# Patient Record
Sex: MALE | Race: WHITE | Employment: OTHER | URBAN - METROPOLITAN AREA
[De-identification: names, ages, dates, MRNs, and addresses within clinical notes are randomized per-mention and may not be internally consistent; named-entity substitution may affect disease eponyms.]

---

## 2017-01-01 ENCOUNTER — APPOINTMENT (OUTPATIENT)
Dept: GENERAL RADIOLOGY | Age: 57
DRG: 641 | End: 2017-01-01
Attending: HOSPITALIST
Payer: MEDICARE

## 2017-01-01 ENCOUNTER — APPOINTMENT (OUTPATIENT)
Dept: GENERAL RADIOLOGY | Age: 57
DRG: 641 | End: 2017-01-01
Attending: NURSE PRACTITIONER
Payer: MEDICARE

## 2017-01-01 ENCOUNTER — APPOINTMENT (OUTPATIENT)
Dept: GENERAL RADIOLOGY | Age: 57
DRG: 433 | End: 2017-01-01
Attending: HOSPITALIST
Payer: MEDICARE

## 2017-01-01 ENCOUNTER — HOSPITAL ENCOUNTER (INPATIENT)
Age: 57
LOS: 4 days | Discharge: HOME | DRG: 433 | End: 2017-11-24
Attending: EMERGENCY MEDICINE | Admitting: HOSPITALIST
Payer: MEDICARE

## 2017-01-01 ENCOUNTER — HOSPITAL ENCOUNTER (INPATIENT)
Age: 57
LOS: 4 days | Discharge: HOME | DRG: 641 | End: 2017-12-10
Attending: EMERGENCY MEDICINE | Admitting: INTERNAL MEDICINE
Payer: MEDICARE

## 2017-01-01 ENCOUNTER — APPOINTMENT (OUTPATIENT)
Dept: ULTRASOUND IMAGING | Age: 57
DRG: 433 | End: 2017-01-01
Attending: HOSPITALIST
Payer: MEDICARE

## 2017-01-01 ENCOUNTER — APPOINTMENT (OUTPATIENT)
Dept: GENERAL RADIOLOGY | Age: 57
DRG: 433 | End: 2017-01-01
Attending: EMERGENCY MEDICINE
Payer: MEDICARE

## 2017-01-01 ENCOUNTER — APPOINTMENT (OUTPATIENT)
Dept: ULTRASOUND IMAGING | Age: 57
DRG: 433 | End: 2017-01-01
Attending: INTERNAL MEDICINE
Payer: MEDICARE

## 2017-01-01 VITALS
BODY MASS INDEX: 28.41 KG/M2 | HEIGHT: 70 IN | TEMPERATURE: 98 F | DIASTOLIC BLOOD PRESSURE: 53 MMHG | OXYGEN SATURATION: 97 % | SYSTOLIC BLOOD PRESSURE: 94 MMHG | HEART RATE: 60 BPM | RESPIRATION RATE: 16 BRPM | WEIGHT: 198.41 LBS

## 2017-01-01 VITALS
RESPIRATION RATE: 15 BRPM | BODY MASS INDEX: 29.35 KG/M2 | WEIGHT: 205 LBS | DIASTOLIC BLOOD PRESSURE: 55 MMHG | HEIGHT: 70 IN | TEMPERATURE: 97.9 F | OXYGEN SATURATION: 98 % | HEART RATE: 60 BPM | SYSTOLIC BLOOD PRESSURE: 109 MMHG

## 2017-01-01 DIAGNOSIS — E87.1 HYPONATREMIA: Primary | ICD-10-CM

## 2017-01-01 DIAGNOSIS — D64.9 ANEMIA, UNSPECIFIED TYPE: ICD-10-CM

## 2017-01-01 DIAGNOSIS — R55 SYNCOPE AND COLLAPSE: ICD-10-CM

## 2017-01-01 DIAGNOSIS — F10.10 ALCOHOL ABUSE: ICD-10-CM

## 2017-01-01 DIAGNOSIS — N17.9 AKI (ACUTE KIDNEY INJURY) (HCC): ICD-10-CM

## 2017-01-01 DIAGNOSIS — D69.6 THROMBOCYTOPENIA (HCC): ICD-10-CM

## 2017-01-01 DIAGNOSIS — K74.60 CIRRHOSIS OF LIVER WITHOUT ASCITES, UNSPECIFIED HEPATIC CIRRHOSIS TYPE (HCC): ICD-10-CM

## 2017-01-01 DIAGNOSIS — K70.31 ALCOHOLIC CIRRHOSIS OF LIVER WITH ASCITES (HCC): ICD-10-CM
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
## 2017-01-01 DIAGNOSIS — R18.8 OTHER ASCITES: ICD-10-CM

## 2017-01-01 LAB
ABO + RH BLD: NORMAL
ALBUMIN SERPL-MCNC: 2.5 G/DL (ref 3.5–5)
ALBUMIN SERPL-MCNC: 2.7 G/DL (ref 3.5–5)
ALBUMIN SERPL-MCNC: 3.2 G/DL (ref 3.5–5)
ALBUMIN SERPL-MCNC: 4 G/DL (ref 3.5–5)
ALBUMIN/GLOB SERPL: 0.6 {RATIO} (ref 1.1–2.2)
ALBUMIN/GLOB SERPL: 0.6 {RATIO} (ref 1.1–2.2)
ALBUMIN/GLOB SERPL: 0.7 {RATIO} (ref 1.1–2.2)
ALBUMIN/GLOB SERPL: 1.2 {RATIO} (ref 1.1–2.2)
ALP SERPL-CCNC: 158 U/L (ref 45–117)
ALP SERPL-CCNC: 159 U/L (ref 45–117)
ALP SERPL-CCNC: 170 U/L (ref 45–117)
ALP SERPL-CCNC: 242 U/L (ref 45–117)
ALT SERPL-CCNC: 45 U/L (ref 12–78)
ALT SERPL-CCNC: 48 U/L (ref 12–78)
ALT SERPL-CCNC: 51 U/L (ref 12–78)
ALT SERPL-CCNC: 60 U/L (ref 12–78)
ANION GAP SERPL CALC-SCNC: 10 MMOL/L (ref 5–15)
ANION GAP SERPL CALC-SCNC: 11 MMOL/L (ref 5–15)
ANION GAP SERPL CALC-SCNC: 7 MMOL/L (ref 5–15)
ANION GAP SERPL CALC-SCNC: 8 MMOL/L (ref 5–15)
ANION GAP SERPL CALC-SCNC: 9 MMOL/L (ref 5–15)
APPEARANCE FLD: CLEAR
APPEARANCE UR: CLEAR
APTT PPP: 33.4 SEC (ref 22.1–32.5)
AST SERPL-CCNC: 107 U/L (ref 15–37)
AST SERPL-CCNC: 110 U/L (ref 15–37)
AST SERPL-CCNC: 129 U/L (ref 15–37)
AST SERPL-CCNC: 79 U/L (ref 15–37)
ATRIAL RATE: 60 BPM
BACTERIA URNS QL MICRO: NEGATIVE /HPF
BASOPHILS # BLD: 0 K/UL (ref 0–0.1)
BASOPHILS # BLD: 0.1 K/UL (ref 0–0.1)
BASOPHILS NFR BLD: 0 % (ref 0–1)
BASOPHILS NFR BLD: 1 % (ref 0–1)
BASOPHILS NFR BLD: 2 % (ref 0–1)
BILIRUB SERPL-MCNC: 4.6 MG/DL (ref 0.2–1)
BILIRUB SERPL-MCNC: 5.4 MG/DL (ref 0.2–1)
BILIRUB SERPL-MCNC: 6.1 MG/DL (ref 0.2–1)
BILIRUB SERPL-MCNC: 6.3 MG/DL (ref 0.2–1)
BILIRUB UR QL: NEGATIVE
BLD PROD TYP BPU: NORMAL
BLD PROD TYP BPU: NORMAL
BLOOD GROUP ANTIBODIES SERPL: NORMAL
BPU ID: NORMAL
BPU ID: NORMAL
BUN SERPL-MCNC: 11 MG/DL (ref 6–20)
BUN SERPL-MCNC: 12 MG/DL (ref 6–20)
BUN SERPL-MCNC: 12 MG/DL (ref 6–20)
BUN SERPL-MCNC: 13 MG/DL (ref 6–20)
BUN SERPL-MCNC: 14 MG/DL (ref 6–20)
BUN SERPL-MCNC: 16 MG/DL (ref 6–20)
BUN SERPL-MCNC: 17 MG/DL (ref 6–20)
BUN SERPL-MCNC: 17 MG/DL (ref 6–20)
BUN SERPL-MCNC: 24 MG/DL (ref 6–20)
BUN/CREAT SERPL: 12 (ref 12–20)
BUN/CREAT SERPL: 13 (ref 12–20)
BUN/CREAT SERPL: 14 (ref 12–20)
BUN/CREAT SERPL: 17 (ref 12–20)
BUN/CREAT SERPL: 18 (ref 12–20)
CALCIUM SERPL-MCNC: 10.1 MG/DL (ref 8.5–10.1)
CALCIUM SERPL-MCNC: 8.3 MG/DL (ref 8.5–10.1)
CALCIUM SERPL-MCNC: 8.4 MG/DL (ref 8.5–10.1)
CALCIUM SERPL-MCNC: 8.8 MG/DL (ref 8.5–10.1)
CALCIUM SERPL-MCNC: 8.9 MG/DL (ref 8.5–10.1)
CALCIUM SERPL-MCNC: 8.9 MG/DL (ref 8.5–10.1)
CALCIUM SERPL-MCNC: 9 MG/DL (ref 8.5–10.1)
CALCIUM SERPL-MCNC: 9.2 MG/DL (ref 8.5–10.1)
CALCIUM SERPL-MCNC: 9.7 MG/DL (ref 8.5–10.1)
CALCULATED P AXIS, ECG09: -3 DEGREES
CALCULATED R AXIS, ECG10: -114 DEGREES
CALCULATED T AXIS, ECG11: 31 DEGREES
CHLORIDE SERPL-SCNC: 86 MMOL/L (ref 97–108)
CHLORIDE SERPL-SCNC: 87 MMOL/L (ref 97–108)
CHLORIDE SERPL-SCNC: 91 MMOL/L (ref 97–108)
CHLORIDE SERPL-SCNC: 93 MMOL/L (ref 97–108)
CHLORIDE SERPL-SCNC: 93 MMOL/L (ref 97–108)
CHLORIDE SERPL-SCNC: 94 MMOL/L (ref 97–108)
CHLORIDE SERPL-SCNC: 96 MMOL/L (ref 97–108)
CHLORIDE SERPL-SCNC: 96 MMOL/L (ref 97–108)
CHLORIDE SERPL-SCNC: 97 MMOL/L (ref 97–108)
CO2 SERPL-SCNC: 21 MMOL/L (ref 21–32)
CO2 SERPL-SCNC: 21 MMOL/L (ref 21–32)
CO2 SERPL-SCNC: 22 MMOL/L (ref 21–32)
CO2 SERPL-SCNC: 22 MMOL/L (ref 21–32)
CO2 SERPL-SCNC: 24 MMOL/L (ref 21–32)
CO2 SERPL-SCNC: 25 MMOL/L (ref 21–32)
CO2 SERPL-SCNC: 26 MMOL/L (ref 21–32)
CO2 SERPL-SCNC: 26 MMOL/L (ref 21–32)
COLOR FLD: YELLOW
COLOR UR: ABNORMAL
CREAT SERPL-MCNC: 0.86 MG/DL (ref 0.7–1.3)
CREAT SERPL-MCNC: 0.87 MG/DL (ref 0.7–1.3)
CREAT SERPL-MCNC: 0.9 MG/DL (ref 0.7–1.3)
CREAT SERPL-MCNC: 0.9 MG/DL (ref 0.7–1.3)
CREAT SERPL-MCNC: 0.97 MG/DL (ref 0.7–1.3)
CREAT SERPL-MCNC: 0.98 MG/DL (ref 0.7–1.3)
CREAT SERPL-MCNC: 0.98 MG/DL (ref 0.7–1.3)
CREAT SERPL-MCNC: 1.01 MG/DL (ref 0.7–1.3)
CREAT SERPL-MCNC: 1.03 MG/DL (ref 0.7–1.3)
CREAT SERPL-MCNC: 1.41 MG/DL (ref 0.7–1.3)
CREAT SERPL-MCNC: 1.77 MG/DL (ref 0.7–1.3)
CROSSMATCH RESULT,%XM: NORMAL
CROSSMATCH RESULT,%XM: NORMAL
DIAGNOSIS, 93000: NORMAL
DIFFERENTIAL METHOD BLD: ABNORMAL
DIGOXIN SERPL-MCNC: 0.6 NG/ML (ref 0.9–2)
DIGOXIN SERPL-MCNC: 0.8 NG/ML (ref 0.9–2)
EOSINOPHIL # BLD: 0 K/UL (ref 0–0.4)
EOSINOPHIL # BLD: 0.1 K/UL (ref 0–0.4)
EOSINOPHIL NFR BLD: 0 % (ref 0–7)
EOSINOPHIL NFR BLD: 1 % (ref 0–7)
EOSINOPHIL NFR BLD: 1 % (ref 0–7)
EOSINOPHIL NFR BLD: 2 % (ref 0–7)
EOSINOPHIL NFR BLD: 3 % (ref 0–7)
EOSINOPHIL NFR BLD: 4 % (ref 0–7)
EPITH CASTS URNS QL MICRO: ABNORMAL /LPF
ERYTHROCYTE [DISTWIDTH] IN BLOOD BY AUTOMATED COUNT: 12.4 % (ref 11.5–14.5)
ERYTHROCYTE [DISTWIDTH] IN BLOOD BY AUTOMATED COUNT: 12.8 % (ref 11.5–14.5)
ERYTHROCYTE [DISTWIDTH] IN BLOOD BY AUTOMATED COUNT: 13 % (ref 11.5–14.5)
ERYTHROCYTE [DISTWIDTH] IN BLOOD BY AUTOMATED COUNT: 13 % (ref 11.5–14.5)
ERYTHROCYTE [DISTWIDTH] IN BLOOD BY AUTOMATED COUNT: 13.1 % (ref 11.5–14.5)
ERYTHROCYTE [DISTWIDTH] IN BLOOD BY AUTOMATED COUNT: 13.1 % (ref 11.5–14.5)
ERYTHROCYTE [DISTWIDTH] IN BLOOD BY AUTOMATED COUNT: 13.4 % (ref 11.5–14.5)
EST. AVERAGE GLUCOSE BLD GHB EST-MCNC: 114 MG/DL
EST. AVERAGE GLUCOSE BLD GHB EST-MCNC: NORMAL MG/DL
ETHANOL SERPL-MCNC: 136 MG/DL
FOLATE SERPL-MCNC: 19.9 NG/ML (ref 5–21)
GLOBULIN SER CALC-MCNC: 3.3 G/DL (ref 2–4)
GLOBULIN SER CALC-MCNC: 4.1 G/DL (ref 2–4)
GLOBULIN SER CALC-MCNC: 4.3 G/DL (ref 2–4)
GLOBULIN SER CALC-MCNC: 4.9 G/DL (ref 2–4)
GLUCOSE BLD STRIP.AUTO-MCNC: 111 MG/DL (ref 65–100)
GLUCOSE BLD STRIP.AUTO-MCNC: 121 MG/DL (ref 65–100)
GLUCOSE BLD STRIP.AUTO-MCNC: 125 MG/DL (ref 65–100)
GLUCOSE BLD STRIP.AUTO-MCNC: 126 MG/DL (ref 65–100)
GLUCOSE BLD STRIP.AUTO-MCNC: 128 MG/DL (ref 65–100)
GLUCOSE BLD STRIP.AUTO-MCNC: 129 MG/DL (ref 65–100)
GLUCOSE BLD STRIP.AUTO-MCNC: 130 MG/DL (ref 65–100)
GLUCOSE BLD STRIP.AUTO-MCNC: 134 MG/DL (ref 65–100)
GLUCOSE BLD STRIP.AUTO-MCNC: 135 MG/DL (ref 65–100)
GLUCOSE BLD STRIP.AUTO-MCNC: 137 MG/DL (ref 65–100)
GLUCOSE BLD STRIP.AUTO-MCNC: 140 MG/DL (ref 65–100)
GLUCOSE BLD STRIP.AUTO-MCNC: 140 MG/DL (ref 65–100)
GLUCOSE BLD STRIP.AUTO-MCNC: 141 MG/DL (ref 65–100)
GLUCOSE BLD STRIP.AUTO-MCNC: 142 MG/DL (ref 65–100)
GLUCOSE BLD STRIP.AUTO-MCNC: 144 MG/DL (ref 65–100)
GLUCOSE BLD STRIP.AUTO-MCNC: 145 MG/DL (ref 65–100)
GLUCOSE BLD STRIP.AUTO-MCNC: 149 MG/DL (ref 65–100)
GLUCOSE BLD STRIP.AUTO-MCNC: 153 MG/DL (ref 65–100)
GLUCOSE BLD STRIP.AUTO-MCNC: 157 MG/DL (ref 65–100)
GLUCOSE BLD STRIP.AUTO-MCNC: 172 MG/DL (ref 65–100)
GLUCOSE BLD STRIP.AUTO-MCNC: 175 MG/DL (ref 65–100)
GLUCOSE BLD STRIP.AUTO-MCNC: 181 MG/DL (ref 65–100)
GLUCOSE BLD STRIP.AUTO-MCNC: 185 MG/DL (ref 65–100)
GLUCOSE BLD STRIP.AUTO-MCNC: 186 MG/DL (ref 65–100)
GLUCOSE BLD STRIP.AUTO-MCNC: 186 MG/DL (ref 65–100)
GLUCOSE BLD STRIP.AUTO-MCNC: 188 MG/DL (ref 65–100)
GLUCOSE BLD STRIP.AUTO-MCNC: 194 MG/DL (ref 65–100)
GLUCOSE BLD STRIP.AUTO-MCNC: 197 MG/DL (ref 65–100)
GLUCOSE BLD STRIP.AUTO-MCNC: 197 MG/DL (ref 65–100)
GLUCOSE BLD STRIP.AUTO-MCNC: 206 MG/DL (ref 65–100)
GLUCOSE BLD STRIP.AUTO-MCNC: 243 MG/DL (ref 65–100)
GLUCOSE SERPL-MCNC: 103 MG/DL (ref 65–100)
GLUCOSE SERPL-MCNC: 115 MG/DL (ref 65–100)
GLUCOSE SERPL-MCNC: 121 MG/DL (ref 65–100)
GLUCOSE SERPL-MCNC: 123 MG/DL (ref 65–100)
GLUCOSE SERPL-MCNC: 137 MG/DL (ref 65–100)
GLUCOSE SERPL-MCNC: 137 MG/DL (ref 65–100)
GLUCOSE SERPL-MCNC: 139 MG/DL (ref 65–100)
GLUCOSE SERPL-MCNC: 141 MG/DL (ref 65–100)
GLUCOSE SERPL-MCNC: 144 MG/DL (ref 65–100)
GLUCOSE SERPL-MCNC: 166 MG/DL (ref 65–100)
GLUCOSE SERPL-MCNC: 170 MG/DL (ref 65–100)
GLUCOSE UR STRIP.AUTO-MCNC: 250 MG/DL
HBA1C MFR BLD: 4.9 % (ref 4.2–6.3)
HBA1C MFR BLD: 5.6 % (ref 4.2–6.3)
HCT VFR BLD AUTO: 18.9 % (ref 36.6–50.3)
HCT VFR BLD AUTO: 22.4 % (ref 36.6–50.3)
HCT VFR BLD AUTO: 23.3 % (ref 36.6–50.3)
HCT VFR BLD AUTO: 23.7 % (ref 36.6–50.3)
HCT VFR BLD AUTO: 24.5 % (ref 36.6–50.3)
HCT VFR BLD AUTO: 24.9 % (ref 36.6–50.3)
HCT VFR BLD AUTO: 25.5 % (ref 36.6–50.3)
HCT VFR BLD AUTO: 27.4 % (ref 36.6–50.3)
HCT VFR BLD AUTO: 28.5 % (ref 36.6–50.3)
HCT VFR BLD AUTO: 29.6 % (ref 36.6–50.3)
HGB BLD-MCNC: 10 G/DL (ref 12.1–17)
HGB BLD-MCNC: 11 G/DL (ref 12.1–17)
HGB BLD-MCNC: 6.9 G/DL (ref 12.1–17)
HGB BLD-MCNC: 8.3 G/DL (ref 12.1–17)
HGB BLD-MCNC: 8.5 G/DL (ref 12.1–17)
HGB BLD-MCNC: 8.7 G/DL (ref 12.1–17)
HGB BLD-MCNC: 8.8 G/DL (ref 12.1–17)
HGB BLD-MCNC: 9 G/DL (ref 12.1–17)
HGB BLD-MCNC: 9.5 G/DL (ref 12.1–17)
HGB BLD-MCNC: 9.9 G/DL (ref 12.1–17)
HGB UR QL STRIP: NEGATIVE
HYALINE CASTS URNS QL MICRO: ABNORMAL /LPF (ref 0–5)
INR PPP: 1.3 (ref 0.9–1.1)
KETONES UR QL STRIP.AUTO: NEGATIVE MG/DL
LEUKOCYTE ESTERASE UR QL STRIP.AUTO: NEGATIVE
LYMPHOCYTES # BLD: 0.2 K/UL (ref 0.8–3.5)
LYMPHOCYTES # BLD: 0.3 K/UL (ref 0.8–3.5)
LYMPHOCYTES # BLD: 0.3 K/UL (ref 0.8–3.5)
LYMPHOCYTES # BLD: 0.4 K/UL (ref 0.8–3.5)
LYMPHOCYTES NFR BLD: 10 % (ref 12–49)
LYMPHOCYTES NFR BLD: 11 % (ref 12–49)
LYMPHOCYTES NFR BLD: 13 % (ref 12–49)
LYMPHOCYTES NFR BLD: 19 % (ref 12–49)
LYMPHOCYTES NFR BLD: 22 % (ref 12–49)
LYMPHOCYTES NFR BLD: 4 % (ref 12–49)
LYMPHOCYTES NFR BLD: 7 % (ref 12–49)
LYMPHOCYTES NFR BLD: 9 % (ref 12–49)
LYMPHOCYTES NFR FLD: 2 %
MAGNESIUM SERPL-MCNC: 1.5 MG/DL (ref 1.6–2.4)
MAGNESIUM SERPL-MCNC: 1.8 MG/DL (ref 1.6–2.4)
MCH RBC QN AUTO: 37.4 PG (ref 26–34)
MCH RBC QN AUTO: 37.9 PG (ref 26–34)
MCH RBC QN AUTO: 38.1 PG (ref 26–34)
MCH RBC QN AUTO: 38.5 PG (ref 26–34)
MCH RBC QN AUTO: 38.6 PG (ref 26–34)
MCH RBC QN AUTO: 38.6 PG (ref 26–34)
MCH RBC QN AUTO: 39.1 PG (ref 26–34)
MCH RBC QN AUTO: 39.4 PG (ref 26–34)
MCH RBC QN AUTO: 39.6 PG (ref 26–34)
MCHC RBC AUTO-ENTMCNC: 34.7 G/DL (ref 30–36.5)
MCHC RBC AUTO-ENTMCNC: 35.9 G/DL (ref 30–36.5)
MCHC RBC AUTO-ENTMCNC: 36.1 G/DL (ref 30–36.5)
MCHC RBC AUTO-ENTMCNC: 36.5 G/DL (ref 30–36.5)
MCHC RBC AUTO-ENTMCNC: 37.1 G/DL (ref 30–36.5)
MCHC RBC AUTO-ENTMCNC: 37.2 G/DL (ref 30–36.5)
MCHC RBC AUTO-ENTMCNC: 37.3 G/DL (ref 30–36.5)
MCV RBC AUTO: 102.3 FL (ref 80–99)
MCV RBC AUTO: 104.3 FL (ref 80–99)
MCV RBC AUTO: 104.4 FL (ref 80–99)
MCV RBC AUTO: 105.3 FL (ref 80–99)
MCV RBC AUTO: 105.4 FL (ref 80–99)
MCV RBC AUTO: 105.9 FL (ref 80–99)
MCV RBC AUTO: 106.3 FL (ref 80–99)
MCV RBC AUTO: 106.9 FL (ref 80–99)
MCV RBC AUTO: 113 FL (ref 80–99)
MESOTHL CELL NFR FLD: 15 %
MONOCYTES # BLD: 0.2 K/UL (ref 0–1)
MONOCYTES # BLD: 0.2 K/UL (ref 0–1)
MONOCYTES # BLD: 0.3 K/UL (ref 0–1)
MONOCYTES # BLD: 0.4 K/UL (ref 0–1)
MONOCYTES # BLD: 0.5 K/UL (ref 0–1)
MONOCYTES # BLD: 0.5 K/UL (ref 0–1)
MONOCYTES # BLD: 0.6 K/UL (ref 0–1)
MONOCYTES # BLD: 0.7 K/UL (ref 0–1)
MONOCYTES NFR BLD: 11 % (ref 5–13)
MONOCYTES NFR BLD: 12 % (ref 5–13)
MONOCYTES NFR BLD: 16 % (ref 5–13)
MONOCYTES NFR BLD: 18 % (ref 5–13)
MONOCYTES NFR BLD: 22 % (ref 5–13)
MONOCYTES NFR BLD: 23 % (ref 5–13)
MONOCYTES NFR BLD: 9 % (ref 5–13)
MONOCYTES NFR BLD: 9 % (ref 5–13)
MONOS+MACROS NFR FLD: 82 %
NEUTROPHILS NFR FLD: 1 %
NEUTS BAND NFR BLD MANUAL: 5 % (ref 0–6)
NEUTS SEG # BLD: 1.1 K/UL (ref 1.8–8)
NEUTS SEG # BLD: 1.3 K/UL (ref 1.8–8)
NEUTS SEG # BLD: 1.5 K/UL (ref 1.8–8)
NEUTS SEG # BLD: 1.6 K/UL (ref 1.8–8)
NEUTS SEG # BLD: 1.7 K/UL (ref 1.8–8)
NEUTS SEG # BLD: 1.9 K/UL (ref 1.8–8)
NEUTS SEG # BLD: 3.2 K/UL (ref 1.8–8)
NEUTS SEG # BLD: 3.7 K/UL (ref 1.8–8)
NEUTS SEG NFR BLD: 62 % (ref 32–75)
NEUTS SEG NFR BLD: 62 % (ref 32–75)
NEUTS SEG NFR BLD: 64 % (ref 32–75)
NEUTS SEG NFR BLD: 66 % (ref 32–75)
NEUTS SEG NFR BLD: 68 % (ref 32–75)
NEUTS SEG NFR BLD: 77 % (ref 32–75)
NEUTS SEG NFR BLD: 78 % (ref 32–75)
NEUTS SEG NFR BLD: 83 % (ref 32–75)
NITRITE UR QL STRIP.AUTO: NEGATIVE
NRBC # BLD: 0.03 K/UL (ref 0–0.01)
NRBC BLD-RTO: 1.2 PER 100 WBC
NUC CELL # FLD: 247 /CU MM (ref 0–5)
OSMOLALITY SERPL: 273 MOSM/KG H2O
OSMOLALITY UR: 195 MOSM/KG H2O
P-R INTERVAL, ECG05: 226 MS
PH UR STRIP: 6 [PH] (ref 5–8)
PLATELET # BLD AUTO: 48 K/UL (ref 150–400)
PLATELET # BLD AUTO: 50 K/UL (ref 150–400)
PLATELET # BLD AUTO: 55 K/UL (ref 150–400)
PLATELET # BLD AUTO: 57 K/UL (ref 150–400)
PLATELET # BLD AUTO: 60 K/UL (ref 150–400)
PLATELET # BLD AUTO: 68 K/UL (ref 150–400)
PLATELET # BLD AUTO: 71 K/UL (ref 150–400)
PLATELET # BLD AUTO: 85 K/UL (ref 150–400)
PLATELET # BLD AUTO: 89 K/UL (ref 150–400)
POTASSIUM SERPL-SCNC: 3.9 MMOL/L (ref 3.5–5.1)
POTASSIUM SERPL-SCNC: 4.2 MMOL/L (ref 3.5–5.1)
POTASSIUM SERPL-SCNC: 4.2 MMOL/L (ref 3.5–5.1)
POTASSIUM SERPL-SCNC: 4.3 MMOL/L (ref 3.5–5.1)
POTASSIUM SERPL-SCNC: 4.4 MMOL/L (ref 3.5–5.1)
POTASSIUM SERPL-SCNC: 4.4 MMOL/L (ref 3.5–5.1)
POTASSIUM SERPL-SCNC: 4.5 MMOL/L (ref 3.5–5.1)
POTASSIUM SERPL-SCNC: 4.7 MMOL/L (ref 3.5–5.1)
POTASSIUM SERPL-SCNC: 5.1 MMOL/L (ref 3.5–5.1)
PROT SERPL-MCNC: 6.6 G/DL (ref 6.4–8.2)
PROT SERPL-MCNC: 7.3 G/DL (ref 6.4–8.2)
PROT SERPL-MCNC: 7.5 G/DL (ref 6.4–8.2)
PROT SERPL-MCNC: 7.6 G/DL (ref 6.4–8.2)
PROT UR STRIP-MCNC: NEGATIVE MG/DL
PROTHROMBIN TIME: 13.3 SEC (ref 9–11.1)
Q-T INTERVAL, ECG07: 500 MS
QRS DURATION, ECG06: 158 MS
QTC CALCULATION (BEZET), ECG08: 500 MS
RBC # BLD AUTO: 1.81 M/UL (ref 4.1–5.7)
RBC # BLD AUTO: 2.19 M/UL (ref 4.1–5.7)
RBC # BLD AUTO: 2.2 M/UL (ref 4.1–5.7)
RBC # BLD AUTO: 2.33 M/UL (ref 4.1–5.7)
RBC # BLD AUTO: 2.35 M/UL (ref 4.1–5.7)
RBC # BLD AUTO: 2.4 M/UL (ref 4.1–5.7)
RBC # BLD AUTO: 2.51 M/UL (ref 4.1–5.7)
RBC # BLD AUTO: 2.6 M/UL (ref 4.1–5.7)
RBC # BLD AUTO: 2.81 M/UL (ref 4.1–5.7)
RBC # FLD: >100 /CU MM
RBC #/AREA URNS HPF: ABNORMAL /HPF (ref 0–5)
RBC MORPH BLD: ABNORMAL
SERVICE CMNT-IMP: ABNORMAL
SODIUM SERPL-SCNC: 117 MMOL/L (ref 136–145)
SODIUM SERPL-SCNC: 118 MMOL/L (ref 136–145)
SODIUM SERPL-SCNC: 123 MMOL/L (ref 136–145)
SODIUM SERPL-SCNC: 124 MMOL/L (ref 136–145)
SODIUM SERPL-SCNC: 125 MMOL/L (ref 136–145)
SODIUM SERPL-SCNC: 127 MMOL/L (ref 136–145)
SODIUM SERPL-SCNC: 127 MMOL/L (ref 136–145)
SODIUM SERPL-SCNC: 129 MMOL/L (ref 136–145)
SODIUM SERPL-SCNC: 130 MMOL/L (ref 136–145)
SODIUM SERPL-SCNC: 130 MMOL/L (ref 136–145)
SODIUM SERPL-SCNC: 131 MMOL/L (ref 136–145)
SP GR UR REFRACTOMETRY: 1.01 (ref 1–1.03)
SPECIMEN EXP DATE BLD: NORMAL
SPECIMEN SOURCE FLD: ABNORMAL
STATUS OF UNIT,%ST: NORMAL
STATUS OF UNIT,%ST: NORMAL
THERAPEUTIC RANGE,PTTT: ABNORMAL SECS (ref 58–77)
UNIT DIVISION, %UDIV: 0
UNIT DIVISION, %UDIV: 0
UR CULT HOLD, URHOLD: NORMAL
UROBILINOGEN UR QL STRIP.AUTO: 1 EU/DL (ref 0.2–1)
VENTRICULAR RATE, ECG03: 60 BPM
VIT B12 SERPL-MCNC: 1280 PG/ML (ref 211–911)
VIT B12 SERPL-MCNC: 886 PG/ML (ref 211–911)
WBC # BLD AUTO: 1.9 K/UL (ref 4.1–11.1)
WBC # BLD AUTO: 1.9 K/UL (ref 4.1–11.1)
WBC # BLD AUTO: 2.1 K/UL (ref 4.1–11.1)
WBC # BLD AUTO: 2.3 K/UL (ref 4.1–11.1)
WBC # BLD AUTO: 2.5 K/UL (ref 4.1–11.1)
WBC # BLD AUTO: 2.6 K/UL (ref 4.1–11.1)
WBC # BLD AUTO: 2.9 K/UL (ref 4.1–11.1)
WBC # BLD AUTO: 4.1 K/UL (ref 4.1–11.1)
WBC # BLD AUTO: 4.4 K/UL (ref 4.1–11.1)
WBC NRBC COR # BLD: ABNORMAL 10*3/UL
WBC URNS QL MICRO: ABNORMAL /HPF (ref 0–4)

## 2017-01-01 PROCEDURE — 74011636637 HC RX REV CODE- 636/637: Performed by: INTERNAL MEDICINE

## 2017-01-01 PROCEDURE — 80053 COMPREHEN METABOLIC PANEL: CPT | Performed by: EMERGENCY MEDICINE

## 2017-01-01 PROCEDURE — 77030011256 HC DRSG MEPILEX <16IN NO BORD MOLN -A

## 2017-01-01 PROCEDURE — 74011250637 HC RX REV CODE- 250/637: Performed by: HOSPITALIST

## 2017-01-01 PROCEDURE — 83735 ASSAY OF MAGNESIUM: CPT | Performed by: HOSPITALIST

## 2017-01-01 PROCEDURE — 97161 PT EVAL LOW COMPLEX 20 MIN: CPT

## 2017-01-01 PROCEDURE — 65660000000 HC RM CCU STEPDOWN

## 2017-01-01 PROCEDURE — 77030010545

## 2017-01-01 PROCEDURE — 77030012940 HC DRSG HYDRCOIL BMS -B

## 2017-01-01 PROCEDURE — 93971 EXTREMITY STUDY: CPT

## 2017-01-01 PROCEDURE — 82962 GLUCOSE BLOOD TEST: CPT

## 2017-01-01 PROCEDURE — 74011250637 HC RX REV CODE- 250/637: Performed by: INTERNAL MEDICINE

## 2017-01-01 PROCEDURE — 77030028894 HC DRSG MEDIH CA ALG INLC -B

## 2017-01-01 PROCEDURE — 83036 HEMOGLOBIN GLYCOSYLATED A1C: CPT | Performed by: HOSPITALIST

## 2017-01-01 PROCEDURE — 74011250636 HC RX REV CODE- 250/636: Performed by: INTERNAL MEDICINE

## 2017-01-01 PROCEDURE — 94761 N-INVAS EAR/PLS OXIMETRY MLT: CPT

## 2017-01-01 PROCEDURE — 93005 ELECTROCARDIOGRAM TRACING: CPT

## 2017-01-01 PROCEDURE — 85025 COMPLETE CBC W/AUTO DIFF WBC: CPT | Performed by: EMERGENCY MEDICINE

## 2017-01-01 PROCEDURE — 82607 VITAMIN B-12: CPT | Performed by: INTERNAL MEDICINE

## 2017-01-01 PROCEDURE — 85025 COMPLETE CBC W/AUTO DIFF WBC: CPT | Performed by: HOSPITALIST

## 2017-01-01 PROCEDURE — 74011000250 HC RX REV CODE- 250: Performed by: RADIOLOGY

## 2017-01-01 PROCEDURE — 36415 COLL VENOUS BLD VENIPUNCTURE: CPT | Performed by: HOSPITALIST

## 2017-01-01 PROCEDURE — 86923 COMPATIBILITY TEST ELECTRIC: CPT | Performed by: FAMILY MEDICINE

## 2017-01-01 PROCEDURE — 74011250636 HC RX REV CODE- 250/636: Performed by: EMERGENCY MEDICINE

## 2017-01-01 PROCEDURE — 74000 XR ABD PORT  1 V: CPT

## 2017-01-01 PROCEDURE — 74011636637 HC RX REV CODE- 636/637: Performed by: HOSPITALIST

## 2017-01-01 PROCEDURE — 77030002996 HC SUT SLK J&J -A

## 2017-01-01 PROCEDURE — 71020 XR CHEST PA LAT: CPT

## 2017-01-01 PROCEDURE — 96374 THER/PROPH/DIAG INJ IV PUSH: CPT

## 2017-01-01 PROCEDURE — P9047 ALBUMIN (HUMAN), 25%, 50ML: HCPCS | Performed by: INTERNAL MEDICINE

## 2017-01-01 PROCEDURE — 85730 THROMBOPLASTIN TIME PARTIAL: CPT | Performed by: EMERGENCY MEDICINE

## 2017-01-01 PROCEDURE — 80048 BASIC METABOLIC PNL TOTAL CA: CPT | Performed by: HOSPITALIST

## 2017-01-01 PROCEDURE — 85027 COMPLETE CBC AUTOMATED: CPT | Performed by: HOSPITALIST

## 2017-01-01 PROCEDURE — 80053 COMPREHEN METABOLIC PANEL: CPT | Performed by: HOSPITALIST

## 2017-01-01 PROCEDURE — 82607 VITAMIN B-12: CPT | Performed by: HOSPITALIST

## 2017-01-01 PROCEDURE — 83735 ASSAY OF MAGNESIUM: CPT | Performed by: NURSE PRACTITIONER

## 2017-01-01 PROCEDURE — 49083 ABD PARACENTESIS W/IMAGING: CPT

## 2017-01-01 PROCEDURE — 85018 HEMOGLOBIN: CPT | Performed by: HOSPITALIST

## 2017-01-01 PROCEDURE — 77030032490 HC SLV COMPR SCD KNE COVD -B

## 2017-01-01 PROCEDURE — 30233N1 TRANSFUSION OF NONAUTOLOGOUS RED BLOOD CELLS INTO PERIPHERAL VEIN, PERCUTANEOUS APPROACH: ICD-10-PCS | Performed by: FAMILY MEDICINE

## 2017-01-01 PROCEDURE — 80307 DRUG TEST PRSMV CHEM ANLYZR: CPT | Performed by: EMERGENCY MEDICINE

## 2017-01-01 PROCEDURE — 86900 BLOOD TYPING SEROLOGIC ABO: CPT | Performed by: FAMILY MEDICINE

## 2017-01-01 PROCEDURE — 74011000250 HC RX REV CODE- 250: Performed by: NURSE PRACTITIONER

## 2017-01-01 PROCEDURE — 83930 ASSAY OF BLOOD OSMOLALITY: CPT | Performed by: HOSPITALIST

## 2017-01-01 PROCEDURE — 83036 HEMOGLOBIN GLYCOSYLATED A1C: CPT | Performed by: INTERNAL MEDICINE

## 2017-01-01 PROCEDURE — 36415 COLL VENOUS BLD VENIPUNCTURE: CPT | Performed by: EMERGENCY MEDICINE

## 2017-01-01 PROCEDURE — 96365 THER/PROPH/DIAG IV INF INIT: CPT

## 2017-01-01 PROCEDURE — 80162 ASSAY OF DIGOXIN TOTAL: CPT | Performed by: INTERNAL MEDICINE

## 2017-01-01 PROCEDURE — 83935 ASSAY OF URINE OSMOLALITY: CPT | Performed by: INTERNAL MEDICINE

## 2017-01-01 PROCEDURE — 76705 ECHO EXAM OF ABDOMEN: CPT

## 2017-01-01 PROCEDURE — 99285 EMERGENCY DEPT VISIT HI MDM: CPT

## 2017-01-01 PROCEDURE — 89050 BODY FLUID CELL COUNT: CPT | Performed by: INTERNAL MEDICINE

## 2017-01-01 PROCEDURE — 96366 THER/PROPH/DIAG IV INF ADDON: CPT

## 2017-01-01 PROCEDURE — 81001 URINALYSIS AUTO W/SCOPE: CPT | Performed by: NURSE PRACTITIONER

## 2017-01-01 PROCEDURE — 80048 BASIC METABOLIC PNL TOTAL CA: CPT | Performed by: INTERNAL MEDICINE

## 2017-01-01 PROCEDURE — 74011250636 HC RX REV CODE- 250/636: Performed by: HOSPITALIST

## 2017-01-01 PROCEDURE — 74011250636 HC RX REV CODE- 250/636: Performed by: NURSE PRACTITIONER

## 2017-01-01 PROCEDURE — 0W9G3ZZ DRAINAGE OF PERITONEAL CAVITY, PERCUTANEOUS APPROACH: ICD-10-PCS | Performed by: RADIOLOGY

## 2017-01-01 PROCEDURE — 77030005208 HC CATH HLDR GLWC -A

## 2017-01-01 PROCEDURE — C1729 CATH, DRAINAGE: HCPCS

## 2017-01-01 PROCEDURE — 72100 X-RAY EXAM L-S SPINE 2/3 VWS: CPT

## 2017-01-01 PROCEDURE — 82746 ASSAY OF FOLIC ACID SERUM: CPT | Performed by: INTERNAL MEDICINE

## 2017-01-01 PROCEDURE — 36415 COLL VENOUS BLD VENIPUNCTURE: CPT | Performed by: INTERNAL MEDICINE

## 2017-01-01 PROCEDURE — 80162 ASSAY OF DIGOXIN TOTAL: CPT | Performed by: NURSE PRACTITIONER

## 2017-01-01 PROCEDURE — 97116 GAIT TRAINING THERAPY: CPT

## 2017-01-01 PROCEDURE — P9047 ALBUMIN (HUMAN), 25%, 50ML: HCPCS | Performed by: NURSE PRACTITIONER

## 2017-01-01 PROCEDURE — 85610 PROTHROMBIN TIME: CPT | Performed by: EMERGENCY MEDICINE

## 2017-01-01 PROCEDURE — 99284 EMERGENCY DEPT VISIT MOD MDM: CPT

## 2017-01-01 PROCEDURE — 96368 THER/DIAG CONCURRENT INF: CPT

## 2017-01-01 RX ORDER — ONDANSETRON 2 MG/ML
4 INJECTION INTRAMUSCULAR; INTRAVENOUS
Status: DISCONTINUED | OUTPATIENT
Start: 2017-01-01 | End: 2017-01-01 | Stop reason: HOSPADM

## 2017-01-01 RX ORDER — METOPROLOL SUCCINATE 50 MG/1
100 TABLET, EXTENDED RELEASE ORAL
Status: DISCONTINUED | OUTPATIENT
Start: 2017-01-01 | End: 2017-01-01

## 2017-01-01 RX ORDER — LANOLIN ALCOHOL/MO/W.PET/CERES
100 CREAM (GRAM) TOPICAL DAILY
Status: DISCONTINUED | OUTPATIENT
Start: 2017-01-01 | End: 2017-01-01 | Stop reason: HOSPADM

## 2017-01-01 RX ORDER — FUROSEMIDE 40 MG/1
40 TABLET ORAL DAILY
Status: DISCONTINUED | OUTPATIENT
Start: 2017-01-01 | End: 2017-01-01

## 2017-01-01 RX ORDER — SODIUM CHLORIDE 0.9 % (FLUSH) 0.9 %
5-10 SYRINGE (ML) INJECTION AS NEEDED
Status: DISCONTINUED | OUTPATIENT
Start: 2017-01-01 | End: 2017-01-01 | Stop reason: HOSPADM

## 2017-01-01 RX ORDER — SPIRONOLACTONE 100 MG/1
100 TABLET, FILM COATED ORAL DAILY
Status: DISCONTINUED | OUTPATIENT
Start: 2017-01-01 | End: 2017-01-01 | Stop reason: HOSPADM

## 2017-01-01 RX ORDER — INSULIN LISPRO 100 [IU]/ML
INJECTION, SOLUTION INTRAVENOUS; SUBCUTANEOUS
Status: DISCONTINUED | OUTPATIENT
Start: 2017-01-01 | End: 2017-01-01 | Stop reason: HOSPADM

## 2017-01-01 RX ORDER — SIMETHICONE 80 MG
80 TABLET,CHEWABLE ORAL
Status: DISCONTINUED | OUTPATIENT
Start: 2017-01-01 | End: 2017-01-01 | Stop reason: HOSPADM

## 2017-01-01 RX ORDER — DEXTROSE 50 % IN WATER (D50W) INTRAVENOUS SYRINGE
12.5-25 AS NEEDED
Status: DISCONTINUED | OUTPATIENT
Start: 2017-01-01 | End: 2017-01-01 | Stop reason: HOSPADM

## 2017-01-01 RX ORDER — HEPARIN SODIUM 5000 [USP'U]/ML
5000 INJECTION, SOLUTION INTRAVENOUS; SUBCUTANEOUS EVERY 8 HOURS
Status: CANCELLED | OUTPATIENT
Start: 2017-01-01

## 2017-01-01 RX ORDER — SODIUM CHLORIDE 9 MG/ML
250 INJECTION, SOLUTION INTRAVENOUS AS NEEDED
Status: DISCONTINUED | OUTPATIENT
Start: 2017-01-01 | End: 2017-01-01 | Stop reason: HOSPADM

## 2017-01-01 RX ORDER — LORAZEPAM 2 MG/ML
4 INJECTION INTRAMUSCULAR
Status: DISCONTINUED | OUTPATIENT
Start: 2017-01-01 | End: 2017-01-01 | Stop reason: HOSPADM

## 2017-01-01 RX ORDER — DIGOXIN 125 MCG
0.12 TABLET ORAL
Status: DISCONTINUED | OUTPATIENT
Start: 2017-01-01 | End: 2017-01-01 | Stop reason: HOSPADM

## 2017-01-01 RX ORDER — METOPROLOL SUCCINATE 25 MG/1
12.5 TABLET, EXTENDED RELEASE ORAL DAILY
Status: DISCONTINUED | OUTPATIENT
Start: 2017-01-01 | End: 2017-01-01 | Stop reason: HOSPADM

## 2017-01-01 RX ORDER — AMIODARONE HYDROCHLORIDE 200 MG/1
200 TABLET ORAL
COMMUNITY
End: 2018-01-01 | Stop reason: SDUPTHER

## 2017-01-01 RX ORDER — METOPROLOL SUCCINATE 25 MG/1
12.5 TABLET, EXTENDED RELEASE ORAL DAILY
Qty: 15 TAB | Refills: 0 | Status: SHIPPED | OUTPATIENT
Start: 2017-01-01

## 2017-01-01 RX ORDER — AMIODARONE HYDROCHLORIDE 200 MG/1
100 TABLET ORAL
Status: DISCONTINUED | OUTPATIENT
Start: 2017-01-01 | End: 2017-01-01

## 2017-01-01 RX ORDER — AMIODARONE HYDROCHLORIDE 200 MG/1
200 TABLET ORAL
Status: DISCONTINUED | OUTPATIENT
Start: 2017-01-01 | End: 2017-01-01 | Stop reason: HOSPADM

## 2017-01-01 RX ORDER — LORAZEPAM 1 MG/1
2 TABLET ORAL
Status: DISCONTINUED | OUTPATIENT
Start: 2017-01-01 | End: 2017-01-01 | Stop reason: HOSPADM

## 2017-01-01 RX ORDER — GABAPENTIN 100 MG/1
100 CAPSULE ORAL 3 TIMES DAILY
Status: DISCONTINUED | OUTPATIENT
Start: 2017-01-01 | End: 2017-01-01 | Stop reason: HOSPADM

## 2017-01-01 RX ORDER — AMIODARONE HYDROCHLORIDE 200 MG/1
200 TABLET ORAL
Status: DISCONTINUED | OUTPATIENT
Start: 2017-01-01 | End: 2017-01-01

## 2017-01-01 RX ORDER — SPIRONOLACTONE 50 MG/1
50 TABLET, FILM COATED ORAL
COMMUNITY
End: 2017-01-01

## 2017-01-01 RX ORDER — LANOLIN ALCOHOL/MO/W.PET/CERES
100 CREAM (GRAM) TOPICAL DAILY
Qty: 30 TAB | Refills: 0 | Status: SHIPPED | OUTPATIENT
Start: 2017-01-01

## 2017-01-01 RX ORDER — BACITRACIN 500 UNIT/G
1 PACKET (EA) TOPICAL
Status: COMPLETED | OUTPATIENT
Start: 2017-01-01 | End: 2017-01-01

## 2017-01-01 RX ORDER — MAGNESIUM SULFATE 100 %
4 CRYSTALS MISCELLANEOUS AS NEEDED
Status: DISCONTINUED | OUTPATIENT
Start: 2017-01-01 | End: 2017-01-01 | Stop reason: HOSPADM

## 2017-01-01 RX ORDER — FOLIC ACID 1 MG/1
1 TABLET ORAL DAILY
Status: DISCONTINUED | OUTPATIENT
Start: 2017-01-01 | End: 2017-01-01 | Stop reason: HOSPADM

## 2017-01-01 RX ORDER — ALBUMIN HUMAN 250 G/1000ML
25 SOLUTION INTRAVENOUS EVERY 6 HOURS
Status: DISCONTINUED | OUTPATIENT
Start: 2017-01-01 | End: 2017-01-01 | Stop reason: HOSPADM

## 2017-01-01 RX ORDER — METOPROLOL SUCCINATE 25 MG/1
25 TABLET, EXTENDED RELEASE ORAL
Status: DISCONTINUED | OUTPATIENT
Start: 2017-01-01 | End: 2017-01-01

## 2017-01-01 RX ORDER — ALBUMIN HUMAN 250 G/1000ML
25 SOLUTION INTRAVENOUS EVERY 6 HOURS
Status: COMPLETED | OUTPATIENT
Start: 2017-01-01 | End: 2017-01-01

## 2017-01-01 RX ORDER — IBUPROFEN 400 MG/1
400 TABLET ORAL
Status: DISCONTINUED | OUTPATIENT
Start: 2017-01-01 | End: 2017-01-01 | Stop reason: HOSPADM

## 2017-01-01 RX ORDER — FUROSEMIDE 40 MG/1
40 TABLET ORAL DAILY
Qty: 30 TAB | Refills: 0 | Status: SHIPPED | OUTPATIENT
Start: 2017-01-01 | End: 2017-01-01

## 2017-01-01 RX ORDER — FOLIC ACID 1 MG/1
1 TABLET ORAL DAILY
Qty: 30 TAB | Refills: 0 | Status: SHIPPED | OUTPATIENT
Start: 2017-01-01 | End: 2018-01-01

## 2017-01-01 RX ORDER — DIAZEPAM 5 MG/1
15 TABLET ORAL EVERY 8 HOURS
Status: DISCONTINUED | OUTPATIENT
Start: 2017-01-01 | End: 2017-01-01

## 2017-01-01 RX ORDER — FUROSEMIDE 10 MG/ML
60 INJECTION INTRAMUSCULAR; INTRAVENOUS DAILY
Status: DISCONTINUED | OUTPATIENT
Start: 2017-01-01 | End: 2017-01-01

## 2017-01-01 RX ORDER — SPIRONOLACTONE 100 MG/1
100 TABLET, FILM COATED ORAL DAILY
Qty: 30 TAB | Refills: 0 | Status: SHIPPED | OUTPATIENT
Start: 2017-01-01 | End: 2017-01-01

## 2017-01-01 RX ORDER — SODIUM CHLORIDE 0.9 % (FLUSH) 0.9 %
5-10 SYRINGE (ML) INJECTION EVERY 8 HOURS
Status: DISCONTINUED | OUTPATIENT
Start: 2017-01-01 | End: 2017-01-01 | Stop reason: HOSPADM

## 2017-01-01 RX ORDER — FUROSEMIDE 10 MG/ML
40 INJECTION INTRAMUSCULAR; INTRAVENOUS DAILY
Status: DISCONTINUED | OUTPATIENT
Start: 2017-01-01 | End: 2017-01-01

## 2017-01-01 RX ORDER — MIDODRINE HYDROCHLORIDE 5 MG/1
5 TABLET ORAL 2 TIMES DAILY WITH MEALS
Status: DISCONTINUED | OUTPATIENT
Start: 2017-01-01 | End: 2017-01-01 | Stop reason: HOSPADM

## 2017-01-01 RX ORDER — FUROSEMIDE 40 MG/1
40 TABLET ORAL DAILY
Status: DISCONTINUED | OUTPATIENT
Start: 2017-01-01 | End: 2017-01-01 | Stop reason: HOSPADM

## 2017-01-01 RX ORDER — SPIRONOLACTONE 25 MG/1
50 TABLET ORAL
Status: DISCONTINUED | OUTPATIENT
Start: 2017-01-01 | End: 2017-01-01

## 2017-01-01 RX ORDER — MAGNESIUM SULFATE HEPTAHYDRATE 40 MG/ML
2 INJECTION, SOLUTION INTRAVENOUS ONCE
Status: COMPLETED | OUTPATIENT
Start: 2017-01-01 | End: 2017-01-01

## 2017-01-01 RX ORDER — SPIRONOLACTONE 100 MG/1
100 TABLET, FILM COATED ORAL DAILY
Status: DISCONTINUED | OUTPATIENT
Start: 2017-01-01 | End: 2017-01-01

## 2017-01-01 RX ORDER — LIDOCAINE HYDROCHLORIDE 10 MG/ML
10 INJECTION, SOLUTION EPIDURAL; INFILTRATION; INTRACAUDAL; PERINEURAL
Status: COMPLETED | OUTPATIENT
Start: 2017-01-01 | End: 2017-01-01

## 2017-01-01 RX ORDER — LORAZEPAM 2 MG/ML
2 INJECTION INTRAMUSCULAR
Status: DISCONTINUED | OUTPATIENT
Start: 2017-01-01 | End: 2017-01-01

## 2017-01-01 RX ORDER — FUROSEMIDE 10 MG/ML
20 INJECTION INTRAMUSCULAR; INTRAVENOUS
Status: COMPLETED | OUTPATIENT
Start: 2017-01-01 | End: 2017-01-01

## 2017-01-01 RX ORDER — LORAZEPAM 2 MG/ML
2 INJECTION INTRAMUSCULAR
Status: DISCONTINUED | OUTPATIENT
Start: 2017-01-01 | End: 2017-01-01 | Stop reason: HOSPADM

## 2017-01-01 RX ORDER — ALBUMIN HUMAN 250 G/1000ML
12.5 SOLUTION INTRAVENOUS ONCE
Status: COMPLETED | OUTPATIENT
Start: 2017-01-01 | End: 2017-01-01

## 2017-01-01 RX ORDER — ALBUMIN HUMAN 250 G/1000ML
25 SOLUTION INTRAVENOUS EVERY 6 HOURS
Status: DISCONTINUED | OUTPATIENT
Start: 2017-01-01 | End: 2017-01-01 | Stop reason: SDUPTHER

## 2017-01-01 RX ORDER — THERA TABS 400 MCG
1 TAB ORAL DAILY
Status: DISCONTINUED | OUTPATIENT
Start: 2017-01-01 | End: 2017-01-01 | Stop reason: HOSPADM

## 2017-01-01 RX ADMIN — FUROSEMIDE 40 MG: 40 TABLET ORAL at 13:04

## 2017-01-01 RX ADMIN — GABAPENTIN 100 MG: 100 CAPSULE ORAL at 09:00

## 2017-01-01 RX ADMIN — FUROSEMIDE 40 MG: 40 TABLET ORAL at 09:12

## 2017-01-01 RX ADMIN — Medication 10 ML: at 20:59

## 2017-01-01 RX ADMIN — ALBUMIN (HUMAN) 25 G: 0.25 INJECTION, SOLUTION INTRAVENOUS at 15:23

## 2017-01-01 RX ADMIN — ALBUMIN (HUMAN) 25 G: 0.25 INJECTION, SOLUTION INTRAVENOUS at 06:35

## 2017-01-01 RX ADMIN — SPIRONOLACTONE 100 MG: 100 TABLET, FILM COATED ORAL at 09:56

## 2017-01-01 RX ADMIN — SODIUM CHLORIDE 500 ML: 900 INJECTION, SOLUTION INTRAVENOUS at 17:31

## 2017-01-01 RX ADMIN — IBUPROFEN 400 MG: 400 TABLET ORAL at 12:09

## 2017-01-01 RX ADMIN — AMIODARONE HYDROCHLORIDE 200 MG: 200 TABLET ORAL at 21:07

## 2017-01-01 RX ADMIN — INSULIN LISPRO 2 UNITS: 100 INJECTION, SOLUTION INTRAVENOUS; SUBCUTANEOUS at 07:15

## 2017-01-01 RX ADMIN — Medication 10 ML: at 16:45

## 2017-01-01 RX ADMIN — ALBUMIN (HUMAN) 25 G: 0.25 INJECTION, SOLUTION INTRAVENOUS at 07:01

## 2017-01-01 RX ADMIN — SPIRONOLACTONE 50 MG: 25 TABLET ORAL at 21:07

## 2017-01-01 RX ADMIN — SPIRONOLACTONE 100 MG: 100 TABLET, FILM COATED ORAL at 10:09

## 2017-01-01 RX ADMIN — AMIODARONE HYDROCHLORIDE 200 MG: 200 TABLET ORAL at 21:01

## 2017-01-01 RX ADMIN — AMIODARONE HYDROCHLORIDE 200 MG: 200 TABLET ORAL at 22:10

## 2017-01-01 RX ADMIN — GABAPENTIN 100 MG: 100 CAPSULE ORAL at 21:25

## 2017-01-01 RX ADMIN — FOLIC ACID 1 MG: 1 TABLET ORAL at 09:00

## 2017-01-01 RX ADMIN — AMIODARONE HYDROCHLORIDE 200 MG: 200 TABLET ORAL at 21:10

## 2017-01-01 RX ADMIN — ALBUMIN (HUMAN) 25 G: 0.25 INJECTION, SOLUTION INTRAVENOUS at 18:27

## 2017-01-01 RX ADMIN — THERA TABS 1 TABLET: TAB at 09:26

## 2017-01-01 RX ADMIN — AMIODARONE HYDROCHLORIDE 200 MG: 200 TABLET ORAL at 21:25

## 2017-01-01 RX ADMIN — DIGOXIN 0.12 MG: 125 TABLET ORAL at 21:25

## 2017-01-01 RX ADMIN — Medication 10 ML: at 13:15

## 2017-01-01 RX ADMIN — GABAPENTIN 100 MG: 100 CAPSULE ORAL at 16:40

## 2017-01-01 RX ADMIN — Medication 100 MG: at 08:47

## 2017-01-01 RX ADMIN — GABAPENTIN 100 MG: 100 CAPSULE ORAL at 01:42

## 2017-01-01 RX ADMIN — IBUPROFEN 400 MG: 400 TABLET ORAL at 18:08

## 2017-01-01 RX ADMIN — Medication 10 ML: at 21:01

## 2017-01-01 RX ADMIN — DIGOXIN 0.12 MG: 125 TABLET ORAL at 21:00

## 2017-01-01 RX ADMIN — Medication 10 ML: at 06:36

## 2017-01-01 RX ADMIN — GABAPENTIN 100 MG: 100 CAPSULE ORAL at 09:13

## 2017-01-01 RX ADMIN — SPIRONOLACTONE 100 MG: 100 TABLET, FILM COATED ORAL at 09:00

## 2017-01-01 RX ADMIN — Medication 10 ML: at 06:00

## 2017-01-01 RX ADMIN — INSULIN LISPRO 2 UNITS: 100 INJECTION, SOLUTION INTRAVENOUS; SUBCUTANEOUS at 11:44

## 2017-01-01 RX ADMIN — SACUBITRIL AND VALSARTAN 1 TABLET: 49; 51 TABLET, FILM COATED ORAL at 21:07

## 2017-01-01 RX ADMIN — SACUBITRIL AND VALSARTAN 1 TABLET: 24; 26 TABLET, FILM COATED ORAL at 09:17

## 2017-01-01 RX ADMIN — INSULIN LISPRO 2 UNITS: 100 INJECTION, SOLUTION INTRAVENOUS; SUBCUTANEOUS at 12:29

## 2017-01-01 RX ADMIN — FUROSEMIDE 40 MG: 40 TABLET ORAL at 09:00

## 2017-01-01 RX ADMIN — SPIRONOLACTONE 100 MG: 100 TABLET, FILM COATED ORAL at 13:04

## 2017-01-01 RX ADMIN — FUROSEMIDE 60 MG: 10 INJECTION, SOLUTION INTRAMUSCULAR; INTRAVENOUS at 08:31

## 2017-01-01 RX ADMIN — Medication 10 ML: at 07:34

## 2017-01-01 RX ADMIN — GABAPENTIN 100 MG: 100 CAPSULE ORAL at 09:27

## 2017-01-01 RX ADMIN — Medication 10 ML: at 19:36

## 2017-01-01 RX ADMIN — INSULIN LISPRO 2 UNITS: 100 INJECTION, SOLUTION INTRAVENOUS; SUBCUTANEOUS at 12:09

## 2017-01-01 RX ADMIN — INSULIN LISPRO 2 UNITS: 100 INJECTION, SOLUTION INTRAVENOUS; SUBCUTANEOUS at 08:02

## 2017-01-01 RX ADMIN — Medication 10 ML: at 22:11

## 2017-01-01 RX ADMIN — Medication 10 ML: at 15:01

## 2017-01-01 RX ADMIN — METOPROLOL SUCCINATE 100 MG: 50 TABLET, EXTENDED RELEASE ORAL at 22:56

## 2017-01-01 RX ADMIN — ALBUMIN (HUMAN) 25 G: 0.25 INJECTION, SOLUTION INTRAVENOUS at 15:06

## 2017-01-01 RX ADMIN — FUROSEMIDE 40 MG: 40 TABLET ORAL at 10:09

## 2017-01-01 RX ADMIN — METOPROLOL SUCCINATE 12.5 MG: 25 TABLET, EXTENDED RELEASE ORAL at 09:13

## 2017-01-01 RX ADMIN — IBUPROFEN 400 MG: 400 TABLET ORAL at 20:01

## 2017-01-01 RX ADMIN — ALBUMIN (HUMAN) 25 G: 0.25 INJECTION, SOLUTION INTRAVENOUS at 01:45

## 2017-01-01 RX ADMIN — Medication 100 MG: at 09:00

## 2017-01-01 RX ADMIN — INSULIN LISPRO 2 UNITS: 100 INJECTION, SOLUTION INTRAVENOUS; SUBCUTANEOUS at 07:11

## 2017-01-01 RX ADMIN — Medication 100 MG: at 08:36

## 2017-01-01 RX ADMIN — DIGOXIN 0.12 MG: 125 TABLET ORAL at 21:33

## 2017-01-01 RX ADMIN — ALBUMIN (HUMAN) 25 G: 0.25 INJECTION, SOLUTION INTRAVENOUS at 08:35

## 2017-01-01 RX ADMIN — GABAPENTIN 100 MG: 100 CAPSULE ORAL at 08:48

## 2017-01-01 RX ADMIN — Medication 100 MG: at 08:28

## 2017-01-01 RX ADMIN — Medication 10 ML: at 18:39

## 2017-01-01 RX ADMIN — Medication 10 ML: at 21:28

## 2017-01-01 RX ADMIN — ALBUMIN (HUMAN) 25 G: 0.25 INJECTION, SOLUTION INTRAVENOUS at 11:45

## 2017-01-01 RX ADMIN — SACUBITRIL AND VALSARTAN 1 TABLET: 24; 26 TABLET, FILM COATED ORAL at 13:14

## 2017-01-01 RX ADMIN — Medication 100 MG: at 09:12

## 2017-01-01 RX ADMIN — ALBUMIN (HUMAN) 25 G: 0.25 INJECTION, SOLUTION INTRAVENOUS at 21:33

## 2017-01-01 RX ADMIN — DIAZEPAM 15 MG: 5 TABLET ORAL at 07:08

## 2017-01-01 RX ADMIN — METOPROLOL SUCCINATE 100 MG: 50 TABLET, EXTENDED RELEASE ORAL at 21:07

## 2017-01-01 RX ADMIN — INSULIN LISPRO 3 UNITS: 100 INJECTION, SOLUTION INTRAVENOUS; SUBCUTANEOUS at 11:46

## 2017-01-01 RX ADMIN — THERA TABS 1 TABLET: TAB at 09:13

## 2017-01-01 RX ADMIN — MAGNESIUM SULFATE HEPTAHYDRATE 2 G: 40 INJECTION, SOLUTION INTRAVENOUS at 17:27

## 2017-01-01 RX ADMIN — METOPROLOL SUCCINATE 12.5 MG: 25 TABLET, EXTENDED RELEASE ORAL at 09:57

## 2017-01-01 RX ADMIN — LIDOCAINE HYDROCHLORIDE 10 ML: 10 INJECTION, SOLUTION EPIDURAL; INFILTRATION; INTRACAUDAL; PERINEURAL at 10:30

## 2017-01-01 RX ADMIN — Medication 100 MG: at 10:10

## 2017-01-01 RX ADMIN — DIGOXIN 0.12 MG: 125 TABLET ORAL at 22:22

## 2017-01-01 RX ADMIN — GABAPENTIN 100 MG: 100 CAPSULE ORAL at 16:14

## 2017-01-01 RX ADMIN — Medication 10 ML: at 07:01

## 2017-01-01 RX ADMIN — Medication 10 ML: at 22:57

## 2017-01-01 RX ADMIN — FOLIC ACID 1 MG: 1 TABLET ORAL at 08:31

## 2017-01-01 RX ADMIN — SACUBITRIL AND VALSARTAN 1 TABLET: 49; 51 TABLET, FILM COATED ORAL at 18:01

## 2017-01-01 RX ADMIN — Medication 10 ML: at 21:24

## 2017-01-01 RX ADMIN — INSULIN LISPRO 2 UNITS: 100 INJECTION, SOLUTION INTRAVENOUS; SUBCUTANEOUS at 16:40

## 2017-01-01 RX ADMIN — SACUBITRIL AND VALSARTAN 1 TABLET: 49; 51 TABLET, FILM COATED ORAL at 08:40

## 2017-01-01 RX ADMIN — SACUBITRIL AND VALSARTAN 1 TABLET: 24; 26 TABLET, FILM COATED ORAL at 16:40

## 2017-01-01 RX ADMIN — INSULIN LISPRO 2 UNITS: 100 INJECTION, SOLUTION INTRAVENOUS; SUBCUTANEOUS at 12:38

## 2017-01-01 RX ADMIN — SACUBITRIL AND VALSARTAN 1 TABLET: 49; 51 TABLET, FILM COATED ORAL at 18:39

## 2017-01-01 RX ADMIN — SACUBITRIL AND VALSARTAN 1 TABLET: 24; 26 TABLET, FILM COATED ORAL at 22:59

## 2017-01-01 RX ADMIN — Medication 10 ML: at 07:17

## 2017-01-01 RX ADMIN — METOPROLOL SUCCINATE 25 MG: 25 TABLET, EXTENDED RELEASE ORAL at 21:10

## 2017-01-01 RX ADMIN — INSULIN LISPRO 2 UNITS: 100 INJECTION, SOLUTION INTRAVENOUS; SUBCUTANEOUS at 07:20

## 2017-01-01 RX ADMIN — Medication 10 ML: at 07:11

## 2017-01-01 RX ADMIN — AMIODARONE HYDROCHLORIDE 200 MG: 200 TABLET ORAL at 22:56

## 2017-01-01 RX ADMIN — FUROSEMIDE 40 MG: 40 TABLET ORAL at 09:58

## 2017-01-01 RX ADMIN — SPIRONOLACTONE 100 MG: 100 TABLET, FILM COATED ORAL at 09:13

## 2017-01-01 RX ADMIN — Medication 10 ML: at 15:23

## 2017-01-01 RX ADMIN — SODIUM CHLORIDE 500 ML: 900 INJECTION, SOLUTION INTRAVENOUS at 04:16

## 2017-01-01 RX ADMIN — SPIRONOLACTONE 50 MG: 25 TABLET ORAL at 22:56

## 2017-01-01 RX ADMIN — Medication 10 ML: at 16:41

## 2017-01-01 RX ADMIN — ALBUMIN (HUMAN) 25 G: 0.25 INJECTION, SOLUTION INTRAVENOUS at 20:59

## 2017-01-01 RX ADMIN — INSULIN LISPRO 2 UNITS: 100 INJECTION, SOLUTION INTRAVENOUS; SUBCUTANEOUS at 16:45

## 2017-01-01 RX ADMIN — FOLIC ACID 1 MG: 1 TABLET ORAL at 09:13

## 2017-01-01 RX ADMIN — ALBUMIN (HUMAN) 25 G: 0.25 INJECTION, SOLUTION INTRAVENOUS at 02:53

## 2017-01-01 RX ADMIN — FOLIC ACID 1 MG: 1 TABLET ORAL at 08:37

## 2017-01-01 RX ADMIN — AMIODARONE HYDROCHLORIDE 200 MG: 200 TABLET ORAL at 21:33

## 2017-01-01 RX ADMIN — ALBUMIN (HUMAN) 12.5 G: 0.25 INJECTION, SOLUTION INTRAVENOUS at 17:31

## 2017-01-01 RX ADMIN — Medication 10 ML: at 21:34

## 2017-01-01 RX ADMIN — SACUBITRIL AND VALSARTAN 1 TABLET: 49; 51 TABLET, FILM COATED ORAL at 19:34

## 2017-01-01 RX ADMIN — INSULIN LISPRO 2 UNITS: 100 INJECTION, SOLUTION INTRAVENOUS; SUBCUTANEOUS at 12:28

## 2017-01-01 RX ADMIN — THERA TABS 1 TABLET: TAB at 09:00

## 2017-01-01 RX ADMIN — BACITRACIN 1 PACKET: 500 OINTMENT TOPICAL at 18:18

## 2017-01-01 RX ADMIN — INSULIN LISPRO 2 UNITS: 100 INJECTION, SOLUTION INTRAVENOUS; SUBCUTANEOUS at 12:35

## 2017-01-01 RX ADMIN — Medication 10 ML: at 13:05

## 2017-01-01 RX ADMIN — SACUBITRIL AND VALSARTAN 1 TABLET: 24; 26 TABLET, FILM COATED ORAL at 20:59

## 2017-01-01 RX ADMIN — DIGOXIN 0.12 MG: 125 TABLET ORAL at 22:10

## 2017-01-01 RX ADMIN — MIDODRINE HYDROCHLORIDE 5 MG: 5 TABLET ORAL at 12:36

## 2017-01-01 RX ADMIN — SACUBITRIL AND VALSARTAN 1 TABLET: 24; 26 TABLET, FILM COATED ORAL at 09:58

## 2017-01-01 RX ADMIN — SACUBITRIL AND VALSARTAN 1 TABLET: 49; 51 TABLET, FILM COATED ORAL at 08:45

## 2017-01-01 RX ADMIN — FOLIC ACID 1 MG: 1 TABLET ORAL at 08:28

## 2017-01-01 RX ADMIN — FOLIC ACID 1 MG: 1 TABLET ORAL at 09:27

## 2017-01-01 RX ADMIN — ALBUMIN (HUMAN) 25 G: 0.25 INJECTION, SOLUTION INTRAVENOUS at 23:40

## 2017-01-01 RX ADMIN — Medication 10 ML: at 22:24

## 2017-01-01 RX ADMIN — METOPROLOL SUCCINATE 12.5 MG: 25 TABLET, EXTENDED RELEASE ORAL at 09:00

## 2017-01-01 RX ADMIN — Medication 10 ML: at 07:08

## 2017-01-01 RX ADMIN — SACUBITRIL AND VALSARTAN 1 TABLET: 24; 26 TABLET, FILM COATED ORAL at 22:13

## 2017-01-01 RX ADMIN — INSULIN LISPRO 3 UNITS: 100 INJECTION, SOLUTION INTRAVENOUS; SUBCUTANEOUS at 06:44

## 2017-01-01 RX ADMIN — INSULIN LISPRO 2 UNITS: 100 INJECTION, SOLUTION INTRAVENOUS; SUBCUTANEOUS at 13:48

## 2017-01-01 RX ADMIN — SACUBITRIL AND VALSARTAN 1 TABLET: 49; 51 TABLET, FILM COATED ORAL at 08:31

## 2017-01-01 RX ADMIN — DIAZEPAM 15 MG: 5 TABLET ORAL at 22:22

## 2017-01-01 RX ADMIN — MIDODRINE HYDROCHLORIDE 5 MG: 5 TABLET ORAL at 17:28

## 2017-01-01 RX ADMIN — SACUBITRIL AND VALSARTAN 1 TABLET: 49; 51 TABLET, FILM COATED ORAL at 10:10

## 2017-01-01 RX ADMIN — GABAPENTIN 100 MG: 100 CAPSULE ORAL at 22:10

## 2017-01-01 RX ADMIN — Medication 100 MG: at 08:31

## 2017-01-01 RX ADMIN — FOLIC ACID 1 MG: 1 TABLET ORAL at 10:09

## 2017-01-01 RX ADMIN — GABAPENTIN 100 MG: 100 CAPSULE ORAL at 21:01

## 2017-01-01 RX ADMIN — Medication 100 MG: at 09:27

## 2017-01-01 RX ADMIN — MIDODRINE HYDROCHLORIDE 5 MG: 5 TABLET ORAL at 10:09

## 2017-01-01 RX ADMIN — FUROSEMIDE 20 MG: 10 INJECTION, SOLUTION INTRAMUSCULAR; INTRAVENOUS at 15:47

## 2017-01-01 RX ADMIN — FOLIC ACID 1 MG: 1 TABLET ORAL at 08:47

## 2017-01-01 RX ADMIN — METOPROLOL SUCCINATE 12.5 MG: 25 TABLET, EXTENDED RELEASE ORAL at 13:14

## 2017-01-01 RX ADMIN — ALBUMIN (HUMAN) 25 G: 0.25 INJECTION, SOLUTION INTRAVENOUS at 07:26

## 2017-01-01 RX ADMIN — Medication 10 ML: at 07:54

## 2017-01-01 RX ADMIN — AMIODARONE HYDROCHLORIDE 200 MG: 200 TABLET ORAL at 22:21

## 2017-01-01 RX ADMIN — THERA TABS 1 TABLET: TAB at 08:54

## 2017-03-30 ENCOUNTER — OFFICE VISIT (OUTPATIENT)
Dept: HEMATOLOGY | Age: 57
End: 2017-03-30

## 2017-03-30 VITALS
HEART RATE: 68 BPM | OXYGEN SATURATION: 95 % | HEIGHT: 70 IN | TEMPERATURE: 98.6 F | BODY MASS INDEX: 30.8 KG/M2 | SYSTOLIC BLOOD PRESSURE: 132 MMHG | WEIGHT: 215.13 LBS | DIASTOLIC BLOOD PRESSURE: 60 MMHG

## 2017-03-30 DIAGNOSIS — K70.30 ALCOHOLIC CIRRHOSIS OF LIVER WITHOUT ASCITES (HCC): Primary | ICD-10-CM

## 2017-03-30 PROBLEM — Z95.810 CARDIAC DEFIBRILLATOR IN PLACE: Status: ACTIVE | Noted: 2017-03-30

## 2017-03-30 PROBLEM — Z89.519 S/P BKA (BELOW KNEE AMPUTATION) (HCC): Status: ACTIVE | Noted: 2017-03-30

## 2017-03-30 PROBLEM — G62.9 NEUROPATHY: Status: ACTIVE | Noted: 2017-03-30

## 2017-03-30 PROBLEM — I10 HYPERTENSION: Status: ACTIVE | Noted: 2017-03-30

## 2017-03-30 NOTE — PROGRESS NOTES
MD Laurie, [address]     April Karina Castellanos, KULDEEP Cunningham MD, [address], MD Shoshana Mauricio, MARTÍN Guadalupe NP        [address], [address] [address] Long  22.     [phone]     FAX: [address], [address] [facility], [address]     [phone]     FAX: [phone]       Patient Care Team:  Cherelle Trores MD as PCP - General (Family Practice)  Cherelle Torres MD (Family Practice)      Problem List  Date Reviewed: 3/30/2017          Codes Class Noted    Neuropathy ICD-10-CM: G62.9  ICD-9-CM: 355.9  3/30/2017        S/P BKA (below knee amputation) [facility]) ICD-10-CM: Z89.519  ICD-9-CM: V49.75  3/30/2017        Hypertension ICD-10-CM: I10  ICD-9-CM: 401.9  3/30/2017        Cardiac defibrillator in place ICD-10-CM: Z95.810  ICD-9-CM: V45.02  3/30/2017        Type II diabetes mellitus [facility]) ICD-10-CM: E11.9  ICD-9-CM: 250.00  2/1/2015                The physicians listed above have asked me to see Pebbles  in consultation regarding elevated liver enzymes and its management. All medical records sent by the referring physicians were reviewed including imaging studies     The patient is a 59 y.o.  male who was first noted to have abnormalities in liver enzymes in 2012. Serologic evaluation for markers of chronic liver disease were either not performed or available to me. Ultrasound of the liver was performed in 2012. The results of the imaging suggested cirrhosis. A liver biopsy was performed in 2012. This did not demonstrate cirhrosis. The exact findings are not know.     The most recent laboratory studies indicate that the liver transaminases are normal, ALP is elevated, tests of hepatic synthetic and metabolic function are   normal, and the platelet count is normal.    The patient has no symptoms which could be attributed to the liver disorder. The patient completes all daily activities without any functional limitations. The patient has not experienced fatigue, pain in the right side over the liver, problems concentrating, swelling of the abdomen, swelling of the lower extremities, hematemesis, hematochezia. ALLERGIES  No Known Allergies    MEDICATIONS  Current Outpatient Prescriptions   Medication Sig    SACUBITRIL/VALSARTAN (ENTRESTO PO) Take 50 mg by mouth two (2) times a day.  canagliflozin (INVOKANA) 100 mg tablet Take 100 mg by mouth every evening.  linagliptin (TRADJENTA) 5 mg tablet Take 5 mg by mouth every morning.  amiodarone (CORDARONE) 200 mg tablet Take 1 Tab by mouth Every Mon, Wed & Sun. (Patient taking differently: Take 200 mg by mouth nightly.)    spironolactone (ALDACTONE) 25 mg tablet Take 50 mg by mouth nightly.  nitroglycerin (NITROSTAT) 0.4 mg SL tablet Take 1 Tab by mouth every five (5) minutes as needed for Chest Pain. qhs prn for sob    multivitamins-minerals-lutein (CENTRUM SILVER) Tab Take 1 Tab by mouth daily.  metoprolol-XL (TOPROL XL) 100 mg XL tablet Take 100 mg by mouth nightly.  digoxin (LANOXIN) 0.125 mg tablet Take 0.125 mg by mouth nightly. No current facility-administered medications for this visit. SYSTEM REVIEW NOT RELATED TO LIVER DISEASE OR REVIEWED ABOVE:  Constitution systems: Negative for fever, chills, weight gain, weight loss. Eyes: Negative for visual changes. ENT: Negative for sore throat, painful swallowing. Respiratory: Negative for cough, hemoptysis, SOB. Cardiology: Negative for chest pain, palpitations. GI:  Negative for constipation or diarrhea. : Negative for urinary frequency, dysuria, hematuria, nocturia. Skin: Negative for rash. Hematology: Negative for easy bruising, blood clots.     Musculo-skelatal: Negative for back pain, muscle pain, weakness. Neurologic: Negative for headaches, dizziness, vertigo, memory problems not related to HE. Psychology: Negative for anxiety, depression. FAMILY HISTORY:  The father  of unknown cause. The mother  of heart disease. There is no family history of liver disease. SOCIAL HISTORY:  The patient is . The patient has 3 children,   The patient has never used tobacco products. The patient consumes 6+ alcoholic beverages per day. The patient used to work . The patient retired in . PHYSICAL EXAMINATION:  /60  Pulse 68  Temp 98.6 °F (37 °C) (Tympanic)   Ht 5' 10\" (1.778 m)  Wt 215 lb 2 oz (97.6 kg)  SpO2 95%  BMI 30.87 kg/m2  General: No acute distress. Eyes: Sclera anicteric. ENT: No oral lesions. Thyroid normal.  Nodes: No adenopathy. Skin: No spider angiomata. No jaundice. No palmar erythema. Respiratory: Lungs clear to auscultation. Cardiovascular: Regular heart rate. No murmurs. No JVD. Abdomen: Soft non-tender. Liver size normal to percussion/palpation. Spleen not palpable. No obvious ascites. Extremities: No edema. No muscle wasting. No gross arthritic changes. Neurologic: Alert and oriented. Cranial nerves grossly intact. No asterixis.     LABORATORY STUDIES:  [facility] Ref Rng & Units 3/30/2017   WBC 3.4 - 10.8 x10E3/uL 5.0   ANC 1.4 - 7.0 x10E3/uL 4.1   HGB 12.6 - 17.7 g/dL 13.9    - 379 x10E3/uL 76 (LL)   INR 0.8 - 1.2 1.2   AST 0 - 40 IU/L 80 (H)   ALT 0 - 44 IU/L 48 (H)   Alk Phos 39 - 117 IU/L 172 (H)   Bili, Total 0.0 - 1.2 mg/dL 3.4 (H)   Bili, Direct 0.00 - 0.40 mg/dL 1.78 (H)   Albumin 3.5 - 5.5 g/dL 4.1   BUN 6 - 24 mg/dL 14   Creat 0.76 - 1.27 mg/dL 0.84   Na 134 - 144 mmol/L 136   K 3.5 - 5.2 mmol/L 4.5   Cl 96 - 106 mmol/L 97   CO2 18 - 29 mmol/L 24   Glucose 65 - 99 mg/dL 138 (H)   Magnesium 1.6 - 2.4 mg/dL      SEROLOGIES:  Serologies Latest Ref Rng & Units 3/30/2017   Hep A Ab, Total Negative Positive (A)   Hep B Surface Ag Negative Negative   Hep B Core Ab, Total Negative Negative   Hep B Surface AB QL  Non Reactive   Hep C Ab 0.0 - 0.9 s/co ratio 0.1   Ferritin 30 - 400 ng/mL 399   Iron % Saturation 15 - 55 % 76 (HH)   Alpha-1 antitrypsin level 90 - 200 mg/dL 181     LIVER HISTOLOGY:  Not available or performed    ENDOSCOPIC PROCEDURES:  Not available or performed    RADIOLOGY:  10/2012. Ultrasound of liver. Echogenic nodular consistent with cirrhosis. No liver mass lesions. No dilated bile ducts. No ascites. OTHER TESTING:  Not available or performed    ASSESSMENT AND PLAN:  Persistent elevation in alkaline phosphate of unclear etiology at this time. I doubt this is related to his previous bone injuries since these were treated with amputation. Liver function is normal.  The platelet count is normal.      Although the ultrasound suggests cirrhosis I am not yet convinced this is correct. There is a liver biopsy performed somewhere that he says does not show cirrhosis. We do not know at which hospital the liver biopsy was performed. Serologic testing for causes of chronic liver disease were all negative. Will perform additional serologic tests to screen for cholestatic chronic liver disease. Will perform MRI to exlclude bile duct disease and help determine if cirrhosis is present. Will perform laboratory testing to monitor liver function and degree of liver injury. This included BMP, hepatic panel, CBC with platelet count, INR. Will perform imaging of the liver with ultrasound. The need to perform an assessment of liver fibrosis was discussed with the patient. The Fibroscan can assess liver fibrosis and determine if a patient has advanced fibrosis or cirrhosis without the need for liver biopsy. The Fibroscan is currently available at [facility] [place]. This will be performed a tthe next office visit.     The patient was directed to continue all current medications at the current dosages. There are no contraindications for the patient to take any medications that are necessary for treatment of other medical issues. The patient was counseled regarding alcohol consumption. Vaccination for viral hepatitis B is recommended since the patient has no serologic evidence of previous exposure or vaccination with immunity. Vaccination for viral hepatitis A is not needed. The patient has serologic evidence of prior exposure or vaccination with immunity. All of the above issues were discussed with the patient. All questions were answered. The patient expressed a clear understanding of the above. [address] [place] [address] in 4 weeks to review all data and determine the treatment plan.     Madison Garcia MD  [facility] [place] [address].  [phone]

## 2017-03-30 NOTE — MR AVS SNAPSHOT
Visit Information Date & Time Provider Department Dept. Phone Encounter #  
 3/30/2017  3:30 PM [facility] [address] [place] [address] [id number] Follow-up Instructions Return for FS with NP. Upcoming Health Maintenance Date Due Hepatitis C Screening 1960 DTaP/Tdap/Td series (1 - Tdap) 1/17/1979 FOBT Q 1 YEAR AGE 50-75 1/17/2008 INFLUENZA AGE 9 TO ADULT 8/1/2016 Allergies as of 3/30/2017  Review Complete On: 3/30/2017 By: Rose Mary Pizarro LPN No Known Allergies Current Immunizations  Reviewed on 11/5/2013 Name Date Influenza Vaccine 11/12/2014 Influenza Vaccine PF 11/5/2013  6:09 PM  
 Pneumococcal Vaccine (Unspecified Type) 2/1/2012 Not reviewed this visit You Were Diagnosed With   
  
 Codes Comments Alcoholic cirrhosis of liver without ascites ([facility] [address]    -  Primary ICD-10-CM: K70.30 ICD-9-CM: 571.2 Vitals BP Pulse Temp Height(growth percentile) Weight(growth percentile) SpO2  
 132/60 68 98.6 °F (37 °C) (Tympanic) 5' 10\" (1.778 m) 215 lb 2 oz (97.6 kg) 95% BMI Smoking Status 30.87 kg/m2 Former Smoker Vitals History BMI and BSA Data Body Mass Index Body Surface Area  
 30.87 kg/m 2 2.2 m 2 Preferred Pharmacy Pharmacy Name Phone [facility] [address] [phone] Your Updated Medication List  
  
   
This list is accurate as of: 3/30/17  3:54 PM.  Always use your most recent med list. ALDACTONE 25 mg tablet Generic drug:  spironolactone Take 50 mg by mouth nightly. amiodarone 200 mg tablet Commonly known as:  CORDARONE Take 1 Tab by mouth Every Mon, Wed & Sun.  
  
 CENTRUM SILVER Tab tablet Generic drug:  multivitamins-minerals-lutein Take 1 Tab by mouth daily. digoxin 0.125 mg tablet Commonly known as:  LANOXIN Take 0.125 mg by mouth nightly. ENTRESTO PO Take 50 mg by mouth two (2) times a day. INVOKANA 100 mg tablet Generic drug:  canagliflozin Take 100 mg by mouth every evening. nitroglycerin 0.4 mg SL tablet Commonly known as:  NITROSTAT Take 1 Tab by mouth every five (5) minutes as needed for Chest Pain. qhs prn for sob TOPROL  mg tablet Generic drug:  metoprolol succinate Take 100 mg by mouth nightly. TRADJENTA 5 mg tablet Generic drug:  linagliptin Take 5 mg by mouth every morning. We Performed the Following AFP WITH AFP-L3% [[id number] Custom] ALPHA-1-ANTITRYPSIN, TOTAL [73430 CPT(R)] CBC WITH AUTOMATED DIFF [39603 CPT(R)] ETHYL ALCOHOL [id number] CPT(R)] FERRITIN [92788 CPT(R)] HCV AB W/REFLEX VERIFICATION [[id number] Custom] HEMOGLOBIN A1C WITH EAG [43299 CPT(R)] HEP A AB, TOTAL [id number] CPT(R)] HEP B SURFACE AB [id number] CPT(R)] HEP B SURFACE AG X011646 CPT(R)] HEPATIC FUNCTION PANEL [28152 CPT(R)] HEPATITIS B CORE AB, TOTAL [id number] CPT(R)] IRON PROFILE L9272586 CPT(R)] METABOLIC PANEL, COMPREHENSIVE [00425 CPT(R)] PROTHROMBIN TIME + INR [69334 CPT(R)] Follow-up Instructions Return for FS with NP. To-Do List   
 05/02/2017 9:30 AM  
  Appointment with ARTURO Prince at [facility] [address] ([phone]) Introducing [facility] & HEALTH SERVICES! Anisa Ames introduces Gini patient portal. Now you can access parts of your medical record, email your doctor's office, and request medication refills online. 1. In your internet browser, go to [email or web address] 2. Click on the First Time User? Click Here link in the Sign In box. You will see the New Member Sign Up page. 3. Enter your Gini Access Code exactly as it appears below. You will not need to use this code after youve completed the sign-up process.  If you do not sign up before the expiration date, you must request a new code. 
 
· iPosition Access Code: [email or web address] Expires: 6/28/2017  3:54 PM 
 
4. Enter the last four digits of your Social Security Number (xxxx) and Date of Birth (mm/dd/yyyy) as indicated and click Submit. You will be taken to the next sign-up page. 5. Create a iPosition ID. This will be your iPosition login ID and cannot be changed, so think of one that is secure and easy to remember. 6. Create a iPosition password. You can change your password at any time. 7. Enter your Password Reset Question and Answer. This can be used at a later time if you forget your password. 8. Enter your e-mail address. You will receive e-mail notification when new information is available in [address] 9. Click Sign Up. You can now view and download portions of your medical record. 10. Click the Download Summary menu link to download a portable copy of your medical information. If you have questions, please visit the Frequently Asked Questions section of the iPosition website. Remember, iPosition is NOT to be used for urgent needs. For medical emergencies, dial 911. Now available from your iPhone and Android! Please provide this summary of care documentation to your next provider. Your primary care clinician is listed as [place]. If you have any questions after today's visit, please call [phone].

## 2017-03-31 LAB
A1AT SERPL-MCNC: 181 MG/DL (ref 90–200)
AFP L3 MFR SERPL: 8.2 % (ref 0–9.9)
AFP SERPL-MCNC: 11.1 NG/ML (ref 0–8)
ALBUMIN SERPL-MCNC: 4.1 G/DL (ref 3.5–5.5)
ALBUMIN/GLOB SERPL: 1.1 {RATIO} (ref 1.2–2.2)
ALP SERPL-CCNC: 172 IU/L (ref 39–117)
ALT SERPL-CCNC: 48 IU/L (ref 0–44)
AST SERPL-CCNC: 80 IU/L (ref 0–40)
BASOPHILS # BLD AUTO: 0 X10E3/UL (ref 0–0.2)
BASOPHILS NFR BLD AUTO: 0 %
BILIRUB DIRECT SERPL-MCNC: 1.78 MG/DL (ref 0–0.4)
BILIRUB SERPL-MCNC: 3.4 MG/DL (ref 0–1.2)
BUN SERPL-MCNC: 14 MG/DL (ref 6–24)
BUN/CREAT SERPL: 17 (ref 9–20)
CALCIUM SERPL-MCNC: 9.4 MG/DL (ref 8.7–10.2)
CHLORIDE SERPL-SCNC: 97 MMOL/L (ref 96–106)
CO2 SERPL-SCNC: 24 MMOL/L (ref 18–29)
COMMENT, 144067: NORMAL
CREAT SERPL-MCNC: 0.84 MG/DL (ref 0.76–1.27)
EOSINOPHIL # BLD AUTO: 0 X10E3/UL (ref 0–0.4)
EOSINOPHIL NFR BLD AUTO: 0 %
ERYTHROCYTE [DISTWIDTH] IN BLOOD BY AUTOMATED COUNT: 13.9 % (ref 12.3–15.4)
EST. AVERAGE GLUCOSE BLD GHB EST-MCNC: 137 MG/DL
ETHANOL BLD GC-MCNC: NEGATIVE %
FERRITIN SERPL-MCNC: 399 NG/ML (ref 30–400)
GLOBULIN SER CALC-MCNC: 3.8 G/DL (ref 1.5–4.5)
GLUCOSE SERPL-MCNC: 138 MG/DL (ref 65–99)
HAV AB SER QL IA: POSITIVE
HBA1C MFR BLD: 6.4 % (ref 4.8–5.6)
HBV CORE AB SERPL QL IA: NEGATIVE
HBV SURFACE AB SER QL: NON REACTIVE
HBV SURFACE AG SERPL QL IA: NEGATIVE
HCT VFR BLD AUTO: 40.9 % (ref 37.5–51)
HCV AB S/CO SERPL IA: 0.1 S/CO RATIO (ref 0–0.9)
HGB BLD-MCNC: 13.9 G/DL (ref 12.6–17.7)
IMM GRANULOCYTES # BLD: 0 X10E3/UL (ref 0–0.1)
IMM GRANULOCYTES NFR BLD: 0 %
INR PPP: 1.2 (ref 0.8–1.2)
IRON SATN MFR SERPL: 76 % (ref 15–55)
IRON SERPL-MCNC: 198 UG/DL (ref 38–169)
LYMPHOCYTES # BLD AUTO: 0.4 X10E3/UL (ref 0.7–3.1)
LYMPHOCYTES NFR BLD AUTO: 7 %
MCH RBC QN AUTO: 37.4 PG (ref 26.6–33)
MCHC RBC AUTO-ENTMCNC: 34 G/DL (ref 31.5–35.7)
MCV RBC AUTO: 110 FL (ref 79–97)
MONOCYTES # BLD AUTO: 0.6 X10E3/UL (ref 0.1–0.9)
MONOCYTES NFR BLD AUTO: 11 %
MORPHOLOGY BLD-IMP: ABNORMAL
NEUTROPHILS # BLD AUTO: 4.1 X10E3/UL (ref 1.4–7)
NEUTROPHILS NFR BLD AUTO: 82 %
PLATELET # BLD AUTO: 76 X10E3/UL (ref 150–379)
POTASSIUM SERPL-SCNC: 4.5 MMOL/L (ref 3.5–5.2)
PROT SERPL-MCNC: 7.9 G/DL (ref 6–8.5)
PROTHROMBIN TIME: 11.9 SEC (ref 9.1–12)
RBC # BLD AUTO: 3.72 X10E6/UL (ref 4.14–5.8)
SODIUM SERPL-SCNC: 136 MMOL/L (ref 134–144)
TIBC SERPL-MCNC: 259 UG/DL (ref 250–450)
UIBC SERPL-MCNC: 61 UG/DL (ref 111–343)
WBC # BLD AUTO: 5 X10E3/UL (ref 3.4–10.8)

## 2017-05-02 ENCOUNTER — OFFICE VISIT (OUTPATIENT)
Dept: HEMATOLOGY | Age: 57
End: 2017-05-02

## 2017-05-02 VITALS
OXYGEN SATURATION: 96 % | BODY MASS INDEX: 31.51 KG/M2 | HEART RATE: 61 BPM | WEIGHT: 219.6 LBS | TEMPERATURE: 98.7 F | SYSTOLIC BLOOD PRESSURE: 131 MMHG | DIASTOLIC BLOOD PRESSURE: 64 MMHG

## 2017-05-02 DIAGNOSIS — K74.60 CIRRHOSIS OF LIVER WITHOUT ASCITES, UNSPECIFIED HEPATIC CIRRHOSIS TYPE (HCC): Primary | ICD-10-CM

## 2017-05-02 NOTE — MR AVS SNAPSHOT
Visit Information Date & Time Provider Department Dept. Phone Encounter #  
 5/2/2017  9:30 AM ARTURO Perez [facility]EMI [phone] [id number] Follow-up Instructions Return in about 6 weeks (around 6/13/2017) for Melo Donald. Upcoming Health Maintenance Date Due  
 LIPID PANEL Q1 1960 MICROALBUMIN Q1 1/17/1968 EYE EXAM RETINAL OR DILATED Q1 1/17/1968 DTaP/Tdap/Td series (1 - Tdap) 1/17/1979 FOBT Q 1 YEAR AGE 50-75 1/17/2008 FOOT EXAM Q1 1/31/2016 INFLUENZA AGE 9 TO ADULT 8/1/2017 HEMOGLOBIN A1C Q6M 9/30/2017 Allergies as of 5/2/2017  Review Complete On: 4/16/2017 By: Fouzia Valdez MD  
 No Known Allergies Current Immunizations  Reviewed on 11/5/2013 Name Date Influenza Vaccine 11/12/2014 Influenza Vaccine PF 11/5/2013  6:09 PM  
 Pneumococcal Vaccine (Unspecified Type) 2/1/2012 Not reviewed this visit You Were Diagnosed With   
  
 Codes Comments Cirrhosis of liver without ascites, unspecified hepatic cirrhosis type ([facility]    -  Primary ICD-10-CM: K74.60 ICD-9-CM: 571.5 Vitals BP Pulse Temp Weight(growth percentile) SpO2 BMI  
 131/64 61 98.7 °F (37.1 °C) (Tympanic) 219 lb 9.6 oz (99.6 kg) 96% 31.51 kg/m2 Smoking Status Former Smoker BMI and BSA Data Body Mass Index Body Surface Area  
 31.51 kg/m 2 2.22 m 2 Preferred Pharmacy Pharmacy Name Phone James [facility]orden [address], [address] [place] [address] [phone] Your Updated Medication List  
  
   
This list is accurate as of: 5/2/17  9:59 AM.  Always use your most recent med list. ALDACTONE 25 mg tablet Generic drug:  spironolactone Take 50 mg by mouth nightly. amiodarone 200 mg tablet Commonly known as:  CORDARONE Take 1 Tab by mouth Every Mon, Wed & Sun.  
  
 CENTRUM SILVER Tab tablet Generic drug:  multivitamins-minerals-lutein Take 1 Tab by mouth daily. digoxin 0.125 mg tablet Commonly known as:  LANOXIN Take 0.125 mg by mouth nightly. ENTRESTO PO Take 50 mg by mouth two (2) times a day. INVOKANA 100 mg tablet Generic drug:  canagliflozin Take 100 mg by mouth every evening. nitroglycerin 0.4 mg SL tablet Commonly known as:  NITROSTAT Take 1 Tab by mouth every five (5) minutes as needed for Chest Pain. qhs prn for sob TOPROL  mg tablet Generic drug:  metoprolol succinate Take 100 mg by mouth nightly. TRADJENTA 5 mg tablet Generic drug:  linagliptin Take 5 mg by mouth every morning. We Performed the Following ANTI-NEUTROPHIL CYTOPLASMIC AB [id number] CPT(R)] CBC W/O DIFF [08417 CPT(R)] HEMOCHROMATOSIS MUTATION [[id number] Custom] HEPATIC FUNCTION PANEL (6) [[id number] Custom] METABOLIC PANEL, BASIC [57398 CPT(R)] MITOCHONDRIAL M2 AB [id number] CPT(R)] PROTHROMBIN TIME + INR [96326 CPT(R)] UPPER GI ENDOSCOPY,DIAGNOSIS [89468 CPT(R)] Comments:  
 Schedule with Dr. Maddie Bundy Follow-up Instructions Return in about 6 weeks (around 6/13/2017) for Kaylene Brock. To-Do List   
 05/08/2017 Imaging:  [facility] ABD COMP \[facility]\"" & HEALTH SERVICES! Bernabe Jarvis introduces [facility] patient portal. Now you can access parts of your medical record, email your doctor's office, and request medication refills online. 1. In your internet browser, go to [email or web address] 2. Click on the First Time User? Click Here link in the Sign In box. You will see the New Member Sign Up page. 3. Enter your [facility] Access Code exactly as it appears below. You will not need to use this code after youve completed the sign-up process. If you do not sign up before the expiration date, you must request a new code. · [facility] Access Code: [email or web address] Expires: 6/28/2017  3:54 PM 
 
 4. Enter the last four digits of your Social Security Number (xxxx) and Date of Birth (mm/dd/yyyy) as indicated and click Submit. You will be taken to the next sign-up page. 5. Create a Quandora ID. This will be your Quandora login ID and cannot be changed, so think of one that is secure and easy to remember. 6. Create a Quandora password. You can change your password at any time. 7. Enter your Password Reset Question and Answer. This can be used at a later time if you forget your password. 8. Enter your e-mail address. You will receive e-mail notification when new information is available in [address] 9. Click Sign Up. You can now view and download portions of your medical record. 10. Click the Download Summary menu link to download a portable copy of your medical information. If you have questions, please visit the Frequently Asked Questions section of the Qu[employer]ora website. Remember, [employer]ora is NOT to be used for urgent needs. For medical emergencies, dial 911. Now available from your iPhone and Android! Please provide this summary of care documentation to your next provider. Your primary care clinician is listed as Myrna Carr. If you have any questions after today's visit, please call [phone].

## 2017-05-02 NOTE — PROGRESS NOTES
93 Agnes Mercedes MD, [address]     April MARTÍN Santoro PA-C Iantha Hooker, MD, [address], MD Shoshana Sanchez NP Soundra Barbara, NP        [address]     [address] [place] [address], [address] [facility], [place]  [address].     [phone]     FAX: [address] [place] [address], [address] [facility], [address]     [phone]     FAX: [phone]       Patient Care Team:  Emelia Yan MD as PCP - General (Family Practice)  Emelia Yan MD (Family Practice)      Problem List  Date Reviewed: 4/16/2017          Codes Class Noted    Neuropathy ICD-10-CM: G62.9  ICD-9-CM: 355.9  3/30/2017        S/P BKA (below knee amputation) [facility]) ICD-10-CM: Z89.519  ICD-9-CM: V49.75  3/30/2017        Hypertension ICD-10-CM: I10  ICD-9-CM: 401.9  3/30/2017        Cardiac defibrillator in place ICD-10-CM: Z95.810  ICD-9-CM: V45.02  3/30/2017        Type II diabetes mellitus ([facility] [address] ICD-10-CM: E11.9  ICD-9-CM: 250.00  2/1/2015            [facility] for management of cirrhosis secondary to unclear causes at this time. The active problem list, all pertinent past medical history, medications, and laboratory findings related to the liver disorder were reviewed with the patient. The patient is a 59 y.o.  male who was first noted to have abnormalities in liver enzymes in 2012. Serologic evaluation for markers of chronic liver disease were performed at his last office visit and rule out viral hepatitis and A1AT disease. There is suggestion of iron overload. Ultrasound of the liver was performed in 2012. The results of the imaging suggested cirrhosis. A liver biopsy was performed in 2012. This according to the patient did not demonstrate cirrhosis. The exact findings are not known or available for review.     The most recent laboratory studies indicate that the liver transaminases are elevated, alkaline phosphatase is elevated, tests of hepatic synthetic and metabolic function are depressed, total bilirubin is elevated, INR is normal, albumin is  normal, and the platelet count is depressed. The patient has no symptoms which could be attributed to the liver disorder. The patient completes all daily activities without any functional limitations. The patient has not experienced fatigue, pain in the right side over the liver, problems concentrating, swelling of the abdomen, swelling of the lower extremities, hematemesis, hematochezia. ALLERGIES  No Known Allergies    MEDICATIONS  Current Outpatient Prescriptions   Medication Sig    SACUBITRIL/VALSARTAN (ENTRESTO PO) Take 50 mg by mouth two (2) times a day.  canagliflozin (INVOKANA) 100 mg tablet Take 100 mg by mouth every evening.  linagliptin (TRADJENTA) 5 mg tablet Take 5 mg by mouth every morning.  amiodarone (CORDARONE) 200 mg tablet Take 1 Tab by mouth Every Mon, Wed & Sun. (Patient taking differently: Take 200 mg by mouth nightly.)    spironolactone (ALDACTONE) 25 mg tablet Take 50 mg by mouth nightly.  nitroglycerin (NITROSTAT) 0.4 mg SL tablet Take 1 Tab by mouth every five (5) minutes as needed for Chest Pain. qhs prn for sob    multivitamins-minerals-lutein (CENTRUM SILVER) Tab Take 1 Tab by mouth daily.  metoprolol-XL (TOPROL XL) 100 mg XL tablet Take 100 mg by mouth nightly.  digoxin (LANOXIN) 0.125 mg tablet Take 0.125 mg by mouth nightly. No current facility-administered medications for this visit. SYSTEM REVIEW NOT RELATED TO LIVER DISEASE OR REVIEWED ABOVE:  Constitution systems: Negative for fever, chills, weight gain, weight loss. Eyes: Negative for visual changes. ENT: Negative for sore throat, painful swallowing. Respiratory: Negative for cough, hemoptysis, SOB.    Cardiology: Negative for chest pain, palpitations. GI:  Negative for constipation or diarrhea. : Negative for urinary frequency, dysuria, hematuria, nocturia. Skin: Negative for rash. Hematology: Negative for easy bruising, blood clots. Musculo-skeletal: Negative for back pain, muscle pain, weakness. Neurologic: Negative for headaches, dizziness, vertigo, memory problems not related to HE. Psychology: Negative for anxiety, depression. FAMILY HISTORY:  The father  of unknown cause. The mother  of heart disease. There is no family history of liver disease. SOCIAL HISTORY:  The patient is . The patient has 3 children,   The patient has never used tobacco products. The patient consumes 6+ alcoholic beverages per day. This has been his long-standing pattern. The patient used to work . The patient retired in . PHYSICAL EXAMINATION:  /64  Pulse 61  Temp 98.7 °F (37.1 °C) (Tympanic)   Wt 219 lb 9.6 oz (99.6 kg)  SpO2 96%  BMI 31.51 kg/m2  General: No acute distress. Eyes: Sclera anicteric. ENT: No oral lesions. Thyroid normal.  Nodes: No adenopathy. Skin: Numerous spider angiomata. No jaundice. No palmar erythema. Respiratory: Lungs clear to auscultation. Cardiovascular: Regular heart rate. No murmurs. No JVD. Abdomen: Soft non-tender. Liver size normal to percussion/palpation. Spleen not palpable. No obvious ascites. Extremities: No edema. No muscle wasting. No gross arthritic changes. Neurologic: Alert and oriented. Cranial nerves grossly intact. No asterixis.     LABORATORY STUDIES:  [facility] of [address] [place] [address] & Units 3/30/2017   WBC 3.4 - 10.8 x10E3/uL 5.0   ANC 1.4 - 7.0 x10E3/uL 4.1   HGB 12.6 - 17.7 g/dL 13.9    - 379 x10E3/uL 76 (LL)   INR 0.8 - 1.2 1.2   AST 0 - 40 IU/L 80 (H)   ALT 0 - 44 IU/L 48 (H)   Alk Phos 39 - 117 IU/L 172 (H)   Bili, Total 0.0 - 1.2 mg/dL 3.4 (H)   Bili, Direct 0.00 - 0.40 mg/dL 1.78 (H)   Albumin 3.5 - 5.5 g/dL 4.1   BUN 6 - 24 mg/dL 14   Creat 0.76 - 1.27 mg/dL 0.84   Na 134 - 144 mmol/L 136   K 3.5 - 5.2 mmol/L 4.5   Cl 96 - 106 mmol/L 97   CO2 18 - 29 mmol/L 24   Glucose 65 - 99 mg/dL 138 (H)   Magnesium 1.6 - 2.4 mg/dL    Additional lab values drawn at today's office visit are pending at the time of documentation. SEROLOGIES:  Serologies Latest Ref Rng & Units 3/30/2017   Hep A Ab, Total Negative Positive (A)   Hep B Surface Ag Negative Negative   Hep B Core Ab, Total Negative Negative   Hep B Surface AB QL  Non Reactive   Hep C Ab 0.0 - 0.9 s/co ratio 0.1   Ferritin 30 - 400 ng/mL 399   Iron % Saturation 15 - 55 % 76 (HH)   Alpha-1 antitrypsin level 90 - 200 mg/dL 181     LIVER HISTOLOGY:  5/2017. FibroScan performed at [address]. EkPa was 75. Suggested fibrosis level is F4. ENDOSCOPIC PROCEDURES:  Not available or performed    RADIOLOGY:  10/2012. Ultrasound of liver. Echogenic nodular consistent with cirrhosis. No liver mass lesions. No dilated bile ducts. No ascites. OTHER TESTING:  Not available or performed    ASSESSMENT AND PLAN:  Cirrhosis of unclear etiology at this time, likely alcohol. Liver function is depressed, platelet count is depressed. Fibroscan in the office supports clinical/lab evidence of cirrhosis. Virologic testing for causes of chronic liver disease were all negative. Will perform additional serologic tests to screen for cholestatic chronic liver disease and investigate further possible iron overload with hemochromatosis testing. I have explained this in detail to the patient. Will perform laboratory testing to monitor liver function and degree of liver injury. This included BMP, hepatic panel, CBC with platelet count, INR. I have explained to the patient the significance of cirrhosis, its natural history, and our recommendations for initiating screening for complications.   I have advised complete abstinence from alcohol as this is likely underlying or contributing etiology of liver disease. Patient's current calculated MELD score is 14. He is not presently a candidate for transplant due to relatively low MELD score and ongoing alcohol use. Will perform imaging of the liver with ultrasound. Will schedule for EGD to evaluate for esophageal varices and need to administer treatment to reduce risk of first variceal bleed. The patient was counseled regarding alcohol consumption. I have advised in no uncertain terms that he must discontinue all alcohol as this will cause additional strain on his liver functions. He verbalizes understanding. Vaccination for viral hepatitis B is recommended since the patient has no serologic evidence of previous exposure or vaccination with immunity. Vaccination for viral hepatitis A is not needed. The patient has serologic evidence of prior exposure or vaccination with immunity. All of the above issues were discussed with the patient. All questions were answered. The patient expressed a clear understanding of the above. [facility] [place] [address] in 6 weeks to review all additional lab data, EGD and US findings and to monitor the impact of alcohol cessation.     Fatou Nieves PA-C  Liver [place] [address], [address] [address]  22.  [phone]

## 2017-05-03 LAB
ALBUMIN SERPL-MCNC: 4.2 G/DL (ref 3.5–5.5)
ALP SERPL-CCNC: 154 IU/L (ref 39–117)
ALT SERPL-CCNC: 35 IU/L (ref 0–44)
AST SERPL-CCNC: 72 IU/L (ref 0–40)
BILIRUB DIRECT SERPL-MCNC: 1.35 MG/DL (ref 0–0.4)
BILIRUB SERPL-MCNC: 2.4 MG/DL (ref 0–1.2)
BUN SERPL-MCNC: 10 MG/DL (ref 6–24)
BUN/CREAT SERPL: 13 (ref 9–20)
C-ANCA TITR SER IF: NORMAL TITER
CALCIUM SERPL-MCNC: 9.4 MG/DL (ref 8.7–10.2)
CHLORIDE SERPL-SCNC: 93 MMOL/L (ref 96–106)
CO2 SERPL-SCNC: 19 MMOL/L (ref 18–29)
CREAT SERPL-MCNC: 0.79 MG/DL (ref 0.76–1.27)
ERYTHROCYTE [DISTWIDTH] IN BLOOD BY AUTOMATED COUNT: 14.7 % (ref 12.3–15.4)
GLUCOSE SERPL-MCNC: 132 MG/DL (ref 65–99)
HCT VFR BLD AUTO: 40.4 % (ref 37.5–51)
HGB BLD-MCNC: 13.9 G/DL (ref 12.6–17.7)
INR PPP: 1.2 (ref 0.8–1.2)
MCH RBC QN AUTO: 38.2 PG (ref 26.6–33)
MCHC RBC AUTO-ENTMCNC: 34.4 G/DL (ref 31.5–35.7)
MCV RBC AUTO: 111 FL (ref 79–97)
MITOCHONDRIA M2 IGG SER-ACNC: 4.2 UNITS (ref 0–20)
MORPHOLOGY BLD-IMP: ABNORMAL
P-ANCA ATYPICAL TITR SER IF: NORMAL TITER
P-ANCA TITR SER IF: NORMAL TITER
PLATELET # BLD AUTO: 65 X10E3/UL (ref 150–379)
POTASSIUM SERPL-SCNC: 5.3 MMOL/L (ref 3.5–5.2)
PROTHROMBIN TIME: 11.9 SEC (ref 9.1–12)
RBC # BLD AUTO: 3.64 X10E6/UL (ref 4.14–5.8)
SODIUM SERPL-SCNC: 131 MMOL/L (ref 134–144)
WBC # BLD AUTO: 3.4 X10E3/UL (ref 3.4–10.8)

## 2017-05-05 NOTE — PROGRESS NOTES
Pt notified of depressed but stable liver function. Must discontinue all alcohol as discussed at office visit. Plan follow-up and additional procedures as scheduled. HC testing pending, will follow-up with results when available.

## 2017-05-08 LAB — HFE GENE MUT ANL BLD/T: NORMAL

## 2017-05-15 ENCOUNTER — HOSPITAL ENCOUNTER (OUTPATIENT)
Dept: ULTRASOUND IMAGING | Age: 57
Discharge: HOME | End: 2017-05-15
Attending: PHYSICIAN ASSISTANT
Payer: COMMERCIAL

## 2017-05-15 DIAGNOSIS — K74.60 CIRRHOSIS OF LIVER WITHOUT ASCITES, UNSPECIFIED HEPATIC CIRRHOSIS TYPE (HCC): ICD-10-CM

## 2017-05-15 PROCEDURE — 76700 US EXAM ABDOM COMPLETE: CPT

## 2017-05-16 DIAGNOSIS — K74.60 CIRRHOSIS OF LIVER WITHOUT ASCITES, UNSPECIFIED HEPATIC CIRRHOSIS TYPE (HCC): Primary | ICD-10-CM

## 2017-05-16 NOTE — PROGRESS NOTES
Pt notified of new lesion of left lobe of liver. Recommend imaging with CT, ordered. Will follow-up on results as available.

## 2017-05-25 ENCOUNTER — HOSPITAL ENCOUNTER (OUTPATIENT)
Dept: CT IMAGING | Age: 57
Discharge: HOME | End: 2017-05-25
Attending: PHYSICIAN ASSISTANT
Payer: COMMERCIAL

## 2017-05-25 DIAGNOSIS — K74.60 CIRRHOSIS OF LIVER WITHOUT ASCITES, UNSPECIFIED HEPATIC CIRRHOSIS TYPE (HCC): ICD-10-CM

## 2017-05-25 PROCEDURE — 74011000258 HC RX REV CODE- 258: Performed by: PHYSICIAN ASSISTANT

## 2017-05-25 PROCEDURE — 74170 CT ABD WO CNTRST FLWD CNTRST: CPT

## 2017-05-25 PROCEDURE — 74011636320 HC RX REV CODE- 636/320: Performed by: PHYSICIAN ASSISTANT

## 2017-05-25 RX ORDER — SODIUM CHLORIDE 0.9 % (FLUSH) 0.9 %
10 SYRINGE (ML) INJECTION
Status: COMPLETED | OUTPATIENT
Start: 2017-05-25 | End: 2017-05-25

## 2017-05-25 RX ADMIN — Medication 10 ML: at 07:29

## 2017-05-25 RX ADMIN — SODIUM CHLORIDE 100 ML: 900 INJECTION, SOLUTION INTRAVENOUS at 07:29

## 2017-05-25 RX ADMIN — IOPAMIDOL 100 ML: 755 INJECTION, SOLUTION INTRAVENOUS at 07:29

## 2017-06-01 ENCOUNTER — ANESTHESIA (OUTPATIENT)
Dept: ENDOSCOPY | Age: 57
End: 2017-06-01
Payer: COMMERCIAL

## 2017-06-01 ENCOUNTER — ANESTHESIA EVENT (OUTPATIENT)
Dept: ENDOSCOPY | Age: 57
End: 2017-06-01
Payer: COMMERCIAL

## 2017-06-01 ENCOUNTER — HOSPITAL ENCOUNTER (OUTPATIENT)
Age: 57
Setting detail: OUTPATIENT SURGERY
Discharge: HOME | End: 2017-06-01
Attending: INTERNAL MEDICINE | Admitting: INTERNAL MEDICINE
Payer: COMMERCIAL

## 2017-06-01 VITALS
BODY MASS INDEX: 31.1 KG/M2 | SYSTOLIC BLOOD PRESSURE: 170 MMHG | HEART RATE: 81 BPM | OXYGEN SATURATION: 96 % | TEMPERATURE: 97.6 F | RESPIRATION RATE: 11 BRPM | DIASTOLIC BLOOD PRESSURE: 85 MMHG | HEIGHT: 70 IN | WEIGHT: 217.25 LBS

## 2017-06-01 LAB
GLUCOSE BLD STRIP.AUTO-MCNC: 126 MG/DL (ref 65–100)
SERVICE CMNT-IMP: ABNORMAL

## 2017-06-01 PROCEDURE — 76040000019: Performed by: INTERNAL MEDICINE

## 2017-06-01 PROCEDURE — 74011250636 HC RX REV CODE- 250/636

## 2017-06-01 PROCEDURE — 74011250636 HC RX REV CODE- 250/636: Performed by: INTERNAL MEDICINE

## 2017-06-01 PROCEDURE — 76060000031 HC ANESTHESIA FIRST 0.5 HR: Performed by: INTERNAL MEDICINE

## 2017-06-01 PROCEDURE — 77030014243 HC BND LIG VRCES BSC -D: Performed by: INTERNAL MEDICINE

## 2017-06-01 PROCEDURE — 82962 GLUCOSE BLOOD TEST: CPT

## 2017-06-01 PROCEDURE — 74011000250 HC RX REV CODE- 250

## 2017-06-01 RX ORDER — NALOXONE HYDROCHLORIDE 0.4 MG/ML
0.4 INJECTION, SOLUTION INTRAMUSCULAR; INTRAVENOUS; SUBCUTANEOUS
Status: ACTIVE | OUTPATIENT
Start: 2017-06-01 | End: 2017-06-01

## 2017-06-01 RX ORDER — MIDAZOLAM HYDROCHLORIDE 1 MG/ML
5-10 INJECTION, SOLUTION INTRAMUSCULAR; INTRAVENOUS
Status: ACTIVE | OUTPATIENT
Start: 2017-06-01 | End: 2017-06-01

## 2017-06-01 RX ORDER — SODIUM CHLORIDE 9 MG/ML
INJECTION, SOLUTION INTRAVENOUS
Status: DISCONTINUED | OUTPATIENT
Start: 2017-06-01 | End: 2017-06-01 | Stop reason: HOSPADM

## 2017-06-01 RX ORDER — PROPOFOL 10 MG/ML
INJECTION, EMULSION INTRAVENOUS AS NEEDED
Status: DISCONTINUED | OUTPATIENT
Start: 2017-06-01 | End: 2017-06-01 | Stop reason: HOSPADM

## 2017-06-01 RX ORDER — SODIUM CHLORIDE 0.9 % (FLUSH) 0.9 %
5-10 SYRINGE (ML) INJECTION EVERY 8 HOURS
Status: DISCONTINUED | OUTPATIENT
Start: 2017-06-01 | End: 2017-06-02 | Stop reason: HOSPADM

## 2017-06-01 RX ORDER — SODIUM CHLORIDE 9 MG/ML
50 INJECTION, SOLUTION INTRAVENOUS CONTINUOUS
Status: DISPENSED | OUTPATIENT
Start: 2017-06-01 | End: 2017-06-01

## 2017-06-01 RX ORDER — EPINEPHRINE 0.1 MG/ML
1 INJECTION INTRACARDIAC; INTRAVENOUS
Status: ACTIVE | OUTPATIENT
Start: 2017-06-01 | End: 2017-06-01

## 2017-06-01 RX ORDER — LIDOCAINE HYDROCHLORIDE 20 MG/ML
INJECTION, SOLUTION EPIDURAL; INFILTRATION; INTRACAUDAL; PERINEURAL AS NEEDED
Status: DISCONTINUED | OUTPATIENT
Start: 2017-06-01 | End: 2017-06-01 | Stop reason: HOSPADM

## 2017-06-01 RX ORDER — FLUMAZENIL 0.1 MG/ML
0.2 INJECTION INTRAVENOUS
Status: ACTIVE | OUTPATIENT
Start: 2017-06-01 | End: 2017-06-01

## 2017-06-01 RX ORDER — SODIUM CHLORIDE 0.9 % (FLUSH) 0.9 %
5-10 SYRINGE (ML) INJECTION AS NEEDED
Status: ACTIVE | OUTPATIENT
Start: 2017-06-01 | End: 2017-06-01

## 2017-06-01 RX ORDER — ATROPINE SULFATE 0.1 MG/ML
0.5 INJECTION INTRAVENOUS
Status: ACTIVE | OUTPATIENT
Start: 2017-06-01 | End: 2017-06-01

## 2017-06-01 RX ORDER — DEXTROMETHORPHAN/PSEUDOEPHED 2.5-7.5/.8
1.2 DROPS ORAL
Status: DISCONTINUED | OUTPATIENT
Start: 2017-06-01 | End: 2017-06-02 | Stop reason: HOSPADM

## 2017-06-01 RX ORDER — FENTANYL CITRATE 50 UG/ML
50-200 INJECTION, SOLUTION INTRAMUSCULAR; INTRAVENOUS
Status: ACTIVE | OUTPATIENT
Start: 2017-06-01 | End: 2017-06-01

## 2017-06-01 RX ADMIN — LIDOCAINE HYDROCHLORIDE 100 MG: 20 INJECTION, SOLUTION EPIDURAL; INFILTRATION; INTRACAUDAL; PERINEURAL at 12:25

## 2017-06-01 RX ADMIN — SODIUM CHLORIDE 500 ML: 900 INJECTION, SOLUTION INTRAVENOUS at 12:12

## 2017-06-01 RX ADMIN — PROPOFOL 100 MG: 10 INJECTION, EMULSION INTRAVENOUS at 12:25

## 2017-06-01 RX ADMIN — PROPOFOL 100 MG: 10 INJECTION, EMULSION INTRAVENOUS at 12:32

## 2017-06-01 RX ADMIN — SODIUM CHLORIDE: 9 INJECTION, SOLUTION INTRAVENOUS at 12:00

## 2017-06-01 RX ADMIN — PROPOFOL 100 MG: 10 INJECTION, EMULSION INTRAVENOUS at 12:27

## 2017-06-01 RX ADMIN — PROPOFOL 100 MG: 10 INJECTION, EMULSION INTRAVENOUS at 12:30

## 2017-06-01 NOTE — PROCEDURES
93 Agnes Mercedes MD, [address]     April Kacie So, NP     KULDEEP Bermudez MD, FACP, [address], NP        [address] [facility], [address] [facility], [address]     [phone]     FAX: [address] [place] [address], [address] [facility], [address]26 [facility] [address]     [phone]     FAX: [phone]         UPPER ENDOSCOPY PROCEDURE NOTE    David Shane  1960    INDICATION: Cirrhosis. Screening for esophageal varices with variceal ligation if indicated. : Lexi Hurtado MD    ANESTHESIA/SEDATION: Propofol per anesthesia    PROCEDURE DESCRIPTION:  [employer] consent was obtained from the patient for the procedure. All risks and benefits of the procedure explained. The patient was taken to the endoscopy suite and placed in the left lateral decubitus position. Following sequential administration of sedation to doses as indicated above the endoscope was inserted into the mouth and advanced under direct vision to the second portion of the duodenum. Careful inspection of upper gastrointestinal tract was made as the endoscope was inserted and withdrawn. Retroflexion of the endoscope to view of the cardia of the stomach was performed. After withdrawing the endoscope the banding devise was placed on the tip of the endoscope. The scope was then reinserted under direct inspection and advanced to the esophagus. Banding of esophageal varices was performed as described below. The scope was then removed. FINDINGS:  Esophagus:  A few medium-large esophageal varices were identified. Banding of esophageal varices was performed. Excellent hemostasis was achieved after banding. Short segment Barretts    Stomach: Moderate portal hypertensive gastropathy of the body of the stomach. No gastric varicies identified.     Duodenum:   Normal bulb and second portion    INTERVENTION:   4 bands placed were placed on esophageal varices. COMPLICATIONS: None. The patient tolerated the procedure well. EBL: Negligible. RECOMMENDATIONS:  Observe until discharge parameters are achieved. Liquid diet today. Soft food tomorrow. Resume general diet thereafter. Repeat endoscopy to reassess varices and need for additional banding in 3 months. Follow-up Liver [place] of [address] [place] [address] office as scheduled.       Shelly Hope MD  6/1/2017  12:43 PM

## 2017-06-01 NOTE — ANESTHESIA PREPROCEDURE EVALUATION
Anesthetic History   No history of anesthetic complications            Review of Systems / Medical History  Patient summary reviewed, nursing notes reviewed and pertinent labs reviewed    Pulmonary  Within defined limits                 Neuro/Psych   Within defined limits           Cardiovascular    Hypertension        Dysrhythmias : atrial fibrillation  Pacemaker    Exercise tolerance: >4 METS  Comments: AICD  SUMMARY:  Left ventricle: The ventricle was moderately dilated. Systolic function  was severely reduced. Ejection fraction was estimated to be 20 %. There  was moderate diffuse hypokinesis.    GI/Hepatic/Renal           Liver disease    Comments: Alcoholic cirrhosis Endo/Other    Diabetes    Arthritis     Other Findings   Comments: Left leg prothesis         Physical Exam    Airway  Mallampati: II  TM Distance: > 6 cm  Neck ROM: normal range of motion   Mouth opening: Normal     Cardiovascular    Rhythm: regular  Rate: normal         Dental  No notable dental hx       Pulmonary  Breath sounds clear to auscultation               Abdominal  Abdominal exam normal       Other Findings            Anesthetic Plan    ASA: 3  Anesthesia type: MAC          Induction: Intravenous  Anesthetic plan and risks discussed with: Patient

## 2017-06-01 NOTE — ANESTHESIA POSTPROCEDURE EVALUATION
Post-Anesthesia Evaluation and Assessment    Patient: Ranjana Calle MRN: [id number]  SSN: [id number]    YOB: 1960  Age: 59 y.o. Sex: male       Cardiovascular Function/Vital Signs  Visit Vitals    /85    Pulse 81    Temp 36.4 °C (97.6 °F)    Resp 11    Ht 5' 10\" (1.778 m)    Wt 98.5 kg (217 lb 4 oz)    SpO2 96%    BMI 31.17 kg/m2       Patient is status post MAC anesthesia for Procedure(s):  ESOPHAGOGASTRODUODENOSCOPY (EGD)  ENDOSCOPIC BANDING OR LIGATION. Nausea/Vomiting: None    Postoperative hydration reviewed and adequate. Pain:  Pain Scale 1: Numeric (0 - 10) (06/01/17 1104)  Pain Intensity 1: 0 (06/01/17 1104)   Managed    Neurological Status: At baseline    Mental Status and Level of Consciousness: Arousable    Pulmonary Status:   O2 Device: Nasal cannula (06/01/17 1253)   Adequate oxygenation and airway patent    Complications related to anesthesia: None    Post-anesthesia assessment completed.  No concerns    Signed By: Darcy Pereyra MD     June 1, 2017

## 2017-06-01 NOTE — H&P
93 Agnes Mercedes MD, Reese Ferrer, [facility]     April MARTÍN Hernandez PA-C Bernhard Reasons, MD, FACP, [address], NP        [facility], [address] [address]     [phone]     FAX: [address], [address] [place] [address], [address]     [phone]     FAX: [phone]         PRE-PROCEDURE NOTE - EGD    H and P from last office visit reviewed. Allergies reviewed. Out-patient medicaton list reviewed. Patient Active Problem List   Diagnosis Code    Type II diabetes mellitus ([facility] [address] E11.9    Neuropathy G62.9    S/P BKA (below knee amputation) ([facility] Z89.519    Hypertension I10    Cardiac defibrillator in place Z95.810    Cirrhosis of liver without ascites ([facility] K74.60       No Known Allergies    No current facility-administered medications on file prior to encounter. Current Outpatient Prescriptions on File Prior to Encounter   Medication Sig Dispense Refill    SACUBITRIL/VALSARTAN (ENTRESTO PO) Take 50 mg by mouth two (2) times a day.  canagliflozin (INVOKANA) 100 mg tablet Take 100 mg by mouth every evening.  linagliptin (TRADJENTA) 5 mg tablet Take 5 mg by mouth every morning.  amiodarone (CORDARONE) 200 mg tablet Take 1 Tab by mouth Every Mon, Wed & Sun. (Patient taking differently: Take 200 mg by mouth nightly.) 15 Tab 0    spironolactone (ALDACTONE) 25 mg tablet Take 50 mg by mouth nightly.  multivitamins-minerals-lutein (CENTRUM SILVER) Tab Take 1 Tab by mouth daily.  metoprolol-XL (TOPROL XL) 100 mg XL tablet Take 100 mg by mouth nightly.  digoxin (LANOXIN) 0.125 mg tablet Take 0.125 mg by mouth nightly.  nitroglycerin (NITROSTAT) 0.4 mg SL tablet Take 1 Tab by mouth every five (5) minutes as needed for Chest Pain.  qhs prn for sob 1 Bottle 0       For EGD to assess for esophageal and gastric varices. Plan to perform banding if indicated based upon variceal size and appearance. The risks of the procedure were discussed with the patient. These included reaction to anesthesia, pain, perforation and bleeding. All questions were answered. The patient wishes to proceed with the procedure. PHYSICAL EXAMINATION:  VS: per nursing note  General: No acute distress. Eyes: Sclera anicteric. ENT: No oral lesions. Thyroid normal.  Nodes: No adenopathy. Skin: No spider angiomata. No jaundice. No palmar erythema. Respiratory: Lungs clear to auscultation. Cardiovascular: Regular heart rate. No murmurs. No JVD. Abdomen: Soft non-tender, liver size normal to percussion/palpation. Spleen not palpable. No obvious ascites. Extremities: No edema. No muscle wasting. No gross arthritic changes. Neurologic: Alert and oriented. Cranial nerves grossly intact. No asterixis. MOST RECENT LABORATORY STUDIES:  Lab Results   Component Value Date/Time    WBC 3.4 05/02/2017 12:00 AM    HGB 13.9 05/02/2017 12:00 AM    HCT 40.4 05/02/2017 12:00 AM    PLATELET 65 02/50/0026 12:00 AM     05/02/2017 12:00 AM     Lab Results   Component Value Date/Time    INR 1.2 05/02/2017 12:00 AM    INR 1.2 03/30/2017 12:00 PM    INR 1.1 02/18/2015 02:08 AM    Prothrombin time 11.9 05/02/2017 12:00 AM    Prothrombin time 11.9 03/30/2017 12:00 PM    Prothrombin time 10.9 02/18/2015 02:08 AM       ASSESSMENT AND PLAN:  EGD to assess for esophageal and/or gastric varices. Conscious sedation with fentanyl and versed.     Trung Flynn MD  Liver [place] of [address] [place] [address] [address] [place] [address], [facility] 22.  [phone]

## 2017-06-01 NOTE — ROUTINE PROCESS
Ranjana Calle  1960  [id number]    Situation:  Verbal report received from: Jessica  Procedure: Procedure(s):  ESOPHAGOGASTRODUODENOSCOPY (EGD)  ENDOSCOPIC BANDING OR LIGATION    Background:    Preoperative diagnosis: Alcoholic cirrhosis of liver without ascites ([facility] [K70.30]  Postoperative diagnosis: portal gastropathy, short segment barretts with banding    :  Dr. Danielle Martinez  Assistant(s): Endoscopy Technician-1: Agnes Lozano  Endoscopy RN-1: Mahamed Meraz RN    Specimens: * No specimens in log *  H. Pylori  no    Assessment:  Intra-procedure medications     Anesthesia gave intra-procedure sedation and medications, see anesthesia flow sheet yes    Intravenous fluids: NS@ KVO     Vital signs stable     Abdominal assessment: round and soft     Recommendation:  Discharge patient per MD order.     Family   Permission to share finding with family or friend yes

## 2017-06-01 NOTE — IP AVS SNAPSHOT
[address] 
[phone] Patient: Guerrero Fernandes MRN: [id number] GMP:8/91/0641 You are allergic to the following No active allergies Recent Documentation Height Weight BMI Smoking Status 1.778 m 98.5 kg 31.17 kg/m2 Former Smoker Emergency Contacts Name Discharge Info Relation Home Work Mobile CarlosCass DISCHARGE CAREGIVER [3] Spouse [3]   [phone] ReCass holman  Spouse [3] [phone] About your hospitalization You were admitted on:  June 1, 2017 You last received care in the:  [facility] ENDOSCOPY You were discharged on:  June 1, 2017 Unit phone number:  [phone] Why you were hospitalized Your primary diagnosis was:  Not on File Providers Seen During Your Hospitalizations Provider Role Specialty Primary office phone Sarah Perez MD Attending Provider Hepatology [phone] Your Primary Care Physician (PCP) Primary Care Physician Office Phone Office Fax Linda Lazo [phone] Follow-up Information Follow up With Details Comments Contact Info Joselito Rosario MD   [address] [place] [address] 
[phone] Your Appointments Tuesday June 06, 2017  1:30 PM EDT Follow Up with [facility] [address] ([address] [phone] [address] [place] [address]  
[phone] Current Discharge Medication List  
  
CONTINUE these medications which have CHANGED Dose & Instructions Dispensing Information Comments Morning Noon Evening Bedtime  
 amiodarone 200 mg tablet Commonly known as:  CORDARONE What changed:  when to take this Your last dose was: Your next dose is:    
   
   
 Dose:  200 mg Take 1 Tab by mouth Every Mon, Wed & Sun.  
 Quantity:  15 Tab Refills:  0 CONTINUE these medications which have NOT CHANGED Dose & Instructions Dispensing Information Comments Morning Noon Evening Bedtime ALDACTONE 25 mg tablet Generic drug:  spironolactone Your last dose was: Your next dose is:    
   
   
 Dose:  50 mg Take 50 mg by mouth nightly. Refills:  0 CENTRUM SILVER Tab tablet Generic drug:  multivitamins-minerals-lutein Your last dose was: Your next dose is:    
   
   
 Dose:  1 Tab Take 1 Tab by mouth daily. Refills:  0  
     
   
   
   
  
 digoxin 0.125 mg tablet Commonly known as:  LANOXIN Your last dose was: Your next dose is:    
   
   
 Dose:  0.125 mg Take 0.125 mg by mouth nightly. Refills:  0 ENTRESTO PO Your last dose was: Your next dose is:    
   
   
 Dose:  50 mg Take 50 mg by mouth two (2) times a day. Refills:  0 INVOKANA 100 mg tablet Generic drug:  canagliflozin Your last dose was: Your next dose is:    
   
   
 Dose:  100 mg Take 100 mg by mouth every evening. Refills:  0  
     
   
   
   
  
 nitroglycerin 0.4 mg SL tablet Commonly known as:  NITROSTAT Your last dose was: Your next dose is:    
   
   
 Dose:  0.4 mg Take 1 Tab by mouth every five (5) minutes as needed for Chest Pain. qhs prn for sob Quantity:  1 Bottle Refills:  0  
     
   
   
   
  
 TOPROL  mg tablet Generic drug:  metoprolol succinate Your last dose was: Your next dose is:    
   
   
 Dose:  100 mg Take 100 mg by mouth nightly. Refills:  0  
     
   
   
   
  
 TRADJENTA 5 mg tablet Generic drug:  linagliptin Your last dose was: Your next dose is:    
   
   
 Dose:  5 mg Take 5 mg by mouth every morning. Refills:  0 Discharge Instructions [address] [facility] [address] [facility] Carlee Bush MD, MARTÍN Jo PA-C Pauline Handing, MD, Abdifatah Mcdonnell NP [address] [facility], [address] [facility], [address]. 
   [phone] FAX: 411 [address], [address] [facility] [place], [address] 
   [phone] FAX: [phone] ENDOSCOPY WITH BANDING DISCHARGE INSTRUCTIONS Erica Simmons 1960 Date: 6/1/2017 DISCOMFORT: 
Use lozenges or warm salt water gargle for sore thoat Apply warm compress to IV site if red. If redness or soreness persists call the office. You may experience gas and bloating. Walking and belching will help relieve this. You may experience chest pain or discomfort or feel as though food is \"sticking\" in your food pipe for a few days after the procedure. This is a normal feeling after banding of esophageal varices. DIET: 
Regular food may dislodge the bands placed on the varices. For this reason you should only have liquid for the rest of today. Eat only soft food that does not need to be chewed all day tomorrow. You may advance to your regular diet in 2 days. ACTIVITY: 
Spend the remainder of the day resting. Avoid any strenuous activity. You may not operate a vehicle for 12 hours. You may not engage in an occupation involving machinery or appliances for rest of today. Avoid making any critical decisions for at least 24 hour. Call the [address] [facility] if you have any of the following: 
Increasing chest or abdominal pain, nausea, vomiting, vomiting blood, abdominal distension or swelling, fever or chills, bloody discharge from nose or mouth or shortness of breath. Follow-up Instructions: 
Call Dr. Ru Webb for any questions or problems at the phone number listed above. If a biopsy was performed, you will be contacted by the office staff or Dr Janny Huang within 1 week. If you have not heard from us by then you may call the office at the phone number listed above to inquire about the results. ENDOSCOPY FINDINGS: 
Multiple varices were found in the esophagus (food tube). 4 bands were placed to seal the varices and reduce the risk of bleeding. DISCHARGE SUMMARY from the Nurse: The following personal items collected during your admission are returned to you:  
Dental Appliance: Dental Appliances: None Vision: Visual Aid: None Hearing Aid:   
Jewelry:   
Clothing:   
Other Valuables:   
Valuables sent to safe:   
 
 
Discharge Orders None Introducing [facility]! Olga Astorga introduces [employer] patient portal. Now you can access parts of your medical record, email your doctor's office, and request medication refills online. 1. In your internet browser, go to [email or web address] 2. Click on the First Time User? Click Here link in the Sign In box. You will see the New Member Sign Up page. 3. Enter your [employer] Access Code exactly as it appears below. You will not need to use this code after youve completed the sign-up process. If you do not sign up before the expiration date, you must request a new code. · [employer] Access Code: [email or web address] Expires: 6/28/2017  3:54 PM 
 
4. Enter the last four digits of your Social Security Number (xxxx) and Date of Birth (mm/dd/yyyy) as indicated and click Submit. You will be taken to the next sign-up page. 5. Create a [email or web address] ID. This will be your [employer] login ID and cannot be changed, so think of one that is secure and easy to remember. 6. Create a [employer] password. You can change your password at any time. 7. Enter your Password Reset Question and Answer. This can be used at a later time if you forget your password. 8. Enter your e-mail address.  You will receive e-mail notification when new information is available in [employer]. 9. Click Sign Up. You can now view and download portions of your medical record. 10. Click the Download Summary menu link to download a portable copy of your medical information. If you have questions, please visit the Frequently Asked Questions section of the [employer] website. Remember, [employer] is NOT to be used for urgent needs. For medical emergencies, dial 911. Now available from your iPhone and Android! General Information Please provide this summary of care documentation to your next provider. Patient Signature:  ____________________________________________________________ Date:  ____________________________________________________________  
  
Carlos Has Provider Signature:  ____________________________________________________________ Date:  ____________________________________________________________

## 2017-06-01 NOTE — DISCHARGE INSTRUCTIONS
[address], MD, [facility]     April MARTÍN Jones PA-C Rip Cleaver, MD, [place], [address] [facility], NP        [address] [facility], [place], [address] [address]  [address].     [phone]     FAX: [address], [address] [facility], [address]     [phone]     FAX: [phone]         ENDOSCOPY WITH BANDING DISCHARGE INSTRUCTIONS    Rogelioril Hearing  1960  Date: 6/1/2017    DISCOMFORT:  Use lozenges or warm salt water gargle for sore thoat  Apply warm compress to IV site if red. If redness or soreness persists call the office. You may experience gas and bloating. Walking and belching will help relieve this. You may experience chest pain or discomfort or feel as though food is \"sticking\" in your food pipe for a few days after the procedure. This is a normal feeling after banding of esophageal varices. DIET:  Regular food may dislodge the bands placed on the varices. For this reason you should only have liquid for the rest of today. Eat only soft food that does not need to be chewed all day tomorrow. You may advance to your regular diet in 2 days. ACTIVITY:  Spend the remainder of the day resting. Avoid any strenuous activity. You may not operate a vehicle for 12 hours. You may not engage in an occupation involving machinery or appliances for rest of today. Avoid making any critical decisions for at least 24 hour. Call the [address] [employer] if you have any of the following:  Increasing chest or abdominal pain, nausea, vomiting, vomiting blood, abdominal distension or swelling, fever or chills, bloody discharge from nose or mouth or shortness of breath. Follow-up Instructions:  Call Dr. Liz Vernon for any questions or problems at the phone number listed above.   If a biopsy was performed, you will be contacted by the office staff or Dr Ru Webb within 1 week. If you have not heard from us by then you may call the office at the phone number listed above to inquire about the results. ENDOSCOPY FINDINGS:  Multiple varices were found in the esophagus (food tube). 4 bands were placed to seal the varices and reduce the risk of bleeding. DISCHARGE SUMMARY from the Nurse:   The following personal items collected during your admission are returned to you:   Dental Appliance: Dental Appliances: None  Vision: Visual Aid: None  Hearing Aid:    Jewelry:    Clothing:    Other Valuables:    Valuables sent to safe:

## 2017-06-06 ENCOUNTER — OFFICE VISIT (OUTPATIENT)
Dept: HEMATOLOGY | Age: 57
End: 2017-06-06

## 2017-06-06 VITALS
WEIGHT: 220.2 LBS | OXYGEN SATURATION: 96 % | DIASTOLIC BLOOD PRESSURE: 59 MMHG | HEART RATE: 60 BPM | BODY MASS INDEX: 31.6 KG/M2 | TEMPERATURE: 97.5 F | SYSTOLIC BLOOD PRESSURE: 114 MMHG

## 2017-06-06 DIAGNOSIS — K74.60 CIRRHOSIS OF LIVER WITHOUT ASCITES, UNSPECIFIED HEPATIC CIRRHOSIS TYPE (HCC): Primary | ICD-10-CM

## 2017-06-06 NOTE — PROGRESS NOTES
93 Agnes Mercedes MD, [address]     April MARTÍN Burden, KULDEEP Guo MD, MD Shoshana Osuna NP Arden Rattan, NP [facility]     [address] [facility], [address] [address]  [address].     [phone]     FAX: [address] [place] [address], [address] [facility], [address]     [phone]     FAX: [phone]       Patient Care Team:  Pita Gill MD as PCP - General (Family Practice)  Pita Gill MD (Family Practice)  Maame Johnson MD (Gastroenterology)      Problem List  Date Reviewed: 5/2/2017          Codes Class Noted    Cirrhosis of liver without ascites [facility]) ICD-10-CM: K74.60  ICD-9-CM: 571.5  5/2/2017        Neuropathy ICD-10-CM: G62.9  ICD-9-CM: 355.9  3/30/2017        S/P BKA (below knee amputation) [facility]) ICD-10-CM: G45.941  ICD-9-CM: V49.75  3/30/2017        Hypertension ICD-10-CM: I10  ICD-9-CM: 401.9  3/30/2017        Cardiac defibrillator in place ICD-10-CM: Z95.810  ICD-9-CM: V45.02  3/30/2017        Type II diabetes mellitus ([facility] ICD-10-CM: E11.9  ICD-9-CM: 250.00  2/1/2015            Monet Mosquera returns to the [address] for management of cirrhosis kvng likely secondary to alcohol. The active problem list, all pertinent past medical history, medications, and laboratory findings related to the liver disorder were reviewed with the patient. The patient is a 59 y.o.  male who was first noted to have abnormalities in liver enzymes in 2012. Serologic evaluation for markers of chronic liver disease were performed at his last office visit and rule out viral hepatitis and A1AT disease, autoimmune markers, and HC. His has a single mutation for H63D. Ultrasound of the liver was recently performed in 5/2017.   The results of the imaging suggested cirrhosis and question a new 1.7 cm lesion. Follow-up imaging with CT scan did NOT demonstrate liver mass/lesion, the finding on US was believed to be due to overlying mesenteric fat. A liver biopsy was performed in 2012. This according to the patient did not demonstrate cirrhosis. The exact findings are not known or available for review. The patient has had past fibroscan analysis demonstrating a score consistent with cirrhosis, EkPa of 75. The patient has had no history of UGI bleeding, and has had recent screening EGD. This demonstrated portal hypertensive gastropathy and the presence of varices. Four bands were placed last week and the patient has tolerated recovery from this procedure well. The most recent laboratory studies indicate that the liver transaminases are elevated, alkaline phosphatase is elevated, tests of hepatic synthetic and metabolic function are depressed, total bilirubin is elevated, INR is normal, albumin is  normal, and the platelet count is depressed. The patient has no symptoms which could be attributed to the liver disorder. The patient completes all daily activities without any functional limitations. The patient has not experienced fatigue, pain in the right side over the liver, problems concentrating, swelling of the abdomen, swelling of the lower extremities, hematemesis, hematochezia. ALLERGIES  No Known Allergies    MEDICATIONS  Current Outpatient Prescriptions   Medication Sig    SACUBITRIL/VALSARTAN (ENTRESTO PO) Take 50 mg by mouth two (2) times a day.  canagliflozin (INVOKANA) 100 mg tablet Take 100 mg by mouth every evening.  linagliptin (TRADJENTA) 5 mg tablet Take 5 mg by mouth every morning.  amiodarone (CORDARONE) 200 mg tablet Take 1 Tab by mouth Every Mon, Wed & Sun. (Patient taking differently: Take 200 mg by mouth nightly.)    spironolactone (ALDACTONE) 25 mg tablet Take 50 mg by mouth nightly.     nitroglycerin (NITROSTAT) 0.4 mg SL tablet Take 1 Tab by mouth every five (5) minutes as needed for Chest Pain. qhs prn for sob    multivitamins-minerals-lutein (CENTRUM SILVER) Tab Take 1 Tab by mouth daily.  metoprolol-XL (TOPROL XL) 100 mg XL tablet Take 100 mg by mouth nightly.  digoxin (LANOXIN) 0.125 mg tablet Take 0.125 mg by mouth nightly. No current facility-administered medications for this visit. SYSTEM REVIEW NOT RELATED TO LIVER DISEASE OR REVIEWED ABOVE:  Constitution systems: Negative for fever, chills, weight gain, weight loss. Eyes: Negative for visual changes. ENT: Negative for sore throat, painful swallowing. Respiratory: Negative for cough, hemoptysis, SOB. Cardiology: Negative for chest pain, palpitations. GI:  Negative for constipation or diarrhea. : Negative for urinary frequency, dysuria, hematuria, nocturia. Skin: Negative for rash. Hematology: Negative for easy bruising, blood clots. Musculo-skeletal: Negative for back pain, muscle pain, weakness. Neurologic: Negative for headaches, dizziness, vertigo, memory problems not related to HE. Psychology: Negative for anxiety, depression. FAMILY HISTORY:  The father  of unknown cause. The mother  of heart disease. There is no family history of liver disease. SOCIAL HISTORY:  The patient is . The patient has 3 children. The patient has never used tobacco products. The patient consumes 6+ alcoholic beverages per day. This has been his long-standing pattern. He states that he has tried to make a recent effort to cut back, is vague on actual intake. The patient used to work . The patient retired in . PHYSICAL EXAMINATION:  /59  Pulse 60  Temp 97.5 °F (36.4 °C) (Oral)   Wt 220 lb 3.2 oz (99.9 kg)  SpO2 96%  BMI 31.6 kg/m2  General: No acute distress. Eyes: Sclera anicteric. ENT: No oral lesions. Thyroid normal.  Nodes: No adenopathy. Skin: Numerous spider angiomata. No jaundice.   No palmar erythema. Respiratory: Lungs clear to auscultation. Cardiovascular: Regular heart rate. No murmurs. No JVD. Abdomen: Soft non-tender. Liver firm and easily palpable 2-3 cm BCM. Spleen not palpable. No obvious ascites. Extremities: No edema. No muscle wasting. No gross arthritic changes. Prosthetic LLE. Neurologic: Alert and oriented. Cranial nerves grossly intact. No asterixis. LABORATORY STUDIES:  [facility] [address] [place] [address] & Units 5/2/2017 3/30/2017 2/20/2015   WBC 3.4 - 10.8 x10E3/uL 3.4 5.0 4.1   ANC 1.4 - 7.0 x10E3/uL  4.1 2.9   HGB 12.6 - 17.7 g/dL 13.9 13.9 7.7 (L)    - 379 x10E3/uL 65 (LL) 76 (LL) 187   INR 0.8 - 1.2 1.2 1.2    AST 0 - 40 IU/L 72 (H) 80 (H)    ALT 0 - 44 IU/L 35 48 (H)    Alk Phos 39 - 117 IU/L 154 (H) 172 (H)    Bili, Total 0.0 - 1.2 mg/dL 2.4 (H) 3.4 (H)    Bili, Direct 0.00 - 0.40 mg/dL 1.35 (H) 1.78 (H)    Albumin 3.5 - 5.5 g/dL 4.2 4.1    BUN 6 - 24 mg/dL 10 14    Creat 0.76 - 1.27 mg/dL 0.79 0.84    Na 134 - 144 mmol/L 131 (L) 136    K 3.5 - 5.2 mmol/L 5.3 (H) 4.5    Cl 96 - 106 mmol/L 93 (L) 97    CO2 18 - 29 mmol/L 19 24    Glucose 65 - 99 mg/dL 132 (H) 138 (H)    Magnesium 1.6 - 2.4 mg/dL      Cancer Screening Latest Ref Rng & Units  3/30/2017    AFP, Serum 0.0 - 8.0 ng/mL  11.1 (H)    AFP-L3% 0.0 - 9.9 %  8.2    Additional lab values drawn at today's office visit are pending at the time of documentation. SEROLOGIES:  Serologies Latest Ref Rng & Units 3/30/2017   Hep A Ab, Total Negative Positive (A)   Hep B Surface Ag Negative Negative   Hep B Core Ab, Total Negative Negative   Hep B Surface AB QL  Non Reactive   Hep C Ab 0.0 - 0.9 s/co ratio 0.1   Ferritin 30 - 400 ng/mL 399   Iron % Saturation 15 - 55 % 76 (HH)   Alpha-1 antitrypsin level 90 - 200 mg/dL 181     LIVER HISTOLOGY:  5/2017. FibroScan performed at [address]. EkPa was 75. Suggested fibrosis level is F4. ENDOSCOPIC PROCEDURES:  6/2017.   EGD performed by Dr Maryam Tay. Few medium-large esophageal varices. Banding performed x 4. No gastric varices. Moderate portal gastropathy. Repeat in 3 months. RADIOLOGY:  10/2012. Ultrasound of liver. Echogenic nodular consistent with cirrhosis. No liver mass lesions. No dilated bile ducts. No ascites. 5/2017. Ultrasound of liver. Echogenic consistent with cirrhosis. No dilated bile ducts. No ascites. There is question of a 1.7 x 1.1 cm echogenic lesion in  the left lobe and MR is recommended for further assessment  5/2017. CT scan of the abdomen with and without contrast.  Cirrhotic liver with marked left lobe atrophy and lobulation with an area of intervening mesenteric fat corresponding to hyperechoic area seen on ultrasound. No CT identification of suspicious hepatic lesion. 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 Liver function is depressed, platelet count is depressed. Fibroscan in the office supports clinical/lab evidence of cirrhosis. Virologic and serologic testing for causes of chronic liver disease were all negative. I have explained these results in detail with the patient and that his underlying diseae is most likely due to alcohol. No need or indication for liver biopsy at this time as this would not likely change our treatment/screening approach with this patient. Will perform laboratory testing to monitor liver function and degree of liver injury. This included BMP, hepatic panel, CBC with platelet count, INR. I have explained to the patient the significance of cirrhosis, its natural history, and our recommendations for initiating screening for complications. I have advised complete abstinence from alcohol as this is likely underlying or contributing etiology of liver disease. Patient's most recent calculated MELD score is 18. He is not presently a candidate for transplant due to ongoing alcohol use.       Will perform imaging of the liver with ultrasound on a routine basis, the next screen will be due in 11/2017. Recent EGD with banding of esophageal varices. I have explained to the patient that this will be repeated until varices obliterated. Plan to repeat this study in 9/2017. The patient was counseled regarding alcohol consumption. I have advised in no uncertain terms that he must discontinue all alcohol as this will cause additional strain on his liver functions. He verbalizes understanding. Vaccination for viral hepatitis B is recommended since the patient has no serologic evidence of previous exposure or vaccination with immunity. Vaccination for viral hepatitis A is not needed. The patient has serologic evidence of prior exposure or vaccination with immunity. All of the above issues were discussed with the patient. All questions were answered. The patient expressed a clear understanding of the above. [address] [address] in 2 months.     Filiberto Salmeron PA-C  Liver [place] [address], [address] [place]  [address]elia[address]frank  22.  [phone]

## 2017-06-06 NOTE — MR AVS SNAPSHOT
Visit Information Date & Time Provider Department Dept. Phone Encounter #  
 6/6/2017  1:30 PM [facility] [phone] [id number] Follow-up Instructions Return in about 2 months (around 8/6/2017). Your Appointments 8/3/2017 11:00 AM  
Follow Up with [facility] Appt Note: Follow up [address] [facility] [place] [phone] [address] [facility] [address]  
[address] [place] [address] Upcoming Health Maintenance Date Due  
 LIPID PANEL Q1 1960 MICROALBUMIN Q1 1/17/1968 EYE EXAM RETINAL OR DILATED Q1 1/17/1968 DTaP/Tdap/Td series (1 - Tdap) 1/17/1979 FOBT Q 1 YEAR AGE 50-75 1/17/2008 FOOT EXAM Q1 1/31/2016 INFLUENZA AGE 9 TO ADULT 8/1/2017 HEMOGLOBIN A1C Q6M 9/30/2017 Allergies as of 6/6/2017  Review Complete On: 6/6/2017 By: ARTURO Valdez No Known Allergies Current Immunizations  Reviewed on 11/5/2013 Name Date Influenza Vaccine 11/12/2014 Influenza Vaccine PF 11/5/2013  6:09 PM  
 Pneumococcal Vaccine (Unspecified Type) 2/1/2012 Not reviewed this visit You Were Diagnosed With   
  
 Codes Comments Cirrhosis of liver without ascites, unspecified hepatic cirrhosis type ([facility]    -  Primary ICD-10-CM: K74.60 ICD-9-CM: 571.5 Vitals BP Pulse Temp Weight(growth percentile) SpO2 BMI  
 114/59 60 97.5 °F (36.4 °C) (Oral) 220 lb 3.2 oz (99.9 kg) 96% 31.6 kg/m2 Smoking Status Former Smoker Vitals History BMI and BSA Data Body Mass Index Body Surface Area  
 31.6 kg/m 2 2.22 m 2 Preferred Pharmacy Pharmacy Name Phone [facility] [address], [address] [facility] [phone] Your Updated Medication List  
  
   
This list is accurate as of: 6/6/17  1:57 PM.  Always use your most recent med list.  
  
  
  
  
 ALDACTONE 25 mg tablet Generic drug:  spironolactone Take 50 mg by mouth nightly. amiodarone 200 mg tablet Commonly known as:  CORDARONE Take 1 Tab by mouth Every Mon, Wed & Sun.  
  
 CENTRUM SILVER Tab tablet Generic drug:  multivitamins-minerals-lutein Take 1 Tab by mouth daily. digoxin 0.125 mg tablet Commonly known as:  LANOXIN Take 0.125 mg by mouth nightly. ENTRESTO PO Take 50 mg by mouth two (2) times a day. INVOKANA 100 mg tablet Generic drug:  canagliflozin Take 100 mg by mouth every evening. nitroglycerin 0.4 mg SL tablet Commonly known as:  NITROSTAT Take 1 Tab by mouth every five (5) minutes as needed for Chest Pain. qhs prn for sob TOPROL  mg tablet Generic drug:  metoprolol succinate Take 100 mg by mouth nightly. TRADJENTA 5 mg tablet Generic drug:  linagliptin Take 5 mg by mouth every morning. We Performed the Following CBC W/O DIFF [47387 CPT(R)] HEPATIC FUNCTION PANEL (6) [[id number] Custom] METABOLIC PANEL, BASIC [48604 CPT(R)] PROTHROMBIN TIME + INR [65682 CPT(R)] Follow-up Instructions Return in about 2 months (around 8/6/2017). To-Do List   
 09/06/2017 Procedures:  UPPER GI ENDOSCOPY,DIAGNOSIS Introducing [facility] & HEALTH SERVICES! [facility] introduces [employer] patient portal. Now you can access parts of your medical record, email your doctor's office, and request medication refills online. 1. In your internet browser, go to [email or web address] 2. Click on the First Time User? Click Here link in the Sign In box. You will see the New Member Sign Up page. 3. Enter your [employer] Access Code exactly as it appears below. You will not need to use this code after youve completed the sign-up process. If you do not sign up before the expiration date, you must request a new code. · [employer] Access Code: [email or web address] Expires: 6/28/2017  3:54 PM 
 
4. Enter the last four digits of your Social Security Number (xxxx) and Date of Birth (mm/dd/yyyy) as indicated and click Submit. You will be taken to the next sign-up page. 5. Create a qLearning ID. This will be your qLearning login ID and cannot be changed, so think of one that is secure and easy to remember. 6. Create a qLearning password. You can change your password at any time. 7. Enter your Password Reset Question and Answer. This can be used at a later time if you forget your password. 8. Enter your e-mail address. You will receive e-mail notification when new information is available in [address] 9. Click Sign Up. You can now view and download portions of your medical record. 10. Click the Download Summary menu link to download a portable copy of your medical information. If you have questions, please visit the Frequently Asked Questions section of the qLearning website. Remember, qLearning is NOT to be used for urgent needs. For medical emergencies, dial 911. Now available from your iPhone and Android! Please provide this summary of care documentation to your next provider. Your primary care clinician is listed as Heather Chavez. If you have any questions after today's visit, please call [phone].

## 2017-06-07 LAB
ALBUMIN SERPL-MCNC: 3.8 G/DL (ref 3.5–5.5)
ALP SERPL-CCNC: 171 IU/L (ref 39–117)
ALT SERPL-CCNC: 37 IU/L (ref 0–44)
AST SERPL-CCNC: 85 IU/L (ref 0–40)
BILIRUB DIRECT SERPL-MCNC: 1.35 MG/DL (ref 0–0.4)
BILIRUB SERPL-MCNC: 2.4 MG/DL (ref 0–1.2)
BUN SERPL-MCNC: 9 MG/DL (ref 6–24)
BUN/CREAT SERPL: 12 (ref 9–20)
CALCIUM SERPL-MCNC: 9 MG/DL (ref 8.7–10.2)
CHLORIDE SERPL-SCNC: 93 MMOL/L (ref 96–106)
CO2 SERPL-SCNC: 21 MMOL/L (ref 18–29)
CREAT SERPL-MCNC: 0.76 MG/DL (ref 0.76–1.27)
ERYTHROCYTE [DISTWIDTH] IN BLOOD BY AUTOMATED COUNT: 14.2 % (ref 12.3–15.4)
GLUCOSE SERPL-MCNC: 104 MG/DL (ref 65–99)
HCT VFR BLD AUTO: 38.1 % (ref 37.5–51)
HGB BLD-MCNC: 13.2 G/DL (ref 12.6–17.7)
INR PPP: 1.2 (ref 0.8–1.2)
MCH RBC QN AUTO: 38.7 PG (ref 26.6–33)
MCHC RBC AUTO-ENTMCNC: 34.6 G/DL (ref 31.5–35.7)
MCV RBC AUTO: 112 FL (ref 79–97)
MORPHOLOGY BLD-IMP: ABNORMAL
PLATELET # BLD AUTO: 64 X10E3/UL (ref 150–379)
POTASSIUM SERPL-SCNC: 4.4 MMOL/L (ref 3.5–5.2)
PROTHROMBIN TIME: 11.9 SEC (ref 9.1–12)
RBC # BLD AUTO: 3.41 X10E6/UL (ref 4.14–5.8)
SODIUM SERPL-SCNC: 131 MMOL/L (ref 134–144)
WBC # BLD AUTO: 3.3 X10E3/UL (ref 3.4–10.8)

## 2017-11-20 PROBLEM — R60.1 ANASARCA: Status: ACTIVE | Noted: 2017-01-01

## 2017-11-20 PROBLEM — E87.1 HYPONATREMIA: Status: ACTIVE | Noted: 2017-01-01

## 2017-11-20 NOTE — ED PROVIDER NOTES
HPI Comments: 59 y.o. male with past medical history significant for CHF, Hypertension, Diabetes, Atrial Fibrillation, Cirrhosis, and Arthritis who presents from Home with chief complaint of Evaluation for Fluid Retention. Patient states onset \"over the past month\" of increased swelling of his right leg and abdomen. Patient reports gradually worsening symptoms since onset. Patient was seen by his Primary Care Physician today regarding symptoms and was referred to ED for further evaluation. Pt states accompanying nonproductive cough, dyspnea and atypical weight gain described as \"25lbs since last Tuesday\". 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 Reginald Myles Pt denies fever, chills, congestion, chest pain, abdominal pain, nausea, vomiting, diarrhea, difficulty urinating, or dysuria. Pt denies any other acute medical complaints. There are no other acute medical concerns at this time. PCP: Guru Lincoln MD    Hepatologist: Dr. Perry Minor    Alcohol Use: Yes    Note written by Radhika Romo, as dictated by Cheryl Rhodes MD 2:37 PM    The history is provided by the patient.         Past Medical History:   Diagnosis Date    Arthritis     Atrial fibrillation ([address] 2014    CHF (congestive heart failure) ([address]     Diabetes ([address]     Diabetic ulcer of left foot ([address] 2/2015 2/2015 Lt BKA    History of blood transfusion 2015    During Lt BKA; pt denies any adverse reaction    Hypertension     Liver disease     CIRRHOSIS    Sepsis ([address] 02/2015    From diabetic Left foot wound;  had a BKA ;  as of 11/21/16 pt states back to his baseline        Past Surgical History:   Procedure Laterality Date    HX AMPUTATION Left 2/10/2015    BKA    HX COLONOSCOPY      HX IMPLANTABLE CARDIOVERTER DEFIBRILLATOR  08/04/2015    [employer] cardio defibrillator; Dr Renetta Sanchez; as of 11/21/16 pt denies defibrillator going off         Family History: Problem Relation Age of Onset    Heart Disease Brother     Heart Failure Brother     Heart Failure Brother        Social History     Social History    Marital status:      Spouse name: N/A    Number of children: N/A    Years of education: N/A     Occupational History    Not on file. Social History Main Topics    Smoking status: Former Smoker     Packs/day: 1.00     Years: 5.00     Quit date: 1/1/2013    Smokeless tobacco: Never Used    Alcohol use 14.4 oz/week     24 Cans of beer per week      Comment: as of 11/21/16 pt reports a case of beer weekly    Drug use: No    Sexual activity: Not on file     Other Topics Concern    Not on file     Social History Narrative         ALLERGIES: Review of patient's allergies indicates no known allergies. Review of Systems   Constitutional: Positive for unexpected weight change. Negative for chills and fever. HENT: Negative for congestion. Respiratory: Positive for cough and shortness of breath. Cardiovascular: Positive for leg swelling. Negative for chest pain. Gastrointestinal: Positive for abdominal distention. Negative for abdominal pain, diarrhea, nausea and vomiting. Genitourinary: Negative for difficulty urinating and dysuria. All other systems reviewed and are negative. Vitals:    11/20/17 1334 11/20/17 1534 11/20/17 1600 11/20/17 1615   BP: 136/75 139/65 115/85 128/82   Pulse: 60 60 60 60   Resp: 24 20 14 19   Temp: 98.1 °F (36.7 °C)      SpO2: 98% 97% 98% 95%   Weight: 109.4 kg (241 lb 2 oz)      Height: 5' 10\" (1.778 m)               Physical Exam   Vital signs reviewed. Nursing notes reviewed.     Const:  No acute distress, well developed, well nourished  Head:  Atraumatic, normocephalic  Eyes:  PERRL, conjunctiva normal, no scleral icterus  Neck:  Supple, trachea midline  Cardiovascular:  RRR, no murmurs, no gallops, no rubs  Resp:  No resp distress, no increased work of breathing, no wheezes, no rhonchi, no rales,  Abd:  Soft, non-tender, non-distended, no rebound, no guarding, no CVA tenderness  :  Deferred  MSK:  Edema of right left, BKA of left leg  Neuro:  Alert and oriented x3, no cranial nerve defect  Skin:  Jaundice  Psych: normal mood and affect, behavior is normal, judgement and thought content is normal  Note written by Radhika Salas, as dictated by Chrissy Louise MD 2:38 PM      MDM  Number of Diagnoses or Management Options  Alcoholic cirrhosis of liver with ascites ([facility]): Hyponatremia:      Amount and/or Complexity of Data Reviewed  Clinical lab tests: ordered and reviewed  Tests in the radiology section of CPT®: ordered and reviewed  Review and summarize past medical records: yes    Patient Progress  Patient progress: stable    ED Course     Pt. Presents to the ER with fluid retention. Pt. Has had weight gain. Pt. Found to be hyponatremic. I have given him a dose of lasix. Pt. To be evaluated for admission by the hospitalist.      Procedures    PROGRESS NOTE:3:30 PM  Provider has discussed results and plan of treatment with patient and patient's family. Patient expressed understanding and agreement of treatment plan. NA is 117    Will admit patient     CONSULT NOTE:  4:04 PM Chrissy Louise MD spoke with Dr. Kassidy Samayoa, Consult for Hospitalist.  Discussed available diagnostic tests and clinical findings. He is in agreement with care plans as outlined. CONSULT NOTE:  5:07 PM Chrissy Louise MD spoke with Dr. Iwona Miller for Hospitalist.  Discussed available diagnostic tests and clinical findings. He is in agreement with care plans as outlined.   Will see and admit

## 2017-11-20 NOTE — IP AVS SNAPSHOT
2700 [facility] [address] [place] [address] 
[phone] Patient: Victorina Beasley MRN: [id number] GZV:1/44/[id number] About your hospitalization You were admitted on:  November 20, 2017 You last received care in the:  [facility] [place] 4 IM 2 You were discharged on:  November 24, 2017 Why you were hospitalized Your primary diagnosis was: Anasarca Your diagnoses also included:  Hyponatremia Things You Need To Do (next 8 weeks) Follow up with ARTURO Willard  
follow up in 2 weeks Phone:  [phone] Where:  [address] [facility] [place], [facility] [address] [address] [place], [address] [facility], [address] [place] [address] Follow up with Manju Tracey MD  
Follow up next week Phone:  [phone] Where:  [address], [address] [facility], [address] [place] [address] Friday Dec 01, 2017 Follow up with Petra Shane MD  
Friday December 1, 2017 3:45 Phone:  [phone] Where:  [address] [address], [address] [place] [address] Discharge Orders None A check suzanne indicates which time of day the medication should be taken. My Medications TAKE these medications as instructed Instructions Each Dose to Equal  
 Morning Noon Evening Bedtime  
 amiodarone 200 mg tablet Commonly known as:  CORDARONE Your last dose was: Your next dose is: Take 200 mg by mouth nightly. 200 mg CENTRUM SILVER Tab tablet Generic drug:  multivitamins-minerals-lutein Your last dose was: Your next dose is: Take 1 Tab by mouth daily. 1 Tab  
    
   
   
   
  
 digoxin 0.125 mg tablet Commonly known as:  LANOXIN Your last dose was: Your next dose is: Take 0.125 mg by mouth nightly. 0.125 mg ENTRESTO 49-51 mg Tab tablet Generic drug:  sacubitril-valsartan Your last dose was: Your next dose is: Take 1 Tab by mouth two (2) times a day. 1 Tab  
    
   
   
   
  
 folic acid 1 mg tablet Commonly known as:  Google Start taking on:  11/25/2017 Your last dose was: Your next dose is: Take 1 Tab by mouth daily. 1 mg  
    
   
   
   
  
 furosemide 40 mg tablet Commonly known as:  LASIX Your last dose was: Your next dose is: Take 1 Tab by mouth daily. 40 mg  
    
   
   
   
  
 metoprolol succinate 25 mg XL tablet Commonly known as:  TOPROL XL Your last dose was: Your next dose is: Take 0.5 Tabs by mouth daily. 12.5 mg  
    
   
   
   
  
 spironolactone 100 mg tablet Commonly known as:  ALDACTONE Start taking on:  11/25/2017 Your last dose was: Your next dose is: Take 1 Tab by mouth daily. 100 mg  
    
   
   
   
  
 thiamine 100 mg tablet Commonly known as:  B-1 Start taking on:  11/25/2017 Your last dose was: Your next dose is: Take 1 Tab by mouth daily. 100 mg  
    
   
   
   
  
 TRADJENTA 5 mg tablet Generic drug:  linagliptin Your last dose was: Your next dose is: Take 5 mg by mouth every morning. 5 mg Where to Get Your Medications Information on where to get these meds will be given to you by the nurse or doctor. ! Ask your nurse or doctor about these medications  
  folic acid 1 mg tablet  
 furosemide 40 mg tablet  
 metoprolol succinate 25 mg XL tablet  
 spironolactone 100 mg tablet  
 thiamine 100 mg tablet Discharge Instructions Discharge Instructions PATIENT ID: Terrance Paredes MRN: [id number] YOB: 1960 DATE OF ADMISSION: 11/20/2017  1:58 PM   
DATE OF DISCHARGE: 11/24/2017 PRIMARY CARE PROVIDER: Thu Lazcano MD  
 
ATTENDING PHYSICIAN: Karen Melchor MD 
DISCHARGING PROVIDER: Lindsay Jo NP   
 To contact this individual call [phone] and ask the  to page. If unavailable ask to be transferred the Adult Hospitalist Department. DISCHARGE DIAGNOSES: Ascites, decompensated liver cirrhosis, chronic systolic heart failure, alcohol abuse CONSULTATIONS: IP CONSULT TO HOSPITALIST 
IP CONSULT TO HEPATOLOGY 
IP CONSULT TO INTERVENTIONAL RADIOLOGY 
IP CONSULT TO CARDIOLOGY PROCEDURES/SURGERIES: * No surgery found * PENDING TEST RESULTS:  
At the time of discharge the following test results are still pending: none FOLLOW UP APPOINTMENTS:  
Follow-up Information Follow up With Details Comments Contact Info [place]audhary [place]   [address] [address] 
[phone] ARTURO Chaves  follow up in 2 weeks [address] [facility] [address] 
[phone] ADDITIONAL CARE RECOMMENDATIONS: 
Follow up with your cardiologist next week Follow up with Dr. Talia Keyes office in 2 weeks Decrease metoprolol to 12.5 mg per cardiology NEW: lasix 40 mg daily, spirolactone 100 mg daily DIET: Cardiac ACTIVITY: Activity as tolerated WOUND CARE: none EQUIPMENT needed: none DISCHARGE MEDICATIONS: 
 See Medication Reconciliation Form · It is important that you take the medication exactly as they are prescribed. · Keep your medication in the bottles provided by the pharmacist and keep a list of the medication names, dosages, and times to be taken in your wallet. · Do not take other medications without consulting your doctor. NOTIFY YOUR PHYSICIAN FOR ANY OF THE FOLLOWING:  
Fever over 101 degrees for 24 hours. Chest pain, shortness of breath, fever, chills, nausea, vomiting, diarrhea, change in mentation, falling, weakness, bleeding. Severe pain or pain not relieved by medications. Or, any other signs or symptoms that you may have questions about.  
 
 
DISPOSITION: 
xx Home With: 
 OT  PT  [facility]  RN  
  
 SNF/Inpatient Rehab/LTAC Independent/assisted living Hospice Other: CDMP Checked:  
Yes xx PROBLEM LIST Updated: 
Yes xx Signed:  
Sofia Whaley NP 
11/24/2017 
1:58 PM 
 
  
  
  
Atreaon Announcement We are excited to announce that we are making your provider's discharge notes available to you in Atreaon. You will see these notes when they are completed and signed by the physician that discharged you from your recent hospital stay. If you have any questions or concerns about any information you see in Atreaon, please call the Health Information Department where you were seen or reach out to your Primary Care Provider for more information about your plan of care. Introducing [facility] SERVICES! Ruchi Reyes introduces Atreaon patient portal. Now you can access parts of your medical record, email your doctor's office, and request medication refills online. 1. In your internet browser, go to [email or web address] 2. Click on the First Time User? Click Here link in the Sign In box. You will see the New Member Sign Up page. 3. Enter your Atreaon Access Code exactly as it appears below. You will not need to use this code after youve completed the sign-up process. If you do not sign up before the expiration date, you must request a new code. · Atreaon Access Code: [email or web address] Expires: 2/20/2018  2:42 PM 
 
4. Enter the last four digits of your Social Security Number (xxxx) and Date of Birth (mm/dd/yyyy) as indicated and click Submit. You will be taken to the next sign-up page. 5. Create a Atreaon ID. This will be your Atreaon login ID and cannot be changed, so think of one that is secure and easy to remember. 6. Create a Atreaon password. You can change your password at any time. 7. Enter your Password Reset Question and Answer. This can be used at a later time if you forget your password. 8. Enter your e-mail address. You will receive e-mail notification when new information is available in [address] 9. Click Sign Up. You can now view and download portions of your medical record. 10. Click the Download Summary menu link to download a portable copy of your medical information. If you have questions, please visit the Frequently Asked Questions section of the MyChart website. Remember, [facility] is NOT to be used for urgent needs. For medical emergencies, dial 911. Now available from your iPhone and Android! Providers Seen During Your Hospitalization Provider Specialty Primary office phone Jennifer Zapata MD Emergency Medicine [phone] Jodene Siemens, MD Internal Medicine [phone] Darnell Vega MD Internal Medicine [phone] Salas Crocker MD Internal Medicine [phone] Your Primary Care Physician (PCP) Primary Care Physician Office Phone Office Fax Cally Chen [phone] You are allergic to the following No active allergies Recent Documentation Height Weight BMI Smoking Status 1.778 m 93 kg 29.41 kg/m2 Former Smoker Emergency Contacts Name Discharge Info Relation Home Work Mobile Cass Gonzalez DISCHARGE CAREGIVER [3] Spouse [3] [phone] Patient Belongings The following personal items are in your possession at time of discharge: 
  Dental Appliances: None  Visual Aid: Glasses, At bedside      Home Medications: None   Jewelry: None  Clothing: At bedside, Footwear    Other Valuables: Cell Phone, Prosthesis Discharge Instructions Attachments/References HEART FAILURE: AVOIDING TRIGGERS (ENGLISH) Patient Handouts Avoiding Triggers With Heart Failure: Care Instructions Your Care Instructions Triggers are anything that make your heart failure flare up.  A flare-up is also called \"sudden heart failure\" or \"acute heart failure. \" When you have a flare-up, fluid builds up in your lungs, and you have problems breathing. You might need to go to the hospital. By watching for changes in your condition and avoiding triggers, you can prevent heart failure flare-ups. Follow-up care is a key part of your treatment and safety. Be sure to make and go to all appointments, and call your doctor if you are having problems. It's also a good idea to know your test results and keep a list of the medicines you take. How can you care for yourself at home? Watch for changes in your weight and condition · Weigh yourself without clothing at the same time each day. Record your weight. Call your doctor if you have sudden weight gain, such as more than 2 to 3 pounds in a day or 5 pounds in a week. (Your doctor may suggest a different range of weight gain.) A sudden weight gain may mean that your heart failure is getting worse. · Keep a daily record of your symptoms. Write down any changes in how you feel, such as new shortness of breath, cough, or problems eating. Also record if your ankles are more swollen than usual and if you feel more tired than usual. Note anything that you ate or did that could have triggered these changes. Limit sodium Sodium causes your body to hold on to extra water. This may cause your heart failure symptoms to get worse. People get most of their sodium from processed foods. Fast food and restaurant meals also tend to be very high in sodium. · Your doctor may suggest that you limit sodium to 2,000 milligrams (mg) a day or less. That is less than 1 teaspoon of salt a day, including all the salt you eat in cooking or in packaged foods. · Read food labels on cans and food packages. They tell you how much sodium you get in one serving. Check the serving size. If you eat more than one serving, you are getting more sodium. · Be aware that sodium can come in forms other than salt, including monosodium glutamate (MSG), sodium citrate, and sodium bicarbonate (baking soda). MSG is often added to Asian food. You can sometimes ask for food without MSG or salt. · Slowly reducing salt will help you adjust to the taste. Take the salt shaker off the table. · Flavor your food with garlic, lemon juice, onion, vinegar, herbs, and spices instead of salt. Do not use soy sauce, steak sauce, onion salt, garlic salt, mustard, or ketchup on your food, unless it is labeled \"low-sodium\" or \"low-salt. \" 
· Make your own salad dressings, sauces, and ketchup without adding salt. · Use fresh or frozen ingredients, instead of canned ones, whenever you can. Choose low-sodium canned goods. · Eat less processed food and food from restaurants, including fast food. Exercise as directed Moderate, regular exercise is very good for your heart. It improves your blood flow and helps control your weight. But too much exercise can stress your heart and cause a heart failure flare-up. · Check with your doctor before you start an exercise program. 
· Walking is an easy way to get exercise. Start out slowly. Gradually increase the length and pace of your walk. Swimming, riding a bike, and using a treadmill are also good forms of exercise. · When you exercise, watch for signs that your heart is working too hard. You are pushing yourself too hard if you cannot talk while you are exercising. If you become short of breath or dizzy or have chest pain, stop, sit down, and rest. 
· Do not exercise when you do not feel well. Take medicines correctly · Take your medicines exactly as prescribed. Call your doctor if you think you are having a problem with your medicine. · Make a list of all the medicines you take.  Include those prescribed to you by other doctors and any over-the-counter medicines, vitamins, or supplements you take. Take this list with you when you go to any doctor. · Take your medicines at the same time every day. It may help you to post a list of all the medicines you take every day and what time of day you take them. · Make taking your medicine as simple as you can. Plan times to take your medicines when you are doing other things, such as eating a meal or getting ready for bed. This will make it easier to remember to take your medicines. · Get organized. Use helpful tools, such as daily or weekly pill containers. When should you call for help? Call 911 if you have symptoms of sudden heart failure such as: 
? · You have severe trouble breathing. ? · You cough up pink, foamy mucus. ? · You have a new irregular or rapid heartbeat. ?Call your doctor now or seek immediate medical care if: 
? · You have new or increased shortness of breath. ? · You are dizzy or lightheaded, or you feel like you may faint. ? · You have sudden weight gain, such as more than 2 to 3 pounds in a day or 5 pounds in a week. (Your doctor may suggest a different range of weight gain.) ? · You have increased swelling in your legs, ankles, or feet. ? · You are suddenly so tired or weak that you cannot do your usual activities. ? Watch closely for changes in your health, and be sure to contact your doctor if you develop new symptoms. Where can you learn more? Go to http://precious-vita.info/. Enter S619 in the search box to learn more about \"Avoiding Triggers With Heart Failure: Care Instructions. \" Current as of: September 21, 2016 Content Version: 11.4 © 5836-7901 [employer]. Care instructions adapted under license by [employer] (which disclaims liability or warranty for this information).  If you have questions about a medical condition or this instruction, always ask your healthcare professional. [employer] disclaims any warranty or liability for your use of this information. Please provide this summary of care documentation to your next provider. Signatures-by signing, you are acknowledging that this After Visit Summary has been reviewed with you and you have received a copy. Patient Signature:  ____________________________________________________________ Date:  ____________________________________________________________  
  
Ramon Chelsie Provider Signature:  ____________________________________________________________ Date:  ____________________________________________________________

## 2017-11-20 NOTE — ED TRIAGE NOTES
Pt c/o right leg swelling and abd swelling x 3 weeks, denies abd pain, +jaundice , some difficulty breathing , +edema noted to right leg(pt with prosthesis to left leg)

## 2017-11-20 NOTE — IP AVS SNAPSHOT
[address] 
[phone] Patient: Tenzin Gonzalez MRN: [id number] KZO:[birth year]34 My Medications TAKE these medications as instructed Instructions Each Dose to Equal  
 Morning Noon Evening Bedtime  
 amiodarone 200 mg tablet Commonly known as:  CORDARONE Your last dose was: Your next dose is: Take 200 mg by mouth nightly. 200 mg CENTRUM SILVER Tab tablet Generic drug:  multivitamins-minerals-lutein Your last dose was: Your next dose is: Take 1 Tab by mouth daily. 1 Tab  
    
   
   
   
  
 digoxin 0.125 mg tablet Commonly known as:  LANOXIN Your last dose was: Your next dose is: Take 0.125 mg by mouth nightly. 0.125 mg ENTRESTO 49-51 mg Tab tablet Generic drug:  sacubitril-valsartan Your last dose was: Your next dose is: Take 1 Tab by mouth two (2) times a day. 1 Tab  
    
   
   
   
  
 folic acid 1 mg tablet Commonly known as:  Google Start taking on:  11/25/2017 Your last dose was: Your next dose is: Take 1 Tab by mouth daily. 1 mg  
    
   
   
   
  
 furosemide 40 mg tablet Commonly known as:  LASIX Your last dose was: Your next dose is: Take 1 Tab by mouth daily. 40 mg  
    
   
   
   
  
 metoprolol succinate 25 mg XL tablet Commonly known as:  TOPROL XL Your last dose was: Your next dose is: Take 0.5 Tabs by mouth daily. 12.5 mg  
    
   
   
   
  
 spironolactone 100 mg tablet Commonly known as:  ALDACTONE Start taking on:  11/25/2017 Your last dose was: Your next dose is: Take 1 Tab by mouth daily. 100 mg  
    
   
   
   
  
 thiamine 100 mg tablet Commonly known as:  B-1  
 Start taking on:  11/25/2017 Your last dose was: Your next dose is: Take 1 Tab by mouth daily. 100 mg  
    
   
   
   
  
 TRADJENTA 5 mg tablet Generic drug:  linagliptin Your last dose was: Your next dose is: Take 5 mg by mouth every morning. 5 mg Where to Get Your Medications Information on where to get these meds will be given to you by the nurse or doctor. ! Ask your nurse or doctor about these medications  
  folic acid 1 mg tablet  
 furosemide 40 mg tablet  
 metoprolol succinate 25 mg XL tablet  
 spironolactone 100 mg tablet  
 thiamine 100 mg tablet

## 2017-11-20 NOTE — PROGRESS NOTES
Admission Medication Reconciliation:    Information obtained from: Patient, spouse, home medication list    Significant PMH/Disease States:   Past Medical History:   Diagnosis Date    Arthritis     [facility] ([facility] 2014    CHF (congestive heart failure) ([facility]     Diabetes ([facility]     Diabetic ulcer of left foot ([facility] 2/2015 2/2015 Lt BKA    History of blood transfusion 2015    During Lt BKA; pt denies any adverse reaction    Hypertension     Liver disease     CIRRHOSIS    Sepsis ([facility] 02/2015    From diabetic Left foot wound;  had a BKA ;  as of 11/21/16 pt states back to his baseline        Chief Complaint for this Admission:    Chief Complaint   Patient presents with    Leg Swelling         Allergies:  Review of patient's allergies indicates no known allergies. Prior to Admission Medications:   Prior to Admission Medications   Prescriptions Last Dose Informant Patient Reported? Taking?   amiodarone (CORDARONE) 200 mg tablet 11/19/2017  Yes Yes   Sig: Take 200 mg by mouth nightly. digoxin (LANOXIN) 0.125 mg tablet 11/19/2017  Yes Yes   Sig: Take 0.125 mg by mouth nightly. linagliptin (TRADJENTA) 5 mg tablet 11/20/2017  Yes Yes   Sig: Take 5 mg by mouth every morning. metoprolol-XL (TOPROL XL) 100 mg XL tablet 11/19/2017  Yes Yes   Sig: Take 100 mg by mouth nightly. multivitamins-minerals-lutein (CENTRUM SILVER) Tab 11/20/2017  Yes Yes   Sig: Take 1 Tab by mouth daily. sacubitril-valsartan (ENTRESTO) 49 mg/51 mg tablet 11/20/2017  Yes Yes   Sig: Take 1 Tab by mouth two (2) times a day. spironolactone (ALDACTONE) 50 mg tablet 11/19/2017  Yes Yes   Sig: Take 50 mg by mouth nightly. Facility-Administered Medications: None         Comments/Recommendations: Spoke with patient and wife. Changes made/pertinent information regarding PTA medications as follows:      -Removed Invokana. Patient stopped taking back in July 2017.  Physician ok'd stop as patient's A1C was not improving  -Changed frequency of amiodarone from 3x/week to daily at bedtime  -Added correct dose to [facility]  -Patient has been prescribed Amaryl, but has not yet started. He was instructed to finish supply of Tradjenta before starting.       Jacinto Becerra, PharmD

## 2017-11-20 NOTE — PROCEDURES
[address]  *** FINAL REPORT ***    Name: Montez Mendez  MRN: [id number]  : 1960  HIS Order #: [id number]  [address] [facility] Visit #: [id number]  Date: 2017    TYPE OF TEST: Peripheral Venous Testing    REASON FOR TEST  Concern for DVT    Right Leg:-  Deep venous thrombosis:           No  Superficial venous thrombosis:    No  Deep venous insufficiency:        Not examined  Superficial venous insufficiency: Not examined      INTERPRETATION/FINDINGS  PROCEDURE:  Color duplex ultrasound imaging of lower extremity veins. FINDINGS:       Right: The common femoral, deep femoral, femoral, popliteal,  posterior tibial, peroneal, and great saphenous are patent and without   evidence of thrombus; each is is fully compressible and there is no  narrowing of the flow channel on color Doppler imaging. Phasic flow  is observed in the common femoral vein. Left:   The common femoral vein is patent and without evidence of   thrombus. Phasic flow is observed. This extremity was not otherwise   evaluated. IMPRESSION:  No evidence of right lower extremity vein thrombosis. ADDITIONAL COMMENTS    I have personally reviewed the data relevant to the interpretation of  this  study.     TECHNOLOGIST: Evon Singh RVT  Signed: 2017 03:13 PM    PHYSICIAN: Jolene Elizondo MD  Signed: 2017 06:43 AM

## 2017-11-20 NOTE — ROUTINE PROCESS
TRANSFER - OUT REPORT:    Verbal report given to [address] [place] [address] on Dot Can  being transferred to  for routine progression of care       Report consisted of patients Situation, Background, Assessment and   Recommendations(SBAR). Information from the following report(s) SBAR, Kardex and ED Summary was reviewed with the receiving nurse. Lines:   Peripheral IV 06/01/17 Right Hand (Active)       Peripheral IV 11/20/17 Right Antecubital (Active)   Site Assessment Clean, dry, & intact 11/20/2017  1:44 PM   Phlebitis Assessment 0 11/20/2017  1:44 PM   Infiltration Assessment 0 11/20/2017  1:44 PM   Dressing Status Clean, dry, & intact; Occlusive 11/20/2017  1:44 PM   Dressing Type 4 X 4;Tape;Transparent 11/20/2017  1:44 PM   Hub Color/Line Status Pink;Flushed;Patent 11/20/2017  1:44 PM   Action Taken Blood drawn 11/20/2017  1:44 PM        Opportunity for questions and clarification was provided.       Patient transported with:   Monitor  Tech

## 2017-11-20 NOTE — PROGRESS NOTES
Primary Nurse Demetrio Meneses RN and Bo Hagan RN performed a dual skin assessment on this patient No impairment noted  Spencer score is {SPENCER SCALE:60574}

## 2017-11-21 PROBLEM — I50.22 CHRONIC SYSTOLIC HEART FAILURE (HCC): Chronic | Status: ACTIVE | Noted: 2017-01-01

## 2017-11-21 NOTE — PROGRESS NOTES
Problem: Falls - Risk of  Goal: *Absence of Falls  Document Stefania Fall Risk and appropriate interventions in the flowsheet.    Outcome: Progressing Towards Goal  Fall Risk Interventions:  Mobility Interventions: Communicate number of staff needed for ambulation/transfer, Patient to call before getting OOB         Medication Interventions: Evaluate medications/consider consulting pharmacy, Patient to call before getting OOB, Teach patient to arise slowly

## 2017-11-21 NOTE — PROGRESS NOTES
NUTRITION       Nutrition screening referral was triggered based on results obtained during nursing admission assessment (leg swelling/wound). MST negative on admission. The patient's chart was reviewed and nutrition assessment is not indicated at this time. Pt admitted with hyponatremia. PMHx: cirrhosis, EtOH abuse, DM2, HTN, Left BKA, others noted. Pt is on a Cardiac Regular diet. Noted 1500 mL fluid restriction under orders-- added to diet order. No po recorded yet. Will plan to see patient for rescreen as indicated. Thank you.      [address]

## 2017-11-21 NOTE — WOUND CARE
WOCN Note:   New consult placed by RN for left stump wound; Chart review revealed:   Admitted for anasarca; history of DM, CHF, AFIB, cirrhosis of the liver  Admitted from home    Assessment:   Patient is alert, verbal;  Bed: total care; Patient continent;  Diet: cardiac  Patient denied pain;   right heel, buttocks, and sacral skin intact and without erythema. Unable to palpate pulse in right foot, foot warm, pink; lower leg with hemosiderosis staining;     1. POA left stump wound consistent with friction/stage 2 pressure injury from device (prosthesis) measured 1.5cm x 1.5cm x 0.1, 100% pink, small serous drainage, no odor, wound edges open, periwound skin without erythema; cleansed with normal saline, applied duoderm, secured with transparent dressing; Patient repositioned without assist from supine to right on total care bed with pillows to offload bony prominences;     Skin/wound treatment Recommendations:    1. Weekly and as needed for strike through drainage or if edges roll up, cleanse left stump wound with normal saline, apply duoderm, secure with transparent dressing;   2. Discussed with patient that he needs to follow up to have prosthesis adjust to fit better;   3.  Recommend that patient follow up with out patient wound center for wound healing;     Skin Care & Pressure Injury Prevention Recommendations:  1. Minimize layers of linen/pads under patient to optimize support surface. 2.  Encourage patient to reposition approximately every 2 hours;  3. Float heel with pillow under calf;   4. Promote continence;   5. Continue on total care bed for pressure redistribution. 6.  Assess/protect skin in contact with medical devices. Keep skin moisturized;   7. Ensure that patient is repositioning in chair shifting weight approximately every 15 minutes and standing up every hour.        Discussed above assessment and recommendations with Yamile Sanchez (JEAN) and Dr. Ronald Bhandari;    Transition of Care: Plan to follow weekly and as needed while admitted to hospital.    MUSTAPHA MartínezN, RN, [facility]  Certified Wound, Ostomy, Continence Nurse  office [phone]  pager [phone]   [facility]

## 2017-11-21 NOTE — PROGRESS NOTES
Problem: Fluid Volume - Risk of, Imbalanced  Goal: *Balanced intake and output  Outcome: Progressing Towards Goal  Discussed with patient about fluid intake. I&Os being monitored closely. Patient urinating in the urinal for accurate fluid output measurement. No signs of fluid overload. Discussed the importance of daily weights. 0745- Bedside and Verbal shift change report given to JEAN Bajwa (oncoming nurse) by Brigitte Gallegos (offgoing nurse). Report included the following information SBAR, Kardex, Intake/Output, MAR and Cardiac Rhythm Paced.

## 2017-11-21 NOTE — PROGRESS NOTES
Problem: Falls - Risk of  Goal: *Absence of Falls  Document Stefania Fall Risk and appropriate interventions in the flowsheet.   Outcome: Progressing Towards Goal  Fall Risk Interventions:  Mobility Interventions: Utilize walker, cane, or other assitive device

## 2017-11-21 NOTE — PROGRESS NOTES
Hospitalist Progress Note  Angelita Ruff MD  Answering service: [phone] OR [phone] from in house phone      Date of Service:  2017  NAME:  Terrance Paredes  :  1960  MRN:  [id number]      Admission Summary:   Terrance Paredes is a 59 y.o.  male with past medical history cirrhosis, non ischemic cardiomyopathy EF 25% s/p AICD,  DM type 2 , hypertension who presented to the ED with the complaint of abdominal swelling and right lower extremity swelling. Interval history / Subjective:     Pt seen, Family at bedside, he reports that his swelling is coming down. He reports he did take some alcohol and his las drink was a few days back Denies Withdrawals. Assessment & Plan:     Anasarca: This is secondary to decompensated liver cirrhosis, in the setting of chronic systolic heart failure. Diurese with Lasix 40 mg I.V BID, aldactone  Daily weights  Advised cessation  Consulted her Hepatologist     Non ischemic cardiomyopathy:  Last Echo 2015: Ef 20%. S/p AICD  CXR done on admission showed no acute findings. Diurese with Lasix 40 mg  I.V BID, aldactone. Continue metoprolol and entresto  Check 2 d echo to evaluate EF  Fluid water restriction 1.5 liters/ day. Daily weights     Alcoholic Liver Cirrhosis  No evidence of hepatic encephalopathy.   Advised Etoh Cessation      Severe Hyponatremia: Secondary to hypervolemic state and beer potomania  Serum sodium of 117 - improving   Avoid More than 12 meq correction  Liberalize fluid Intake to slow down correction   Repeat BMP in AM  No Neurologic symptoms    Ulcer on the left Foot Stump - POA  Wound care and Duoderm Dressings  [facility]     Hyperbilirubinemia: Likely due to Congestive heart failure in the setting of cirrhosis  Check abd US    Thrombocytopenia: Secondary to liver cirrhosis  No evidence of bleeding.      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 Will not benefit from corticosteriods  Obtain Abd US  Serial Monitoring     Paroxsymal Atrial fibrillation:   Patient is rate controlled and in SR. Continue amiodarone  Continue tele monitoring. Not on [facility] due to bleeding risk     Hyperglycemia - likely From Acute Illness/Etoh  On  ISS as per protocol  hbA1c 4.9     Alcohol Abuse with dependence; Patient is not in withdrawal.  Monitor for DT's. CIWA protocol with as needed ativan  Seizure precautions. On thiamine 100 mg I.V daily     DVT PPX: SCD    Code status: Full  DVT prophylaxis: SCD    Care Plan discussed with: Patient/Family and Nurse  Disposition: TBD     Hospital Problems  Date Reviewed: 6/6/2017          Codes Class Noted POA    * (Principal)Anasarca ICD-10-CM: R60.1  ICD-9-CM: 782.3  11/20/2017 Yes        Hyponatremia ICD-10-CM: E87.1  ICD-9-CM: 276.1  11/20/2017 Unknown                Review of Systems:   A comprehensive review of systems was negative except for that written in the HPI. Vital Signs:    Last 24hrs VS reviewed since prior progress note. Most recent are:  Visit Vitals    /62 (BP 1 Location: Left arm, BP Patient Position: At rest)    Pulse 60    Temp 98.4 °F (36.9 °C)    Resp 18    Ht 5' 10\" (1.778 m)    Wt 108.9 kg (240 lb 1.3 oz)    SpO2 98%    BMI 34.45 kg/m2         Intake/Output Summary (Last 24 hours) at 11/21/17 2345  Last data filed at 11/21/17 6930   Gross per 24 hour   Intake              100 ml   Output             1575 ml   Net            -1475 ml        Physical Examination:             Constitutional:  No acute distress, cooperative, pleasant    ENT:  Oral mucous moist, oropharynx benign. Neck supple,    Resp:  CTA bilaterally. No wheezing/rhonchi/rales.  No accessory muscle use   CV:  Regular rhythm, normal rate, no murmurs, gallops, rubs    GI:  Soft, non distended, non tender. normoactive bowel sounds, no hepatosplenomegaly     Musculoskeletal:  Edema Noted in his right leg. 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 AAOx3, CN II-XII reviewed     Psych:  Good insight, Not anxious nor agitated. Data Review:    Review and/or order of clinical lab test  Review and/or order of tests in the radiology section of CPT  Review and/or order of tests in the medicine section of CPT      Labs:     Recent Labs      11/20/17   1342   WBC  4.4   HGB  11.0*   HCT  29.6*   PLT  85*     Recent Labs      11/21/17   0456  11/20/17   1342   NA  124*  117*   K  4.7  5.1   CL  91*  87*   CO2  24  21   BUN  12  13   CREA  0.90  1.03   GLU  115*  141*   CA  9.0  8.8   MG  1.8   --      Recent Labs      11/21/17   0456  11/20/17   1342   SGOT  107*  129*   ALT  45  51   AP  170*  242*   TBILI  6.1*  6.3*   TP  6.6  7.6   ALB  2.5*  2.7*   GLOB  4.1*  4.9*     Recent Labs      11/20/17   1342   INR  1.3*   PTP  13.3*   APTT  33.4*      No results for input(s): FE, TIBC, PSAT, FERR in the last 72 hours. No results found for: FOL, RBCF   No results for input(s): PH, PCO2, PO2 in the last 72 hours. No results for input(s): CPK, CKNDX, TROIQ in the last 72 hours.     No lab exists for component: CPKMB  No results found for: CHOL, CHOLX, CHLST, CHOLV, HDL, LDL, LDLC, DLDLP, TGLX, TRIGL, TRIGP, CHHD, CHHDX  Lab Results   Component Value Date/Time    Glucose (POC) 130 11/21/2017 06:41 AM    Glucose (POC) 128 11/20/2017 09:14 PM    Glucose (POC) 142 11/20/2017 07:29 PM    Glucose (POC) 126 06/01/2017 11:19 AM    Glucose (POC) 146 08/05/2015 07:47 AM     Lab Results   Component Value Date/Time    Color DARK YELLOW 02/20/2015 06:30 PM    Appearance CLEAR 02/20/2015 06:30 PM    Specific gravity 1.018 02/20/2015 06:30 PM    pH (UA) 6.5 02/20/2015 06:30 PM    Protein NEGATIVE  02/20/2015 06:30 PM    Glucose 250 02/20/2015 06:30 PM    Ketone NEGATIVE  02/20/2015 06:30 PM    Bilirubin NEGATIVE  02/20/2015 06:30 PM    Urobilinogen 0.2 02/20/2015 06:30 PM    Nitrites NEGATIVE  02/20/2015 06:30 PM    Leukocyte Esterase NEGATIVE  02/20/2015 06:30 PM    Epithelial cells FEW 02/20/2015 06:30 PM    Bacteria NEGATIVE  02/20/2015 06:30 PM    WBC 0-4 02/20/2015 06:30 PM    RBC 0-5 02/20/2015 06:30 PM         Medications Reviewed:     Current Facility-Administered Medications   Medication Dose Route Frequency    amiodarone (CORDARONE) tablet 200 mg  200 mg Oral QHS    metoprolol succinate (TOPROL-XL) XL tablet 100 mg  100 mg Oral QHS    sacubitril-valsartan (ENTRESTO) 49-51 mg tablet 1 Tab  1 Tab Oral BID    sodium chloride (NS) flush 5-10 mL  5-10 mL IntraVENous Q8H    sodium chloride (NS) flush 5-10 mL  5-10 mL IntraVENous PRN    glucose chewable tablet 16 g  4 Tab Oral PRN    dextrose (D50W) injection syrg 12.5-25 g  12.5-25 g IntraVENous PRN    glucagon (GLUCAGEN) injection 1 mg  1 mg IntraMUSCular PRN    insulin lispro (HUMALOG) injection   SubCUTAneous TIDAC    spironolactone (ALDACTONE) tablet 50 mg  50 mg Oral QHS    furosemide (LASIX) injection 60 mg  60 mg IntraVENous DAILY    LORazepam (ATIVAN) injection 2 mg  2 mg IntraVENous Q1H PRN    LORazepam (ATIVAN) injection 4 mg  4 mg IntraVENous Q1H PRN    thiamine (B-1) tablet 100 mg  100 mg Oral DAILY    folic acid (FOLVITE) tablet 1 mg  1 mg Oral DAILY     ______________________________________________________________________  EXPECTED LENGTH OF STAY: - - -  ACTUAL LENGTH OF STAY:          1                 Lina Villagomez MD

## 2017-11-21 NOTE — PROGRESS NOTES
Bedside and Verbal shift change report given to Leanne Valdivia RN (oncoming nurse) by Missy Baldwin RN (offgoing nurse). Report included the following information SBAR, Kardex, Intake/Output, MAR and Recent Results.

## 2017-11-21 NOTE — PROGRESS NOTES
11/20/18 2000 Primary Nurse Claudeen Elders, JEAN and Veronica Klinefelter, RN performed a dual skin assessment on this patient Impairment noted- see wound doc flow sheet  Spencer score is 23.

## 2017-11-22 NOTE — PROGRESS NOTES
Hospitalist Progress Note  Lizeth Kidd MD  Answering service: [phone] OR [phone] from in house phone      Date of Service:  2017  NAME:  Suma Almazan  :  1960  MRN:  [id number]      Admission Summary:   Suma Almazan is a 59 y.o.  male with past medical history cirrhosis, non ischemic cardiomyopathy EF 25% s/p AICD,  DM type 2 , hypertension who presented to the ED with the complaint of abdominal swelling and right lower extremity swelling. Interval history / Subjective:     Patient seen and examined, he is back from paracentesis, he has a intraperitoneal catheter whicj is draining clear marielena fluid.  - on Diuresis, Started on IV albumin   - paracentesis done     Assessment & Plan:     Anasarca: This is secondary to decompensated liver cirrhosis, in the setting of chronic systolic heart failure. Diurese with Lasix 40 mg I.V BID, aldactone  Daily weights  Advised cessation  S/P paracentesis on   Hepatology following     Non ischemic cardiomyopathy:  Last Echo 2015: Ef 20%. S/p AICD  CXR done on admission showed no acute findings. Diurese with Lasix  I.V BID, aldactone. Decrease Metoprolol due to hypotension   Continue entresto  Check 2 d echo this admissions showing EF  35% to 40%. Fluid water restriction 1.5 liters/ day. Daily weights     Alcoholic Liver Cirrhosis  No evidence of hepatic encephalopathy.   Advised Etoh Cessation      Severe Hyponatremia: Secondary to hypervolemic state and beer potomania  Serum sodium of 117 on admission  - improving   Avoid More than 12 meq correction in 24 hrs  Liberalize fluid Intake to slow down correction   Repeat BMP in AM  No Neurologic symptoms    Ulcer on the left Foot Stump - POA  Wound care and Duoderm Dressings  [facility] Followup     Hyperbilirubinemia: Likely due to Congestive heart failure in the setting of cirrhosis  Check abd US - Thickened wall is expected in the setting of cirrhosis and ascites. No shadowing gallstone. Negative sonographic Vásquez sign.    no intra or extrahepatic biliary ductal dilatation. Thrombocytopenia: Secondary to liver cirrhosis  No evidence of bleeding.        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 Continue amiodarone  Continue tele monitoring. Not on [address] due to bleeding risk     Hyperglycemia - likely From Acute Illness/Etoh  On  ISS as per protocol  hbA1c 4.9     Alcohol Abuse with dependence; Patient is not in withdrawal.  Monitor for DT's. CIWA protocol with as needed ativan  Seizure precautions. On thiamine 100 mg I.V daily     DVT PPX: SCD    Code status: Full  DVT prophylaxis: SCD    Care Plan discussed with: Patient/Family and Nurse  Disposition: TBD     Hospital Problems  Date Reviewed: 6/6/2017          Codes Class Noted POA    * (Principal)Anasarca ICD-10-CM: R60.1  ICD-9-CM: 782.3  11/20/2017 Yes        Hyponatremia ICD-10-CM: E87.1  ICD-9-CM: 276.1  11/20/2017 Unknown                Review of Systems:   A comprehensive review of systems was negative except for that written in the HPI. Vital Signs:    Last 24hrs VS reviewed since prior progress note.  Most recent are:  Visit Vitals    /59 (BP 1 Location: Left arm, BP Patient Position: At rest)    Pulse 60    Temp 98.1 °F (36.7 °C)    Resp 18    Ht 5' 10\" (1.778 m)    Wt 108.8 kg (239 lb 13.8 oz)    SpO2 98%    BMI 34.42 kg/m2         Intake/Output Summary (Last 24 hours) at 11/22/17 0143  Last data filed at 11/21/17 1847   Gross per 24 hour   Intake              340 ml   Output             2390 ml   Net            -2050 ml        Physical Examination:             Constitutional:  No acute distress, cooperative, pleasant    ENT:  Oral mucous moist, oropharynx benign. Neck supple,    Resp:  CTA bilaterally. No wheezing/rhonchi/rales. No accessory muscle use   CV:  Regular rhythm, normal rate, no murmurs, gallops, rubs    GI:  Soft, non distended, non tender. normoactive bowel sounds, no hepatosplenomegaly , Paracentesis catheter in place and draining clear marielena fluid    Musculoskeletal:  Edema Noted in his right leg. 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 AAOx3, CN II-XII reviewed     Psych:  Good insight, Not anxious nor agitated. Data Review:    Review and/or order of clinical lab test  Review and/or order of tests in the radiology section of CPT  Review and/or order of tests in the medicine section of CPT      Labs:     Recent Labs      11/22/17 0340 11/21/17 0456   WBC  2.6*  2.9*   HGB  9.5*  10.0*   HCT  25.5*  27.4*   PLT  55*  71*     Recent Labs      11/22/17   0340  11/21/17   0456  11/20/17   1342   NA  127*  124*  117*   K  4.5  4.7  5.1   CL  94*  91*  87*   CO2  26  24  21   BUN  17  12  13   CREA  1.01  0.90  1.03   GLU  103*  115*  141*   CA  8.9  9.0  8.8   MG   --   1.8   --      Recent Labs      11/21/17   0456  11/20/17   1342   SGOT  107*  129*   ALT  45  51   AP  170*  242*   TBILI  6.1*  6.3*   TP  6.6  7.6   ALB  2.5*  2.7*   GLOB  4.1*  4.9*     Recent Labs      11/20/17   1342   INR  1.3*   PTP  13.3*   APTT  33.4*      No results for input(s): FE, TIBC, PSAT, FERR in the last 72 hours. No results found for: FOL, RBCF   No results for input(s): PH, PCO2, PO2 in the last 72 hours. No results for input(s): CPK, CKNDX, TROIQ in the last 72 hours.     No lab exists for component: CPKMB  No results found for: CHOL, CHOLX, CHLST, CHOLV, HDL, LDL, LDLC, DLDLP, TGLX, TRIGL, TRIGP, CHHD, CHHDX  Lab Results   Component Value Date/Time    Glucose (POC) 129 11/22/2017 07:03 AM    Glucose (POC) 135 11/21/2017 04:24 PM    Glucose (POC) 157 11/21/2017 11:48 AM    Glucose (POC) 130 11/21/2017 06:41 AM    Glucose (POC) 128 11/20/2017 09:14 PM     Lab Results   Component Value Date/Time    Color DARK YELLOW 02/20/2015 06:30 PM    Appearance CLEAR 02/20/2015 06:30 PM    Specific gravity 1.018 02/20/2015 06:30 PM    pH (UA) 6.5 02/20/2015 06:30 PM    Protein NEGATIVE  02/20/2015 06:30 PM    Glucose 250 02/20/2015 06:30 PM    Ketone NEGATIVE  02/20/2015 06:30 PM    Bilirubin NEGATIVE  02/20/2015 06:30 PM    Urobilinogen 0.2 02/20/2015 06:30 PM    Nitrites NEGATIVE  02/20/2015 06:30 PM    Leukocyte Esterase NEGATIVE  02/20/2015 06:30 PM    Epithelial cells FEW 02/20/2015 06:30 PM    Bacteria NEGATIVE  02/20/2015 06:30 PM    WBC 0-4 02/20/2015 06:30 PM    RBC 0-5 02/20/2015 06:30 PM         Medications Reviewed:     Current Facility-Administered Medications   Medication Dose Route Frequency    albumin human 25% (BUMINATE) solution 25 g  25 g IntraVENous Q6H    metoprolol succinate (TOPROL-XL) XL tablet 25 mg  25 mg Oral QHS    ibuprofen (MOTRIN) tablet 400 mg  400 mg Oral Q8H PRN    amiodarone (CORDARONE) tablet 200 mg  200 mg Oral QHS    sacubitril-valsartan (ENTRESTO) 49-51 mg tablet 1 Tab  1 Tab Oral BID    sodium chloride (NS) flush 5-10 mL  5-10 mL IntraVENous Q8H    sodium chloride (NS) flush 5-10 mL  5-10 mL IntraVENous PRN    glucose chewable tablet 16 g  4 Tab Oral PRN    dextrose (D50W) injection syrg 12.5-25 g  12.5-25 g IntraVENous PRN    glucagon (GLUCAGEN) injection 1 mg  1 mg IntraMUSCular PRN    insulin lispro (HUMALOG) injection   SubCUTAneous TIDAC    LORazepam (ATIVAN) injection 2 mg  2 mg IntraVENous Q1H PRN    LORazepam (ATIVAN) injection 4 mg  4 mg IntraVENous Q1H PRN    thiamine (B-1) tablet 100 mg  100 mg Oral DAILY    folic acid (FOLVITE) tablet 1 mg  1 mg Oral DAILY     ______________________________________________________________________  EXPECTED LENGTH OF STAY: 2d 16h  ACTUAL LENGTH OF STAY:          2                 Jaida Crouch MD

## 2017-11-22 NOTE — NURSE NAVIGATOR
Chart reviewed by Heart Failure Nurse Navigator. Heart Failure database completed. EF 35/40%. ACEi/ARB: Entrest 49/51mg q12hr. BB: Toprol XL 100mg qd. CRT ICD implanted 2015. NYHA Functional Class not assigned. Documentation requested for NYHA Functional Class via Provider message on [employer]  Heart Failure Teach Back in Patient Education. Heart Failure Avoiding Triggers on Discharge Instructions. Cardiology not yet consulted.

## 2017-11-22 NOTE — PROGRESS NOTES
8911 Patient's BP 79/44. Paged Dr. Venkata Shah to receive orders. 0410 Orders received from Dr. Venkata Shah  For 500 mL bolus of NS. Advised to page if BP does not improve after bolus. 0416 Bolus started. Will recheck patient's BP.     0437 Patient's BP improved to 93/51. Will continue to monitor. 0529 Patient's BP improved to 101/59 after bolus. 4016 Bedside and Verbal shift change report given to Lm العراقي RN (oncoming nurse) by Kunal Ribeiro RN (offgoing nurse). Report included the following information SBAR, Kardex, Intake/Output, MAR, Recent Results and Cardiac Rhythm AV Paced.

## 2017-11-22 NOTE — PROGRESS NOTES
Per Bernadette patients blood sugar 144 (I saw 144 on glucometer). Not registering in results review. Will give 2 units per order      Approximately 1400-Pt put on prosthesis and opened wound on left stump. Blood is visible. Left message with wound care to have them look at wound. Wound care advised to apply mepilex        1616-patient c/o pain in abdomen and back. Had paracentesis this am .  I paged MD.  Waiting for return call  MD ordered abdominal ultrasound bedside. Drain was clamped. Ultrasound was done and results were no abnormal.   findings.   MD advised leave clamp drained

## 2017-11-22 NOTE — PROGRESS NOTES
Problem: Fluid Volume - Risk of, Imbalanced  Goal: *Balanced intake and output  Outcome: Progressing Towards Goal  Monitor intake and output  Limit fluid to 1500ml as ordered    Problem: Falls - Risk of  Goal: *Absence of Falls  Document Stefania Fall Risk and appropriate interventions in the flowsheet. Outcome: Progressing Towards Goal  Fall Risk Interventions:  Mobility Interventions: Communicate number of staff needed for ambulation/transfer, Patient to call before getting OOB         Medication Interventions: Evaluate medications/consider consulting pharmacy, Patient to call before getting OOB, Teach patient to arise slowly    Elimination Interventions: Call light in reach, Patient to call for help with toileting needs             Bedside and Verbal shift change report given to Jasbir Chappell RN (oncoming nurse) by Steve Felix RN (offgoing nurse). Report included the following information SBAR, Kardex, Intake/Output, MAR and Recent Results.

## 2017-11-22 NOTE — CONSULTS
[address], [address], [facility], [place]       MARTÍN Senior PA-C Lianne Mano, [facility]   MARTÍN Amin NP        at [address], [address] [address]     [phone]     FAX: [phone]    at [address], [address] [facility], [address]     [phone]     FAX: [phone]       HEPATOLOGY CONSULT NOTE  The patient is well known to me and regularly cared for at [address] [address]. He is a 59year old  male with cirrhosis secondary to alcohol. He has never been abstinent from alcohol for a prolonged period of time. The current hospitalization was promted by several days of prolonged excessive alcohol use and increasing swelling of the abdomen. I have reviewed the Emergency room note, Hospital admission note, Notes by all other physicians who have seen the patient during this hospitalization to date. I have reviewed the problem list and the reason for this hospitalization. I have reviewed the allergies and the medications the patient was taking at home prior to this hospitalization. A paracentesis was not performed. He was swtched from oral to IV lasix. Aldactone was continued. He feels like his abdomen is softer. There ahs been no confusion, hematemasis or hematochezia. ASSESSMENT AND PLAN:  Cirrhosis  This is secondary to alcohol. The CTP score 10, Child class C, MELD score 26. He is not a candidate for liver transplant because of ongoing alcohol consumption. Ascites  Paracentesis was not performed. He was switched for oral to IV lasix and continued on aldactone. I will have him undergo paracentesis in AM.  I have started IV albumin at usual dose. Hyponatremia  This is secondary to cirrhosis and diuretics.   Cannot continue to push diuretics in the face of hyponatremia in patients with cirrhosis. Cannot give IV NS fluid bolus as this will just cause more ascites. The management of hyponatremia in cirrhosis is to stop diuretics and use IV albumin which will correct hyponatremia. As sodium comes up can then restart diuretics. Anemia  Etiology is multifactorial.  Probably bone marrow supression from alcohol and chronic GI blood loss from portal hypertension. Ferritin and FE saturation last checked in 3/2017 and was 399 and 76%. Thrombocytopenia  This is secondary to cirrhosis. No treatment needed. Alcohol abuse  This is an ongoing problem  He is aware that he needs to stop returning to alcohol consumption when he gets \"stressed out\"    SYSTEM REVIEW:  Constitution systems: Negative for fever, chills, weight gain, weight loss. Eyes: Negative for visual changes. ENT: Negative for sore throat, painful swallowing. Respiratory: Negative for cough, hemoptysis, SOB. Cardiology: Negative for chest pain, palpitations. GI:  Abdominal swelling. : Negative for urinary frequency, dysuria, hematuria, nocturia. Skin: Negative for rash. Hematology: Negative for easy bruising, blood clots. Musculo-skelatal: Negative for back pain, muscle pain, weakness. Neurologic: Negative for headaches, dizziness, vertigo, memory problems not related to HE. Psychology: Negative for anxiety, depression. FAMILY HISTORY:  The father  of unknown cause. The mother  of heart disease. There is no family history of liver disease.       SOCIAL HISTORY:  The patient is . The patient has 3 children. The patient has never used tobacco products. The patient consumes 6+ alcoholic beverages per day. This has been his long-standing pattern. He states that he has tried to make a recent effort to cut back, is vague on actual intake. The patient used to work .   The patient retired in .       PHYSICAL EXAMINATION:  VS: per nursing note  General:  No acute distress. Eyes:  Sclera anicteric. ENT:  No oral lesions. Thyroid normal.  Nodes:  No adenopathy. Skin:  Spider angiomata. No jaundice. Respiratory:  Lungs clear to auscultation. Cardiovascular:  Irregularly irregular heart rate. Abdomen:  Soft non-tender, Distended with obvious ascites. Extremities:  No lower extremity edema. Neurologic:  Alert and oriented. Cranial nerves grossly intact. No asterixis. LABORATORY:  Results for Sp Becker (MRN [id number]) as of 11/21/2017 22:06   Ref. Range 11/20/2017 13:42 11/21/2017 04:56   WBC Latest Ref Range: 4.1 - 11.1 K/uL 4.4 2.9 (L)   HGB Latest Ref Range: 12.1 - 17.0 g/dL 11.0 (L) 10.0 (L)   PLATELET Latest Ref Range: 150 - 400 K/uL 85 (L) 71 (L)   INR Latest Ref Range: 0.9 - 1.1   1.3 (H)    Sodium Latest Ref Range: 136 - 145 mmol/L 117 (LL) 124 (L)   Potassium Latest Ref Range: 3.5 - 5.1 mmol/L 5.1 4.7   Chloride Latest Ref Range: 97 - 108 mmol/L 87 (L) 91 (L)   CO2 Latest Ref Range: 21 - 32 mmol/L 21 24   Glucose Latest Ref Range: 65 - 100 mg/dL 141 (H) 115 (H)   BUN Latest Ref Range: 6 - 20 MG/DL 13 12   Creatinine Latest Ref Range: 0.70 - 1.30 MG/DL 1.03 0.90   Bilirubin, total Latest Ref Range: 0.2 - 1.0 MG/DL 6.3 (H) 6.1 (H)   Albumin Latest Ref Range: 3.5 - 5.0 g/dL 2.7 (L) 2.5 (L)   ALT (SGPT) Latest Ref Range: 12 - 78 U/L 51 45   AST Latest Ref Range: 15 - 37 U/L 129 (H) 107 (H)   Alk. phosphatase Latest Ref Range: 45 - 117 U/L 242 (H) 170 (H)       RADIOLOGY  Ultrasound of liver. Echogenic consistent with cirrhosis. No liver mass lesions. No dilated bile ducts. Moderate ascites.         Benjamin Irwin MD  [facility] [place] of [address] [place] [address] [facility] [address].  [phone]

## 2017-11-22 NOTE — PROGRESS NOTES
Report called to JEAN Palumbo. Patient tolerated well paracentesis. Drain to gravity drainage to clark bag. Draining clear yellow drainage. Patient to receive albumin when returns to floor per [facility]. patient asymptomatic with blood pressures.

## 2017-11-22 NOTE — PROCEDURES
[address], [address] [place] [address]   ECHOCARDIOGRAPHY REPORT       Name:  Erasmo Frias   MR#:  [id number]   :  1960   Account #:  [de-identified]    Date of Procedure:  2017   Date of Adm:  2017       DATE OF PROCEDURE: 2017    Left ventricle is moderately dilated, and there is moderately reduced   ejection fraction with EF estimated at 35% to 40%. The left atrium is dilated. Right atrial and left ventricular sizes are   normal with preserved right ventricular ejection fraction. An ICD or a   pacer lead is noted crossing the tricuspid valve. Aortic valve appears normal, and there is no stenosis or regurgitation. Mitral valve appears normal, and there is mild regurgitation. Tricuspid   valve appears normal, and there is mild regurgitation without   pulmonary hypertension. CONCLUSION   1. Dilated left ventricle with moderate to severely reduced ejection   fraction. 2. Left atrial enlargement. 3. Mild mitral regurgitation. 4. Mild tricuspid regurgitation without pulmonary hypertension.         MD MARIA ANTONIA Lombardi / MATT   D:  2017   11:42   T:  2017   07:38   Job #:  [id number]

## 2017-11-22 NOTE — PROGRESS NOTES
Problem: Falls - Risk of  Goal: *Absence of Falls  Document Stefania Fall Risk and appropriate interventions in the flowsheet. Outcome: Progressing Towards Goal  Fall Risk Interventions:  Mobility Interventions: Communicate number of staff needed for ambulation/transfer, Patient to call before getting OOB         Medication Interventions: Evaluate medications/consider consulting pharmacy, Patient to call before getting OOB, Teach patient to arise slowly    Elimination Interventions: Call light in reach, Patient to call for help with toileting needs     Bed in low position, locked bed wheels. Call bell and personal items within reach. Hourly rounding performed. Instructed patient to call when needing assistance. Patient demonstrates understanding.

## 2017-11-23 NOTE — PROGRESS NOTES
1130 Patient received verbal order for IR to consult for removal of Centesis drain. Writer notified IR of new order. Per radiology staff stated she notify MD and return call back to writer. [address] Yelitza Collins at patient's bedside for removal of Centesis drain to right abdomen. Abdominal Pad and 4x4 pads with special tape in place. Will continue to monitor.

## 2017-11-23 NOTE — PROGRESS NOTES
Problem: Falls - Risk of  Goal: *Absence of Falls  Document Stefania Fall Risk and appropriate interventions in the flowsheet.    Outcome: Progressing Towards Goal  Fall Risk Interventions:  Mobility Interventions: Communicate number of staff needed for ambulation/transfer, Patient to call before getting OOB, PT Consult for mobility concerns, Strengthening exercises (ROM-active/passive)         Medication Interventions: Bed/chair exit alarm, Evaluate medications/consider consulting pharmacy, Patient to call before getting OOB, Teach patient to arise slowly    Elimination Interventions: Call light in reach, Patient to call for help with toileting needs, Toilet paper/wipes in reach, Toileting schedule/hourly rounds, Urinal in reach

## 2017-11-23 NOTE — PROGRESS NOTES
[address], [address], [place], [place]        April MARTÍN Morillo PA-C Louetta Peace, [facility]   MARTÍN Saha NP         at [address], [address] [address]     [phone]     FAX: [phone]    at [address], [address] [place] [address], [address]     [phone]     FAX: [phone]         HEPATOLOGY PROGRESS NOTE  The patient is a 59year old  male with cirrhosis secondary to alcohol. He has never been abstinent from alcohol for a prolonged period of time. The current hospitalization was promted by several days of prolonged excessive alcohol use and increasing swelling of the abdomen. Large volume paracentesis has drained ascites. Catheter removed today. Sodium is up to 127 with IV albumin. Will continue IV albu,in. Can restart step 1 diuretics. ASSESSMENT AND PLAN:  Cirrhosis  This is secondary to alcohol. The CTP score 10, Child class C, MELD score 26. He is not a candidate for liver transplant because of ongoing alcohol consumption.       Ascites  Most of ascites removed with paracentesis. Catheter pulled out. Remains on IV lasix and aldactone. Continue IV albumin.       Hyponatremia  NA up to 127. Can restart diuretics. Continue IV albumin.       Anemia  Etiology is multifactorial.  Probably bone marrow supression from alcohol and chronic GI blood loss from portal hypertension. Ferritin and FE saturation last checked in 3/2017 and was 399 and 76%.     Thrombocytopenia  This is secondary to cirrhosis. No treatment needed.     Alcohol abuse  This is an ongoing problem  He is aware that he needs to stop returning to alcohol consumption when he gets \"stressed out\"     PHYSICAL EXAMINATION:  VS: per nursing note  General:  No acute distress. Eyes:  Sclera anicteric.    ENT:  No oral lesions. Thyroid normal.  Nodes:  No adenopathy. Skin:  Spider angiomata. No jaundice. Respiratory:  Lungs clear to auscultation. Cardiovascular:  Irregularly irregular heart rate. Abdomen:  Soft non-tender, Some ascites may still be present. Extremities:  No lower extremity edema. Neurologic:  Alert and oriented. Cranial nerves grossly intact. No asterixis.     LABORATORY:  Results for Milad Kidd (MRN [id number]) as of 11/23/2017 12:26   Ref. Range 11/20/2017 13:42 11/21/2017 04:56 11/22/2017 03:40   WBC Latest Ref Range: 4.1 - 11.1 K/uL 4.4 2.9 (L) 2.6 (L)   HGB Latest Ref Range: 12.1 - 17.0 g/dL 11.0 (L) 10.0 (L) 9.5 (L)   PLATELET Latest Ref Range: 150 - 400 K/uL 85 (L) 71 (L) 55 (L)   INR Latest Ref Range: 0.9 - 1.1   1.3 (H)     Sodium Latest Ref Range: 136 - 145 mmol/L 117 (LL) 124 (L) 127 (L)   Potassium Latest Ref Range: 3.5 - 5.1 mmol/L 5.1 4.7 4.5   Chloride Latest Ref Range: 97 - 108 mmol/L 87 (L) 91 (L) 94 (L)   CO2 Latest Ref Range: 21 - 32 mmol/L 21 24 26   Glucose Latest Ref Range: 65 - 100 mg/dL 141 (H) 115 (H) 103 (H)   BUN Latest Ref Range: 6 - 20 MG/DL 13 12 17   Creatinine Latest Ref Range: 0.70 - 1.30 MG/DL 1.03 0.90 1.01   Bilirubin, total Latest Ref Range: 0.2 - 1.0 MG/DL 6.3 (H) 6.1 (H)    Albumin Latest Ref Range: 3.5 - 5.0 g/dL 2.7 (L) 2.5 (L)    ALT (SGPT) Latest Ref Range: 12 - 78 U/L 51 45    AST Latest Ref Range: 15 - 37 U/L 129 (H) 107 (H)    Alk.  phosphatase Latest Ref Range: 45 - 117 U/L 242 (H) 170 (H)

## 2017-11-23 NOTE — PROGRESS NOTES
Problem: Fluid Volume - Risk of, Imbalanced  Goal: *Balanced intake and output  Outcome: Progressing Towards Goal  Document intake and output    Bedside and Verbal shift change report given to Juan Alfred RN (oncoming nurse) by Fern Adams (offgoing nurse). Report included the following information SBAR, Kardex, Intake/Output, MAR and Recent Results.

## 2017-11-23 NOTE — PROGRESS NOTES
Bedside shift change report given to Suyapa Lyons RN (oncoming nurse) by Dylon Castellanos RN (offgoing nurse). Report included the following information SBAR, Kardex, Procedure Summary, Intake/Output, Recent Results and Cardiac Rhythm Paced.

## 2017-11-23 NOTE — CONSULTS
CARDIOLOGY CONSULT                  Subjective:    Date of  Admission: 11/20/2017  1:58 PM     Admission type:Emergency    Shola Olvera is a 59 y.o. male admitted for Hyponatremia. MR. Miguel Scott has a known cardiomyopathy with history of volume overload due to cirrhosis as well as CHF. He had another episode of heavy EtOH intake and presented as above. He had a LVP and received IV albumin with improvement in his symptoms. He has been restarted on his diuretics. Has been on Entresto for some time and has noticed improvement in his dyspnea and his LVEF has improved also.     Patient Active Problem List    Diagnosis Date Noted    Chronic systolic heart failure ([address] 11/21/2017    Anasarca 11/20/2017    Hyponatremia 11/20/2017    Cirrhosis of liver without ascites ([address] 05/02/2017    Neuropathy 03/30/2017    S/P BKA (below knee amputation) ([address] 03/30/2017    Hypertension 03/30/2017    Cardiac defibrillator in place 03/30/2017    Type II diabetes mellitus ([address] 02/01/2015      Janett Freeman MD  Past Medical History:   Diagnosis Date    Arthritis     Atrial fibrillation [facility]) 2014    CHF (congestive heart failure) ([address]     Diabetes ([address]     Diabetic ulcer of left foot ([address] 2/2015 2/2015 Lt BKA    History of blood transfusion 2015    During Lt BKA; pt denies any adverse reaction    Hypertension     Liver disease     CIRRHOSIS    Sepsis ([address] 02/2015    From diabetic Left foot wound;  had a BKA ;  as of 11/21/16 pt states back to his baseline       Past Surgical History:   Procedure Laterality Date    HX AMPUTATION Left 2/10/2015    BKA    HX COLONOSCOPY      HX IMPLANTABLE CARDIOVERTER DEFIBRILLATOR  08/04/2015    [employer] cardio defibrillator; Dr Justin Vega; as of 11/21/16 pt denies defibrillator going off     No Known Allergies   Family History   Problem Relation Age of Onset    Heart Disease Brother     Heart Failure Brother     Heart Failure Brother       Current Facility-Administered Medications   Medication Dose Route Frequency    digoxin (LANOXIN) tablet 0.125 mg  0.125 mg Oral QHS    midodrine (PROAMITINE) tablet 5 mg  5 mg Oral BID WITH MEALS    furosemide (LASIX) tablet 40 mg  40 mg Oral DAILY    spironolactone (ALDACTONE) tablet 100 mg  100 mg Oral DAILY    albumin human 25% (BUMINATE) solution 25 g  25 g IntraVENous Q6H    simethicone (MYLICON) tablet 80 mg  80 mg Oral QID PRN    ibuprofen (MOTRIN) tablet 400 mg  400 mg Oral Q8H PRN    amiodarone (CORDARONE) tablet 200 mg  200 mg Oral QHS    sacubitril-valsartan (ENTRESTO) 49-51 mg tablet 1 Tab  1 Tab Oral BID    sodium chloride (NS) flush 5-10 mL  5-10 mL IntraVENous Q8H    sodium chloride (NS) flush 5-10 mL  5-10 mL IntraVENous PRN    glucose chewable tablet 16 g  4 Tab Oral PRN    dextrose (D50W) injection syrg 12.5-25 g  12.5-25 g IntraVENous PRN    glucagon (GLUCAGEN) injection 1 mg  1 mg IntraMUSCular PRN    insulin lispro (HUMALOG) injection   SubCUTAneous TIDAC    LORazepam (ATIVAN) injection 2 mg  2 mg IntraVENous Q1H PRN    LORazepam (ATIVAN) injection 4 mg  4 mg IntraVENous Q1H PRN    thiamine (B-1) tablet 100 mg  100 mg Oral DAILY    folic acid (FOLVITE) tablet 1 mg  1 mg Oral DAILY         Review of Symptoms:  Gen - no F/C/S  Eyes - no vision changes  ENT - no sore throat, rhinorrhea, otalgia  CV - no CP, no palpitations, no orthopnea, no PND, no JOHANA  Resp no cough, no SOB/LEE  GI - no AP, no n/v/d/c   - no dysuria, no hematuria  MSK - no abnormal joint pains  Skin - no rashes  Neuro - no HA, no numbness, no weakness, no slurred speech  Psych - no change in mood         Physical Exam    Visit Vitals    /55 (BP 1 Location: Left arm, BP Patient Position: At rest)    Pulse 60    Temp 98.1 °F (36.7 °C)    Resp 11    Ht 5' 10\" (1.778 m)    Wt 93.4 kg (206 lb)  Comment: weighed twice-- one pillow, blanket, and sheet    SpO2 100%    BMI 29.56 kg/m2     NAD  Skin warm and dry  Icteric sclera  Oropharynx without exudate. Neck supple  Lungs clear  Normal S1/ S2 with occasional ectopy  No Murmurs, click or Rubs  Abdomen soft and non tender  Pulses 2+ radials  Neuro:  Grossly intact  Appropriate      Cardiographics    Echocardiogram: Noted    Labs:   Recent Results (from the past 24 hour(s))   GLUCOSE, POC    Collection Time: 11/22/17  4:42 PM   Result Value Ref Range    Glucose (POC) 111 (H) 65 - 100 mg/dL    Performed by Berny Villanueva, POC    Collection Time: 11/23/17  6:44 AM   Result Value Ref Range    Glucose (POC) 153 (H) 65 - 100 mg/dL    Performed by Miguel Fuller, POC    Collection Time: 11/23/17 12:10 PM   Result Value Ref Range    Glucose (POC) 141 (H) 65 - 100 mg/dL    Performed by Freddie Loza. METABOLIC PANEL, BASIC    Collection Time: 11/23/17  1:08 PM   Result Value Ref Range    Sodium 130 (L) 136 - 145 mmol/L    Potassium 4.2 3.5 - 5.1 mmol/L    Chloride 96 (L) 97 - 108 mmol/L    CO2 26 21 - 32 mmol/L    Anion gap 8 5 - 15 mmol/L    Glucose 139 (H) 65 - 100 mg/dL    BUN 11 6 - 20 MG/DL    Creatinine 0.87 0.70 - 1.30 MG/DL    BUN/Creatinine ratio 13 12 - 20      GFR est AA >60 >60 ml/min/1.73m2    GFR est non-AA >60 >60 ml/min/1.73m2    Calcium 8.9 8.5 - 10.1 MG/DL        Assessment and Plan: This is a 59 yom with chronic systolic CHF presenting with acute cirrhosis exacerbation. From a cardiac perspective, holding his metoprolol currently and has been continued on other medications.     Continue his current cardiac meds, including his Alexander Ugarte, can provide some samples if needed on a short-term basis and will relay his case to our local Entresto rep for help in obtaining financial assistance  Hopeful to restart BB, will decrease the dose but would like to maintain some beta-blockade in order to balance cardiac and hepatic concerns  Checking digoxin level in AM  Has been restarted on his diuretics    Thank you for this consult, please call with questions       Assessment:

## 2017-11-23 NOTE — PROGRESS NOTES
Hospitalist Progress Note  Kailey Hill MD  Answering service: [phone] OR [phone] from in house phone      Date of Service:  2017  NAME:  Uma Hope  :  1960  MRN:  [id number]      Admission Summary:   Uma Hope is a 59 y.o.  male with past medical history cirrhosis, non ischemic cardiomyopathy EF 25% s/p AICD,  DM type 2 , hypertension who presented to the ED with the complaint of abdominal swelling and right lower extremity swelling. Interval history / Subjective:     Patient seen and examined, he Is feeling improved. He denies dizziness   - on Diuresis, Started on IV albumin   - paracentesis done, he started complaining of abdominal gas pain- kub done and was negative. He got gasex and improved   - peritoneal drain removed. Started back on diuretics     Assessment & Plan:     Anasarca: This is secondary to decompensated liver cirrhosis, in the setting of chronic systolic heart failure. Daily weights  Advised cessation  S/P paracentesis on   Hepatology following  Restarted back on lasix as hyponatremia has improved     Non ischemic cardiomyopathy:  Last Echo 2015: Ef 20%. S/p AICD  CXR done on admission showed no acute findings. D/C BB due to hypotension  Will Consult Cardiology as patient had questions about continuing the entresto. Check 2 d echo this admission showing EF  35% to 40%. Fluid water restriction 1.5 liters/ day. Daily weights  Hypotension - likely from large volume paracentesis - on albumin, added midodrine.      Alcoholic Liver Cirrhosis  No evidence of hepatic encephalopathy.   Advised Etoh Cessation and fluid restriction at home     Severe Hyponatremia: Secondary to hypervolemic state and beer potomania  Serum sodium of 117 on admission  - improving   Repeat BMP in AM  No Neurologic symptoms    Ulcer on the left Foot Stump - POA  Wound care and Duoderm Dressings  [facility]     Hyperbilirubinemia: Likely due to Congestive heart failure in the setting of cirrhosis  Check abd US - Thickened wall is expected in the setting of cirrhosis and ascites. No shadowing gallstone. Negative sonographic Vásquez sign. no intra or extrahepatic biliary ductal dilatation. Thrombocytopenia: Secondary to liver cirrhosis  No evidence of bleeding.      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 Continue amiodarone and digoxin  Continue tele monitoring. Not on [address] due to bleeding risk     Hyperglycemia - likely From Acute Illness/Etoh  On  ISS as per protocol  hbA1c 4.9     Alcohol Abuse with dependence; Patient is not in withdrawal.  Monitor for DT's. CIWA protocol with as needed ativan  Seizure precautions. On thiamine  daily     DVT PPX: SCD    Code status: Full  DVT prophylaxis: SCD    Care Plan discussed with: Patient/Family and Nurse  Disposition: TBD     Hospital Problems  Date Reviewed: 6/6/2017          Codes Class Noted POA    * (Principal)Anasarca ICD-10-CM: R60.1  ICD-9-CM: 782.3  11/20/2017 Yes        Hyponatremia ICD-10-CM: E87.1  ICD-9-CM: 276.1  11/20/2017 Unknown                Review of Systems:   A comprehensive review of systems was negative except for that written in the HPI. Vital Signs:    Last 24hrs VS reviewed since prior progress note.  Most recent are:  Visit Vitals    BP 96/42 (BP 1 Location: Right arm, BP Patient Position: At rest)    Pulse 60    Temp 98.5 °F (36.9 °C)    Resp 16    Ht 5' 10\" (1.778 m)    Wt 93.4 kg (206 lb)    SpO2 100%    BMI 29.56 kg/m2         Intake/Output Summary (Last 24 hours) at 11/23/17 1050  Last data filed at 11/23/17 0305   Gross per 24 hour   Intake                0 ml   Output             8700 ml   Net            -8700 ml        Physical Examination:      Constitutional:  No acute distress, cooperative, pleasant    ENT:  Oral mucous moist, oropharynx benign. Neck supple,    Resp:  CTA bilaterally. No wheezing/rhonchi/rales. No accessory muscle use   CV:  Regular rhythm, normal rate, no murmurs, gallops, rubs    GI:  Soft, non distended, non tender. normoactive bowel sounds, no hepatosplenomegaly , Paracentesis catheter in place and draining clear marielena fluid    Musculoskeletal:  Edema resolved in the leg. Left Leg has BKA stump has a superficial ulcer dressed   2+ pulses throughout in radials    Neurologic:  Moves all extremities. AAOx3, CN II-XII reviewed     Psych:  Good insight, Not anxious nor agitated. Data Review:    Review and/or order of clinical lab test  Review and/or order of tests in the radiology section of CPT  Review and/or order of tests in the medicine section of CPT      Labs:     Recent Labs      11/22/17 0340 11/21/17   0456   WBC  2.6*  2.9*   HGB  9.5*  10.0*   HCT  25.5*  27.4*   PLT  55*  71*     Recent Labs      11/22/17   0340  11/21/17   0456  11/20/17   1342   NA  127*  124*  117*   K  4.5  4.7  5.1   CL  94*  91*  87*   CO2  26  24  21   BUN  17  12  13   CREA  1.01  0.90  1.03   GLU  103*  115*  141*   CA  8.9  9.0  8.8   MG   --   1.8   --      Recent Labs      11/21/17   0456  11/20/17   1342   SGOT  107*  129*   ALT  45  51   AP  170*  242*   TBILI  6.1*  6.3*   TP  6.6  7.6   ALB  2.5*  2.7*   GLOB  4.1*  4.9*     Recent Labs      11/20/17   1342   INR  1.3*   PTP  13.3*   APTT  33.4*      No results for input(s): FE, TIBC, PSAT, FERR in the last 72 hours. No results found for: FOL, RBCF   No results for input(s): PH, PCO2, PO2 in the last 72 hours. No results for input(s): CPK, CKNDX, TROIQ in the last 72 hours.     No lab exists for component: CPKMB  No results found for: CHOL, CHOLX, CHLST, CHOLV, HDL, LDL, LDLC, DLDLP, TGLX, Thelma Muhammad [facility]  Lab Results   Component Value Date/Time    Glucose (POC) 153 11/23/2017 06:44 AM    Glucose (POC) 111 11/22/2017 04:42 PM    Glucose (POC) 144 11/22/2017 01:08 PM    Glucose (POC) 129 11/22/2017 07:03 AM    Glucose (POC) 135 11/21/2017 04:24 PM     Lab Results   Component Value Date/Time    Color DARK YELLOW 02/20/2015 06:30 PM    Appearance CLEAR 02/20/2015 06:30 PM    Specific gravity 1.018 02/20/2015 06:30 PM    pH (UA) 6.5 02/20/2015 06:30 PM    Protein NEGATIVE  02/20/2015 06:30 PM    Glucose 250 02/20/2015 06:30 PM    Ketone NEGATIVE  02/20/2015 06:30 PM    Bilirubin NEGATIVE  02/20/2015 06:30 PM    Urobilinogen 0.2 02/20/2015 06:30 PM    Nitrites NEGATIVE  02/20/2015 06:30 PM    Leukocyte Esterase NEGATIVE  02/20/2015 06:30 PM    Epithelial cells FEW 02/20/2015 06:30 PM    Bacteria NEGATIVE  02/20/2015 06:30 PM    WBC 0-4 02/20/2015 06:30 PM    RBC 0-5 02/20/2015 06:30 PM         Medications Reviewed:     Current Facility-Administered Medications   Medication Dose Route Frequency    digoxin (LANOXIN) tablet 0.125 mg  0.125 mg Oral QHS    midodrine (PROAMITINE) tablet 5 mg  5 mg Oral BID WITH MEALS    metoprolol succinate (TOPROL-XL) XL tablet 25 mg  25 mg Oral QHS    albumin human 25% (BUMINATE) solution 25 g  25 g IntraVENous Q6H    simethicone (MYLICON) tablet 80 mg  80 mg Oral QID PRN    ibuprofen (MOTRIN) tablet 400 mg  400 mg Oral Q8H PRN    amiodarone (CORDARONE) tablet 200 mg  200 mg Oral QHS    sacubitril-valsartan (ENTRESTO) 49-51 mg tablet 1 Tab  1 Tab Oral BID    sodium chloride (NS) flush 5-10 mL  5-10 mL IntraVENous Q8H    sodium chloride (NS) flush 5-10 mL  5-10 mL IntraVENous PRN    glucose chewable tablet 16 g  4 Tab Oral PRN    dextrose (D50W) injection syrg 12.5-25 g  12.5-25 g IntraVENous PRN    glucagon (GLUCAGEN) injection 1 mg  1 mg IntraMUSCular PRN    insulin lispro (HUMALOG) injection   SubCUTAneous TIDAC    LORazepam (ATIVAN) injection 2 mg 2 mg IntraVENous Q1H PRN    LORazepam (ATIVAN) injection 4 mg  4 mg IntraVENous Q1H PRN    thiamine (B-1) tablet 100 mg  100 mg Oral DAILY    folic acid (FOLVITE) tablet 1 mg  1 mg Oral DAILY     ______________________________________________________________________  EXPECTED LENGTH OF STAY: 2d 16h  ACTUAL LENGTH OF STAY:          3                 Laura Montano MD

## 2017-11-23 NOTE — PROGRESS NOTES
Problem: General Medical Care Plan  Goal: *Vital signs within specified parameters  Outcome: Progressing Towards Goal  Patient Vitals for the past 12 hrs:   Temp Pulse Resp BP SpO2   11/23/17 0305 98.2 °F (36.8 °C) 60 16 (!) 94/36 97 %   11/22/17 2329 97.8 °F (36.6 °C) 60 20 95/50 99 %   11/22/17 1934 98 °F (36.7 °C) 60 20 118/59 98 %       Patient is A&O x 4 with no signs of distress. Vital signs are within defined limits. Goal: *Optimal pain control at patient's stated goal  Outcome: Progressing Towards Goal  Patient has no complaints of pain. 0745 Bedside and Verbal shift change report given to JEAN OWEN (oncoming nurse) by Lucinda Boone RN (offgoing nurse). Report included the following information SBAR, Kardex, Intake/Output, MAR, Recent Results and Cardiac Rhythm AV paced.

## 2017-11-24 PROBLEM — R60.1 ANASARCA: Status: RESOLVED | Noted: 2017-01-01 | Resolved: 2017-01-01

## 2017-11-24 NOTE — PROGRESS NOTES
Problem: General Medical Care Plan  Goal: *Skin integrity maintained  Outcome: Progressing Towards Goal  Patient has wound on left BKA that was present on admission. Wound covered with Mepilex foam dressing with as needed and weekly dressing changes scheduled. Patient also has incision from paracentesis drain removal. Site covered with 4X4--clean, dry, and intact. Goal: *Anxiety reduced or absent  Outcome: Progressing Towards Goal  Patient shows no signs of anxiety. He is calm and cooperative with staff. Will continue to monitor. 0745 Bedside and Verbal shift change report given to Bert Gustafson RN and Ector Moran RN (oncoming nurse) by Davina Burkitt, RN (offgoing nurse). Report included the following information SBAR, Kardex, Intake/Output, MAR, Recent Results and Cardiac Rhythm AV paced.

## 2017-11-24 NOTE — PROGRESS NOTES
[address] [facility] Parisa Jamil MD, FACP, [place], [place]       Mukul Peñaloza, KULDEEP Villarreal, [facility]-BC   Parth Hernandez, MARTÍN Romero NP   [address] [address], [address] [facility]  [place], [address] [facility]     [phone]     FAX: [phone] [facility]  [address] [facility], [address] [facility], [address] [facility]     [phone]     FAX: [phone]   Ashok Bowers  HEPATOLOGY PROGRESS NOTE  The patient is a 59year old  male with cirrhosis secondary to alcohol. Imani Black has never been abstinent from alcohol for a prolonged period of time.  The current hospitalization was promted by several days of prolonged excessive alcohol use and increasing swelling of the abdomen.       Large volume paracentesis has drained ascites. Catheter removed today. Sodium is up to 129-130. Now back on step 1 diuretics. Will continue IV albumin until discharged. Beta-blockers have been shown to increase the risk of LORENZA in patients with cirrhosis. I would like to stop beta-blockers if possible and if not reduce to lowest dose possible. From my point of view he is ready for DC. ARTURO Figueredo in my office will see him back in the office in 2 weeks. ASSESSMENT AND PLAN:  Cirrhosis  This is secondary to alcohol.  The CTP score 10, Child class C, MELD score 26.  He is not a candidate for liver transplant because of ongoing alcohol consumption.        Ascites  Most of ascites removed with paracentesis. Catheter pulled out. Now on step 1 diuretics PO. Continue IV albumin until DC.        Hyponatremia  This has nearly resolved. NA up to 129-130.   Continue IV albumin until DC     Anemia  Etiology is multifactorial.  Probably bone marrow supression from alcohol and chronic GI blood loss from portal hypertension.  Ferritin and FE saturation last checked in 3/2017 and was 399 and 76%.      Thrombocytopenia  This is secondary to cirrhosis.  No treatment needed.      Alcohol abuse  This is an ongoing problem  He is aware that he needs to stop returning to alcohol consumption when he gets \"stressed out\"      PHYSICAL EXAMINATION:  VS: per nursing note  General:  No acute distress. Eyes:  Sclera anicteric. ENT:  No oral lesions.  Thyroid normal.  Nodes:  No adenopathy. Skin:  Spider angiomata.  No jaundice. Respiratory:  Lungs clear to auscultation. Cardiovascular:  Irregularly irregular heart rate. Abdomen:  Soft non-tender, Some ascites may still be present. Extremities:  No lower extremity edema.    Neurologic:  Alert and oriented.  Cranial nerves grossly intact.  No asterixis.      LABORATORY:  Results for Leonid Yan (MRN [id number]) as of 11/24/2017 06:47   Ref. Range 11/21/2017 04:56 11/22/2017 03:40 11/23/2017 13:08 11/24/2017 03:00   WBC Latest Ref Range: 4.1 - 11.1 K/uL 2.9 (L) 2.6 (L)  1.9 (L)   HGB Latest Ref Range: 12.1 - 17.0 g/dL 10.0 (L) 9.5 (L)  9.0 (L)   PLATELET Latest Ref Range: 150 - 400 K/uL 71 (L) 55 (L)  60 (L)   Sodium Latest Ref Range: 136 - 145 mmol/L 124 (L) 127 (L) 130 (L) 129 (L)   Potassium Latest Ref Range: 3.5 - 5.1 mmol/L 4.7 4.5 4.2 3.9   Chloride Latest Ref Range: 97 - 108 mmol/L 91 (L) 94 (L) 96 (L) 97   CO2 Latest Ref Range: 21 - 32 mmol/L 24 26 26 24   Glucose Latest Ref Range: 65 - 100 mg/dL 115 (H) 103 (H) 139 (H) 123 (H)   BUN Latest Ref Range: 6 - 20 MG/DL 12 17 11 14   Creatinine Latest Ref Range: 0.70 - 1.30 MG/DL 0.90 1.01 0.87 0.98   Bilirubin, total Latest Ref Range: 0.2 - 1.0 MG/DL 6.1 (H)      A-G Ratio Latest Ref Range: 1.1 - 2.2   0.6 (L)      ALT (SGPT) Latest Ref Range: 12 - 78 U/L 45      AST Latest Ref Range: 15 - 37 U/L 107 (H)      Alk.  phosphatase Latest Ref Range: 45 - 117 U/L 170 (H)        Tomas Mccray MD  [place] [address] [address] [facility], [address] [place] [address], [address] [place] [id number]  [phone]

## 2017-11-24 NOTE — PROGRESS NOTES
Patient admitted for anasarca, ascites, and hyponatremia. Cardiology notes they have samples for short-term Entresto and will refer to their rep for assistance with Entresto. Not sure what insurance will pay. He has [facility]. Will follow for discharge needs.

## 2017-11-24 NOTE — PROGRESS NOTES
I have reviewed discharge instructions with the patient. The patient verbalized understanding. PIV removed and opportunities for questions provided.

## 2017-11-24 NOTE — DISCHARGE SUMMARY
Discharge Summary       PATIENT ID: Marixa Graf  MRN: [id number]   YOB: 1960    DATE OF ADMISSION: 11/20/2017  1:58 PM    DATE OF DISCHARGE: 11/24/2017   PRIMARY CARE PROVIDER: Lianne Villarreal MD     ATTENDING PHYSICIAN: Dr. Helena Gatica  DISCHARGING PROVIDER: Andrzej Agustin NP    To contact this individual call [phone] and ask the  to page. If unavailable ask to be transferred the Adult Hospitalist Department. CONSULTATIONS: IP CONSULT TO HOSPITALIST  IP CONSULT TO HEPATOLOGY  IP CONSULT TO INTERVENTIONAL RADIOLOGY  IP CONSULT TO CARDIOLOGY    PROCEDURES/SURGERIES: * No surgery found *    ADMITTING DIAGNOSES & HOSPITAL COURSE:   Jose Maria Cloud is a 59 y.o.  male with PMHx of cirrhosis, non ischemic cardiomyopathy EF 25% s/p AICD, DM2 , hTn who presented to the ED on 11/20 w/ cc of abdominal swelling & RLE edema. Pt was admitted with ascites and underwent a large volume (2L) paracentesis, drain was left in and removed on 1/23. He Was evaluated by Dr. Luis Santiago who is well known to him. Pt was restarted on step 1 diuretics. He was additionally evaluated by cardiology and metoprolol was decreased to 12.5 mg. Hyponatremia is stable on discharge at 129. VSS    DISCHARGE DIAGNOSES / PLAN:      Anasarca: This is secondary to decompensated liver cirrhosis, in the setting of chronic systolic heart failure. Daily weights  Advised etoh cessation  S/P paracentesis on 11/22  Hepatology following  Restarted back on lasix as hyponatremia has improved      Non ischemic cardiomyopathy:  Last Echo 2/2015: Ef 20%. S/p AICD  CXR done on admission showed no acute findings. D/C BB due to hypotension  Will Consult Cardiology as patient had questions about continuing the entresto. Check 2 d echo this admission showing EF  35% to 40%. Fluid water restriction 1.5 liters/ day. Daily weights  Hypotension - likely from large volume paracentesis - on albumin, added midodrine.     Alcoholic Liver Cirrhosis  No evidence of hepatic encephalopathy. Advised Etoh Cessation and fluid restriction at home      Severe Hyponatremia: Secondary to hypervolemic state and beer potomania  Serum sodium of 117 on admission  - improved to 129  No Neurologic symptoms     Ulcer on the left Foot Stump - POA  Wound care and Duoderm Dressings  [facility] Follow[facility]      Hyperbilirubinemia: Likely due to Congestive heart failure in the setting of cirrhosis  Check abd US - Thickened wall is expected in the setting of cirrhosis and ascites. No shadowing gallstone. Negative sonographic Vásquez sign. no intra or extrahepatic biliary ductal dilatation.     Thrombocytopenia: Secondary to liver cirrhosis  No evidence of bleeding.       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 Continue amiodarone and digoxin  Continue tele monitoring. Not on [address] due to bleeding risk      Hyperglycemia - likely From Acute Illness/Etoh  On  ISS as per protocol  hbA1c 4.9      Alcohol Abuse with dependence; Patient is not in withdrawal.  Monitor for DT's.   CIWA protocol with as needed ativan  Seizure precautions  On thiamine  daily       PENDING TEST RESULTS:   At the time of discharge the following test results are still pending: none    FOLLOW UP APPOINTMENTS:    Follow-up Information     Follow up With Details Comments Imer Mcclellan MD   [address] [facility] [address] [facility]      ARTURO Friend  follow up in 2 weeks [address] [facility] [address]      Alonso Crawford MD   [address]  [id number]  [address] [address] [phone]             ADDITIONAL CARE RECOMMENDATIONS:  Follow up with your cardiologist next week  Follow up with Dr. Goldstein Nearing office in 2 weeks  Decrease metoprolol to 12.5 mg per cardiology  NEW: lasix 40 mg daily, spirolactone 100 mg daily     DIET: Cardiac     ACTIVITY: Activity as tolerated     WOUND CARE: none     EQUIPMENT needed: none      DISCHARGE MEDICATIONS:  Current Discharge Medication List      START taking these medications    Details   furosemide (LASIX) 40 mg tablet Take 1 Tab by mouth daily. Qty: 30 Tab, Refills: 0      thiamine (B-1) 100 mg tablet Take 1 Tab by mouth daily. Qty: 30 Tab, Refills: 0      folic acid (FOLVITE) 1 mg tablet Take 1 Tab by mouth daily. Qty: 30 Tab, Refills: 0         CONTINUE these medications which have CHANGED    Details   spironolactone (ALDACTONE) 100 mg tablet Take 1 Tab by mouth daily. Qty: 30 Tab, Refills: 0         CONTINUE these medications which have NOT CHANGED    Details   amiodarone (CORDARONE) 200 mg tablet Take 200 mg by mouth nightly. sacubitril-valsartan (ENTRESTO) 49 mg/51 mg tablet Take 1 Tab by mouth two (2) times a day. linagliptin (TRADJENTA) 5 mg tablet Take 5 mg by mouth every morning.      multivitamins-minerals-lutein (CENTRUM SILVER) Tab Take 1 Tab by mouth daily. digoxin (LANOXIN) 0.125 mg tablet Take 0.125 mg by mouth nightly. NOTIFY YOUR PHYSICIAN FOR ANY OF THE FOLLOWING:   Fever over 101 degrees for 24 hours. Chest pain, shortness of breath, fever, chills, nausea, vomiting, diarrhea, change in mentation, falling, weakness, bleeding. Severe pain or pain not relieved by medications. Or, any other signs or symptoms that you may have questions about.     DISPOSITION:   xx Home With:   OT  PT  KELTON  RN       Long term SNF/Inpatient Rehab    Independent/assisted living    Hospice    Other:       PATIENT CONDITION AT DISCHARGE:     Functional status    Poor     Deconditioned    xx Independent      Cognition   xx  Lucid     Forgetful     Dementia      Catheters/lines (plus indication)    Vizcarra     PICC     PEG    xx None      Code status    xx Full code     DNR      PHYSICAL EXAMINATION AT DISCHARGE:  Constitutional:  No acute distress, cooperative, pleasant    ENT:  Oral mucous moist, oropharynx benign. Neck supple   Resp:  CTA bilaterally. No wheezing/rhonchi/rales. No accessory muscle use   CV:  Regular rhythm, normal rate, no murmurs, gallops, rubs. AV paced on tele    GI:  Soft, non distended, non tender. normoactive bowel sounds, no hepatosplenomegaly , Paracentesis catheter in place and draining clear marielena fluid    Musculoskeletal:  Edema resolved in the leg. Left Leg has BKA stump has a superficial ulcer dressed   2+ pulses throughout in radials    Neurologic:  Moves all extremities. AAOx3, CN II-XII reviewed.  No asterixis. Psych:  Good insight, Not anxious nor agitated. Skin: Spider angiomata.         CHRONIC MEDICAL DIAGNOSES:  Problem List as of 11/24/2017  Date Reviewed: 11/24/2017          Codes Class Noted - Resolved    Chronic systolic heart failure (HCC) (Chronic) ICD-10-CM: I50.22  ICD-9-CM: 428.22  11/21/2017 - Present        Hyponatremia ICD-10-CM: E87.1  ICD-9-CM: 276.1  11/20/2017 - Present        Cirrhosis of liver without ascites ([facility] ICD-10-CM: K74.60  ICD-9-CM: 571.5  5/2/2017 - Present        Neuropathy ICD-10-CM: G62.9  ICD-9-CM: 355.9  3/30/2017 - Present        S/P BKA (below knee amputation) ([facility] ICD-10-CM: Z89.519  ICD-9-CM: V49.75  3/30/2017 - Present        Hypertension ICD-10-CM: I10  ICD-9-CM: 401.9  3/30/2017 - Present        Cardiac defibrillator in place ICD-10-CM: Z95.810  ICD-9-CM: V45.02  3/30/2017 - Present        Type II diabetes mellitus ([facility] ICD-10-CM: E11.9  ICD-9-CM: 250.00  2/1/2015 - Present        * (Principal)RESOLVED: Anasarca ICD-10-CM: R60.1  ICD-9-CM: 782.3  11/20/2017 - 11/24/2017        RESOLVED: Extremity pain ICD-10-CM: M79.609  ICD-9-CM: 729.5  2/18/2015 - 3/30/2017 RESOLVED: Cellulitis and abscess of foot ICD-10-CM: L03.119, L02.619  ICD-9-CM: 682.7  2/1/2015 - 2/13/2015        RESOLVED: Hyperglycemia ICD-10-CM: R73.9  ICD-9-CM: 790.29  1/31/2015 - 2/13/2015              Greater than 30 minutes were spent with the patient on counseling and coordination of care    Signed:   Heidy Tafoya NP  11/24/2017  2:16 PM

## 2017-11-24 NOTE — DISCHARGE INSTRUCTIONS
Discharge Instructions       PATIENT ID: Bernard Alfonso  MRN: [id number]   YOB: 1960    DATE OF ADMISSION: 11/20/2017  1:58 PM    DATE OF DISCHARGE: 11/24/2017    PRIMARY CARE PROVIDER: Sharon Ghosh MD     ATTENDING PHYSICIAN: Loy Green MD  DISCHARGING PROVIDER: Chet Rodriguez NP    To contact this individual call [phone] and ask the  to page. If unavailable ask to be transferred the Adult Hospitalist Department. DISCHARGE DIAGNOSES: Ascites, decompensated liver cirrhosis, chronic systolic heart failure, alcohol abuse    CONSULTATIONS: IP CONSULT TO HOSPITALIST  IP CONSULT TO HEPATOLOGY  IP CONSULT TO INTERVENTIONAL RADIOLOGY  IP CONSULT TO CARDIOLOGY    PROCEDURES/SURGERIES: * No surgery found *    PENDING TEST RESULTS:   At the time of discharge the following test results are still pending: none    FOLLOW UP APPOINTMENTS:   Follow-up Information     Follow up With Details Comments Imer Mcclellan MD   [address] [place] [address]  follow up in 2 weeks [address]  [phone]             ADDITIONAL CARE RECOMMENDATIONS:  Follow up with your cardiologist next week  Follow up with Dr. Lou Chahal office in 2 weeks  Decrease metoprolol to 12.5 mg per cardiology  NEW: lasix 40 mg daily, spirolactone 100 mg daily    DIET: Cardiac    ACTIVITY: Activity as tolerated    WOUND CARE: none    EQUIPMENT needed: none      DISCHARGE MEDICATIONS:   See Medication Reconciliation Form    · It is important that you take the medication exactly as they are prescribed. · Keep your medication in the bottles provided by the pharmacist and keep a list of the medication names, dosages, and times to be taken in your wallet. · Do not take other medications without consulting your doctor.        NOTIFY YOUR PHYSICIAN FOR ANY OF THE FOLLOWING:   Fever over 101 degrees for 24 hours. Chest pain, shortness of breath, fever, chills, nausea, vomiting, diarrhea, change in mentation, falling, weakness, bleeding. Severe pain or pain not relieved by medications. Or, any other signs or symptoms that you may have questions about.       DISPOSITION:  xx Home With:   OT  PT  HH  RN       SNF/Inpatient Rehab/LTAC    Independent/assisted living    Hospice    Other:     CDMP Checked:   Yes xx     PROBLEM LIST Updated:  Yes xx       Signed:   Payam KERR NP  11/24/2017  1:58 PM

## 2017-11-24 NOTE — PROGRESS NOTES
Bedside shift change report given to Moises Arauz  (oncoming nurse) by Leda Jesus RN  (offgoing nurse). Report included the following information SBAR and MAR.

## 2017-11-24 NOTE — PROGRESS NOTES
Cardiology Progress Note  2017     Admit Date: 2017  Admit Diagnosis: Hyponatremia  CC: none currently    Assessment:   Active Problems:    Hyponatremia (2017)      Plan:     Continue Entresto, amio, digoxin, lasix, asnket  Will restart lowest dose of metoprolol to balance cardiac and hepatic concerns    Subjective:      Sebastian Saleh has no c/o today. Rudolpho Box for DC      Objective:    Physical Exam:  Overall VSSAF;    Visit Vitals    /55 (BP 1 Location: Right arm, BP Patient Position: Sitting)    Pulse 60    Temp 97.9 °F (36.6 °C)    Resp 15    Ht 5' 10\" (1.778 m)    Wt 93 kg (205 lb)    SpO2 98%    BMI 29.41 kg/m2     Temp (24hrs), Av.2 °F (36.8 °C), Min:97.9 °F (36.6 °C), Max:98.7 °F (37.1 °C)    Patient Vitals for the past 8 hrs:   Pulse   17 1150 60   17 0800 67    Patient Vitals for the past 8 hrs:   Resp   17 1150 15   17 0800 13    Patient Vitals for the past 8 hrs:   BP   17 1150 109/55   17 0800 91/46       1901 -  0700  In: -   Out: 0221 [Urine:2850; Drains:900]      General Appearance: Well developed, well nourished, no acute distress. Ears/Nose/Mouth/Throat:   Normal MM; icteric. JVP: WNL   Resp:   Lungs clear to auscultation bilaterally. Nl resp effort. Cardiovascular:  RRR, S1, S2 normal, no new murmur. No gallop or rub. Abdomen:   Soft, non-tender, bowel sounds are present. Extremities: No edema bilaterally. Skin:  Neuro: Warm and dry.   A/O x3, grossly nonfocal                         Data Review:       Labs:   Recent Results (from the past 24 hour(s))   GLUCOSE, POC    Collection Time: 17  5:34 PM   Result Value Ref Range    Glucose (POC) 134 (H) 65 - 100 mg/dL    Performed by Unkown     GLUCOSE, POC    Collection Time: 17  9:01 PM   Result Value Ref Range    Glucose (POC) 126 (H) 65 - 100 mg/dL    Performed by Deion Quiñones, BASIC    Collection Time: 11/24/17  3:00 AM   Result Value Ref Range    Sodium 129 (L) 136 - 145 mmol/L    Potassium 3.9 3.5 - 5.1 mmol/L    Chloride 97 97 - 108 mmol/L    CO2 24 21 - 32 mmol/L    Anion gap 8 5 - 15 mmol/L    Glucose 123 (H) 65 - 100 mg/dL    BUN 14 6 - 20 MG/DL    Creatinine 0.98 0.70 - 1.30 MG/DL    BUN/Creatinine ratio 14 12 - 20      GFR est AA >60 >60 ml/min/1.73m2    GFR est non-AA >60 >60 ml/min/1.73m2    Calcium 9.0 8.5 - 10.1 MG/DL   CBC WITH AUTOMATED DIFF    Collection Time: 11/24/17  3:00 AM   Result Value Ref Range    WBC 1.9 (L) 4.1 - 11.1 K/uL    RBC 2.33 (L) 4.10 - 5.70 M/uL    HGB 9.0 (L) 12.1 - 17.0 g/dL    HCT 24.9 (L) 36.6 - 50.3 %    .9 (H) 80.0 - 99.0 FL    MCH 38.6 (H) 26.0 - 34.0 PG    MCHC 36.1 30.0 - 36.5 g/dL    PLATELET 60 (L) 673 - 400 K/uL    NEUTROPHILS 66 32 - 75 %    LYMPHOCYTES 22 12 - 49 %    MONOCYTES 9 5 - 13 %    EOSINOPHILS 2 0 - 7 %    BASOPHILS 1 0 - 1 %    ABS. NEUTROPHILS 1.3 (L) 1.8 - 8.0 K/UL    ABS. LYMPHOCYTES 0.4 (L) 0.8 - 3.5 K/UL    ABS. MONOCYTES 0.2 0.0 - 1.0 K/UL    ABS. EOSINOPHILS 0.0 0.0 - 0.4 K/UL    ABS.  BASOPHILS 0.0 0.0 - 0.1 K/UL    DF SMEAR SCANNED      RBC COMMENTS MACROCYTOSIS  1+        RBC COMMENTS ANISOCYTOSIS  1+       DIGOXIN    Collection Time: 11/24/17  3:00 AM   Result Value Ref Range    Digoxin level 0.6 (L) 0.9 - 2.0 NG/ML   GLUCOSE, POC    Collection Time: 11/24/17  6:27 AM   Result Value Ref Range    Glucose (POC) 125 (H) 65 - 100 mg/dL    Performed by [id number], POC    Collection Time: 11/24/17 11:48 AM   Result Value Ref Range    Glucose (POC) 140 (H) 65 - 100 mg/dL    Performed by Suma Dutta       Current medications reviewed       Adia Gill MD

## 2017-12-06 PROBLEM — R45.851 SUICIDAL IDEATIONS: Status: ACTIVE | Noted: 2017-01-01

## 2017-12-06 PROBLEM — E83.42 HYPOMAGNESEMIA: Status: ACTIVE | Noted: 2017-01-01

## 2017-12-06 NOTE — ED TRIAGE NOTES
Pt stated he is having spells of dizziness, pt stated he was at the store and passed out, denies cp or sob, denies n/v , pt stated he is \"unstable\", +left elbow and left wrist pain, some left leg pain

## 2017-12-06 NOTE — IP AVS SNAPSHOT
[facility] [facility] [address] [place] [address] 
[phone] Patient: Mone Hernández MRN: [id number] JZ:[id number] About your hospitalization You were admitted on:  December 6, 2017 You last received care in the:  [facility] [id number] You were discharged on:  December 10, 2017 Why you were hospitalized Your primary diagnosis was:  Hyponatremia Your diagnoses also included:  Hypomagnesemia, Suicidal Ideations Things You Need To Do (next 8 weeks) Follow up with Ok Izaguirre MD in 1 week(s) Call office for appointment Phone:  [phone] Where:  [address] [facility], [address] [address] [address] Follow up with Niharika Ash MD  
  
Phone:  [phone] Where:  [address], [address] [address] Discharge Orders None A check suzanne indicates which time of day the medication should be taken. My Medications STOP taking these medications   
 furosemide 40 mg tablet Commonly known as:  LASIX  
   
  
 spironolactone 100 mg tablet Commonly known as:  ALDACTONE  
   
  
  
TAKE these medications as instructed Instructions Each Dose to Equal  
 Morning Noon Evening Bedtime  
 amiodarone 200 mg tablet Commonly known as:  CORDARONE Your last dose was: Your next dose is: Take 200 mg by mouth nightly. 200 mg CENTRUM SILVER Tab tablet Generic drug:  multivitamins-minerals-lutein Your last dose was: Your next dose is: Take 1 Tab by mouth daily. 1 Tab  
    
   
   
   
  
 digoxin 0.125 mg tablet Commonly known as:  LANOXIN Your last dose was: Your next dose is: Take 0.125 mg by mouth nightly. 0.125 mg ENTRESTO 24-26 mg tablet Generic drug:  sacubitril-valsartan Your last dose was: Your next dose is: Take 1 Tab by mouth two (2) times a day. 1 Tab  
    
   
   
   
  
 folic acid 1 mg tablet Commonly known as:  [employer] Your last dose was: Your next dose is: Take 1 Tab by mouth daily. 1 mg  
    
   
   
   
  
 metoprolol succinate 25 mg XL tablet Commonly known as:  TOPROL XL Your last dose was: Your next dose is: Take 0.5 Tabs by mouth daily. 12.5 mg  
    
   
   
   
  
 thiamine 100 mg tablet Commonly known as:  B-1 Your last dose was: Your next dose is: Take 1 Tab by mouth daily. 100 mg  
    
   
   
   
  
 TRADJENTA 5 mg tablet Generic drug:  linagliptin Your last dose was: Your next dose is: Take 5 mg by mouth every morning. 5 mg Discharge Instructions Discharge Instructions PATIENT ID: [facility] MRN: [id number] YOB: 1960 DATE OF ADMISSION: 12/6/2017  4:09 PM   
DATE OF DISCHARGE: 12/10/2017 PRIMARY CARE PROVIDER: Radha Porras MD  
 
ATTENDING PHYSICIAN: Teodora Damon MD 
DISCHARGING PROVIDER: Teodora Damon MD   
To contact this individual call [phone] and ask the  to page. If unavailable ask to be transferred the Adult Hospitalist Department. DISCHARGE DIAGNOSES Syncope,rohtostatic(due to dehydration and low blood pressure) CONSULTATIONS: IP CONSULT TO HOSPITALIST 
IP CONSULT TO CARDIOLOGY PROCEDURES/SURGERIES: * No surgery found * PENDING TEST RESULTS:  
At the time of discharge the following test results are still pending: none FOLLOW UP APPOINTMENTS:  
Follow-up Information Follow up With Details Comments Contact Nithin Patiño MD In 1 week Call office for appointment [address] [place] [address] [address] [place] [address] 
[phone] ADDITIONAL CARE RECOMMENDATIONS:  
 
 We have stopped the diuretics Lasix and aldactone due to worsening kidney function. Dr Priscilla Martinez is aware and his office will monitor the kidney numbers and decide when safe to resume taking the diuretics. DIET: Cardiac Diet and Diabetic Diet ACTIVITY: Activity as tolerated WOUND CARE: local wound care. Wound care team has recommended the following: 
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 Mepilex Border dressings to left elbow and wrist skin tears; change every 3 days. EQUIPMENT needed:none DISCHARGE MEDICATIONS: 
 See Medication Reconciliation Form · It is important that you take the medication exactly as they are prescribed. · Keep your medication in the bottles provided by the pharmacist and keep a list of the medication names, dosages, and times to be taken in your wallet. · Do not take other medications without consulting your doctor. NOTIFY YOUR PHYSICIAN FOR ANY OF THE FOLLOWING:  
Fever over 101 degrees for 24 hours. Chest pain, shortness of breath, fever, chills, nausea, vomiting, diarrhea, change in mentation, falling, weakness, bleeding. Severe pain or pain not relieved by medications. Or, any other signs or symptoms that you may have questions about. DISPOSITION: 
 x Home With: 
 OT  PT  [facility]  RN  
  
 SNF/Inpatient Rehab/LTAC Independent/assisted living Hospice Other:  
 
 
 
 
Signed: Margot Julien MD 
12/10/2017 
12:21 PM 
 
  
  
  
[employer] Announcement We are excited to announce that we are making your provider's discharge notes available to you in [employer]. You will see these notes when they are completed and signed by the physician that discharged you from your recent hospital stay.   If you have any questions or concerns about any information you see in [employer], please call the Health Information Department where you were seen or reach out to your Primary Care Provider for more information about your plan of care. Introducing [facility]! [employer] Weinstein introduces [facility] patient portal. Now you can access parts of your medical record, email your doctor's office, and request medication refills online. 1. In your internet browser, go to [email or web address] 2. Click on the First Time User? Click Here link in the Sign In box. You will see the New Member Sign Up page. 3. Enter your [facility] Access Code exactly as it appears below. You will not need to use this code after youve completed the sign-up process. If you do not sign up before the expiration date, you must request a new code. · [facility] Access Code: [email or web address] Expires: 2/20/2018  2:42 PM 
 
4. Enter the last four digits of your Social Security Number (xxxx) and Date of Birth (mm/dd/yyyy) as indicated and click Submit. You will be taken to the next sign-up page. 5. Create a [facility] ID. This will be your [facility] login ID and cannot be changed, so think of one that is secure and easy to remember. 6. Create a [facility] [employer] password. You can change your password at any time. 7. Enter your Password Reset Question and Answer. This can be used at a later time if you forget your password. 8. Enter your e-mail address. You will receive e-mail notification when new information is available in [address] 9. Click Sign Up. You can now view and download portions of your medical record. 10. Click the Download Summary menu link to download a portable copy of your medical information. If you have questions, please visit the Frequently Asked Questions section of the [employer] website. Remember, [facility] [employer] is NOT to be used for urgent needs. For medical emergencies, dial 911. Now available from your iPhone and Android! Providers Seen During Your Hospitalization Provider Specialty Primary office phone Kvng Gregg MD Emergency Medicine [phone] Anshu Parker MD Internal Medicine [phone] Johanne Gomez MD Internal Medicine [phone] Your Primary Care Physician (PCP) Primary Care Physician Office Phone Office Fax Madelon Sever [phone] You are allergic to the following No active allergies Recent Documentation Height Weight BMI Smoking Status 1.778 m 90 kg 28.47 kg/m2 Former Smoker Emergency Contacts Name Discharge Info Relation Home Work Mobile Cass Gonzalez DISCHARGE CAREGIVER [3] Spouse [3] [phone] Patient Belongings The following personal items are in your possession at time of discharge: 
  Dental Appliances: None  Visual Aid: Glasses, With patient      Home Medications: Sent home   Jewelry: Watch  Clothing: Pants, Undergarments, With patient    Other Valuables: Cell Phone, Prosthesis Please provide this summary of care documentation to your next provider. Signatures-by signing, you are acknowledging that this After Visit Summary has been reviewed with you and you have received a copy. Patient Signature:  ____________________________________________________________ Date:  ____________________________________________________________  
  
[facility] Provider Signature:  ____________________________________________________________ Date:  ____________________________________________________________

## 2017-12-06 NOTE — ED PROVIDER NOTES
HPI Comments: The patient is a 68-year-old male who presents to the emergency room for evaluation of recurrent dizziness, lightheadedness, and collapsed prior to arrival. History of the same. Patient states that he has not felt well since he left the hospital after Thanksgiving. He was seen by his primary care provider and his cardiologist this week who made a medication adjustments and pierre routine laboratory data. He feels most dizzy and lightheaded upon position changes. History of EtOH use and does smell strongly of alcohol today. He reports that he may have had a couple of drinks this morning. He does report ongoing use of alcohol every day. Upon collapse at the grocery store today he did scrape his left elbow and left wrist. There are superficial abrasions present. Tetanus shot is up-to-date. Patient denies closed head injury and altered sensorium. He feels as though he briefly lost consciousness. The collapse was witnessed by his grandson. He denies ICD discharge. Pt denies fevers, chills, night sweats, chest pain, pressure, SOB, LEE, PND, orthopnea, abdominal pain, n/v/d, melena, hematuria, dysuria, constipation, and HA.      Past Medical History:  No date: Arthritis  2014: Atrial fibrillation ([facility] [address]  No date: CHF (congestive heart failure) ([address]  No date: Diabetes ([address]  2/2015: Diabetic ulcer of left foot ([facility] [address]      Comment: 2/2015 Lt BKA  2015: History of blood transfusion      Comment: During Lt BKA; pt denies any adverse reaction  No date: Hypertension  No date: Liver disease      Comment: CIRRHOSIS  02/2015: Sepsis ([address]      Comment: From diabetic Left foot wound;  had a BKA ;                 as of 11/21/16 pt states back to his baseline     Past Surgical History:  2/10/2015: HX AMPUTATION Left      Comment: BKA  No date: HX COLONOSCOPY  08/04/2015: HX IMPLANTABLE CARDIOVERTER DEFIBRILLATOR      Comment: [employer] cardio defibrillator; Dr Renetta Sanchez;                as of 11/21/16 pt denies defibrillator going                off    PCP:  Sharon Ghosh MD        Patient is a 59 y.o. male presenting with syncope. Syncope    This is a recurrent problem. Associated symptoms include dizziness, light-headedness and weakness. Pertinent negatives include no chest pain, no palpitations, no confusion, no diaphoresis, no fever, no abdominal pain, no nausea, no vomiting, no congestion, no headaches and no back pain. His past medical history is significant for syncope. Past Medical History:   Diagnosis Date    Arthritis     Atrial fibrillation ([facility] 2014    CHF (congestive heart failure) ([facility] [facility]     Diabetes ([facility] [facility]     Diabetic ulcer of left foot ([facility] [address] 2/2015 2/2015 Lt BKA    History of blood transfusion 2015    During Lt BKA; pt denies any adverse reaction    Hypertension     Liver disease     CIRRHOSIS    Sepsis ([facility] [address] 02/2015    From diabetic Left foot wound;  had a BKA ;  as of 11/21/16 pt states back to his baseline        Past Surgical History:   Procedure Laterality Date    HX AMPUTATION Left 2/10/2015    BKA    HX COLONOSCOPY      HX IMPLANTABLE CARDIOVERTER DEFIBRILLATOR  08/04/2015    [employer] cardio defibrillator; Dr Canales Rather; as of 11/21/16 pt denies defibrillator going off         Family History:   Problem Relation Age of Onset    Heart Disease Brother     Heart Failure Brother     Heart Failure Brother        Social History     Social History    Marital status:      Spouse name: N/A    Number of children: N/A    Years of education: N/A     Occupational History    Not on file.      Social History Main Topics    Smoking status: Former Smoker     Packs/day: 1.00     Years: 5.00     Quit date: 1/1/2013    Smokeless tobacco: Never Used    Alcohol use 14.4 oz/week     24 Cans of beer per week      Comment: as of 11/21/16 pt reports a case of beer weekly    Drug use: No    Sexual activity: Not on file     Other Topics Concern    Not on file     Social History Narrative ALLERGIES: Review of patient's allergies indicates no known allergies. Review of Systems   Constitutional: Negative for activity change, appetite change, chills, diaphoresis, fatigue, fever and unexpected weight change. HENT: Negative for congestion, ear pain, rhinorrhea, sinus pressure, sore throat, tinnitus, trouble swallowing and voice change. Eyes: Negative for pain, discharge, redness and visual disturbance. Respiratory: Negative for apnea, cough, choking, chest tightness, shortness of breath, wheezing and stridor. Cardiovascular: Positive for syncope. Negative for chest pain, palpitations and leg swelling. Gastrointestinal: Negative for abdominal pain, constipation, nausea and vomiting. Endocrine: Negative for cold intolerance and heat intolerance. Genitourinary: Negative for difficulty urinating, dysuria, flank pain, hematuria, testicular pain and urgency. Musculoskeletal: Negative for arthralgias, back pain, gait problem, myalgias, neck pain and neck stiffness. Skin: Negative for color change, pallor, rash and wound. Allergic/Immunologic: Negative for immunocompromised state. Neurological: Positive for dizziness, syncope, weakness and light-headedness. Negative for tremors, numbness and headaches. Hematological: Does not bruise/bleed easily. Psychiatric/Behavioral: Negative for agitation, confusion and suicidal ideas. Vitals:    12/06/17 1611   BP: (!) 147/91   Pulse: 60   Resp: 16   Temp: 97.9 °F (36.6 °C)   SpO2: 99%   Height: 5' 10\" (1.778 m)            Physical Exam   Constitutional: He is oriented to person, place, and time. He appears well-developed and well-nourished. No distress. HENT:   Head: Atraumatic. Nose: Nose normal.   Mouth/Throat: No oropharyngeal exudate. Eyes: Conjunctivae and EOM are normal. Right eye exhibits no discharge. Left eye exhibits no discharge. No scleral icterus. Neck: Normal range of motion. Neck supple. No JVD present.  No tracheal deviation present. No thyromegaly present. Cardiovascular: Normal rate and regular rhythm. Exam reveals no gallop and no friction rub. No murmur heard. + 2 pitting edema of RLE  LLE surgically absent    Pulmonary/Chest: Breath sounds normal. No stridor. No respiratory distress. He has no wheezes. He has no rales. He exhibits no tenderness. Abdominal: Soft. Bowel sounds are normal. He exhibits no distension and no mass. There is no tenderness. There is no rebound and no guarding. Musculoskeletal: Normal range of motion. He exhibits no edema or tenderness. Lymphadenopathy:     He has no cervical adenopathy. Neurological: He is alert and oriented to person, place, and time. He has normal strength. No cranial nerve deficit or sensory deficit. GCS eye subscore is 4. GCS verbal subscore is 5. GCS motor subscore is 6. Skin: Skin is warm and dry. He is not diaphoretic. Psychiatric: He has a normal mood and affect. His behavior is normal.   Nursing note and vitals reviewed. MDM  Number of Diagnoses or Management Options  Diagnosis management comments:    * routine laboratory data and UA   * CXR   * EKG        Amount and/or Complexity of Data Reviewed  Clinical lab tests: ordered and reviewed  Tests in the radiology section of CPT®: ordered and reviewed  Discussion of test results with the performing providers: yes  Review and summarize past medical records: yes  Discuss the patient with other providers: yes    Risk of Complications, Morbidity, and/or Mortality  General comments:    - hemodynamically stable pt in NAD    Patient Progress  Patient progress: stable    ED Course       Procedures         CONSULT NOTE:   [address] PM  Jacobo Smith NP spoke with Dr. Chastity Simmons MD,   Specialty: Hospitalist  Discussed pt's hx, disposition, and available diagnostic and imaging results. Reviewed care plans. Consultant agrees with plans as outlined. Admit to inpatient.  Jacobo Smith NP      7:22 PM  Patient is being admitted to the hospital.  The results of their tests and reasons for their admission have been discussed with them and/or available family. They convey agreement and understanding for the need to be admitted and for their admission diagnosis. Consultation has been made with the inpatient physician specialist for hospitalization. LABORATORY TESTS:  Recent Results (from the past 12 hour(s))   EKG, 12 LEAD, INITIAL    Collection Time: 12/06/17  4:18 PM   Result Value Ref Range    Ventricular Rate 60 BPM    Atrial Rate 60 BPM    P-R Interval 226 ms    QRS Duration 158 ms    Q-T Interval 500 ms    QTC Calculation (Bezet) 500 ms    Calculated P Axis -3 degrees    Calculated R Axis -114 degrees    Calculated T Axis 31 degrees    Diagnosis       AV dual-paced rhythm with prolonged AV conduction  Biventricular pacemaker detected  When compared with ECG of 31-JAN-2015 12:01,  Electronic ventricular pacemaker has replaced Sinus rhythm     CBC WITH AUTOMATED DIFF    Collection Time: 12/06/17  4:25 PM   Result Value Ref Range    WBC 4.1 4.1 - 11.1 K/uL    RBC 2.51 (L) 4.10 - 5.70 M/uL    HGB 9.9 (L) 12.1 - 17.0 g/dL    HCT 28.5 (L) 36.6 - 50.3 %    .0 (H) 80.0 - 99.0 FL    MCH 39.4 (H) 26.0 - 34.0 PG    MCHC 34.7 30.0 - 36.5 g/dL    RDW 12.4 11.5 - 14.5 %    PLATELET 89 (L) 103 - 400 K/uL    NEUTROPHILS 77 (H) 32 - 75 %    LYMPHOCYTES 9 (L) 12 - 49 %    MONOCYTES 12 5 - 13 %    EOSINOPHILS 1 0 - 7 %    BASOPHILS 1 0 - 1 %    ABS. NEUTROPHILS 3.2 1.8 - 8.0 K/UL    ABS. LYMPHOCYTES 0.4 (L) 0.8 - 3.5 K/UL    ABS. MONOCYTES 0.5 0.0 - 1.0 K/UL    ABS. EOSINOPHILS 0.0 0.0 - 0.4 K/UL    ABS.  BASOPHILS 0.0 0.0 - 0.1 K/UL    DF SMEAR SCANNED      RBC COMMENTS ANISOCYTOSIS  1+        RBC COMMENTS MACROCYTOSIS  PRESENT       METABOLIC PANEL, COMPREHENSIVE    Collection Time: 12/06/17  4:25 PM   Result Value Ref Range    Sodium 118 (LL) 136 - 145 mmol/L    Potassium 4.4 3.5 - 5.1 mmol/L    Chloride 86 (L) 97 - 108 mmol/L    CO2 21 21 - 32 mmol/L    Anion gap 11 5 - 15 mmol/L    Glucose 144 (H) 65 - 100 mg/dL    BUN 13 6 - 20 MG/DL    Creatinine 0.97 0.70 - 1.30 MG/DL    BUN/Creatinine ratio 13 12 - 20      GFR est AA >60 >60 ml/min/1.73m2    GFR est non-AA >60 >60 ml/min/1.73m2    Calcium 8.4 (L) 8.5 - 10.1 MG/DL    Bilirubin, total 5.4 (H) 0.2 - 1.0 MG/DL    ALT (SGPT) 60 12 - 78 U/L    AST (SGOT) 110 (H) 15 - 37 U/L    Alk. phosphatase 158 (H) 45 - 117 U/L    Protein, total 7.5 6.4 - 8.2 g/dL    Albumin 3.2 (L) 3.5 - 5.0 g/dL    Globulin 4.3 (H) 2.0 - 4.0 g/dL    A-G Ratio 0.7 (L) 1.1 - 2.2     ETHYL ALCOHOL    Collection Time: 12/06/17  4:25 PM   Result Value Ref Range    ALCOHOL(ETHYL),SERUM 136 (H) <10 MG/DL   DIGOXIN    Collection Time: 12/06/17  4:25 PM   Result Value Ref Range    Digoxin level 0.8 (L) 0.9 - 2.0 NG/ML   MAGNESIUM    Collection Time: 12/06/17  4:25 PM   Result Value Ref Range    Magnesium 1.5 (L) 1.6 - 2.4 mg/dL   URINALYSIS W/MICROSCOPIC    Collection Time: 12/06/17  5:01 PM   Result Value Ref Range    Color DARK YELLOW      Appearance CLEAR CLEAR      Specific gravity 1.013 1.003 - 1.030      pH (UA) 6.0 5.0 - 8.0      Protein NEGATIVE  NEG mg/dL    Glucose 250 (A) NEG mg/dL    Ketone NEGATIVE  NEG mg/dL    Bilirubin NEGATIVE  NEG      Blood NEGATIVE  NEG      Urobilinogen 1.0 0.2 - 1.0 EU/dL    Nitrites NEGATIVE  NEG      Leukocyte Esterase NEGATIVE  NEG      WBC 0-4 0 - 4 /hpf    RBC 0-5 0 - 5 /hpf    Epithelial cells FEW FEW /lpf    Bacteria NEGATIVE  NEG /hpf    Hyaline cast 0-2 0 - 5 /lpf   URINE CULTURE HOLD SAMPLE    Collection Time: 12/06/17  5:01 PM   Result Value Ref Range    Urine culture hold URINE ON HOLD IN MICROBIOLOGY DEPT FOR 3 DAYS         IMAGING RESULTS:  XR CHEST PA LAT   Final Result        Xr Chest Pa Lat    Result Date: 12/6/2017  EXAM:  XR CHEST PA LAT INDICATION:   weakness and dizziness COMPARISON: November 20, 2017.  FINDINGS: PA and lateral radiographs of the chest demonstrate a left actually pacemaker/AICD with 3 leads again shown. There are diminished lung volumes. There is no consolidation or pulmonary edema. There is no pneumothorax or pleural effusion. Cardiac, mediastinal and hilar contours remain within normal limits. No osseous abnormality is shown. IMPRESSION: No acute findings. MEDICATIONS GIVEN:  Medications   magnesium sulfate 2 g/50 ml IVPB (premix or compounded) (0 g IntraVENous IV Completed 12/6/17 1917)   albumin human 25% (BUMINATE) solution 12.5 g (0 g IntraVENous IV Completed 12/6/17 1917)   sodium chloride 0.9 % bolus infusion 500 mL (0 mL IntraVENous IV Completed 12/6/17 1917)   bacitracin 500 unit/gram packet 1 Packet (1 Packet Topical Given 12/6/17 1818)       IMPRESSION:  1. Hyponatremia    2. Syncope and collapse    3. Blood alcohol level of 120-199 mg/100 ml        PLAN:  1.  Admit to [address] [place] [address]  7:22 PM

## 2017-12-06 NOTE — Clinical Note
Status[de-identified] Inpatient [101] Type of Bed: Medical [8] Inpatient Hospitalization Certified Necessary for the Following Reasons: 9. Other (further clarification in H&P documentation) Admitting Diagnosis: Hypomagnesemia [898976] Admitting Diagnosis: Suicidal ideations [3448525] Admitting Diagnosis: Hyponatremia [557237] Admitting Physician: Farooq Rosa [[id number]] Attending Physician: Farooq Rosa [[id number]] Estimated Length of Stay: 2 Midnights Discharge Plan[de-identified] Other (Specify) Comments: Psychiatric unit

## 2017-12-06 NOTE — ED NOTES
I personally saw and examined the patient. I have reviewed and agree with the MLP's findings, including all diagnostic interpretations, and plans as written. I was present during the key portions of separately billed procedures.     Tiara Fairchild MD

## 2017-12-07 NOTE — ROUTINE PROCESS
Bedside shift change report given to [facility] (oncoming nurse) by Schuyler Orr (offgoing nurse). Report included the following information SBAR, Kardex, Intake/Output and MAR.

## 2017-12-07 NOTE — H&P
[address] [facility] [place] [address]   HISTORY AND PHYSICAL       Name:  Peggy Riggs   MR#:  [id number]   :  1960   Account #:  [de-identified]        Date of Adm:  2017       HISTORY OF PRESENT ILLNESS: A 47-year-old white male presents   to the emergency department after having a syncopal episode that   occurred in a local grocery store today. The patient was discharged   from this facility on 2017 in which he was diagnosed with   anasarca, nonischemic cardiomyopathy, alcoholic liver cirrhosis, and   severe hyponatremia which was believed to be secondary to a   hypervolemic state and beer potomania. Per patient and which spouse   also confirms, the patient has had dizziness since discharge that   typically occurs 1-2 minutes after going from sitting to standing   position. Today is the only syncopal episode. There is no report of him   having any head trauma. The patient is unaware of the length of the   loss of consciousness. He does have an AICD placed; however, he is   not aware of any shocks. He denies any headaches, nausea, vomiting,   chest pain, palpitations, dyspnea on exertion, orthopnea, claudication,   change in frequency, urgency of urination, however, he is on diuretics   given his CHF and alcohol liver cirrhosis. PAST MEDICAL HISTORY:   1. Atrial fibrillation. 2. Congestive heart failure (systolic), LVEF 69-02% 7569.   3. Diabetes mellitus type 2.   4. Hypertension. 5. Alcoholic liver cirrhosis. 6. Nonischemic cardiomyopathy. PAST SURGICAL HISTORY:   1. Left BKA. 2. AICD placement. MEDICATIONS:   1. Amiodarone 200 mg p.o. at bedtime. 2. Digoxin 0.125 mg p.o. at bedtime. 3. Folic acid 1 mg p.o. daily. 4. Furosemide 40 mg p.o. daily. 5. Tradjenta 5 mg p.o. daily. 6. Toprol-XL 12 mg p.o. daily. 7. Multivitamin 1 tablet p.o. daily. 8. Entresto 49/51 mg p.o. b.i.d.   9. Spironolactone 100 mg p.o. daily.    10. Thiamine 100 mg p.o. daily. ALLERGIES: NO KNOWN DRUG ALLERGIES. FAMILY HISTORY: Noncontributory to presentation. SOCIAL HISTORY: The patient is , lives with spouse. He   continues to consume alcohol, last being 4 hours prior to encounter   today. He reports drinking 3-5 beers per day. He denies any tobacco or   illicit drug usage. REVIEW OF SYSTEMS   GENERAL: He reports a decreased appetite, however, his weight has   remained the same since discharge and he his p.o. liquid intake is   usually in the afternoon and evenings. He denies fever or chills. HEAD: Per HPI. EYES: No change in vision, blurred vision, photophobia or pain. EARS: No change in hearing, discharge, tinnitus, or pain. NOSE: No epistaxis, stuffiness, sneezing or discharge. MOUTH AND THROAT: No bleeding gums, ulcer, sore throat, swollen   neck. CARDIAC: Per HPI. RESPIRATORY: No shortness of breath, wheezing, cough,   hemoptysis. GASTROINTESTINAL: No dysphagia, diarrhea, constipation,   abdominal pain, hematemesis, melena. GENITOURINARY: No change in frequency or urgency of urination,   hematuria, dysuria, polyuria, or incontinence. MUSCULOSKELETAL: No joint pain or muscle weakness. He does   have a left BKA. NEUROLOGIC: No loss of sensation, numbness, tingling, tremors,   weakness, paralysis, seizure disorder. All others per HPI. HEMATOLOGIC: No anemia or easy bruising. ENDOCRINE: No heat or cold intolerance. No polyuria, polydipsia. SKIN: No rashes or photosensitivity. PSYCHIATRIC: No anxiety, depression, suicidal/homicidal ideations. PHYSICAL EXAMINATION   GENERAL: White male lying in bed in no acute cardiopulmonary   distress. VITAL SIGNS: Temperature 97.9 degrees Fahrenheit, pulse 62,   respirations 13, blood pressure 104/46, O2 saturations 99% on room   air. HEAD: Normocephalic, atraumatic. No masses, nodes or tenderness. EYES: Pupils equally round and reactive to light. Icteric sclerae. Conjunctivae pink and moist.   NOSE: No deviated or perforated septum, nasal discharge. MOUTH/THROAT: Oral mucosa pink, moist. No erythematous stroke. No ulcers, no lesions. CVS: Regular rate and rhythm, S1, S2 audible. No murmurs, gallops,   or rubs. No edema. No carotid abdominal bruits. Radial pulses 2+   bilaterally. Right dorsalis pedis pulse 2+. LUNGS: Clear to auscultation bilaterally. No wheeze, rales or rhonchi. ABDOMEN: Obese, bowel sounds present, soft, nontender,   nondistended. No guarding or rebound, no hepatosplenomegaly. MUSCULOSKELETAL: Gross range of motion of the upper extremities   bilaterally. Left BKA is noted. No gross muscle atrophy is noted. NEUROLOGIC: Alert and oriented to person, place, time. Cranial   nerves 2-12 grossly intact. Biceps deep tendon reflexes 2+ bilaterally. Muscle strength 5/5 in the upper extremities. SKIN: Jaundice. No rashes or pallor. ENDOCRINE: No thyromegaly. LYMPHATICS: No cervical, axillary, or inguinal lymphadenopathy. LABORATORY DATA:   CBC with differential: WBC 4.1, hemoglobin   9.9, hematocrit 28.5, platelets 89, neutrophils 77%, lymphocytes 9%,   monocytes 12%, eosinophils 1%, basophils 1%. . CMP: Sodium 118, potassium 4.4, chloride 86, bicarbonate 21, anion gap 11,   BUN 13, creatinine 0.97, glucose 144, calcium 8.4, corrected to 9.04,   total bilirubin 5.4, total protein 7.5, albumin 3.2, ALT 60, ,   alkaline phosphatase 158. Urinalysis: Color dark yellow, appearance   clear, specific gravity 1.013, pH 6, protein negative, glucose 250,   ketone negative, blood negative, bilirubin negative, urobilinogen   1, nitrites negative, leukocyte esterase negative, epithelial cells few,   WBC per high-powered field 0-4, RBC per high-power field 0-5,   bacteria negative, hyaline casts 0-2. Magnesium 1.5. Chest x-ray per Radiology shows no acute findings. EKG shows a   paced rhythm.     ASSESSMENT AND PLAN: A 24-year-old white male presents with   syncope, likely due to volume depletion and orthostasis. PROBLEM LIST:   1. Syncope. 2. Hyponatremia. 3. Hypomagnesemia. 4. Alcohol dependence/abuse. 5. Thrombocytopenia. 6. Hyperbilirubinemia. 7. Macrocytic anemia. 8. Congestive heart failure (systolic). 9. Atrial fibrillation (not in acute RVR). 10. Hypertension. 11. Alcoholic liver cirrhosis. 12. Nonischemic cardiomyopathy. PLAN: The patient will be admitted to the telemetry floor. His syncope   could be due to either volume depletion or medications as he is on   quite a few medications that would cause him to be orthostatic of which   include Toprol-XL, spironolactone, furosemide, Entresto, and he is on   2 antiarrhythmics, amiodarone, and digoxin. Given his medication   profile, will consult Cardiology to review. He did have a 2-D   echocardiogram during the previous admission 2 weeks ago, which   showed an LVEF of 35% to 40%, dilated left ventricle with moderate   to severely reduced ejection fraction, left atrial enlargement, mild mitral   regurgitation, and mild tricuspid regurgitation without pulmonary   hypertension. We will hold off obtaining a 2D echo at this time. Cardiomyopathy could be due to EtOH. Given   his cirrhosis and his propensity to be volume overloaded, will be very   difficult to give the patient fluid. We will hold off at this time. During the   last admission, he was diagnosed with beer potomania. Unsure if this   is the etiology of his hyponatremia at this time, however, given his beer   consumption, it is most likely contributing to this. He will be put on fluid   restriction as he is euvolemic clinically. We will obtain a serum and   urine osmolality. We will hold off placing IV fluids at this time. We will   check sodium every day. The patient does have a macrocytic anemia   most likely due to folate and vitamin B12 deficiency given his chronic   alcoholism.  We will obtain a vitamin B12 and folate level. His   hemoglobin is 9.9. Will transfuse if falls below 7. Will continue to   monitor. Given his alcohol dependence/abuse, will place scheduled   diltiazem q.8 hours with CIWA protocol and p.r.n. Ativan. His alcoholism and his liver cirrhosis are likely the etiology of his   thrombocytopenia, as well as his macrocytic anemia. Given his   albumin of 3.2, will place a nutrition consult. Will hold his p.o.   antihyperglycemic and place sliding scale insulin. Will await Cardiology   recommendation regarding his amiodarone,   digoxin, furosemide, Toprol-XL, spironolactone and Entresto. Will   continue multivitamin, folate, and thiamine for his chronic alcohol   consumption. Will place sequential compression hose for deep vein   thrombosis prophylaxis given his thrombocytopenia. CODE STATUS: FULL CODE. TOTAL TIME FOR ADMISSION: 70 minutes.          Radha Aguirre IV, MD      OB / Reymundo Franklin   D:  12/06/2017   21:07   T:  12/06/2017   23:11   Job #:  [id number]

## 2017-12-07 NOTE — PROGRESS NOTES
Hospitalist Progress Note                               Aziza Varner MD                                     Answering service: [phone]                               OR [phone] from in house phone                               Cell: [phone]              Date of Service:  2017  NAME:  Sam Espino  :  1960  MRN:  [id number]      Admission Summary:   From H&P  \"A 68-year-old white male presents to the emergency department after having a syncopal episode that occurred in a local grocery store today. The patient was discharged   from this facility on 2017 in which he was diagnosed with anasarca, nonischemic cardiomyopathy, alcoholic liver cirrhosis, and severe hyponatremia which was believed to be secondary to a hypervolemic state and beer potomania. Per patient and which spouse also confirms, the patient has had dizziness since discharge that typically occurs 1-2 minutes after going from sitting to standing position. Today is the only syncopal episode. There is no report of him having any head trauma. The patient is unaware of the length of the loss of consciousness. He does have an AICD placed; however, he is not aware of any shocks. He denies any headaches, nausea, vomiting,   chest pain, palpitations, dyspnea on exertion, orthopnea, claudication, change in frequency, urgency of urination, however, he is on diuretics given his CHF and alcohol liver cirrhosis. \"    Interval history / Subjective:    F/u for syncope    -Mr Brian Herrera has no complaints today,denied sob,dizziness,chestpressure,nausea,vomiting ,diarrhea.   He admitted to drinking 6 beers yesterday between 10am-2p  He denied ever expressing suicidal ideas     Assessment & Plan:   Orthostatic Syncope,electrolyte disorders contributing   -Due to multiple antihypertensive,diuretics in the contest of ongoing excessive alcohol use  -Minimizing diuretics and antihypertensives  -Colloids for volume expansion    Hyponatremia from hypovolemia,beer potomania  -Improving. Monitor. Hypomagnesemia. Replace    Chronic systolic CHF,NYHA class I,NICMP  -EF 35-45%  -On GDM. Cardiology consulted. Lowered lopressor dose  -S/P AICD       Alcohol dependence/abuse.  -Had 6 drinks yesterday. Watch for withdrawal.CIWA instituted. Alcoholic cirrhosis ,thrombocytopenia,abnormal LFTS,stable liver function. Followed by hepatology.  -He still continued to use excessive alcohol      Thrombocytopenia due to cirrhosis  Hyperbilirubinemia. Macrocytic anemia due likely to ETOH. ON thiamine and folic acid,mvi   Atrial fibrillation (not in acute RVR). -ON amiodarone and digoxin  -Not on Anticoagulation,likey due to thrombocytopenia,fall risk    Hypertension,controlled    Left BKA with stamp ulcer  -Local wound care. Wound care team consulted      Diet:Cardiac  Code status: full  DVT prophylaxis: scd  Care Plan discussed with: patient,nurse    Discharge planning/disposition:home when stable    Hospital Problems  Date Reviewed: 11/24/2017          Codes Class Noted POA    Hypomagnesemia ICD-10-CM: E83.42  ICD-9-CM: 275.2  12/6/2017 Unknown        Suicidal ideations ICD-10-CM: R45.851  ICD-9-CM: V62.84  12/6/2017 Unknown        * (Principal)Hyponatremia ICD-10-CM: E87.1  ICD-9-CM: 276.1  11/20/2017 Unknown                Review of Systems:   A comprehensive review of systems was negative except for that written in the HPI. Physical Examination:      Last 24hrs VS reviewed since prior progress note. Most recent are:  Visit Vitals    /56    Pulse 60    Temp 97.5 °F (36.4 °C)    Resp 18    Ht 5' 10\" (1.778 m)    Wt 89.9 kg (198 lb 3.2 oz)    SpO2 98%    BMI 28.44 kg/m2           Constitutional:  No acute distress, cooperative, pleasant    Eyes: Non icteric,non pallor,no erythema,discharge,PERRLA   ENT:  Oral mucous moist, oropharynx benign. Neck supple,    Resp:  CTA bilaterally.  No wheezing/rhonchi/rales. No accessory muscle use   CV:  Regular rhythm, normal rate, no murmurs, gallops, rubs    GI:  Soft, non distended, non tender. normoactive bowel sounds, no hepatosplenomegaly    :  No CVA or suprapubic tenderness   Skin  :  No erythema,rash,bullae,dipigmentation     Musculoskeletal:  left BKA with small stamp ulcer. Neurologic:  AAOx3, CN II-XII reviewed. Moves all extremities. Intake/Output Summary (Last 24 hours) at 12/07/17 1603  Last data filed at 12/07/17 1306   Gross per 24 hour   Intake              680 ml   Output             2880 ml   Net            -2200 ml          Data Review:    Review and/or order of clinical lab test  Review and/or order of tests in the radiology section of CPT  Review and/or order of tests in the medicine section of CPT      Labs:     Recent Labs      12/06/17   1625   WBC  4.1   HGB  9.9*   HCT  28.5*   PLT  89*     Recent Labs      12/07/17   1143  12/07/17   0425  12/06/17   1625   NA  125*  123*  118*   K  4.5  4.2  4.4   CL  93*  93*  86*   CO2  24  22  21   BUN  13  12  13   CREA  0.98  0.86  0.97   GLU  170*  166*  144*   CA  9.0  8.3*  8.4*   MG   --    --   1.5*     Recent Labs      12/06/17   1625   SGOT  110*   ALT  60   AP  158*   TBILI  5.4*   TP  7.5   ALB  3.2*   GLOB  4.3*     No results for input(s): INR, PTP, APTT in the last 72 hours. No lab exists for component: INREXT   No results for input(s): FE, TIBC, PSAT, FERR in the last 72 hours. Lab Results   Component Value Date/Time    Folate 19.9 12/07/2017 04:25 AM      No results for input(s): PH, PCO2, PO2 in the last 72 hours. No results for input(s): CPK, CKNDX, TROIQ in the last 72 hours.     No lab exists for component: CPKMB  No results found for: CHOL, CHOLX, CHLST, CHOLV, HDL, LDL, LDLC, DLDLP, TGLX, TRIGL, TRIGP, CHHD, CHHDX  Lab Results   Component Value Date/Time    Glucose (POC) 197 12/07/2017 12:02 PM    Glucose (POC) 137 12/07/2017 07:32 AM    Glucose (POC) 140 12/07/2017 07:13 AM    Glucose (POC) 121 12/06/2017 09:31 PM    Glucose (POC) 140 11/24/2017 11:48 AM     Lab Results   Component Value Date/Time    Color DARK YELLOW 12/06/2017 05:01 PM    Appearance CLEAR 12/06/2017 05:01 PM    Specific gravity 1.013 12/06/2017 05:01 PM    pH (UA) 6.0 12/06/2017 05:01 PM    Protein NEGATIVE  12/06/2017 05:01 PM    Glucose 250 12/06/2017 05:01 PM    Ketone NEGATIVE  12/06/2017 05:01 PM    Bilirubin NEGATIVE  12/06/2017 05:01 PM    Urobilinogen 1.0 12/06/2017 05:01 PM    Nitrites NEGATIVE  12/06/2017 05:01 PM    Leukocyte Esterase NEGATIVE  12/06/2017 05:01 PM    Epithelial cells FEW 12/06/2017 05:01 PM    Bacteria NEGATIVE  12/06/2017 05:01 PM    WBC 0-4 12/06/2017 05:01 PM    RBC 0-5 12/06/2017 05:01 PM         Medications Reviewed:     Current Facility-Administered Medications   Medication Dose Route Frequency    gabapentin (NEURONTIN) capsule 100 mg  100 mg Oral TID    sacubitril-valsartan (ENTRESTO) 24-26 mg tablet 1 Tab  1 Tab Oral Q12H    amiodarone (CORDARONE) tablet 200 mg  200 mg Oral QHS    sodium chloride (NS) flush 5-10 mL  5-10 mL IntraVENous Q8H    sodium chloride (NS) flush 5-10 mL  5-10 mL IntraVENous PRN    ondansetron (ZOFRAN) injection 4 mg  4 mg IntraVENous Q4H PRN    digoxin (LANOXIN) tablet 0.125 mg  0.125 mg Oral QHS    folic acid (FOLVITE) tablet 1 mg  1 mg Oral DAILY    furosemide (LASIX) tablet 40 mg  40 mg Oral DAILY    metoprolol succinate (TOPROL-XL) XL tablet 12.5 mg  12.5 mg Oral DAILY    therapeutic multivitamin (THERAGRAN) tablet 1 Tab  1 Tab Oral DAILY    spironolactone (ALDACTONE) tablet 100 mg  100 mg Oral DAILY    thiamine (B-1) tablet 100 mg  100 mg Oral DAILY    LORazepam (ATIVAN) injection 2 mg  2 mg IntraVENous Q1H PRN    glucose chewable tablet 16 g  4 Tab Oral PRN    dextrose (D50W) injection syrg 12.5-25 g  12.5-25 g IntraVENous PRN    glucagon (GLUCAGEN) injection 1 mg  1 mg IntraMUSCular PRN    insulin lispro (HUMALOG) injection   SubCUTAneous AC&HS    sodium chloride (NS) flush 5-10 mL  5-10 mL IntraVENous Q8H    sodium chloride (NS) flush 5-10 mL  5-10 mL IntraVENous PRN     ______________________________________________________________________  EXPECTED LENGTH OF STAY: 2d 14h  ACTUAL LENGTH OF STAY:          1                 Patrick Jimenez MD

## 2017-12-07 NOTE — CONSULTS
CARDIOLOGY CONSULT                  Subjective:    Date of  Admission: 12/6/2017  4:09 PM     Admission type:Emergency    Terrance Paredes is a 59 y.o. male admitted for Hypomagnesemia  Suicidal ideations  Hyponatremia  Hyponatremia  Hypomagnesemia. Mr. Stevo Martell is a very complicated pt with multiple significant medical problems. He was recently admitted for an exacerbation of his liver cirrhosis and his diuretics were increased for compensation. He had follow up with his primary cardiologist who decreased him to the lower dose of Entresto which he was tolerating well. He had presented to the ED with a syncopal episode after having episodes of dizziness which would occur with positional changes previously. He had been feeling well before the presenting episode in general.    Workup thus far has showed mild hyponatremia which has been fairly consistent with his previous values thus far. There have been no episodes on telemetry.     Patient Active Problem List    Diagnosis Date Noted    Hypomagnesemia 12/06/2017    Suicidal ideations 12/06/2017    Chronic systolic heart failure ([address] 11/21/2017    Hyponatremia 11/20/2017    Cirrhosis of liver without ascites ([address] 05/02/2017    Neuropathy 03/30/2017    S/P BKA (below knee amputation) ([address] 03/30/2017    Hypertension 03/30/2017    Cardiac defibrillator in place 03/30/2017    Type II diabetes mellitus ([address] 02/01/2015      Thu Lazcano MD  Past Medical History:   Diagnosis Date    Arthritis     Atrial fibrillation [facility]) 2014    CHF (congestive heart failure) ([address]     Diabetes ([address]     Diabetic ulcer of left foot ([address] 2/2015 2/2015 Lt BKA    History of blood transfusion 2015    During Lt BKA; pt denies any adverse reaction    Hypertension     Liver disease     CIRRHOSIS    Sepsis ([address] 02/2015    From diabetic Left foot wound;  had a BKA ;  as of 11/21/16 pt states back to his baseline       Past Surgical History:   Procedure Laterality Date  HX AMPUTATION Left 2/10/2015    BKA    HX COLONOSCOPY      HX IMPLANTABLE CARDIOVERTER DEFIBRILLATOR  08/04/2015    [employer] cardio defibrillator; Dr Antelmo Phan; as of 11/21/16 pt denies defibrillator going off     No Known Allergies   Family History   Problem Relation Age of Onset    Heart Disease Brother     Heart Failure Brother     Heart Failure Brother       Current Facility-Administered Medications   Medication Dose Route Frequency    gabapentin (NEURONTIN) capsule 100 mg  100 mg Oral TID    sacubitril-valsartan (ENTRESTO) 24-26 mg tablet 1 Tab  1 Tab Oral Q12H    amiodarone (CORDARONE) tablet 200 mg  200 mg Oral QHS    sodium chloride (NS) flush 5-10 mL  5-10 mL IntraVENous Q8H    sodium chloride (NS) flush 5-10 mL  5-10 mL IntraVENous PRN    ondansetron (ZOFRAN) injection 4 mg  4 mg IntraVENous Q4H PRN    digoxin (LANOXIN) tablet 0.125 mg  0.125 mg Oral QHS    folic acid (FOLVITE) tablet 1 mg  1 mg Oral DAILY    furosemide (LASIX) tablet 40 mg  40 mg Oral DAILY    metoprolol succinate (TOPROL-XL) XL tablet 12.5 mg  12.5 mg Oral DAILY    therapeutic multivitamin (THERAGRAN) tablet 1 Tab  1 Tab Oral DAILY    spironolactone (ALDACTONE) tablet 100 mg  100 mg Oral DAILY    thiamine (B-1) tablet 100 mg  100 mg Oral DAILY    LORazepam (ATIVAN) injection 2 mg  2 mg IntraVENous Q1H PRN    glucose chewable tablet 16 g  4 Tab Oral PRN    dextrose (D50W) injection syrg 12.5-25 g  12.5-25 g IntraVENous PRN    glucagon (GLUCAGEN) injection 1 mg  1 mg IntraMUSCular PRN    insulin lispro (HUMALOG) injection   SubCUTAneous AC&HS    sodium chloride (NS) flush 5-10 mL  5-10 mL IntraVENous Q8H    sodium chloride (NS) flush 5-10 mL  5-10 mL IntraVENous PRN         Review of Symptoms:  Gen - no F/C/S  Eyes - no vision changes  ENT - no sore throat, rhinorrhea, otalgia  CV - no CP, no palpitations, no orthopnea, no PND, no JOHANA  Resp no cough, no SOB/LEE  GI - no AP, no n/v/d/c   - no dysuria, no hematuria  MSK - no abnormal joint pains  Skin - no rashes  Neuro - no HA, no numbness, no weakness, no slurred speech  Psych - no change in mood       Physical Exam    Visit Vitals    /56    Pulse 60    Temp 97.5 °F (36.4 °C)    Resp 18    Ht 5' 10\" (1.778 m)    Wt 89.9 kg (198 lb 3.2 oz)    SpO2 98%    BMI 28.44 kg/m2     NAD  Skin warm and dry  Nl conjunctiva  Oropharynx without exudate. Neck supple  Lungs clear  Normal S1/ S2 with occasional ectopy  No Murmurs, click or Rubs  Abdomen soft and non tender  Pulses 2+ radials  Neuro:  Grossly intact  Appropriate      Cardiographics    Telemetry: paced  ECG: paced      Labs:   Recent Results (from the past 24 hour(s))   EKG, 12 LEAD, INITIAL    Collection Time: 12/06/17  4:18 PM   Result Value Ref Range    Ventricular Rate 60 BPM    Atrial Rate 60 BPM    P-R Interval 226 ms    QRS Duration 158 ms    Q-T Interval 500 ms    QTC Calculation (Bezet) 500 ms    Calculated P Axis -3 degrees    Calculated R Axis -114 degrees    Calculated T Axis 31 degrees    Diagnosis       AV dual-paced rhythm with prolonged AV conduction  Biventricular pacemaker detected  When compared with ECG of 31-JAN-2015 12:01,  Electronic ventricular pacemaker has replaced Sinus rhythm     CBC WITH AUTOMATED DIFF    Collection Time: 12/06/17  4:25 PM   Result Value Ref Range    WBC 4.1 4.1 - 11.1 K/uL    RBC 2.51 (L) 4.10 - 5.70 M/uL    HGB 9.9 (L) 12.1 - 17.0 g/dL    HCT 28.5 (L) 36.6 - 50.3 %    .0 (H) 80.0 - 99.0 FL    MCH 39.4 (H) 26.0 - 34.0 PG    MCHC 34.7 30.0 - 36.5 g/dL    RDW 12.4 11.5 - 14.5 %    PLATELET 89 (L) 743 - 400 K/uL    NEUTROPHILS 77 (H) 32 - 75 %    LYMPHOCYTES 9 (L) 12 - 49 %    MONOCYTES 12 5 - 13 %    EOSINOPHILS 1 0 - 7 %    BASOPHILS 1 0 - 1 %    ABS. NEUTROPHILS 3.2 1.8 - 8.0 K/UL    ABS. LYMPHOCYTES 0.4 (L) 0.8 - 3.5 K/UL    ABS. MONOCYTES 0.5 0.0 - 1.0 K/UL    ABS. EOSINOPHILS 0.0 0.0 - 0.4 K/UL    ABS.  BASOPHILS 0.0 0.0 - 0.1 K/UL    DF SMEAR SCANNED      RBC COMMENTS ANISOCYTOSIS  1+        RBC COMMENTS MACROCYTOSIS  PRESENT       METABOLIC PANEL, COMPREHENSIVE    Collection Time: 12/06/17  4:25 PM   Result Value Ref Range    Sodium 118 (LL) 136 - 145 mmol/L    Potassium 4.4 3.5 - 5.1 mmol/L    Chloride 86 (L) 97 - 108 mmol/L    CO2 21 21 - 32 mmol/L    Anion gap 11 5 - 15 mmol/L    Glucose 144 (H) 65 - 100 mg/dL    BUN 13 6 - 20 MG/DL    Creatinine 0.97 0.70 - 1.30 MG/DL    BUN/Creatinine ratio 13 12 - 20      GFR est AA >60 >60 ml/min/1.73m2    GFR est non-AA >60 >60 ml/min/1.73m2    Calcium 8.4 (L) 8.5 - 10.1 MG/DL    Bilirubin, total 5.4 (H) 0.2 - 1.0 MG/DL    ALT (SGPT) 60 12 - 78 U/L    AST (SGOT) 110 (H) 15 - 37 U/L    Alk.  phosphatase 158 (H) 45 - 117 U/L    Protein, total 7.5 6.4 - 8.2 g/dL    Albumin 3.2 (L) 3.5 - 5.0 g/dL    Globulin 4.3 (H) 2.0 - 4.0 g/dL    A-G Ratio 0.7 (L) 1.1 - 2.2     ETHYL ALCOHOL    Collection Time: 12/06/17  4:25 PM   Result Value Ref Range    ALCOHOL(ETHYL),SERUM 136 (H) <10 MG/DL   DIGOXIN    Collection Time: 12/06/17  4:25 PM   Result Value Ref Range    Digoxin level 0.8 (L) 0.9 - 2.0 NG/ML   MAGNESIUM    Collection Time: 12/06/17  4:25 PM   Result Value Ref Range    Magnesium 1.5 (L) 1.6 - 2.4 mg/dL   URINALYSIS W/MICROSCOPIC    Collection Time: 12/06/17  5:01 PM   Result Value Ref Range    Color DARK YELLOW      Appearance CLEAR CLEAR      Specific gravity 1.013 1.003 - 1.030      pH (UA) 6.0 5.0 - 8.0      Protein NEGATIVE  NEG mg/dL    Glucose 250 (A) NEG mg/dL    Ketone NEGATIVE  NEG mg/dL    Bilirubin NEGATIVE  NEG      Blood NEGATIVE  NEG      Urobilinogen 1.0 0.2 - 1.0 EU/dL    Nitrites NEGATIVE  NEG      Leukocyte Esterase NEGATIVE  NEG      WBC 0-4 0 - 4 /hpf    RBC 0-5 0 - 5 /hpf    Epithelial cells FEW FEW /lpf    Bacteria NEGATIVE  NEG /hpf    Hyaline cast 0-2 0 - 5 /lpf   URINE CULTURE HOLD SAMPLE    Collection Time: 12/06/17  5:01 PM   Result Value Ref Range    Urine culture hold URINE ON HOLD IN MICROBIOLOGY DEPT FOR 3 DAYS     GLUCOSE, POC    Collection Time: 12/06/17  9:31 PM   Result Value Ref Range    Glucose (POC) 121 (H) 65 - 100 mg/dL    Performed by ILDEFONSO Lyons    Collection Time: 12/07/17 12:00 AM   Result Value Ref Range    Osmolality,urine 195 MOSM/kg H2O   HEMOGLOBIN A1C WITH EAG    Collection Time: 12/07/17  4:25 AM   Result Value Ref Range    Hemoglobin A1c 5.6 4.2 - 6.3 %    Est. average glucose 114 mg/dL   VITAMIN B12    Collection Time: 12/07/17  4:25 AM   Result Value Ref Range    Vitamin B12 886 211 - 911 pg/mL   FOLATE    Collection Time: 12/07/17  4:25 AM   Result Value Ref Range    Folate 19.9 5.0 - 36.9 ng/mL   METABOLIC PANEL, BASIC    Collection Time: 12/07/17  4:25 AM   Result Value Ref Range    Sodium 123 (L) 136 - 145 mmol/L    Potassium 4.2 3.5 - 5.1 mmol/L    Chloride 93 (L) 97 - 108 mmol/L    CO2 22 21 - 32 mmol/L    Anion gap 8 5 - 15 mmol/L    Glucose 166 (H) 65 - 100 mg/dL    BUN 12 6 - 20 MG/DL    Creatinine 0.86 0.70 - 1.30 MG/DL    BUN/Creatinine ratio 14 12 - 20      GFR est AA >60 >60 ml/min/1.73m2    GFR est non-AA >60 >60 ml/min/1.73m2    Calcium 8.3 (L) 8.5 - 10.1 MG/DL   GLUCOSE, POC    Collection Time: 12/07/17  7:13 AM   Result Value Ref Range    Glucose (POC) 140 (H) 65 - 100 mg/dL    Performed by ADRI BECERRA    GLUCOSE, POC    Collection Time: 12/07/17  7:32 AM   Result Value Ref Range    Glucose (POC) 137 (H) 65 - 100 mg/dL    Performed by ADRI BECERRA    METABOLIC PANEL, BASIC    Collection Time: 12/07/17 11:43 AM   Result Value Ref Range    Sodium 125 (L) 136 - 145 mmol/L    Potassium 4.5 3.5 - 5.1 mmol/L    Chloride 93 (L) 97 - 108 mmol/L    CO2 24 21 - 32 mmol/L    Anion gap 8 5 - 15 mmol/L    Glucose 170 (H) 65 - 100 mg/dL    BUN 13 6 - 20 MG/DL    Creatinine 0.98 0.70 - 1.30 MG/DL    BUN/Creatinine ratio 13 12 - 20      GFR est AA >60 >60 ml/min/1.73m2    GFR est non-AA >60 >60 ml/min/1.73m2    Calcium 9.0 8.5 - 10.1 MG/DL GLUCOSE, POC    Collection Time: 12/07/17 12:02 PM   Result Value Ref Range    Glucose (POC) 197 (H) 65 - 100 mg/dL    Performed by [email or web address] Rod[employer]guez and Plan: This is a 59 yom with advanced liver and heart failure presenting with an episode of syncope. Mr. Neena Denver has a pacemaker system implanted and so would not be concerned with an episode of bradycardia and so would leave amiodarone and digoxin as is. Ideally, would like to continue him on his current cardiac meds as he has had a nice improvement in his LVEF since being on the current regimen despite his continue use of alcohol. Would have hepatology comment on whether his diuretics could be decreased. If that is not possible, can hold his metoprolol as it appears that he has gotten more benefit out of his Entresto than he has his metoprolol although there is sure to be some benefit in the metoprolol.     Thank you for the consult, please call with questions       Assessment:

## 2017-12-07 NOTE — PROGRESS NOTES
Primary Nurse Carlos Theodore, RN and Clair RN, RN performed a dual skin assessment on this patient Impairment noted- see wound doc flow sheet  Spencer score is 21

## 2017-12-07 NOTE — PROGRESS NOTES
Reviewed medical chart; met with the patient at the bedside. Patient is being seen for hyponatremia and hypomagnesemia. He has a history of type II diabetes, neuropathy, arthritis, CHF, liver disease, and s/p BKA among other conditions. Patient lives with his wife and 8year old son. He owns a walker, cane, crutches, shower chair, and prosthetic leg. He typically just uses the prosthetic leg. He has a PCP - Dr. Todd Lira and gets his prescriptions at [facility] on [email or web address]. Patient has been to [facility] in the past for rehabilitation. Patient enjoys spending time with his wife and son and woodworking; he makes canes and walking sticks. Patient is a  by [employer], but [facility] He has been struggling to afford his Entresto. Provided a [employer] (the drug company for [employer]) Patient [facility] application. Care Management will continue to follow his disposition. JOHN Constantino     Current Discharge Plan:  Home with family. Possible home health wound care if needed. Entresto/[employer] Patient Assistance Application. Discharge Caregiver: Patient's wife, CATHLEEN Henry#[phone]. Care Management Interventions  PCP Verified by CM:  Yes  Palliative Care Criteria Met (RRAT>21 & CHF Dx)?: No  Mode of Transport at Discharge:  (Patient will travel via car at discharge.  )  MyChart Signup: No  Discharge Durable Medical Equipment: No  Physical Therapy Consult: Yes  Occupational Therapy Consult: No  Speech Therapy Consult: No  Current Support Network: Lives with Spouse  Confirm Follow Up Transport:  (Patient will travel via car at discharge.  )  Plan discussed with Pt/Family/Caregiver: Yes  Freedom of Choice Offered: Yes  Discharge Location  Discharge Placement: Home with family assistance

## 2017-12-07 NOTE — DIABETES MGMT
DTC Consult Note    Recommendations/ Comments: BG's in range 121 - 140 mg/dL  A1c 5.6% which is WNL but raises concern that pt may have hypoglycemia. He is at higher risk for hypoglycemia due to ETOH abuse. No recommendations at this time  DTC will continue to follow. ____________________________    Consult received for:   []           Hospital Medication Recommendations                 [x]           Hospital Blood Glucose Management    Chart reviewed and initial evaluation complete on [place] Chavez. Patient is a 59 y.o. male with known DM on Tradjenta 5 mg each AM at home. A1c:   Lab Results   Component Value Date/Time    Hemoglobin A1c 5.6 12/07/2017 04:25 AM       Recent Glucose Results:   Lab Results   Component Value Date/Time     (H) 12/07/2017 04:25 AM     (H) 12/06/2017 04:25 PM    GLUCPOC 137 (H) 12/07/2017 07:32 AM    GLUCPOC 140 (H) 12/07/2017 07:13 AM    GLUCPOC 121 (H) 12/06/2017 09:31 PM        Lab Results   Component Value Date/Time    Creatinine 0.86 12/07/2017 04:25 AM       Active Orders   Diet    DIET CARDIAC Regular; 2 GM NA (House Low NA); Consistent Carb 1800kcal; FR 1000ML        PO intake: No data found. Current hospital DM medication: lispro correction scale normal snesitivity    Thank you.   Honorio Kovacs RN, CDE

## 2017-12-07 NOTE — WOUND CARE
Wound Consult:  New Patient Visit. Chart reviewed. Consulted for left BKA, left arm and wrist.  Spoke with patients nurse,  Coretta Ganies to hand off on care provided to areas. Patient is resting on a total care bed. His wife is at bedside. He was seen here in November and noted to have injury to left BKA; he has not yet seen the prosthetic maker to check fitting of prothesis. I have encouraged him to do so in order to correct issue as it is most likely culprit of injury to L BKA. He has syncopal episode with fall that caused skin tears to left arm and wrist.  Assessment:  1. Left bka - 1.2 x 1 x 0.1 cm full thickness appearing injury to distal residual limb; intact macerated skin surrounding. No odor or purulence, scant serosanguinous drainage. 2. Left elbow area - 7 x 1 x 0.1 cm, skin tear with no skin flap remaining, moist pale pink base, small amount serosanguinous drainage. 3. Left wrist - 1.5 x 1 x 0.1 cm skin tear with partial flap coverage. Scant serosanguinous drainage. Treatment:  Medi honey alginate to left BKA, folded 4x4 and tape. Mepilex Border dressings to left elbow and wrist skin tears. Wound Recommendations:  Medi honey alginate to left BKA, folded 4x4 and tape; cleanse with saline, change every other day. Mepilex Border dressings to left elbow and wrist skin tears; change every 3 days. Skin Care Recommendations:  Continue to monitor nutrition, pain, and skin risk scale, and skin assessment. Plan:  Spoke with Dr. Jarred Mata regarding findings and obtained orders for treatment. We will continue to reassess routinely and as needed.   Gavi Stiven, [address] [place] [address] Office [phone]  Pager [phone]

## 2017-12-07 NOTE — PROGRESS NOTES
NUTRITION    RECOMMENDATIONS:   1. Consider some liberalization of fluid restriction as appropriate  2. Continue to monitor electrolytes with repletion PRN  3. Daily weights on standing scale     Interventions/Plan:   Food/Nutrient Delivery:           2200kcal consistent CHO  Nutrition Education:    high protein, regular meals, low sodium  Assessment:   Reason for Assessment: [x] Provider Consult    Diet: Cardiac, Consistent carb (1800kcal) 1000ml FR  Nutritionally Significant Medications: [x] Reviewed & Includes: amiodarone valium, digoxin, folic acid, lasix, SSI, entresto, spironolactone, MVI, thiamine; PRN: ativan, zofran    Pt admitted for hyponatremia. PMHx: cirrhosis, ETOH abuse, CHF, DM, left BKA, cardiomyopathy. Recent dx of cirrhosis noted. 10% wt loss x 6 months however pt reports 40# wt loss since admit last month with diuresis. Hyponatremia and hypochloridemia noted today with low Mg yesterday. Question if related to diuresis? LFT's elevated with hx of cirrhosis. Pt continues to consume ETOH at home per chart review. Happy to see MVI, folic acid, and thiamine rx. Pt visited today. Appetite has been good at home. Pt notes making healthier food choices with reduction in \"junk food. \" Discussed importance of protein with meals, and encourage at least 3 meals per day (generally eats just 2 meals). High protein foods reviewed. Pt has discussed sodium with hepatologist in the office with good understanding and compliance. Avoids canned tomato sauce, buys [employer] pasta for DM control, non-fat plain yogurt, etc.    Wt Readings:   12/07/17 89.9 kg (198 lb 3.2 oz)   11/24/17 93 kg (205 lb)   06/06/17 99.9 kg (220 lb 3.2 oz)   06/01/17 98.5 kg (217 lb 4 oz)   05/02/17 99.6 kg (219 lb 9.6 oz)   03/30/17 97.6 kg (215 lb 2 oz)     Pt eating well and with good nutrition knowledge and reported compliance. At low nutrition risk, will rescreen per protocol.    Estimated Nutrition Needs:   Kcals/day: 2193 Kcals/day ([id number])  Protein: 90 g (90-105g (1.2-1.4g/kg))  Fluid: 2100 ml (1ml/kcal or per MD ordered FR )  Based On:  [facility] [facility] (x 1.3-1.4)  Weight Used: IBW (75.4kg)    Pt expected to meet estimated nutrient needs:  [x]   Yes     []  No [] Unable to predict at this time    Katlyn Leonard RD

## 2017-12-07 NOTE — PROGRESS NOTES
entresto continues to be held as pt states that he does not take the dose that is ordered. States that his cardiac MD decreased dose last week but this is not reflected on the PTA record.  Cardiology consult called this am

## 2017-12-07 NOTE — PROGRESS NOTES
Physical Therapy Screening:    [facility] was triggered for physical therapy based on results obtained during the nursing admission assessment. The patients chart was reviewed and the patient is appropriate for a skilled therapy evaluation if there is a decline in functional mobility from baseline. Please order a consult for physical therapy if you are in agreement and would like an evaluation to be completed. Thank you.     Catrina Murillo, PT

## 2017-12-07 NOTE — CDMP QUERY
There is noted documentation of CHF (systolic) on this record. Could this be further clarified as    =>Chronic Systolic CHF in the setting of known history requiring monitoring/echo   =>Other Explanation of clinical findings  =>Unable to Determine (no explanation of clinical findings)    The medical record reflects the following clinical findings, treatment, and risk factors:    Risk Factors: HX of CHF  Clinical Indicators: H/P-Congestive heart failure (systolic). Treatment: echo      Please clarify and document your clinical opinion in the progress notes and discharge summary including the definitive and/or presumptive diagnosis, (suspected or probable), related to the above clinical findings.  Please include clinical findings supporting your diagnosis    Thank you,         Colt Flanagan [facility] [place]

## 2017-12-08 NOTE — PROGRESS NOTES
Pt has a \"No blood products\" on chart. Dr. Rossi Loud put in orders for Albumin to begin at 0000. Pharmacy called RN and asked RN to clarify with patient if it is okay for him to receive albumin since his chart reads he does not want to recieve blood products. When RN spoke with pt, pt clarified with RN that it was okay to receive albumin if he needs it. RN answered pts questions regarding the medication and informed pharmacy that it was okayed by the patient.

## 2017-12-08 NOTE — PROGRESS NOTES
Bedside and Verbal shift change report given to Ray Bashir RN  (oncoming nurse) by Alba Singh (offgoing nurse). Report included the following information SBAR and Kardex.  =

## 2017-12-08 NOTE — PROGRESS NOTES
Bedside shift change report given to [employer] (oncoming nurse) by Felice Lozoya (offgoing nurse). Report included the following information SBAR, Kardex, Intake/Output and MAR.

## 2017-12-08 NOTE — PROGRESS NOTES
physical Therapy EVALUATION/DISCHARGE  Patient: Kyree Venegas (46 y.o. male)  Date: 12/8/2017  Primary Diagnosis: Hypomagnesemia  Suicidal ideations  Hyponatremia  Hyponatremia  Hypomagnesemia    Precautions:   Fall (L BKA with prosthetic use; hx EtOH)  ASSESSMENT :  Based on the objective data described below, the patient presents with baseline mobility despite recent syncopal episode. He was able to suly his own prosthetic and did not have WB restrictions in the chart despite his wound on WB surface of his residual limb. Patient ambulated 175 ft and climbed 8 stairs with modified independence. Recommend no further therapy. Will complete orders. Skilled physical therapy is not indicated at this time. PLAN :  Discharge Recommendations: None  Further Equipment Recommendations for Discharge: none     SUBJECTIVE:   Patient stated \"I went to [facility] for a while.     OBJECTIVE DATA SUMMARY:   HISTORY:    Past Medical History:   Diagnosis Date    Arthritis     Atrial fibrillation ([address] 2014    CHF (congestive heart failure) ([address]     [facility]     Diabetic ulcer of left foot ([facility] [facility] 2/2015 2/2015 Lt BKA    History of blood transfusion 2015    During Lt BKA; pt denies any adverse reaction    Hypertension     Liver disease     CIRRHOSIS    Sepsis ([address] 02/2015    From diabetic Left foot wound;  had a BKA ;  as of 11/21/16 pt states back to his baseline      Past Surgical History:   Procedure Laterality Date    HX AMPUTATION Left 2/10/2015    BKA    HX COLONOSCOPY      HX IMPLANTABLE CARDIOVERTER DEFIBRILLATOR  08/04/2015    [employer] cardio defibrillator; Dr Brea Acevedo; as of 11/21/16 pt denies defibrillator going off     Prior Level of Function/Home Situation: Independent with prosthetic.  Hx of EtOH abuse  Personal factors and/or comorbidities impacting plan of care: PMH, alcohol abuse    Home Situation  Home Environment: Private residence  # Steps to Enter: 3  Rails to Enter: Yes  [employer] : Bilateral  Wheelchair Ramp: No  One/Two Story Residence: Two story  # of Interior Steps: 14  Height of Each Step (in): 6 inches  Interior Rails: Both  Lift Chair Available: No  Living Alone: No  Support Systems: Family member(s), Spouse/Significant Other/Partner  Patient Expects to be Discharged to[de-identified] Private residence  Current DME Used/Available at Home: Walker, rolling, Shower chair    EXAMINATION/PRESENTATION/DECISION MAKING:   Critical Behavior:  Neurologic State: Alert, Appropriate for age, Eyes open spontaneously  Hearing: Auditory  Auditory Impairment: None  Range Of Motion:  AROM: Within functional limits  PROM: Within functional limits  Strength:    Strength:  Within functional limits  Tone & Sensation:   Tone: Normal  Sensation: Impaired (hx neuropathy)    Coordination:  Coordination: Within functional limits  Vision:      Functional Mobility:  Bed Mobility:  Independent  Transfers:  Modified independent  Balance:   Intact without support  Ambulation/Gait Training:  Distance (ft): 175 Feet (ft)  Assistive Device: Prosthetic device  Ambulation - Level of Assistance: Stand-by asssistance;Modified independent  Gait Abnormalities: Antalgic;Decreased step clearance;Trunk sway increased  Base of Support: Widened  Speed/Charlee: Pace decreased (<100 feet/min)  Step Length: Right shortened;Left shortened  Swing Pattern: Left asymmetrical   Stairs:  Number of Stairs Trained: 8  Stairs - Level of Assistance: Contact guard assistance   Rail Use: Right     Functional Measure:  Tinetti test:    Sitting Balance: 1  Arises: 1  Attempts to Rise: 2  Immediate Standing Balance: 2  Standing Balance: 1  Nudged: 2  Eyes Closed: 1  Turn 360 Degrees - Continuous/Discontinuous: 1  Turn 360 Degrees - Steady/Unsteady: 1  Sitting Down: 1  Balance Score: 13  Indication of Gait: 1  R Step Length/Height: 1  L Step Length/Height: 1  R Foot Clearance: 1  L Foot Clearance: 1  Step Symmetry: 0  Step Continuity: 1  Path: 1  Trunk: 2  Walking Time: 0  Gait Score: 9  Total Score: 22       Tinetti Test and G-code impairment scale:  Percentage of Impairment CH    0%   CI    1-19% CJ    20-39% CK    40-59% CL    60-79% CM    80-99% CN     100%   Tinetti  Score 0-28 28 23-27 17-22 12-16 6-11 1-5 0       Tinetti Tool Score Risk of Falls  <19 = High Fall Risk  19-24 = Moderate Fall Risk  25-28 = Low Fall Risk  Tinetti ME. Performance-Oriented Assessment of Mobility Problems in Elderly Patients. [facility] 66; [id number]. (Scoring Description: PT Bulletin Feb. 10, 1993)    Older adults: Lea Meade et al, 2009; n = 1000 [facility] elderly evaluated with ABC, ARIEL, ADL, and IADL)  · Mean ARIEL score for males aged 69-68 years = 26.21(3.40)  · Mean ARIEL score for females age 69-68 years = 25.16(4.30)  · Mean ARIEL score for males over 80 years = 23.29(6.02)  · Mean ARIEL score for females over 80 years = 17.20(8.32)         G codes: In compliance with CMSs Claims Based Outcome Reporting, the following G-code set was chosen for this patient based on their primary functional limitation being treated: The outcome measure chosen to determine the severity of the functional limitation was the Tinetti with a score of 22/28 which was correlated with the impairment scale.     ? Mobility - Walking and Moving Around:     - CURRENT STATUS: CJ - 20%-39% impaired, limited or restricted    - D/C STATUS:  CJ - 20%-39% impaired, limited or restricted          Physical Therapy Evaluation Charge Determination   History Examination Presentation Decision-Making   HIGH Complexity :3+ comorbidities / personal factors will impact the outcome/ POC  MEDIUM Complexity : 3 Standardized tests and measures addressing body structure, function, activity limitation and / or participation in recreation  LOW Complexity : Stable, uncomplicated  Other outcome measures Tinetti 22/28  LOW       Based on the above components, the patient evaluation is determined to be of the following complexity level: LOW     Pain:Pain Scale 1: Numeric (0 - 10)  Pain Intensity 1: 0     Activity Tolerance:   Good  Please refer to the flowsheet for vital signs taken during this treatment. After treatment:   [x]   Patient left in no apparent distress sitting up in chair  []   Patient left in no apparent distress in bed  [x]   Call bell left within reach  [x]   Nursing notified  []   Caregiver present  []   Bed alarm activated    COMMUNICATION/EDUCATION:   Communication/Collaboration:  [x]   Fall prevention education was provided and the patient/caregiver indicated understanding. [x]   Patient/family have participated as able and agree with findings and recommendations. []   Patient is unable to participate in plan of care at this time.   Findings and recommendations were discussed with: Registered Nurse    Thank you for this referral.  Jodi Sewell, PT, DPT   Time Calculation: 22 mins

## 2017-12-08 NOTE — PROGRESS NOTES
Bedside shift change report given to Yazmin Keys RN by Betzy Romero RN. Report included the following information SBAR, Kardex, ED Summary, Intake/Output, MAR, Recent Results and Cardiac Rhythm Paced.

## 2017-12-08 NOTE — DIABETES MGMT
DTC Consult Note   Follow Up    Recommendations/ Comments: BG's in range 121 - 140 mg/dL initially but have now risen to 175 - 206 mg/dL  A1c 5.6% which is WNL but raises concern that pt may have hypoglycemia. He is at higher risk for hypoglycemia due to ETOH abuse. If appropriate please consider  Addition of Januvia 100 mg daily (on Tradjenta at home)  Add carb consistent to current diet order  Document po intake    ____________________________    Consult received for:   []           Hospital Medication Recommendations                 [x]           Hospital Blood Glucose Management    Chart reviewed and initial evaluation complete on Henry Jonessherman. Patient is a 59 y.o. male with known DM on Tradjenta 5 mg each AM at home. A1c:   Lab Results   Component Value Date/Time    Hemoglobin A1c 5.6 12/07/2017 04:25 AM       Recent Glucose Results:   Lab Results   Component Value Date/Time     (H) 12/08/2017 03:54 AM    GLUCPOC 188 (H) 12/08/2017 11:01 AM    GLUCPOC 206 (H) 12/08/2017 06:39 AM    GLUCPOC 175 (H) 12/07/2017 09:14 PM        Lab Results   Component Value Date/Time    Creatinine 0.90 12/08/2017 03:54 AM       Active Orders   Diet    DIET CARDIAC Regular; Consistent Carb 2200kcal; FR 1000ML        PO intake:   Patient Vitals for the past 72 hrs:   % Diet Eaten   12/07/17 1616 100 %       Current hospital DM medication: lispro correction scale normal sensitivity    Thank you.   Katiuska Benitez RN, CDE

## 2017-12-08 NOTE — PROGRESS NOTES
Hospitalist Progress Note                               Jarrett Lopez MD                                     Answering service: [phone]                               OR [phone] from in house phone                               Cell: [phone]              Date of Service:  2017  NAME:  Suma Almazan  :  1960  MRN:  [id number]      Admission Summary:   From H&P  \"A 68-year-old white male presents to the emergency department after having a syncopal episode that occurred in a local grocery store today. The patient was discharged   from this facility on 2017 in which he was diagnosed with anasarca, nonischemic cardiomyopathy, alcoholic liver cirrhosis, and severe hyponatremia which was believed to be secondary to a hypervolemic state and beer potomania. Per patient and which spouse also confirms, the patient has had dizziness since discharge that typically occurs 1-2 minutes after going from sitting to standing position. Today is the only syncopal episode. There is no report of him having any head trauma. The patient is unaware of the length of the loss of consciousness. He does have an AICD placed; however, he is not aware of any shocks. He denies any headaches, nausea, vomiting,   chest pain, palpitations, dyspnea on exertion, orthopnea, claudication, change in frequency, urgency of urination, however, he is on diuretics given his CHF and alcohol liver cirrhosis. \"    Interval history / Subjective:    F/u for syncope  -Feeling better. Assessment & Plan:   Orthostatic Syncope,electrolyte disorders contributing   -Due to multiple antihypertensive,diuretics in the contest of ongoing excessive alcohol use  -Minimizing diuretics and antihypertensives  -Colloids for volume expansion    Hyponatremia from hypovolemia,beer potomania  -Improving,127 today. Monitor. Hypomagnesemia.  Replace    Chronic systolic CHF,NYHA class I,NICMP  -EF 35-45%  -On GDM. Cardiology consulted. Lowered lopressor dose  -S/P AICD       Alcohol dependence/abuse.  -Had 6 drinks day of amdission. Watch for withdrawal.CIWA instituted.  -No sign of withdrawal.    Alcoholic cirrhosis ,thrombocytopenia,abnormal LFTS,stable liver function. Followed by hepatology.  -He still continued to use excessive alcohol      Thrombocytopenia due to cirrhosis  Hyperbilirubinemia. Macrocytic anemia due likely to ETOH. ON thiamine and folic acid,mvi   Atrial fibrillation (not in acute RVR). -ON amiodarone and digoxin  -Not on Anticoagulation,likey due to thrombocytopenia,fall risk    Hypertension,controlled    Left BKA with stamp ulcer  -Local wound care. Wound care team consulted      Diet:Cardiac  Code status: full  DVT prophylaxis: scd  Care Plan discussed with: patient,nurse    Discharge planning/disposition:home if the NA >=130    Hospital Problems  Date Reviewed: 11/24/2017          Codes Class Noted POA    Hypomagnesemia ICD-10-CM: E83.42  ICD-9-CM: 275.2  12/6/2017 Unknown        Suicidal ideations ICD-10-CM: R45.851  ICD-9-CM: V62.84  12/6/2017 Unknown        * (Principal)Hyponatremia ICD-10-CM: E87.1  ICD-9-CM: 276.1  11/20/2017 Unknown                Review of Systems:   A comprehensive review of systems was negative except for that written in the HPI. Physical Examination:      Last 24hrs VS reviewed since prior progress note. Most recent are:  Visit Vitals    BP 94/60 (BP 1 Location: Left arm, BP Patient Position: Standing)    Pulse 73    Temp 98.2 °F (36.8 °C)    Resp 16    Ht 5' 10\" (1.778 m)    Wt 87.5 kg (193 lb)    SpO2 99%    BMI 27.69 kg/m2           Constitutional:  No acute distress, cooperative, pleasant    Eyes: Non icteric,non pallor,no erythema,discharge,PERRLA   ENT:  Oral mucous moist, oropharynx benign. Neck supple,    Resp:  CTA bilaterally. No wheezing/rhonchi/rales.  No accessory muscle use   CV:  Regular rhythm, normal rate, no murmurs, gallops, rubs GI:  Soft, non distended, non tender. normoactive bowel sounds, no hepatosplenomegaly    :  No CVA or suprapubic tenderness   Skin  :  No erythema,rash,bullae,dipigmentation     Musculoskeletal:  left BKA with small stamp ulcer. Neurologic:  AAOx3, CN II-XII reviewed. Moves all extremities. Intake/Output Summary (Last 24 hours) at 12/08/17 1424  Last data filed at 12/07/17 2345   Gross per 24 hour   Intake              480 ml   Output             1900 ml   Net            -1420 ml          Data Review:    Review and/or order of clinical lab test  Review and/or order of tests in the radiology section of CPT  Review and/or order of tests in the medicine section of CPT      Labs:     Recent Labs      12/08/17   0354  12/06/17   1625   WBC  2.1*  4.1   HGB  8.3*  9.9*   HCT  22.4*  28.5*   PLT  50*  89*     Recent Labs      12/08/17   0354  12/07/17   1143  12/07/17   0425  12/06/17   1625   NA  127*  125*  123*  118*   K  4.4  4.5  4.2  4.4   CL  96*  93*  93*  86*   CO2  22  24  22  21   BUN  16  13  12  13   CREA  0.90  0.98  0.86  0.97   GLU  121*  170*  166*  144*   CA  9.2  9.0  8.3*  8.4*   MG   --    --    --   1.5*     Recent Labs      12/06/17   1625   SGOT  110*   ALT  60   AP  158*   TBILI  5.4*   TP  7.5   ALB  3.2*   GLOB  4.3*     No results for input(s): INR, PTP, APTT in the last 72 hours. No lab exists for component: INREXT, INREXT   No results for input(s): FE, TIBC, PSAT, FERR in the last 72 hours. Lab Results   Component Value Date/Time    Folate 19.9 12/07/2017 04:25 AM      No results for input(s): PH, PCO2, PO2 in the last 72 hours. No results for input(s): CPK, CKNDX, TROIQ in the last 72 hours.     No lab exists for component: CPKMB  No results found for: CHOL, CHOLX, CHLST, CHOLV, HDL, LDL, LDLC, DLDLP, TGLX, TRIGL, TRIGP, CHHD, CHHDX  Lab Results   Component Value Date/Time    Glucose (POC) 188 12/08/2017 11:01 AM    Glucose (POC) 206 12/08/2017 06:39 AM    Glucose (POC) 175 12/07/2017 09:14 PM    Glucose (POC) 197 12/07/2017 06:13 PM    Glucose (POC) 197 12/07/2017 12:02 PM     Lab Results   Component Value Date/Time    Color DARK YELLOW 12/06/2017 05:01 PM    Appearance CLEAR 12/06/2017 05:01 PM    Specific gravity 1.013 12/06/2017 05:01 PM    pH (UA) 6.0 12/06/2017 05:01 PM    Protein NEGATIVE  12/06/2017 05:01 PM    Glucose 250 12/06/2017 05:01 PM    Ketone NEGATIVE  12/06/2017 05:01 PM    Bilirubin NEGATIVE  12/06/2017 05:01 PM    Urobilinogen 1.0 12/06/2017 05:01 PM    Nitrites NEGATIVE  12/06/2017 05:01 PM    Leukocyte Esterase NEGATIVE  12/06/2017 05:01 PM    Epithelial cells FEW 12/06/2017 05:01 PM    Bacteria NEGATIVE  12/06/2017 05:01 PM    WBC 0-4 12/06/2017 05:01 PM    RBC 0-5 12/06/2017 05:01 PM         Medications Reviewed:     Current Facility-Administered Medications   Medication Dose Route Frequency    gabapentin (NEURONTIN) capsule 100 mg  100 mg Oral TID    sacubitril-valsartan (ENTRESTO) 24-26 mg tablet 1 Tab  1 Tab Oral Q12H    amiodarone (CORDARONE) tablet 200 mg  200 mg Oral QHS    albumin human 25% (BUMINATE) solution 25 g  25 g IntraVENous Q6H    sodium chloride (NS) flush 5-10 mL  5-10 mL IntraVENous Q8H    sodium chloride (NS) flush 5-10 mL  5-10 mL IntraVENous PRN    ondansetron (ZOFRAN) injection 4 mg  4 mg IntraVENous Q4H PRN    digoxin (LANOXIN) tablet 0.125 mg  0.125 mg Oral QHS    folic acid (FOLVITE) tablet 1 mg  1 mg Oral DAILY    furosemide (LASIX) tablet 40 mg  40 mg Oral DAILY    metoprolol succinate (TOPROL-XL) XL tablet 12.5 mg  12.5 mg Oral DAILY    therapeutic multivitamin (THERAGRAN) tablet 1 Tab  1 Tab Oral DAILY    spironolactone (ALDACTONE) tablet 100 mg  100 mg Oral DAILY    thiamine (B-1) tablet 100 mg  100 mg Oral DAILY    LORazepam (ATIVAN) injection 2 mg  2 mg IntraVENous Q1H PRN    glucose chewable tablet 16 g  4 Tab Oral PRN    dextrose (D50W) injection syrg 12.5-25 g  12.5-25 g IntraVENous PRN    glucagon (GLUCAGEN) injection 1 mg  1 mg IntraMUSCular PRN    insulin lispro (HUMALOG) injection   SubCUTAneous AC&HS    sodium chloride (NS) flush 5-10 mL  5-10 mL IntraVENous Q8H    sodium chloride (NS) flush 5-10 mL  5-10 mL IntraVENous PRN     ______________________________________________________________________  EXPECTED LENGTH OF STAY: 2d 14h  ACTUAL LENGTH OF STAY:          2                 Shaylee Mak MD

## 2017-12-08 NOTE — CONSULTS
[address], [address], [place], [place]       Grady Kirkland, KULDEEP Menard, [facility]   MARTÍN Dumont NP        at [address]     [address] [place] [address], [address] [address]     [phone]     FAX: [phone]    at [facility]     [facility], [address] [place] [address], [address]     [phone]     FAX: [phone]       HEPATOLOGY CONSULT NOTE  The patient is well known to me and regularly cared for at [address] [address]. He has cirrhosis that is probably secondary to alcohol use. The liver function is depressed and the platelet count is low. He has never developed complciations of cirrhosis such as ascites, HE or variceal bleeding. He has a history of CAD and a pacemaker in place. I have reviewed the Emergency room note, Hospital admission note, Notes by all other physicians who have seen the patient during this hospitalization to date. I have reviewed the problem list and the reason for this hospitalization. I have reviewed the allergies and the medications the patient was taking at home prior to this hospitalization. ASSESSMENT AND PLAN:  Cirrhosis  The etiology has not been defined. It may be alcohol but he is not consuming alcohol any more. With history of DM this may be due to NUNEZ. The CTP score is 8, Child class B, MELD score 17. Alcohol abuse  He knows he has cirrhosis and despite this continues to consume alcohol. Blood alcohol when in the ED was positive. Hyponatremia  This is multifocal and due to cirrhosis, heart disease, and diuretics. Will start IV albumin. Thrombocytopenia  This is secondary to cirrhosis. No treatment needed. Syncope  This sounds like it was precipitated by dizziness which is unlikely to be from liver/cirrhosis.      SYSTEM REVIEW:  Constitution systems: Negative for fever, chills, weight gain, weight loss. Eyes: Negative for visual changes. ENT: Negative for sore throat, painful swallowing. Respiratory: Negative for cough, hemoptysis, SOB. Cardiology: Negative for chest pain, palpitations. GI:  Negative for constipation or diarrhea. : Negative for urinary frequency, dysuria, hematuria, nocturia. Skin: Negative for rash. Hematology: Negative for easy bruising, blood clots. Musculo-skelatal: Negative for back pain, muscle pain, weakness. Neurologic: Negative for headaches, dizziness, vertigo, memory problems not related to HE. Psychology: Negative for anxiety, depression. FAMILY HISTORY:  The father  of unknown cause. The mother  of heart disease. There is no family history of liver disease.       SOCIAL HISTORY:  The patient is . The patient has 3 children,   The patient has never used tobacco products. The patient consumes 6+ alcoholic beverages per day. This has been his long-standing pattern. The patient used to work . The patient retired in . PHYSICAL EXAMINATION:  VS: per nursing note  General:  No acute distress. Eyes:  Sclera anicteric. ENT:  No oral lesions. Thyroid normal.  Nodes:  No adenopathy. Skin:  No spider angiomata. No jaundice. Respiratory:  Lungs clear to auscultation. Cardiovascular:  Regular heart rate. Abdomen:  Soft non-tender, Some ascites may be present. Extremities:  No lower extremity edema. No muscle wasting. Neurologic:  Alert and oriented. Cranial nerves grossly intact. No asterixis. LABORATORY:  Results for Peggy Riggs (MRN [id number]) as of 2017 20:28   Ref.  Range 2017 03:00 2017 16:25 2017 04:25 2017 11:43   WBC Latest Ref Range: 4.1 - 11.1 K/uL 1.9 (L) 4.1     HGB Latest Ref Range: 12.1 - 17.0 g/dL 9.0 (L) 9.9 (L)     PLATELET Latest Ref Range: 150 - 400 K/uL 60 (L) 89 (L)     Sodium Latest Ref Range: 136 - 145 mmol/L 129 (L) 118 (LL) 123 (L) 125 (L)   Potassium Latest Ref Range: 3.5 - 5.1 mmol/L 3.9 4.4 4.2 4.5   Chloride Latest Ref Range: 97 - 108 mmol/L 97 86 (L) 93 (L) 93 (L)   CO2 Latest Ref Range: 21 - 32 mmol/L 24 21 22 24   Glucose Latest Ref Range: 65 - 100 mg/dL 123 (H) 144 (H) 166 (H) 170 (H)   BUN Latest Ref Range: 6 - 20 MG/DL 14 13 12 13   Creatinine Latest Ref Range: 0.70 - 1.30 MG/DL 0.98 0.97 0.86 0.98   Bilirubin, total Latest Ref Range: 0.2 - 1.0 MG/DL  5.4 (H)     Albumin Latest Ref Range: 3.5 - 5.0 g/dL  3.2 (L)     ALT (SGPT) Latest Ref Range: 12 - 78 U/L  60     AST Latest Ref Range: 15 - 37 U/L  110 (H)     Alk. phosphatase Latest Ref Range: 45 - 117 U/L  158 (H)         RADIOLOGY:  11/2017. Ultrasound of liver. Echogenic consistent with cirrhosis. No liver mass lesions. No dilated bile ducts. Moderate ascites.         Benjamin Irwin MD  [facility] [address] [facility]  [place]  [address].  [phone]

## 2017-12-09 NOTE — PROGRESS NOTES
8798: Per Dr. Venu Gonzalez ok to administer metoprolol, spirinolactone, entresto, and Lasix with pt's /56.

## 2017-12-09 NOTE — PROGRESS NOTES
1200 - Pt refused medihoney and gauze on LLE.  Placed nonadherent and tape over LLE wound at pt request.

## 2017-12-09 NOTE — PROGRESS NOTES
Hospitalist Progress Note                               Margot Julien MD                                     Answering service: [phone]                               OR [phone] from in house phone                               Cell: [phone]              Date of Service:  2017  NAME:  Coni Chu  :  1960  MRN:  [id number]      Admission Summary:   From H&P  \"A 61-year-old white male presents to the emergency department after having a syncopal episode that occurred in a local grocery store today. The patient was discharged   from this facility on 2017 in which he was diagnosed with anasarca, nonischemic cardiomyopathy, alcoholic liver cirrhosis, and severe hyponatremia which was believed to be secondary to a hypervolemic state and beer potomania. Per patient and which spouse also confirms, the patient has had dizziness since discharge that typically occurs 1-2 minutes after going from sitting to standing position. Today is the only syncopal episode. There is no report of him having any head trauma. The patient is unaware of the length of the loss of consciousness. He does have an AICD placed; however, he is not aware of any shocks. He denies any headaches, nausea, vomiting,   chest pain, palpitations, dyspnea on exertion, orthopnea, claudication, change in frequency, urgency of urination, however, he is on diuretics given his CHF and alcohol liver cirrhosis. \"    Interval history / Subjective:    F/u for syncope  -No complaints. Wife and son the room. Hb was reported low and transfusion was ordered by night hospitalist.His HB stable for days. He has no evidence of blood loss to account for this sudden drop in HB. Repeat HB  8. 7. D/c transfusion. Repeat H&H in the afternoon     Assessment & Plan:   Orthostatic Syncope,electrolyte disorders contributing   -Due to multiple antihypertensive,diuretics in the contest of ongoing excessive alcohol use  -Minimizing diuretics and antihypertensives  -Colloids for volume expansion    Hyponatremia from hypovolemia,beer potomania  -Improving,130 today. Monitor. Hypomagnesemia. Replace    Chronic systolic CHF,NYHA class I,NICMP  -EF 35-45%  -On GDM. Cardiology consulted. Lowered lopressor dose  -S/P AICD       Alcohol dependence/abuse.  -Had 6 drinks day of amdission. Watch for withdrawal.CIWA instituted.  -No sign of withdrawal.    Alcoholic cirrhosis ,thrombocytopenia,abnormal LFTS,stable liver function. Followed by hepatology.  -He still continued to use excessive alcohol      Thrombocytopenia due to cirrhosis  Hyperbilirubinemia. Macrocytic anemia due likely to ETOH. ON thiamine and folic acid,mvi   Atrial fibrillation (not in acute RVR). -ON amiodarone and digoxin  -Not on Anticoagulation,likey due to thrombocytopenia,fall risk    Hypertension,controlled    Left BKA with stamp ulcer  -Local wound care. Wound care team consulted      Diet:Cardiac  Code status: full  DVT prophylaxis: scd  Care Plan discussed with: patient,nurse    Discharge planning/disposition:home likely tomorrow. Hospital Problems  Date Reviewed: 11/24/2017          Codes Class Noted POA    Hypomagnesemia ICD-10-CM: E83.42  ICD-9-CM: 275.2  12/6/2017 Unknown        Suicidal ideations ICD-10-CM: R45.851  ICD-9-CM: V62.84  12/6/2017 Unknown        * (Principal)Hyponatremia ICD-10-CM: E87.1  ICD-9-CM: 276.1  11/20/2017 Unknown                Review of Systems:   A comprehensive review of systems was negative except for that written in the HPI. Physical Examination:      Last 24hrs VS reviewed since prior progress note.  Most recent are:  Visit Vitals    /56 (BP 1 Location: Right arm, BP Patient Position: At rest;Head of bed elevated (Comment degrees))    Pulse 68    Temp 98.6 °F (37 °C)    Resp 16    Ht 5' 10\" (1.778 m)    Wt 89.9 kg (198 lb 3.1 oz)    SpO2 98%    BMI 28.44 kg/m2           Constitutional:  No acute distress, cooperative, pleasant    Eyes: Non icteric,non pallor,no erythema,discharge,PERRLA   ENT:  Oral mucous moist, oropharynx benign. Neck supple,    Resp:  CTA bilaterally. No wheezing/rhonchi/rales. No accessory muscle use   CV:  Regular rhythm, normal rate, no murmurs, gallops, rubs    GI:  Soft, non distended, non tender. normoactive bowel sounds, no hepatosplenomegaly    :  No CVA or suprapubic tenderness   Skin  :  No erythema,rash,bullae,dipigmentation     Musculoskeletal:  left BKA with small stamp ulcer. Neurologic:  AAOx3, CN II-XII reviewed. Moves all extremities. Intake/Output Summary (Last 24 hours) at 12/09/17 1200  Last data filed at 12/09/17 2811   Gross per 24 hour   Intake              440 ml   Output             2000 ml   Net            -1560 ml          Data Review:    Review and/or order of clinical lab test  Review and/or order of tests in the radiology section of CPT  Review and/or order of tests in the medicine section of CPT      Labs:     Recent Labs      12/09/17   0937  12/09/17   0325  12/08/17   0354   WBC   --   1.9*  2.1*   HGB  8.7*  6.9*  8.3*   HCT  23.7*  18.9*  22.4*   PLT   --   48*  50*     Recent Labs      12/09/17   0325  12/08/17   0354  12/07/17   1143   12/06/17   1625   NA  130*  127*  125*   < >  118*   K  4.3  4.4  4.5   < >  4.4   CL  97  96*  93*   < >  86*   CO2  25  22  24   < >  21   BUN  17  16  13   < >  13   CREA  1.41*  0.90  0.98   < >  0.97   GLU  137*  121*  170*   < >  144*   CA  9.7  9.2  9.0   < >  8.4*   MG   --    --    --    --   1.5*    < > = values in this interval not displayed. Recent Labs      12/09/17   0325  12/06/17   1625   SGOT  79*  110*   ALT  48  60   AP  159*  158*   TBILI  4.6*  5.4*   TP  7.3  7.5   ALB  4.0  3.2*   GLOB  3.3  4.3*     No results for input(s): INR, PTP, APTT in the last 72 hours. No lab exists for component: INREXT, INREXT   No results for input(s): FE, TIBC, PSAT, FERR in the last 72 hours. Lab Results   Component Value Date/Time    Folate 19.9 12/07/2017 04:25 AM      No results for input(s): PH, PCO2, PO2 in the last 72 hours. No results for input(s): CPK, CKNDX, TROIQ in the last 72 hours.     No lab exists for component: CPKMB  No results found for: CHOL, CHOLX, CHLST, CHOLV, HDL, LDL, LDLC, DLDLP, TGLX, TRIGL, TRIGP, CHHD, CHHDX  Lab Results   Component Value Date/Time    Glucose (POC) 186 12/09/2017 06:29 AM    Glucose (POC) 181 12/08/2017 10:26 PM    Glucose (POC) 172 12/08/2017 05:26 PM    Glucose (POC) 149 12/08/2017 04:35 PM    Glucose (POC) 188 12/08/2017 11:01 AM     Lab Results   Component Value Date/Time    Color DARK YELLOW 12/06/2017 05:01 PM    Appearance CLEAR 12/06/2017 05:01 PM    Specific gravity 1.013 12/06/2017 05:01 PM    pH (UA) 6.0 12/06/2017 05:01 PM    Protein NEGATIVE  12/06/2017 05:01 PM    Glucose 250 12/06/2017 05:01 PM    Ketone NEGATIVE  12/06/2017 05:01 PM    Bilirubin NEGATIVE  12/06/2017 05:01 PM    Urobilinogen 1.0 12/06/2017 05:01 PM    Nitrites NEGATIVE  12/06/2017 05:01 PM    Leukocyte Esterase NEGATIVE  12/06/2017 05:01 PM    Epithelial cells FEW 12/06/2017 05:01 PM    Bacteria NEGATIVE  12/06/2017 05:01 PM    WBC 0-4 12/06/2017 05:01 PM    RBC 0-5 12/06/2017 05:01 PM         Medications Reviewed:     Current Facility-Administered Medications   Medication Dose Route Frequency    0.9% sodium chloride infusion 250 mL  250 mL IntraVENous PRN    LORazepam (ATIVAN) tablet 2 mg  2 mg Oral Q1H PRN    gabapentin (NEURONTIN) capsule 100 mg  100 mg Oral TID    sacubitril-valsartan (ENTRESTO) 24-26 mg tablet 1 Tab  1 Tab Oral Q12H    amiodarone (CORDARONE) tablet 200 mg  200 mg Oral QHS    sodium chloride (NS) flush 5-10 mL  5-10 mL IntraVENous Q8H    sodium chloride (NS) flush 5-10 mL  5-10 mL IntraVENous PRN    ondansetron (ZOFRAN) injection 4 mg  4 mg IntraVENous Q4H PRN    digoxin (LANOXIN) tablet 0.125 mg  0.125 mg Oral QHS    folic acid (FOLVITE) tablet 1 mg  1 mg Oral DAILY    furosemide (LASIX) tablet 40 mg  40 mg Oral DAILY    metoprolol succinate (TOPROL-XL) XL tablet 12.5 mg  12.5 mg Oral DAILY    therapeutic multivitamin (THERAGRAN) tablet 1 Tab  1 Tab Oral DAILY    spironolactone (ALDACTONE) tablet 100 mg  100 mg Oral DAILY    thiamine (B-1) tablet 100 mg  100 mg Oral DAILY    glucose chewable tablet 16 g  4 Tab Oral PRN    dextrose (D50W) injection syrg 12.5-25 g  12.5-25 g IntraVENous PRN    glucagon (GLUCAGEN) injection 1 mg  1 mg IntraMUSCular PRN    insulin lispro (HUMALOG) injection   SubCUTAneous AC&HS    sodium chloride (NS) flush 5-10 mL  5-10 mL IntraVENous Q8H    sodium chloride (NS) flush 5-10 mL  5-10 mL IntraVENous PRN     ______________________________________________________________________  EXPECTED LENGTH OF STAY: 2d 14h  ACTUAL LENGTH OF STAY:          3                 Latonia Perea MD

## 2017-12-09 NOTE — PROGRESS NOTES
Bedside shift change [facility] by Rohith Alberts RN (offgoing nurse). Report included the following information SBAR, Kardex, ED Summary, Intake/Output, MAR, Accordion and Recent Results.

## 2017-12-09 NOTE — PROGRESS NOTES
Spiritual Care Partner Volunteer visited patient in [address] [place] [address] on 12.8. 17.     Documented by:  Ti Zamora, Staff   Please call [phone] ([phone]) to page  if needed

## 2017-12-09 NOTE — PROGRESS NOTES
I returned page from nurse who reports hemoglobin = 6.9. I spoke with patient and informed him of risks, benefits, and potential complications of blood transfusion. Patient provided written informed consent, freely and without coercion. Orders were given to transfuse 2 units of PRBCs. Repeat CBC post transfusion.

## 2017-12-10 PROBLEM — R45.851 SUICIDAL IDEATIONS: Status: RESOLVED | Noted: 2017-01-01 | Resolved: 2017-01-01

## 2017-12-10 PROBLEM — E87.1 HYPONATREMIA: Status: RESOLVED | Noted: 2017-01-01 | Resolved: 2017-01-01

## 2017-12-10 PROBLEM — E83.42 HYPOMAGNESEMIA: Status: RESOLVED | Noted: 2017-01-01 | Resolved: 2017-01-01

## 2017-12-10 NOTE — DISCHARGE SUMMARY
Discharge Summary       PATIENT ID: Veronica Penn  MRN: [id number]   YOB: 1960    DATE OF ADMISSION: 12/6/2017  4:09 PM    DATE OF DISCHARGE: 12/10/2017  PRIMARY CARE PROVIDER: Triston Cheung MD     ATTENDING PHYSICIAN: Fly Tripp MD  DISCHARGING PROVIDER: Fly Tripp MD    To contact this individual call [phone] and ask the  to page. If unavailable ask to be transferred the Adult Hospitalist Department. CONSULTATIONS: IP CONSULT TO HOSPITALIST  IP CONSULT TO CARDIOLOGY    PROCEDURES/SURGERIES: * No surgery found *    ADMITTING [address] [place] [address] COURSE:   Admission Summary:   From H&P  \"A 49-year-old white male presents to the emergency department after having a syncopal episode that occurred in a local grocery store today. The patient was discharged   from this facility on 11/24/2017 in which he was diagnosed with anasarca, nonischemic cardiomyopathy, alcoholic liver cirrhosis, and severe hyponatremia which was believed to be secondary to a hypervolemic state and beer potomania. Per patient and which spouse also confirms, the patient has had dizziness since discharge that typically occurs 1-2 minutes after going from sitting to standing position. Today is the only syncopal episode. There is no report of him having any head trauma. The patient is unaware of the length of the loss of consciousness. He does have an AICD placed; however, he is not aware of any shocks. He denies any headaches, nausea, vomiting,   chest pain, palpitations, dyspnea on exertion, orthopnea, claudication, change in frequency, urgency of urination, however, he is on diuretics given his CHF and alcohol liver cirrhosis. \"           Assessment & Plan:   Orthostatic Syncope,electrolyte disorders contributing   -Due to multiple antihypertensive,diuretics in the contest of ongoing excessive alcohol use  -Received albumin. Remain with out symptoms and orthostatic drop in BP here  -Lasix and aldactone stopped on discharge due to rising creatinine,     Hyponatremia from hypovolemia,beer potomania  -Improving,131. Out patient follow up. Hypomagnesemia. Replace     Chronic systolic CHF,NYHA class I,NICMP  -EF 35-45%  -On GDM. Cardiology consulted. Lowered lopressor dose,other cardiac medication continued. -S/P AICD         Alcohol dependence/abuse.  -Had 6 drinks day of amdission. Watch for withdrawal.CIWA instituted.  -No sign of withdrawal.     Alcoholic cirrhosis ,thrombocytopenia,abnormal LFTS,stable liver function. Followed by hepatology.  -He still continued to use excessive alcohol        Thrombocytopenia due to cirrhosis  Hyperbilirubinemia. Macrocytic anemia due likely to ETOH. ON thiamine and folic acid,mvi  -Drop in Hb yesterday was spurious,repeated reading afterwards remained at base line HB of 8-9     Atrial fibrillation (not in acute RVR). -ON amiodarone and digoxin  -Not on Anticoagulation,likey due to thrombocytopenia,fall risk     Hypertension,controlled     Left BKA with stamp ulcer  -Local wound care. Wound care team consulted   Per wound care team:Assessment:  1. Left bka - 1.2 x 1 x 0.1 cm full thickness appearing injury to distal residual limb; intact macerated skin surrounding. No odor or purulence, scant serosanguinous drainage. 2. Left elbow area - 7 x 1 x 0.1 cm, skin tear with no skin flap remaining, moist pale pink base, small amount serosanguinous drainage. 3. Left wrist - 1.5 x 1 x 0.1 cm skin tear with partial flap coverage. Scant serosanguinous drainage. Treatment:  Medi honey alginate to left BKA, folded 4x4 and tape. Mepilex Border dressings to left elbow and wrist skin tears. Wound Recommendations:  Medi honey alginate to left BKA, folded 4x4 and tape; cleanse with saline, change every other day. Mepilex Border dressings to left elbow and wrist skin tears; change every 3 days. DISCHARGE DIAGNOSES / PLAN:      LORENZA: hold diuretics. Discussed with  Jazmyn,his office will follow patient with Labs and determine when to restart the diuretics. Jonatan Sameera PENDING TEST RESULTS:   At the time of discharge the following test results are still pending: none    FOLLOW UP APPOINTMENTS:    Follow-up Information     Follow up With Details Comments [address], [place] In 1 week Call office for appointment [address] [facility]  [address]  [phone]             ADDITIONAL CARE RECOMMENDATIONS:   We have stopped the diuretics Lasix and aldactone due to worsening kidney function. Dr Graeme Sánchez is aware and his office will monitor the kidney numbers and decide when safe to resume taking the diuretics. DIET: Cardiac Diet and Diabetic Diet    ACTIVITY: Activity as tolerated    WOUND CARE: local wound care    EQUIPMENT needed: own      DISCHARGE MEDICATIONS:  Current Discharge Medication List      CONTINUE these medications which have NOT CHANGED    Details   sacubitril-valsartan (ENTRESTO) 24 mg/26 mg tablet Take 1 Tab by mouth two (2) times a day. thiamine (B-1) 100 mg tablet Take 1 Tab by mouth daily. Qty: 30 Tab, Refills: 0      folic acid (FOLVITE) 1 mg tablet Take 1 Tab by mouth daily. Qty: 30 Tab, Refills: 0      metoprolol succinate (TOPROL XL) 25 mg XL tablet Take 0.5 Tabs by mouth daily. Qty: 15 Tab, Refills: 0      amiodarone (CORDARONE) 200 mg tablet Take 200 mg by mouth nightly. linagliptin (TRADJENTA) 5 mg tablet Take 5 mg by mouth every morning.      multivitamins-minerals-lutein (CENTRUM SILVER) Tab Take 1 Tab by mouth daily. digoxin (LANOXIN) 0.125 mg tablet Take 0.125 mg by mouth nightly. STOP taking these medications       furosemide (LASIX) 40 mg tablet Comments:   Reason for Stopping:         spironolactone (ALDACTONE) 100 mg tablet Comments:   Reason for Stopping:                 NOTIFY YOUR PHYSICIAN FOR ANY OF THE FOLLOWING:   Fever over 101 degrees for 24 hours.    Chest pain, shortness of breath, fever, chills, nausea, vomiting, diarrhea, change in mentation, falling, weakness, bleeding. Severe pain or pain not relieved by medications. Or, any other signs or symptoms that you may have questions about. DISPOSITION:   x Home With:   OT  PT  HH  RN       Long term SNF/Inpatient Rehab    Independent/assisted living    Hospice    Other:       PATIENT CONDITION AT DISCHARGE:     Functional status    Poor     Deconditioned    x Independent      Cognition   x  Lucid     Forgetful     Dementia      Catheters/lines (plus indication)    Vizcarra     PICC     PEG    x None      Code status    x Full code     DNR      PHYSICAL EXAMINATION AT DISCHARGE:     Visit Vitals    BP 94/53 (BP 1 Location: Left arm, BP Patient Position: At rest;Supine)    Pulse 60    Temp 98 °F (36.7 °C)    Resp 16    Ht 5' 10\" (1.778 m)    Wt 90 kg (198 lb 6.6 oz)    SpO2 97%    BMI 28.47 kg/m2      O2 Device: Room air    Temp (24hrs), Av.1 °F (36.7 °C), Min:97.3 °F (36.3 °C), Max:98.6 °F (37 °C)    12/10 0701 - 12/10 1900  In: -   Out: 350 [Urine:350]   1901 - 12/10 07  In: 120 [P.O.:120]  Out: 2550 [Urine:2550]    GENERAL:  Alert, oriented, cooperative, no apparent distress  HEENT:  Normocephalic, atraumatic, non icteric sclerae, non pallor conjuctivae, EOMs intact, PERRLA. NECK: Supple, trachea midline, no adenopathy, no thyromegally or tenderness, no carotid bruit and no JVD. LUNGS:   Vesicular breath sounds bilaterally, no added sounds. HEART:   S1 and S2 well heard,RRR,  no murmur, click, rub or gallop. ABDOMEB:   Soft, non-tender. Normoactive bowel sounds. No masses,  No organomegaly. EXTREMETIES: Left BKA  PULSES: 2+ and symmetric all extremities. SKIN:  No rashes or lesions  NEUROLOGY: Alert and oriented to PPT, CNII-XII intact. Motor and sensory exam grossly intact.       CHRONIC MEDICAL DIAGNOSES:  Problem List as of 12/10/2017  Date Reviewed: 2017          Codes Class Noted - Resolved    Chronic systolic heart failure (HCC) (Chronic) ICD-10-CM: I50.22  ICD-9-CM: 428.22  11/21/2017 - Present        Cirrhosis of liver without ascites (HCC) ICD-10-CM: K74.60  ICD-9-CM: 571.5  5/2/2017 - Present        Neuropathy ICD-10-CM: G62.9  ICD-9-CM: 355.9  3/30/2017 - Present        S/P BKA (below knee amputation) ([facility] ICD-10-CM: Z89.519  ICD-9-CM: V49.75  3/30/2017 - Present        Hypertension ICD-10-CM: I10  ICD-9-CM: 401.9  3/30/2017 - Present        Cardiac defibrillator in place ICD-10-CM: Z95.810  ICD-9-CM: V45.02  3/30/2017 - Present        Type II diabetes mellitus ([facility] ICD-10-CM: E11.9  ICD-9-CM: 250.00  2/1/2015 - Present        RESOLVED: Hypomagnesemia ICD-10-CM: E83.42  ICD-9-CM: 275.2  12/6/2017 - 12/10/2017        RESOLVED: Suicidal ideations ICD-10-CM: L68.449  ICD-9-CM: V62.84  12/6/2017 - 12/10/2017        RESOLVED: Anasarca ICD-10-CM: R60.1  ICD-9-CM: 782.3  11/20/2017 - 11/24/2017        * (Principal)RESOLVED: Hyponatremia ICD-10-CM: E87.1  ICD-9-CM: 276.1  11/20/2017 - 12/10/2017        RESOLVED: Extremity pain ICD-10-CM: M79.609  ICD-9-CM: 729.5  2/18/2015 - 3/30/2017        RESOLVED: Cellulitis and abscess of foot ICD-10-CM: L03.119, L02.619  ICD-9-CM: 682.7  2/1/2015 - 2/13/2015        RESOLVED: Hyperglycemia ICD-10-CM: R73.9  ICD-9-CM: 790.29  1/31/2015 - 2/13/2015              Greater than 35 minutes were spent with the patient on counseling and coordination of care    Signed:    [address] [place]  12/10/2017  12:25 PM

## 2017-12-10 NOTE — PROGRESS NOTES
[address] [facility] [address] Juan Talbot MD, FACP, Cite Frantz Pined[place], [place]       Carlos Manuel Mcdaniel, MARTÍN Pierson, KULDEEP SARKAR, ACNP-BC   Parth Hinson, MARTÍN Mazariegos, MARTÍN   [address] [address], [address] [facility]  [place], [address] [place] [address]     [phone]     FAX: [phone] [facility]  1000 [facility], [address] [facility], [address] [facility]     [phone]     FAX: [phone]   [facility]  HEPATOLOGY PROGRESS NOTE  The patient has cirrhosis that is probably secondary to alcohol use.  The liver function is depressed and the platelet count is low.  He has never developed complciations of cirrhosis such as ascites, HE or variceal bleeding. Yusuf Nascimento has a history of CAD and a pacemaker in place. He was consuming alcohol the day of admission when he had a syncopal episode. Unclear if this was related to alcohol.     He has done well since admission. Screat up and he appears a bit dry. HB is stable. I agree he can go home. We will see him in office in 2 weeks.      ASSESSMENT AND PLAN:  Cirrhosis  The etiology has not been defined. Desiree Mcneil may be alcohol but he is not consuming alcohol any more.  With history of DM this may be due to NUNEZ.  The CTP score is 8, Child class B, MELD score 17.      Alcohol abuse  He knows he has cirrhosis and despite this continues to consume alcohol.  Blood alcohol when in the ED was positive.      Hyponatremia  This is multifocal and due to cirrhosis, heart disease, and diuretics.  Will start IV albumin.     LORENZA  Will DC on no diuretics.     Anemia  HB down 2 gms from admission. NOw stable. No sign of acute blood loss.      Thrombocytopenia  This is secondary to cirrhosis.  No treatment needed.      Syncope  This sounds like it was precipitated by dizziness which is unlikely to be from liver/cirrhosis.        PHYSICAL EXAMINATION:  VS: per nursing note  General:  No acute distress. Eyes:  Sclera anicteric. ENT:  No oral lesions.  Thyroid normal.  Nodes:  No adenopathy. Skin:  No spider angiomata.  No jaundice. Respiratory:  Lungs clear to auscultation. Cardiovascular:  Regular heart rate. Abdomen:  Soft non-tender, Some ascites may be present. Extremities:  No lower extremity edema.  No muscle wasting. Neurologic:  Alert and oriented.  Cranial nerves grossly intact.  No asterixis.      LABORATORY:  Results for Sol Lo (MRN [id number]) as of 12/10/2017 14:16   Ref. Range 12/8/2017 03:54 12/9/2017 03:25 12/9/2017 09:37 12/9/2017 16:04 12/10/2017 03:12   WBC Latest Ref Range: 4.1 - 11.1 K/uL 2.1 (L) 1.9 (L)  2.5 (L) 2.3 (L)   HGB Latest Ref Range: 12.1 - 17.0 g/dL 8.3 (L) 6.9 (L) 8.7 (L) 8.8 (L) 8.5 (L)   PLATELET Latest Ref Range: 150 - 400 K/uL 50 (L) 48 (LL)  68 (L) 57 (L)   Sodium Latest Ref Range: 136 - 145 mmol/L 127 (L) 130 (L)   131 (L)   Potassium Latest Ref Range: 3.5 - 5.1 mmol/L 4.4 4.3   4.5   Chloride Latest Ref Range: 97 - 108 mmol/L 96 (L) 97   97   CO2 Latest Ref Range: 21 - 32 mmol/L 22 25   24   Glucose Latest Ref Range: 65 - 100 mg/dL 121 (H) 137 (H)   137 (H)   BUN Latest Ref Range: 6 - 20 MG/DL 16 17   24 (H)   Creatinine Latest Ref Range: 0.70 - 1.30 MG/DL 0.90 1.41 (H)   1.77 (H)   Bilirubin, total Latest Ref Range: 0.2 - 1.0 MG/DL  4.6 (H)      Albumin Latest Ref Range: 3.5 - 5.0 g/dL  4.0      ALT (SGPT) Latest Ref Range: 12 - 78 U/L  48      AST Latest Ref Range: 15 - 37 U/L  79 (H)      Alk.  phosphatase Latest Ref Range: 45 - 117 U/L  159 (H)          Mt Polina, MD  Liver [place] of [address] [place] [address] Thierry Mcdaniels [address] [place] [address], [facility] 22.  [phone]

## 2017-12-10 NOTE — DISCHARGE INSTRUCTIONS
Discharge Instructions       PATIENT ID: Cristina Ramirez  MRN: [id number]   YOB: 1960    DATE OF ADMISSION: 12/6/2017  4:09 PM    DATE OF DISCHARGE: 12/10/2017    PRIMARY CARE PROVIDER: Tolbert Galeazzi, MD     ATTENDING PHYSICIAN: Rebekah Ojeda MD  DISCHARGING PROVIDER: Rebekah Ojeda MD    To contact this individual call [phone] and ask the  to page. If unavailable ask to be transferred the Adult Hospitalist Department. DISCHARGE DIAGNOSES   Syncope,rohtostatic(due to dehydration and low blood pressure)    CONSULTATIONS: IP CONSULT TO HOSPITALIST  IP CONSULT TO CARDIOLOGY    PROCEDURES/SURGERIES: * No surgery found *    PENDING TEST RESULTS:   At the time of discharge the following test results are still pending: none    FOLLOW UP APPOINTMENTS:   Follow-up Information     Follow up With Details Comments [address], [place] In 1 week Call office for appointment [address]  [phone]             ADDITIONAL CARE RECOMMENDATIONS:     We have stopped the diuretics Lasix and aldactone due to worsening kidney function. Dr Paloma Sexton is aware and his office will monitor the kidney numbers and decide when safe to resume taking the diuretics. DIET: Cardiac Diet and Diabetic Diet    ACTIVITY: Activity as tolerated    WOUND CARE: local wound care. Wound care team has recommended the following:  Medi honey alginate to left BKA, folded 4x4 and tape; cleanse with saline, change every other day. Mepilex Border dressings to left elbow and wrist skin tears; change every 3 days. EQUIPMENT needed:none      DISCHARGE MEDICATIONS:   See Medication Reconciliation Form    · It is important that you take the medication exactly as they are prescribed. · Keep your medication in the bottles provided by the pharmacist and keep a list of the medication names, dosages, and times to be taken in your wallet.    · Do not take other medications without consulting your doctor. NOTIFY YOUR PHYSICIAN FOR ANY OF THE FOLLOWING:   Fever over 101 degrees for 24 hours. Chest pain, shortness of breath, fever, chills, nausea, vomiting, diarrhea, change in mentation, falling, weakness, bleeding. Severe pain or pain not relieved by medications. Or, any other signs or symptoms that you may have questions about. DISPOSITION:   x Home With:   OT  PT  HH  RN       SNF/Inpatient Rehab/LTAC    Independent/assisted living    Hospice    Other:           Signed:    Chua Ash MD  12/10/2017  12:21 PM

## 2017-12-10 NOTE — PROGRESS NOTES
Bedside shift change report given to Erica (oncoming nurse) by Digna Vargas (offgoing nurse). Report included the following information SBAR, Kardex, Intake/Output, MAR, Recent Results and Cardiac Rhythm Paced, BBB.

## 2017-12-10 NOTE — PROGRESS NOTES
Discharge instructions and medication information discussed with pt. Opportunity for questions provided. Peripheral IV removed without complication. Pt belongings packed and sent with pt. Pt transported via wheelchair by  to discharge lot. Pt stable and in no acute distress at time of discharge.

## 2017-12-10 NOTE — PROGRESS NOTES
[address], [address], Cite [facility], [place]        April Saratha Epley, NP Aletta Mura, PA-C Sheral Haas, [facility]-BC   MARTÍN Marroquin NP         at [facility]     217 [facility], [address] [address]     [phone]     FAX: [phone]    at [facility], [address] [place] [address], [address]     [phone]     FAX: [phone]         HEPATOLOGY PROGRESS NOTE  The patient has cirrhosis that is probably secondary to alcohol use. The liver function is depressed and the platelet count is low. He has never developed complciations of cirrhosis such as ascites, HE or variceal bleeding. He has a history of CAD and a pacemaker in place. He was consuming alcohol the day of admission when he had a syncopal episode. Unclear if this was related to alcohol. He has done well since admission. Screat up and he appears a bit dry. Will hold diuretics.       ASSESSMENT AND PLAN:  Cirrhosis  The etiology has not been defined. It may be alcohol but he is not consuming alcohol any more. With history of DM this may be due to NUNEZ. The CTP score is 8, Child class B, MELD score 17.     Alcohol abuse  He knows he has cirrhosis and despite this continues to consume alcohol. Blood alcohol when in the ED was positive.     Hyponatremia  This is multifocal and due to cirrhosis, heart disease, and diuretics. Will start IV albumin. LORENZA  Treating with IV albumin. Anemia  HB down 2 gms today. Will repeat later. No sign of acute blood loss.     Thrombocytopenia  This is secondary to cirrhosis. No treatment needed.     Syncope  This sounds like it was precipitated by dizziness which is unlikely to be from liver/cirrhosis.        PHYSICAL EXAMINATION:  VS: per nursing note  General:  No acute distress. Eyes:  Sclera anicteric. ENT:  No oral lesions.   Thyroid normal.  Nodes:  No adenopathy. Skin:  No spider angiomata. No jaundice. Respiratory:  Lungs clear to auscultation. Cardiovascular:  Regular heart rate. Abdomen:  Soft non-tender, Some ascites may be present. Extremities:  No lower extremity edema. No muscle wasting. Neurologic:  Alert and oriented. Cranial nerves grossly intact. No asterixis.     LABORATORY:  Results for Sp Becker (MRN [id number]) as of 12/10/2017 14:16   Ref. Range 12/8/2017 03:54 12/9/2017 03:25   WBC Latest Ref Range: 4.1 - 11.1 K/uL 2.1 (L) 1.9 (L)   HGB Latest Ref Range: 12.1 - 17.0 g/dL 8.3 (L) 6.9 (L)   PLATELET Latest Ref Range: 150 - 400 K/uL 50 (L) 48 (LL)   Sodium Latest Ref Range: 136 - 145 mmol/L 127 (L) 130 (L)   Potassium Latest Ref Range: 3.5 - 5.1 mmol/L 4.4 4.3   Chloride Latest Ref Range: 97 - 108 mmol/L 96 (L) 97   CO2 Latest Ref Range: 21 - 32 mmol/L 22 25   Glucose Latest Ref Range: 65 - 100 mg/dL 121 (H) 137 (H)   BUN Latest Ref Range: 6 - 20 MG/DL 16 17   Creatinine Latest Ref Range: 0.70 - 1.30 MG/DL 0.90 1.41 (H)   Bilirubin, total Latest Ref Range: 0.2 - 1.0 MG/DL  4.6 (H)   Albumin Latest Ref Range: 3.5 - 5.0 g/dL  4.0   ALT (SGPT) Latest Ref Range: 12 - 78 U/L  48   AST Latest Ref Range: 15 - 37 U/L  79 (H)   Alk.  phosphatase Latest Ref Range: 45 - 117 U/L  159 (H)       Benjamin Irwin MD  Liver [place] of [address] [place] [address] Drive [address] Thierry Mcdaniels [address] [place], [place] [address]  22.  [phone]

## 2018-01-01 ENCOUNTER — HOSPICE CERTIFICATION ENCOUNTER (OUTPATIENT)
Dept: PALLATIVE CARE | Age: 58
End: 2018-01-01

## 2018-01-01 ENCOUNTER — APPOINTMENT (OUTPATIENT)
Dept: CT IMAGING | Age: 58
DRG: 432 | End: 2018-01-01
Attending: FAMILY MEDICINE
Payer: MEDICARE

## 2018-01-01 ENCOUNTER — HOME CARE VISIT (OUTPATIENT)
Dept: SCHEDULING | Facility: HOME HEALTH | Age: 58
End: 2018-01-01
Payer: MEDICARE

## 2018-01-01 ENCOUNTER — APPOINTMENT (OUTPATIENT)
Dept: ULTRASOUND IMAGING | Age: 58
DRG: 432 | End: 2018-01-01
Attending: NURSE PRACTITIONER
Payer: MEDICARE

## 2018-01-01 ENCOUNTER — HOSPICE ADMISSION (OUTPATIENT)
Dept: HOSPICE | Facility: HOSPICE | Age: 58
End: 2018-01-01
Payer: MEDICARE

## 2018-01-01 ENCOUNTER — HOSPITAL ENCOUNTER (INPATIENT)
Age: 58
LOS: 4 days | Discharge: HOME | DRG: 372 | End: 2018-07-20
Attending: EMERGENCY MEDICINE | Admitting: FAMILY MEDICINE
Payer: MEDICARE

## 2018-01-01 ENCOUNTER — HOSPITAL ENCOUNTER (INPATIENT)
Age: 58
LOS: 4 days | Discharge: HOME | DRG: 372 | End: 2018-08-10
Attending: EMERGENCY MEDICINE | Admitting: FAMILY MEDICINE
Payer: MEDICARE

## 2018-01-01 ENCOUNTER — TELEPHONE (OUTPATIENT)
Dept: PALLATIVE CARE | Age: 58
End: 2018-01-01

## 2018-01-01 ENCOUNTER — DOCUMENTATION ONLY (OUTPATIENT)
Dept: HEMATOLOGY | Age: 58
End: 2018-01-01

## 2018-01-01 ENCOUNTER — APPOINTMENT (OUTPATIENT)
Dept: CT IMAGING | Age: 58
DRG: 432 | End: 2018-01-01
Attending: EMERGENCY MEDICINE
Payer: MEDICARE

## 2018-01-01 ENCOUNTER — HOME CARE VISIT (OUTPATIENT)
Dept: HOSPICE | Facility: HOSPICE | Age: 58
End: 2018-01-01
Payer: MEDICARE

## 2018-01-01 ENCOUNTER — HOME VISIT (OUTPATIENT)
Dept: PALLATIVE CARE | Age: 58
End: 2018-01-01

## 2018-01-01 ENCOUNTER — HOSPITAL ENCOUNTER (OUTPATIENT)
Dept: ULTRASOUND IMAGING | Age: 58
Discharge: HOME | End: 2018-07-09
Attending: INTERNAL MEDICINE
Payer: MEDICARE

## 2018-01-01 ENCOUNTER — HOSPITAL ENCOUNTER (OUTPATIENT)
Dept: ULTRASOUND IMAGING | Age: 58
Discharge: HOME | DRG: 442 | End: 2018-06-20
Attending: NURSE PRACTITIONER
Payer: MEDICARE

## 2018-01-01 ENCOUNTER — NURSE NAVIGATOR (OUTPATIENT)
Dept: PALLATIVE CARE | Age: 58
End: 2018-01-01

## 2018-01-01 ENCOUNTER — HOSPITAL ENCOUNTER (OUTPATIENT)
Dept: ULTRASOUND IMAGING | Age: 58
Discharge: HOME | DRG: 432 | End: 2018-04-13
Attending: NURSE PRACTITIONER
Payer: MEDICARE

## 2018-01-01 ENCOUNTER — TELEPHONE (OUTPATIENT)
Dept: HEMATOLOGY | Age: 58
End: 2018-01-01

## 2018-01-01 ENCOUNTER — APPOINTMENT (OUTPATIENT)
Dept: CT IMAGING | Age: 58
DRG: 433 | End: 2018-01-01
Attending: NURSE PRACTITIONER
Payer: MEDICARE

## 2018-01-01 ENCOUNTER — OFFICE VISIT (OUTPATIENT)
Dept: HEMATOLOGY | Age: 58
End: 2018-01-01

## 2018-01-01 ENCOUNTER — HOSPITAL ENCOUNTER (OUTPATIENT)
Dept: ULTRASOUND IMAGING | Age: 58
Discharge: HOME | End: 2018-01-08
Attending: NURSE PRACTITIONER
Payer: MEDICARE

## 2018-01-01 ENCOUNTER — HOSPITAL ENCOUNTER (INPATIENT)
Age: 58
LOS: 2 days | Discharge: HOME | DRG: 433 | End: 2018-04-22
Attending: EMERGENCY MEDICINE | Admitting: HOSPITALIST
Payer: MEDICARE

## 2018-01-01 ENCOUNTER — APPOINTMENT (OUTPATIENT)
Dept: ULTRASOUND IMAGING | Age: 58
DRG: 433 | End: 2018-01-01
Attending: HOSPITALIST
Payer: MEDICARE

## 2018-01-01 ENCOUNTER — HOSPITAL ENCOUNTER (EMERGENCY)
Age: 58
Discharge: HOME | DRG: 432 | End: 2018-04-13
Attending: EMERGENCY MEDICINE
Payer: MEDICARE

## 2018-01-01 ENCOUNTER — APPOINTMENT (OUTPATIENT)
Dept: CT IMAGING | Age: 58
DRG: 372 | End: 2018-01-01
Attending: FAMILY MEDICINE
Payer: MEDICARE

## 2018-01-01 ENCOUNTER — APPOINTMENT (OUTPATIENT)
Dept: GENERAL RADIOLOGY | Age: 58
DRG: 372 | End: 2018-01-01
Attending: HOSPITALIST
Payer: MEDICARE

## 2018-01-01 ENCOUNTER — HOSPITAL ENCOUNTER (OUTPATIENT)
Dept: ULTRASOUND IMAGING | Age: 58
Discharge: HOME | End: 2018-02-12
Attending: NURSE PRACTITIONER
Payer: MEDICARE

## 2018-01-01 ENCOUNTER — HOSPITAL ENCOUNTER (INPATIENT)
Age: 58
LOS: 3 days | Discharge: HOME | DRG: 432 | End: 2018-04-16
Attending: EMERGENCY MEDICINE | Admitting: FAMILY MEDICINE
Payer: MEDICARE

## 2018-01-01 ENCOUNTER — HOSPITAL ENCOUNTER (INPATIENT)
Age: 58
LOS: 5 days | Discharge: HOME | DRG: 432 | End: 2018-01-28
Attending: EMERGENCY MEDICINE | Admitting: FAMILY MEDICINE
Payer: MEDICARE

## 2018-01-01 ENCOUNTER — HOSPITAL ENCOUNTER (INPATIENT)
Age: 58
LOS: 2 days | Discharge: HOME | DRG: 378 | End: 2018-02-19
Attending: EMERGENCY MEDICINE | Admitting: FAMILY MEDICINE
Payer: MEDICARE

## 2018-01-01 ENCOUNTER — APPOINTMENT (OUTPATIENT)
Dept: CT IMAGING | Age: 58
End: 2018-01-01
Attending: EMERGENCY MEDICINE
Payer: MEDICARE

## 2018-01-01 ENCOUNTER — APPOINTMENT (OUTPATIENT)
Dept: ULTRASOUND IMAGING | Age: 58
DRG: 433 | End: 2018-01-01
Attending: EMERGENCY MEDICINE
Payer: MEDICARE

## 2018-01-01 ENCOUNTER — APPOINTMENT (OUTPATIENT)
Dept: ULTRASOUND IMAGING | Age: 58
DRG: 372 | End: 2018-01-01
Attending: INTERNAL MEDICINE
Payer: MEDICARE

## 2018-01-01 ENCOUNTER — HOSPITAL ENCOUNTER (OUTPATIENT)
Dept: ULTRASOUND IMAGING | Age: 58
Discharge: HOME | End: 2018-03-22
Attending: INTERNAL MEDICINE
Payer: MEDICARE

## 2018-01-01 ENCOUNTER — APPOINTMENT (OUTPATIENT)
Dept: GENERAL RADIOLOGY | Age: 58
DRG: 432 | End: 2018-01-01
Attending: INTERNAL MEDICINE
Payer: MEDICARE

## 2018-01-01 ENCOUNTER — APPOINTMENT (OUTPATIENT)
Dept: GENERAL RADIOLOGY | Age: 58
End: 2018-01-01
Attending: EMERGENCY MEDICINE
Payer: MEDICARE

## 2018-01-01 ENCOUNTER — HOSPITAL ENCOUNTER (EMERGENCY)
Age: 58
Discharge: HOME | End: 2018-01-10
Attending: EMERGENCY MEDICINE
Payer: MEDICARE

## 2018-01-01 ENCOUNTER — APPOINTMENT (OUTPATIENT)
Dept: GENERAL RADIOLOGY | Age: 58
DRG: 433 | End: 2018-01-01
Attending: INTERNAL MEDICINE
Payer: MEDICARE

## 2018-01-01 ENCOUNTER — HOSPITAL ENCOUNTER (OUTPATIENT)
Dept: ULTRASOUND IMAGING | Age: 58
Discharge: HOME | End: 2018-07-26
Attending: INTERNAL MEDICINE
Payer: MEDICARE

## 2018-01-01 ENCOUNTER — HOSPITAL ENCOUNTER (INPATIENT)
Age: 58
LOS: 3 days | Discharge: HOME | DRG: 442 | End: 2018-06-24
Attending: EMERGENCY MEDICINE | Admitting: FAMILY MEDICINE
Payer: MEDICARE

## 2018-01-01 ENCOUNTER — HOSPITAL ENCOUNTER (OUTPATIENT)
Dept: ULTRASOUND IMAGING | Age: 58
Discharge: HOME | DRG: 372 | End: 2018-07-13
Attending: NURSE PRACTITIONER
Payer: MEDICARE

## 2018-01-01 ENCOUNTER — APPOINTMENT (OUTPATIENT)
Dept: GENERAL RADIOLOGY | Age: 58
DRG: 378 | End: 2018-01-01
Attending: EMERGENCY MEDICINE
Payer: MEDICARE

## 2018-01-01 ENCOUNTER — HOSPITAL ENCOUNTER (OUTPATIENT)
Dept: ULTRASOUND IMAGING | Age: 58
Discharge: HOME | End: 2018-08-02
Attending: INTERNAL MEDICINE
Payer: MEDICARE

## 2018-01-01 ENCOUNTER — HOSPITAL ENCOUNTER (OUTPATIENT)
Dept: ULTRASOUND IMAGING | Age: 58
Discharge: HOME | End: 2018-03-02
Attending: INTERNAL MEDICINE
Payer: MEDICARE

## 2018-01-01 ENCOUNTER — APPOINTMENT (OUTPATIENT)
Dept: ULTRASOUND IMAGING | Age: 58
DRG: 372 | End: 2018-01-01
Attending: HOSPITALIST
Payer: MEDICARE

## 2018-01-01 ENCOUNTER — HOSPITAL ENCOUNTER (OUTPATIENT)
Dept: NON INVASIVE DIAGNOSTICS | Age: 58
Discharge: HOME | End: 2018-06-05
Attending: INTERNAL MEDICINE
Payer: MEDICARE

## 2018-01-01 ENCOUNTER — HOSPITAL ENCOUNTER (OUTPATIENT)
Dept: INTERVENTIONAL RADIOLOGY/VASCULAR | Age: 58
Discharge: HOME | End: 2018-08-21
Attending: INTERNAL MEDICINE | Admitting: INTERNAL MEDICINE
Payer: MEDICARE

## 2018-01-01 ENCOUNTER — APPOINTMENT (OUTPATIENT)
Dept: ULTRASOUND IMAGING | Age: 58
DRG: 372 | End: 2018-01-01
Attending: STUDENT IN AN ORGANIZED HEALTH CARE EDUCATION/TRAINING PROGRAM
Payer: MEDICARE

## 2018-01-01 ENCOUNTER — APPOINTMENT (OUTPATIENT)
Dept: GENERAL RADIOLOGY | Age: 58
DRG: 433 | End: 2018-01-01
Attending: EMERGENCY MEDICINE
Payer: MEDICARE

## 2018-01-01 ENCOUNTER — APPOINTMENT (OUTPATIENT)
Dept: CT IMAGING | Age: 58
DRG: 433 | End: 2018-01-01
Attending: HOSPITALIST
Payer: MEDICARE

## 2018-01-01 ENCOUNTER — APPOINTMENT (OUTPATIENT)
Dept: NUCLEAR MEDICINE | Age: 58
DRG: 442 | End: 2018-01-01
Attending: NURSE PRACTITIONER
Payer: MEDICARE

## 2018-01-01 ENCOUNTER — HOSPITAL ENCOUNTER (OUTPATIENT)
Dept: ULTRASOUND IMAGING | Age: 58
Discharge: HOME | End: 2018-08-15
Attending: INTERNAL MEDICINE
Payer: MEDICARE

## 2018-01-01 ENCOUNTER — HOSPITAL ENCOUNTER (INPATIENT)
Age: 58
LOS: 8 days | Discharge: HOME | DRG: 433 | End: 2018-05-07
Attending: STUDENT IN AN ORGANIZED HEALTH CARE EDUCATION/TRAINING PROGRAM | Admitting: HOSPITALIST
Payer: MEDICARE

## 2018-01-01 VITALS
HEIGHT: 70 IN | RESPIRATION RATE: 15 BRPM | OXYGEN SATURATION: 96 % | HEART RATE: 62 BPM | BODY MASS INDEX: 28.59 KG/M2 | SYSTOLIC BLOOD PRESSURE: 102 MMHG | WEIGHT: 199.74 LBS | TEMPERATURE: 98 F | DIASTOLIC BLOOD PRESSURE: 56 MMHG

## 2018-01-01 VITALS
BODY MASS INDEX: 30.92 KG/M2 | OXYGEN SATURATION: 100 % | WEIGHT: 216 LBS | TEMPERATURE: 97.9 F | HEIGHT: 70 IN | HEART RATE: 60 BPM | SYSTOLIC BLOOD PRESSURE: 117 MMHG | RESPIRATION RATE: 18 BRPM | DIASTOLIC BLOOD PRESSURE: 42 MMHG

## 2018-01-01 VITALS
DIASTOLIC BLOOD PRESSURE: 70 MMHG | HEIGHT: 70 IN | BODY MASS INDEX: 31.52 KG/M2 | OXYGEN SATURATION: 98 % | WEIGHT: 220.2 LBS | RESPIRATION RATE: 16 BRPM | TEMPERATURE: 98.2 F | HEART RATE: 62 BPM | SYSTOLIC BLOOD PRESSURE: 122 MMHG

## 2018-01-01 VITALS
TEMPERATURE: 97.7 F | RESPIRATION RATE: 18 BRPM | DIASTOLIC BLOOD PRESSURE: 80 MMHG | SYSTOLIC BLOOD PRESSURE: 150 MMHG | HEART RATE: 52 BPM

## 2018-01-01 VITALS
HEIGHT: 70 IN | SYSTOLIC BLOOD PRESSURE: 127 MMHG | BODY MASS INDEX: 32.21 KG/M2 | DIASTOLIC BLOOD PRESSURE: 55 MMHG | RESPIRATION RATE: 20 BRPM | HEART RATE: 71 BPM | WEIGHT: 225 LBS | OXYGEN SATURATION: 99 % | TEMPERATURE: 97.9 F

## 2018-01-01 VITALS
DIASTOLIC BLOOD PRESSURE: 59 MMHG | HEART RATE: 66 BPM | BODY MASS INDEX: 31.14 KG/M2 | SYSTOLIC BLOOD PRESSURE: 124 MMHG | OXYGEN SATURATION: 100 % | WEIGHT: 217 LBS | TEMPERATURE: 97.5 F

## 2018-01-01 VITALS
WEIGHT: 199.6 LBS | SYSTOLIC BLOOD PRESSURE: 110 MMHG | TEMPERATURE: 96.6 F | BODY MASS INDEX: 29.56 KG/M2 | HEIGHT: 69 IN | OXYGEN SATURATION: 100 % | DIASTOLIC BLOOD PRESSURE: 50 MMHG | HEART RATE: 61 BPM

## 2018-01-01 VITALS
TEMPERATURE: 99.2 F | TEMPERATURE: 98.4 F | SYSTOLIC BLOOD PRESSURE: 119 MMHG | HEART RATE: 60 BPM | DIASTOLIC BLOOD PRESSURE: 55 MMHG | HEIGHT: 70 IN | WEIGHT: 218.4 LBS | OXYGEN SATURATION: 97 % | HEART RATE: 69 BPM | RESPIRATION RATE: 14 BRPM | DIASTOLIC BLOOD PRESSURE: 49 MMHG | BODY MASS INDEX: 31.26 KG/M2 | SYSTOLIC BLOOD PRESSURE: 123 MMHG | BODY MASS INDEX: 26.51 KG/M2 | OXYGEN SATURATION: 96 % | HEIGHT: 70 IN | WEIGHT: 185.19 LBS

## 2018-01-01 VITALS
RESPIRATION RATE: 18 BRPM | HEART RATE: 60 BPM | BODY MASS INDEX: 30.06 KG/M2 | SYSTOLIC BLOOD PRESSURE: 107 MMHG | OXYGEN SATURATION: 100 % | WEIGHT: 210 LBS | DIASTOLIC BLOOD PRESSURE: 48 MMHG | TEMPERATURE: 98.2 F | HEIGHT: 70 IN

## 2018-01-01 VITALS
HEIGHT: 70 IN | OXYGEN SATURATION: 99 % | WEIGHT: 191.6 LBS | RESPIRATION RATE: 18 BRPM | TEMPERATURE: 98.2 F | DIASTOLIC BLOOD PRESSURE: 44 MMHG | OXYGEN SATURATION: 98 % | SYSTOLIC BLOOD PRESSURE: 93 MMHG | HEART RATE: 60 BPM | DIASTOLIC BLOOD PRESSURE: 60 MMHG | BODY MASS INDEX: 29.16 KG/M2 | BODY MASS INDEX: 27.43 KG/M2 | HEIGHT: 70 IN | TEMPERATURE: 97.2 F | SYSTOLIC BLOOD PRESSURE: 120 MMHG | WEIGHT: 203.71 LBS | HEART RATE: 68 BPM

## 2018-01-01 VITALS — HEART RATE: 76 BPM | DIASTOLIC BLOOD PRESSURE: 68 MMHG | SYSTOLIC BLOOD PRESSURE: 126 MMHG | RESPIRATION RATE: 18 BRPM

## 2018-01-01 VITALS
HEART RATE: 69 BPM | OXYGEN SATURATION: 95 % | DIASTOLIC BLOOD PRESSURE: 60 MMHG | RESPIRATION RATE: 18 BRPM | SYSTOLIC BLOOD PRESSURE: 110 MMHG

## 2018-01-01 VITALS
OXYGEN SATURATION: 100 % | HEART RATE: 60 BPM | TEMPERATURE: 97.9 F | SYSTOLIC BLOOD PRESSURE: 114 MMHG | RESPIRATION RATE: 16 BRPM | DIASTOLIC BLOOD PRESSURE: 44 MMHG

## 2018-01-01 VITALS
OXYGEN SATURATION: 97 % | HEIGHT: 69 IN | HEART RATE: 69 BPM | DIASTOLIC BLOOD PRESSURE: 68 MMHG | TEMPERATURE: 98 F | SYSTOLIC BLOOD PRESSURE: 125 MMHG | WEIGHT: 200 LBS | RESPIRATION RATE: 18 BRPM | BODY MASS INDEX: 29.62 KG/M2

## 2018-01-01 VITALS
BODY MASS INDEX: 26.24 KG/M2 | RESPIRATION RATE: 16 BRPM | HEART RATE: 72 BPM | HEIGHT: 70 IN | SYSTOLIC BLOOD PRESSURE: 131 MMHG | WEIGHT: 183.3 LBS | OXYGEN SATURATION: 100 % | DIASTOLIC BLOOD PRESSURE: 73 MMHG | TEMPERATURE: 98 F

## 2018-01-01 VITALS
DIASTOLIC BLOOD PRESSURE: 46 MMHG | TEMPERATURE: 98.4 F | RESPIRATION RATE: 16 BRPM | OXYGEN SATURATION: 100 % | HEART RATE: 62 BPM | SYSTOLIC BLOOD PRESSURE: 122 MMHG

## 2018-01-01 VITALS
WEIGHT: 205.4 LBS | TEMPERATURE: 97.9 F | DIASTOLIC BLOOD PRESSURE: 59 MMHG | HEIGHT: 70 IN | SYSTOLIC BLOOD PRESSURE: 122 MMHG | HEART RATE: 60 BPM | BODY MASS INDEX: 29.41 KG/M2 | OXYGEN SATURATION: 99 %

## 2018-01-01 VITALS
DIASTOLIC BLOOD PRESSURE: 39 MMHG | RESPIRATION RATE: 18 BRPM | OXYGEN SATURATION: 96 % | HEART RATE: 74 BPM | HEIGHT: 70 IN | TEMPERATURE: 98.5 F | SYSTOLIC BLOOD PRESSURE: 106 MMHG

## 2018-01-01 VITALS
TEMPERATURE: 97.8 F | HEART RATE: 60 BPM | DIASTOLIC BLOOD PRESSURE: 58 MMHG | SYSTOLIC BLOOD PRESSURE: 122 MMHG | OXYGEN SATURATION: 98 % | BODY MASS INDEX: 32.4 KG/M2 | WEIGHT: 225.8 LBS

## 2018-01-01 VITALS
OXYGEN SATURATION: 98 % | SYSTOLIC BLOOD PRESSURE: 140 MMHG | WEIGHT: 220 LBS | HEART RATE: 60 BPM | BODY MASS INDEX: 31.57 KG/M2 | DIASTOLIC BLOOD PRESSURE: 57 MMHG | RESPIRATION RATE: 18 BRPM

## 2018-01-01 VITALS
SYSTOLIC BLOOD PRESSURE: 116 MMHG | OXYGEN SATURATION: 99 % | RESPIRATION RATE: 23 BRPM | DIASTOLIC BLOOD PRESSURE: 68 MMHG | RESPIRATION RATE: 16 BRPM | BODY MASS INDEX: 28.15 KG/M2 | HEART RATE: 80 BPM | WEIGHT: 196.21 LBS | DIASTOLIC BLOOD PRESSURE: 52 MMHG | TEMPERATURE: 98.4 F | OXYGEN SATURATION: 97 % | SYSTOLIC BLOOD PRESSURE: 127 MMHG | HEART RATE: 62 BPM

## 2018-01-01 VITALS
DIASTOLIC BLOOD PRESSURE: 48 MMHG | RESPIRATION RATE: 20 BRPM | HEART RATE: 60 BPM | WEIGHT: 218.48 LBS | BODY MASS INDEX: 31.35 KG/M2 | OXYGEN SATURATION: 100 % | SYSTOLIC BLOOD PRESSURE: 101 MMHG | TEMPERATURE: 97.6 F

## 2018-01-01 VITALS
HEART RATE: 50 BPM | DIASTOLIC BLOOD PRESSURE: 70 MMHG | SYSTOLIC BLOOD PRESSURE: 135 MMHG | RESPIRATION RATE: 22 BRPM | OXYGEN SATURATION: 98 %

## 2018-01-01 VITALS
HEART RATE: 59 BPM | OXYGEN SATURATION: 99 % | RESPIRATION RATE: 18 BRPM | DIASTOLIC BLOOD PRESSURE: 47 MMHG | WEIGHT: 225 LBS | BODY MASS INDEX: 32.21 KG/M2 | HEIGHT: 70 IN | SYSTOLIC BLOOD PRESSURE: 131 MMHG | TEMPERATURE: 97.7 F

## 2018-01-01 VITALS
HEIGHT: 70 IN | BODY MASS INDEX: 29.15 KG/M2 | OXYGEN SATURATION: 94 % | WEIGHT: 203.6 LBS | TEMPERATURE: 98.6 F | DIASTOLIC BLOOD PRESSURE: 54 MMHG | HEART RATE: 66 BPM | SYSTOLIC BLOOD PRESSURE: 113 MMHG

## 2018-01-01 VITALS — SYSTOLIC BLOOD PRESSURE: 122 MMHG | HEART RATE: 62 BPM | DIASTOLIC BLOOD PRESSURE: 66 MMHG

## 2018-01-01 VITALS
TEMPERATURE: 98 F | BODY MASS INDEX: 28.92 KG/M2 | SYSTOLIC BLOOD PRESSURE: 114 MMHG | WEIGHT: 202 LBS | HEART RATE: 88 BPM | HEIGHT: 70 IN | RESPIRATION RATE: 20 BRPM | OXYGEN SATURATION: 98 % | DIASTOLIC BLOOD PRESSURE: 43 MMHG

## 2018-01-01 VITALS
SYSTOLIC BLOOD PRESSURE: 107 MMHG | HEART RATE: 60 BPM | DIASTOLIC BLOOD PRESSURE: 42 MMHG | WEIGHT: 199 LBS | RESPIRATION RATE: 12 BRPM | HEIGHT: 70 IN | BODY MASS INDEX: 28.49 KG/M2 | OXYGEN SATURATION: 100 % | TEMPERATURE: 97.8 F

## 2018-01-01 VITALS
BODY MASS INDEX: 32.1 KG/M2 | TEMPERATURE: 98.2 F | HEIGHT: 70 IN | SYSTOLIC BLOOD PRESSURE: 116 MMHG | HEART RATE: 69 BPM | RESPIRATION RATE: 17 BRPM | DIASTOLIC BLOOD PRESSURE: 65 MMHG | OXYGEN SATURATION: 100 % | WEIGHT: 224.21 LBS

## 2018-01-01 VITALS
TEMPERATURE: 97.6 F | HEART RATE: 60 BPM | SYSTOLIC BLOOD PRESSURE: 137 MMHG | OXYGEN SATURATION: 98 % | DIASTOLIC BLOOD PRESSURE: 38 MMHG | RESPIRATION RATE: 18 BRPM

## 2018-01-01 VITALS
WEIGHT: 189.82 LBS | SYSTOLIC BLOOD PRESSURE: 136 MMHG | OXYGEN SATURATION: 100 % | TEMPERATURE: 98.3 F | BODY MASS INDEX: 27.17 KG/M2 | HEART RATE: 60 BPM | DIASTOLIC BLOOD PRESSURE: 66 MMHG | HEIGHT: 70 IN | RESPIRATION RATE: 20 BRPM

## 2018-01-01 VITALS
DIASTOLIC BLOOD PRESSURE: 52 MMHG | SYSTOLIC BLOOD PRESSURE: 104 MMHG | HEART RATE: 74 BPM | OXYGEN SATURATION: 95 % | RESPIRATION RATE: 20 BRPM

## 2018-01-01 VITALS
OXYGEN SATURATION: 100 % | HEART RATE: 59 BPM | TEMPERATURE: 98 F | SYSTOLIC BLOOD PRESSURE: 115 MMHG | RESPIRATION RATE: 16 BRPM | DIASTOLIC BLOOD PRESSURE: 60 MMHG

## 2018-01-01 DIAGNOSIS — K70.31 ALCOHOLIC CIRRHOSIS OF LIVER WITH ASCITES (HCC): Primary | ICD-10-CM

## 2018-01-01 DIAGNOSIS — D69.6 THROMBOCYTOPENIA (HCC): ICD-10-CM

## 2018-01-01 DIAGNOSIS — K70.31 ALCOHOLIC CIRRHOSIS OF LIVER WITH ASCITES (HCC): ICD-10-CM

## 2018-01-01 DIAGNOSIS — K76.82 HEPATIC ENCEPHALOPATHY: Primary | ICD-10-CM

## 2018-01-01 DIAGNOSIS — R18.8 ASCITES OF LIVER: ICD-10-CM

## 2018-01-01 DIAGNOSIS — K70.30 ALCOHOLIC CIRRHOSIS OF LIVER WITHOUT ASCITES (HCC): ICD-10-CM

## 2018-01-01 DIAGNOSIS — R18.8 ASCITES OF LIVER: Primary | ICD-10-CM

## 2018-01-01 DIAGNOSIS — N17.9 ACUTE RENAL FAILURE, UNSPECIFIED ACUTE RENAL FAILURE TYPE (HCC): ICD-10-CM

## 2018-01-01 DIAGNOSIS — K70.31 ASCITES DUE TO ALCOHOLIC CIRRHOSIS (HCC): ICD-10-CM

## 2018-01-01 DIAGNOSIS — K65.2 SBP (SPONTANEOUS BACTERIAL PERITONITIS) (HCC): ICD-10-CM

## 2018-01-01 DIAGNOSIS — R10.84 ABDOMINAL PAIN, GENERALIZED: Primary | ICD-10-CM

## 2018-01-01 DIAGNOSIS — N17.9 AKI (ACUTE KIDNEY INJURY) (HCC): ICD-10-CM

## 2018-01-01 DIAGNOSIS — E87.5 HYPERKALEMIA: ICD-10-CM

## 2018-01-01 DIAGNOSIS — F10.10 ALCOHOL ABUSE: ICD-10-CM

## 2018-01-01 DIAGNOSIS — F10.11 ALCOHOL ABUSE, IN REMISSION: ICD-10-CM

## 2018-01-01 DIAGNOSIS — K92.2 GASTROINTESTINAL HEMORRHAGE, UNSPECIFIED GASTROINTESTINAL HEMORRHAGE TYPE: ICD-10-CM

## 2018-01-01 DIAGNOSIS — K76.82 HEPATIC ENCEPHALOPATHY: ICD-10-CM

## 2018-01-01 DIAGNOSIS — Z95.810 CARDIAC DEFIBRILLATOR IN PLACE: ICD-10-CM

## 2018-01-01 DIAGNOSIS — I85.10 SECONDARY ESOPHAGEAL VARICES WITHOUT BLEEDING (HCC): ICD-10-CM

## 2018-01-01 DIAGNOSIS — I50.22 CHRONIC SYSTOLIC HEART FAILURE (HCC): Chronic | ICD-10-CM

## 2018-01-01 DIAGNOSIS — K76.6 PORTAL HYPERTENSIVE GASTROPATHY (HCC): ICD-10-CM

## 2018-01-01 DIAGNOSIS — K65.9 PERITONITIS (HCC): Primary | ICD-10-CM

## 2018-01-01 DIAGNOSIS — K31.89 PORTAL HYPERTENSIVE GASTROPATHY (HCC): ICD-10-CM

## 2018-01-01 DIAGNOSIS — E87.1 HYPONATREMIA: ICD-10-CM

## 2018-01-01 DIAGNOSIS — M79.605 LEFT LEG PAIN: ICD-10-CM

## 2018-01-01 DIAGNOSIS — Z51.5 END OF LIFE CARE: ICD-10-CM

## 2018-01-01 DIAGNOSIS — D64.9 ANEMIA, UNSPECIFIED TYPE: ICD-10-CM

## 2018-01-01 DIAGNOSIS — I85.11 SECONDARY ESOPHAGEAL VARICES WITH BLEEDING (HCC): ICD-10-CM

## 2018-01-01 DIAGNOSIS — R18.8 CIRRHOSIS OF LIVER WITH ASCITES, UNSPECIFIED HEPATIC CIRRHOSIS TYPE (HCC): ICD-10-CM

## 2018-01-01 DIAGNOSIS — K70.30 ALCOHOLIC CIRRHOSIS OF LIVER WITHOUT ASCITES (HCC): Primary | ICD-10-CM

## 2018-01-01 DIAGNOSIS — F51.01 PRIMARY INSOMNIA: ICD-10-CM

## 2018-01-01 DIAGNOSIS — D62 ACUTE BLOOD LOSS ANEMIA: ICD-10-CM

## 2018-01-01 DIAGNOSIS — I50.22 CHRONIC SYSTOLIC HEART FAILURE (HCC): ICD-10-CM

## 2018-01-01 DIAGNOSIS — G54.6 PAIN, PHANTOM LIMB (HCC): ICD-10-CM

## 2018-01-01 DIAGNOSIS — R41.0 CONFUSION: Primary | ICD-10-CM

## 2018-01-01 DIAGNOSIS — Z71.89 DNR (DO NOT RESUSCITATE) DISCUSSION: ICD-10-CM

## 2018-01-01 DIAGNOSIS — K92.0 GASTROINTESTINAL HEMORRHAGE WITH HEMATEMESIS: Primary | ICD-10-CM

## 2018-01-01 DIAGNOSIS — R18.8 OTHER ASCITES: ICD-10-CM

## 2018-01-01 DIAGNOSIS — K74.60 CIRRHOSIS OF LIVER WITH ASCITES, UNSPECIFIED HEPATIC CIRRHOSIS TYPE (HCC): ICD-10-CM

## 2018-01-01 DIAGNOSIS — N18.9 CHRONIC KIDNEY DISEASE, UNSPECIFIED CKD STAGE: ICD-10-CM

## 2018-01-01 DIAGNOSIS — K70.31 ASCITES DUE TO ALCOHOLIC CIRRHOSIS (HCC): Primary | ICD-10-CM

## 2018-01-01 DIAGNOSIS — R94.31 ABNORMAL ELECTROCARDIOGRAM: ICD-10-CM

## 2018-01-01 DIAGNOSIS — R53.1 WEAKNESS: ICD-10-CM

## 2018-01-01 DIAGNOSIS — K65.9 PERITONITIS (HCC): ICD-10-CM

## 2018-01-01 DIAGNOSIS — I95.9 HYPOTENSION, UNSPECIFIED HYPOTENSION TYPE: Primary | ICD-10-CM

## 2018-01-01 DIAGNOSIS — K92.2 GASTROINTESTINAL HEMORRHAGE, UNSPECIFIED GASTROINTESTINAL HEMORRHAGE TYPE: Primary | ICD-10-CM

## 2018-01-01 DIAGNOSIS — R18.8 OTHER ASCITES: Primary | ICD-10-CM

## 2018-01-01 DIAGNOSIS — R53.83 FATIGUE, UNSPECIFIED TYPE: Primary | ICD-10-CM

## 2018-01-01 LAB
ABO + RH BLD: NORMAL
AFP L3 MFR SERPL: 12.1 % (ref 0–9.9)
AFP SERPL-MCNC: 8.1 NG/ML (ref 0–8)
ALBUMIN SERPL-MCNC: 2.5 G/DL (ref 3.5–5)
ALBUMIN SERPL-MCNC: 2.5 G/DL (ref 3.5–5)
ALBUMIN SERPL-MCNC: 2.6 G/DL (ref 3.5–5)
ALBUMIN SERPL-MCNC: 2.6 G/DL (ref 3.5–5)
ALBUMIN SERPL-MCNC: 2.7 G/DL (ref 3.5–5)
ALBUMIN SERPL-MCNC: 2.7 G/DL (ref 3.5–5)
ALBUMIN SERPL-MCNC: 2.8 G/DL (ref 3.5–5)
ALBUMIN SERPL-MCNC: 2.9 G/DL (ref 3.5–5)
ALBUMIN SERPL-MCNC: 2.9 G/DL (ref 3.5–5)
ALBUMIN SERPL-MCNC: 3 G/DL (ref 3.5–5)
ALBUMIN SERPL-MCNC: 3.1 G/DL (ref 3.5–5)
ALBUMIN SERPL-MCNC: 3.2 G/DL (ref 3.5–5)
ALBUMIN SERPL-MCNC: 3.2 G/DL (ref 3.6–4.8)
ALBUMIN SERPL-MCNC: 3.4 G/DL (ref 3.5–5)
ALBUMIN SERPL-MCNC: 3.4 G/DL (ref 3.5–5)
ALBUMIN SERPL-MCNC: 3.4 G/DL (ref 3.5–5.5)
ALBUMIN SERPL-MCNC: 3.5 G/DL (ref 3.5–5)
ALBUMIN SERPL-MCNC: 3.5 G/DL (ref 3.6–4.8)
ALBUMIN SERPL-MCNC: 3.6 G/DL (ref 3.5–5)
ALBUMIN SERPL-MCNC: 3.6 G/DL (ref 3.6–4.8)
ALBUMIN SERPL-MCNC: 3.7 G/DL (ref 3.6–4.8)
ALBUMIN SERPL-MCNC: 3.9 G/DL (ref 3.5–5)
ALBUMIN SERPL-MCNC: 3.9 G/DL (ref 3.6–4.8)
ALBUMIN SERPL-MCNC: 4 G/DL (ref 3.5–5)
ALBUMIN SERPL-MCNC: 4.2 G/DL (ref 3.5–5)
ALBUMIN SERPL-MCNC: 4.2 G/DL (ref 3.5–5)
ALBUMIN SERPL-MCNC: 4.6 G/DL (ref 3.6–4.8)
ALBUMIN/GLOB SERPL: 0.7 {RATIO} (ref 1.1–2.2)
ALBUMIN/GLOB SERPL: 0.7 {RATIO} (ref 1.1–2.2)
ALBUMIN/GLOB SERPL: 0.8 {RATIO} (ref 1.1–2.2)
ALBUMIN/GLOB SERPL: 0.9 {RATIO} (ref 1.1–2.2)
ALBUMIN/GLOB SERPL: 0.9 {RATIO} (ref 1.1–2.2)
ALBUMIN/GLOB SERPL: 1 {RATIO} (ref 1.1–2.2)
ALBUMIN/GLOB SERPL: 1.1 {RATIO} (ref 1.1–2.2)
ALBUMIN/GLOB SERPL: 1.2 {RATIO} (ref 1.1–2.2)
ALBUMIN/GLOB SERPL: 1.3 {RATIO} (ref 1.1–2.2)
ALBUMIN/GLOB SERPL: 1.4 {RATIO} (ref 1.1–2.2)
ALBUMIN/GLOB SERPL: 1.5 {RATIO} (ref 1.1–2.2)
ALBUMIN/GLOB SERPL: 1.6 {RATIO} (ref 1.1–2.2)
ALBUMIN/GLOB SERPL: 1.6 {RATIO} (ref 1.1–2.2)
ALBUMIN/GLOB SERPL: 2 {RATIO} (ref 1.1–2.2)
ALP SERPL-CCNC: 107 IU/L (ref 39–117)
ALP SERPL-CCNC: 114 U/L (ref 45–117)
ALP SERPL-CCNC: 122 IU/L (ref 39–117)
ALP SERPL-CCNC: 122 IU/L (ref 39–117)
ALP SERPL-CCNC: 123 U/L (ref 45–117)
ALP SERPL-CCNC: 124 U/L (ref 45–117)
ALP SERPL-CCNC: 132 U/L (ref 45–117)
ALP SERPL-CCNC: 140 IU/L (ref 39–117)
ALP SERPL-CCNC: 141 U/L (ref 45–117)
ALP SERPL-CCNC: 144 U/L (ref 45–117)
ALP SERPL-CCNC: 146 IU/L (ref 39–117)
ALP SERPL-CCNC: 147 U/L (ref 45–117)
ALP SERPL-CCNC: 149 U/L (ref 45–117)
ALP SERPL-CCNC: 152 U/L (ref 45–117)
ALP SERPL-CCNC: 160 U/L (ref 45–117)
ALP SERPL-CCNC: 161 U/L (ref 45–117)
ALP SERPL-CCNC: 162 U/L (ref 45–117)
ALP SERPL-CCNC: 163 U/L (ref 45–117)
ALP SERPL-CCNC: 163 U/L (ref 45–117)
ALP SERPL-CCNC: 167 U/L (ref 45–117)
ALP SERPL-CCNC: 170 U/L (ref 45–117)
ALP SERPL-CCNC: 173 U/L (ref 45–117)
ALP SERPL-CCNC: 176 U/L (ref 45–117)
ALP SERPL-CCNC: 177 U/L (ref 45–117)
ALP SERPL-CCNC: 177 U/L (ref 45–117)
ALP SERPL-CCNC: 178 IU/L (ref 39–117)
ALP SERPL-CCNC: 183 U/L (ref 45–117)
ALP SERPL-CCNC: 220 U/L (ref 45–117)
ALP SERPL-CCNC: 238 U/L (ref 45–117)
ALP SERPL-CCNC: 247 IU/L (ref 39–117)
ALP SERPL-CCNC: 248 U/L (ref 45–117)
ALP SERPL-CCNC: 279 U/L (ref 45–117)
ALP SERPL-CCNC: 53 U/L (ref 45–117)
ALP SERPL-CCNC: 53 U/L (ref 45–117)
ALP SERPL-CCNC: 61 U/L (ref 45–117)
ALP SERPL-CCNC: 71 U/L (ref 45–117)
ALP SERPL-CCNC: 86 U/L (ref 45–117)
ALT SERPL-CCNC: 19 IU/L (ref 0–44)
ALT SERPL-CCNC: 21 IU/L (ref 0–44)
ALT SERPL-CCNC: 22 IU/L (ref 0–44)
ALT SERPL-CCNC: 22 U/L (ref 12–78)
ALT SERPL-CCNC: 23 U/L (ref 12–78)
ALT SERPL-CCNC: 23 U/L (ref 12–78)
ALT SERPL-CCNC: 24 U/L (ref 12–78)
ALT SERPL-CCNC: 27 IU/L (ref 0–44)
ALT SERPL-CCNC: 27 U/L (ref 12–78)
ALT SERPL-CCNC: 27 U/L (ref 12–78)
ALT SERPL-CCNC: 29 U/L (ref 12–78)
ALT SERPL-CCNC: 29 U/L (ref 12–78)
ALT SERPL-CCNC: 30 U/L (ref 12–78)
ALT SERPL-CCNC: 31 U/L (ref 12–78)
ALT SERPL-CCNC: 32 IU/L (ref 0–44)
ALT SERPL-CCNC: 33 U/L (ref 12–78)
ALT SERPL-CCNC: 34 U/L (ref 12–78)
ALT SERPL-CCNC: 35 U/L (ref 12–78)
ALT SERPL-CCNC: 37 IU/L (ref 0–44)
ALT SERPL-CCNC: 37 U/L (ref 12–78)
ALT SERPL-CCNC: 37 U/L (ref 12–78)
ALT SERPL-CCNC: 38 U/L (ref 12–78)
ALT SERPL-CCNC: 39 U/L (ref 12–78)
ALT SERPL-CCNC: 41 U/L (ref 12–78)
ALT SERPL-CCNC: 44 IU/L (ref 0–44)
ALT SERPL-CCNC: 46 U/L (ref 12–78)
ALT SERPL-CCNC: 48 U/L (ref 12–78)
ALT SERPL-CCNC: 52 U/L (ref 12–78)
AMMONIA PLAS-MCNC: 68 UG/DL (ref 27–102)
AMMONIA PLAS-SCNC: 103 UMOL/L
AMMONIA PLAS-SCNC: 114 UMOL/L
AMMONIA PLAS-SCNC: 118 UMOL/L
AMMONIA PLAS-SCNC: 122 UMOL/L
AMMONIA PLAS-SCNC: 134 UMOL/L
AMMONIA PLAS-SCNC: 144 UMOL/L
AMMONIA PLAS-SCNC: 227 UMOL/L
AMMONIA PLAS-SCNC: 23 UMOL/L
AMMONIA PLAS-SCNC: 233 UMOL/L
AMMONIA PLAS-SCNC: 27 UMOL/L
AMMONIA PLAS-SCNC: 52 UMOL/L
AMMONIA PLAS-SCNC: 54 UMOL/L
AMMONIA PLAS-SCNC: 58 UMOL/L
AMMONIA PLAS-SCNC: 66 UMOL/L
AMMONIA PLAS-SCNC: 71 UMOL/L
AMMONIA PLAS-SCNC: 73 UMOL/L
AMMONIA PLAS-SCNC: 78 UMOL/L
AMMONIA PLAS-SCNC: 80 UMOL/L
AMMONIA PLAS-SCNC: 81 UMOL/L
AMMONIA PLAS-SCNC: 82 UMOL/L
AMMONIA PLAS-SCNC: 84 UMOL/L
AMMONIA PLAS-SCNC: 97 UMOL/L
AMYLASE SERPL-CCNC: 73 U/L (ref 25–115)
ANION GAP SERPL CALC-SCNC: 10 MMOL/L (ref 5–15)
ANION GAP SERPL CALC-SCNC: 11 MMOL/L (ref 5–15)
ANION GAP SERPL CALC-SCNC: 12 MMOL/L (ref 5–15)
ANION GAP SERPL CALC-SCNC: 13 MMOL/L (ref 5–15)
ANION GAP SERPL CALC-SCNC: 13 MMOL/L (ref 5–15)
ANION GAP SERPL CALC-SCNC: 4 MMOL/L (ref 5–15)
ANION GAP SERPL CALC-SCNC: 6 MMOL/L (ref 5–15)
ANION GAP SERPL CALC-SCNC: 6 MMOL/L (ref 5–15)
ANION GAP SERPL CALC-SCNC: 7 MMOL/L (ref 5–15)
ANION GAP SERPL CALC-SCNC: 8 MMOL/L (ref 5–15)
ANION GAP SERPL CALC-SCNC: 9 MMOL/L (ref 5–15)
APPEARANCE FLD: ABNORMAL
APPEARANCE FLD: CLEAR
APPEARANCE UR: ABNORMAL
APPEARANCE UR: CLEAR
APTT PPP: 30 SEC (ref 22.1–32.5)
APTT PPP: 30.9 SEC (ref 22.1–32.5)
AST SERPL-CCNC: 30 IU/L (ref 0–40)
AST SERPL-CCNC: 32 U/L (ref 15–37)
AST SERPL-CCNC: 33 IU/L (ref 0–40)
AST SERPL-CCNC: 34 IU/L (ref 0–40)
AST SERPL-CCNC: 36 U/L (ref 15–37)
AST SERPL-CCNC: 36 U/L (ref 15–37)
AST SERPL-CCNC: 37 U/L (ref 15–37)
AST SERPL-CCNC: 37 U/L (ref 15–37)
AST SERPL-CCNC: 38 U/L (ref 15–37)
AST SERPL-CCNC: 39 U/L (ref 15–37)
AST SERPL-CCNC: 39 U/L (ref 15–37)
AST SERPL-CCNC: 40 U/L (ref 15–37)
AST SERPL-CCNC: 41 U/L (ref 15–37)
AST SERPL-CCNC: 43 U/L (ref 15–37)
AST SERPL-CCNC: 44 U/L (ref 15–37)
AST SERPL-CCNC: 47 U/L (ref 15–37)
AST SERPL-CCNC: 49 U/L (ref 15–37)
AST SERPL-CCNC: 49 U/L (ref 15–37)
AST SERPL-CCNC: 51 IU/L (ref 0–40)
AST SERPL-CCNC: 53 U/L (ref 15–37)
AST SERPL-CCNC: 54 U/L (ref 15–37)
AST SERPL-CCNC: 58 IU/L (ref 0–40)
AST SERPL-CCNC: 59 IU/L (ref 0–40)
AST SERPL-CCNC: 59 U/L (ref 15–37)
AST SERPL-CCNC: 59 U/L (ref 15–37)
AST SERPL-CCNC: 60 U/L (ref 15–37)
AST SERPL-CCNC: 62 U/L (ref 15–37)
AST SERPL-CCNC: 64 U/L (ref 15–37)
AST SERPL-CCNC: 65 U/L (ref 15–37)
AST SERPL-CCNC: 66 U/L (ref 15–37)
AST SERPL-CCNC: 68 IU/L (ref 0–40)
AST SERPL-CCNC: 75 U/L (ref 15–37)
ATRIAL RATE: 15 BPM
ATRIAL RATE: 60 BPM
ATRIAL RATE: 65 BPM
ATRIAL RATE: 65 BPM
ATRIAL RATE: 83 BPM
ATTENDING PHYSICIAN, CST07: NORMAL
BACTERIA SPEC CULT: ABNORMAL
BACTERIA SPEC CULT: ABNORMAL
BACTERIA SPEC CULT: NORMAL
BACTERIA URNS QL MICRO: NEGATIVE /HPF
BASOPHILS # BLD AUTO: 0 X10E3/UL (ref 0–0.2)
BASOPHILS # BLD AUTO: 0 X10E3/UL (ref 0–0.2)
BASOPHILS # BLD: 0 K/UL (ref 0–0.1)
BASOPHILS # BLD: 0.1 K/UL (ref 0–0.1)
BASOPHILS NFR BLD AUTO: 0 %
BASOPHILS NFR BLD AUTO: 0 %
BASOPHILS NFR BLD: 0 % (ref 0–1)
BASOPHILS NFR BLD: 1 % (ref 0–1)
BILIRUB DIRECT SERPL-MCNC: 1.08 MG/DL (ref 0–0.4)
BILIRUB DIRECT SERPL-MCNC: 1.3 MG/DL (ref 0–0.2)
BILIRUB DIRECT SERPL-MCNC: 1.33 MG/DL (ref 0–0.4)
BILIRUB DIRECT SERPL-MCNC: 1.33 MG/DL (ref 0–0.4)
BILIRUB DIRECT SERPL-MCNC: 1.5 MG/DL (ref 0–0.2)
BILIRUB DIRECT SERPL-MCNC: 1.5 MG/DL (ref 0–0.2)
BILIRUB DIRECT SERPL-MCNC: 1.52 MG/DL (ref 0–0.4)
BILIRUB DIRECT SERPL-MCNC: 1.66 MG/DL (ref 0–0.4)
BILIRUB DIRECT SERPL-MCNC: 1.8 MG/DL (ref 0–0.2)
BILIRUB DIRECT SERPL-MCNC: 1.82 MG/DL (ref 0–0.4)
BILIRUB DIRECT SERPL-MCNC: 3.04 MG/DL (ref 0–0.4)
BILIRUB SERPL-MCNC: 1.9 MG/DL (ref 0.2–1)
BILIRUB SERPL-MCNC: 1.9 MG/DL (ref 0.2–1)
BILIRUB SERPL-MCNC: 2.1 MG/DL (ref 0.2–1)
BILIRUB SERPL-MCNC: 2.1 MG/DL (ref 0.2–1)
BILIRUB SERPL-MCNC: 2.2 MG/DL (ref 0.2–1)
BILIRUB SERPL-MCNC: 2.2 MG/DL (ref 0.2–1)
BILIRUB SERPL-MCNC: 2.2 MG/DL (ref 0–1.2)
BILIRUB SERPL-MCNC: 2.4 MG/DL (ref 0.2–1)
BILIRUB SERPL-MCNC: 2.5 MG/DL (ref 0.2–1)
BILIRUB SERPL-MCNC: 2.6 MG/DL (ref 0.2–1)
BILIRUB SERPL-MCNC: 2.8 MG/DL (ref 0.2–1)
BILIRUB SERPL-MCNC: 2.9 MG/DL (ref 0.2–1)
BILIRUB SERPL-MCNC: 3 MG/DL (ref 0.2–1)
BILIRUB SERPL-MCNC: 3 MG/DL (ref 0–1.2)
BILIRUB SERPL-MCNC: 3.1 MG/DL (ref 0.2–1)
BILIRUB SERPL-MCNC: 3.1 MG/DL (ref 0.2–1)
BILIRUB SERPL-MCNC: 3.2 MG/DL (ref 0.2–1)
BILIRUB SERPL-MCNC: 3.5 MG/DL (ref 0.2–1)
BILIRUB SERPL-MCNC: 3.6 MG/DL (ref 0.2–1)
BILIRUB SERPL-MCNC: 3.6 MG/DL (ref 0.2–1)
BILIRUB SERPL-MCNC: 3.6 MG/DL (ref 0–1.2)
BILIRUB SERPL-MCNC: 3.7 MG/DL (ref 0.2–1)
BILIRUB SERPL-MCNC: 3.7 MG/DL (ref 0–1.2)
BILIRUB SERPL-MCNC: 3.8 MG/DL (ref 0.2–1)
BILIRUB SERPL-MCNC: 3.8 MG/DL (ref 0–1.2)
BILIRUB SERPL-MCNC: 4.2 MG/DL (ref 0.2–1)
BILIRUB SERPL-MCNC: 4.5 MG/DL (ref 0.2–1)
BILIRUB SERPL-MCNC: 4.5 MG/DL (ref 0.2–1)
BILIRUB SERPL-MCNC: 4.5 MG/DL (ref 0–1.2)
BILIRUB SERPL-MCNC: 4.7 MG/DL (ref 0.2–1)
BILIRUB SERPL-MCNC: 4.7 MG/DL (ref 0.2–1)
BILIRUB SERPL-MCNC: 4.8 MG/DL (ref 0.2–1)
BILIRUB SERPL-MCNC: 5.1 MG/DL (ref 0.2–1)
BILIRUB SERPL-MCNC: 5.5 MG/DL (ref 0–1.2)
BILIRUB UR QL: NEGATIVE
BLD PROD TYP BPU: NORMAL
BLOOD GROUP ANTIBODIES SERPL: NORMAL
BPU ID: NORMAL
BUN SERPL-MCNC: 11 MG/DL (ref 6–20)
BUN SERPL-MCNC: 13 MG/DL (ref 6–20)
BUN SERPL-MCNC: 15 MG/DL (ref 6–20)
BUN SERPL-MCNC: 16 MG/DL (ref 6–20)
BUN SERPL-MCNC: 17 MG/DL (ref 6–20)
BUN SERPL-MCNC: 18 MG/DL (ref 6–20)
BUN SERPL-MCNC: 19 MG/DL (ref 6–20)
BUN SERPL-MCNC: 19 MG/DL (ref 6–20)
BUN SERPL-MCNC: 20 MG/DL (ref 6–20)
BUN SERPL-MCNC: 20 MG/DL (ref 6–20)
BUN SERPL-MCNC: 20 MG/DL (ref 8–27)
BUN SERPL-MCNC: 21 MG/DL (ref 6–20)
BUN SERPL-MCNC: 23 MG/DL (ref 6–20)
BUN SERPL-MCNC: 23 MG/DL (ref 6–20)
BUN SERPL-MCNC: 24 MG/DL (ref 6–20)
BUN SERPL-MCNC: 25 MG/DL (ref 6–20)
BUN SERPL-MCNC: 26 MG/DL (ref 6–20)
BUN SERPL-MCNC: 26 MG/DL (ref 6–20)
BUN SERPL-MCNC: 27 MG/DL (ref 6–20)
BUN SERPL-MCNC: 28 MG/DL (ref 6–20)
BUN SERPL-MCNC: 29 MG/DL (ref 6–20)
BUN SERPL-MCNC: 29 MG/DL (ref 8–27)
BUN SERPL-MCNC: 30 MG/DL (ref 6–20)
BUN SERPL-MCNC: 30 MG/DL (ref 8–27)
BUN SERPL-MCNC: 32 MG/DL (ref 6–20)
BUN SERPL-MCNC: 32 MG/DL (ref 6–20)
BUN SERPL-MCNC: 33 MG/DL (ref 6–20)
BUN SERPL-MCNC: 35 MG/DL (ref 6–20)
BUN SERPL-MCNC: 35 MG/DL (ref 6–20)
BUN SERPL-MCNC: 36 MG/DL (ref 8–27)
BUN SERPL-MCNC: 38 MG/DL (ref 6–20)
BUN SERPL-MCNC: 39 MG/DL (ref 6–20)
BUN SERPL-MCNC: 41 MG/DL (ref 8–27)
BUN SERPL-MCNC: 43 MG/DL (ref 6–20)
BUN SERPL-MCNC: 44 MG/DL (ref 6–20)
BUN SERPL-MCNC: 47 MG/DL (ref 8–27)
BUN SERPL-MCNC: 9 MG/DL (ref 6–24)
BUN/CREAT SERPL: 10 (ref 12–20)
BUN/CREAT SERPL: 10 (ref 9–20)
BUN/CREAT SERPL: 11 (ref 12–20)
BUN/CREAT SERPL: 12 (ref 12–20)
BUN/CREAT SERPL: 13 (ref 12–20)
BUN/CREAT SERPL: 14 (ref 12–20)
BUN/CREAT SERPL: 15 (ref 12–20)
BUN/CREAT SERPL: 16 (ref 10–24)
BUN/CREAT SERPL: 16 (ref 12–20)
BUN/CREAT SERPL: 17 (ref 10–24)
BUN/CREAT SERPL: 17 (ref 10–24)
BUN/CREAT SERPL: 17 (ref 12–20)
BUN/CREAT SERPL: 17 (ref 12–20)
BUN/CREAT SERPL: 18 (ref 10–24)
BUN/CREAT SERPL: 18 (ref 10–24)
BUN/CREAT SERPL: 18 (ref 12–20)
BUN/CREAT SERPL: 18 (ref 12–20)
BUN/CREAT SERPL: 19 (ref 12–20)
BUN/CREAT SERPL: 20 (ref 12–20)
BUN/CREAT SERPL: 20 (ref 12–20)
BUN/CREAT SERPL: 21 (ref 12–20)
BUN/CREAT SERPL: 22 (ref 12–20)
BUN/CREAT SERPL: 22 (ref 12–20)
BUN/CREAT SERPL: 23 (ref 10–24)
BUN/CREAT SERPL: 23 (ref 12–20)
BUN/CREAT SERPL: 23 (ref 12–20)
BUN/CREAT SERPL: 24 (ref 12–20)
BUN/CREAT SERPL: 25 (ref 12–20)
BUN/CREAT SERPL: 26 (ref 12–20)
BUN/CREAT SERPL: 27 (ref 12–20)
BUN/CREAT SERPL: 27 (ref 12–20)
BUN/CREAT SERPL: 28 (ref 12–20)
C DIFF TOX GENS STL QL NAA+PROBE: NEGATIVE
C JEJUNI+C COLI AG STL QL: NORMAL
C JEJUNI+C COLI AG STL QL: NORMAL
CALCIUM SERPL-MCNC: 10.1 MG/DL (ref 8.5–10.1)
CALCIUM SERPL-MCNC: 10.1 MG/DL (ref 8.5–10.1)
CALCIUM SERPL-MCNC: 10.2 MG/DL (ref 8.6–10.2)
CALCIUM SERPL-MCNC: 10.3 MG/DL (ref 8.5–10.1)
CALCIUM SERPL-MCNC: 7.9 MG/DL (ref 8.5–10.1)
CALCIUM SERPL-MCNC: 8 MG/DL (ref 8.5–10.1)
CALCIUM SERPL-MCNC: 8.1 MG/DL (ref 8.5–10.1)
CALCIUM SERPL-MCNC: 8.1 MG/DL (ref 8.5–10.1)
CALCIUM SERPL-MCNC: 8.2 MG/DL (ref 8.5–10.1)
CALCIUM SERPL-MCNC: 8.3 MG/DL (ref 8.5–10.1)
CALCIUM SERPL-MCNC: 8.4 MG/DL (ref 8.5–10.1)
CALCIUM SERPL-MCNC: 8.4 MG/DL (ref 8.5–10.1)
CALCIUM SERPL-MCNC: 8.5 MG/DL (ref 8.5–10.1)
CALCIUM SERPL-MCNC: 8.5 MG/DL (ref 8.5–10.1)
CALCIUM SERPL-MCNC: 8.6 MG/DL (ref 8.5–10.1)
CALCIUM SERPL-MCNC: 8.6 MG/DL (ref 8.5–10.1)
CALCIUM SERPL-MCNC: 8.6 MG/DL (ref 8.7–10.2)
CALCIUM SERPL-MCNC: 8.7 MG/DL (ref 8.5–10.1)
CALCIUM SERPL-MCNC: 8.8 MG/DL (ref 8.5–10.1)
CALCIUM SERPL-MCNC: 8.8 MG/DL (ref 8.6–10.2)
CALCIUM SERPL-MCNC: 8.8 MG/DL (ref 8.6–10.2)
CALCIUM SERPL-MCNC: 8.9 MG/DL (ref 8.5–10.1)
CALCIUM SERPL-MCNC: 9 MG/DL (ref 8.5–10.1)
CALCIUM SERPL-MCNC: 9.1 MG/DL (ref 8.5–10.1)
CALCIUM SERPL-MCNC: 9.2 MG/DL (ref 8.5–10.1)
CALCIUM SERPL-MCNC: 9.2 MG/DL (ref 8.6–10.2)
CALCIUM SERPL-MCNC: 9.2 MG/DL (ref 8.6–10.2)
CALCIUM SERPL-MCNC: 9.3 MG/DL (ref 8.5–10.1)
CALCIUM SERPL-MCNC: 9.3 MG/DL (ref 8.6–10.2)
CALCIUM SERPL-MCNC: 9.5 MG/DL (ref 8.5–10.1)
CALCIUM SERPL-MCNC: 9.5 MG/DL (ref 8.5–10.1)
CALCIUM SERPL-MCNC: 9.6 MG/DL (ref 8.5–10.1)
CALCIUM SERPL-MCNC: 9.6 MG/DL (ref 8.5–10.1)
CALCIUM SERPL-MCNC: 9.8 MG/DL (ref 8.5–10.1)
CALCIUM SERPL-MCNC: 9.9 MG/DL (ref 8.5–10.1)
CALCULATED P AXIS, ECG09: -29 DEGREES
CALCULATED P AXIS, ECG09: 0 DEGREES
CALCULATED P AXIS, ECG09: 115 DEGREES
CALCULATED P AXIS, ECG09: 5 DEGREES
CALCULATED P AXIS, ECG09: 98 DEGREES
CALCULATED R AXIS, ECG10: -112 DEGREES
CALCULATED R AXIS, ECG10: -122 DEGREES
CALCULATED R AXIS, ECG10: -158 DEGREES
CALCULATED R AXIS, ECG10: -165 DEGREES
CALCULATED R AXIS, ECG10: -166 DEGREES
CALCULATED R AXIS, ECG10: -170 DEGREES
CALCULATED R AXIS, ECG10: -178 DEGREES
CALCULATED T AXIS, ECG11: 10 DEGREES
CALCULATED T AXIS, ECG11: 2 DEGREES
CALCULATED T AXIS, ECG11: 30 DEGREES
CALCULATED T AXIS, ECG11: 33 DEGREES
CALCULATED T AXIS, ECG11: 43 DEGREES
CALCULATED T AXIS, ECG11: 6 DEGREES
CAOX CRY URNS QL MICRO: ABNORMAL
CAOX CRY URNS QL MICRO: ABNORMAL
CC UR VC: ABNORMAL
CC UR VC: NORMAL
CHLORIDE SERPL-SCNC: 100 MMOL/L (ref 97–108)
CHLORIDE SERPL-SCNC: 101 MMOL/L (ref 96–106)
CHLORIDE SERPL-SCNC: 101 MMOL/L (ref 97–108)
CHLORIDE SERPL-SCNC: 101 MMOL/L (ref 97–108)
CHLORIDE SERPL-SCNC: 102 MMOL/L (ref 97–108)
CHLORIDE SERPL-SCNC: 103 MMOL/L (ref 97–108)
CHLORIDE SERPL-SCNC: 104 MMOL/L (ref 97–108)
CHLORIDE SERPL-SCNC: 105 MMOL/L (ref 97–108)
CHLORIDE SERPL-SCNC: 106 MMOL/L (ref 97–108)
CHLORIDE SERPL-SCNC: 107 MMOL/L (ref 97–108)
CHLORIDE SERPL-SCNC: 89 MMOL/L (ref 96–106)
CHLORIDE SERPL-SCNC: 92 MMOL/L (ref 96–106)
CHLORIDE SERPL-SCNC: 92 MMOL/L (ref 97–108)
CHLORIDE SERPL-SCNC: 93 MMOL/L (ref 97–108)
CHLORIDE SERPL-SCNC: 94 MMOL/L (ref 97–108)
CHLORIDE SERPL-SCNC: 94 MMOL/L (ref 97–108)
CHLORIDE SERPL-SCNC: 95 MMOL/L (ref 97–108)
CHLORIDE SERPL-SCNC: 96 MMOL/L (ref 97–108)
CHLORIDE SERPL-SCNC: 96 MMOL/L (ref 97–108)
CHLORIDE SERPL-SCNC: 97 MMOL/L (ref 96–106)
CHLORIDE SERPL-SCNC: 97 MMOL/L (ref 97–108)
CHLORIDE SERPL-SCNC: 97 MMOL/L (ref 97–108)
CHLORIDE SERPL-SCNC: 98 MMOL/L (ref 96–106)
CHLORIDE SERPL-SCNC: 98 MMOL/L (ref 97–108)
CHLORIDE SERPL-SCNC: 99 MMOL/L (ref 97–108)
CO2 SERPL-SCNC: 17 MMOL/L (ref 21–32)
CO2 SERPL-SCNC: 19 MMOL/L (ref 21–32)
CO2 SERPL-SCNC: 20 MMOL/L (ref 20–29)
CO2 SERPL-SCNC: 20 MMOL/L (ref 20–29)
CO2 SERPL-SCNC: 20 MMOL/L (ref 21–32)
CO2 SERPL-SCNC: 20 MMOL/L (ref 21–32)
CO2 SERPL-SCNC: 21 MMOL/L (ref 18–29)
CO2 SERPL-SCNC: 21 MMOL/L (ref 20–29)
CO2 SERPL-SCNC: 21 MMOL/L (ref 21–32)
CO2 SERPL-SCNC: 22 MMOL/L (ref 18–29)
CO2 SERPL-SCNC: 22 MMOL/L (ref 21–32)
CO2 SERPL-SCNC: 23 MMOL/L (ref 21–32)
CO2 SERPL-SCNC: 24 MMOL/L (ref 18–29)
CO2 SERPL-SCNC: 24 MMOL/L (ref 18–29)
CO2 SERPL-SCNC: 24 MMOL/L (ref 21–32)
CO2 SERPL-SCNC: 25 MMOL/L (ref 21–32)
CO2 SERPL-SCNC: 25 MMOL/L (ref 21–32)
CO2 SERPL-SCNC: 26 MMOL/L (ref 21–32)
COLOR FLD: ABNORMAL
COLOR FLD: ABNORMAL
COLOR FLD: YELLOW
COLOR UR: ABNORMAL
COLOR UR: NORMAL
COLOR UR: NORMAL
CREAT SERPL-MCNC: 0.86 MG/DL (ref 0.7–1.3)
CREAT SERPL-MCNC: 0.88 MG/DL (ref 0.76–1.27)
CREAT SERPL-MCNC: 0.89 MG/DL (ref 0.7–1.3)
CREAT SERPL-MCNC: 0.92 MG/DL (ref 0.7–1.3)
CREAT SERPL-MCNC: 1.04 MG/DL (ref 0.7–1.3)
CREAT SERPL-MCNC: 1.05 MG/DL (ref 0.7–1.3)
CREAT SERPL-MCNC: 1.06 MG/DL (ref 0.7–1.3)
CREAT SERPL-MCNC: 1.09 MG/DL (ref 0.7–1.3)
CREAT SERPL-MCNC: 1.1 MG/DL (ref 0.7–1.3)
CREAT SERPL-MCNC: 1.12 MG/DL (ref 0.76–1.27)
CREAT SERPL-MCNC: 1.15 MG/DL (ref 0.7–1.3)
CREAT SERPL-MCNC: 1.18 MG/DL (ref 0.7–1.3)
CREAT SERPL-MCNC: 1.23 MG/DL (ref 0.7–1.3)
CREAT SERPL-MCNC: 1.31 MG/DL (ref 0.7–1.3)
CREAT SERPL-MCNC: 1.32 MG/DL (ref 0.7–1.3)
CREAT SERPL-MCNC: 1.32 MG/DL (ref 0.7–1.3)
CREAT SERPL-MCNC: 1.34 MG/DL (ref 0.7–1.3)
CREAT SERPL-MCNC: 1.34 MG/DL (ref 0.7–1.3)
CREAT SERPL-MCNC: 1.35 MG/DL (ref 0.7–1.3)
CREAT SERPL-MCNC: 1.41 MG/DL (ref 0.7–1.3)
CREAT SERPL-MCNC: 1.43 MG/DL (ref 0.7–1.3)
CREAT SERPL-MCNC: 1.47 MG/DL (ref 0.7–1.3)
CREAT SERPL-MCNC: 1.48 MG/DL (ref 0.7–1.3)
CREAT SERPL-MCNC: 1.48 MG/DL (ref 0.7–1.3)
CREAT SERPL-MCNC: 1.49 MG/DL (ref 0.7–1.3)
CREAT SERPL-MCNC: 1.57 MG/DL (ref 0.7–1.3)
CREAT SERPL-MCNC: 1.58 MG/DL (ref 0.7–1.3)
CREAT SERPL-MCNC: 1.6 MG/DL (ref 0.7–1.3)
CREAT SERPL-MCNC: 1.62 MG/DL (ref 0.7–1.3)
CREAT SERPL-MCNC: 1.63 MG/DL (ref 0.7–1.3)
CREAT SERPL-MCNC: 1.64 MG/DL (ref 0.7–1.3)
CREAT SERPL-MCNC: 1.68 MG/DL (ref 0.7–1.3)
CREAT SERPL-MCNC: 1.69 MG/DL (ref 0.7–1.3)
CREAT SERPL-MCNC: 1.7 MG/DL (ref 0.7–1.3)
CREAT SERPL-MCNC: 1.71 MG/DL (ref 0.76–1.27)
CREAT SERPL-MCNC: 1.74 MG/DL (ref 0.7–1.3)
CREAT SERPL-MCNC: 1.79 MG/DL (ref 0.7–1.3)
CREAT SERPL-MCNC: 1.83 MG/DL (ref 0.7–1.3)
CREAT SERPL-MCNC: 1.83 MG/DL (ref 0.7–1.3)
CREAT SERPL-MCNC: 1.85 MG/DL (ref 0.76–1.27)
CREAT SERPL-MCNC: 1.86 MG/DL (ref 0.7–1.3)
CREAT SERPL-MCNC: 1.92 MG/DL (ref 0.7–1.3)
CREAT SERPL-MCNC: 1.93 MG/DL (ref 0.7–1.3)
CREAT SERPL-MCNC: 2.01 MG/DL (ref 0.7–1.3)
CREAT SERPL-MCNC: 2.02 MG/DL (ref 0.76–1.27)
CREAT SERPL-MCNC: 2.02 MG/DL (ref 0.76–1.27)
CREAT SERPL-MCNC: 2.17 MG/DL (ref 0.7–1.3)
CREAT SERPL-MCNC: 2.24 MG/DL (ref 0.7–1.3)
CREAT SERPL-MCNC: 2.4 MG/DL (ref 0.76–1.27)
CREAT SERPL-MCNC: 2.47 MG/DL (ref 0.7–1.3)
CROSSMATCH RESULT,%XM: NORMAL
DIAGNOSIS, 93000: NORMAL
DIFFERENTIAL METHOD BLD: ABNORMAL
DIGOXIN SERPL-MCNC: 1 NG/ML (ref 0.9–2)
DIGOXIN SERPL-MCNC: 1.3 NG/ML (ref 0.9–2)
DIGOXIN SERPL-MCNC: 1.4 NG/ML (ref 0.9–2)
DIGOXIN SERPL-MCNC: 1.4 NG/ML (ref 0.9–2)
DIGOXIN SERPL-MCNC: 1.6 NG/ML (ref 0.9–2)
DIGOXIN SERPL-MCNC: 1.8 NG/ML (ref 0.9–2)
DIGOXIN SERPL-MCNC: 1.9 NG/ML (ref 0.9–2)
DIGOXIN SERPL-MCNC: 2 NG/ML (ref 0.9–2)
DIGOXIN SERPL-MCNC: 2.3 NG/ML (ref 0.9–2)
DIGOXIN SERPL-MCNC: 2.4 NG/ML (ref 0.9–2)
DUKE TM SCORE RESULT, CST14: NORMAL
DUKE TREADMILL SCORE, CST13: NORMAL
E COLI SXT1+2 STL IA: NO GROWTH
ECG INTERP BEFORE EX, CST11: NORMAL
ECG INTERP DURING EX, CST12: NORMAL
EOSINOPHIL # BLD AUTO: 0 X10E3/UL (ref 0–0.4)
EOSINOPHIL # BLD AUTO: 0.1 X10E3/UL (ref 0–0.4)
EOSINOPHIL # BLD: 0 K/UL (ref 0–0.4)
EOSINOPHIL # BLD: 0.1 K/UL (ref 0–0.4)
EOSINOPHIL NFR BLD AUTO: 1 %
EOSINOPHIL NFR BLD AUTO: 2 %
EOSINOPHIL NFR BLD: 0 % (ref 0–7)
EOSINOPHIL NFR BLD: 0 % (ref 0–7)
EOSINOPHIL NFR BLD: 1 % (ref 0–7)
EOSINOPHIL NFR BLD: 2 % (ref 0–7)
EPITH CASTS URNS QL MICRO: ABNORMAL /LPF
EPITH CASTS URNS QL MICRO: NORMAL /LPF
EPITH CASTS URNS QL MICRO: NORMAL /LPF
ERYTHROCYTE [DISTWIDTH] IN BLOOD BY AUTOMATED COUNT: 13 % (ref 11.5–14.5)
ERYTHROCYTE [DISTWIDTH] IN BLOOD BY AUTOMATED COUNT: 13.3 % (ref 11.5–14.5)
ERYTHROCYTE [DISTWIDTH] IN BLOOD BY AUTOMATED COUNT: 13.3 % (ref 11.5–14.5)
ERYTHROCYTE [DISTWIDTH] IN BLOOD BY AUTOMATED COUNT: 13.6 % (ref 11.5–14.5)
ERYTHROCYTE [DISTWIDTH] IN BLOOD BY AUTOMATED COUNT: 13.7 % (ref 11.5–14.5)
ERYTHROCYTE [DISTWIDTH] IN BLOOD BY AUTOMATED COUNT: 13.8 % (ref 11.5–14.5)
ERYTHROCYTE [DISTWIDTH] IN BLOOD BY AUTOMATED COUNT: 13.8 % (ref 11.5–14.5)
ERYTHROCYTE [DISTWIDTH] IN BLOOD BY AUTOMATED COUNT: 14.2 % (ref 11.5–14.5)
ERYTHROCYTE [DISTWIDTH] IN BLOOD BY AUTOMATED COUNT: 15.4 % (ref 12.3–15.4)
ERYTHROCYTE [DISTWIDTH] IN BLOOD BY AUTOMATED COUNT: 15.5 % (ref 12.3–15.4)
ERYTHROCYTE [DISTWIDTH] IN BLOOD BY AUTOMATED COUNT: 15.9 % (ref 11.5–14.5)
ERYTHROCYTE [DISTWIDTH] IN BLOOD BY AUTOMATED COUNT: 16 % (ref 11.5–14.5)
ERYTHROCYTE [DISTWIDTH] IN BLOOD BY AUTOMATED COUNT: 16.1 % (ref 11.5–14.5)
ERYTHROCYTE [DISTWIDTH] IN BLOOD BY AUTOMATED COUNT: 16.2 % (ref 11.5–14.5)
ERYTHROCYTE [DISTWIDTH] IN BLOOD BY AUTOMATED COUNT: 16.4 % (ref 11.5–14.5)
ERYTHROCYTE [DISTWIDTH] IN BLOOD BY AUTOMATED COUNT: 16.4 % (ref 12.3–15.4)
ERYTHROCYTE [DISTWIDTH] IN BLOOD BY AUTOMATED COUNT: 16.9 % (ref 11.5–14.5)
ERYTHROCYTE [DISTWIDTH] IN BLOOD BY AUTOMATED COUNT: 16.9 % (ref 11.5–14.5)
ERYTHROCYTE [DISTWIDTH] IN BLOOD BY AUTOMATED COUNT: 16.9 % (ref 12.3–15.4)
ERYTHROCYTE [DISTWIDTH] IN BLOOD BY AUTOMATED COUNT: 17 % (ref 12.3–15.4)
ERYTHROCYTE [DISTWIDTH] IN BLOOD BY AUTOMATED COUNT: 17.2 % (ref 11.5–14.5)
ERYTHROCYTE [DISTWIDTH] IN BLOOD BY AUTOMATED COUNT: 17.5 % (ref 11.5–14.5)
ERYTHROCYTE [DISTWIDTH] IN BLOOD BY AUTOMATED COUNT: 17.8 % (ref 11.5–14.5)
ERYTHROCYTE [DISTWIDTH] IN BLOOD BY AUTOMATED COUNT: 17.9 % (ref 11.5–14.5)
ERYTHROCYTE [DISTWIDTH] IN BLOOD BY AUTOMATED COUNT: 18 % (ref 11.5–14.5)
ERYTHROCYTE [DISTWIDTH] IN BLOOD BY AUTOMATED COUNT: 18.2 % (ref 11.5–14.5)
ERYTHROCYTE [DISTWIDTH] IN BLOOD BY AUTOMATED COUNT: 18.3 % (ref 11.5–14.5)
ERYTHROCYTE [DISTWIDTH] IN BLOOD BY AUTOMATED COUNT: 18.3 % (ref 11.5–14.5)
ERYTHROCYTE [DISTWIDTH] IN BLOOD BY AUTOMATED COUNT: 18.4 % (ref 11.5–14.5)
ERYTHROCYTE [DISTWIDTH] IN BLOOD BY AUTOMATED COUNT: 18.4 % (ref 11.5–14.5)
ERYTHROCYTE [DISTWIDTH] IN BLOOD BY AUTOMATED COUNT: 18.5 % (ref 11.5–14.5)
ERYTHROCYTE [DISTWIDTH] IN BLOOD BY AUTOMATED COUNT: 18.6 % (ref 11.5–14.5)
ERYTHROCYTE [DISTWIDTH] IN BLOOD BY AUTOMATED COUNT: 18.7 % (ref 11.5–14.5)
ERYTHROCYTE [DISTWIDTH] IN BLOOD BY AUTOMATED COUNT: 20 % (ref 12.3–15.4)
ERYTHROCYTE [DISTWIDTH] IN BLOOD BY AUTOMATED COUNT: 20.5 % (ref 11.5–14.5)
ERYTHROCYTE [DISTWIDTH] IN BLOOD BY AUTOMATED COUNT: 20.7 % (ref 11.5–14.5)
ERYTHROCYTE [DISTWIDTH] IN BLOOD BY AUTOMATED COUNT: 21.3 % (ref 11.5–14.5)
ERYTHROCYTE [DISTWIDTH] IN BLOOD BY AUTOMATED COUNT: 21.7 % (ref 11.5–14.5)
ERYTHROCYTE [DISTWIDTH] IN BLOOD BY AUTOMATED COUNT: 21.7 % (ref 12.3–15.4)
ERYTHROCYTE [DISTWIDTH] IN BLOOD BY AUTOMATED COUNT: 21.8 % (ref 11.5–14.5)
ERYTHROCYTE [DISTWIDTH] IN BLOOD BY AUTOMATED COUNT: 22.3 % (ref 11.5–14.5)
ERYTHROCYTE [DISTWIDTH] IN BLOOD BY AUTOMATED COUNT: 22.4 % (ref 11.5–14.5)
ERYTHROCYTE [DISTWIDTH] IN BLOOD BY AUTOMATED COUNT: 22.7 % (ref 11.5–14.5)
EST. AVERAGE GLUCOSE BLD GHB EST-MCNC: 108 MG/DL
EST. AVERAGE GLUCOSE BLD GHB EST-MCNC: 117 MG/DL
EST. AVERAGE GLUCOSE BLD GHB EST-MCNC: ABNORMAL MG/DL
ETHANOL BLD GC-MCNC: NEGATIVE %
ETHANOL SERPL-MCNC: 102 MG/DL
ETHANOL SERPL-MCNC: <10 MG/DL
FERRITIN SERPL-MCNC: 300 NG/ML (ref 26–388)
FUNCTIONAL CAPACITY, CST17: NORMAL
GFR SERPLBLD CREATININE-BSD FMLA CKD-EPI: 28 ML/MIN/1.73
GFR SERPLBLD CREATININE-BSD FMLA CKD-EPI: 33 ML/MIN/1.73
GFR SERPLBLD CREATININE-BSD FMLA CKD-EPI: 35 ML/MIN/1.73
GFR SERPLBLD CREATININE-BSD FMLA CKD-EPI: 40 ML/MIN/1.73
GFR SERPLBLD CREATININE-BSD FMLA CKD-EPI: 71 ML/MIN/1.73
GFR SERPLBLD CREATININE-BSD FMLA CKD-EPI: 82 ML/MIN/1.73
GLOBULIN SER CALC-MCNC: 1.8 G/DL (ref 2–4)
GLOBULIN SER CALC-MCNC: 2.1 G/DL (ref 2–4)
GLOBULIN SER CALC-MCNC: 2.4 G/DL (ref 2–4)
GLOBULIN SER CALC-MCNC: 2.4 G/DL (ref 2–4)
GLOBULIN SER CALC-MCNC: 2.6 G/DL (ref 2–4)
GLOBULIN SER CALC-MCNC: 2.7 G/DL (ref 2–4)
GLOBULIN SER CALC-MCNC: 2.9 G/DL (ref 2–4)
GLOBULIN SER CALC-MCNC: 3.1 G/DL (ref 2–4)
GLOBULIN SER CALC-MCNC: 3.1 G/DL (ref 2–4)
GLOBULIN SER CALC-MCNC: 3.2 G/DL (ref 2–4)
GLOBULIN SER CALC-MCNC: 3.3 G/DL (ref 2–4)
GLOBULIN SER CALC-MCNC: 3.4 G/DL (ref 2–4)
GLOBULIN SER CALC-MCNC: 3.4 G/DL (ref 2–4)
GLOBULIN SER CALC-MCNC: 3.5 G/DL (ref 2–4)
GLOBULIN SER CALC-MCNC: 3.8 G/DL (ref 2–4)
GLOBULIN SER CALC-MCNC: 3.9 G/DL (ref 2–4)
GLOBULIN SER CALC-MCNC: 4.2 G/DL (ref 2–4)
GLUCOSE BLD STRIP.AUTO-MCNC: 100 MG/DL (ref 65–100)
GLUCOSE BLD STRIP.AUTO-MCNC: 109 MG/DL (ref 65–100)
GLUCOSE BLD STRIP.AUTO-MCNC: 109 MG/DL (ref 65–100)
GLUCOSE BLD STRIP.AUTO-MCNC: 112 MG/DL (ref 65–100)
GLUCOSE BLD STRIP.AUTO-MCNC: 118 MG/DL (ref 65–100)
GLUCOSE BLD STRIP.AUTO-MCNC: 126 MG/DL (ref 65–100)
GLUCOSE BLD STRIP.AUTO-MCNC: 127 MG/DL (ref 65–100)
GLUCOSE BLD STRIP.AUTO-MCNC: 131 MG/DL (ref 65–100)
GLUCOSE BLD STRIP.AUTO-MCNC: 134 MG/DL (ref 65–100)
GLUCOSE BLD STRIP.AUTO-MCNC: 135 MG/DL (ref 65–100)
GLUCOSE BLD STRIP.AUTO-MCNC: 135 MG/DL (ref 65–100)
GLUCOSE BLD STRIP.AUTO-MCNC: 140 MG/DL (ref 65–100)
GLUCOSE BLD STRIP.AUTO-MCNC: 145 MG/DL (ref 65–100)
GLUCOSE BLD STRIP.AUTO-MCNC: 145 MG/DL (ref 65–100)
GLUCOSE BLD STRIP.AUTO-MCNC: 147 MG/DL (ref 65–100)
GLUCOSE BLD STRIP.AUTO-MCNC: 148 MG/DL (ref 65–100)
GLUCOSE BLD STRIP.AUTO-MCNC: 149 MG/DL (ref 65–100)
GLUCOSE BLD STRIP.AUTO-MCNC: 153 MG/DL (ref 65–100)
GLUCOSE BLD STRIP.AUTO-MCNC: 153 MG/DL (ref 65–100)
GLUCOSE BLD STRIP.AUTO-MCNC: 154 MG/DL (ref 65–100)
GLUCOSE BLD STRIP.AUTO-MCNC: 155 MG/DL (ref 65–100)
GLUCOSE BLD STRIP.AUTO-MCNC: 157 MG/DL (ref 65–100)
GLUCOSE BLD STRIP.AUTO-MCNC: 159 MG/DL (ref 65–100)
GLUCOSE BLD STRIP.AUTO-MCNC: 160 MG/DL (ref 65–100)
GLUCOSE BLD STRIP.AUTO-MCNC: 164 MG/DL (ref 65–100)
GLUCOSE BLD STRIP.AUTO-MCNC: 166 MG/DL (ref 65–100)
GLUCOSE BLD STRIP.AUTO-MCNC: 171 MG/DL (ref 65–100)
GLUCOSE BLD STRIP.AUTO-MCNC: 175 MG/DL (ref 65–100)
GLUCOSE BLD STRIP.AUTO-MCNC: 177 MG/DL (ref 65–100)
GLUCOSE BLD STRIP.AUTO-MCNC: 177 MG/DL (ref 65–100)
GLUCOSE BLD STRIP.AUTO-MCNC: 178 MG/DL (ref 65–100)
GLUCOSE BLD STRIP.AUTO-MCNC: 180 MG/DL (ref 65–100)
GLUCOSE BLD STRIP.AUTO-MCNC: 180 MG/DL (ref 65–100)
GLUCOSE BLD STRIP.AUTO-MCNC: 185 MG/DL (ref 65–100)
GLUCOSE BLD STRIP.AUTO-MCNC: 189 MG/DL (ref 65–100)
GLUCOSE BLD STRIP.AUTO-MCNC: 190 MG/DL (ref 65–100)
GLUCOSE BLD STRIP.AUTO-MCNC: 191 MG/DL (ref 65–100)
GLUCOSE BLD STRIP.AUTO-MCNC: 192 MG/DL (ref 65–100)
GLUCOSE BLD STRIP.AUTO-MCNC: 199 MG/DL (ref 65–100)
GLUCOSE BLD STRIP.AUTO-MCNC: 199 MG/DL (ref 65–100)
GLUCOSE BLD STRIP.AUTO-MCNC: 201 MG/DL (ref 65–100)
GLUCOSE BLD STRIP.AUTO-MCNC: 201 MG/DL (ref 65–100)
GLUCOSE BLD STRIP.AUTO-MCNC: 202 MG/DL (ref 65–100)
GLUCOSE BLD STRIP.AUTO-MCNC: 204 MG/DL (ref 65–100)
GLUCOSE BLD STRIP.AUTO-MCNC: 205 MG/DL (ref 65–100)
GLUCOSE BLD STRIP.AUTO-MCNC: 205 MG/DL (ref 65–100)
GLUCOSE BLD STRIP.AUTO-MCNC: 207 MG/DL (ref 65–100)
GLUCOSE BLD STRIP.AUTO-MCNC: 208 MG/DL (ref 65–100)
GLUCOSE BLD STRIP.AUTO-MCNC: 211 MG/DL (ref 65–100)
GLUCOSE BLD STRIP.AUTO-MCNC: 216 MG/DL (ref 65–100)
GLUCOSE BLD STRIP.AUTO-MCNC: 218 MG/DL (ref 65–100)
GLUCOSE BLD STRIP.AUTO-MCNC: 221 MG/DL (ref 65–100)
GLUCOSE BLD STRIP.AUTO-MCNC: 223 MG/DL (ref 65–100)
GLUCOSE BLD STRIP.AUTO-MCNC: 224 MG/DL (ref 65–100)
GLUCOSE BLD STRIP.AUTO-MCNC: 225 MG/DL (ref 65–100)
GLUCOSE BLD STRIP.AUTO-MCNC: 225 MG/DL (ref 65–100)
GLUCOSE BLD STRIP.AUTO-MCNC: 226 MG/DL (ref 65–100)
GLUCOSE BLD STRIP.AUTO-MCNC: 230 MG/DL (ref 65–100)
GLUCOSE BLD STRIP.AUTO-MCNC: 232 MG/DL (ref 65–100)
GLUCOSE BLD STRIP.AUTO-MCNC: 234 MG/DL (ref 65–100)
GLUCOSE BLD STRIP.AUTO-MCNC: 234 MG/DL (ref 65–100)
GLUCOSE BLD STRIP.AUTO-MCNC: 237 MG/DL (ref 65–100)
GLUCOSE BLD STRIP.AUTO-MCNC: 239 MG/DL (ref 65–100)
GLUCOSE BLD STRIP.AUTO-MCNC: 243 MG/DL (ref 65–100)
GLUCOSE BLD STRIP.AUTO-MCNC: 246 MG/DL (ref 65–100)
GLUCOSE BLD STRIP.AUTO-MCNC: 247 MG/DL (ref 65–100)
GLUCOSE BLD STRIP.AUTO-MCNC: 249 MG/DL (ref 65–100)
GLUCOSE BLD STRIP.AUTO-MCNC: 253 MG/DL (ref 65–100)
GLUCOSE BLD STRIP.AUTO-MCNC: 255 MG/DL (ref 65–100)
GLUCOSE BLD STRIP.AUTO-MCNC: 262 MG/DL (ref 65–100)
GLUCOSE BLD STRIP.AUTO-MCNC: 271 MG/DL (ref 65–100)
GLUCOSE BLD STRIP.AUTO-MCNC: 274 MG/DL (ref 65–100)
GLUCOSE BLD STRIP.AUTO-MCNC: 289 MG/DL (ref 65–100)
GLUCOSE BLD STRIP.AUTO-MCNC: 294 MG/DL (ref 65–100)
GLUCOSE BLD STRIP.AUTO-MCNC: 297 MG/DL (ref 65–100)
GLUCOSE BLD STRIP.AUTO-MCNC: 300 MG/DL (ref 65–100)
GLUCOSE BLD STRIP.AUTO-MCNC: 311 MG/DL (ref 65–100)
GLUCOSE BLD STRIP.AUTO-MCNC: 313 MG/DL (ref 65–100)
GLUCOSE BLD STRIP.AUTO-MCNC: 316 MG/DL (ref 65–100)
GLUCOSE BLD STRIP.AUTO-MCNC: 334 MG/DL (ref 65–100)
GLUCOSE BLD STRIP.AUTO-MCNC: 360 MG/DL (ref 65–100)
GLUCOSE BLD STRIP.AUTO-MCNC: 382 MG/DL (ref 65–100)
GLUCOSE BLD STRIP.AUTO-MCNC: 90 MG/DL (ref 65–100)
GLUCOSE SERPL-MCNC: 100 MG/DL (ref 65–100)
GLUCOSE SERPL-MCNC: 110 MG/DL (ref 65–100)
GLUCOSE SERPL-MCNC: 110 MG/DL (ref 65–100)
GLUCOSE SERPL-MCNC: 113 MG/DL (ref 65–100)
GLUCOSE SERPL-MCNC: 114 MG/DL (ref 65–100)
GLUCOSE SERPL-MCNC: 116 MG/DL (ref 65–100)
GLUCOSE SERPL-MCNC: 123 MG/DL (ref 65–99)
GLUCOSE SERPL-MCNC: 124 MG/DL (ref 65–100)
GLUCOSE SERPL-MCNC: 127 MG/DL (ref 65–100)
GLUCOSE SERPL-MCNC: 128 MG/DL (ref 65–100)
GLUCOSE SERPL-MCNC: 130 MG/DL (ref 65–100)
GLUCOSE SERPL-MCNC: 132 MG/DL (ref 65–100)
GLUCOSE SERPL-MCNC: 132 MG/DL (ref 65–99)
GLUCOSE SERPL-MCNC: 134 MG/DL (ref 65–100)
GLUCOSE SERPL-MCNC: 139 MG/DL (ref 65–100)
GLUCOSE SERPL-MCNC: 145 MG/DL (ref 65–100)
GLUCOSE SERPL-MCNC: 149 MG/DL (ref 65–100)
GLUCOSE SERPL-MCNC: 150 MG/DL (ref 65–100)
GLUCOSE SERPL-MCNC: 154 MG/DL (ref 65–100)
GLUCOSE SERPL-MCNC: 154 MG/DL (ref 65–99)
GLUCOSE SERPL-MCNC: 155 MG/DL (ref 65–100)
GLUCOSE SERPL-MCNC: 167 MG/DL (ref 65–100)
GLUCOSE SERPL-MCNC: 169 MG/DL (ref 65–100)
GLUCOSE SERPL-MCNC: 171 MG/DL (ref 65–100)
GLUCOSE SERPL-MCNC: 172 MG/DL (ref 65–100)
GLUCOSE SERPL-MCNC: 172 MG/DL (ref 65–99)
GLUCOSE SERPL-MCNC: 173 MG/DL (ref 65–100)
GLUCOSE SERPL-MCNC: 179 MG/DL (ref 65–100)
GLUCOSE SERPL-MCNC: 182 MG/DL (ref 65–100)
GLUCOSE SERPL-MCNC: 184 MG/DL (ref 65–99)
GLUCOSE SERPL-MCNC: 185 MG/DL (ref 65–100)
GLUCOSE SERPL-MCNC: 186 MG/DL (ref 65–100)
GLUCOSE SERPL-MCNC: 188 MG/DL (ref 65–100)
GLUCOSE SERPL-MCNC: 189 MG/DL (ref 65–100)
GLUCOSE SERPL-MCNC: 195 MG/DL (ref 65–100)
GLUCOSE SERPL-MCNC: 195 MG/DL (ref 65–100)
GLUCOSE SERPL-MCNC: 198 MG/DL (ref 65–100)
GLUCOSE SERPL-MCNC: 205 MG/DL (ref 65–100)
GLUCOSE SERPL-MCNC: 208 MG/DL (ref 65–100)
GLUCOSE SERPL-MCNC: 208 MG/DL (ref 65–99)
GLUCOSE SERPL-MCNC: 210 MG/DL (ref 65–100)
GLUCOSE SERPL-MCNC: 218 MG/DL (ref 65–100)
GLUCOSE SERPL-MCNC: 228 MG/DL (ref 65–100)
GLUCOSE SERPL-MCNC: 238 MG/DL (ref 65–100)
GLUCOSE SERPL-MCNC: 266 MG/DL (ref 65–99)
GLUCOSE SERPL-MCNC: 94 MG/DL (ref 65–100)
GLUCOSE UR STRIP.AUTO-MCNC: NEGATIVE MG/DL
GRAN CASTS URNS QL MICRO: ABNORMAL /LPF
HBA1C MFR BLD: 5.4 % (ref 4.2–6.3)
HBA1C MFR BLD: 5.7 % (ref 4.2–6.3)
HBA1C MFR BLD: <3.5 % (ref 4.2–6.3)
HCT VFR BLD AUTO: 18.8 % (ref 36.6–50.3)
HCT VFR BLD AUTO: 18.9 % (ref 36.6–50.3)
HCT VFR BLD AUTO: 19.5 % (ref 36.6–50.3)
HCT VFR BLD AUTO: 19.9 % (ref 36.6–50.3)
HCT VFR BLD AUTO: 20.2 % (ref 36.6–50.3)
HCT VFR BLD AUTO: 20.3 % (ref 36.6–50.3)
HCT VFR BLD AUTO: 20.6 % (ref 36.6–50.3)
HCT VFR BLD AUTO: 20.8 % (ref 36.6–50.3)
HCT VFR BLD AUTO: 20.9 % (ref 36.6–50.3)
HCT VFR BLD AUTO: 21.3 % (ref 36.6–50.3)
HCT VFR BLD AUTO: 21.8 % (ref 36.6–50.3)
HCT VFR BLD AUTO: 21.8 % (ref 36.6–50.3)
HCT VFR BLD AUTO: 21.9 % (ref 36.6–50.3)
HCT VFR BLD AUTO: 22 % (ref 36.6–50.3)
HCT VFR BLD AUTO: 22 % (ref 37.5–51)
HCT VFR BLD AUTO: 22.1 % (ref 36.6–50.3)
HCT VFR BLD AUTO: 22.4 % (ref 36.6–50.3)
HCT VFR BLD AUTO: 22.5 % (ref 36.6–50.3)
HCT VFR BLD AUTO: 22.6 % (ref 36.6–50.3)
HCT VFR BLD AUTO: 22.7 % (ref 36.6–50.3)
HCT VFR BLD AUTO: 22.7 % (ref 36.6–50.3)
HCT VFR BLD AUTO: 22.9 % (ref 36.6–50.3)
HCT VFR BLD AUTO: 22.9 % (ref 36.6–50.3)
HCT VFR BLD AUTO: 23.1 % (ref 36.6–50.3)
HCT VFR BLD AUTO: 23.1 % (ref 36.6–50.3)
HCT VFR BLD AUTO: 23.4 % (ref 36.6–50.3)
HCT VFR BLD AUTO: 23.8 % (ref 36.6–50.3)
HCT VFR BLD AUTO: 23.9 % (ref 36.6–50.3)
HCT VFR BLD AUTO: 24 % (ref 36.6–50.3)
HCT VFR BLD AUTO: 24.2 % (ref 36.6–50.3)
HCT VFR BLD AUTO: 24.3 % (ref 36.6–50.3)
HCT VFR BLD AUTO: 24.5 % (ref 36.6–50.3)
HCT VFR BLD AUTO: 24.9 % (ref 36.6–50.3)
HCT VFR BLD AUTO: 25.1 % (ref 36.6–50.3)
HCT VFR BLD AUTO: 25.2 % (ref 37.5–51)
HCT VFR BLD AUTO: 25.3 % (ref 36.6–50.3)
HCT VFR BLD AUTO: 25.3 % (ref 36.6–50.3)
HCT VFR BLD AUTO: 25.4 % (ref 37.5–51)
HCT VFR BLD AUTO: 25.6 % (ref 36.6–50.3)
HCT VFR BLD AUTO: 25.6 % (ref 36.6–50.3)
HCT VFR BLD AUTO: 25.9 % (ref 37.5–51)
HCT VFR BLD AUTO: 26.1 % (ref 36.6–50.3)
HCT VFR BLD AUTO: 26.2 % (ref 36.6–50.3)
HCT VFR BLD AUTO: 26.6 % (ref 36.6–50.3)
HCT VFR BLD AUTO: 26.9 % (ref 36.6–50.3)
HCT VFR BLD AUTO: 27.1 % (ref 37.5–51)
HCT VFR BLD AUTO: 27.4 % (ref 36.6–50.3)
HCT VFR BLD AUTO: 27.6 % (ref 37.5–51)
HCT VFR BLD AUTO: 27.8 % (ref 36.6–50.3)
HCT VFR BLD AUTO: 27.9 % (ref 37.5–51)
HCT VFR BLD AUTO: 28 % (ref 36.6–50.3)
HEMOCCULT STL QL: NEGATIVE
HEMOCCULT STL QL: NEGATIVE
HEMOCCULT STL QL: POSITIVE
HEMOCCULT STL QL: POSITIVE
HGB BLD-MCNC: 10 G/DL (ref 12.1–17)
HGB BLD-MCNC: 6.7 G/DL (ref 12.1–17)
HGB BLD-MCNC: 6.7 G/DL (ref 12.1–17)
HGB BLD-MCNC: 6.8 G/DL (ref 12.1–17)
HGB BLD-MCNC: 7 G/DL (ref 12.1–17)
HGB BLD-MCNC: 7 G/DL (ref 12.1–17)
HGB BLD-MCNC: 7.1 G/DL (ref 12.1–17)
HGB BLD-MCNC: 7.2 G/DL (ref 12.1–17)
HGB BLD-MCNC: 7.3 G/DL (ref 12.1–17)
HGB BLD-MCNC: 7.4 G/DL (ref 12.1–17)
HGB BLD-MCNC: 7.5 G/DL (ref 12.1–17)
HGB BLD-MCNC: 7.6 G/DL (ref 12.1–17)
HGB BLD-MCNC: 7.6 G/DL (ref 12.1–17)
HGB BLD-MCNC: 7.6 G/DL (ref 13–17.7)
HGB BLD-MCNC: 7.7 G/DL (ref 12.1–17)
HGB BLD-MCNC: 7.8 G/DL (ref 12.1–17)
HGB BLD-MCNC: 7.9 G/DL (ref 12.1–17)
HGB BLD-MCNC: 8 G/DL (ref 12.1–17)
HGB BLD-MCNC: 8 G/DL (ref 12.1–17)
HGB BLD-MCNC: 8.1 G/DL (ref 12.1–17)
HGB BLD-MCNC: 8.1 G/DL (ref 12.1–17)
HGB BLD-MCNC: 8.2 G/DL (ref 12.1–17)
HGB BLD-MCNC: 8.3 G/DL (ref 12.1–17)
HGB BLD-MCNC: 8.3 G/DL (ref 12.1–17)
HGB BLD-MCNC: 8.4 G/DL (ref 12.1–17)
HGB BLD-MCNC: 8.5 G/DL (ref 12.1–17)
HGB BLD-MCNC: 8.6 G/DL (ref 12.1–17)
HGB BLD-MCNC: 8.6 G/DL (ref 12.1–17)
HGB BLD-MCNC: 8.7 G/DL (ref 12.1–17)
HGB BLD-MCNC: 8.7 G/DL (ref 12.1–17)
HGB BLD-MCNC: 8.7 G/DL (ref 13–17.7)
HGB BLD-MCNC: 8.8 G/DL (ref 12.1–17)
HGB BLD-MCNC: 8.9 G/DL (ref 12.1–17)
HGB BLD-MCNC: 8.9 G/DL (ref 12.1–17)
HGB BLD-MCNC: 8.9 G/DL (ref 13–17.7)
HGB BLD-MCNC: 9 G/DL (ref 12.1–17)
HGB BLD-MCNC: 9.1 G/DL (ref 12.1–17)
HGB BLD-MCNC: 9.1 G/DL (ref 13–17.7)
HGB BLD-MCNC: 9.1 G/DL (ref 13–17.7)
HGB BLD-MCNC: 9.2 G/DL (ref 12.1–17)
HGB BLD-MCNC: 9.4 G/DL (ref 12.1–17)
HGB BLD-MCNC: 9.4 G/DL (ref 12.1–17)
HGB BLD-MCNC: 9.7 G/DL (ref 12.1–17)
HGB BLD-MCNC: 9.8 G/DL (ref 12.1–17)
HGB BLD-MCNC: 9.8 G/DL (ref 13–17.7)
HGB BLD-MCNC: 9.8 G/DL (ref 13–17.7)
HGB UR QL STRIP: ABNORMAL
HGB UR QL STRIP: ABNORMAL
HGB UR QL STRIP: NEGATIVE
HYALINE CASTS URNS QL MICRO: ABNORMAL /LPF (ref 0–5)
HYALINE CASTS URNS QL MICRO: NORMAL /LPF (ref 0–5)
HYALINE CASTS URNS QL MICRO: NORMAL /LPF (ref 0–5)
IMM GRANULOCYTES # BLD: 0 K/UL (ref 0–0.04)
IMM GRANULOCYTES # BLD: 0 X10E3/UL (ref 0–0.1)
IMM GRANULOCYTES # BLD: 0 X10E3/UL (ref 0–0.1)
IMM GRANULOCYTES # BLD: 0.1 K/UL (ref 0–0.04)
IMM GRANULOCYTES # BLD: 0.1 K/UL (ref 0–0.04)
IMM GRANULOCYTES NFR BLD AUTO: 0 % (ref 0–0.5)
IMM GRANULOCYTES NFR BLD AUTO: 1 % (ref 0–0.5)
IMM GRANULOCYTES NFR BLD: 0 %
IMM GRANULOCYTES NFR BLD: 0 %
INR PPP: 1.2 (ref 0.8–1.2)
INR PPP: 1.2 (ref 0.8–1.2)
INR PPP: 1.2 (ref 0.9–1.1)
INR PPP: 1.3 (ref 0.8–1.2)
INR PPP: 1.3 (ref 0.9–1.1)
INR PPP: 1.4 (ref 0.8–1.2)
INR PPP: 1.4 (ref 0.8–1.2)
INR PPP: 1.4 (ref 0.9–1.1)
INR PPP: 1.5 (ref 0.9–1.1)
INR PPP: 1.5 (ref 0.9–1.1)
IRON SATN MFR SERPL: 32 % (ref 20–50)
IRON SERPL-MCNC: 47 UG/DL (ref 35–150)
KETONES UR QL STRIP.AUTO: NEGATIVE MG/DL
KNOWN CARDIAC CONDITION, CST08: NORMAL
LACTATE BLD-SCNC: 1.5 MMOL/L (ref 0.4–2)
LACTATE SERPL-SCNC: 0.9 MMOL/L (ref 0.4–2)
LACTATE SERPL-SCNC: 1.6 MMOL/L (ref 0.4–2)
LACTATE SERPL-SCNC: 1.6 MMOL/L (ref 0.4–2)
LACTATE SERPL-SCNC: 2.6 MMOL/L (ref 0.4–2)
LACTATE SERPL-SCNC: 3.6 MMOL/L (ref 0.4–2)
LACTATE SERPL-SCNC: 5.6 MMOL/L (ref 0.4–2)
LACTATE SERPL-SCNC: 5.9 MMOL/L (ref 0.4–2)
LEUKOCYTE ESTERASE UR QL STRIP.AUTO: ABNORMAL
LEUKOCYTE ESTERASE UR QL STRIP.AUTO: NEGATIVE
LIPASE SERPL-CCNC: 339 U/L (ref 73–393)
LIPASE SERPL-CCNC: 439 U/L (ref 73–393)
LIPASE SERPL-CCNC: 470 U/L (ref 73–393)
LIPASE SERPL-CCNC: 534 U/L (ref 73–393)
LYMPHOCYTES # BLD AUTO: 0.3 X10E3/UL (ref 0.7–3.1)
LYMPHOCYTES # BLD AUTO: 0.8 X10E3/UL (ref 0.7–3.1)
LYMPHOCYTES # BLD: 0.1 K/UL (ref 0.8–3.5)
LYMPHOCYTES # BLD: 0.2 K/UL (ref 0.8–3.5)
LYMPHOCYTES # BLD: 0.3 K/UL (ref 0.8–3.5)
LYMPHOCYTES # BLD: 0.4 K/UL (ref 0.8–3.5)
LYMPHOCYTES NFR BLD AUTO: 11 %
LYMPHOCYTES NFR BLD AUTO: 12 %
LYMPHOCYTES NFR BLD: 11 % (ref 12–49)
LYMPHOCYTES NFR BLD: 12 % (ref 12–49)
LYMPHOCYTES NFR BLD: 12 % (ref 12–49)
LYMPHOCYTES NFR BLD: 13 % (ref 12–49)
LYMPHOCYTES NFR BLD: 4 % (ref 12–49)
LYMPHOCYTES NFR BLD: 5 % (ref 12–49)
LYMPHOCYTES NFR BLD: 6 % (ref 12–49)
LYMPHOCYTES NFR BLD: 7 % (ref 12–49)
LYMPHOCYTES NFR BLD: 8 % (ref 12–49)
LYMPHOCYTES NFR BLD: 9 % (ref 12–49)
LYMPHOCYTES NFR FLD: 10 %
LYMPHOCYTES NFR FLD: 10 %
LYMPHOCYTES NFR FLD: 22 %
LYMPHOCYTES NFR FLD: 25 %
LYMPHOCYTES NFR FLD: 25 %
LYMPHOCYTES NFR FLD: 28 %
LYMPHOCYTES NFR FLD: 29 %
LYMPHOCYTES NFR FLD: 32 %
LYMPHOCYTES NFR FLD: 5 %
LYMPHOCYTES NFR FLD: 5 %
LYMPHOCYTES NFR FLD: 54 %
LYMPHOCYTES NFR FLD: 6 %
LYMPHOCYTES NFR FLD: 61 %
LYMPHOCYTES NFR FLD: 8 %
LYMPHOCYTES NFR FLD: 8 %
MAGNESIUM SERPL-MCNC: 1.5 MG/DL (ref 1.6–2.4)
MAGNESIUM SERPL-MCNC: 1.6 MG/DL (ref 1.6–2.4)
MAGNESIUM SERPL-MCNC: 1.7 MG/DL (ref 1.6–2.4)
MAGNESIUM SERPL-MCNC: 1.7 MG/DL (ref 1.6–2.4)
MAGNESIUM SERPL-MCNC: 1.8 MG/DL (ref 1.6–2.4)
MAX. DIASTOLIC BP, CST04: 69 MMHG
MAX. HEART RATE, CST05: 65 BPM
MAX. SYSTOLIC BP, CST03: 137 MMHG
MCH RBC QN AUTO: 32.7 PG (ref 26.6–33)
MCH RBC QN AUTO: 33.2 PG (ref 26–34)
MCH RBC QN AUTO: 33.5 PG (ref 26–34)
MCH RBC QN AUTO: 33.5 PG (ref 26–34)
MCH RBC QN AUTO: 33.7 PG (ref 26–34)
MCH RBC QN AUTO: 33.9 PG (ref 26–34)
MCH RBC QN AUTO: 34 PG (ref 26–34)
MCH RBC QN AUTO: 34 PG (ref 26–34)
MCH RBC QN AUTO: 34.1 PG (ref 26–34)
MCH RBC QN AUTO: 34.1 PG (ref 26–34)
MCH RBC QN AUTO: 34.3 PG (ref 26.6–33)
MCH RBC QN AUTO: 34.3 PG (ref 26–34)
MCH RBC QN AUTO: 34.4 PG (ref 26–34)
MCH RBC QN AUTO: 34.5 PG (ref 26–34)
MCH RBC QN AUTO: 34.5 PG (ref 26–34)
MCH RBC QN AUTO: 34.6 PG (ref 26–34)
MCH RBC QN AUTO: 35.7 PG (ref 26.6–33)
MCH RBC QN AUTO: 35.7 PG (ref 26–34)
MCH RBC QN AUTO: 35.8 PG (ref 26–34)
MCH RBC QN AUTO: 35.9 PG (ref 26–34)
MCH RBC QN AUTO: 35.9 PG (ref 26–34)
MCH RBC QN AUTO: 36 PG (ref 26–34)
MCH RBC QN AUTO: 36 PG (ref 26–34)
MCH RBC QN AUTO: 36.2 PG (ref 26.6–33)
MCH RBC QN AUTO: 36.2 PG (ref 26–34)
MCH RBC QN AUTO: 36.2 PG (ref 26–34)
MCH RBC QN AUTO: 36.3 PG (ref 26–34)
MCH RBC QN AUTO: 36.4 PG (ref 26–34)
MCH RBC QN AUTO: 36.5 PG (ref 26–34)
MCH RBC QN AUTO: 36.6 PG (ref 26.6–33)
MCH RBC QN AUTO: 36.7 PG (ref 26.6–33)
MCH RBC QN AUTO: 36.7 PG (ref 26–34)
MCH RBC QN AUTO: 36.8 PG (ref 26–34)
MCH RBC QN AUTO: 37 PG (ref 26–34)
MCH RBC QN AUTO: 37 PG (ref 26–34)
MCH RBC QN AUTO: 37.2 PG (ref 26–34)
MCH RBC QN AUTO: 37.2 PG (ref 26–34)
MCH RBC QN AUTO: 37.8 PG (ref 26–34)
MCH RBC QN AUTO: 38.6 PG (ref 26.6–33)
MCH RBC QN AUTO: 38.9 PG (ref 26–34)
MCH RBC QN AUTO: 40 PG (ref 26–34)
MCHC RBC AUTO-ENTMCNC: 33.6 G/DL (ref 31.5–35.7)
MCHC RBC AUTO-ENTMCNC: 34.1 G/DL (ref 30–36.5)
MCHC RBC AUTO-ENTMCNC: 34.4 G/DL (ref 31.5–35.7)
MCHC RBC AUTO-ENTMCNC: 34.5 G/DL (ref 30–36.5)
MCHC RBC AUTO-ENTMCNC: 34.5 G/DL (ref 31.5–35.7)
MCHC RBC AUTO-ENTMCNC: 34.5 G/DL (ref 31.5–35.7)
MCHC RBC AUTO-ENTMCNC: 34.6 G/DL (ref 30–36.5)
MCHC RBC AUTO-ENTMCNC: 34.7 G/DL (ref 30–36.5)
MCHC RBC AUTO-ENTMCNC: 34.8 G/DL (ref 30–36.5)
MCHC RBC AUTO-ENTMCNC: 34.9 G/DL (ref 30–36.5)
MCHC RBC AUTO-ENTMCNC: 35 G/DL (ref 30–36.5)
MCHC RBC AUTO-ENTMCNC: 35 G/DL (ref 30–36.5)
MCHC RBC AUTO-ENTMCNC: 35.1 G/DL (ref 30–36.5)
MCHC RBC AUTO-ENTMCNC: 35.1 G/DL (ref 31.5–35.7)
MCHC RBC AUTO-ENTMCNC: 35.2 G/DL (ref 30–36.5)
MCHC RBC AUTO-ENTMCNC: 35.2 G/DL (ref 30–36.5)
MCHC RBC AUTO-ENTMCNC: 35.3 G/DL (ref 30–36.5)
MCHC RBC AUTO-ENTMCNC: 35.4 G/DL (ref 30–36.5)
MCHC RBC AUTO-ENTMCNC: 35.5 G/DL (ref 30–36.5)
MCHC RBC AUTO-ENTMCNC: 35.5 G/DL (ref 31.5–35.7)
MCHC RBC AUTO-ENTMCNC: 35.6 G/DL (ref 30–36.5)
MCHC RBC AUTO-ENTMCNC: 35.6 G/DL (ref 30–36.5)
MCHC RBC AUTO-ENTMCNC: 35.7 G/DL (ref 30–36.5)
MCHC RBC AUTO-ENTMCNC: 35.8 G/DL (ref 30–36.5)
MCHC RBC AUTO-ENTMCNC: 35.8 G/DL (ref 31.5–35.7)
MCHC RBC AUTO-ENTMCNC: 35.9 G/DL (ref 30–36.5)
MCHC RBC AUTO-ENTMCNC: 36 G/DL (ref 30–36.5)
MCHC RBC AUTO-ENTMCNC: 36.1 G/DL (ref 30–36.5)
MCHC RBC AUTO-ENTMCNC: 36.5 G/DL (ref 30–36.5)
MCV RBC AUTO: 100 FL (ref 79–97)
MCV RBC AUTO: 100.8 FL (ref 80–99)
MCV RBC AUTO: 101 FL (ref 80–99)
MCV RBC AUTO: 101 FL (ref 80–99)
MCV RBC AUTO: 101.5 FL (ref 80–99)
MCV RBC AUTO: 101.5 FL (ref 80–99)
MCV RBC AUTO: 102 FL (ref 79–97)
MCV RBC AUTO: 102 FL (ref 80–99)
MCV RBC AUTO: 102 FL (ref 80–99)
MCV RBC AUTO: 102.6 FL (ref 80–99)
MCV RBC AUTO: 102.7 FL (ref 80–99)
MCV RBC AUTO: 102.8 FL (ref 80–99)
MCV RBC AUTO: 102.8 FL (ref 80–99)
MCV RBC AUTO: 103 FL (ref 79–97)
MCV RBC AUTO: 103.1 FL (ref 80–99)
MCV RBC AUTO: 103.4 FL (ref 80–99)
MCV RBC AUTO: 103.7 FL (ref 80–99)
MCV RBC AUTO: 103.8 FL (ref 80–99)
MCV RBC AUTO: 103.8 FL (ref 80–99)
MCV RBC AUTO: 103.9 FL (ref 80–99)
MCV RBC AUTO: 104.5 FL (ref 80–99)
MCV RBC AUTO: 104.8 FL (ref 80–99)
MCV RBC AUTO: 104.9 FL (ref 80–99)
MCV RBC AUTO: 105 FL (ref 79–97)
MCV RBC AUTO: 105.1 FL (ref 80–99)
MCV RBC AUTO: 105.2 FL (ref 80–99)
MCV RBC AUTO: 105.4 FL (ref 80–99)
MCV RBC AUTO: 105.4 FL (ref 80–99)
MCV RBC AUTO: 105.9 FL (ref 80–99)
MCV RBC AUTO: 106 FL (ref 79–97)
MCV RBC AUTO: 107.1 FL (ref 80–99)
MCV RBC AUTO: 107.9 FL (ref 80–99)
MCV RBC AUTO: 109.6 FL (ref 80–99)
MCV RBC AUTO: 110 FL (ref 79–97)
MCV RBC AUTO: 95 FL (ref 79–97)
MCV RBC AUTO: 95.2 FL (ref 80–99)
MCV RBC AUTO: 95.5 FL (ref 80–99)
MCV RBC AUTO: 95.8 FL (ref 80–99)
MCV RBC AUTO: 95.8 FL (ref 80–99)
MCV RBC AUTO: 96.3 FL (ref 80–99)
MCV RBC AUTO: 96.6 FL (ref 80–99)
MCV RBC AUTO: 96.7 FL (ref 80–99)
MCV RBC AUTO: 97 FL (ref 80–99)
MCV RBC AUTO: 97.2 FL (ref 80–99)
MCV RBC AUTO: 97.8 FL (ref 80–99)
MCV RBC AUTO: 98.3 FL (ref 80–99)
MCV RBC AUTO: 99.1 FL (ref 80–99)
MESOTHL CELL NFR FLD: 1 %
MESOTHL CELL NFR FLD: 11 %
MESOTHL CELL NFR FLD: 12 %
MESOTHL CELL NFR FLD: 13 %
MESOTHL CELL NFR FLD: 13 %
MESOTHL CELL NFR FLD: 2 %
MESOTHL CELL NFR FLD: 2 %
MESOTHL CELL NFR FLD: 30 %
MESOTHL CELL NFR FLD: 38 %
MESOTHL CELL NFR FLD: 4 %
MONOCYTES # BLD AUTO: 0.3 X10E3/UL (ref 0.1–0.9)
MONOCYTES # BLD AUTO: 0.3 X10E3/UL (ref 0.1–0.9)
MONOCYTES # BLD: 0.1 K/UL (ref 0–1)
MONOCYTES # BLD: 0.3 K/UL (ref 0–1)
MONOCYTES # BLD: 0.3 K/UL (ref 0–1)
MONOCYTES # BLD: 0.4 K/UL (ref 0–1)
MONOCYTES # BLD: 0.5 K/UL (ref 0–1)
MONOCYTES # BLD: 0.6 K/UL (ref 0–1)
MONOCYTES # BLD: 0.7 K/UL (ref 0–1)
MONOCYTES # BLD: 0.8 K/UL (ref 0–1)
MONOCYTES # BLD: 0.9 K/UL (ref 0–1)
MONOCYTES # BLD: 0.9 K/UL (ref 0–1)
MONOCYTES # BLD: 1 K/UL (ref 0–1)
MONOCYTES NFR BLD AUTO: 11 %
MONOCYTES NFR BLD AUTO: 4 %
MONOCYTES NFR BLD: 10 % (ref 5–13)
MONOCYTES NFR BLD: 11 % (ref 5–13)
MONOCYTES NFR BLD: 12 % (ref 5–13)
MONOCYTES NFR BLD: 13 % (ref 5–13)
MONOCYTES NFR BLD: 14 % (ref 5–13)
MONOCYTES NFR BLD: 14 % (ref 5–13)
MONOCYTES NFR BLD: 15 % (ref 5–13)
MONOCYTES NFR BLD: 16 % (ref 5–13)
MONOCYTES NFR BLD: 16 % (ref 5–13)
MONOCYTES NFR BLD: 17 % (ref 5–13)
MONOCYTES NFR BLD: 18 % (ref 5–13)
MONOCYTES NFR BLD: 5 % (ref 5–13)
MONOCYTES NFR BLD: 9 % (ref 5–13)
MONOS+MACROS NFR FLD: 16 %
MONOS+MACROS NFR FLD: 19 %
MONOS+MACROS NFR FLD: 30 %
MONOS+MACROS NFR FLD: 35 %
MONOS+MACROS NFR FLD: 35 %
MONOS+MACROS NFR FLD: 4 %
MONOS+MACROS NFR FLD: 42 %
MONOS+MACROS NFR FLD: 44 %
MONOS+MACROS NFR FLD: 50 %
MONOS+MACROS NFR FLD: 54 %
MONOS+MACROS NFR FLD: 58 %
MONOS+MACROS NFR FLD: 60 %
MONOS+MACROS NFR FLD: 65 %
MONOS+MACROS NFR FLD: 82 %
MONOS+MACROS NFR FLD: 88 %
MORPHOLOGY BLD-IMP: ABNORMAL
NEUTROPHILS # BLD AUTO: 2 X10E3/UL (ref 1.4–7)
NEUTROPHILS # BLD AUTO: 5.6 X10E3/UL (ref 1.4–7)
NEUTROPHILS NFR BLD AUTO: 76 %
NEUTROPHILS NFR BLD AUTO: 83 %
NEUTROPHILS NFR FLD: 1 %
NEUTROPHILS NFR FLD: 1 %
NEUTROPHILS NFR FLD: 11 %
NEUTROPHILS NFR FLD: 2 %
NEUTROPHILS NFR FLD: 2 %
NEUTROPHILS NFR FLD: 23 %
NEUTROPHILS NFR FLD: 29 %
NEUTROPHILS NFR FLD: 4 %
NEUTROPHILS NFR FLD: 45 %
NEUTROPHILS NFR FLD: 5 %
NEUTROPHILS NFR FLD: 5 %
NEUTROPHILS NFR FLD: 6 %
NEUTROPHILS NFR FLD: 60 %
NEUTROPHILS NFR FLD: 75 %
NEUTROPHILS NFR FLD: 91 %
NEUTS BAND NFR BLD MANUAL: 2 % (ref 0–6)
NEUTS SEG # BLD: 1.3 K/UL (ref 1.8–8)
NEUTS SEG # BLD: 1.8 K/UL (ref 1.8–8)
NEUTS SEG # BLD: 1.8 K/UL (ref 1.8–8)
NEUTS SEG # BLD: 1.9 K/UL (ref 1.8–8)
NEUTS SEG # BLD: 2.2 K/UL (ref 1.8–8)
NEUTS SEG # BLD: 2.2 K/UL (ref 1.8–8)
NEUTS SEG # BLD: 2.3 K/UL (ref 1.8–8)
NEUTS SEG # BLD: 2.4 K/UL (ref 1.8–8)
NEUTS SEG # BLD: 2.5 K/UL (ref 1.8–8)
NEUTS SEG # BLD: 2.5 K/UL (ref 1.8–8)
NEUTS SEG # BLD: 2.6 K/UL (ref 1.8–8)
NEUTS SEG # BLD: 2.8 K/UL (ref 1.8–8)
NEUTS SEG # BLD: 2.9 K/UL (ref 1.8–8)
NEUTS SEG # BLD: 3.2 K/UL (ref 1.8–8)
NEUTS SEG # BLD: 3.3 K/UL (ref 1.8–8)
NEUTS SEG # BLD: 3.4 K/UL (ref 1.8–8)
NEUTS SEG # BLD: 3.7 K/UL (ref 1.8–8)
NEUTS SEG # BLD: 3.8 K/UL (ref 1.8–8)
NEUTS SEG # BLD: 3.8 K/UL (ref 1.8–8)
NEUTS SEG # BLD: 4 K/UL (ref 1.8–8)
NEUTS SEG # BLD: 4 K/UL (ref 1.8–8)
NEUTS SEG # BLD: 4.1 K/UL (ref 1.8–8)
NEUTS SEG # BLD: 4.1 K/UL (ref 1.8–8)
NEUTS SEG # BLD: 4.2 K/UL (ref 1.8–8)
NEUTS SEG # BLD: 4.2 K/UL (ref 1.8–8)
NEUTS SEG # BLD: 4.5 K/UL (ref 1.8–8)
NEUTS SEG # BLD: 6 K/UL (ref 1.8–8)
NEUTS SEG # BLD: 6.3 K/UL (ref 1.8–8)
NEUTS SEG # BLD: 6.7 K/UL (ref 1.8–8)
NEUTS SEG NFR BLD: 67 % (ref 32–75)
NEUTS SEG NFR BLD: 70 % (ref 32–75)
NEUTS SEG NFR BLD: 72 % (ref 32–75)
NEUTS SEG NFR BLD: 74 % (ref 32–75)
NEUTS SEG NFR BLD: 75 % (ref 32–75)
NEUTS SEG NFR BLD: 76 % (ref 32–75)
NEUTS SEG NFR BLD: 76 % (ref 32–75)
NEUTS SEG NFR BLD: 77 % (ref 32–75)
NEUTS SEG NFR BLD: 78 % (ref 32–75)
NEUTS SEG NFR BLD: 79 % (ref 32–75)
NEUTS SEG NFR BLD: 80 % (ref 32–75)
NEUTS SEG NFR BLD: 81 % (ref 32–75)
NEUTS SEG NFR BLD: 82 % (ref 32–75)
NEUTS SEG NFR BLD: 83 % (ref 32–75)
NEUTS SEG NFR BLD: 83 % (ref 32–75)
NEUTS SEG NFR BLD: 84 % (ref 32–75)
NEUTS SEG NFR BLD: 85 % (ref 32–75)
NEUTS SEG NFR BLD: 86 % (ref 32–75)
NITRITE UR QL STRIP.AUTO: NEGATIVE
NRBC # BLD: 0 K/UL (ref 0–0.01)
NRBC BLD-RTO: 0 PER 100 WBC
NUC CELL # FLD: 123 /CU MM
NUC CELL # FLD: 1263 /CU MM
NUC CELL # FLD: 151 /CU MM
NUC CELL # FLD: 201 /CU MM
NUC CELL # FLD: 212 /CU MM
NUC CELL # FLD: 213 /CU MM (ref 0–5)
NUC CELL # FLD: 2557 /CU MM (ref 0–5)
NUC CELL # FLD: 282 /CU MM
NUC CELL # FLD: 283 /CU MM
NUC CELL # FLD: 56 /CU MM
NUC CELL # FLD: 75 /CU MM
NUC CELL # FLD: 80 /CU MM
NUC CELL # FLD: 8028 /CU MM
NUC CELL # FLD: 82 /CU MM
NUC CELL # FLD: 93 /CU MM
NUC CELL # FLD: 98 /CU MM
OTHER CELL,FOTHC: 4 %
OVERALL BP RESPONSE TO EXERCISE, CST16: NORMAL
OVERALL HR RESPONSE TO EXERCISE, CST15: NORMAL
P-R INTERVAL, ECG05: 102 MS
P-R INTERVAL, ECG05: 106 MS
P-R INTERVAL, ECG05: 154 MS
P-R INTERVAL, ECG05: 178 MS
P-R INTERVAL, ECG05: 80 MS
PATH REV BLD -IMP: ABNORMAL
PATH REV BLD -IMP: NORMAL
PEAK EX METS, CST10: 1 METS
PH UR STRIP: 5.5 [PH] (ref 5–8)
PH UR STRIP: 5.5 [PH] (ref 5–8)
PH UR STRIP: 6 [PH]
PH UR STRIP: 6 [PH] (ref 5–8)
PH UR STRIP: 6 [PH] (ref 5–8)
PHOSPHATE SERPL-MCNC: 2.1 MG/DL (ref 2.6–4.7)
PHOSPHATE SERPL-MCNC: 2.2 MG/DL (ref 2.6–4.7)
PHOSPHATE SERPL-MCNC: 2.5 MG/DL (ref 2.6–4.7)
PHOSPHATE SERPL-MCNC: 2.9 MG/DL (ref 2.6–4.7)
PHOSPHATE SERPL-MCNC: 3 MG/DL (ref 2.6–4.7)
PHOSPHATE SERPL-MCNC: 3.1 MG/DL (ref 2.6–4.7)
PHOSPHATE SERPL-MCNC: 3.2 MG/DL (ref 2.6–4.7)
PLATELET # BLD AUTO: 100 K/UL (ref 150–400)
PLATELET # BLD AUTO: 41 K/UL (ref 150–400)
PLATELET # BLD AUTO: 42 K/UL (ref 150–400)
PLATELET # BLD AUTO: 45 K/UL (ref 150–400)
PLATELET # BLD AUTO: 46 K/UL (ref 150–400)
PLATELET # BLD AUTO: 48 K/UL (ref 150–400)
PLATELET # BLD AUTO: 49 K/UL (ref 150–400)
PLATELET # BLD AUTO: 50 K/UL (ref 150–400)
PLATELET # BLD AUTO: 51 K/UL (ref 150–400)
PLATELET # BLD AUTO: 51 X10E3/UL (ref 150–379)
PLATELET # BLD AUTO: 51 X10E3/UL (ref 150–379)
PLATELET # BLD AUTO: 53 K/UL (ref 150–400)
PLATELET # BLD AUTO: 54 K/UL (ref 150–400)
PLATELET # BLD AUTO: 54 K/UL (ref 150–400)
PLATELET # BLD AUTO: 55 K/UL (ref 150–400)
PLATELET # BLD AUTO: 56 K/UL (ref 150–400)
PLATELET # BLD AUTO: 57 K/UL (ref 150–400)
PLATELET # BLD AUTO: 58 K/UL (ref 150–400)
PLATELET # BLD AUTO: 58 K/UL (ref 150–400)
PLATELET # BLD AUTO: 59 K/UL (ref 150–400)
PLATELET # BLD AUTO: 59 X10E3/UL (ref 150–379)
PLATELET # BLD AUTO: 60 K/UL (ref 150–400)
PLATELET # BLD AUTO: 61 K/UL (ref 150–400)
PLATELET # BLD AUTO: 63 K/UL (ref 150–400)
PLATELET # BLD AUTO: 64 K/UL (ref 150–400)
PLATELET # BLD AUTO: 65 K/UL (ref 150–400)
PLATELET # BLD AUTO: 66 K/UL (ref 150–400)
PLATELET # BLD AUTO: 68 K/UL (ref 150–400)
PLATELET # BLD AUTO: 71 K/UL (ref 150–400)
PLATELET # BLD AUTO: 71 X10E3/UL (ref 150–379)
PLATELET # BLD AUTO: 75 X10E3/UL (ref 150–379)
PLATELET # BLD AUTO: 79 X10E3/UL (ref 150–379)
PLATELET # BLD AUTO: 83 X10E3/UL (ref 150–379)
PLATELET # BLD AUTO: 87 K/UL (ref 150–400)
PLATELET # BLD AUTO: 88 K/UL (ref 150–400)
PLATELET # BLD AUTO: 89 K/UL (ref 150–400)
PLATELET # BLD AUTO: 95 K/UL (ref 150–400)
PMV BLD AUTO: 10 FL (ref 8.9–12.9)
PMV BLD AUTO: 10.1 FL (ref 8.9–12.9)
PMV BLD AUTO: 10.2 FL (ref 8.9–12.9)
PMV BLD AUTO: 10.3 FL (ref 8.9–12.9)
PMV BLD AUTO: 10.4 FL (ref 8.9–12.9)
PMV BLD AUTO: 10.5 FL (ref 8.9–12.9)
PMV BLD AUTO: 10.7 FL (ref 8.9–12.9)
PMV BLD AUTO: 10.8 FL (ref 8.9–12.9)
PMV BLD AUTO: 9.2 FL (ref 8.9–12.9)
PMV BLD AUTO: 9.2 FL (ref 8.9–12.9)
PMV BLD AUTO: 9.3 FL (ref 8.9–12.9)
PMV BLD AUTO: 9.3 FL (ref 8.9–12.9)
PMV BLD AUTO: 9.4 FL (ref 8.9–12.9)
PMV BLD AUTO: 9.6 FL (ref 8.9–12.9)
PMV BLD AUTO: 9.7 FL (ref 8.9–12.9)
PMV BLD AUTO: 9.8 FL (ref 8.9–12.9)
PMV BLD AUTO: 9.8 FL (ref 8.9–12.9)
PMV BLD AUTO: 9.9 FL (ref 8.9–12.9)
PMV BLD AUTO: 9.9 FL (ref 8.9–12.9)
POTASSIUM SERPL-SCNC: 3.4 MMOL/L (ref 3.5–5.1)
POTASSIUM SERPL-SCNC: 3.5 MMOL/L (ref 3.5–5.1)
POTASSIUM SERPL-SCNC: 3.9 MMOL/L (ref 3.5–5.1)
POTASSIUM SERPL-SCNC: 4.1 MMOL/L (ref 3.5–5.2)
POTASSIUM SERPL-SCNC: 4.2 MMOL/L (ref 3.5–5.1)
POTASSIUM SERPL-SCNC: 4.3 MMOL/L (ref 3.5–5.1)
POTASSIUM SERPL-SCNC: 4.4 MMOL/L (ref 3.5–5.1)
POTASSIUM SERPL-SCNC: 4.4 MMOL/L (ref 3.5–5.1)
POTASSIUM SERPL-SCNC: 4.4 MMOL/L (ref 3.5–5.2)
POTASSIUM SERPL-SCNC: 4.4 MMOL/L (ref 3.5–5.2)
POTASSIUM SERPL-SCNC: 4.5 MMOL/L (ref 3.5–5.1)
POTASSIUM SERPL-SCNC: 4.5 MMOL/L (ref 3.5–5.2)
POTASSIUM SERPL-SCNC: 4.6 MMOL/L (ref 3.5–5.1)
POTASSIUM SERPL-SCNC: 4.7 MMOL/L (ref 3.5–5.1)
POTASSIUM SERPL-SCNC: 4.8 MMOL/L (ref 3.5–5.1)
POTASSIUM SERPL-SCNC: 4.8 MMOL/L (ref 3.5–5.1)
POTASSIUM SERPL-SCNC: 4.9 MMOL/L (ref 3.5–5.1)
POTASSIUM SERPL-SCNC: 5 MMOL/L (ref 3.5–5.1)
POTASSIUM SERPL-SCNC: 5 MMOL/L (ref 3.5–5.1)
POTASSIUM SERPL-SCNC: 5.1 MMOL/L (ref 3.5–5.1)
POTASSIUM SERPL-SCNC: 5.1 MMOL/L (ref 3.5–5.2)
POTASSIUM SERPL-SCNC: 5.2 MMOL/L (ref 3.5–5.1)
POTASSIUM SERPL-SCNC: 5.2 MMOL/L (ref 3.5–5.1)
POTASSIUM SERPL-SCNC: 5.4 MMOL/L (ref 3.5–5.1)
POTASSIUM SERPL-SCNC: 5.5 MMOL/L (ref 3.5–5.1)
POTASSIUM SERPL-SCNC: 5.5 MMOL/L (ref 3.5–5.2)
POTASSIUM SERPL-SCNC: 5.6 MMOL/L (ref 3.5–5.1)
POTASSIUM SERPL-SCNC: 5.9 MMOL/L (ref 3.5–5.1)
POTASSIUM SERPL-SCNC: 6.1 MMOL/L (ref 3.5–5.1)
POTASSIUM SERPL-SCNC: 6.1 MMOL/L (ref 3.5–5.1)
POTASSIUM SERPL-SCNC: 6.6 MMOL/L (ref 3.5–5.2)
PROT SERPL-MCNC: 5.1 G/DL (ref 6.4–8.2)
PROT SERPL-MCNC: 5.2 G/DL (ref 6.4–8.2)
PROT SERPL-MCNC: 5.3 G/DL (ref 6.4–8.2)
PROT SERPL-MCNC: 5.4 G/DL (ref 6.4–8.2)
PROT SERPL-MCNC: 5.5 G/DL (ref 6.4–8.2)
PROT SERPL-MCNC: 5.8 G/DL (ref 6.4–8.2)
PROT SERPL-MCNC: 5.9 G/DL (ref 6.4–8.2)
PROT SERPL-MCNC: 6 G/DL (ref 6.4–8.2)
PROT SERPL-MCNC: 6.1 G/DL (ref 6.4–8.2)
PROT SERPL-MCNC: 6.2 G/DL (ref 6.4–8.2)
PROT SERPL-MCNC: 6.3 G/DL (ref 6.4–8.2)
PROT SERPL-MCNC: 6.4 G/DL (ref 6–8.5)
PROT SERPL-MCNC: 6.5 G/DL (ref 6.4–8.2)
PROT SERPL-MCNC: 6.5 G/DL (ref 6–8.5)
PROT SERPL-MCNC: 6.5 G/DL (ref 6–8.5)
PROT SERPL-MCNC: 6.6 G/DL (ref 6.4–8.2)
PROT SERPL-MCNC: 6.6 G/DL (ref 6–8.5)
PROT SERPL-MCNC: 6.7 G/DL (ref 6.4–8.2)
PROT SERPL-MCNC: 6.7 G/DL (ref 6.4–8.2)
PROT SERPL-MCNC: 6.8 G/DL (ref 6.4–8.2)
PROT SERPL-MCNC: 6.9 G/DL (ref 6.4–8.2)
PROT SERPL-MCNC: 7 G/DL (ref 6.4–8.2)
PROT SERPL-MCNC: 7 G/DL (ref 6.4–8.2)
PROT SERPL-MCNC: 7.1 G/DL (ref 6.4–8.2)
PROT SERPL-MCNC: 7.1 G/DL (ref 6.4–8.2)
PROT SERPL-MCNC: 7.3 G/DL (ref 6.4–8.2)
PROT SERPL-MCNC: 7.4 G/DL (ref 6.4–8.2)
PROT SERPL-MCNC: 7.5 G/DL (ref 6–8.5)
PROT UR STRIP-MCNC: NEGATIVE MG/DL
PROTHROMBIN TIME: 12.7 SEC (ref 9.1–12)
PROTHROMBIN TIME: 12.7 SEC (ref 9–11.1)
PROTHROMBIN TIME: 12.8 SEC (ref 9–11.1)
PROTHROMBIN TIME: 13 SEC (ref 9.1–12)
PROTHROMBIN TIME: 13.1 SEC (ref 9–11.1)
PROTHROMBIN TIME: 13.2 SEC (ref 9.1–12)
PROTHROMBIN TIME: 13.3 SEC (ref 9–11.1)
PROTHROMBIN TIME: 13.7 SEC (ref 9.1–12)
PROTHROMBIN TIME: 13.8 SEC (ref 9–11.1)
PROTHROMBIN TIME: 13.9 SEC (ref 9.1–12)
PROTHROMBIN TIME: 14 SEC (ref 9–11.1)
PROTHROMBIN TIME: 14.1 SEC (ref 9–11.1)
PROTHROMBIN TIME: 14.3 SEC (ref 9.1–12)
PROTHROMBIN TIME: 14.3 SEC (ref 9–11.1)
PROTHROMBIN TIME: 14.4 SEC (ref 9.1–12)
PROTHROMBIN TIME: 14.4 SEC (ref 9–11.1)
PROTHROMBIN TIME: 15.2 SEC (ref 9–11.1)
PROTHROMBIN TIME: 15.3 SEC (ref 9–11.1)
PROTOCOL NAME, CST01: NORMAL
Q-T INTERVAL, ECG07: 474 MS
Q-T INTERVAL, ECG07: 490 MS
Q-T INTERVAL, ECG07: 506 MS
Q-T INTERVAL, ECG07: 516 MS
Q-T INTERVAL, ECG07: 518 MS
Q-T INTERVAL, ECG07: 532 MS
Q-T INTERVAL, ECG07: 534 MS
QRS DURATION, ECG06: 152 MS
QRS DURATION, ECG06: 154 MS
QRS DURATION, ECG06: 174 MS
QRS DURATION, ECG06: 178 MS
QRS DURATION, ECG06: 184 MS
QRS DURATION, ECG06: 208 MS
QRS DURATION, ECG06: 218 MS
QTC CALCULATION (BEZET), ECG08: 474 MS
QTC CALCULATION (BEZET), ECG08: 490 MS
QTC CALCULATION (BEZET), ECG08: 526 MS
QTC CALCULATION (BEZET), ECG08: 534 MS
QTC CALCULATION (BEZET), ECG08: 534 MS
QTC CALCULATION (BEZET), ECG08: 536 MS
QTC CALCULATION (BEZET), ECG08: 625 MS
RBC # BLD AUTO: 1.82 M/UL (ref 4.1–5.7)
RBC # BLD AUTO: 1.88 M/UL (ref 4.1–5.7)
RBC # BLD AUTO: 1.93 M/UL (ref 4.1–5.7)
RBC # BLD AUTO: 1.93 M/UL (ref 4.1–5.7)
RBC # BLD AUTO: 1.96 M/UL (ref 4.1–5.7)
RBC # BLD AUTO: 1.98 M/UL (ref 4.1–5.7)
RBC # BLD AUTO: 2 M/UL (ref 4.1–5.7)
RBC # BLD AUTO: 2 M/UL (ref 4.1–5.7)
RBC # BLD AUTO: 2.03 M/UL (ref 4.1–5.7)
RBC # BLD AUTO: 2.07 M/UL (ref 4.1–5.7)
RBC # BLD AUTO: 2.07 X10E6/UL (ref 4.14–5.8)
RBC # BLD AUTO: 2.08 M/UL (ref 4.1–5.7)
RBC # BLD AUTO: 2.11 M/UL (ref 4.1–5.7)
RBC # BLD AUTO: 2.12 M/UL (ref 4.1–5.7)
RBC # BLD AUTO: 2.15 M/UL (ref 4.1–5.7)
RBC # BLD AUTO: 2.18 M/UL (ref 4.1–5.7)
RBC # BLD AUTO: 2.19 M/UL (ref 4.1–5.7)
RBC # BLD AUTO: 2.22 M/UL (ref 4.1–5.7)
RBC # BLD AUTO: 2.23 M/UL (ref 4.1–5.7)
RBC # BLD AUTO: 2.24 M/UL (ref 4.1–5.7)
RBC # BLD AUTO: 2.26 M/UL (ref 4.1–5.7)
RBC # BLD AUTO: 2.29 M/UL (ref 4.1–5.7)
RBC # BLD AUTO: 2.34 M/UL (ref 4.1–5.7)
RBC # BLD AUTO: 2.36 M/UL (ref 4.1–5.7)
RBC # BLD AUTO: 2.37 M/UL (ref 4.1–5.7)
RBC # BLD AUTO: 2.37 M/UL (ref 4.1–5.7)
RBC # BLD AUTO: 2.4 M/UL (ref 4.1–5.7)
RBC # BLD AUTO: 2.4 M/UL (ref 4.1–5.7)
RBC # BLD AUTO: 2.42 M/UL (ref 4.1–5.7)
RBC # BLD AUTO: 2.44 M/UL (ref 4.1–5.7)
RBC # BLD AUTO: 2.45 M/UL (ref 4.1–5.7)
RBC # BLD AUTO: 2.46 M/UL (ref 4.1–5.7)
RBC # BLD AUTO: 2.46 X10E6/UL (ref 4.14–5.8)
RBC # BLD AUTO: 2.5 M/UL (ref 4.1–5.7)
RBC # BLD AUTO: 2.51 M/UL (ref 4.1–5.7)
RBC # BLD AUTO: 2.54 X10E6/UL (ref 4.14–5.8)
RBC # BLD AUTO: 2.55 X10E6/UL (ref 4.14–5.8)
RBC # BLD AUTO: 2.62 M/UL (ref 4.1–5.7)
RBC # BLD AUTO: 2.65 M/UL (ref 4.1–5.7)
RBC # BLD AUTO: 2.65 X10E6/UL (ref 4.14–5.8)
RBC # BLD AUTO: 2.66 X10E6/UL (ref 4.14–5.8)
RBC # BLD AUTO: 2.68 M/UL (ref 4.1–5.7)
RBC # BLD AUTO: 2.68 X10E6/UL (ref 4.14–5.8)
RBC # BLD AUTO: 2.72 M/UL (ref 4.1–5.7)
RBC # BLD AUTO: 2.88 M/UL (ref 4.1–5.7)
RBC # FLD: >100 /CU MM
RBC #/AREA URNS HPF: ABNORMAL /HPF (ref 0–5)
RBC #/AREA URNS HPF: NORMAL /HPF (ref 0–5)
RBC #/AREA URNS HPF: NORMAL /HPF (ref 0–5)
RBC MORPH BLD: ABNORMAL
REASON FOR TERMINATION: 62 BPM
SERVICE CMNT-IMP: ABNORMAL
SERVICE CMNT-IMP: NORMAL
SODIUM SERPL-SCNC: 121 MMOL/L (ref 134–144)
SODIUM SERPL-SCNC: 124 MMOL/L (ref 134–144)
SODIUM SERPL-SCNC: 124 MMOL/L (ref 136–145)
SODIUM SERPL-SCNC: 125 MMOL/L (ref 136–145)
SODIUM SERPL-SCNC: 125 MMOL/L (ref 136–145)
SODIUM SERPL-SCNC: 126 MMOL/L (ref 136–145)
SODIUM SERPL-SCNC: 127 MMOL/L (ref 136–145)
SODIUM SERPL-SCNC: 128 MMOL/L (ref 136–145)
SODIUM SERPL-SCNC: 129 MMOL/L (ref 136–145)
SODIUM SERPL-SCNC: 130 MMOL/L (ref 134–144)
SODIUM SERPL-SCNC: 130 MMOL/L (ref 136–145)
SODIUM SERPL-SCNC: 130 MMOL/L (ref 136–145)
SODIUM SERPL-SCNC: 131 MMOL/L (ref 134–144)
SODIUM SERPL-SCNC: 131 MMOL/L (ref 136–145)
SODIUM SERPL-SCNC: 132 MMOL/L (ref 134–144)
SODIUM SERPL-SCNC: 132 MMOL/L (ref 136–145)
SODIUM SERPL-SCNC: 133 MMOL/L (ref 136–145)
SODIUM SERPL-SCNC: 134 MMOL/L (ref 136–145)
SODIUM SERPL-SCNC: 134 MMOL/L (ref 136–145)
SODIUM SERPL-SCNC: 135 MMOL/L (ref 136–145)
SODIUM SERPL-SCNC: 136 MMOL/L (ref 136–145)
SODIUM SERPL-SCNC: 138 MMOL/L (ref 134–144)
SODIUM SERPL-SCNC: 138 MMOL/L (ref 136–145)
SODIUM SERPL-SCNC: 139 MMOL/L (ref 134–144)
SP GR UR REFRACTOMETRY: 1.01
SP GR UR REFRACTOMETRY: 1.01 (ref 1–1.03)
SP GR UR REFRACTOMETRY: 1.02 (ref 1–1.03)
SPECIMEN EXP DATE BLD: NORMAL
SPECIMEN SOURCE FLD: ABNORMAL
STATUS OF UNIT,%ST: NORMAL
TEST INDICATION, CST09: NORMAL
THERAPEUTIC RANGE,PTTT: NORMAL SECS (ref 58–77)
THERAPEUTIC RANGE,PTTT: NORMAL SECS (ref 58–77)
TIBC SERPL-MCNC: 147 UG/DL (ref 250–450)
TIME IN EXERCISE PHASE, CST02: NORMAL
TROPONIN I SERPL-MCNC: <0.04 NG/ML
TROPONIN I SERPL-MCNC: <0.05 NG/ML
UA: UC IF INDICATED,UAUC: ABNORMAL
UA: UC IF INDICATED,UAUC: NORMAL
UNIT DIVISION, %UDIV: 0
UR CULT HOLD, URHOLD: NORMAL
UROBILINOGEN UR QL STRIP.AUTO: 0.2 EU/DL
UROBILINOGEN UR QL STRIP.AUTO: 0.2 EU/DL (ref 0.2–1)
UROBILINOGEN UR QL STRIP.AUTO: 1 EU/DL (ref 0.2–1)
VENTRICULAR RATE, ECG03: 60 BPM
VENTRICULAR RATE, ECG03: 64 BPM
VENTRICULAR RATE, ECG03: 65 BPM
VENTRICULAR RATE, ECG03: 65 BPM
VENTRICULAR RATE, ECG03: 83 BPM
VIT B12 SERPL-MCNC: 1003 PG/ML (ref 193–986)
WBC # BLD AUTO: 1.8 K/UL (ref 4.1–11.1)
WBC # BLD AUTO: 2 K/UL (ref 4.1–11.1)
WBC # BLD AUTO: 2.1 K/UL (ref 4.1–11.1)
WBC # BLD AUTO: 2.4 K/UL (ref 4.1–11.1)
WBC # BLD AUTO: 2.5 K/UL (ref 4.1–11.1)
WBC # BLD AUTO: 2.6 K/UL (ref 4.1–11.1)
WBC # BLD AUTO: 2.6 X10E3/UL (ref 3.4–10.8)
WBC # BLD AUTO: 2.7 K/UL (ref 4.1–11.1)
WBC # BLD AUTO: 2.9 K/UL (ref 4.1–11.1)
WBC # BLD AUTO: 3 K/UL (ref 4.1–11.1)
WBC # BLD AUTO: 3.1 K/UL (ref 4.1–11.1)
WBC # BLD AUTO: 3.2 K/UL (ref 4.1–11.1)
WBC # BLD AUTO: 3.3 K/UL (ref 4.1–11.1)
WBC # BLD AUTO: 3.4 K/UL (ref 4.1–11.1)
WBC # BLD AUTO: 3.4 K/UL (ref 4.1–11.1)
WBC # BLD AUTO: 3.4 X10E3/UL (ref 3.4–10.8)
WBC # BLD AUTO: 3.4 X10E3/UL (ref 3.4–10.8)
WBC # BLD AUTO: 3.6 K/UL (ref 4.1–11.1)
WBC # BLD AUTO: 3.9 K/UL (ref 4.1–11.1)
WBC # BLD AUTO: 4 K/UL (ref 4.1–11.1)
WBC # BLD AUTO: 4.1 K/UL (ref 4.1–11.1)
WBC # BLD AUTO: 4.1 K/UL (ref 4.1–11.1)
WBC # BLD AUTO: 4.2 K/UL (ref 4.1–11.1)
WBC # BLD AUTO: 4.4 K/UL (ref 4.1–11.1)
WBC # BLD AUTO: 4.4 K/UL (ref 4.1–11.1)
WBC # BLD AUTO: 4.6 X10E3/UL (ref 3.4–10.8)
WBC # BLD AUTO: 4.8 K/UL (ref 4.1–11.1)
WBC # BLD AUTO: 4.8 X10E3/UL (ref 3.4–10.8)
WBC # BLD AUTO: 4.9 K/UL (ref 4.1–11.1)
WBC # BLD AUTO: 5.1 K/UL (ref 4.1–11.1)
WBC # BLD AUTO: 5.2 K/UL (ref 4.1–11.1)
WBC # BLD AUTO: 5.3 K/UL (ref 4.1–11.1)
WBC # BLD AUTO: 6.8 X10E3/UL (ref 3.4–10.8)
WBC # BLD AUTO: 7.4 K/UL (ref 4.1–11.1)
WBC # BLD AUTO: 7.6 X10E3/UL (ref 3.4–10.8)
WBC # BLD AUTO: 7.7 K/UL (ref 4.1–11.1)
WBC # BLD AUTO: 8.3 K/UL (ref 4.1–11.1)
WBC #/AREA STL HPF: NORMAL /HPF (ref 0–4)
WBC URNS QL MICRO: ABNORMAL /HPF (ref 0–4)
WBC URNS QL MICRO: NORMAL /HPF (ref 0–4)
WBC URNS QL MICRO: NORMAL /HPF (ref 0–4)

## 2018-01-01 PROCEDURE — 89050 BODY FLUID CELL COUNT: CPT | Performed by: HOSPITALIST

## 2018-01-01 PROCEDURE — 82140 ASSAY OF AMMONIA: CPT | Performed by: FAMILY MEDICINE

## 2018-01-01 PROCEDURE — 85025 COMPLETE CBC W/AUTO DIFF WBC: CPT | Performed by: FAMILY MEDICINE

## 2018-01-01 PROCEDURE — 74011250637 HC RX REV CODE- 250/637: Performed by: HOSPITALIST

## 2018-01-01 PROCEDURE — 96375 TX/PRO/DX INJ NEW DRUG ADDON: CPT

## 2018-01-01 PROCEDURE — 87493 C DIFF AMPLIFIED PROBE: CPT | Performed by: EMERGENCY MEDICINE

## 2018-01-01 PROCEDURE — 80053 COMPREHEN METABOLIC PANEL: CPT | Performed by: HOSPITALIST

## 2018-01-01 PROCEDURE — 74011636637 HC RX REV CODE- 636/637: Performed by: FAMILY MEDICINE

## 2018-01-01 PROCEDURE — C9113 INJ PANTOPRAZOLE SODIUM, VIA: HCPCS | Performed by: FAMILY MEDICINE

## 2018-01-01 PROCEDURE — C9113 INJ PANTOPRAZOLE SODIUM, VIA: HCPCS | Performed by: EMERGENCY MEDICINE

## 2018-01-01 PROCEDURE — 77030002996 HC SUT SLK J&J -A

## 2018-01-01 PROCEDURE — 83735 ASSAY OF MAGNESIUM: CPT | Performed by: INTERNAL MEDICINE

## 2018-01-01 PROCEDURE — G0300 HHS/HOSPICE OF LPN EA 15 MIN: HCPCS

## 2018-01-01 PROCEDURE — 93005 ELECTROCARDIOGRAM TRACING: CPT

## 2018-01-01 PROCEDURE — 85610 PROTHROMBIN TIME: CPT | Performed by: EMERGENCY MEDICINE

## 2018-01-01 PROCEDURE — 74011000250 HC RX REV CODE- 250: Performed by: RADIOLOGY

## 2018-01-01 PROCEDURE — 74011250637 HC RX REV CODE- 250/637: Performed by: NURSE PRACTITIONER

## 2018-01-01 PROCEDURE — 74011250636 HC RX REV CODE- 250/636: Performed by: EMERGENCY MEDICINE

## 2018-01-01 PROCEDURE — 83605 ASSAY OF LACTIC ACID: CPT | Performed by: HOSPITALIST

## 2018-01-01 PROCEDURE — 74011250637 HC RX REV CODE- 250/637: Performed by: FAMILY MEDICINE

## 2018-01-01 PROCEDURE — P9047 ALBUMIN (HUMAN), 25%, 50ML: HCPCS | Performed by: RADIOLOGY

## 2018-01-01 PROCEDURE — 94761 N-INVAS EAR/PLS OXIMETRY MLT: CPT

## 2018-01-01 PROCEDURE — 80053 COMPREHEN METABOLIC PANEL: CPT | Performed by: EMERGENCY MEDICINE

## 2018-01-01 PROCEDURE — 74011000250 HC RX REV CODE- 250: Performed by: FAMILY MEDICINE

## 2018-01-01 PROCEDURE — P9047 ALBUMIN (HUMAN), 25%, 50ML: HCPCS | Performed by: HOSPITALIST

## 2018-01-01 PROCEDURE — 06L38CZ OCCLUSION OF ESOPHAGEAL VEIN WITH EXTRALUMINAL DEVICE, VIA NATURAL OR ARTIFICIAL OPENING ENDOSCOPIC: ICD-10-PCS | Performed by: INTERNAL MEDICINE

## 2018-01-01 PROCEDURE — 74011250637 HC RX REV CODE- 250/637: Performed by: EMERGENCY MEDICINE

## 2018-01-01 PROCEDURE — 94640 AIRWAY INHALATION TREATMENT: CPT

## 2018-01-01 PROCEDURE — 36415 COLL VENOUS BLD VENIPUNCTURE: CPT | Performed by: HOSPITALIST

## 2018-01-01 PROCEDURE — 77030010545

## 2018-01-01 PROCEDURE — 74011636637 HC RX REV CODE- 636/637: Performed by: EMERGENCY MEDICINE

## 2018-01-01 PROCEDURE — 86900 BLOOD TYPING SEROLOGIC ABO: CPT | Performed by: HOSPITALIST

## 2018-01-01 PROCEDURE — 85025 COMPLETE CBC W/AUTO DIFF WBC: CPT | Performed by: EMERGENCY MEDICINE

## 2018-01-01 PROCEDURE — 65660000000 HC RM CCU STEPDOWN

## 2018-01-01 PROCEDURE — 36430 TRANSFUSION BLD/BLD COMPNT: CPT

## 2018-01-01 PROCEDURE — 65270000029 HC RM PRIVATE

## 2018-01-01 PROCEDURE — 74011250636 HC RX REV CODE- 250/636: Performed by: HOSPITALIST

## 2018-01-01 PROCEDURE — A6250 SKIN SEAL PROTECT MOISTURIZR: HCPCS

## 2018-01-01 PROCEDURE — 36415 COLL VENOUS BLD VENIPUNCTURE: CPT

## 2018-01-01 PROCEDURE — 85025 COMPLETE CBC W/AUTO DIFF WBC: CPT | Performed by: HOSPITALIST

## 2018-01-01 PROCEDURE — 89050 BODY FLUID CELL COUNT: CPT | Performed by: INTERNAL MEDICINE

## 2018-01-01 PROCEDURE — 80162 ASSAY OF DIGOXIN TOTAL: CPT | Performed by: FAMILY MEDICINE

## 2018-01-01 PROCEDURE — 84100 ASSAY OF PHOSPHORUS: CPT | Performed by: INTERNAL MEDICINE

## 2018-01-01 PROCEDURE — P9047 ALBUMIN (HUMAN), 25%, 50ML: HCPCS | Performed by: INTERNAL MEDICINE

## 2018-01-01 PROCEDURE — 77030014243 HC BND LIG VRCES BSC -D: Performed by: INTERNAL MEDICINE

## 2018-01-01 PROCEDURE — 77030031139 HC SUT VCRL2 J&J -A

## 2018-01-01 PROCEDURE — 97161 PT EVAL LOW COMPLEX 20 MIN: CPT | Performed by: PHYSICAL THERAPIST

## 2018-01-01 PROCEDURE — 74011250636 HC RX REV CODE- 250/636: Performed by: INTERNAL MEDICINE

## 2018-01-01 PROCEDURE — 77030039266 HC ADH SKN EXOFIN S2SG -A

## 2018-01-01 PROCEDURE — 80053 COMPREHEN METABOLIC PANEL: CPT | Performed by: FAMILY MEDICINE

## 2018-01-01 PROCEDURE — 82962 GLUCOSE BLOOD TEST: CPT

## 2018-01-01 PROCEDURE — 80053 COMPREHEN METABOLIC PANEL: CPT | Performed by: PHYSICIAN ASSISTANT

## 2018-01-01 PROCEDURE — 87186 SC STD MICRODIL/AGAR DIL: CPT | Performed by: NURSE PRACTITIONER

## 2018-01-01 PROCEDURE — 80048 BASIC METABOLIC PNL TOTAL CA: CPT | Performed by: NURSE PRACTITIONER

## 2018-01-01 PROCEDURE — 74011000250 HC RX REV CODE- 250: Performed by: HOSPITALIST

## 2018-01-01 PROCEDURE — 86901 BLOOD TYPING SEROLOGIC RH(D): CPT | Performed by: EMERGENCY MEDICINE

## 2018-01-01 PROCEDURE — C1729 CATH, DRAINAGE: HCPCS

## 2018-01-01 PROCEDURE — 71045 X-RAY EXAM CHEST 1 VIEW: CPT

## 2018-01-01 PROCEDURE — P9047 ALBUMIN (HUMAN), 25%, 50ML: HCPCS | Performed by: FAMILY MEDICINE

## 2018-01-01 PROCEDURE — 80048 BASIC METABOLIC PNL TOTAL CA: CPT | Performed by: HOSPITALIST

## 2018-01-01 PROCEDURE — 85025 COMPLETE CBC W/AUTO DIFF WBC: CPT | Performed by: PHYSICIAN ASSISTANT

## 2018-01-01 PROCEDURE — 74011250636 HC RX REV CODE- 250/636: Performed by: NURSE PRACTITIONER

## 2018-01-01 PROCEDURE — 74011250636 HC RX REV CODE- 250/636: Performed by: FAMILY MEDICINE

## 2018-01-01 PROCEDURE — 49083 ABD PARACENTESIS W/IMAGING: CPT

## 2018-01-01 PROCEDURE — 83735 ASSAY OF MAGNESIUM: CPT | Performed by: EMERGENCY MEDICINE

## 2018-01-01 PROCEDURE — P9016 RBC LEUKOCYTES REDUCED: HCPCS | Performed by: EMERGENCY MEDICINE

## 2018-01-01 PROCEDURE — 76450000000

## 2018-01-01 PROCEDURE — 85018 HEMOGLOBIN: CPT | Performed by: HOSPITALIST

## 2018-01-01 PROCEDURE — 74011636637 HC RX REV CODE- 636/637: Performed by: HOSPITALIST

## 2018-01-01 PROCEDURE — 85025 COMPLETE CBC W/AUTO DIFF WBC: CPT | Performed by: INTERNAL MEDICINE

## 2018-01-01 PROCEDURE — 82140 ASSAY OF AMMONIA: CPT | Performed by: HOSPITALIST

## 2018-01-01 PROCEDURE — 36415 COLL VENOUS BLD VENIPUNCTURE: CPT | Performed by: FAMILY MEDICINE

## 2018-01-01 PROCEDURE — 74011250636 HC RX REV CODE- 250/636: Performed by: RADIOLOGY

## 2018-01-01 PROCEDURE — 74011250636 HC RX REV CODE- 250/636

## 2018-01-01 PROCEDURE — 83036 HEMOGLOBIN GLYCOSYLATED A1C: CPT | Performed by: FAMILY MEDICINE

## 2018-01-01 PROCEDURE — 99152 MOD SED SAME PHYS/QHP 5/>YRS: CPT

## 2018-01-01 PROCEDURE — 83605 ASSAY OF LACTIC ACID: CPT | Performed by: EMERGENCY MEDICINE

## 2018-01-01 PROCEDURE — 80053 COMPREHEN METABOLIC PANEL: CPT | Performed by: INTERNAL MEDICINE

## 2018-01-01 PROCEDURE — 74011250637 HC RX REV CODE- 250/637: Performed by: INTERNAL MEDICINE

## 2018-01-01 PROCEDURE — 80048 BASIC METABOLIC PNL TOTAL CA: CPT | Performed by: INTERNAL MEDICINE

## 2018-01-01 PROCEDURE — 74011000250 HC RX REV CODE- 250: Performed by: INTERNAL MEDICINE

## 2018-01-01 PROCEDURE — 36415 COLL VENOUS BLD VENIPUNCTURE: CPT | Performed by: INTERNAL MEDICINE

## 2018-01-01 PROCEDURE — 85018 HEMOGLOBIN: CPT | Performed by: EMERGENCY MEDICINE

## 2018-01-01 PROCEDURE — A4927 NON-STERILE GLOVES: HCPCS

## 2018-01-01 PROCEDURE — 49418 INSERT TUN IP CATH PERC: CPT

## 2018-01-01 PROCEDURE — 99218 HC RM OBSERVATION: CPT

## 2018-01-01 PROCEDURE — 93306 TTE W/DOPPLER COMPLETE: CPT

## 2018-01-01 PROCEDURE — 0D9670Z DRAINAGE OF STOMACH WITH DRAINAGE DEVICE, VIA NATURAL OR ARTIFICIAL OPENING: ICD-10-PCS | Performed by: EMERGENCY MEDICINE

## 2018-01-01 PROCEDURE — 85730 THROMBOPLASTIN TIME PARTIAL: CPT | Performed by: EMERGENCY MEDICINE

## 2018-01-01 PROCEDURE — HOSPICE MEDICATION HC HH HOSPICE MEDICATION

## 2018-01-01 PROCEDURE — G0155 HHCP-SVS OF CSW,EA 15 MIN: HCPCS

## 2018-01-01 PROCEDURE — 80076 HEPATIC FUNCTION PANEL: CPT | Performed by: NURSE PRACTITIONER

## 2018-01-01 PROCEDURE — 86923 COMPATIBILITY TEST ELECTRIC: CPT | Performed by: EMERGENCY MEDICINE

## 2018-01-01 PROCEDURE — 0651 HSPC ROUTINE HOME CARE

## 2018-01-01 PROCEDURE — 36415 COLL VENOUS BLD VENIPUNCTURE: CPT | Performed by: PHYSICIAN ASSISTANT

## 2018-01-01 PROCEDURE — 89050 BODY FLUID CELL COUNT: CPT | Performed by: NURSE PRACTITIONER

## 2018-01-01 PROCEDURE — 30233N1 TRANSFUSION OF NONAUTOLOGOUS RED BLOOD CELLS INTO PERIPHERAL VEIN, PERCUTANEOUS APPROACH: ICD-10-PCS | Performed by: HOSPITALIST

## 2018-01-01 PROCEDURE — 74011000250 HC RX REV CODE- 250: Performed by: EMERGENCY MEDICINE

## 2018-01-01 PROCEDURE — 74176 CT ABD & PELVIS W/O CONTRAST: CPT

## 2018-01-01 PROCEDURE — 74011000258 HC RX REV CODE- 258: Performed by: FAMILY MEDICINE

## 2018-01-01 PROCEDURE — 96374 THER/PROPH/DIAG INJ IV PUSH: CPT

## 2018-01-01 PROCEDURE — 36415 COLL VENOUS BLD VENIPUNCTURE: CPT | Performed by: RADIOLOGY

## 2018-01-01 PROCEDURE — 96361 HYDRATE IV INFUSION ADD-ON: CPT

## 2018-01-01 PROCEDURE — 78452 HT MUSCLE IMAGE SPECT MULT: CPT

## 2018-01-01 PROCEDURE — 77030009426 HC FCPS BIOP ENDOSC BSC -B: Performed by: INTERNAL MEDICINE

## 2018-01-01 PROCEDURE — 80162 ASSAY OF DIGOXIN TOTAL: CPT | Performed by: HOSPITALIST

## 2018-01-01 PROCEDURE — 94760 N-INVAS EAR/PLS OXIMETRY 1: CPT

## 2018-01-01 PROCEDURE — 85025 COMPLETE CBC W/AUTO DIFF WBC: CPT | Performed by: RADIOLOGY

## 2018-01-01 PROCEDURE — 97165 OT EVAL LOW COMPLEX 30 MIN: CPT

## 2018-01-01 PROCEDURE — 86900 BLOOD TYPING SEROLOGIC ABO: CPT | Performed by: EMERGENCY MEDICINE

## 2018-01-01 PROCEDURE — 99285 EMERGENCY DEPT VISIT HI MDM: CPT

## 2018-01-01 PROCEDURE — 85027 COMPLETE CBC AUTOMATED: CPT | Performed by: HOSPITALIST

## 2018-01-01 PROCEDURE — 65610000006 HC RM INTENSIVE CARE

## 2018-01-01 PROCEDURE — P9047 ALBUMIN (HUMAN), 25%, 50ML: HCPCS | Performed by: NURSE PRACTITIONER

## 2018-01-01 PROCEDURE — 82140 ASSAY OF AMMONIA: CPT | Performed by: NURSE PRACTITIONER

## 2018-01-01 PROCEDURE — 85018 HEMOGLOBIN: CPT | Performed by: FAMILY MEDICINE

## 2018-01-01 PROCEDURE — 74011636320 HC RX REV CODE- 636/320: Performed by: EMERGENCY MEDICINE

## 2018-01-01 PROCEDURE — 87086 URINE CULTURE/COLONY COUNT: CPT | Performed by: NURSE PRACTITIONER

## 2018-01-01 PROCEDURE — 99284 EMERGENCY DEPT VISIT MOD MDM: CPT

## 2018-01-01 PROCEDURE — 82272 OCCULT BLD FECES 1-3 TESTS: CPT | Performed by: EMERGENCY MEDICINE

## 2018-01-01 PROCEDURE — 85027 COMPLETE CBC AUTOMATED: CPT | Performed by: INTERNAL MEDICINE

## 2018-01-01 PROCEDURE — 0W9G30Z DRAINAGE OF PERITONEAL CAVITY WITH DRAINAGE DEVICE, PERCUTANEOUS APPROACH: ICD-10-PCS | Performed by: RADIOLOGY

## 2018-01-01 PROCEDURE — 36415 COLL VENOUS BLD VENIPUNCTURE: CPT | Performed by: EMERGENCY MEDICINE

## 2018-01-01 PROCEDURE — 88112 CYTOPATH CELL ENHANCE TECH: CPT | Performed by: EMERGENCY MEDICINE

## 2018-01-01 PROCEDURE — A4314 CATH W/DRAINAGE 2-WAY LATEX: HCPCS

## 2018-01-01 PROCEDURE — 74011000258 HC RX REV CODE- 258: Performed by: INTERNAL MEDICINE

## 2018-01-01 PROCEDURE — 77030032490 HC SLV COMPR SCD KNE COVD -B

## 2018-01-01 PROCEDURE — 84100 ASSAY OF PHOSPHORUS: CPT | Performed by: HOSPITALIST

## 2018-01-01 PROCEDURE — 0W9G3ZZ DRAINAGE OF PERITONEAL CAVITY, PERCUTANEOUS APPROACH: ICD-10-PCS | Performed by: RADIOLOGY

## 2018-01-01 PROCEDURE — 99283 EMERGENCY DEPT VISIT LOW MDM: CPT

## 2018-01-01 PROCEDURE — 80307 DRUG TEST PRSMV CHEM ANLYZR: CPT | Performed by: EMERGENCY MEDICINE

## 2018-01-01 PROCEDURE — A9500 TC99M SESTAMIBI: HCPCS

## 2018-01-01 PROCEDURE — 82607 VITAMIN B-12: CPT | Performed by: FAMILY MEDICINE

## 2018-01-01 PROCEDURE — 85018 HEMOGLOBIN: CPT | Performed by: INTERNAL MEDICINE

## 2018-01-01 PROCEDURE — G0299 HHS/HOSPICE OF RN EA 15 MIN: HCPCS

## 2018-01-01 PROCEDURE — 77030005208 HC CATH HLDR GLWC -A

## 2018-01-01 PROCEDURE — 85610 PROTHROMBIN TIME: CPT | Performed by: NURSE PRACTITIONER

## 2018-01-01 PROCEDURE — 85610 PROTHROMBIN TIME: CPT | Performed by: STUDENT IN AN ORGANIZED HEALTH CARE EDUCATION/TRAINING PROGRAM

## 2018-01-01 PROCEDURE — 82272 OCCULT BLD FECES 1-3 TESTS: CPT | Performed by: FAMILY MEDICINE

## 2018-01-01 PROCEDURE — 77030011223 HC DEV LIG POLYLP OCOA -B: Performed by: INTERNAL MEDICINE

## 2018-01-01 PROCEDURE — 74011000250 HC RX REV CODE- 250

## 2018-01-01 PROCEDURE — 83605 ASSAY OF LACTIC ACID: CPT | Performed by: FAMILY MEDICINE

## 2018-01-01 PROCEDURE — 97116 GAIT TRAINING THERAPY: CPT

## 2018-01-01 PROCEDURE — 70450 CT HEAD/BRAIN W/O DYE: CPT

## 2018-01-01 PROCEDURE — 74011000258 HC RX REV CODE- 258: Performed by: HOSPITALIST

## 2018-01-01 PROCEDURE — 65270000032 HC RM SEMIPRIVATE

## 2018-01-01 PROCEDURE — C1892 INTRO/SHEATH,FIXED,PEEL-AWAY: HCPCS

## 2018-01-01 PROCEDURE — 76040000019: Performed by: INTERNAL MEDICINE

## 2018-01-01 PROCEDURE — 74011000258 HC RX REV CODE- 258: Performed by: EMERGENCY MEDICINE

## 2018-01-01 PROCEDURE — 83735 ASSAY OF MAGNESIUM: CPT | Performed by: HOSPITALIST

## 2018-01-01 PROCEDURE — 36415 COLL VENOUS BLD VENIPUNCTURE: CPT | Performed by: NURSE PRACTITIONER

## 2018-01-01 PROCEDURE — T4523 ADULT SIZE BRIEF/DIAPER LG: HCPCS

## 2018-01-01 PROCEDURE — 0W9G3ZZ DRAINAGE OF PERITONEAL CAVITY, PERCUTANEOUS APPROACH: ICD-10-PCS | Performed by: HOSPITALIST

## 2018-01-01 PROCEDURE — 84100 ASSAY OF PHOSPHORUS: CPT | Performed by: EMERGENCY MEDICINE

## 2018-01-01 PROCEDURE — 96376 TX/PRO/DX INJ SAME DRUG ADON: CPT

## 2018-01-01 PROCEDURE — 85610 PROTHROMBIN TIME: CPT | Performed by: HOSPITALIST

## 2018-01-01 PROCEDURE — 80048 BASIC METABOLIC PNL TOTAL CA: CPT | Performed by: FAMILY MEDICINE

## 2018-01-01 PROCEDURE — 85025 COMPLETE CBC W/AUTO DIFF WBC: CPT | Performed by: NURSE PRACTITIONER

## 2018-01-01 PROCEDURE — C9113 INJ PANTOPRAZOLE SODIUM, VIA: HCPCS | Performed by: INTERNAL MEDICINE

## 2018-01-01 PROCEDURE — 97116 GAIT TRAINING THERAPY: CPT | Performed by: PHYSICAL THERAPIST

## 2018-01-01 PROCEDURE — A4452 WATERPROOF TAPE: HCPCS

## 2018-01-01 PROCEDURE — 87045 FECES CULTURE AEROBIC BACT: CPT | Performed by: FAMILY MEDICINE

## 2018-01-01 PROCEDURE — 83690 ASSAY OF LIPASE: CPT | Performed by: EMERGENCY MEDICINE

## 2018-01-01 PROCEDURE — HHS10554 SHAMPOO/BODY WASH 8 OZ ALOE VESTA

## 2018-01-01 PROCEDURE — 82728 ASSAY OF FERRITIN: CPT | Performed by: HOSPITALIST

## 2018-01-01 PROCEDURE — 80307 DRUG TEST PRSMV CHEM ANLYZR: CPT | Performed by: STUDENT IN AN ORGANIZED HEALTH CARE EDUCATION/TRAINING PROGRAM

## 2018-01-01 PROCEDURE — 87077 CULTURE AEROBIC IDENTIFY: CPT | Performed by: NURSE PRACTITIONER

## 2018-01-01 PROCEDURE — A9270 NON-COVERED ITEM OR SERVICE: HCPCS

## 2018-01-01 PROCEDURE — 77030029684 HC NEB SM VOL KT MONA -A

## 2018-01-01 PROCEDURE — 77030014137 HC TY PARCNT TELE -B

## 2018-01-01 PROCEDURE — 81001 URINALYSIS AUTO W/SCOPE: CPT | Performed by: EMERGENCY MEDICINE

## 2018-01-01 PROCEDURE — 85610 PROTHROMBIN TIME: CPT | Performed by: RADIOLOGY

## 2018-01-01 PROCEDURE — 77030013169 SET IV BLD ICUM -A

## 2018-01-01 PROCEDURE — E0276 BED PAN FRACTURE: HCPCS

## 2018-01-01 PROCEDURE — 85610 PROTHROMBIN TIME: CPT | Performed by: FAMILY MEDICINE

## 2018-01-01 PROCEDURE — 89055 LEUKOCYTE ASSESSMENT FECAL: CPT | Performed by: FAMILY MEDICINE

## 2018-01-01 PROCEDURE — 82140 ASSAY OF AMMONIA: CPT | Performed by: EMERGENCY MEDICINE

## 2018-01-01 PROCEDURE — 74011636637 HC RX REV CODE- 636/637: Performed by: NURSE PRACTITIONER

## 2018-01-01 PROCEDURE — 74178 CT ABD&PLV WO CNTR FLWD CNTR: CPT

## 2018-01-01 PROCEDURE — 84484 ASSAY OF TROPONIN QUANT: CPT | Performed by: EMERGENCY MEDICINE

## 2018-01-01 PROCEDURE — 94762 N-INVAS EAR/PLS OXIMTRY CONT: CPT

## 2018-01-01 PROCEDURE — 74177 CT ABD & PELVIS W/CONTRAST: CPT

## 2018-01-01 PROCEDURE — 74011636320 HC RX REV CODE- 636/320: Performed by: HOSPITALIST

## 2018-01-01 PROCEDURE — 83690 ASSAY OF LIPASE: CPT | Performed by: NURSE PRACTITIONER

## 2018-01-01 PROCEDURE — 65660000001 HC RM ICU INTERMED STEPDOWN

## 2018-01-01 PROCEDURE — 65620000000 HC RM CCU GENERAL

## 2018-01-01 PROCEDURE — 81001 URINALYSIS AUTO W/SCOPE: CPT | Performed by: STUDENT IN AN ORGANIZED HEALTH CARE EDUCATION/TRAINING PROGRAM

## 2018-01-01 PROCEDURE — 93017 CV STRESS TEST TRACING ONLY: CPT

## 2018-01-01 PROCEDURE — 80053 COMPREHEN METABOLIC PANEL: CPT

## 2018-01-01 PROCEDURE — 85027 COMPLETE CBC AUTOMATED: CPT | Performed by: FAMILY MEDICINE

## 2018-01-01 PROCEDURE — 80076 HEPATIC FUNCTION PANEL: CPT | Performed by: HOSPITALIST

## 2018-01-01 PROCEDURE — 71046 X-RAY EXAM CHEST 2 VIEWS: CPT

## 2018-01-01 PROCEDURE — 82140 ASSAY OF AMMONIA: CPT

## 2018-01-01 PROCEDURE — T4541 LARGE DISPOSABLE UNDERPAD: HCPCS

## 2018-01-01 PROCEDURE — 3336500001 HSPC ELECTION

## 2018-01-01 PROCEDURE — 86923 COMPATIBILITY TEST ELECTRIC: CPT | Performed by: HOSPITALIST

## 2018-01-01 PROCEDURE — 87040 BLOOD CULTURE FOR BACTERIA: CPT | Performed by: NURSE PRACTITIONER

## 2018-01-01 PROCEDURE — T4528 ADULT SIZE PULL-ON XL: HCPCS

## 2018-01-01 PROCEDURE — 80162 ASSAY OF DIGOXIN TOTAL: CPT | Performed by: EMERGENCY MEDICINE

## 2018-01-01 PROCEDURE — 74011000250 HC RX REV CODE- 250: Performed by: NURSE PRACTITIONER

## 2018-01-01 PROCEDURE — 30233N1 TRANSFUSION OF NONAUTOLOGOUS RED BLOOD CELLS INTO PERIPHERAL VEIN, PERCUTANEOUS APPROACH: ICD-10-PCS | Performed by: FAMILY MEDICINE

## 2018-01-01 PROCEDURE — 77030011256 HC DRSG MEPILEX <16IN NO BORD MOLN -A

## 2018-01-01 PROCEDURE — 77030012940 HC DRSG HYDRCOIL BMS -B

## 2018-01-01 PROCEDURE — 77010033678 HC OXYGEN DAILY

## 2018-01-01 PROCEDURE — 82150 ASSAY OF AMYLASE: CPT | Performed by: EMERGENCY MEDICINE

## 2018-01-01 PROCEDURE — 74011250636 HC RX REV CODE- 250/636: Performed by: STUDENT IN AN ORGANIZED HEALTH CARE EDUCATION/TRAINING PROGRAM

## 2018-01-01 PROCEDURE — 85610 PROTHROMBIN TIME: CPT | Performed by: INTERNAL MEDICINE

## 2018-01-01 PROCEDURE — 0DJ08ZZ INSPECTION OF UPPER INTESTINAL TRACT, VIA NATURAL OR ARTIFICIAL OPENING ENDOSCOPIC: ICD-10-PCS | Performed by: INTERNAL MEDICINE

## 2018-01-01 PROCEDURE — G0156 HHCP-SVS OF AIDE,EA 15 MIN: HCPCS

## 2018-01-01 PROCEDURE — P9047 ALBUMIN (HUMAN), 25%, 50ML: HCPCS | Performed by: STUDENT IN AN ORGANIZED HEALTH CARE EDUCATION/TRAINING PROGRAM

## 2018-01-01 PROCEDURE — 81001 URINALYSIS AUTO W/SCOPE: CPT | Performed by: FAMILY MEDICINE

## 2018-01-01 PROCEDURE — 74011250637 HC RX REV CODE- 250/637: Performed by: STUDENT IN AN ORGANIZED HEALTH CARE EDUCATION/TRAINING PROGRAM

## 2018-01-01 PROCEDURE — 97161 PT EVAL LOW COMPLEX 20 MIN: CPT

## 2018-01-01 PROCEDURE — P9016 RBC LEUKOCYTES REDUCED: HCPCS | Performed by: HOSPITALIST

## 2018-01-01 PROCEDURE — 82140 ASSAY OF AMMONIA: CPT | Performed by: PHYSICIAN ASSISTANT

## 2018-01-01 PROCEDURE — 80076 HEPATIC FUNCTION PANEL: CPT | Performed by: FAMILY MEDICINE

## 2018-01-01 PROCEDURE — 84484 ASSAY OF TROPONIN QUANT: CPT | Performed by: PHYSICIAN ASSISTANT

## 2018-01-01 PROCEDURE — 74018 RADEX ABDOMEN 1 VIEW: CPT

## 2018-01-01 PROCEDURE — 74019 RADEX ABDOMEN 2 VIEWS: CPT

## 2018-01-01 PROCEDURE — P9047 ALBUMIN (HUMAN), 25%, 50ML: HCPCS

## 2018-01-01 PROCEDURE — 83605 ASSAY OF LACTIC ACID: CPT

## 2018-01-01 PROCEDURE — 80053 COMPREHEN METABOLIC PANEL: CPT | Performed by: NURSE PRACTITIONER

## 2018-01-01 PROCEDURE — 83540 ASSAY OF IRON: CPT | Performed by: HOSPITALIST

## 2018-01-01 PROCEDURE — 85025 COMPLETE CBC W/AUTO DIFF WBC: CPT

## 2018-01-01 PROCEDURE — 87086 URINE CULTURE/COLONY COUNT: CPT | Performed by: EMERGENCY MEDICINE

## 2018-01-01 PROCEDURE — 74011250637 HC RX REV CODE- 250/637: Performed by: RADIOLOGY

## 2018-01-01 PROCEDURE — 83690 ASSAY OF LIPASE: CPT | Performed by: STUDENT IN AN ORGANIZED HEALTH CARE EDUCATION/TRAINING PROGRAM

## 2018-01-01 PROCEDURE — 87040 BLOOD CULTURE FOR BACTERIA: CPT | Performed by: EMERGENCY MEDICINE

## 2018-01-01 RX ORDER — LANOLIN ALCOHOL/MO/W.PET/CERES
100 CREAM (GRAM) TOPICAL DAILY
Status: DISCONTINUED | OUTPATIENT
Start: 2018-01-01 | End: 2018-01-01 | Stop reason: HOSPADM

## 2018-01-01 RX ORDER — SODIUM CHLORIDE 9 MG/ML
250 INJECTION, SOLUTION INTRAVENOUS AS NEEDED
Status: DISCONTINUED | OUTPATIENT
Start: 2018-01-01 | End: 2018-01-01 | Stop reason: HOSPADM

## 2018-01-01 RX ORDER — VANCOMYCIN 2 GRAM/500 ML IN 0.9 % SODIUM CHLORIDE INTRAVENOUS
2000 ONCE
Status: DISCONTINUED | OUTPATIENT
Start: 2018-01-01 | End: 2018-01-01 | Stop reason: HOSPADM

## 2018-01-01 RX ORDER — INSULIN LISPRO 100 [IU]/ML
INJECTION, SOLUTION INTRAVENOUS; SUBCUTANEOUS EVERY 6 HOURS
Status: DISCONTINUED | OUTPATIENT
Start: 2018-01-01 | End: 2018-01-01

## 2018-01-01 RX ORDER — PANTOPRAZOLE SODIUM 40 MG/1
40 TABLET, DELAYED RELEASE ORAL
Status: DISCONTINUED | OUTPATIENT
Start: 2018-01-01 | End: 2018-01-01 | Stop reason: HOSPADM

## 2018-01-01 RX ORDER — SODIUM CHLORIDE 9 MG/ML
100 INJECTION, SOLUTION INTRAVENOUS CONTINUOUS
Status: DISCONTINUED | OUTPATIENT
Start: 2018-01-01 | End: 2018-01-01

## 2018-01-01 RX ORDER — ALBUMIN HUMAN 250 G/1000ML
25 SOLUTION INTRAVENOUS
Status: DISCONTINUED | OUTPATIENT
Start: 2018-01-01 | End: 2018-01-01 | Stop reason: HOSPADM

## 2018-01-01 RX ORDER — CIPROFLOXACIN 500 MG/1
500 TABLET ORAL DAILY
Qty: 90 TAB | Refills: 3 | Status: SHIPPED | OUTPATIENT
Start: 2018-01-01 | End: 2018-01-01

## 2018-01-01 RX ORDER — FUROSEMIDE 40 MG/1
80 TABLET ORAL DAILY
Qty: 30 TAB | Refills: 3 | Status: ON HOLD | OUTPATIENT
Start: 2018-01-01 | End: 2018-01-01

## 2018-01-01 RX ORDER — OCTREOTIDE ACETATE 100 UG/ML
100 INJECTION, SOLUTION INTRAVENOUS; SUBCUTANEOUS
Status: COMPLETED | OUTPATIENT
Start: 2018-01-01 | End: 2018-01-01

## 2018-01-01 RX ORDER — NALOXONE HYDROCHLORIDE 0.4 MG/ML
0.4 INJECTION, SOLUTION INTRAMUSCULAR; INTRAVENOUS; SUBCUTANEOUS
Status: ACTIVE | OUTPATIENT
Start: 2018-01-01 | End: 2018-01-01

## 2018-01-01 RX ORDER — ALBUMIN HUMAN 250 G/1000ML
25 SOLUTION INTRAVENOUS EVERY 6 HOURS
Status: COMPLETED | OUTPATIENT
Start: 2018-01-01 | End: 2018-01-01

## 2018-01-01 RX ORDER — ONDANSETRON 2 MG/ML
8 INJECTION INTRAMUSCULAR; INTRAVENOUS
Status: DISPENSED | OUTPATIENT
Start: 2018-01-01 | End: 2018-01-01

## 2018-01-01 RX ORDER — DIGOXIN 125 MCG
0.12 TABLET ORAL
Status: DISCONTINUED | OUTPATIENT
Start: 2018-01-01 | End: 2018-01-01 | Stop reason: HOSPADM

## 2018-01-01 RX ORDER — ALBUMIN HUMAN 250 G/1000ML
25 SOLUTION INTRAVENOUS EVERY 6 HOURS
Status: DISCONTINUED | OUTPATIENT
Start: 2018-01-01 | End: 2018-01-01

## 2018-01-01 RX ORDER — EPINEPHRINE 0.1 MG/ML
1 INJECTION INTRACARDIAC; INTRAVENOUS
Status: ACTIVE | OUTPATIENT
Start: 2018-01-01 | End: 2018-01-01

## 2018-01-01 RX ORDER — ONDANSETRON 2 MG/ML
4 INJECTION INTRAMUSCULAR; INTRAVENOUS
Status: COMPLETED | OUTPATIENT
Start: 2018-01-01 | End: 2018-01-01

## 2018-01-01 RX ORDER — DIPHENHYDRAMINE HCL 25 MG
25 CAPSULE ORAL ONCE
Status: COMPLETED | OUTPATIENT
Start: 2018-01-01 | End: 2018-01-01

## 2018-01-01 RX ORDER — FUROSEMIDE 40 MG/1
40 TABLET ORAL DAILY
Status: DISCONTINUED | OUTPATIENT
Start: 2018-01-01 | End: 2018-01-01 | Stop reason: HOSPADM

## 2018-01-01 RX ORDER — GABAPENTIN 100 MG/1
100 CAPSULE ORAL 3 TIMES DAILY
COMMUNITY
Start: 2018-01-01

## 2018-01-01 RX ORDER — ALBUMIN HUMAN 250 G/1000ML
25 SOLUTION INTRAVENOUS ONCE
Status: COMPLETED | OUTPATIENT
Start: 2018-01-01 | End: 2018-01-01

## 2018-01-01 RX ORDER — ALBUMIN HUMAN 250 G/1000ML
12.5 SOLUTION INTRAVENOUS EVERY 6 HOURS
Status: COMPLETED | OUTPATIENT
Start: 2018-01-01 | End: 2018-01-01

## 2018-01-01 RX ORDER — OXYCODONE AND ACETAMINOPHEN 5; 325 MG/1; MG/1
1 TABLET ORAL
Status: DISCONTINUED | OUTPATIENT
Start: 2018-01-01 | End: 2018-01-01 | Stop reason: HOSPADM

## 2018-01-01 RX ORDER — FENTANYL CITRATE 50 UG/ML
50-200 INJECTION, SOLUTION INTRAMUSCULAR; INTRAVENOUS
Status: DISPENSED | OUTPATIENT
Start: 2018-01-01 | End: 2018-01-01

## 2018-01-01 RX ORDER — SODIUM CHLORIDE 0.9 % (FLUSH) 0.9 %
5-10 SYRINGE (ML) INJECTION AS NEEDED
Status: DISPENSED | OUTPATIENT
Start: 2018-01-01 | End: 2018-01-01

## 2018-01-01 RX ORDER — LACTULOSE 10 G/15ML
20 SOLUTION ORAL 3 TIMES DAILY
COMMUNITY
End: 2018-01-01 | Stop reason: SDUPTHER

## 2018-01-01 RX ORDER — GABAPENTIN 100 MG/1
100 CAPSULE ORAL 3 TIMES DAILY
Status: DISCONTINUED | OUTPATIENT
Start: 2018-01-01 | End: 2018-01-01 | Stop reason: HOSPADM

## 2018-01-01 RX ORDER — MAGNESIUM SULFATE 100 %
4 CRYSTALS MISCELLANEOUS AS NEEDED
Status: DISCONTINUED | OUTPATIENT
Start: 2018-01-01 | End: 2018-01-01 | Stop reason: HOSPADM

## 2018-01-01 RX ORDER — ONDANSETRON 2 MG/ML
INJECTION INTRAMUSCULAR; INTRAVENOUS
Status: COMPLETED
Start: 2018-01-01 | End: 2018-01-01

## 2018-01-01 RX ORDER — SPIRONOLACTONE 100 MG/1
300 TABLET, FILM COATED ORAL DAILY
Qty: 60 TAB | Refills: 3 | Status: SHIPPED
Start: 2018-01-01 | End: 2018-01-01

## 2018-01-01 RX ORDER — FUROSEMIDE 20 MG/1
20 TABLET ORAL DAILY
Qty: 30 TAB | Refills: 1 | Status: ON HOLD | OUTPATIENT
Start: 2018-01-01 | End: 2018-01-01

## 2018-01-01 RX ORDER — SODIUM CHLORIDE 0.9 % (FLUSH) 0.9 %
5-10 SYRINGE (ML) INJECTION AS NEEDED
Status: DISCONTINUED | OUTPATIENT
Start: 2018-01-01 | End: 2018-01-01 | Stop reason: HOSPADM

## 2018-01-01 RX ORDER — INSULIN LISPRO 100 [IU]/ML
INJECTION, SOLUTION INTRAVENOUS; SUBCUTANEOUS
Status: DISCONTINUED | OUTPATIENT
Start: 2018-01-01 | End: 2018-01-01 | Stop reason: HOSPADM

## 2018-01-01 RX ORDER — METOPROLOL SUCCINATE 25 MG/1
12.5 TABLET, EXTENDED RELEASE ORAL
Status: DISCONTINUED | OUTPATIENT
Start: 2018-01-01 | End: 2018-01-01 | Stop reason: HOSPADM

## 2018-01-01 RX ORDER — SODIUM BICARBONATE 42 MG/ML
1 INJECTION, SOLUTION INTRAVENOUS
Status: COMPLETED | OUTPATIENT
Start: 2018-01-01 | End: 2018-01-01

## 2018-01-01 RX ORDER — GLIMEPIRIDE 1 MG/1
1 TABLET ORAL DAILY
COMMUNITY
Start: 2017-01-01 | End: 2018-01-01

## 2018-01-01 RX ORDER — ALBUMIN HUMAN 250 G/1000ML
25 SOLUTION INTRAVENOUS EVERY 6 HOURS
Status: DISCONTINUED | OUTPATIENT
Start: 2018-01-01 | End: 2018-01-01 | Stop reason: HOSPADM

## 2018-01-01 RX ORDER — AMIODARONE HYDROCHLORIDE 200 MG/1
200 TABLET ORAL DAILY
Status: DISCONTINUED | OUTPATIENT
Start: 2018-01-01 | End: 2018-01-01 | Stop reason: HOSPADM

## 2018-01-01 RX ORDER — SODIUM BICARBONATE 42 MG/ML
1 INJECTION, SOLUTION INTRAVENOUS
Status: ACTIVE | OUTPATIENT
Start: 2018-01-01 | End: 2018-01-01

## 2018-01-01 RX ORDER — CETIRIZINE HCL 10 MG
10 TABLET ORAL DAILY
Status: DISCONTINUED | OUTPATIENT
Start: 2018-01-01 | End: 2018-01-01 | Stop reason: HOSPADM

## 2018-01-01 RX ORDER — DEXTROSE 50 % IN WATER (D50W) INTRAVENOUS SYRINGE
12.5-25 AS NEEDED
Status: DISCONTINUED | OUTPATIENT
Start: 2018-01-01 | End: 2018-01-01 | Stop reason: HOSPADM

## 2018-01-01 RX ORDER — ALBUMIN HUMAN 250 G/1000ML
12.5 SOLUTION INTRAVENOUS ONCE
Status: COMPLETED | OUTPATIENT
Start: 2018-01-01 | End: 2018-01-01

## 2018-01-01 RX ORDER — SACUBITRIL AND VALSARTAN 49; 51 MG/1; MG/1
TABLET, FILM COATED ORAL
COMMUNITY
Start: 2017-01-01 | End: 2018-01-01 | Stop reason: CLARIF

## 2018-01-01 RX ORDER — PANTOPRAZOLE SODIUM 40 MG/1
40 TABLET, DELAYED RELEASE ORAL DAILY
Status: DISCONTINUED | OUTPATIENT
Start: 2018-01-01 | End: 2018-01-01 | Stop reason: HOSPADM

## 2018-01-01 RX ORDER — PANTOPRAZOLE SODIUM 40 MG/1
40 TABLET, DELAYED RELEASE ORAL
Qty: 30 TAB | Refills: 0 | Status: SHIPPED | OUTPATIENT
Start: 2018-01-01 | End: 2018-01-01 | Stop reason: SDUPTHER

## 2018-01-01 RX ORDER — SODIUM CHLORIDE 0.9 % (FLUSH) 0.9 %
5-10 SYRINGE (ML) INJECTION EVERY 8 HOURS
Status: DISCONTINUED | OUTPATIENT
Start: 2018-01-01 | End: 2018-01-01 | Stop reason: HOSPADM

## 2018-01-01 RX ORDER — ALBUMIN HUMAN 250 G/1000ML
SOLUTION INTRAVENOUS
Status: DISPENSED
Start: 2018-01-01 | End: 2018-01-01

## 2018-01-01 RX ORDER — CALCIUM GLUCONATE 94 MG/ML
1 INJECTION, SOLUTION INTRAVENOUS ONCE
Status: DISCONTINUED | OUTPATIENT
Start: 2018-01-01 | End: 2018-01-01

## 2018-01-01 RX ORDER — ALBUMIN HUMAN 250 G/1000ML
50 SOLUTION INTRAVENOUS ONCE
Status: COMPLETED | OUTPATIENT
Start: 2018-01-01 | End: 2018-01-01

## 2018-01-01 RX ORDER — OXYCODONE HCL 5 MG/5 ML
10 SOLUTION, ORAL ORAL
Status: DISCONTINUED | OUTPATIENT
Start: 2018-01-01 | End: 2018-01-01

## 2018-01-01 RX ORDER — ONDANSETRON 2 MG/ML
4 INJECTION INTRAMUSCULAR; INTRAVENOUS
Status: DISCONTINUED | OUTPATIENT
Start: 2018-01-01 | End: 2018-01-01 | Stop reason: HOSPADM

## 2018-01-01 RX ORDER — PANTOPRAZOLE SODIUM 40 MG/1
40 TABLET, DELAYED RELEASE ORAL
Qty: 60 TAB | Refills: 1 | Status: SHIPPED | OUTPATIENT
Start: 2018-01-01 | End: 2018-01-01

## 2018-01-01 RX ORDER — SODIUM CHLORIDE 9 MG/ML
250 INJECTION, SOLUTION INTRAVENOUS AS NEEDED
Status: DISCONTINUED | OUTPATIENT
Start: 2018-01-01 | End: 2018-01-01

## 2018-01-01 RX ORDER — PANTOPRAZOLE SODIUM 40 MG/1
40 TABLET, DELAYED RELEASE ORAL
Qty: 30 TAB | Refills: 0 | Status: CANCELLED | OUTPATIENT
Start: 2018-01-01

## 2018-01-01 RX ORDER — LIDOCAINE HYDROCHLORIDE 10 MG/ML
5 INJECTION, SOLUTION EPIDURAL; INFILTRATION; INTRACAUDAL; PERINEURAL
Status: COMPLETED | OUTPATIENT
Start: 2018-01-01 | End: 2018-01-01

## 2018-01-01 RX ORDER — SODIUM CHLORIDE 0.9 % (FLUSH) 0.9 %
20 SYRINGE (ML) INJECTION
Status: COMPLETED | OUTPATIENT
Start: 2018-01-01 | End: 2018-01-01

## 2018-01-01 RX ORDER — OXYCODONE HYDROCHLORIDE 5 MG/1
5-10 TABLET ORAL
Qty: 12 TAB | Refills: 0 | Status: SHIPPED | OUTPATIENT
Start: 2018-01-01 | End: 2018-01-01

## 2018-01-01 RX ORDER — FUROSEMIDE 40 MG/1
TABLET ORAL
COMMUNITY
Start: 2017-01-01 | End: 2018-01-01 | Stop reason: SDUPTHER

## 2018-01-01 RX ORDER — LIDOCAINE HYDROCHLORIDE 10 MG/ML
10 INJECTION, SOLUTION EPIDURAL; INFILTRATION; INTRACAUDAL; PERINEURAL
Status: COMPLETED | OUTPATIENT
Start: 2018-01-01 | End: 2018-01-01

## 2018-01-01 RX ORDER — ZOLPIDEM TARTRATE 5 MG/1
5 TABLET ORAL
Status: DISCONTINUED | OUTPATIENT
Start: 2018-01-01 | End: 2018-01-01 | Stop reason: HOSPADM

## 2018-01-01 RX ORDER — INSULIN LISPRO 100 [IU]/ML
INJECTION, SOLUTION INTRAVENOUS; SUBCUTANEOUS EVERY 6 HOURS
Status: DISCONTINUED | OUTPATIENT
Start: 2018-01-01 | End: 2018-01-01 | Stop reason: HOSPADM

## 2018-01-01 RX ORDER — HYDROCODONE BITARTRATE AND ACETAMINOPHEN 5; 325 MG/1; MG/1
1 TABLET ORAL
Status: DISCONTINUED | OUTPATIENT
Start: 2018-01-01 | End: 2018-01-01 | Stop reason: HOSPADM

## 2018-01-01 RX ORDER — ALBUMIN HUMAN 250 G/1000ML
12.5 SOLUTION INTRAVENOUS EVERY 6 HOURS
Status: DISCONTINUED | OUTPATIENT
Start: 2018-01-01 | End: 2018-01-01

## 2018-01-01 RX ORDER — SAME BUTANEDISULFONATE/BETAINE 400-600 MG
250 POWDER IN PACKET (EA) ORAL 2 TIMES DAILY
Qty: 60 CAP | Refills: 0 | Status: SHIPPED | OUTPATIENT
Start: 2018-01-01 | End: 2018-09-09

## 2018-01-01 RX ORDER — DIGOXIN 125 MCG
0.12 TABLET ORAL
Status: DISCONTINUED | OUTPATIENT
Start: 2018-01-01 | End: 2018-01-01

## 2018-01-01 RX ORDER — SPIRONOLACTONE 100 MG/1
TABLET, FILM COATED ORAL
COMMUNITY
Start: 2017-01-01 | End: 2018-01-01

## 2018-01-01 RX ORDER — HYDROMORPHONE HYDROCHLORIDE 1 MG/ML
0.5 INJECTION, SOLUTION INTRAMUSCULAR; INTRAVENOUS; SUBCUTANEOUS
Status: DISCONTINUED | OUTPATIENT
Start: 2018-01-01 | End: 2018-01-01 | Stop reason: HOSPADM

## 2018-01-01 RX ORDER — TRAMADOL HYDROCHLORIDE 50 MG/1
50 TABLET ORAL ONCE
Status: COMPLETED | OUTPATIENT
Start: 2018-01-01 | End: 2018-01-01

## 2018-01-01 RX ORDER — SODIUM CHLORIDE 9 MG/ML
50 INJECTION, SOLUTION INTRAVENOUS CONTINUOUS
Status: DISPENSED | OUTPATIENT
Start: 2018-01-01 | End: 2018-01-01

## 2018-01-01 RX ORDER — EPINEPHRINE 0.1 MG/ML
1 INJECTION INTRACARDIAC; INTRAVENOUS
Status: DISCONTINUED | OUTPATIENT
Start: 2018-01-01 | End: 2018-01-01 | Stop reason: HOSPADM

## 2018-01-01 RX ORDER — ATROPINE SULFATE 0.1 MG/ML
0.5 INJECTION INTRAVENOUS
Status: DISCONTINUED | OUTPATIENT
Start: 2018-01-01 | End: 2018-01-01 | Stop reason: HOSPADM

## 2018-01-01 RX ORDER — SPIRONOLACTONE 100 MG/1
100 TABLET, FILM COATED ORAL 2 TIMES DAILY
Status: DISCONTINUED | OUTPATIENT
Start: 2018-01-01 | End: 2018-01-01 | Stop reason: HOSPADM

## 2018-01-01 RX ORDER — ALBUMIN HUMAN 250 G/1000ML
50 SOLUTION INTRAVENOUS
Status: DISCONTINUED | OUTPATIENT
Start: 2018-01-01 | End: 2018-01-01 | Stop reason: HOSPADM

## 2018-01-01 RX ORDER — OXYCODONE HYDROCHLORIDE 5 MG/1
5 TABLET ORAL
Status: DISCONTINUED | OUTPATIENT
Start: 2018-01-01 | End: 2018-01-01

## 2018-01-01 RX ORDER — SPIRONOLACTONE 100 MG/1
200 TABLET, FILM COATED ORAL DAILY
Status: DISCONTINUED | OUTPATIENT
Start: 2018-01-01 | End: 2018-01-01

## 2018-01-01 RX ORDER — DIPHENHYDRAMINE HYDROCHLORIDE 50 MG/ML
50 INJECTION, SOLUTION INTRAMUSCULAR; INTRAVENOUS ONCE
Status: DISCONTINUED | OUTPATIENT
Start: 2018-01-01 | End: 2018-01-01 | Stop reason: HOSPADM

## 2018-01-01 RX ORDER — CALCIUM GLUCONATE 94 MG/ML
1 INJECTION, SOLUTION INTRAVENOUS ONCE
Status: DISCONTINUED | OUTPATIENT
Start: 2018-01-01 | End: 2018-01-01 | Stop reason: SDUPTHER

## 2018-01-01 RX ORDER — ALBUMIN HUMAN 250 G/1000ML
SOLUTION INTRAVENOUS
Status: COMPLETED
Start: 2018-01-01 | End: 2018-01-01

## 2018-01-01 RX ORDER — LIDOCAINE HYDROCHLORIDE 10 MG/ML
5 INJECTION, SOLUTION EPIDURAL; INFILTRATION; INTRACAUDAL; PERINEURAL ONCE
Status: COMPLETED | OUTPATIENT
Start: 2018-01-01 | End: 2018-01-01

## 2018-01-01 RX ORDER — LACTULOSE 10 G/15ML
10 SOLUTION ORAL; RECTAL 3 TIMES DAILY
Status: DISCONTINUED | OUTPATIENT
Start: 2018-01-01 | End: 2018-01-01 | Stop reason: DRUGHIGH

## 2018-01-01 RX ORDER — TRAMADOL HYDROCHLORIDE 50 MG/1
50 TABLET ORAL
Qty: 20 TAB | Refills: 0 | Status: SHIPPED | OUTPATIENT
Start: 2018-01-01

## 2018-01-01 RX ORDER — METOPROLOL SUCCINATE 25 MG/1
12.5 TABLET, EXTENDED RELEASE ORAL DAILY
Status: DISCONTINUED | OUTPATIENT
Start: 2018-01-01 | End: 2018-01-01 | Stop reason: HOSPADM

## 2018-01-01 RX ORDER — FUROSEMIDE 10 MG/ML
40 INJECTION INTRAMUSCULAR; INTRAVENOUS DAILY
Status: DISCONTINUED | OUTPATIENT
Start: 2018-01-01 | End: 2018-01-01

## 2018-01-01 RX ORDER — SODIUM CHLORIDE 0.9 % (FLUSH) 0.9 %
10 SYRINGE (ML) INJECTION
Status: COMPLETED | OUTPATIENT
Start: 2018-01-01 | End: 2018-01-01

## 2018-01-01 RX ORDER — DEXTROMETHORPHAN/PSEUDOEPHED 2.5-7.5/.8
1.2 DROPS ORAL
Status: DISCONTINUED | OUTPATIENT
Start: 2018-01-01 | End: 2018-01-01 | Stop reason: HOSPADM

## 2018-01-01 RX ORDER — GABAPENTIN 100 MG/1
100 CAPSULE ORAL 3 TIMES DAILY
Status: DISCONTINUED | OUTPATIENT
Start: 2018-01-01 | End: 2018-01-01 | Stop reason: SDUPTHER

## 2018-01-01 RX ORDER — FUROSEMIDE 20 MG/1
TABLET ORAL
COMMUNITY
Start: 2018-01-01 | End: 2018-01-01

## 2018-01-01 RX ORDER — DEXTROSE MONOHYDRATE 100 MG/ML
250 INJECTION, SOLUTION INTRAVENOUS ONCE
Status: COMPLETED | OUTPATIENT
Start: 2018-01-01 | End: 2018-01-01

## 2018-01-01 RX ORDER — FOLIC ACID 1 MG/1
1 TABLET ORAL DAILY
Status: DISCONTINUED | OUTPATIENT
Start: 2018-01-01 | End: 2018-01-01 | Stop reason: HOSPADM

## 2018-01-01 RX ORDER — FUROSEMIDE 40 MG/1
40 TABLET ORAL 2 TIMES DAILY
Status: DISCONTINUED | OUTPATIENT
Start: 2018-01-01 | End: 2018-01-01

## 2018-01-01 RX ORDER — SPIRONOLACTONE 100 MG/1
100 TABLET, FILM COATED ORAL DAILY
Status: DISCONTINUED | OUTPATIENT
Start: 2018-01-01 | End: 2018-01-01 | Stop reason: HOSPADM

## 2018-01-01 RX ORDER — ALBUMIN HUMAN 250 G/1000ML
12.5 SOLUTION INTRAVENOUS
Status: DISCONTINUED | OUTPATIENT
Start: 2018-01-01 | End: 2018-01-01 | Stop reason: HOSPADM

## 2018-01-01 RX ORDER — ACETAMINOPHEN 325 MG/1
650 TABLET ORAL
Status: DISCONTINUED | OUTPATIENT
Start: 2018-01-01 | End: 2018-01-01

## 2018-01-01 RX ORDER — ACETAMINOPHEN 500 MG
500 TABLET ORAL
Status: DISCONTINUED | OUTPATIENT
Start: 2018-01-01 | End: 2018-01-01 | Stop reason: HOSPADM

## 2018-01-01 RX ORDER — FACIAL-BODY WIPES
10 EACH TOPICAL DAILY PRN
Status: DISCONTINUED | OUTPATIENT
Start: 2018-01-01 | End: 2018-01-01 | Stop reason: HOSPADM

## 2018-01-01 RX ORDER — SPIRONOLACTONE 100 MG/1
100 TABLET, FILM COATED ORAL 2 TIMES DAILY
Status: DISCONTINUED | OUTPATIENT
Start: 2018-01-01 | End: 2018-01-01

## 2018-01-01 RX ORDER — FUROSEMIDE 10 MG/ML
40 INJECTION INTRAMUSCULAR; INTRAVENOUS 2 TIMES DAILY
Status: DISCONTINUED | OUTPATIENT
Start: 2018-01-01 | End: 2018-01-01

## 2018-01-01 RX ORDER — SODIUM CHLORIDE 9 MG/ML
75 INJECTION, SOLUTION INTRAVENOUS CONTINUOUS
Status: DISPENSED | OUTPATIENT
Start: 2018-01-01 | End: 2018-01-01

## 2018-01-01 RX ORDER — METOCLOPRAMIDE HYDROCHLORIDE 5 MG/ML
10 INJECTION INTRAMUSCULAR; INTRAVENOUS EVERY 6 HOURS
Status: DISCONTINUED | OUTPATIENT
Start: 2018-01-01 | End: 2018-01-01

## 2018-01-01 RX ORDER — FLUMAZENIL 0.1 MG/ML
0.2 INJECTION INTRAVENOUS
Status: ACTIVE | OUTPATIENT
Start: 2018-01-01 | End: 2018-01-01

## 2018-01-01 RX ORDER — MORPHINE SULFATE 2 MG/ML
1 INJECTION, SOLUTION INTRAMUSCULAR; INTRAVENOUS
Status: DISCONTINUED | OUTPATIENT
Start: 2018-01-01 | End: 2018-01-01

## 2018-01-01 RX ORDER — ATROPINE SULFATE 0.1 MG/ML
0.5 INJECTION INTRAVENOUS
Status: ACTIVE | OUTPATIENT
Start: 2018-01-01 | End: 2018-01-01

## 2018-01-01 RX ORDER — DEXTROSE 50 % IN WATER (D50W) INTRAVENOUS SYRINGE
25 ONCE
Status: COMPLETED | OUTPATIENT
Start: 2018-01-01 | End: 2018-01-01

## 2018-01-01 RX ORDER — METOCLOPRAMIDE HYDROCHLORIDE 5 MG/ML
10 INJECTION INTRAMUSCULAR; INTRAVENOUS EVERY 6 HOURS
Status: DISCONTINUED | OUTPATIENT
Start: 2018-01-01 | End: 2018-01-01 | Stop reason: HOSPADM

## 2018-01-01 RX ORDER — DEXTROMETHORPHAN POLISTIREX 30 MG/5 ML
SUSPENSION, EXTENDED RELEASE 12 HR ORAL ONCE
Status: DISPENSED | OUTPATIENT
Start: 2018-01-01 | End: 2018-01-01

## 2018-01-01 RX ORDER — SPIRONOLACTONE 100 MG/1
100 TABLET, FILM COATED ORAL DAILY
Status: DISCONTINUED | OUTPATIENT
Start: 2018-01-01 | End: 2018-01-01

## 2018-01-01 RX ORDER — SODIUM CHLORIDE 0.9 % (FLUSH) 0.9 %
5-10 SYRINGE (ML) INJECTION EVERY 8 HOURS
Status: COMPLETED | OUTPATIENT
Start: 2018-01-01 | End: 2018-01-01

## 2018-01-01 RX ORDER — GABAPENTIN 300 MG/1
300 CAPSULE ORAL 3 TIMES DAILY
Status: DISCONTINUED | OUTPATIENT
Start: 2018-01-01 | End: 2018-01-01 | Stop reason: HOSPADM

## 2018-01-01 RX ORDER — LIDOCAINE HYDROCHLORIDE 10 MG/ML
10 INJECTION INFILTRATION; PERINEURAL
Status: COMPLETED | OUTPATIENT
Start: 2018-01-01 | End: 2018-01-01

## 2018-01-01 RX ORDER — ALBUTEROL SULFATE 0.83 MG/ML
5 SOLUTION RESPIRATORY (INHALATION) ONCE
Status: COMPLETED | OUTPATIENT
Start: 2018-01-01 | End: 2018-01-01

## 2018-01-01 RX ORDER — FUROSEMIDE 40 MG/1
TABLET ORAL
Qty: 30 TAB | Refills: 0 | Status: SHIPPED | OUTPATIENT
Start: 2018-01-01

## 2018-01-01 RX ORDER — ALBUTEROL SULFATE 0.83 MG/ML
5 SOLUTION RESPIRATORY (INHALATION)
Status: DISPENSED | OUTPATIENT
Start: 2018-01-01 | End: 2018-01-01

## 2018-01-01 RX ORDER — METOPROLOL SUCCINATE 25 MG/1
12.5 TABLET, EXTENDED RELEASE ORAL DAILY
Status: DISCONTINUED | OUTPATIENT
Start: 2018-01-01 | End: 2018-01-01

## 2018-01-01 RX ORDER — LIDOCAINE HYDROCHLORIDE 10 MG/ML
5 INJECTION, SOLUTION EPIDURAL; INFILTRATION; INTRACAUDAL; PERINEURAL
Status: DISPENSED | OUTPATIENT
Start: 2018-01-01 | End: 2018-01-01

## 2018-01-01 RX ORDER — OXYCODONE HYDROCHLORIDE 5 MG/1
10 TABLET ORAL
Status: COMPLETED | OUTPATIENT
Start: 2018-01-01 | End: 2018-01-01

## 2018-01-01 RX ORDER — ONDANSETRON HYDROCHLORIDE 4 MG/5ML
4 SOLUTION ORAL ONCE
Status: ACTIVE | OUTPATIENT
Start: 2018-01-01 | End: 2018-01-01

## 2018-01-01 RX ORDER — POTASSIUM CHLORIDE 750 MG/1
40 TABLET, FILM COATED, EXTENDED RELEASE ORAL
Status: COMPLETED | OUTPATIENT
Start: 2018-01-01 | End: 2018-01-01

## 2018-01-01 RX ORDER — TEMAZEPAM 7.5 MG/1
7.5 CAPSULE ORAL
Qty: 5 CAP | Refills: 0 | Status: SHIPPED | OUTPATIENT
Start: 2018-01-01 | End: 2018-01-01

## 2018-01-01 RX ORDER — MIDAZOLAM HYDROCHLORIDE 1 MG/ML
5-10 INJECTION, SOLUTION INTRAMUSCULAR; INTRAVENOUS
Status: DISPENSED | OUTPATIENT
Start: 2018-01-01 | End: 2018-01-01

## 2018-01-01 RX ORDER — ONDANSETRON 2 MG/ML
4 INJECTION INTRAMUSCULAR; INTRAVENOUS ONCE
Status: COMPLETED | OUTPATIENT
Start: 2018-01-01 | End: 2018-01-01

## 2018-01-01 RX ORDER — AMIODARONE HYDROCHLORIDE 200 MG/1
200 TABLET ORAL
Status: DISCONTINUED | OUTPATIENT
Start: 2018-01-01 | End: 2018-01-01 | Stop reason: HOSPADM

## 2018-01-01 RX ORDER — OXYCODONE AND ACETAMINOPHEN 5; 325 MG/1; MG/1
1 TABLET ORAL
Status: DISCONTINUED | OUTPATIENT
Start: 2018-01-01 | End: 2018-01-01

## 2018-01-01 RX ORDER — TEMAZEPAM 7.5 MG/1
7.5 CAPSULE ORAL
Status: DISCONTINUED | OUTPATIENT
Start: 2018-01-01 | End: 2018-01-01 | Stop reason: CLARIF

## 2018-01-01 RX ORDER — MIDAZOLAM HYDROCHLORIDE 1 MG/ML
INJECTION, SOLUTION INTRAMUSCULAR; INTRAVENOUS
Status: COMPLETED
Start: 2018-01-01 | End: 2018-01-01

## 2018-01-01 RX ORDER — LIDOCAINE HYDROCHLORIDE 10 MG/ML
INJECTION, SOLUTION EPIDURAL; INFILTRATION; INTRACAUDAL; PERINEURAL
Status: COMPLETED
Start: 2018-01-01 | End: 2018-01-01

## 2018-01-01 RX ORDER — ALBUMIN HUMAN 250 G/1000ML
12.5 SOLUTION INTRAVENOUS
Status: CANCELLED | OUTPATIENT
Start: 2018-01-01

## 2018-01-01 RX ORDER — SODIUM CHLORIDE 9 MG/ML
25 INJECTION, SOLUTION INTRAVENOUS CONTINUOUS
Status: DISCONTINUED | OUTPATIENT
Start: 2018-01-01 | End: 2018-01-01 | Stop reason: HOSPADM

## 2018-01-01 RX ORDER — CEFAZOLIN SODIUM/WATER 2 G/20 ML
2 SYRINGE (ML) INTRAVENOUS ONCE
Status: COMPLETED | OUTPATIENT
Start: 2018-01-01 | End: 2018-01-01

## 2018-01-01 RX ORDER — INSULIN LISPRO 100 [IU]/ML
10 INJECTION, SOLUTION INTRAVENOUS; SUBCUTANEOUS ONCE
Status: COMPLETED | OUTPATIENT
Start: 2018-01-01 | End: 2018-01-01

## 2018-01-01 RX ORDER — AMIODARONE HYDROCHLORIDE 200 MG/1
200 TABLET ORAL
Status: DISCONTINUED | OUTPATIENT
Start: 2018-01-01 | End: 2018-01-01

## 2018-01-01 RX ORDER — MIDAZOLAM HYDROCHLORIDE 1 MG/ML
.25-1 INJECTION, SOLUTION INTRAMUSCULAR; INTRAVENOUS
Status: DISCONTINUED | OUTPATIENT
Start: 2018-01-01 | End: 2018-01-01 | Stop reason: HOSPADM

## 2018-01-01 RX ORDER — ALBUMIN HUMAN 250 G/1000ML
115 SOLUTION INTRAVENOUS ONCE
Status: COMPLETED | OUTPATIENT
Start: 2018-01-01 | End: 2018-01-01

## 2018-01-01 RX ORDER — FENTANYL CITRATE 50 UG/ML
25 INJECTION, SOLUTION INTRAMUSCULAR; INTRAVENOUS
Status: DISCONTINUED | OUTPATIENT
Start: 2018-01-01 | End: 2018-01-01 | Stop reason: HOSPADM

## 2018-01-01 RX ORDER — MIDAZOLAM HYDROCHLORIDE 1 MG/ML
5 INJECTION, SOLUTION INTRAMUSCULAR; INTRAVENOUS
Status: DISCONTINUED | OUTPATIENT
Start: 2018-01-01 | End: 2018-01-01 | Stop reason: HOSPADM

## 2018-01-01 RX ORDER — TEMAZEPAM 15 MG/1
15 CAPSULE ORAL
Status: COMPLETED | OUTPATIENT
Start: 2018-01-01 | End: 2018-01-01

## 2018-01-01 RX ORDER — FUROSEMIDE 40 MG/1
40 TABLET ORAL DAILY
Qty: 30 TAB | Refills: 0 | Status: SHIPPED | OUTPATIENT
Start: 2018-01-01 | End: 2018-01-01

## 2018-01-01 RX ORDER — FUROSEMIDE 40 MG/1
120 TABLET ORAL DAILY
Qty: 30 TAB | Refills: 0 | Status: SHIPPED
Start: 2018-01-01 | End: 2018-01-01

## 2018-01-01 RX ORDER — SPIRONOLACTONE 100 MG/1
300 TABLET, FILM COATED ORAL DAILY
Status: DISCONTINUED | OUTPATIENT
Start: 2018-01-01 | End: 2018-01-01 | Stop reason: HOSPADM

## 2018-01-01 RX ORDER — ONDANSETRON 4 MG/1
4 TABLET, ORALLY DISINTEGRATING ORAL
Status: DISCONTINUED | OUTPATIENT
Start: 2018-01-01 | End: 2018-01-01 | Stop reason: HOSPADM

## 2018-01-01 RX ORDER — PEPPERMINT OIL
SPIRIT ORAL ONCE
Status: COMPLETED | OUTPATIENT
Start: 2018-01-01 | End: 2018-01-01

## 2018-01-01 RX ORDER — MAGNESIUM SULFATE HEPTAHYDRATE 40 MG/ML
2 INJECTION, SOLUTION INTRAVENOUS ONCE
Status: COMPLETED | OUTPATIENT
Start: 2018-01-01 | End: 2018-01-01

## 2018-01-01 RX ORDER — TRAMADOL HYDROCHLORIDE 50 MG/1
50 TABLET ORAL
Status: DISCONTINUED | OUTPATIENT
Start: 2018-01-01 | End: 2018-01-01 | Stop reason: HOSPADM

## 2018-01-01 RX ORDER — GABAPENTIN 300 MG/1
300 CAPSULE ORAL 3 TIMES DAILY
Qty: 90 CAP | Refills: 0 | Status: SHIPPED | OUTPATIENT
Start: 2018-01-01 | End: 2018-01-01

## 2018-01-01 RX ORDER — LORATADINE 10 MG/1
10 TABLET ORAL
Status: DISCONTINUED | OUTPATIENT
Start: 2018-01-01 | End: 2018-01-01 | Stop reason: HOSPADM

## 2018-01-01 RX ORDER — INSULIN GLARGINE 100 [IU]/ML
10 INJECTION, SOLUTION SUBCUTANEOUS
Status: DISCONTINUED | OUTPATIENT
Start: 2018-01-01 | End: 2018-01-01 | Stop reason: HOSPADM

## 2018-01-01 RX ORDER — FENTANYL CITRATE 50 UG/ML
200 INJECTION, SOLUTION INTRAMUSCULAR; INTRAVENOUS
Status: DISCONTINUED | OUTPATIENT
Start: 2018-01-01 | End: 2018-01-01 | Stop reason: HOSPADM

## 2018-01-01 RX ORDER — SPIRONOLACTONE 100 MG/1
100 TABLET, FILM COATED ORAL DAILY
Qty: 30 TAB | Refills: 0 | Status: SHIPPED | OUTPATIENT
Start: 2018-01-01 | End: 2018-01-01

## 2018-01-01 RX ORDER — LIDOCAINE HYDROCHLORIDE 10 MG/ML
5 INJECTION, SOLUTION EPIDURAL; INFILTRATION; INTRACAUDAL; PERINEURAL ONCE
Status: DISPENSED | OUTPATIENT
Start: 2018-01-01 | End: 2018-01-01

## 2018-01-01 RX ORDER — ONDANSETRON 4 MG/1
4 TABLET, ORALLY DISINTEGRATING ORAL
Qty: 12 TAB | Refills: 2 | Status: SHIPPED | OUTPATIENT
Start: 2018-01-01 | End: 2018-01-01

## 2018-01-01 RX ORDER — ALBUMIN HUMAN 250 G/1000ML
12.5 SOLUTION INTRAVENOUS EVERY 6 HOURS
Status: DISPENSED | OUTPATIENT
Start: 2018-01-01 | End: 2018-01-01

## 2018-01-01 RX ORDER — VALSARTAN 40 MG/1
40 TABLET ORAL DAILY
COMMUNITY
End: 2018-01-01

## 2018-01-01 RX ORDER — SODIUM CHLORIDE 9 MG/ML
50 INJECTION, SOLUTION INTRAVENOUS CONTINUOUS
Status: DISCONTINUED | OUTPATIENT
Start: 2018-01-01 | End: 2018-01-01

## 2018-01-01 RX ORDER — DIGOXIN 0.25 MG/ML
100 INJECTION INTRAMUSCULAR; INTRAVENOUS
Status: ACTIVE | OUTPATIENT
Start: 2018-01-01 | End: 2018-01-01

## 2018-01-01 RX ORDER — KETOROLAC TROMETHAMINE 30 MG/ML
15 INJECTION, SOLUTION INTRAMUSCULAR; INTRAVENOUS ONCE
Status: COMPLETED | OUTPATIENT
Start: 2018-01-01 | End: 2018-01-01

## 2018-01-01 RX ORDER — FENTANYL CITRATE 50 UG/ML
100 INJECTION, SOLUTION INTRAMUSCULAR; INTRAVENOUS
Status: DISCONTINUED | OUTPATIENT
Start: 2018-01-01 | End: 2018-01-01 | Stop reason: HOSPADM

## 2018-01-01 RX ORDER — FUROSEMIDE 10 MG/ML
20 INJECTION INTRAMUSCULAR; INTRAVENOUS 2 TIMES DAILY
Status: DISCONTINUED | OUTPATIENT
Start: 2018-01-01 | End: 2018-01-01

## 2018-01-01 RX ORDER — PANTOPRAZOLE SODIUM 40 MG/1
40 TABLET, DELAYED RELEASE ORAL DAILY
Status: DISCONTINUED | OUTPATIENT
Start: 2018-01-01 | End: 2018-01-01 | Stop reason: SDUPTHER

## 2018-01-01 RX ORDER — CIPROFLOXACIN 500 MG/1
500 TABLET ORAL 2 TIMES DAILY
Qty: 10 TAB | Refills: 0 | Status: SHIPPED | OUTPATIENT
Start: 2018-01-01 | End: 2018-01-01

## 2018-01-01 RX ORDER — LANOLIN ALCOHOL/MO/W.PET/CERES
100 CREAM (GRAM) TOPICAL DAILY
Status: DISCONTINUED | OUTPATIENT
Start: 2018-01-01 | End: 2018-01-01

## 2018-01-01 RX ORDER — SODIUM,POTASSIUM PHOSPHATES 280-250MG
2 POWDER IN PACKET (EA) ORAL 4 TIMES DAILY
Status: COMPLETED | OUTPATIENT
Start: 2018-01-01 | End: 2018-01-01

## 2018-01-01 RX ORDER — DIGOXIN 125 MCG
0.12 TABLET ORAL EVERY OTHER DAY
Status: DISCONTINUED | OUTPATIENT
Start: 2018-01-01 | End: 2018-01-01 | Stop reason: HOSPADM

## 2018-01-01 RX ORDER — CIPROFLOXACIN 500 MG/1
500 TABLET ORAL 2 TIMES DAILY
Qty: 20 TAB | Refills: 0 | Status: SHIPPED | OUTPATIENT
Start: 2018-01-01 | End: 2018-01-01 | Stop reason: SDUPTHER

## 2018-01-01 RX ORDER — MAGNESIUM SULFATE 100 %
4 CRYSTALS MISCELLANEOUS AS NEEDED
Status: DISCONTINUED | OUTPATIENT
Start: 2018-01-01 | End: 2018-01-01 | Stop reason: SDUPTHER

## 2018-01-01 RX ORDER — METOPROLOL SUCCINATE 25 MG/1
12.5 TABLET, EXTENDED RELEASE ORAL DAILY
Status: CANCELLED | OUTPATIENT
Start: 2018-01-01

## 2018-01-01 RX ORDER — CIPROFLOXACIN 500 MG/1
500 TABLET ORAL DAILY
Qty: 90 TAB | Refills: 3 | Status: SHIPPED | OUTPATIENT
Start: 2018-01-01 | End: 2018-01-01 | Stop reason: SDUPTHER

## 2018-01-01 RX ORDER — FENTANYL CITRATE 50 UG/ML
50-200 INJECTION, SOLUTION INTRAMUSCULAR; INTRAVENOUS
Status: ACTIVE | OUTPATIENT
Start: 2018-01-01 | End: 2018-01-01

## 2018-01-01 RX ORDER — SODIUM POLYSTYRENE SULFONATE 15 G/60ML
30 SUSPENSION ORAL; RECTAL
Status: COMPLETED | OUTPATIENT
Start: 2018-01-01 | End: 2018-01-01

## 2018-01-01 RX ORDER — LOSARTAN POTASSIUM 25 MG/1
25 TABLET ORAL DAILY
Status: DISCONTINUED | OUTPATIENT
Start: 2018-01-01 | End: 2018-01-01 | Stop reason: HOSPADM

## 2018-01-01 RX ORDER — FUROSEMIDE 40 MG/1
80 TABLET ORAL DAILY
Status: DISCONTINUED | OUTPATIENT
Start: 2018-01-01 | End: 2018-01-01 | Stop reason: HOSPADM

## 2018-01-01 RX ORDER — FUROSEMIDE 40 MG/1
40 TABLET ORAL 2 TIMES DAILY
Status: DISCONTINUED | OUTPATIENT
Start: 2018-01-01 | End: 2018-01-01 | Stop reason: HOSPADM

## 2018-01-01 RX ORDER — DEXTROMETHORPHAN POLISTIREX 30 MG/5 ML
SUSPENSION, EXTENDED RELEASE 12 HR ORAL AS NEEDED
Status: DISCONTINUED | OUTPATIENT
Start: 2018-01-01 | End: 2018-01-01 | Stop reason: HOSPADM

## 2018-01-01 RX ORDER — NALOXONE HYDROCHLORIDE 0.4 MG/ML
0.4 INJECTION, SOLUTION INTRAMUSCULAR; INTRAVENOUS; SUBCUTANEOUS
Status: DISCONTINUED | OUTPATIENT
Start: 2018-01-01 | End: 2018-01-01 | Stop reason: HOSPADM

## 2018-01-01 RX ORDER — ALBUMIN HUMAN 250 G/1000ML
87.5 SOLUTION INTRAVENOUS ONCE
Status: COMPLETED | OUTPATIENT
Start: 2018-01-01 | End: 2018-01-01

## 2018-01-01 RX ORDER — FUROSEMIDE 40 MG/1
120 TABLET ORAL DAILY
Status: DISCONTINUED | OUTPATIENT
Start: 2018-01-01 | End: 2018-01-01 | Stop reason: HOSPADM

## 2018-01-01 RX ORDER — SODIUM CHLORIDE 9 MG/ML
150 INJECTION, SOLUTION INTRAVENOUS CONTINUOUS
Status: DISCONTINUED | OUTPATIENT
Start: 2018-01-01 | End: 2018-01-01 | Stop reason: HOSPADM

## 2018-01-01 RX ORDER — THERA TABS 400 MCG
1 TAB ORAL DAILY
Status: DISCONTINUED | OUTPATIENT
Start: 2018-01-01 | End: 2018-01-01 | Stop reason: HOSPADM

## 2018-01-01 RX ORDER — LACTULOSE 10 G/15ML
10 SOLUTION ORAL 3 TIMES DAILY
Qty: 4320 ML | Refills: 2 | Status: SHIPPED | OUTPATIENT
Start: 2018-01-01

## 2018-01-01 RX ORDER — ONDANSETRON 2 MG/ML
8 INJECTION INTRAMUSCULAR; INTRAVENOUS
Status: COMPLETED | OUTPATIENT
Start: 2018-01-01 | End: 2018-01-01

## 2018-01-01 RX ORDER — FUROSEMIDE 40 MG/1
40 TABLET ORAL DAILY
COMMUNITY
End: 2018-01-01

## 2018-01-01 RX ORDER — LACTULOSE 10 G/15ML
SOLUTION ORAL; RECTAL
Qty: 946 ML | Refills: 6 | Status: SHIPPED | OUTPATIENT
Start: 2018-01-01

## 2018-01-01 RX ORDER — MIDAZOLAM HYDROCHLORIDE 1 MG/ML
5-10 INJECTION, SOLUTION INTRAMUSCULAR; INTRAVENOUS
Status: ACTIVE | OUTPATIENT
Start: 2018-01-01 | End: 2018-01-01

## 2018-01-01 RX ORDER — MORPHINE SULFATE 2 MG/ML
1 INJECTION, SOLUTION INTRAMUSCULAR; INTRAVENOUS ONCE
Status: COMPLETED | OUTPATIENT
Start: 2018-01-01 | End: 2018-01-01

## 2018-01-01 RX ORDER — LEVOFLOXACIN 500 MG/1
500 TABLET, FILM COATED ORAL DAILY
Qty: 10 TAB | Refills: 0 | Status: SHIPPED | OUTPATIENT
Start: 2018-01-01 | End: 2018-01-01

## 2018-01-01 RX ORDER — GABAPENTIN 300 MG/1
300 CAPSULE ORAL
Status: DISCONTINUED | OUTPATIENT
Start: 2018-01-01 | End: 2018-01-01

## 2018-01-01 RX ORDER — SPIRONOLACTONE 100 MG/1
200 TABLET, FILM COATED ORAL DAILY
Status: DISCONTINUED | OUTPATIENT
Start: 2018-01-01 | End: 2018-01-01 | Stop reason: HOSPADM

## 2018-01-01 RX ORDER — DEXTROSE 50 % IN WATER (D50W) INTRAVENOUS SYRINGE
12.5-25 AS NEEDED
Status: DISCONTINUED | OUTPATIENT
Start: 2018-01-01 | End: 2018-01-01 | Stop reason: SDUPTHER

## 2018-01-01 RX ORDER — LACTULOSE 10 G/15ML
10 SOLUTION ORAL; RECTAL 3 TIMES DAILY
Qty: 480 ML | Refills: 3 | Status: SHIPPED | OUTPATIENT
Start: 2018-01-01 | End: 2018-01-01

## 2018-01-01 RX ORDER — SPIRONOLACTONE 100 MG/1
200 TABLET, FILM COATED ORAL DAILY
COMMUNITY
End: 2018-01-01

## 2018-01-01 RX ORDER — PANTOPRAZOLE SODIUM 40 MG/1
40 TABLET, DELAYED RELEASE ORAL DAILY
Qty: 60 TAB | Refills: 1 | Status: SHIPPED | OUTPATIENT
Start: 2018-01-01

## 2018-01-01 RX ORDER — LIDOCAINE HYDROCHLORIDE 20 MG/ML
INJECTION, SOLUTION EPIDURAL; INFILTRATION; INTRACAUDAL; PERINEURAL
Status: COMPLETED
Start: 2018-01-01 | End: 2018-01-01

## 2018-01-01 RX ORDER — SPIRONOLACTONE 100 MG/1
200 TABLET, FILM COATED ORAL DAILY
Qty: 60 TAB | Refills: 3 | Status: ON HOLD | OUTPATIENT
Start: 2018-01-01 | End: 2018-01-01

## 2018-01-01 RX ORDER — FENTANYL CITRATE 50 UG/ML
INJECTION, SOLUTION INTRAMUSCULAR; INTRAVENOUS
Status: COMPLETED
Start: 2018-01-01 | End: 2018-01-01

## 2018-01-01 RX ORDER — FLUMAZENIL 0.1 MG/ML
0.2 INJECTION INTRAVENOUS
Status: DISCONTINUED | OUTPATIENT
Start: 2018-01-01 | End: 2018-01-01 | Stop reason: HOSPADM

## 2018-01-01 RX ADMIN — GABAPENTIN 300 MG: 300 CAPSULE ORAL at 16:29

## 2018-01-01 RX ADMIN — LACTULOSE 20 G: 20 SOLUTION ORAL at 21:09

## 2018-01-01 RX ADMIN — GABAPENTIN 100 MG: 100 CAPSULE ORAL at 08:47

## 2018-01-01 RX ADMIN — LACTULOSE 20 G: 20 SOLUTION ORAL at 16:00

## 2018-01-01 RX ADMIN — ONDANSETRON 4 MG: 2 INJECTION INTRAMUSCULAR; INTRAVENOUS at 18:57

## 2018-01-01 RX ADMIN — SPIRONOLACTONE 100 MG: 100 TABLET, FILM COATED ORAL at 08:36

## 2018-01-01 RX ADMIN — AMIODARONE HYDROCHLORIDE 200 MG: 200 TABLET ORAL at 21:54

## 2018-01-01 RX ADMIN — SPIRONOLACTONE 100 MG: 25 TABLET, FILM COATED ORAL at 09:22

## 2018-01-01 RX ADMIN — SODIUM CHLORIDE 8 MG/HR: 900 INJECTION, SOLUTION INTRAVENOUS at 08:05

## 2018-01-01 RX ADMIN — Medication 100 MG: at 08:44

## 2018-01-01 RX ADMIN — PANTOPRAZOLE SODIUM 40 MG: 40 TABLET, DELAYED RELEASE ORAL at 09:13

## 2018-01-01 RX ADMIN — ALBUMIN (HUMAN) 25 G: 0.25 INJECTION, SOLUTION INTRAVENOUS at 07:09

## 2018-01-01 RX ADMIN — PANTOPRAZOLE SODIUM 40 MG: 40 TABLET, DELAYED RELEASE ORAL at 07:30

## 2018-01-01 RX ADMIN — FENTANYL CITRATE 25 MCG: 50 INJECTION, SOLUTION INTRAMUSCULAR; INTRAVENOUS at 16:15

## 2018-01-01 RX ADMIN — ALBUMIN (HUMAN) 12.5 G: 0.25 INJECTION, SOLUTION INTRAVENOUS at 08:46

## 2018-01-01 RX ADMIN — INSULIN LISPRO 2 UNITS: 100 INJECTION, SOLUTION INTRAVENOUS; SUBCUTANEOUS at 06:11

## 2018-01-01 RX ADMIN — ALBUMIN (HUMAN) 12.5 G: 0.25 INJECTION, SOLUTION INTRAVENOUS at 02:23

## 2018-01-01 RX ADMIN — Medication 10 ML: at 15:10

## 2018-01-01 RX ADMIN — RIFAXIMIN 550 MG: 550 TABLET ORAL at 09:28

## 2018-01-01 RX ADMIN — SODIUM CHLORIDE 50 ML/HR: 900 INJECTION, SOLUTION INTRAVENOUS at 23:45

## 2018-01-01 RX ADMIN — INSULIN LISPRO 7 UNITS: 100 INJECTION, SOLUTION INTRAVENOUS; SUBCUTANEOUS at 11:48

## 2018-01-01 RX ADMIN — ALBUMIN (HUMAN) 12.5 G: 0.25 INJECTION, SOLUTION INTRAVENOUS at 18:44

## 2018-01-01 RX ADMIN — Medication 10 ML: at 05:51

## 2018-01-01 RX ADMIN — CEFOTETAN DISODIUM 2 G: 2 INJECTION, POWDER, FOR SOLUTION INTRAMUSCULAR; INTRAVENOUS at 16:28

## 2018-01-01 RX ADMIN — LACTULOSE 10 G: 20 SOLUTION ORAL at 15:21

## 2018-01-01 RX ADMIN — INSULIN LISPRO 3 UNITS: 100 INJECTION, SOLUTION INTRAVENOUS; SUBCUTANEOUS at 13:13

## 2018-01-01 RX ADMIN — Medication 10 ML: at 05:13

## 2018-01-01 RX ADMIN — Medication 10 ML: at 21:28

## 2018-01-01 RX ADMIN — METOPROLOL SUCCINATE 12.5 MG: 25 TABLET, EXTENDED RELEASE ORAL at 09:13

## 2018-01-01 RX ADMIN — Medication 10 ML: at 05:02

## 2018-01-01 RX ADMIN — ALBUMIN (HUMAN) 25 G: 0.25 INJECTION, SOLUTION INTRAVENOUS at 06:25

## 2018-01-01 RX ADMIN — ALBUMIN (HUMAN) 12.5 G: 0.25 INJECTION, SOLUTION INTRAVENOUS at 17:53

## 2018-01-01 RX ADMIN — ALBUMIN (HUMAN) 25 G: 0.25 INJECTION, SOLUTION INTRAVENOUS at 11:43

## 2018-01-01 RX ADMIN — RIFAXIMIN 550 MG: 550 TABLET ORAL at 17:19

## 2018-01-01 RX ADMIN — ALBUMIN (HUMAN) 25 G: 0.25 INJECTION, SOLUTION INTRAVENOUS at 23:44

## 2018-01-01 RX ADMIN — GABAPENTIN 100 MG: 100 CAPSULE ORAL at 21:05

## 2018-01-01 RX ADMIN — LACTULOSE 30 G: 20 SOLUTION ORAL at 10:09

## 2018-01-01 RX ADMIN — RIFAXIMIN 550 MG: 550 TABLET ORAL at 18:04

## 2018-01-01 RX ADMIN — OCTREOTIDE ACETATE 100 MCG: 100 INJECTION, SOLUTION INTRAVENOUS; SUBCUTANEOUS at 21:13

## 2018-01-01 RX ADMIN — POTASSIUM & SODIUM PHOSPHATES POWDER PACK 280-160-250 MG 2 PACKET: 280-160-250 PACK at 18:25

## 2018-01-01 RX ADMIN — FOLIC ACID 1 MG: 1 TABLET ORAL at 09:22

## 2018-01-01 RX ADMIN — RIFAXIMIN 550 MG: 550 TABLET ORAL at 09:24

## 2018-01-01 RX ADMIN — LIDOCAINE HYDROCHLORIDE: 20 INJECTION, SOLUTION EPIDURAL; INFILTRATION; INTRACAUDAL; PERINEURAL at 08:00

## 2018-01-01 RX ADMIN — OCTREOTIDE ACETATE 25 MCG/HR: 500 INJECTION, SOLUTION INTRAVENOUS; SUBCUTANEOUS at 22:55

## 2018-01-01 RX ADMIN — OXYCODONE HYDROCHLORIDE AND ACETAMINOPHEN 1 TABLET: 5; 325 TABLET ORAL at 02:13

## 2018-01-01 RX ADMIN — METOPROLOL SUCCINATE 12.5 MG: 25 TABLET, EXTENDED RELEASE ORAL at 10:09

## 2018-01-01 RX ADMIN — OXYCODONE HYDROCHLORIDE 5 MG: 5 TABLET ORAL at 03:18

## 2018-01-01 RX ADMIN — INSULIN LISPRO 3 UNITS: 100 INJECTION, SOLUTION INTRAVENOUS; SUBCUTANEOUS at 06:38

## 2018-01-01 RX ADMIN — ALBUMIN (HUMAN) 12.5 G: 0.25 INJECTION, SOLUTION INTRAVENOUS at 15:43

## 2018-01-01 RX ADMIN — Medication 10 ML: at 23:27

## 2018-01-01 RX ADMIN — Medication 10 ML: at 06:19

## 2018-01-01 RX ADMIN — Medication 10 ML: at 22:51

## 2018-01-01 RX ADMIN — DIGOXIN 0.12 MG: 125 TABLET ORAL at 21:33

## 2018-01-01 RX ADMIN — ONDANSETRON 4 MG: 2 INJECTION INTRAMUSCULAR; INTRAVENOUS at 19:47

## 2018-01-01 RX ADMIN — FUROSEMIDE 40 MG: 40 TABLET ORAL at 17:53

## 2018-01-01 RX ADMIN — CEFOTETAN DISODIUM 2 G: 2 INJECTION, POWDER, FOR SOLUTION INTRAMUSCULAR; INTRAVENOUS at 04:53

## 2018-01-01 RX ADMIN — MIDAZOLAM HYDROCHLORIDE 2 MG: 1 INJECTION, SOLUTION INTRAMUSCULAR; INTRAVENOUS at 17:48

## 2018-01-01 RX ADMIN — ALBUMIN (HUMAN) 25 G: 0.25 INJECTION, SOLUTION INTRAVENOUS at 01:39

## 2018-01-01 RX ADMIN — PANTOPRAZOLE SODIUM 40 MG: 40 TABLET, DELAYED RELEASE ORAL at 12:08

## 2018-01-01 RX ADMIN — SODIUM CHLORIDE 500 ML: 900 INJECTION, SOLUTION INTRAVENOUS at 19:00

## 2018-01-01 RX ADMIN — INSULIN LISPRO 3 UNITS: 100 INJECTION, SOLUTION INTRAVENOUS; SUBCUTANEOUS at 07:37

## 2018-01-01 RX ADMIN — Medication 10 ML: at 21:34

## 2018-01-01 RX ADMIN — DIGOXIN 0.12 MG: 125 TABLET ORAL at 20:27

## 2018-01-01 RX ADMIN — ALBUMIN (HUMAN) 12.5 G: 0.25 INJECTION, SOLUTION INTRAVENOUS at 13:01

## 2018-01-01 RX ADMIN — Medication 10 ML: at 16:58

## 2018-01-01 RX ADMIN — RIFAXIMIN 550 MG: 550 TABLET ORAL at 09:00

## 2018-01-01 RX ADMIN — FUROSEMIDE 40 MG: 40 TABLET ORAL at 10:07

## 2018-01-01 RX ADMIN — LIDOCAINE HYDROCHLORIDE 5 ML: 10 INJECTION, SOLUTION EPIDURAL; INFILTRATION; INTRACAUDAL; PERINEURAL at 13:00

## 2018-01-01 RX ADMIN — SODIUM BICARBONATE 2 ML: 42 INJECTION, SOLUTION INTRAVENOUS at 11:47

## 2018-01-01 RX ADMIN — Medication 10 ML: at 06:00

## 2018-01-01 RX ADMIN — INSULIN LISPRO 2 UNITS: 100 INJECTION, SOLUTION INTRAVENOUS; SUBCUTANEOUS at 12:07

## 2018-01-01 RX ADMIN — Medication 10 ML: at 06:39

## 2018-01-01 RX ADMIN — ALBUMIN (HUMAN) 12.5 G: 0.25 INJECTION, SOLUTION INTRAVENOUS at 20:56

## 2018-01-01 RX ADMIN — RIFAXIMIN 550 MG: 550 TABLET ORAL at 11:12

## 2018-01-01 RX ADMIN — FOLIC ACID 1 MG: 1 TABLET ORAL at 10:06

## 2018-01-01 RX ADMIN — AMIODARONE HYDROCHLORIDE 200 MG: 200 TABLET ORAL at 22:08

## 2018-01-01 RX ADMIN — KETOROLAC TROMETHAMINE 15 MG: 30 INJECTION, SOLUTION INTRAMUSCULAR at 05:13

## 2018-01-01 RX ADMIN — Medication 10 ML: at 05:42

## 2018-01-01 RX ADMIN — AMIODARONE HYDROCHLORIDE 200 MG: 200 TABLET ORAL at 22:51

## 2018-01-01 RX ADMIN — ALBUMIN (HUMAN) 12.5 G: 0.25 INJECTION, SOLUTION INTRAVENOUS at 12:52

## 2018-01-01 RX ADMIN — Medication 10 ML: at 13:19

## 2018-01-01 RX ADMIN — ALBUMIN (HUMAN) 25 G: 0.25 INJECTION, SOLUTION INTRAVENOUS at 06:19

## 2018-01-01 RX ADMIN — MIDAZOLAM HYDROCHLORIDE 1 MG: 1 INJECTION, SOLUTION INTRAMUSCULAR; INTRAVENOUS at 08:48

## 2018-01-01 RX ADMIN — OCTREOTIDE ACETATE 50 MCG/HR: 500 INJECTION, SOLUTION INTRAVENOUS; SUBCUTANEOUS at 08:40

## 2018-01-01 RX ADMIN — PANTOPRAZOLE SODIUM 40 MG: 40 TABLET, DELAYED RELEASE ORAL at 11:12

## 2018-01-01 RX ADMIN — SODIUM CHLORIDE 40 MG: 9 INJECTION INTRAMUSCULAR; INTRAVENOUS; SUBCUTANEOUS at 08:17

## 2018-01-01 RX ADMIN — GABAPENTIN 100 MG: 100 CAPSULE ORAL at 15:54

## 2018-01-01 RX ADMIN — Medication 10 ML: at 07:08

## 2018-01-01 RX ADMIN — ALBUMIN (HUMAN) 12.5 G: 0.25 INJECTION, SOLUTION INTRAVENOUS at 06:56

## 2018-01-01 RX ADMIN — CETIRIZINE HYDROCHLORIDE 10 MG: 10 TABLET, FILM COATED ORAL at 08:49

## 2018-01-01 RX ADMIN — HYDROCODONE BITARTRATE AND ACETAMINOPHEN 1 TABLET: 5; 325 TABLET ORAL at 08:57

## 2018-01-01 RX ADMIN — PANTOPRAZOLE SODIUM 40 MG: 40 TABLET, DELAYED RELEASE ORAL at 12:45

## 2018-01-01 RX ADMIN — DIGOXIN 0.12 MG: 125 TABLET ORAL at 22:16

## 2018-01-01 RX ADMIN — LACTULOSE 30 G: 20 SOLUTION ORAL at 11:43

## 2018-01-01 RX ADMIN — GABAPENTIN 300 MG: 300 CAPSULE ORAL at 21:25

## 2018-01-01 RX ADMIN — ALBUMIN (HUMAN) 25 G: 0.25 INJECTION, SOLUTION INTRAVENOUS at 05:11

## 2018-01-01 RX ADMIN — ALBUMIN (HUMAN) 25 G: 0.25 INJECTION, SOLUTION INTRAVENOUS at 11:04

## 2018-01-01 RX ADMIN — OXYCODONE AND ACETAMINOPHEN 1 TABLET: 5; 325 TABLET ORAL at 11:49

## 2018-01-01 RX ADMIN — ALBUMIN (HUMAN) 25 G: 0.25 INJECTION, SOLUTION INTRAVENOUS at 05:32

## 2018-01-01 RX ADMIN — ALBUMIN (HUMAN) 12.5 G: 0.25 INJECTION, SOLUTION INTRAVENOUS at 20:05

## 2018-01-01 RX ADMIN — ZOLPIDEM TARTRATE 5 MG: 5 TABLET ORAL at 22:00

## 2018-01-01 RX ADMIN — SPIRONOLACTONE 200 MG: 100 TABLET, FILM COATED ORAL at 09:13

## 2018-01-01 RX ADMIN — INSULIN LISPRO 4 UNITS: 100 INJECTION, SOLUTION INTRAVENOUS; SUBCUTANEOUS at 18:08

## 2018-01-01 RX ADMIN — MIDAZOLAM HYDROCHLORIDE 1 MG: 1 INJECTION, SOLUTION INTRAMUSCULAR; INTRAVENOUS at 08:07

## 2018-01-01 RX ADMIN — Medication 20 ML: at 14:54

## 2018-01-01 RX ADMIN — Medication 10 ML: at 14:31

## 2018-01-01 RX ADMIN — GABAPENTIN 300 MG: 300 CAPSULE ORAL at 21:54

## 2018-01-01 RX ADMIN — RIFAXIMIN 550 MG: 550 TABLET ORAL at 17:01

## 2018-01-01 RX ADMIN — GABAPENTIN 300 MG: 300 CAPSULE ORAL at 08:17

## 2018-01-01 RX ADMIN — MAGNESIUM SULFATE HEPTAHYDRATE 2 G: 40 INJECTION, SOLUTION INTRAVENOUS at 09:13

## 2018-01-01 RX ADMIN — INSULIN LISPRO 3 UNITS: 100 INJECTION, SOLUTION INTRAVENOUS; SUBCUTANEOUS at 17:27

## 2018-01-01 RX ADMIN — SODIUM CHLORIDE 200 ML: 900 INJECTION, SOLUTION INTRAVENOUS at 09:02

## 2018-01-01 RX ADMIN — HUMAN INSULIN 5 UNITS: 100 INJECTION, SOLUTION SUBCUTANEOUS at 07:52

## 2018-01-01 RX ADMIN — Medication 10 ML: at 14:00

## 2018-01-01 RX ADMIN — SODIUM CHLORIDE 40 MG: 9 INJECTION INTRAMUSCULAR; INTRAVENOUS; SUBCUTANEOUS at 10:28

## 2018-01-01 RX ADMIN — METOPROLOL SUCCINATE 12.5 MG: 25 TABLET, EXTENDED RELEASE ORAL at 09:00

## 2018-01-01 RX ADMIN — ONDANSETRON 4 MG: 2 INJECTION INTRAMUSCULAR; INTRAVENOUS at 04:02

## 2018-01-01 RX ADMIN — Medication 10 ML: at 22:19

## 2018-01-01 RX ADMIN — RIFAXIMIN 550 MG: 550 TABLET ORAL at 09:13

## 2018-01-01 RX ADMIN — OCTREOTIDE ACETATE 50 MCG/HR: 500 INJECTION, SOLUTION INTRAVENOUS; SUBCUTANEOUS at 05:28

## 2018-01-01 RX ADMIN — Medication 100 MG: at 09:00

## 2018-01-01 RX ADMIN — LACTULOSE 30 G: 20 SOLUTION ORAL at 09:06

## 2018-01-01 RX ADMIN — Medication 10 ML: at 02:13

## 2018-01-01 RX ADMIN — LACTULOSE 20 G: 20 SOLUTION ORAL at 17:07

## 2018-01-01 RX ADMIN — OCTREOTIDE ACETATE 100 MCG: 100 INJECTION, SOLUTION INTRAVENOUS; SUBCUTANEOUS at 09:05

## 2018-01-01 RX ADMIN — Medication 10 ML: at 14:39

## 2018-01-01 RX ADMIN — INSULIN LISPRO 2 UNITS: 100 INJECTION, SOLUTION INTRAVENOUS; SUBCUTANEOUS at 12:00

## 2018-01-01 RX ADMIN — SODIUM CHLORIDE 40 MG: 9 INJECTION INTRAMUSCULAR; INTRAVENOUS; SUBCUTANEOUS at 21:24

## 2018-01-01 RX ADMIN — GABAPENTIN 100 MG: 100 CAPSULE ORAL at 08:59

## 2018-01-01 RX ADMIN — SODIUM CHLORIDE 8 MG/HR: 900 INJECTION, SOLUTION INTRAVENOUS at 13:02

## 2018-01-01 RX ADMIN — PANTOPRAZOLE SODIUM 40 MG: 40 TABLET, DELAYED RELEASE ORAL at 09:00

## 2018-01-01 RX ADMIN — AMIODARONE HYDROCHLORIDE 200 MG: 200 TABLET ORAL at 09:37

## 2018-01-01 RX ADMIN — INSULIN LISPRO 1 UNITS: 100 INJECTION, SOLUTION INTRAVENOUS; SUBCUTANEOUS at 00:00

## 2018-01-01 RX ADMIN — MULTIPLE VITAMINS W/ MINERALS TAB 1 TABLET: TAB at 09:29

## 2018-01-01 RX ADMIN — LACTULOSE 20 G: 20 SOLUTION ORAL at 12:00

## 2018-01-01 RX ADMIN — DIGOXIN 0.12 MG: 125 TABLET ORAL at 21:54

## 2018-01-01 RX ADMIN — FUROSEMIDE 40 MG: 40 TABLET ORAL at 12:00

## 2018-01-01 RX ADMIN — Medication 10 ML: at 21:05

## 2018-01-01 RX ADMIN — ALBUMIN (HUMAN) 25 G: 0.25 INJECTION, SOLUTION INTRAVENOUS at 11:16

## 2018-01-01 RX ADMIN — GABAPENTIN 100 MG: 100 CAPSULE ORAL at 17:01

## 2018-01-01 RX ADMIN — METOCLOPRAMIDE 10 MG: 5 INJECTION, SOLUTION INTRAMUSCULAR; INTRAVENOUS at 00:24

## 2018-01-01 RX ADMIN — MIDAZOLAM HYDROCHLORIDE 1 MG: 1 INJECTION, SOLUTION INTRAMUSCULAR; INTRAVENOUS at 17:55

## 2018-01-01 RX ADMIN — RIFAXIMIN 550 MG: 550 TABLET ORAL at 18:44

## 2018-01-01 RX ADMIN — GABAPENTIN 100 MG: 100 CAPSULE ORAL at 22:51

## 2018-01-01 RX ADMIN — GABAPENTIN 100 MG: 100 CAPSULE ORAL at 21:40

## 2018-01-01 RX ADMIN — INSULIN LISPRO 3 UNITS: 100 INJECTION, SOLUTION INTRAVENOUS; SUBCUTANEOUS at 12:24

## 2018-01-01 RX ADMIN — Medication 100 MG: at 12:09

## 2018-01-01 RX ADMIN — GABAPENTIN 300 MG: 300 CAPSULE ORAL at 21:50

## 2018-01-01 RX ADMIN — OCTREOTIDE ACETATE 25 MCG/HR: 500 INJECTION, SOLUTION INTRAVENOUS; SUBCUTANEOUS at 21:00

## 2018-01-01 RX ADMIN — GABAPENTIN 100 MG: 100 CAPSULE ORAL at 08:48

## 2018-01-01 RX ADMIN — SODIUM CHLORIDE 100 ML/HR: 900 INJECTION, SOLUTION INTRAVENOUS at 00:26

## 2018-01-01 RX ADMIN — INSULIN LISPRO 2 UNITS: 100 INJECTION, SOLUTION INTRAVENOUS; SUBCUTANEOUS at 22:01

## 2018-01-01 RX ADMIN — GABAPENTIN 300 MG: 300 CAPSULE ORAL at 22:37

## 2018-01-01 RX ADMIN — Medication 100 MG: at 08:36

## 2018-01-01 RX ADMIN — LACTULOSE 30 G: 20 SOLUTION ORAL at 21:40

## 2018-01-01 RX ADMIN — LIDOCAINE HYDROCHLORIDE 10 ML: 10 INJECTION, SOLUTION EPIDURAL; INFILTRATION; INTRACAUDAL; PERINEURAL at 09:00

## 2018-01-01 RX ADMIN — Medication 10 ML: at 06:53

## 2018-01-01 RX ADMIN — FUROSEMIDE 80 MG: 40 TABLET ORAL at 09:13

## 2018-01-01 RX ADMIN — INSULIN LISPRO 2 UNITS: 100 INJECTION, SOLUTION INTRAVENOUS; SUBCUTANEOUS at 23:58

## 2018-01-01 RX ADMIN — GABAPENTIN 100 MG: 100 CAPSULE ORAL at 08:53

## 2018-01-01 RX ADMIN — MAGNESIUM SULFATE HEPTAHYDRATE 2 G: 40 INJECTION, SOLUTION INTRAVENOUS at 09:43

## 2018-01-01 RX ADMIN — Medication 10 ML: at 12:25

## 2018-01-01 RX ADMIN — ALBUMIN (HUMAN) 25 G: 0.25 INJECTION, SOLUTION INTRAVENOUS at 23:00

## 2018-01-01 RX ADMIN — METOCLOPRAMIDE 10 MG: 5 INJECTION, SOLUTION INTRAMUSCULAR; INTRAVENOUS at 12:25

## 2018-01-01 RX ADMIN — SODIUM CHLORIDE 8 MG/HR: 900 INJECTION, SOLUTION INTRAVENOUS at 17:57

## 2018-01-01 RX ADMIN — BISACODYL 10 MG: 10 SUPPOSITORY RECTAL at 18:02

## 2018-01-01 RX ADMIN — AMIODARONE HYDROCHLORIDE 200 MG: 200 TABLET ORAL at 22:37

## 2018-01-01 RX ADMIN — ALBUMIN (HUMAN) 25 G: 0.25 INJECTION, SOLUTION INTRAVENOUS at 07:19

## 2018-01-01 RX ADMIN — Medication 2 G: at 08:00

## 2018-01-01 RX ADMIN — FUROSEMIDE 40 MG: 40 TABLET ORAL at 08:44

## 2018-01-01 RX ADMIN — OXYCODONE HYDROCHLORIDE AND ACETAMINOPHEN 1 TABLET: 5; 325 TABLET ORAL at 06:23

## 2018-01-01 RX ADMIN — ALBUMIN (HUMAN) 25 G: 0.25 INJECTION, SOLUTION INTRAVENOUS at 06:05

## 2018-01-01 RX ADMIN — METOPROLOL SUCCINATE 12.5 MG: 25 TABLET, EXTENDED RELEASE ORAL at 09:25

## 2018-01-01 RX ADMIN — RIFAXIMIN 550 MG: 550 TABLET ORAL at 18:16

## 2018-01-01 RX ADMIN — MIDAZOLAM HYDROCHLORIDE 1 MG: 1 INJECTION, SOLUTION INTRAMUSCULAR; INTRAVENOUS at 00:15

## 2018-01-01 RX ADMIN — FUROSEMIDE 40 MG: 10 INJECTION, SOLUTION INTRAMUSCULAR; INTRAVENOUS at 08:45

## 2018-01-01 RX ADMIN — FENTANYL CITRATE 25 MCG: 50 INJECTION, SOLUTION INTRAMUSCULAR; INTRAVENOUS at 23:02

## 2018-01-01 RX ADMIN — Medication 10 MG: at 15:13

## 2018-01-01 RX ADMIN — DIGOXIN 0.12 MG: 125 TABLET ORAL at 21:04

## 2018-01-01 RX ADMIN — GABAPENTIN 300 MG: 300 CAPSULE ORAL at 09:22

## 2018-01-01 RX ADMIN — HYDROCODONE BITARTRATE AND ACETAMINOPHEN 1 TABLET: 5; 325 TABLET ORAL at 18:27

## 2018-01-01 RX ADMIN — LACTULOSE 30 G: 20 SOLUTION ORAL at 13:55

## 2018-01-01 RX ADMIN — OCTREOTIDE ACETATE 50 MCG/HR: 500 INJECTION, SOLUTION INTRAVENOUS; SUBCUTANEOUS at 11:32

## 2018-01-01 RX ADMIN — MULTIPLE VITAMINS W/ MINERALS TAB 1 TABLET: TAB at 08:46

## 2018-01-01 RX ADMIN — INSULIN LISPRO 2 UNITS: 100 INJECTION, SOLUTION INTRAVENOUS; SUBCUTANEOUS at 18:00

## 2018-01-01 RX ADMIN — GABAPENTIN 100 MG: 100 CAPSULE ORAL at 22:16

## 2018-01-01 RX ADMIN — Medication 10 ML: at 14:24

## 2018-01-01 RX ADMIN — ALBUMIN (HUMAN) 25 G: 0.25 INJECTION, SOLUTION INTRAVENOUS at 05:25

## 2018-01-01 RX ADMIN — CEFTRIAXONE 1 G: 1 INJECTION, POWDER, FOR SOLUTION INTRAMUSCULAR; INTRAVENOUS at 00:08

## 2018-01-01 RX ADMIN — MULTIPLE VITAMINS W/ MINERALS TAB 1 TABLET: TAB at 10:01

## 2018-01-01 RX ADMIN — Medication 10 ML: at 13:38

## 2018-01-01 RX ADMIN — METOCLOPRAMIDE 10 MG: 5 INJECTION, SOLUTION INTRAMUSCULAR; INTRAVENOUS at 17:43

## 2018-01-01 RX ADMIN — Medication 10 ML: at 22:00

## 2018-01-01 RX ADMIN — MULTIPLE VITAMINS W/ MINERALS TAB 1 TABLET: TAB at 09:00

## 2018-01-01 RX ADMIN — SODIUM CHLORIDE 40 MG: 9 INJECTION INTRAMUSCULAR; INTRAVENOUS; SUBCUTANEOUS at 20:40

## 2018-01-01 RX ADMIN — FOLIC ACID 1 MG: 1 TABLET ORAL at 08:44

## 2018-01-01 RX ADMIN — MULTIPLE VITAMINS W/ MINERALS TAB 1 TABLET: TAB at 08:57

## 2018-01-01 RX ADMIN — ALBUMIN (HUMAN) 12.5 G: 0.25 INJECTION, SOLUTION INTRAVENOUS at 14:24

## 2018-01-01 RX ADMIN — ALBUMIN (HUMAN) 25 G: 0.25 INJECTION, SOLUTION INTRAVENOUS at 18:00

## 2018-01-01 RX ADMIN — INSULIN LISPRO 5 UNITS: 100 INJECTION, SOLUTION INTRAVENOUS; SUBCUTANEOUS at 12:47

## 2018-01-01 RX ADMIN — INSULIN LISPRO 3 UNITS: 100 INJECTION, SOLUTION INTRAVENOUS; SUBCUTANEOUS at 00:26

## 2018-01-01 RX ADMIN — FENTANYL CITRATE 50 MCG: 50 INJECTION, SOLUTION INTRAMUSCULAR; INTRAVENOUS at 17:48

## 2018-01-01 RX ADMIN — LACTULOSE 30 G: 20 SOLUTION ORAL at 16:00

## 2018-01-01 RX ADMIN — CEFOTETAN DISODIUM 2 G: 2 INJECTION, POWDER, FOR SOLUTION INTRAMUSCULAR; INTRAVENOUS at 16:56

## 2018-01-01 RX ADMIN — GABAPENTIN 100 MG: 100 CAPSULE ORAL at 21:33

## 2018-01-01 RX ADMIN — DIPHENHYDRAMINE HYDROCHLORIDE 25 MG: 25 CAPSULE ORAL at 02:15

## 2018-01-01 RX ADMIN — FUROSEMIDE 40 MG: 40 TABLET ORAL at 08:36

## 2018-01-01 RX ADMIN — LACTULOSE 20 G: 20 SOLUTION ORAL at 23:59

## 2018-01-01 RX ADMIN — ALBUMIN (HUMAN) 12.5 G: 0.25 INJECTION, SOLUTION INTRAVENOUS at 15:53

## 2018-01-01 RX ADMIN — INSULIN LISPRO 3 UNITS: 100 INJECTION, SOLUTION INTRAVENOUS; SUBCUTANEOUS at 12:52

## 2018-01-01 RX ADMIN — PANTOPRAZOLE SODIUM 40 MG: 40 TABLET, DELAYED RELEASE ORAL at 10:01

## 2018-01-01 RX ADMIN — SODIUM CHLORIDE 75 ML/HR: 900 INJECTION, SOLUTION INTRAVENOUS at 02:38

## 2018-01-01 RX ADMIN — LIDOCAINE HYDROCHLORIDE 5 ML: 10 INJECTION, SOLUTION EPIDURAL; INFILTRATION; INTRACAUDAL; PERINEURAL at 14:57

## 2018-01-01 RX ADMIN — SPIRONOLACTONE 300 MG: 100 TABLET, FILM COATED ORAL at 08:48

## 2018-01-01 RX ADMIN — LACTULOSE 30 G: 20 SOLUTION ORAL at 08:52

## 2018-01-01 RX ADMIN — ALBUMIN (HUMAN) 12.5 G: 0.25 INJECTION, SOLUTION INTRAVENOUS at 02:43

## 2018-01-01 RX ADMIN — MORPHINE SULFATE 1 MG: 2 INJECTION, SOLUTION INTRAMUSCULAR; INTRAVENOUS at 20:47

## 2018-01-01 RX ADMIN — SPIRONOLACTONE 300 MG: 100 TABLET, FILM COATED ORAL at 11:49

## 2018-01-01 RX ADMIN — SODIUM CHLORIDE 10 MG: 9 INJECTION INTRAMUSCULAR; INTRAVENOUS; SUBCUTANEOUS at 21:03

## 2018-01-01 RX ADMIN — GABAPENTIN 100 MG: 100 CAPSULE ORAL at 08:36

## 2018-01-01 RX ADMIN — Medication 100 MG: at 10:06

## 2018-01-01 RX ADMIN — ALBUMIN (HUMAN) 12.5 G: 0.25 INJECTION, SOLUTION INTRAVENOUS at 05:13

## 2018-01-01 RX ADMIN — GABAPENTIN 100 MG: 100 CAPSULE ORAL at 15:12

## 2018-01-01 RX ADMIN — GABAPENTIN 100 MG: 100 CAPSULE ORAL at 08:56

## 2018-01-01 RX ADMIN — ALBUMIN (HUMAN) 12.5 G: 0.25 INJECTION, SOLUTION INTRAVENOUS at 06:18

## 2018-01-01 RX ADMIN — METOCLOPRAMIDE 10 MG: 5 INJECTION, SOLUTION INTRAMUSCULAR; INTRAVENOUS at 04:16

## 2018-01-01 RX ADMIN — ALBUMIN (HUMAN) 25 G: 0.25 INJECTION, SOLUTION INTRAVENOUS at 18:08

## 2018-01-01 RX ADMIN — RIFAXIMIN 550 MG: 550 TABLET ORAL at 10:01

## 2018-01-01 RX ADMIN — GABAPENTIN 100 MG: 100 CAPSULE ORAL at 20:30

## 2018-01-01 RX ADMIN — CEFTRIAXONE 1 G: 1 INJECTION, POWDER, FOR SOLUTION INTRAMUSCULAR; INTRAVENOUS at 01:45

## 2018-01-01 RX ADMIN — OCTREOTIDE ACETATE 50 MCG/HR: 500 INJECTION, SOLUTION INTRAVENOUS; SUBCUTANEOUS at 20:07

## 2018-01-01 RX ADMIN — PANTOPRAZOLE SODIUM 40 MG: 40 TABLET, DELAYED RELEASE ORAL at 08:59

## 2018-01-01 RX ADMIN — INSULIN LISPRO 2 UNITS: 100 INJECTION, SOLUTION INTRAVENOUS; SUBCUTANEOUS at 06:58

## 2018-01-01 RX ADMIN — LIDOCAINE HYDROCHLORIDE 10 ML: 10 INJECTION, SOLUTION INFILTRATION; PERINEURAL at 14:40

## 2018-01-01 RX ADMIN — CEFOTETAN DISODIUM 2 G: 2 INJECTION, POWDER, FOR SOLUTION INTRAMUSCULAR; INTRAVENOUS at 17:37

## 2018-01-01 RX ADMIN — RIFAXIMIN 550 MG: 550 TABLET ORAL at 09:18

## 2018-01-01 RX ADMIN — SODIUM CHLORIDE 8 MG/HR: 900 INJECTION, SOLUTION INTRAVENOUS at 04:09

## 2018-01-01 RX ADMIN — Medication 10 ML: at 05:41

## 2018-01-01 RX ADMIN — TRAMADOL HYDROCHLORIDE 50 MG: 50 TABLET, FILM COATED ORAL at 18:44

## 2018-01-01 RX ADMIN — Medication 10 ML: at 08:24

## 2018-01-01 RX ADMIN — RIFAXIMIN 550 MG: 550 TABLET ORAL at 10:02

## 2018-01-01 RX ADMIN — GABAPENTIN 100 MG: 100 CAPSULE ORAL at 09:06

## 2018-01-01 RX ADMIN — PANTOPRAZOLE SODIUM 40 MG: 40 TABLET, DELAYED RELEASE ORAL at 17:01

## 2018-01-01 RX ADMIN — SODIUM CHLORIDE 40 MG: 9 INJECTION INTRAMUSCULAR; INTRAVENOUS; SUBCUTANEOUS at 22:19

## 2018-01-01 RX ADMIN — Medication 100 MG: at 10:09

## 2018-01-01 RX ADMIN — INSULIN LISPRO 2 UNITS: 100 INJECTION, SOLUTION INTRAVENOUS; SUBCUTANEOUS at 11:54

## 2018-01-01 RX ADMIN — ALBUMIN (HUMAN) 25 G: 0.25 INJECTION, SOLUTION INTRAVENOUS at 00:24

## 2018-01-01 RX ADMIN — INSULIN LISPRO 2 UNITS: 100 INJECTION, SOLUTION INTRAVENOUS; SUBCUTANEOUS at 06:44

## 2018-01-01 RX ADMIN — GABAPENTIN 300 MG: 300 CAPSULE ORAL at 21:27

## 2018-01-01 RX ADMIN — INSULIN LISPRO 7 UNITS: 100 INJECTION, SOLUTION INTRAVENOUS; SUBCUTANEOUS at 18:04

## 2018-01-01 RX ADMIN — LACTULOSE 30 G: 20 SOLUTION ORAL at 22:01

## 2018-01-01 RX ADMIN — Medication 10 ML: at 07:05

## 2018-01-01 RX ADMIN — RIFAXIMIN 550 MG: 550 TABLET ORAL at 08:48

## 2018-01-01 RX ADMIN — ALBUMIN (HUMAN) 12.5 G: 0.25 INJECTION, SOLUTION INTRAVENOUS at 05:55

## 2018-01-01 RX ADMIN — METOPROLOL SUCCINATE 12.5 MG: 25 TABLET, EXTENDED RELEASE ORAL at 11:12

## 2018-01-01 RX ADMIN — FENTANYL CITRATE 25 MCG: 50 INJECTION, SOLUTION INTRAMUSCULAR; INTRAVENOUS at 08:17

## 2018-01-01 RX ADMIN — FUROSEMIDE 120 MG: 40 TABLET ORAL at 11:53

## 2018-01-01 RX ADMIN — GABAPENTIN 100 MG: 100 CAPSULE ORAL at 20:55

## 2018-01-01 RX ADMIN — TRAMADOL HYDROCHLORIDE 50 MG: 50 TABLET, FILM COATED ORAL at 17:28

## 2018-01-01 RX ADMIN — MULTIPLE VITAMINS W/ MINERALS TAB 1 TABLET: TAB at 10:06

## 2018-01-01 RX ADMIN — ALBUMIN (HUMAN) 25 G: 0.25 INJECTION, SOLUTION INTRAVENOUS at 08:56

## 2018-01-01 RX ADMIN — ALBUMIN (HUMAN) 25 G: 0.25 INJECTION, SOLUTION INTRAVENOUS at 11:49

## 2018-01-01 RX ADMIN — ALBUTEROL SULFATE 5 MG: 2.5 SOLUTION RESPIRATORY (INHALATION) at 07:42

## 2018-01-01 RX ADMIN — GABAPENTIN 100 MG: 100 CAPSULE ORAL at 15:43

## 2018-01-01 RX ADMIN — SODIUM CHLORIDE 8 MG/HR: 900 INJECTION, SOLUTION INTRAVENOUS at 00:25

## 2018-01-01 RX ADMIN — Medication 10 ML: at 06:54

## 2018-01-01 RX ADMIN — HYDROMORPHONE HYDROCHLORIDE 0.5 MG: 1 INJECTION, SOLUTION INTRAMUSCULAR; INTRAVENOUS; SUBCUTANEOUS at 19:18

## 2018-01-01 RX ADMIN — AMIODARONE HYDROCHLORIDE 200 MG: 200 TABLET ORAL at 22:16

## 2018-01-01 RX ADMIN — RIFAXIMIN 550 MG: 550 TABLET ORAL at 17:28

## 2018-01-01 RX ADMIN — INSULIN LISPRO 2 UNITS: 100 INJECTION, SOLUTION INTRAVENOUS; SUBCUTANEOUS at 08:18

## 2018-01-01 RX ADMIN — SODIUM CHLORIDE 40 MG: 9 INJECTION INTRAMUSCULAR; INTRAVENOUS; SUBCUTANEOUS at 21:04

## 2018-01-01 RX ADMIN — Medication 100 MG: at 09:29

## 2018-01-01 RX ADMIN — INSULIN LISPRO 2 UNITS: 100 INJECTION, SOLUTION INTRAVENOUS; SUBCUTANEOUS at 18:44

## 2018-01-01 RX ADMIN — PANTOPRAZOLE SODIUM 40 MG: 40 TABLET, DELAYED RELEASE ORAL at 06:58

## 2018-01-01 RX ADMIN — RIFAXIMIN 550 MG: 550 TABLET ORAL at 00:12

## 2018-01-01 RX ADMIN — HYDROCODONE BITARTRATE AND ACETAMINOPHEN 1 TABLET: 5; 325 TABLET ORAL at 22:16

## 2018-01-01 RX ADMIN — METOCLOPRAMIDE 10 MG: 5 INJECTION, SOLUTION INTRAMUSCULAR; INTRAVENOUS at 06:48

## 2018-01-01 RX ADMIN — ALBUMIN (HUMAN) 12.5 G: 0.25 INJECTION, SOLUTION INTRAVENOUS at 18:45

## 2018-01-01 RX ADMIN — ALBUTEROL SULFATE 5 MG: 2.5 SOLUTION RESPIRATORY (INHALATION) at 20:09

## 2018-01-01 RX ADMIN — OCTREOTIDE ACETATE 50 MCG/HR: 500 INJECTION, SOLUTION INTRAVENOUS; SUBCUTANEOUS at 11:55

## 2018-01-01 RX ADMIN — MULTIPLE VITAMINS W/ MINERALS TAB 1 TABLET: TAB at 08:30

## 2018-01-01 RX ADMIN — GABAPENTIN 100 MG: 100 CAPSULE ORAL at 22:06

## 2018-01-01 RX ADMIN — RIFAXIMIN 550 MG: 550 TABLET ORAL at 17:42

## 2018-01-01 RX ADMIN — ALBUMIN (HUMAN) 12.5 G: 0.25 INJECTION, SOLUTION INTRAVENOUS at 18:48

## 2018-01-01 RX ADMIN — REGADENOSON 0.4 MG: 0.08 INJECTION, SOLUTION INTRAVENOUS at 14:54

## 2018-01-01 RX ADMIN — LACTULOSE 20 G: 20 SOLUTION ORAL at 20:32

## 2018-01-01 RX ADMIN — Medication 10 ML: at 04:06

## 2018-01-01 RX ADMIN — METOCLOPRAMIDE 10 MG: 5 INJECTION, SOLUTION INTRAMUSCULAR; INTRAVENOUS at 23:03

## 2018-01-01 RX ADMIN — MULTIPLE VITAMINS W/ MINERALS TAB 1 TABLET: TAB at 08:36

## 2018-01-01 RX ADMIN — LACTULOSE 20 G: 20 SOLUTION ORAL at 03:42

## 2018-01-01 RX ADMIN — GABAPENTIN 100 MG: 100 CAPSULE ORAL at 18:21

## 2018-01-01 RX ADMIN — ALBUMIN (HUMAN) 12.5 G: 0.25 INJECTION, SOLUTION INTRAVENOUS at 11:38

## 2018-01-01 RX ADMIN — Medication 100 MG: at 09:05

## 2018-01-01 RX ADMIN — Medication 10 ML: at 21:50

## 2018-01-01 RX ADMIN — MORPHINE SULFATE 1 MG: 2 INJECTION, SOLUTION INTRAMUSCULAR; INTRAVENOUS at 02:16

## 2018-01-01 RX ADMIN — MIDAZOLAM HYDROCHLORIDE 2 MG: 1 INJECTION, SOLUTION INTRAMUSCULAR; INTRAVENOUS at 08:44

## 2018-01-01 RX ADMIN — OXYCODONE HYDROCHLORIDE AND ACETAMINOPHEN 1 TABLET: 5; 325 TABLET ORAL at 12:22

## 2018-01-01 RX ADMIN — LACTULOSE 30 G: 20 SOLUTION ORAL at 09:24

## 2018-01-01 RX ADMIN — METOPROLOL SUCCINATE 12.5 MG: 25 TABLET, EXTENDED RELEASE ORAL at 12:45

## 2018-01-01 RX ADMIN — INSULIN LISPRO 2 UNITS: 100 INJECTION, SOLUTION INTRAVENOUS; SUBCUTANEOUS at 12:10

## 2018-01-01 RX ADMIN — CEFOTETAN DISODIUM 2 G: 2 INJECTION, POWDER, FOR SOLUTION INTRAMUSCULAR; INTRAVENOUS at 17:01

## 2018-01-01 RX ADMIN — SPIRONOLACTONE 200 MG: 100 TABLET, FILM COATED ORAL at 08:17

## 2018-01-01 RX ADMIN — LACTULOSE 30 G: 20 SOLUTION ORAL at 11:13

## 2018-01-01 RX ADMIN — ALBUMIN (HUMAN) 25 G: 0.25 INJECTION, SOLUTION INTRAVENOUS at 12:17

## 2018-01-01 RX ADMIN — ALBUMIN (HUMAN) 12.5 G: 0.25 INJECTION, SOLUTION INTRAVENOUS at 11:46

## 2018-01-01 RX ADMIN — GABAPENTIN 300 MG: 300 CAPSULE ORAL at 16:00

## 2018-01-01 RX ADMIN — Medication 100 MG: at 09:13

## 2018-01-01 RX ADMIN — Medication 10 ML: at 21:49

## 2018-01-01 RX ADMIN — INSULIN LISPRO 2 UNITS: 100 INJECTION, SOLUTION INTRAVENOUS; SUBCUTANEOUS at 12:19

## 2018-01-01 RX ADMIN — Medication 100 MG: at 10:53

## 2018-01-01 RX ADMIN — SODIUM CHLORIDE 40 MG: 9 INJECTION INTRAMUSCULAR; INTRAVENOUS; SUBCUTANEOUS at 22:05

## 2018-01-01 RX ADMIN — Medication 10 ML: at 05:06

## 2018-01-01 RX ADMIN — METOPROLOL SUCCINATE 12.5 MG: 25 TABLET, EXTENDED RELEASE ORAL at 08:47

## 2018-01-01 RX ADMIN — PANTOPRAZOLE SODIUM 40 MG: 40 TABLET, DELAYED RELEASE ORAL at 10:53

## 2018-01-01 RX ADMIN — LACTULOSE 20 G: 20 SOLUTION ORAL at 09:00

## 2018-01-01 RX ADMIN — LACTULOSE 30 G: 20 SOLUTION ORAL at 20:27

## 2018-01-01 RX ADMIN — GABAPENTIN 100 MG: 100 CAPSULE ORAL at 21:04

## 2018-01-01 RX ADMIN — TRAMADOL HYDROCHLORIDE 50 MG: 50 TABLET, FILM COATED ORAL at 09:40

## 2018-01-01 RX ADMIN — Medication 10 ML: at 22:01

## 2018-01-01 RX ADMIN — FENTANYL CITRATE 25 MCG: 50 INJECTION, SOLUTION INTRAMUSCULAR; INTRAVENOUS at 17:58

## 2018-01-01 RX ADMIN — MULTIPLE VITAMINS W/ MINERALS TAB 1 TABLET: TAB at 08:55

## 2018-01-01 RX ADMIN — OCTREOTIDE ACETATE 25 MCG/HR: 500 INJECTION, SOLUTION INTRAVENOUS; SUBCUTANEOUS at 01:25

## 2018-01-01 RX ADMIN — FENTANYL CITRATE 25 MCG: 50 INJECTION, SOLUTION INTRAMUSCULAR; INTRAVENOUS at 11:32

## 2018-01-01 RX ADMIN — GABAPENTIN 300 MG: 300 CAPSULE ORAL at 22:30

## 2018-01-01 RX ADMIN — SODIUM CHLORIDE 500 ML: 900 INJECTION, SOLUTION INTRAVENOUS at 19:49

## 2018-01-01 RX ADMIN — INSULIN LISPRO 2 UNITS: 100 INJECTION, SOLUTION INTRAVENOUS; SUBCUTANEOUS at 08:29

## 2018-01-01 RX ADMIN — GABAPENTIN 100 MG: 100 CAPSULE ORAL at 18:31

## 2018-01-01 RX ADMIN — ZOLPIDEM TARTRATE 5 MG: 5 TABLET ORAL at 00:10

## 2018-01-01 RX ADMIN — ALBUMIN (HUMAN) 25 G: 0.25 INJECTION, SOLUTION INTRAVENOUS at 23:40

## 2018-01-01 RX ADMIN — SODIUM CHLORIDE 40 MG: 9 INJECTION INTRAMUSCULAR; INTRAVENOUS; SUBCUTANEOUS at 21:46

## 2018-01-01 RX ADMIN — RIFAXIMIN 550 MG: 550 TABLET ORAL at 17:51

## 2018-01-01 RX ADMIN — LIDOCAINE HYDROCHLORIDE 10 ML: 10 INJECTION, SOLUTION EPIDURAL; INFILTRATION; INTRACAUDAL; PERINEURAL at 08:15

## 2018-01-01 RX ADMIN — GABAPENTIN 100 MG: 100 CAPSULE ORAL at 22:01

## 2018-01-01 RX ADMIN — SODIUM CHLORIDE 40 MG: 9 INJECTION INTRAMUSCULAR; INTRAVENOUS; SUBCUTANEOUS at 10:59

## 2018-01-01 RX ADMIN — LACTULOSE 30 G: 20 SOLUTION ORAL at 15:54

## 2018-01-01 RX ADMIN — AMIODARONE HYDROCHLORIDE 200 MG: 200 TABLET ORAL at 21:45

## 2018-01-01 RX ADMIN — ALBUMIN (HUMAN) 25 G: 0.25 INJECTION, SOLUTION INTRAVENOUS at 18:32

## 2018-01-01 RX ADMIN — GABAPENTIN 100 MG: 100 CAPSULE ORAL at 09:28

## 2018-01-01 RX ADMIN — Medication 10 ML: at 21:48

## 2018-01-01 RX ADMIN — SODIUM CHLORIDE 40 MG: 9 INJECTION, SOLUTION INTRAMUSCULAR; INTRAVENOUS; SUBCUTANEOUS at 03:06

## 2018-01-01 RX ADMIN — RIFAXIMIN 550 MG: 550 TABLET ORAL at 08:47

## 2018-01-01 RX ADMIN — LACTULOSE 30 G: 20 SOLUTION ORAL at 17:52

## 2018-01-01 RX ADMIN — CEFTRIAXONE 1 G: 1 INJECTION, POWDER, FOR SOLUTION INTRAMUSCULAR; INTRAVENOUS at 22:54

## 2018-01-01 RX ADMIN — Medication 100 MG: at 08:47

## 2018-01-01 RX ADMIN — ALBUMIN (HUMAN) 25 G: 0.25 INJECTION, SOLUTION INTRAVENOUS at 02:17

## 2018-01-01 RX ADMIN — RIFAXIMIN 550 MG: 550 TABLET ORAL at 08:55

## 2018-01-01 RX ADMIN — SPIRONOLACTONE 100 MG: 100 TABLET, FILM COATED ORAL at 17:53

## 2018-01-01 RX ADMIN — TRAMADOL HYDROCHLORIDE 50 MG: 50 TABLET, FILM COATED ORAL at 02:01

## 2018-01-01 RX ADMIN — PANTOPRAZOLE SODIUM 40 MG: 40 TABLET, DELAYED RELEASE ORAL at 08:53

## 2018-01-01 RX ADMIN — ALBUMIN (HUMAN) 115 G: 0.25 INJECTION, SOLUTION INTRAVENOUS at 14:09

## 2018-01-01 RX ADMIN — ALBUMIN (HUMAN) 25 G: 0.25 INJECTION, SOLUTION INTRAVENOUS at 17:05

## 2018-01-01 RX ADMIN — HYDROCODONE BITARTRATE AND ACETAMINOPHEN 1 TABLET: 5; 325 TABLET ORAL at 13:22

## 2018-01-01 RX ADMIN — SODIUM CHLORIDE 8 MG/HR: 900 INJECTION, SOLUTION INTRAVENOUS at 17:43

## 2018-01-01 RX ADMIN — OXYCODONE HYDROCHLORIDE 5 MG: 5 TABLET ORAL at 22:10

## 2018-01-01 RX ADMIN — RIFAXIMIN 550 MG: 550 TABLET ORAL at 20:34

## 2018-01-01 RX ADMIN — SPIRONOLACTONE 100 MG: 25 TABLET, FILM COATED ORAL at 10:06

## 2018-01-01 RX ADMIN — LIDOCAINE HYDROCHLORIDE 10 ML: 10 INJECTION, SOLUTION EPIDURAL; INFILTRATION; INTRACAUDAL; PERINEURAL at 11:15

## 2018-01-01 RX ADMIN — LIDOCAINE HYDROCHLORIDE 5 ML: 10 INJECTION, SOLUTION EPIDURAL; INFILTRATION; INTRACAUDAL; PERINEURAL at 13:24

## 2018-01-01 RX ADMIN — AMIODARONE HYDROCHLORIDE 200 MG: 200 TABLET ORAL at 21:53

## 2018-01-01 RX ADMIN — GABAPENTIN 300 MG: 300 CAPSULE ORAL at 18:08

## 2018-01-01 RX ADMIN — ALBUMIN (HUMAN) 25 G: 0.25 INJECTION, SOLUTION INTRAVENOUS at 13:00

## 2018-01-01 RX ADMIN — METOPROLOL SUCCINATE 12.5 MG: 25 TABLET, EXTENDED RELEASE ORAL at 09:09

## 2018-01-01 RX ADMIN — Medication 100 MG: at 09:24

## 2018-01-01 RX ADMIN — ALBUMIN (HUMAN) 12.5 G: 0.25 INJECTION, SOLUTION INTRAVENOUS at 23:41

## 2018-01-01 RX ADMIN — INSULIN LISPRO 2 UNITS: 100 INJECTION, SOLUTION INTRAVENOUS; SUBCUTANEOUS at 01:57

## 2018-01-01 RX ADMIN — ACETAMINOPHEN 650 MG: 325 TABLET, FILM COATED ORAL at 05:54

## 2018-01-01 RX ADMIN — GABAPENTIN 300 MG: 300 CAPSULE ORAL at 12:44

## 2018-01-01 RX ADMIN — LIDOCAINE HYDROCHLORIDE 5 ML: 10 INJECTION, SOLUTION EPIDURAL; INFILTRATION; INTRACAUDAL; PERINEURAL at 09:35

## 2018-01-01 RX ADMIN — MULTIPLE VITAMINS W/ MINERALS TAB 1 TABLET: TAB at 08:44

## 2018-01-01 RX ADMIN — Medication 100 MG: at 08:57

## 2018-01-01 RX ADMIN — SODIUM CHLORIDE 50 ML/HR: 900 INJECTION, SOLUTION INTRAVENOUS at 11:55

## 2018-01-01 RX ADMIN — ALBUMIN (HUMAN) 25 G: 0.25 INJECTION, SOLUTION INTRAVENOUS at 01:01

## 2018-01-01 RX ADMIN — OXYCODONE HYDROCHLORIDE 5 MG: 5 TABLET ORAL at 13:42

## 2018-01-01 RX ADMIN — INSULIN LISPRO 2 UNITS: 100 INJECTION, SOLUTION INTRAVENOUS; SUBCUTANEOUS at 13:20

## 2018-01-01 RX ADMIN — SODIUM CHLORIDE 100 ML: 900 INJECTION, SOLUTION INTRAVENOUS at 13:00

## 2018-01-01 RX ADMIN — GABAPENTIN 100 MG: 100 CAPSULE ORAL at 18:16

## 2018-01-01 RX ADMIN — LACTULOSE 30 G: 20 SOLUTION ORAL at 21:04

## 2018-01-01 RX ADMIN — SODIUM CHLORIDE 1 G: 900 INJECTION, SOLUTION INTRAVENOUS at 14:52

## 2018-01-01 RX ADMIN — FUROSEMIDE 40 MG: 40 TABLET ORAL at 08:29

## 2018-01-01 RX ADMIN — HYDROMORPHONE HYDROCHLORIDE 0.5 MG: 1 INJECTION, SOLUTION INTRAMUSCULAR; INTRAVENOUS; SUBCUTANEOUS at 14:55

## 2018-01-01 RX ADMIN — METOPROLOL SUCCINATE 12.5 MG: 25 TABLET, EXTENDED RELEASE ORAL at 12:21

## 2018-01-01 RX ADMIN — Medication 10 ML: at 22:52

## 2018-01-01 RX ADMIN — AMIODARONE HYDROCHLORIDE 200 MG: 200 TABLET ORAL at 12:44

## 2018-01-01 RX ADMIN — HUMAN INSULIN 8 UNITS: 100 INJECTION, SOLUTION SUBCUTANEOUS at 20:58

## 2018-01-01 RX ADMIN — LOSARTAN POTASSIUM 25 MG: 25 TABLET, FILM COATED ORAL at 08:49

## 2018-01-01 RX ADMIN — OCTREOTIDE ACETATE 50 MCG/HR: 500 INJECTION, SOLUTION INTRAVENOUS; SUBCUTANEOUS at 17:57

## 2018-01-01 RX ADMIN — INSULIN LISPRO 1 UNITS: 100 INJECTION, SOLUTION INTRAVENOUS; SUBCUTANEOUS at 23:31

## 2018-01-01 RX ADMIN — FENTANYL CITRATE 25 MCG: 50 INJECTION, SOLUTION INTRAMUSCULAR; INTRAVENOUS at 17:54

## 2018-01-01 RX ADMIN — LACTULOSE 20 G: 20 SOLUTION ORAL at 04:25

## 2018-01-01 RX ADMIN — Medication 10 ML: at 06:14

## 2018-01-01 RX ADMIN — GABAPENTIN 300 MG: 300 CAPSULE ORAL at 15:16

## 2018-01-01 RX ADMIN — SODIUM CHLORIDE 8 MG/HR: 900 INJECTION, SOLUTION INTRAVENOUS at 05:27

## 2018-01-01 RX ADMIN — PANTOPRAZOLE SODIUM 40 MG: 40 TABLET, DELAYED RELEASE ORAL at 08:26

## 2018-01-01 RX ADMIN — Medication 10 ML: at 02:29

## 2018-01-01 RX ADMIN — HYDROCODONE BITARTRATE AND ACETAMINOPHEN 1 TABLET: 5; 325 TABLET ORAL at 21:58

## 2018-01-01 RX ADMIN — ALBUMIN (HUMAN) 12.5 G: 0.25 INJECTION, SOLUTION INTRAVENOUS at 00:27

## 2018-01-01 RX ADMIN — SODIUM POLYSTYRENE SULFONATE 30 G: 15 SUSPENSION ORAL; RECTAL at 13:42

## 2018-01-01 RX ADMIN — GABAPENTIN 100 MG: 100 CAPSULE ORAL at 10:09

## 2018-01-01 RX ADMIN — PANTOPRAZOLE SODIUM 40 MG: 40 TABLET, DELAYED RELEASE ORAL at 09:25

## 2018-01-01 RX ADMIN — OXYCODONE AND ACETAMINOPHEN 1 TABLET: 5; 325 TABLET ORAL at 20:32

## 2018-01-01 RX ADMIN — Medication 10 ML: at 18:26

## 2018-01-01 RX ADMIN — INSULIN LISPRO 5 UNITS: 100 INJECTION, SOLUTION INTRAVENOUS; SUBCUTANEOUS at 12:21

## 2018-01-01 RX ADMIN — LACTULOSE 20 G: 20 SOLUTION ORAL at 08:00

## 2018-01-01 RX ADMIN — Medication 10 ML: at 05:25

## 2018-01-01 RX ADMIN — GABAPENTIN 300 MG: 300 CAPSULE ORAL at 21:53

## 2018-01-01 RX ADMIN — ALBUMIN (HUMAN) 25 G: 0.25 INJECTION, SOLUTION INTRAVENOUS at 23:47

## 2018-01-01 RX ADMIN — GABAPENTIN 100 MG: 100 CAPSULE ORAL at 10:53

## 2018-01-01 RX ADMIN — GABAPENTIN 100 MG: 100 CAPSULE ORAL at 16:27

## 2018-01-01 RX ADMIN — DIGOXIN 0.12 MG: 125 TABLET ORAL at 21:40

## 2018-01-01 RX ADMIN — PANTOPRAZOLE SODIUM 40 MG: 40 TABLET, DELAYED RELEASE ORAL at 07:02

## 2018-01-01 RX ADMIN — OXYCODONE HYDROCHLORIDE AND ACETAMINOPHEN 1 TABLET: 5; 325 TABLET ORAL at 19:10

## 2018-01-01 RX ADMIN — LACTULOSE 30 G: 20 SOLUTION ORAL at 16:49

## 2018-01-01 RX ADMIN — INSULIN LISPRO 2 UNITS: 100 INJECTION, SOLUTION INTRAVENOUS; SUBCUTANEOUS at 00:13

## 2018-01-01 RX ADMIN — METOCLOPRAMIDE 10 MG: 5 INJECTION, SOLUTION INTRAMUSCULAR; INTRAVENOUS at 12:45

## 2018-01-01 RX ADMIN — RIFAXIMIN 550 MG: 550 TABLET ORAL at 18:05

## 2018-01-01 RX ADMIN — RIFAXIMIN 550 MG: 550 TABLET ORAL at 18:39

## 2018-01-01 RX ADMIN — SODIUM BICARBONATE 2 ML: 42 INJECTION, SOLUTION INTRAVENOUS at 15:15

## 2018-01-01 RX ADMIN — ALBUMIN (HUMAN) 25 G: 0.25 INJECTION, SOLUTION INTRAVENOUS at 06:46

## 2018-01-01 RX ADMIN — ALBUMIN (HUMAN) 25 G: 0.25 INJECTION, SOLUTION INTRAVENOUS at 14:03

## 2018-01-01 RX ADMIN — DIGOXIN 0.12 MG: 125 TABLET ORAL at 21:38

## 2018-01-01 RX ADMIN — LIDOCAINE HYDROCHLORIDE 5 ML: 10 INJECTION, SOLUTION EPIDURAL; INFILTRATION; INTRACAUDAL; PERINEURAL at 09:48

## 2018-01-01 RX ADMIN — SODIUM CHLORIDE 50 ML/HR: 900 INJECTION, SOLUTION INTRAVENOUS at 23:36

## 2018-01-01 RX ADMIN — INSULIN LISPRO 2 UNITS: 100 INJECTION, SOLUTION INTRAVENOUS; SUBCUTANEOUS at 23:40

## 2018-01-01 RX ADMIN — FENTANYL CITRATE 25 MCG: 50 INJECTION, SOLUTION INTRAMUSCULAR; INTRAVENOUS at 08:48

## 2018-01-01 RX ADMIN — ALBUMIN (HUMAN) 12.5 G: 0.25 INJECTION, SOLUTION INTRAVENOUS at 15:05

## 2018-01-01 RX ADMIN — GABAPENTIN 100 MG: 100 CAPSULE ORAL at 16:06

## 2018-01-01 RX ADMIN — Medication 10 ML: at 20:56

## 2018-01-01 RX ADMIN — TRAMADOL HYDROCHLORIDE 50 MG: 50 TABLET, FILM COATED ORAL at 07:13

## 2018-01-01 RX ADMIN — Medication 10 ML: at 07:30

## 2018-01-01 RX ADMIN — ALBUMIN (HUMAN) 25 G: 0.25 INJECTION, SOLUTION INTRAVENOUS at 05:05

## 2018-01-01 RX ADMIN — INSULIN LISPRO 2 UNITS: 100 INJECTION, SOLUTION INTRAVENOUS; SUBCUTANEOUS at 06:50

## 2018-01-01 RX ADMIN — Medication 100 MG: at 08:17

## 2018-01-01 RX ADMIN — LACTULOSE 30 G: 20 SOLUTION ORAL at 22:52

## 2018-01-01 RX ADMIN — Medication 10 ML: at 07:19

## 2018-01-01 RX ADMIN — ALBUMIN (HUMAN) 12.5 G: 0.25 INJECTION, SOLUTION INTRAVENOUS at 08:29

## 2018-01-01 RX ADMIN — INSULIN LISPRO 2 UNITS: 100 INJECTION, SOLUTION INTRAVENOUS; SUBCUTANEOUS at 17:35

## 2018-01-01 RX ADMIN — ALBUMIN (HUMAN) 25 G: 0.25 INJECTION, SOLUTION INTRAVENOUS at 18:44

## 2018-01-01 RX ADMIN — INSULIN LISPRO 1 UNITS: 100 INJECTION, SOLUTION INTRAVENOUS; SUBCUTANEOUS at 00:25

## 2018-01-01 RX ADMIN — ALBUMIN (HUMAN) 25 G: 0.25 INJECTION, SOLUTION INTRAVENOUS at 04:52

## 2018-01-01 RX ADMIN — LACTULOSE 20 G: 20 SOLUTION ORAL at 17:09

## 2018-01-01 RX ADMIN — POTASSIUM CHLORIDE 40 MEQ: 750 TABLET, FILM COATED, EXTENDED RELEASE ORAL at 12:00

## 2018-01-01 RX ADMIN — FENTANYL CITRATE 25 MCG: 50 INJECTION, SOLUTION INTRAMUSCULAR; INTRAVENOUS at 18:00

## 2018-01-01 RX ADMIN — ALBUMIN (HUMAN) 25 G: 0.25 INJECTION, SOLUTION INTRAVENOUS at 21:05

## 2018-01-01 RX ADMIN — DIGOXIN 0.12 MG: 125 TABLET ORAL at 21:53

## 2018-01-01 RX ADMIN — GABAPENTIN 100 MG: 100 CAPSULE ORAL at 08:31

## 2018-01-01 RX ADMIN — PROCHLORPERAZINE EDISYLATE 10 MG: 5 INJECTION INTRAMUSCULAR; INTRAVENOUS at 08:55

## 2018-01-01 RX ADMIN — METOPROLOL SUCCINATE 12.5 MG: 25 TABLET, EXTENDED RELEASE ORAL at 10:01

## 2018-01-01 RX ADMIN — THERA TABS 1 TABLET: TAB at 08:17

## 2018-01-01 RX ADMIN — GABAPENTIN 300 MG: 300 CAPSULE ORAL at 10:01

## 2018-01-01 RX ADMIN — Medication 10 ML: at 15:11

## 2018-01-01 RX ADMIN — FENTANYL CITRATE 50 MCG: 50 INJECTION, SOLUTION INTRAMUSCULAR; INTRAVENOUS at 08:08

## 2018-01-01 RX ADMIN — LIDOCAINE HYDROCHLORIDE 10 ML: 10 INJECTION, SOLUTION EPIDURAL; INFILTRATION; INTRACAUDAL; PERINEURAL at 14:33

## 2018-01-01 RX ADMIN — GABAPENTIN 300 MG: 300 CAPSULE ORAL at 11:00

## 2018-01-01 RX ADMIN — ALBUMIN (HUMAN) 25 G: 0.25 INJECTION, SOLUTION INTRAVENOUS at 17:19

## 2018-01-01 RX ADMIN — Medication 10 ML: at 16:50

## 2018-01-01 RX ADMIN — CEFOTETAN DISODIUM 2 G: 2 INJECTION, POWDER, FOR SOLUTION INTRAMUSCULAR; INTRAVENOUS at 05:41

## 2018-01-01 RX ADMIN — OCTREOTIDE ACETATE 100 MCG: 100 INJECTION, SOLUTION INTRAVENOUS; SUBCUTANEOUS at 04:00

## 2018-01-01 RX ADMIN — INSULIN LISPRO 3 UNITS: 100 INJECTION, SOLUTION INTRAVENOUS; SUBCUTANEOUS at 13:16

## 2018-01-01 RX ADMIN — ALBUMIN (HUMAN) 25 G: 0.25 INJECTION, SOLUTION INTRAVENOUS at 20:38

## 2018-01-01 RX ADMIN — RIFAXIMIN 550 MG: 550 TABLET ORAL at 18:25

## 2018-01-01 RX ADMIN — INSULIN LISPRO 2 UNITS: 100 INJECTION, SOLUTION INTRAVENOUS; SUBCUTANEOUS at 17:53

## 2018-01-01 RX ADMIN — ALBUMIN (HUMAN) 25 G: 0.25 INJECTION, SOLUTION INTRAVENOUS at 05:27

## 2018-01-01 RX ADMIN — METOCLOPRAMIDE 10 MG: 5 INJECTION, SOLUTION INTRAMUSCULAR; INTRAVENOUS at 06:19

## 2018-01-01 RX ADMIN — Medication 100 MG: at 08:53

## 2018-01-01 RX ADMIN — POTASSIUM & SODIUM PHOSPHATES POWDER PACK 280-160-250 MG 2 PACKET: 280-160-250 PACK at 09:46

## 2018-01-01 RX ADMIN — LACTULOSE 30 G: 20 SOLUTION ORAL at 18:45

## 2018-01-01 RX ADMIN — ONDANSETRON 4 MG: 2 INJECTION INTRAMUSCULAR; INTRAVENOUS at 04:06

## 2018-01-01 RX ADMIN — Medication: at 13:00

## 2018-01-01 RX ADMIN — FENTANYL CITRATE 50 MCG: 50 INJECTION, SOLUTION INTRAMUSCULAR; INTRAVENOUS at 08:40

## 2018-01-01 RX ADMIN — GABAPENTIN 100 MG: 100 CAPSULE ORAL at 18:44

## 2018-01-01 RX ADMIN — AMIODARONE HYDROCHLORIDE 200 MG: 200 TABLET ORAL at 22:01

## 2018-01-01 RX ADMIN — MAGNESIUM SULFATE HEPTAHYDRATE 2 G: 40 INJECTION, SOLUTION INTRAVENOUS at 14:30

## 2018-01-01 RX ADMIN — GABAPENTIN 300 MG: 300 CAPSULE ORAL at 21:24

## 2018-01-01 RX ADMIN — CEFTRIAXONE 1 G: 1 INJECTION, POWDER, FOR SOLUTION INTRAMUSCULAR; INTRAVENOUS at 22:04

## 2018-01-01 RX ADMIN — AMIODARONE HYDROCHLORIDE 200 MG: 200 TABLET ORAL at 21:58

## 2018-01-01 RX ADMIN — HYDROCODONE BITARTRATE AND ACETAMINOPHEN 1 TABLET: 5; 325 TABLET ORAL at 03:23

## 2018-01-01 RX ADMIN — AMIODARONE HYDROCHLORIDE 200 MG: 200 TABLET ORAL at 21:07

## 2018-01-01 RX ADMIN — LACTULOSE 30 G: 20 SOLUTION ORAL at 08:31

## 2018-01-01 RX ADMIN — Medication 10 MG: at 03:44

## 2018-01-01 RX ADMIN — ALBUMIN (HUMAN) 25 G: 0.25 INJECTION, SOLUTION INTRAVENOUS at 12:33

## 2018-01-01 RX ADMIN — ALBUMIN (HUMAN) 12.5 G: 0.25 INJECTION, SOLUTION INTRAVENOUS at 16:12

## 2018-01-01 RX ADMIN — LACTULOSE 30 G: 20 SOLUTION ORAL at 21:28

## 2018-01-01 RX ADMIN — RIFAXIMIN 550 MG: 550 TABLET ORAL at 12:09

## 2018-01-01 RX ADMIN — RIFAXIMIN 550 MG: 550 TABLET ORAL at 10:09

## 2018-01-01 RX ADMIN — Medication 10 ML: at 22:30

## 2018-01-01 RX ADMIN — SODIUM CHLORIDE 100 ML/HR: 900 INJECTION, SOLUTION INTRAVENOUS at 20:23

## 2018-01-01 RX ADMIN — LACTULOSE 20 G: 20 SOLUTION ORAL at 16:29

## 2018-01-01 RX ADMIN — ALBUMIN (HUMAN) 12.5 G: 0.25 INJECTION, SOLUTION INTRAVENOUS at 12:56

## 2018-01-01 RX ADMIN — ONDANSETRON 4 MG: 4 TABLET, ORALLY DISINTEGRATING ORAL at 13:32

## 2018-01-01 RX ADMIN — GABAPENTIN 100 MG: 100 CAPSULE ORAL at 21:58

## 2018-01-01 RX ADMIN — GABAPENTIN 300 MG: 300 CAPSULE ORAL at 09:09

## 2018-01-01 RX ADMIN — ALBUMIN (HUMAN) 25 G: 0.25 INJECTION, SOLUTION INTRAVENOUS at 23:12

## 2018-01-01 RX ADMIN — Medication 10 ML: at 13:00

## 2018-01-01 RX ADMIN — FUROSEMIDE 40 MG: 40 TABLET ORAL at 17:43

## 2018-01-01 RX ADMIN — TRAMADOL HYDROCHLORIDE 50 MG: 50 TABLET, FILM COATED ORAL at 21:00

## 2018-01-01 RX ADMIN — RIFAXIMIN 550 MG: 550 TABLET ORAL at 09:37

## 2018-01-01 RX ADMIN — GABAPENTIN 100 MG: 100 CAPSULE ORAL at 22:00

## 2018-01-01 RX ADMIN — LACTULOSE 20 G: 20 SOLUTION ORAL at 00:53

## 2018-01-01 RX ADMIN — LACTULOSE 20 G: 20 SOLUTION ORAL at 23:47

## 2018-01-01 RX ADMIN — Medication 10 ML: at 15:43

## 2018-01-01 RX ADMIN — RIFAXIMIN 550 MG: 550 TABLET ORAL at 18:27

## 2018-01-01 RX ADMIN — LACTULOSE 20 G: 20 SOLUTION ORAL at 20:37

## 2018-01-01 RX ADMIN — ALBUMIN (HUMAN) 25 G: 0.25 INJECTION, SOLUTION INTRAVENOUS at 14:39

## 2018-01-01 RX ADMIN — INSULIN LISPRO 2 UNITS: 100 INJECTION, SOLUTION INTRAVENOUS; SUBCUTANEOUS at 22:53

## 2018-01-01 RX ADMIN — Medication 100 MG: at 12:45

## 2018-01-01 RX ADMIN — ALBUMIN (HUMAN) 25 G: 0.25 INJECTION, SOLUTION INTRAVENOUS at 06:07

## 2018-01-01 RX ADMIN — ONDANSETRON 4 MG: 2 INJECTION INTRAMUSCULAR; INTRAVENOUS at 09:43

## 2018-01-01 RX ADMIN — RIFAXIMIN 550 MG: 550 TABLET ORAL at 09:05

## 2018-01-01 RX ADMIN — Medication 10 ML: at 07:04

## 2018-01-01 RX ADMIN — LACTULOSE 30 G: 20 SOLUTION ORAL at 21:14

## 2018-01-01 RX ADMIN — INSULIN LISPRO 4 UNITS: 100 INJECTION, SOLUTION INTRAVENOUS; SUBCUTANEOUS at 12:02

## 2018-01-01 RX ADMIN — Medication 10 ML: at 02:17

## 2018-01-01 RX ADMIN — DEXTROSE MONOHYDRATE 25 G: 25 INJECTION, SOLUTION INTRAVENOUS at 07:52

## 2018-01-01 RX ADMIN — DIGOXIN 0.12 MG: 125 TABLET ORAL at 21:51

## 2018-01-01 RX ADMIN — ALBUMIN (HUMAN) 12.5 G: 0.25 INJECTION, SOLUTION INTRAVENOUS at 02:29

## 2018-01-01 RX ADMIN — INSULIN LISPRO 2 UNITS: 100 INJECTION, SOLUTION INTRAVENOUS; SUBCUTANEOUS at 23:55

## 2018-01-01 RX ADMIN — FUROSEMIDE 120 MG: 40 TABLET ORAL at 08:48

## 2018-01-01 RX ADMIN — PANTOPRAZOLE SODIUM 40 MG: 40 TABLET, DELAYED RELEASE ORAL at 18:26

## 2018-01-01 RX ADMIN — Medication 10 ML: at 05:11

## 2018-01-01 RX ADMIN — ALBUMIN (HUMAN) 25 G: 0.25 INJECTION, SOLUTION INTRAVENOUS at 19:13

## 2018-01-01 RX ADMIN — MULTIPLE VITAMINS W/ MINERALS TAB 1 TABLET: TAB at 09:22

## 2018-01-01 RX ADMIN — ONDANSETRON 4 MG: 2 INJECTION INTRAMUSCULAR; INTRAVENOUS at 17:35

## 2018-01-01 RX ADMIN — DIGOXIN 0.12 MG: 125 TABLET ORAL at 22:00

## 2018-01-01 RX ADMIN — LACTULOSE 20 G: 20 SOLUTION ORAL at 12:46

## 2018-01-01 RX ADMIN — CEFTRIAXONE 1 G: 1 INJECTION, POWDER, FOR SOLUTION INTRAMUSCULAR; INTRAVENOUS at 21:07

## 2018-01-01 RX ADMIN — Medication 10 ML: at 06:47

## 2018-01-01 RX ADMIN — LACTULOSE 30 G: 20 SOLUTION ORAL at 09:29

## 2018-01-01 RX ADMIN — GABAPENTIN 100 MG: 100 CAPSULE ORAL at 12:45

## 2018-01-01 RX ADMIN — METOPROLOL SUCCINATE 12.5 MG: 25 TABLET, EXTENDED RELEASE ORAL at 12:08

## 2018-01-01 RX ADMIN — Medication 10 MG: at 21:32

## 2018-01-01 RX ADMIN — MIDAZOLAM HYDROCHLORIDE 1 MG: 1 INJECTION, SOLUTION INTRAMUSCULAR; INTRAVENOUS at 17:58

## 2018-01-01 RX ADMIN — IOPAMIDOL 100 ML: 755 INJECTION, SOLUTION INTRAVENOUS at 23:27

## 2018-01-01 RX ADMIN — FENTANYL CITRATE 25 MCG: 50 INJECTION, SOLUTION INTRAMUSCULAR; INTRAVENOUS at 05:55

## 2018-01-01 RX ADMIN — METOPROLOL SUCCINATE 12.5 MG: 25 TABLET, EXTENDED RELEASE ORAL at 12:44

## 2018-01-01 RX ADMIN — DIGOXIN 0.12 MG: 125 TABLET ORAL at 21:45

## 2018-01-01 RX ADMIN — ALBUMIN (HUMAN) 25 G: 0.25 INJECTION, SOLUTION INTRAVENOUS at 12:10

## 2018-01-01 RX ADMIN — OXYCODONE HYDROCHLORIDE AND ACETAMINOPHEN 1 TABLET: 5; 325 TABLET ORAL at 23:00

## 2018-01-01 RX ADMIN — INSULIN LISPRO 2 UNITS: 100 INJECTION, SOLUTION INTRAVENOUS; SUBCUTANEOUS at 06:38

## 2018-01-01 RX ADMIN — LACTULOSE 30 G: 20 SOLUTION ORAL at 15:43

## 2018-01-01 RX ADMIN — Medication 100 MG: at 10:01

## 2018-01-01 RX ADMIN — GABAPENTIN 100 MG: 100 CAPSULE ORAL at 08:46

## 2018-01-01 RX ADMIN — ALBUMIN (HUMAN) 25 G: 0.25 INJECTION, SOLUTION INTRAVENOUS at 00:00

## 2018-01-01 RX ADMIN — LACTULOSE 30 G: 20 SOLUTION ORAL at 20:55

## 2018-01-01 RX ADMIN — Medication 10 ML: at 17:57

## 2018-01-01 RX ADMIN — INSULIN LISPRO 2 UNITS: 100 INJECTION, SOLUTION INTRAVENOUS; SUBCUTANEOUS at 13:25

## 2018-01-01 RX ADMIN — INSULIN LISPRO 10 UNITS: 100 INJECTION, SOLUTION INTRAVENOUS; SUBCUTANEOUS at 14:00

## 2018-01-01 RX ADMIN — PANTOPRAZOLE SODIUM 40 MG: 40 TABLET, DELAYED RELEASE ORAL at 10:09

## 2018-01-01 RX ADMIN — HYDROCODONE BITARTRATE AND ACETAMINOPHEN 1 TABLET: 5; 325 TABLET ORAL at 18:01

## 2018-01-01 RX ADMIN — INSULIN LISPRO 3 UNITS: 100 INJECTION, SOLUTION INTRAVENOUS; SUBCUTANEOUS at 23:14

## 2018-01-01 RX ADMIN — GABAPENTIN 100 MG: 100 CAPSULE ORAL at 15:39

## 2018-01-01 RX ADMIN — DEXTROSE MONOHYDRATE 250 ML: 10 INJECTION, SOLUTION INTRAVENOUS at 19:51

## 2018-01-01 RX ADMIN — Medication 10 ML: at 21:04

## 2018-01-01 RX ADMIN — ONDANSETRON 4 MG: 2 INJECTION INTRAMUSCULAR; INTRAVENOUS at 21:10

## 2018-01-01 RX ADMIN — INSULIN LISPRO 2 UNITS: 100 INJECTION, SOLUTION INTRAVENOUS; SUBCUTANEOUS at 07:15

## 2018-01-01 RX ADMIN — ALBUMIN (HUMAN) 12.5 G: 0.25 INJECTION, SOLUTION INTRAVENOUS at 19:35

## 2018-01-01 RX ADMIN — Medication 10 ML: at 14:48

## 2018-01-01 RX ADMIN — ALBUMIN (HUMAN) 25 G: 0.25 INJECTION, SOLUTION INTRAVENOUS at 13:31

## 2018-01-01 RX ADMIN — ALBUMIN (HUMAN) 25 G: 0.25 INJECTION, SOLUTION INTRAVENOUS at 23:58

## 2018-01-01 RX ADMIN — ALBUMIN (HUMAN) 25 G: 0.25 INJECTION, SOLUTION INTRAVENOUS at 12:00

## 2018-01-01 RX ADMIN — RIFAXIMIN 550 MG: 550 TABLET ORAL at 18:07

## 2018-01-01 RX ADMIN — SODIUM CHLORIDE 150 ML/HR: 900 INJECTION, SOLUTION INTRAVENOUS at 14:30

## 2018-01-01 RX ADMIN — ALBUMIN (HUMAN) 25 G: 0.25 INJECTION, SOLUTION INTRAVENOUS at 18:25

## 2018-01-01 RX ADMIN — ALBUMIN (HUMAN) 25 G: 0.25 INJECTION, SOLUTION INTRAVENOUS at 17:35

## 2018-01-01 RX ADMIN — ONDANSETRON 4 MG: 2 INJECTION INTRAMUSCULAR; INTRAVENOUS at 17:06

## 2018-01-01 RX ADMIN — INSULIN LISPRO 4 UNITS: 100 INJECTION, SOLUTION INTRAVENOUS; SUBCUTANEOUS at 11:41

## 2018-01-01 RX ADMIN — LACTULOSE 30 G: 20 SOLUTION ORAL at 21:05

## 2018-01-01 RX ADMIN — ALBUMIN (HUMAN) 12.5 G: 0.25 INJECTION, SOLUTION INTRAVENOUS at 07:19

## 2018-01-01 RX ADMIN — INSULIN LISPRO 2 UNITS: 100 INJECTION, SOLUTION INTRAVENOUS; SUBCUTANEOUS at 12:46

## 2018-01-01 RX ADMIN — PANTOPRAZOLE SODIUM 40 MG: 40 TABLET, DELAYED RELEASE ORAL at 17:51

## 2018-01-01 RX ADMIN — Medication 10 ML: at 16:28

## 2018-01-01 RX ADMIN — AMIODARONE HYDROCHLORIDE 200 MG: 200 TABLET ORAL at 21:38

## 2018-01-01 RX ADMIN — Medication 100 MG: at 08:46

## 2018-01-01 RX ADMIN — Medication 10 ML: at 06:22

## 2018-01-01 RX ADMIN — ALBUMIN (HUMAN) 25 G: 25 SOLUTION INTRAVENOUS at 11:10

## 2018-01-01 RX ADMIN — RIFAXIMIN 550 MG: 550 TABLET ORAL at 09:29

## 2018-01-01 RX ADMIN — AMIODARONE HYDROCHLORIDE 200 MG: 200 TABLET ORAL at 21:24

## 2018-01-01 RX ADMIN — Medication 10 ML: at 13:16

## 2018-01-01 RX ADMIN — ALBUMIN (HUMAN) 12.5 G: 0.25 INJECTION, SOLUTION INTRAVENOUS at 09:23

## 2018-01-01 RX ADMIN — INSULIN GLARGINE 10 UNITS: 100 INJECTION, SOLUTION SUBCUTANEOUS at 21:46

## 2018-01-01 RX ADMIN — MULTIPLE VITAMINS W/ MINERALS TAB 1 TABLET: TAB at 09:06

## 2018-01-01 RX ADMIN — THERA TABS 1 TABLET: TAB at 09:13

## 2018-01-01 RX ADMIN — FUROSEMIDE 40 MG: 40 TABLET ORAL at 12:45

## 2018-01-01 RX ADMIN — FENTANYL CITRATE 25 MCG: 50 INJECTION, SOLUTION INTRAMUSCULAR; INTRAVENOUS at 08:46

## 2018-01-01 RX ADMIN — POTASSIUM & SODIUM PHOSPHATES POWDER PACK 280-160-250 MG 2 PACKET: 280-160-250 PACK at 22:00

## 2018-01-01 RX ADMIN — ONDANSETRON 4 MG: 2 INJECTION INTRAMUSCULAR; INTRAVENOUS at 13:13

## 2018-01-01 RX ADMIN — MIDAZOLAM HYDROCHLORIDE 1 MG: 1 INJECTION, SOLUTION INTRAMUSCULAR; INTRAVENOUS at 17:52

## 2018-01-01 RX ADMIN — DIGOXIN 0.12 MG: 125 TABLET ORAL at 22:06

## 2018-01-01 RX ADMIN — ZOLPIDEM TARTRATE 5 MG: 5 TABLET ORAL at 22:46

## 2018-01-01 RX ADMIN — ALBUMIN (HUMAN) 87.5 G: 0.25 INJECTION, SOLUTION INTRAVENOUS at 09:38

## 2018-01-01 RX ADMIN — MIDAZOLAM HYDROCHLORIDE 2 MG: 1 INJECTION, SOLUTION INTRAMUSCULAR; INTRAVENOUS at 17:50

## 2018-01-01 RX ADMIN — OXYCODONE HYDROCHLORIDE 10 MG: 5 TABLET ORAL at 11:25

## 2018-01-01 RX ADMIN — INSULIN LISPRO 2 UNITS: 100 INJECTION, SOLUTION INTRAVENOUS; SUBCUTANEOUS at 01:14

## 2018-01-01 RX ADMIN — Medication 100 MG: at 08:49

## 2018-01-01 RX ADMIN — FUROSEMIDE 40 MG: 40 TABLET ORAL at 11:49

## 2018-01-01 RX ADMIN — GABAPENTIN 300 MG: 300 CAPSULE ORAL at 09:37

## 2018-01-01 RX ADMIN — HYDROCODONE BITARTRATE AND ACETAMINOPHEN 1 TABLET: 5; 325 TABLET ORAL at 22:32

## 2018-01-01 RX ADMIN — SODIUM CHLORIDE 100 ML/HR: 900 INJECTION, SOLUTION INTRAVENOUS at 19:41

## 2018-01-01 RX ADMIN — INSULIN LISPRO 3 UNITS: 100 INJECTION, SOLUTION INTRAVENOUS; SUBCUTANEOUS at 13:37

## 2018-01-01 RX ADMIN — HUMAN INSULIN 10 UNITS: 100 INJECTION, SOLUTION SUBCUTANEOUS at 19:52

## 2018-01-01 RX ADMIN — METOPROLOL SUCCINATE 12.5 MG: 25 TABLET, EXTENDED RELEASE ORAL at 08:49

## 2018-01-01 RX ADMIN — ALBUMIN (HUMAN) 25 G: 0.25 INJECTION, SOLUTION INTRAVENOUS at 22:11

## 2018-01-01 RX ADMIN — ZOLPIDEM TARTRATE 5 MG: 5 TABLET ORAL at 21:27

## 2018-01-01 RX ADMIN — INSULIN LISPRO 4 UNITS: 100 INJECTION, SOLUTION INTRAVENOUS; SUBCUTANEOUS at 21:37

## 2018-01-01 RX ADMIN — SODIUM CHLORIDE 8 MG/HR: 900 INJECTION, SOLUTION INTRAVENOUS at 12:16

## 2018-01-01 RX ADMIN — MIDAZOLAM HYDROCHLORIDE 1 MG: 1 INJECTION, SOLUTION INTRAMUSCULAR; INTRAVENOUS at 18:00

## 2018-01-01 RX ADMIN — SODIUM CHLORIDE 25 ML/HR: 900 INJECTION, SOLUTION INTRAVENOUS at 08:29

## 2018-01-01 RX ADMIN — Medication 10 ML: at 21:41

## 2018-01-01 RX ADMIN — MIDAZOLAM HYDROCHLORIDE 1 MG: 1 INJECTION, SOLUTION INTRAMUSCULAR; INTRAVENOUS at 00:19

## 2018-01-01 RX ADMIN — ALBUMIN (HUMAN) 25 G: 0.25 INJECTION, SOLUTION INTRAVENOUS at 14:10

## 2018-01-01 RX ADMIN — INSULIN LISPRO 2 UNITS: 100 INJECTION, SOLUTION INTRAVENOUS; SUBCUTANEOUS at 07:30

## 2018-01-01 RX ADMIN — Medication 10 ML: at 21:27

## 2018-01-01 RX ADMIN — AMIODARONE HYDROCHLORIDE 200 MG: 200 TABLET ORAL at 22:50

## 2018-01-01 RX ADMIN — Medication 10 ML: at 22:09

## 2018-01-01 RX ADMIN — CEFOTETAN DISODIUM 2 G: 2 INJECTION, POWDER, FOR SOLUTION INTRAMUSCULAR; INTRAVENOUS at 04:34

## 2018-01-01 RX ADMIN — METOCLOPRAMIDE 10 MG: 5 INJECTION, SOLUTION INTRAMUSCULAR; INTRAVENOUS at 17:52

## 2018-01-01 RX ADMIN — OCTREOTIDE ACETATE 50 MCG/HR: 500 INJECTION, SOLUTION INTRAVENOUS; SUBCUTANEOUS at 22:22

## 2018-01-01 RX ADMIN — INSULIN LISPRO 5 UNITS: 100 INJECTION, SOLUTION INTRAVENOUS; SUBCUTANEOUS at 12:04

## 2018-01-01 RX ADMIN — GABAPENTIN 100 MG: 100 CAPSULE ORAL at 08:55

## 2018-01-01 RX ADMIN — GABAPENTIN 100 MG: 100 CAPSULE ORAL at 16:49

## 2018-01-01 RX ADMIN — ZOLPIDEM TARTRATE 5 MG: 5 TABLET ORAL at 23:43

## 2018-01-01 RX ADMIN — SPIRONOLACTONE 100 MG: 100 TABLET, FILM COATED ORAL at 12:45

## 2018-01-01 RX ADMIN — SODIUM CHLORIDE 40 MG: 9 INJECTION INTRAMUSCULAR; INTRAVENOUS; SUBCUTANEOUS at 21:00

## 2018-01-01 RX ADMIN — LIDOCAINE HYDROCHLORIDE 10 ML: 10 INJECTION, SOLUTION EPIDURAL; INFILTRATION; INTRACAUDAL; PERINEURAL at 11:47

## 2018-01-01 RX ADMIN — Medication 100 MG: at 11:12

## 2018-01-01 RX ADMIN — INSULIN LISPRO 2 UNITS: 100 INJECTION, SOLUTION INTRAVENOUS; SUBCUTANEOUS at 07:12

## 2018-01-01 RX ADMIN — LORATADINE 10 MG: 10 TABLET ORAL at 19:39

## 2018-01-01 RX ADMIN — TRAMADOL HYDROCHLORIDE 50 MG: 50 TABLET, FILM COATED ORAL at 13:17

## 2018-01-01 RX ADMIN — RIFAXIMIN 550 MG: 550 TABLET ORAL at 08:36

## 2018-01-01 RX ADMIN — ALBUMIN (HUMAN) 25 G: 0.25 INJECTION, SOLUTION INTRAVENOUS at 23:35

## 2018-01-01 RX ADMIN — GABAPENTIN 300 MG: 300 CAPSULE ORAL at 17:52

## 2018-01-01 RX ADMIN — SODIUM CHLORIDE 2000 ML: 900 INJECTION, SOLUTION INTRAVENOUS at 02:57

## 2018-01-01 RX ADMIN — SODIUM CHLORIDE 40 MG: 9 INJECTION INTRAMUSCULAR; INTRAVENOUS; SUBCUTANEOUS at 21:33

## 2018-01-01 RX ADMIN — ALBUMIN (HUMAN) 25 G: 0.25 INJECTION, SOLUTION INTRAVENOUS at 12:14

## 2018-01-01 RX ADMIN — INSULIN LISPRO 3 UNITS: 100 INJECTION, SOLUTION INTRAVENOUS; SUBCUTANEOUS at 17:51

## 2018-01-01 RX ADMIN — GABAPENTIN 300 MG: 300 CAPSULE ORAL at 09:00

## 2018-01-01 RX ADMIN — Medication 10 ML: at 06:20

## 2018-01-01 RX ADMIN — DEXTROSE MONOHYDRATE 25 G: 25 INJECTION, SOLUTION INTRAVENOUS at 20:58

## 2018-01-01 RX ADMIN — SPIRONOLACTONE 100 MG: 100 TABLET, FILM COATED ORAL at 12:00

## 2018-01-01 RX ADMIN — AMIODARONE HYDROCHLORIDE 200 MG: 200 TABLET ORAL at 22:04

## 2018-01-01 RX ADMIN — ALBUMIN (HUMAN) 25 G: 0.25 INJECTION, SOLUTION INTRAVENOUS at 06:28

## 2018-01-01 RX ADMIN — INSULIN LISPRO 2 UNITS: 100 INJECTION, SOLUTION INTRAVENOUS; SUBCUTANEOUS at 06:59

## 2018-01-01 RX ADMIN — SODIUM CHLORIDE 25 ML/HR: 900 INJECTION, SOLUTION INTRAVENOUS at 07:37

## 2018-01-01 RX ADMIN — SODIUM CHLORIDE 8 MG/HR: 900 INJECTION, SOLUTION INTRAVENOUS at 05:00

## 2018-01-01 RX ADMIN — GABAPENTIN 100 MG: 100 CAPSULE ORAL at 16:58

## 2018-01-01 RX ADMIN — ALBUMIN (HUMAN) 25 G: 0.25 INJECTION, SOLUTION INTRAVENOUS at 18:01

## 2018-01-01 RX ADMIN — ALBUMIN (HUMAN) 25 G: 0.25 INJECTION, SOLUTION INTRAVENOUS at 20:27

## 2018-01-01 RX ADMIN — LACTULOSE 30 G: 20 SOLUTION ORAL at 09:00

## 2018-01-01 RX ADMIN — GABAPENTIN 100 MG: 100 CAPSULE ORAL at 12:09

## 2018-01-01 RX ADMIN — RIFAXIMIN 550 MG: 550 TABLET ORAL at 18:12

## 2018-01-01 RX ADMIN — Medication 10 ML: at 13:32

## 2018-01-01 RX ADMIN — RIFAXIMIN 550 MG: 550 TABLET ORAL at 10:53

## 2018-01-01 RX ADMIN — DIGOXIN 0.12 MG: 125 TABLET ORAL at 21:05

## 2018-01-01 RX ADMIN — LORATADINE 10 MG: 10 TABLET ORAL at 20:27

## 2018-01-01 RX ADMIN — GABAPENTIN 100 MG: 100 CAPSULE ORAL at 15:10

## 2018-01-01 RX ADMIN — SPIRONOLACTONE 100 MG: 25 TABLET, FILM COATED ORAL at 08:44

## 2018-01-01 RX ADMIN — OCTREOTIDE ACETATE 50 MCG/HR: 500 INJECTION, SOLUTION INTRAVENOUS; SUBCUTANEOUS at 04:57

## 2018-01-01 RX ADMIN — GABAPENTIN 100 MG: 100 CAPSULE ORAL at 21:07

## 2018-01-01 RX ADMIN — INSULIN LISPRO 2 UNITS: 100 INJECTION, SOLUTION INTRAVENOUS; SUBCUTANEOUS at 05:55

## 2018-01-01 RX ADMIN — LACTULOSE 30 G: 20 SOLUTION ORAL at 15:10

## 2018-01-01 RX ADMIN — Medication 5 ML: at 06:00

## 2018-01-01 RX ADMIN — ALBUMIN (HUMAN) 25 G: 0.25 INJECTION, SOLUTION INTRAVENOUS at 13:38

## 2018-01-01 RX ADMIN — RIFAXIMIN 550 MG: 550 TABLET ORAL at 12:45

## 2018-01-01 RX ADMIN — ALBUMIN (HUMAN) 25 G: 0.25 INJECTION, SOLUTION INTRAVENOUS at 08:30

## 2018-01-01 RX ADMIN — IOPAMIDOL 100 ML: 755 INJECTION, SOLUTION INTRAVENOUS at 13:00

## 2018-01-01 RX ADMIN — ALBUMIN (HUMAN) 12.5 G: 0.25 INJECTION, SOLUTION INTRAVENOUS at 12:35

## 2018-01-01 RX ADMIN — ALBUMIN (HUMAN) 25 G: 0.25 INJECTION, SOLUTION INTRAVENOUS at 10:58

## 2018-01-01 RX ADMIN — SODIUM CHLORIDE 40 MG: 9 INJECTION INTRAMUSCULAR; INTRAVENOUS; SUBCUTANEOUS at 08:04

## 2018-01-01 RX ADMIN — GABAPENTIN 100 MG: 100 CAPSULE ORAL at 21:38

## 2018-01-01 RX ADMIN — LACTULOSE 30 G: 20 SOLUTION ORAL at 12:08

## 2018-01-01 RX ADMIN — SODIUM CHLORIDE 50 ML/HR: 900 INJECTION, SOLUTION INTRAVENOUS at 07:58

## 2018-01-01 RX ADMIN — ONDANSETRON 8 MG: 2 INJECTION INTRAMUSCULAR; INTRAVENOUS at 02:57

## 2018-01-01 RX ADMIN — AMIODARONE HYDROCHLORIDE 200 MG: 200 TABLET ORAL at 21:50

## 2018-01-01 RX ADMIN — FENTANYL CITRATE 25 MCG: 50 INJECTION, SOLUTION INTRAMUSCULAR; INTRAVENOUS at 17:52

## 2018-01-01 RX ADMIN — SPIRONOLACTONE 100 MG: 100 TABLET, FILM COATED ORAL at 13:37

## 2018-01-01 RX ADMIN — RIFAXIMIN 550 MG: 550 TABLET ORAL at 18:21

## 2018-01-01 RX ADMIN — OXYCODONE HYDROCHLORIDE AND ACETAMINOPHEN 1 TABLET: 5; 325 TABLET ORAL at 17:19

## 2018-01-01 RX ADMIN — DIGOXIN 0.12 MG: 125 TABLET ORAL at 21:27

## 2018-01-01 RX ADMIN — CEFOTETAN DISODIUM 2 G: 2 INJECTION, POWDER, FOR SOLUTION INTRAMUSCULAR; INTRAVENOUS at 05:00

## 2018-01-01 RX ADMIN — Medication 10 ML: at 10:59

## 2018-01-01 RX ADMIN — DIGOXIN 0.12 MG: 125 TABLET ORAL at 22:37

## 2018-01-01 RX ADMIN — FENTANYL CITRATE 25 MCG: 50 INJECTION, SOLUTION INTRAMUSCULAR; INTRAVENOUS at 00:15

## 2018-01-01 RX ADMIN — POTASSIUM & SODIUM PHOSPHATES POWDER PACK 280-160-250 MG 2 PACKET: 280-160-250 PACK at 12:07

## 2018-01-01 RX ADMIN — RIFAXIMIN 550 MG: 550 TABLET ORAL at 08:53

## 2018-01-01 RX ADMIN — ALBUMIN (HUMAN) 12.5 G: 0.25 INJECTION, SOLUTION INTRAVENOUS at 08:27

## 2018-01-01 RX ADMIN — MULTIPLE VITAMINS W/ MINERALS TAB 1 TABLET: TAB at 09:13

## 2018-01-01 RX ADMIN — FENTANYL CITRATE 25 MCG: 50 INJECTION, SOLUTION INTRAMUSCULAR; INTRAVENOUS at 08:44

## 2018-01-01 RX ADMIN — ALBUMIN (HUMAN) 25 G: 0.25 INJECTION, SOLUTION INTRAVENOUS at 10:05

## 2018-01-01 RX ADMIN — CEFTRIAXONE 1 G: 1 INJECTION, POWDER, FOR SOLUTION INTRAMUSCULAR; INTRAVENOUS at 00:26

## 2018-01-01 RX ADMIN — OXYCODONE HYDROCHLORIDE AND ACETAMINOPHEN 1 TABLET: 5; 325 TABLET ORAL at 11:48

## 2018-01-01 RX ADMIN — ALBUTEROL SULFATE 5 MG: 2.5 SOLUTION RESPIRATORY (INHALATION) at 19:29

## 2018-01-01 RX ADMIN — SPIRONOLACTONE 100 MG: 100 TABLET, FILM COATED ORAL at 11:49

## 2018-01-01 RX ADMIN — METOPROLOL SUCCINATE 12.5 MG: 25 TABLET, EXTENDED RELEASE ORAL at 09:36

## 2018-01-01 RX ADMIN — FUROSEMIDE 80 MG: 40 TABLET ORAL at 08:17

## 2018-01-01 RX ADMIN — ALBUMIN (HUMAN) 25 G: 0.25 INJECTION, SOLUTION INTRAVENOUS at 07:04

## 2018-01-01 RX ADMIN — GABAPENTIN 300 MG: 300 CAPSULE ORAL at 09:13

## 2018-01-01 RX ADMIN — ALBUMIN (HUMAN) 12.5 G: 0.25 INJECTION, SOLUTION INTRAVENOUS at 02:27

## 2018-01-01 RX ADMIN — THIAMINE HYDROCHLORIDE 100 MG: 100 INJECTION, SOLUTION INTRAMUSCULAR; INTRAVENOUS at 08:32

## 2018-01-01 RX ADMIN — TEMAZEPAM 15 MG: 15 CAPSULE ORAL at 22:56

## 2018-01-01 RX ADMIN — ALBUMIN (HUMAN) 25 G: 0.25 INJECTION, SOLUTION INTRAVENOUS at 00:53

## 2018-01-01 RX ADMIN — THERA TABS 1 TABLET: TAB at 10:03

## 2018-01-01 RX ADMIN — GABAPENTIN 100 MG: 100 CAPSULE ORAL at 11:12

## 2018-01-01 RX ADMIN — SODIUM CHLORIDE 40 MG: 9 INJECTION INTRAMUSCULAR; INTRAVENOUS; SUBCUTANEOUS at 09:13

## 2018-01-01 RX ADMIN — HYDROCODONE BITARTRATE AND ACETAMINOPHEN 1 TABLET: 5; 325 TABLET ORAL at 21:48

## 2018-01-01 RX ADMIN — GABAPENTIN 100 MG: 100 CAPSULE ORAL at 17:00

## 2018-01-01 RX ADMIN — LACTULOSE 30 G: 20 SOLUTION ORAL at 08:47

## 2018-01-01 RX ADMIN — PANTOPRAZOLE SODIUM 40 MG: 40 TABLET, DELAYED RELEASE ORAL at 08:44

## 2018-01-01 RX ADMIN — INSULIN LISPRO 3 UNITS: 100 INJECTION, SOLUTION INTRAVENOUS; SUBCUTANEOUS at 18:34

## 2018-01-01 RX ADMIN — Medication 100 MG: at 09:22

## 2018-01-01 RX ADMIN — INSULIN LISPRO 3 UNITS: 100 INJECTION, SOLUTION INTRAVENOUS; SUBCUTANEOUS at 16:54

## 2018-01-01 RX ADMIN — FUROSEMIDE 40 MG: 40 TABLET ORAL at 08:20

## 2018-01-01 RX ADMIN — LACTULOSE 20 G: 20 SOLUTION ORAL at 09:29

## 2018-01-01 RX ADMIN — SODIUM BICARBONATE: 0.2 INJECTION, SOLUTION INTRAVENOUS at 17:00

## 2018-01-01 RX ADMIN — LIDOCAINE HYDROCHLORIDE 5 ML: 10 INJECTION, SOLUTION EPIDURAL; INFILTRATION; INTRACAUDAL; PERINEURAL at 11:38

## 2018-01-01 RX ADMIN — CETIRIZINE HYDROCHLORIDE 10 MG: 10 TABLET, FILM COATED ORAL at 11:49

## 2018-01-01 RX ADMIN — ALBUMIN (HUMAN) 12.5 G: 0.25 INJECTION, SOLUTION INTRAVENOUS at 14:56

## 2018-01-01 RX ADMIN — Medication 100 MG: at 08:30

## 2018-01-01 RX ADMIN — AMIODARONE HYDROCHLORIDE 200 MG: 200 TABLET ORAL at 09:09

## 2018-01-01 RX ADMIN — ALBUMIN (HUMAN) 25 G: 0.25 INJECTION, SOLUTION INTRAVENOUS at 16:49

## 2018-01-01 RX ADMIN — Medication 10 ML: at 05:55

## 2018-01-01 RX ADMIN — OCTREOTIDE ACETATE 50 MCG/HR: 500 INJECTION, SOLUTION INTRAVENOUS; SUBCUTANEOUS at 21:50

## 2018-01-01 RX ADMIN — ALBUMIN (HUMAN) 12.5 G: 0.25 INJECTION, SOLUTION INTRAVENOUS at 20:00

## 2018-01-01 RX ADMIN — Medication 10 ML: at 06:45

## 2018-01-01 RX ADMIN — PANTOPRAZOLE SODIUM 40 MG: 40 TABLET, DELAYED RELEASE ORAL at 09:37

## 2018-01-01 RX ADMIN — GABAPENTIN 300 MG: 300 CAPSULE ORAL at 10:03

## 2018-01-01 RX ADMIN — MIDAZOLAM HYDROCHLORIDE 2 MG: 1 INJECTION, SOLUTION INTRAMUSCULAR; INTRAVENOUS at 08:40

## 2018-01-01 RX ADMIN — ALBUMIN (HUMAN) 25 G: 0.25 INJECTION, SOLUTION INTRAVENOUS at 11:47

## 2018-01-01 RX ADMIN — Medication 10 ML: at 06:28

## 2018-01-01 RX ADMIN — GABAPENTIN 100 MG: 100 CAPSULE ORAL at 21:45

## 2018-01-01 RX ADMIN — Medication 10 ML: at 22:04

## 2018-01-01 RX ADMIN — HYDROCODONE BITARTRATE AND ACETAMINOPHEN 1 TABLET: 5; 325 TABLET ORAL at 17:00

## 2018-01-01 RX ADMIN — Medication 100 MG: at 08:55

## 2018-01-01 RX ADMIN — MORPHINE SULFATE 1 MG: 2 INJECTION, SOLUTION INTRAMUSCULAR; INTRAVENOUS at 08:03

## 2018-01-01 RX ADMIN — Medication 10 ML: at 14:56

## 2018-01-01 RX ADMIN — RIFAXIMIN 550 MG: 550 TABLET ORAL at 18:00

## 2018-01-01 RX ADMIN — Medication 100 MG: at 09:28

## 2018-01-01 RX ADMIN — PANTOPRAZOLE SODIUM 40 MG: 40 TABLET, DELAYED RELEASE ORAL at 16:14

## 2018-01-01 RX ADMIN — SODIUM CHLORIDE 8 MG/HR: 900 INJECTION, SOLUTION INTRAVENOUS at 23:20

## 2018-01-01 RX ADMIN — LACTULOSE 20 G: 20 SOLUTION ORAL at 08:57

## 2018-01-01 RX ADMIN — DIGOXIN 0.12 MG: 125 TABLET ORAL at 22:51

## 2018-01-01 RX ADMIN — SODIUM CHLORIDE 40 MG: 9 INJECTION INTRAMUSCULAR; INTRAVENOUS; SUBCUTANEOUS at 08:34

## 2018-01-01 RX ADMIN — LACTULOSE 30 G: 20 SOLUTION ORAL at 08:48

## 2018-01-01 RX ADMIN — ALBUMIN (HUMAN) 25 G: 0.25 INJECTION, SOLUTION INTRAVENOUS at 23:29

## 2018-01-01 RX ADMIN — MIDAZOLAM HYDROCHLORIDE 1 MG: 1 INJECTION, SOLUTION INTRAMUSCULAR; INTRAVENOUS at 08:46

## 2018-01-01 RX ADMIN — GABAPENTIN 100 MG: 100 CAPSULE ORAL at 09:29

## 2018-01-01 RX ADMIN — ONDANSETRON 4 MG: 2 INJECTION INTRAMUSCULAR; INTRAVENOUS at 18:18

## 2018-01-01 RX ADMIN — Medication 10 ML: at 05:26

## 2018-01-01 RX ADMIN — Medication 10 ML: at 15:17

## 2018-01-01 RX ADMIN — CEFTRIAXONE 1 G: 1 INJECTION, POWDER, FOR SOLUTION INTRAMUSCULAR; INTRAVENOUS at 21:58

## 2018-01-01 RX ADMIN — PANTOPRAZOLE SODIUM 40 MG: 40 TABLET, DELAYED RELEASE ORAL at 08:47

## 2018-01-01 RX ADMIN — Medication 10 ML: at 05:27

## 2018-01-01 RX ADMIN — SPIRONOLACTONE 100 MG: 100 TABLET, FILM COATED ORAL at 09:09

## 2018-01-01 RX ADMIN — FUROSEMIDE 40 MG: 40 TABLET ORAL at 09:22

## 2018-01-01 RX ADMIN — Medication 10 ML: at 21:33

## 2018-01-01 RX ADMIN — PANTOPRAZOLE SODIUM 40 MG: 40 TABLET, DELAYED RELEASE ORAL at 08:48

## 2018-01-01 RX ADMIN — GABAPENTIN 300 MG: 300 CAPSULE ORAL at 17:45

## 2018-01-01 RX ADMIN — OXYCODONE HYDROCHLORIDE 5 MG: 5 TABLET ORAL at 18:16

## 2018-01-01 RX ADMIN — SODIUM CHLORIDE 100 ML: 900 INJECTION, SOLUTION INTRAVENOUS at 23:27

## 2018-01-01 RX ADMIN — INSULIN LISPRO 3 UNITS: 100 INJECTION, SOLUTION INTRAVENOUS; SUBCUTANEOUS at 11:30

## 2018-01-01 RX ADMIN — INSULIN LISPRO 2 UNITS: 100 INJECTION, SOLUTION INTRAVENOUS; SUBCUTANEOUS at 07:51

## 2018-01-01 RX ADMIN — PANTOPRAZOLE SODIUM 40 MG: 40 TABLET, DELAYED RELEASE ORAL at 09:28

## 2018-01-01 RX ADMIN — GABAPENTIN 100 MG: 100 CAPSULE ORAL at 08:58

## 2018-01-01 RX ADMIN — AMIODARONE HYDROCHLORIDE 200 MG: 200 TABLET ORAL at 22:00

## 2018-01-01 RX ADMIN — SODIUM BICARBONATE 1 ML: 0.2 INJECTION, SOLUTION INTRAVENOUS at 09:35

## 2018-01-01 RX ADMIN — MIDAZOLAM HYDROCHLORIDE 1 MG: 1 INJECTION, SOLUTION INTRAMUSCULAR; INTRAVENOUS at 08:13

## 2018-01-01 RX ADMIN — FENTANYL CITRATE 25 MCG: 50 INJECTION, SOLUTION INTRAMUSCULAR; INTRAVENOUS at 17:50

## 2018-01-03 PROBLEM — F10.10 ALCOHOL ABUSE: Status: ACTIVE | Noted: 2018-01-01

## 2018-01-03 PROBLEM — I85.10 SECONDARY ESOPHAGEAL VARICES WITHOUT BLEEDING (HCC): Status: ACTIVE | Noted: 2018-01-01

## 2018-01-03 PROBLEM — K74.60 CIRRHOSIS OF LIVER WITHOUT ASCITES (HCC): Status: RESOLVED | Noted: 2017-05-02 | Resolved: 2018-01-01

## 2018-01-03 PROBLEM — K70.31 ALCOHOLIC CIRRHOSIS OF LIVER WITH ASCITES (HCC): Status: ACTIVE | Noted: 2018-01-01

## 2018-01-03 NOTE — PROGRESS NOTES
[address], Opal Myles, [place], [place]       Michel Jett, KULDEEP Bates, [facility]-BC   Saira Huang, MARTÍN Duckworth NP        at [address], [address] [facility], [address]     [phone]     FAX: [phone]    at [facility], [address] [place] [address], [address]     [phone]     FAX: [phone]     Patient Care Team:  Humphrey Jon MD as PCP - General (Family Practice)  Humphrey Jon MD (Family Practice)  Earnest Harper MD (Gastroenterology)    Problem List  Date Reviewed: 11/24/2017          Codes Class Noted    Alcoholic cirrhosis of liver with ascites [facility]) ICD-10-CM: K70.31  ICD-9-CM: 571.2  1/3/2018        Secondary esophageal varices without bleeding ([facility] ICD-10-CM: I85.10  ICD-9-CM: 456.21  1/3/2018        Alcohol abuse ICD-10-CM: F10.10  ICD-9-CM: 305.00  1/3/2018        Chronic systolic heart failure ([facility] (Chronic) ICD-10-CM: I50.22  ICD-9-CM: 428.22  11/21/2017        Neuropathy ICD-10-CM: G62.9  ICD-9-CM: 355.9  3/30/2017        S/P BKA (below knee amputation) ([facility].) ICD-10-CM: J45.390  ICD-9-CM: V49.75  3/30/2017        Hypertension ICD-10-CM: I10  ICD-9-CM: 401.9  3/30/2017        Cardiac defibrillator in place ICD-10-CM: Z95.810  ICD-9-CM: V45.02  3/30/2017        Type II diabetes mellitus ([facility] ICD-10-CM: E11.9  ICD-9-CM: 250.00  2/1/2015            Claudette Favorite returns to the [place] [address] for management of cirrhosis most likely secondary to alcohol. He was recently hospitalized for syncope and hyponatremia. The active problem list, all pertinent past medical history, medications, and laboratory findings related to the liver disorder were reviewed with the patient. The patient is a 59 y.o.  male who was first noted to have abnormalities in liver enzymes in 2012.   Serologic evaluation for markers of chronic liver disease were performed at his last office visit and rule out viral hepatitis and A1AT disease, autoimmune markers, and HC. His has a single mutation for H63D. Ultrasound of the liver was recently performed in 5/2017. The results of the imaging suggested cirrhosis and question a new 1.7 cm lesion. Follow-up imaging with CT scan did not demonstrate liver mass/lesion; the finding on US was believed to be due to overlying mesenteric fat. A liver biopsy was performed in 2012. This according to the patient did not demonstrate cirrhosis. The exact findings are not known or available for review. The patient has had a fibroscan analysis with a score consistent with cirrhosis, EkPa of 75. The patient has had no history of UGI bleeding and has had recent screening EGD. This demonstrated portal hypertensive gastropathy and the presence of varices. Four bands were placed at last EGD. The most recent laboratory studies indicate the liver transaminases are elevated, the alkaline phosphatase is elevated, tests of hepatic synthetic and metabolic function are depressed, total bilirubin is elevated, INR is normal, albumin is  normal and the platelet count is depressed. The patient has no symptoms which could be attributed to the liver disorder. The patient completes all daily activities without any functional limitations. The patient has not experienced fatigue, pain in the right side over the liver, problems concentrating, swelling of the abdomen, swelling of the lower extremities, hematemesis or hematochezia. ALLERGIES  No Known Allergies    MEDICATIONS  Current Outpatient Prescriptions   Medication Sig    glimepiride (AMARYL) 1 mg tablet     furosemide (LASIX) 20 mg tablet Take 1 Tab by mouth daily.  sacubitril-valsartan (ENTRESTO) 24 mg/26 mg tablet Take 1 Tab by mouth two (2) times a day.  thiamine (B-1) 100 mg tablet Take 1 Tab by mouth daily.     folic acid (FOLVITE) 1 mg tablet Take 1 Tab by mouth daily.  metoprolol succinate (TOPROL XL) 25 mg XL tablet Take 0.5 Tabs by mouth daily.  amiodarone (CORDARONE) 200 mg tablet Take 200 mg by mouth nightly.  linagliptin (TRADJENTA) 5 mg tablet Take 5 mg by mouth every morning.  multivitamins-minerals-lutein (CENTRUM SILVER) Tab Take 1 Tab by mouth daily.  digoxin (LANOXIN) 0.125 mg tablet Take 0.125 mg by mouth nightly. No current facility-administered medications for this visit. SYSTEM REVIEW NOT RELATED TO LIVER DISEASE OR REVIEWED ABOVE:  Constitution systems: Negative for fever, chills, weight gain, weight loss. Eyes: Negative for visual changes. ENT: Negative for sore throat, painful swallowing. Respiratory: Negative for cough, hemoptysis, SOB. Cardiology: Negative for chest pain, palpitations. GI:  Negative for constipation or diarrhea. : Negative for urinary frequency, dysuria, hematuria, nocturia. Skin: Negative for rash. Hematology: Negative for easy bruising, blood clots. Musculo-skeletal: Negative for back pain, muscle pain, weakness. Neurologic: Negative for headaches, dizziness, vertigo, memory problems not related to HE. Psychology: Negative for anxiety, depression. FAMILY HISTORY:  The father  of unknown cause. The mother  of heart disease. There is no family history of liver disease. SOCIAL HISTORY:  The patient is . The patient has 3 children. The patient has never used tobacco products. The patient consumes 6+ alcoholic beverages per day. This has been his long-standing pattern. He states he has tried to make a recent effort to cut back, is vague on actual intake. The patient used to work . The patient retired in .       PHYSICAL EXAMINATION:  /58 (BP 1 Location: Left arm, BP Patient Position: Sitting)  Pulse 60  Temp 97.8 °F (36.6 °C) (Tympanic)   Wt 225 lb 12.8 oz (102.4 kg)  SpO2 98%  BMI 32.4 kg/m2  General: No acute distress. Eyes: Sclera anicteric. ENT: No oral lesions. Nodes: No adenopathy. Skin: Numerous spider angiomata. No jaundice. No palmar erythema. Respiratory: Lungs clear to auscultation. Cardiovascular: Regular heart rate. No murmurs. No JVD. Abdomen: Soft non-tender. Liver firm and easily palpable 2-3 cm BCM. Spleen not palpable. Possible ascites. Extremities: No edema. No muscle wasting. No gross arthritic changes. Prosthetic LLE. Neurologic: Alert and oriented. Cranial nerves grossly intact. No asterixis. LABORATORY STUDIES:  [facility] [address] & Units 5/2/2017 3/30/2017 2/20/2015   WBC 3.4 - 10.8 x10E3/uL 3.4 5.0 4.1   ANC 1.4 - 7.0 x10E3/uL  4.1 2.9   HGB 12.6 - 17.7 g/dL 13.9 13.9 7.7 (L)    - 379 x10E3/uL 65 (LL) 76 (LL) 187   INR 0.8 - 1.2 1.2 1.2    AST 0 - 40 IU/L 72 (H) 80 (H)    ALT 0 - 44 IU/L 35 48 (H)    Alk Phos 39 - 117 IU/L 154 (H) 172 (H)    Bili, Total 0.0 - 1.2 mg/dL 2.4 (H) 3.4 (H)    Bili, Direct 0.00 - 0.40 mg/dL 1.35 (H) 1.78 (H)    Albumin 3.5 - 5.5 g/dL 4.2 4.1    BUN 6 - 24 mg/dL 10 14    Creat 0.76 - 1.27 mg/dL 0.79 0.84    Na 134 - 144 mmol/L 131 (L) 136    K 3.5 - 5.2 mmol/L 5.3 (H) 4.5    Cl 96 - 106 mmol/L 93 (L) 97    CO2 18 - 29 mmol/L 19 24    Glucose 65 - 99 mg/dL 132 (H) 138 (H)    Magnesium 1.6 - 2.4 mg/dL      Cancer Screening Latest Ref Rng & Units  3/30/2017    AFP, Serum 0.0 - 8.0 ng/mL  11.1 (H)    AFP-L3% 0.0 - 9.9 %  8.2      SEROLOGIES:  Serologies Latest Ref Rng & Units 3/30/2017   Hep A Ab, Total Negative Positive (A)   Hep B Surface Ag Negative Negative   Hep B Core Ab, Total Negative Negative   Hep B Surface AB QL  Non Reactive   Hep C Ab 0.0 - 0.9 s/co ratio 0.1   Ferritin 30 - 400 ng/mL 399   Iron % Saturation 15 - 55 % 76 (HH)   Alpha-1 antitrypsin level 90 - 200 mg/dL 181     LIVER HISTOLOGY:  5/2017. [facility]. EkPa was 75.  Suggested fibrosis level is F4. ENDOSCOPIC PROCEDURES:  6/2017. EGD performed by Dr Shawn Easley. Few medium-large esophageal varices. Banding performed x 4. No gastric varices. Moderate portal gastropathy. Repeat in 3 months. RADIOLOGY:  10/2012. Ultrasound of liver. Echogenic nodular consistent with cirrhosis. No liver mass lesions. No dilated bile ducts. No ascites. 5/2017. Ultrasound of liver. Echogenic consistent with cirrhosis. No dilated bile ducts. No ascites. There is question of a 1.7 x 1.1 cm echogenic lesion in the left lobe and MR is recommended for further assessment    5/2017. CT scan of the abdomen with and without contrast.  Cirrhotic liver with marked left lobe atrophy and lobulation with an area of intervening mesenteric fat corresponding to hyperechoic area seen on ultrasound. No CT identification of suspicious hepatic lesion. 11/2017. Ultrasound of liver. Drainage of 2000 cc ascites. 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 Liver function is depressed, platelet count is depressed. Fibroscan in the office supports clinical/lab evidence of cirrhosis. Virologic and serologic testing for causes of chronic liver disease were all negative. I have explained these results in detail with the patient and that his underlying diseae is most likely due to alcohol. Will perform laboratory testing to monitor liver function and degree of liver injury. This included BMP, hepatic panel, CBC with platelet count and INR. I have explained to the patient the significance of cirrhosis, its natural history, and our recommendations for initiating screening for complications. I have advised complete abstinence from alcohol as this is likely underlying or contributing etiology of liver disease. Patient's most recent calculated MELD score is 25. He was hospitalized at the time.  Today's labs will likely be a better representation of his liver function. He is not presently a candidate for transplant due to recent alcohol use. Ascites appears to have recurred. Sending him for ultrasound guided paracentesis with appropriate cytology ordered. Restart him on Lasix at half step. [address] screening. Will perform imaging of the liver with ultrasound on a routine basis, the next screen is due now but will need to wait until after paracentesis. He has short interval follow up so will order next visit. Recent EGD with banding of esophageal varices. I have explained to the patient this will be repeated until varices obliterated. The follow up EGD does not appear to have been performed. I have ordered this today. The patient was counseled regarding maintaining alcohol abstinence. He states he has not drank like he used to in a \"while\" but would not quantify or give a date. His last any alcohol use was 12/24/2017. He verbalizes understanding. Vaccination for viral hepatitis B is recommended since the patient has no serologic evidence of previous exposure or vaccination with immunity. Vaccination for viral hepatitis A is not needed. The patient has serologic evidence of prior exposure or vaccination with immunity. All of the above issues were discussed with the patient. All questions were answered. The patient expressed a clear understanding of the above. 1901 [facility] 87 in 4 weeks.     Isatu Adams, FIDENCIO-BC  Liver [place] of [facility] [address] [facility], [address] [place]  Esther Denise  22.  [phone]

## 2018-01-03 NOTE — MR AVS SNAPSHOT
Visit Information Date & Time Provider Department Dept. Phone Encounter #  
 1/3/2018 10:45 AM Samir Tima ANGEL Sharon Diehl, [address] of [facility] 219 [id number] Upcoming Health Maintenance Date Due  
 LIPID PANEL Q1 1960 MICROALBUMIN Q1 1/17/1968 EYE EXAM RETINAL OR DILATED Q1 1/17/1968 DTaP/Tdap/Td series (1 - Tdap) 1/17/1979 FOBT Q 1 YEAR AGE 50-75 1/17/2008 FOOT EXAM Q1 1/31/2016 Influenza Age 5 to Adult 8/1/2017 ZOSTER VACCINE AGE 60> 11/17/2017 HEMOGLOBIN A1C Q6M 6/7/2018 Allergies as of 1/3/2018  Review Complete On: 1/3/2018 By: Deshaun Erickson No Known Allergies Current Immunizations  Reviewed on 11/24/2017 Name Date Influenza Vaccine 11/12/2014 Influenza Vaccine PF 11/5/2013  6:09 PM  
 Pneumococcal Vaccine (Unspecified Type) 2/1/2012 Not reviewed this visit Vitals BP Pulse Temp Weight(growth percentile) SpO2 BMI  
 122/58 (BP 1 Location: Left arm, BP Patient Position: Sitting) 60 97.8 °F (36.6 °C) (Tympanic) 225 lb 12.8 oz (102.4 kg) 98% 32.4 kg/m2 Smoking Status Former Smoker BMI and BSA Data Body Mass Index Body Surface Area  
 32.4 kg/m 2 2.25 m 2 Preferred Pharmacy Pharmacy Name Phone [facility] [address], [address] [place] [address] [phone] Your Updated Medication List  
  
   
This list is accurate as of: 1/3/18 11:03 AM.  Always use your most recent med list.  
  
  
  
  
 amiodarone 200 mg tablet Commonly known as:  CORDARONE Take 200 mg by mouth nightly. CENTRUM SILVER Tab tablet Generic drug:  multivitamins-minerals-lutein Take 1 Tab by mouth daily. digoxin 0.125 mg tablet Commonly known as:  LANOXIN Take 0.125 mg by mouth nightly. ENTRESTO 24-26 mg tablet Generic drug:  sacubitril-valsartan Take 1 Tab by mouth two (2) times a day. folic acid 1 mg tablet Commonly known as:  [employer]  
 Take 1 Tab by mouth daily. furosemide 40 mg tablet Commonly known as:  LASIX  
  
 glimepiride 1 mg tablet Commonly known as:  AMARYL  
  
 metoprolol succinate 25 mg XL tablet Commonly known as:  TOPROL XL Take 0.5 Tabs by mouth daily. thiamine 100 mg tablet Commonly known as:  B-1 Take 1 Tab by mouth daily. TRADJENTA 5 mg tablet Generic drug:  linagliptin Take 5 mg by mouth every morning. Introducing [facility]! [employer] introduces [employer] patient portal. Now you can access parts of your medical record, email your doctor's office, and request medication refills online. 1. In your internet browser, go to [email or web address] 2. Click on the First Time User? Click Here link in the Sign In box. You will see the New Member Sign Up page. 3. Enter your [employer] Access Code exactly as it appears below. You will not need to use this code after youve completed the sign-up process. If you do not sign up before the expiration date, you must request a new code. · [employer] Access Code: [email or web address] Expires: 2/20/2018  2:42 PM 
 
4. Enter the last four digits of your Social Security Number (xxxx) and Date of Birth (mm/dd/yyyy) as indicated and click Submit. You will be taken to the next sign-up page. 5. Create a [employer] ID. This will be your [employer] login ID and cannot be changed, so think of one that is secure and easy to remember. 6. Create a [employer] password. You can change your password at any time. 7. Enter your Password Reset Question and Answer. This can be used at a later time if you forget your password. 8. Enter your e-mail address. You will receive e-mail notification when new information is available in [address] 9. Click Sign Up. You can now view and download portions of your medical record. 10. Click the Download Summary menu link to download a portable copy of your medical information. If you have questions, please visit the Frequently Asked Questions section of the [employer] website. Remember, [employer] is NOT to be used for urgent needs. For medical emergencies, dial 911. Now available from your iPhone and Android! Please provide this summary of care documentation to your next provider. Your primary care clinician is listed as Shakira Elizondo. If you have any questions after today's visit, please call [phone].

## 2018-01-03 NOTE — PROGRESS NOTES
1. Have you been to the ER, urgent care clinic since your last visit? Hospitalized since your last visit? Yes Where: [facility] for swelling 2017     2. Have you seen or consulted any other health care providers outside of the [address] [place] [address] since your last visit? Include any pap smears or colon screening.  No   Chief Complaint   Patient presents with    Follow-up     Follow Up      Visit Vitals    /58 (BP 1 Location: Left arm, BP Patient Position: Sitting)    Pulse 60    Temp 97.8 °F (36.6 °C) (Tympanic)    Wt 225 lb 12.8 oz (102.4 kg)    SpO2 98%    BMI 32.4 kg/m2     PHQ over the last two weeks 1/3/2018   Little interest or pleasure in doing things Not at all   Feeling down, depressed or hopeless Not at all   Total Score PHQ 2 0     Learning Assessment 1/3/2018   PRIMARY LEARNER Patient   PRIMARY LANGUAGE ENGLISH   LEARNER PREFERENCE PRIMARY LISTENING   ANSWERED BY patient    RELATIONSHIP SELF

## 2018-01-08 NOTE — PROGRESS NOTES
Left message for pt to call back and needs to go to hospital for abdominal infection at 1:00. Called hospitalist about admission - referred to ER. Called pt back again at 3:10 - again got VM advised pt to report to ER for admission and antibiotics. Left my direct line again.

## 2018-01-08 NOTE — PROGRESS NOTES
Called pt and left 2 messages to go to ER for infection. Will see liver function, etc. After acute episode resolves. SBP.

## 2018-01-08 NOTE — DISCHARGE INSTRUCTIONS
[address] [facility]  Department of Interventional Radiology  [facility]    PARACENTESIS DISCHARGE INSTRUCTIONS    General Information:  During this procedure, the doctor will insert a needle into the abdomen to drain fluid. After the procedure, you will be able to take a deep breath much easier. The site of the puncture may ooze the first day. This will decrease and eventually stop. Paracentesis (draining fluid from the abdomen) sometimes makes patients hypotensive (low blood pressure). Your doctor may order for you to receive fluids or albumin (a volume booster) during the procedure through an IV site. Home Care Instructions:  Keep the puncture site clean and dry. No tub baths or swimming until puncture site heals. Showering is acceptable. Resume your normal diet, and resume your normal activity slowly and as you tolerate. If you are short of breath, rest. If shortness of breath does not ease, please call your ordering doctor. Fluid can re-accumulate in the chest and/or in the abdomen. If this should occur, your doctor needs to know as you may need to have the procedure done again. Call If:     You should call your Physician and/or the Radiology Nurse if you notice any signs of infection, like pus draining, or if it is swollen or reddened. Also call if you have a fever, or if you are bleeding from the puncture site more than a small amount on the dressing. Call if the puncture site keeps draining fluid. Some oozing is to be expected, but should slow and then stop. Call if you feel like you have pressure in your abdomen. SEEK IMMEDIATE CARE OR CALL 911 IF YOU SUDDENLY HAVE TROUBLE BREATHING, OR IF YOUR LIPS TURN BLUE, OR IF YOU NOTICE BLOOD IN YOUR SPUTUM. Follow-Up Instructions: Please see your ordering doctor as he/she has requested.      To Reach Us:    Side effects of sedation medications and other medications used today have been reviewed. Notify us of nausea, itching, hives, dizziness, or anything else out of the ordinary. Should you experience any of these significant changes, please call [phone] between the hours of 7:30 am and 10 pm or [phone] after hours.  After hours, ask the  to page the [address] Technologist, and describe the problem to the technologist.     Date: 1/8/2018  Discharging Nurse: Tari Blanco RN

## 2018-01-08 NOTE — PROGRESS NOTES
Dr Justine Manley performed ultrasound-guided paracentesis, draining 8.5 liters of clear marielena ascites fluid. Ordered labs were sent. Albumin 25 grams IV given per protocol. Discharge instructions reviewed with patient and copy given before discharge home.

## 2018-01-09 NOTE — ED TRIAGE NOTES
Pt is seen by Dr. Justo Lundberg for cirrhosis, had paracentesis done yesterday & was told to come to ED to have blood levels checked. Pt believes that he needs abx but is \"unsure\".

## 2018-01-10 NOTE — ED NOTES
MD reviewed discharge instructions and options with patient and patient verbalized understanding. RN reviewed discharge instructions using teachback method. Pt ambulated to exit without difficulty and in no signs of acute distress. No complaints or needs expressed at this time. Patient was counseled on medications prescribed at discharge. VSS at time of discharge. Pt to call Missael Hall in the morning for follow up.

## 2018-01-10 NOTE — ED NOTES
Gave bedside report regarding, SBAR, MAR, and plan of care to Earline hartman RN. Transfered care of patient to RN.

## 2018-01-10 NOTE — ED NOTES
Pt brought back to Triage for reassessment due to length of wait. Pt has no complaints. States he was just sent to lab work (WBC and PLT). Pt is slurring his words, seems unsteady on his feet. Smells strongly of ETOH. Admits to drinking today.

## 2018-01-10 NOTE — ED PROVIDER NOTES
HPI Comments: 59 y.o. male with past medical history significant for CHF, Defibrillator,HTN, Sepsis, Afib, DM, Cirrhosis, Left BKA who presents from home for evaluation of abnormal lab results. Pt is 1 day s/p paracentesis for alcoholic cirrohsis of the liver. He states that he received a call from the office of his hepatologist regarding abnormal lab results from fluid drawn. Pt states \"the amount of white cells was not right\". Pt states that he was referred to ED for further admission for antibiotics. Pt denies any pain. No fever, chills, nausea, vomiting, diarrhea, constipation, cough, congestion, chest pain, SOB, urinary symptoms, dysuria, hematuria, difficulty urinating, back pain. There are no other acute medical concerns at this time. Chart review: Paracentesis - \"hazy, yellow\" > 100 RBC, 2557 WBC count, Neutrophils 60. Social hx: Pt admits to the use of EtOH. PCP: Kain White MD  Hepatologist: Jacky Peng MD    Note written by Radhika Olivera, as dictated by Christine Dooley MD 11:51 PM    The history is provided by the patient. No  was used.         Past Medical History:   Diagnosis Date    Arthritis     Atrial fibrillation ([address] 2014    CHF (congestive heart failure) ([address]     Diabetes ([address]     Diabetic ulcer of left foot ([address] 2/2015 2/2015 Lt BKA    History of blood transfusion 2015    During Lt BKA; pt denies any adverse reaction    Hypertension     Liver disease     CIRRHOSIS    Sepsis ([address] 02/2015    From diabetic Left foot wound;  had a BKA ;  as of 11/21/16 pt states back to his baseline        Past Surgical History:   Procedure Laterality Date    HX AMPUTATION Left 2/10/2015    BKA    HX COLONOSCOPY      HX IMPLANTABLE CARDIOVERTER DEFIBRILLATOR  08/04/2015    [employer] cardio defibrillator; Dr Cullen Menjivar; as of 11/21/16 pt denies defibrillator going off         Family History:   Problem Relation Age of Onset    Heart Disease Brother  Heart Failure Brother     Heart Failure Brother        Social History     Social History    Marital status:      Spouse name: N/A    Number of children: N/A    Years of education: N/A     Occupational History    Not on file. Social History Main Topics    Smoking status: Former Smoker     Packs/day: 1.00     Years: 5.00     Quit date: 1/1/2013    Smokeless tobacco: Never Used    Alcohol use Yes      Comment: occ    Drug use: No    Sexual activity: Not on file     Other Topics Concern    Not on file     Social History Narrative         ALLERGIES: Review of patient's allergies indicates no known allergies. Review of Systems   Constitutional: Negative for fever. Respiratory: Negative for cough, shortness of breath and wheezing. Cardiovascular: Negative for chest pain and leg swelling. Gastrointestinal: Negative for abdominal pain, diarrhea, nausea and vomiting. Genitourinary: Negative for dysuria. Musculoskeletal: Negative. Negative for back pain and neck stiffness. Skin: Negative for rash. Neurological: Negative. Negative for syncope and headaches. Psychiatric/Behavioral: Negative for confusion. Vitals:    01/09/18 1814 01/09/18 2229 01/09/18 2249   BP: 127/72 110/58 123/64   Pulse: 60 60 60   Resp: 18 20 16   Temp: 98 °F (36.7 °C) 97.6 °F (36.4 °C) 98 °F (36.7 °C)   SpO2: 100% 96% 99%   Weight: 99.9 kg (220 lb 3.2 oz)     Height: 5' 10\" (1.778 m)              Physical Exam   Nursing note and vitals reviewed. CONSTITUTIONAL: Well-appearing; well-nourished; in no apparent distress  HEAD: Normocephalic; atraumatic  EYES: PERRL; EOM intact; conjunctiva and sclera are clear bilaterally. ENT: No rhinorrhea; normal pharynx with no tonsillar hypertrophy; mucous membranes pink/moist, no erythema, no exudate. NECK: Supple; non-tender; no cervical lymphadenopathy  CARD: Normal S1, S2; no murmurs, rubs, or gallops. Regular rate and rhythm.   RESP: Normal respiratory effort; breath sounds clear and equal bilaterally; no wheezes, rhonchi, or rales. ABD: Normal bowel sounds; non-distended; non-tender; no palpable organomegaly, no masses, no bruits. Back Exam: Normal inspection; no vertebral point tenderness, no CVA tenderness. Normal range of motion. EXT: Normal ROM in all four extremities; non-tender to palpation; no swelling or deformity; distal pulses are normal, no edema. SKIN: Warm; dry; no rash. NEURO:Alert and oriented x 3, coherent, WILLI-XII grossly intact, sensory and motor are non-focal.        MDM  Number of Diagnoses or Management Options  Alcohol abuse:   Alcoholic cirrhosis of liver with ascites ([facility]):   Peritonitis [facility]):   Diagnosis management comments: Assessment: 58-year-old male, who presents with history of cirrhosis, and abnormal lab values from peritoneal tap performed 24 hours ago. The patient has no pain or discomfort at this time. He is afebrile and looks well. He is hemodynamically stable. Previous lab reviewed and the patient needs a septic workup. Plan: lab/ IV fluid/ EKG and chest x-ray/ CT scan of the abdomen and pelvis/ consult hepatolog/ Monitor and Reevaluate.          Amount and/or Complexity of Data Reviewed  Clinical lab tests: ordered and reviewed  Tests in the radiology section of CPT®: ordered and reviewed  Tests in the medicine section of CPT®: reviewed and ordered  Discussion of test results with the performing providers: yes  Decide to obtain previous medical records or to obtain history from someone other than the patient: yes  Obtain history from someone other than the patient: yes  Review and summarize past medical records: yes  Discuss the patient with other providers: yes  Independent visualization of images, tracings, or specimens: yes    Risk of Complications, Morbidity, and/or Mortality  Presenting problems: moderate  Diagnostic procedures: moderate  Management options: moderate      ED Course       Procedures   ED EKG interpretation:  Rhythm: paced; and regular . Rate (approx.): 60; Axis: left axis deviation; Other findings: abnormal ekg. This EKG was interpreted by Susi Rios MD,ED Provider. XRAY INTERPRETATION (ED MD)  Chest Xray  No acute process seen. Normal heart size. No bony abnormalities. No infiltrate. Susi Rios MD      CONSULT NOTE:  11:31 PM Susi Rios MD spoke with Dr. Gabino Menon, Consult for Hepatologist.  Discussed available diagnostic tests and clinical findings. Given repeat lab values, Dr. Gabino Menon recommends to not admit the patient and to discharge home on Cipro and to f/u in office. Progress Note:   Pt has been reexamined by Susi Rios MD. Pt is feeling much better. Symptoms have improved. All available results have been reviewed with pt and any available family. The patient has no physical signs and symptoms consistent with peritonitis. WBC remains stable. The patient's care was discussed with Dr. Ryan Mobley. He will be discharged home with a prescription of Cipro as recommended. Pt understands sx, dx, and tx in ED. Care plan has been outlined and questions have been answered. Pt is ready to go home. Will send home on cirrhosis of the liver, alcohol abuse instruction. Outpatient referral with PCP as needed. Written by Susi Rios MD,8:58 AM    .   .

## 2018-01-10 NOTE — DISCHARGE INSTRUCTIONS
We hope that we have addressed all of your medical concerns. The examination and treatment you received in the Emergency Department were for an emergent problem and were not intended as complete care. It is important that you follow up with your healthcare provider(s) for ongoing care. If your symptoms worsen or do not improve as expected, and you are unable to reach your usual health care provider(s), you should return to the Emergency Department. Today's healthcare is undergoing tremendous change, and patient satisfaction surveys are one of the many tools to assess the quality of medical care. You may receive a survey from the [employer] regarding your experience in the Emergency Department. I hope that your experience has been completely positive, particularly the medical care that I provided. As such, please participate in the survey; anything less than excellent does not meet my expectations or intentions. [facility] and 8 [facility] participate in nationally recognized quality of care measures. If your blood pressure is greater than 120/80, as reported below, we urge that you seek medical care to address the potential of high blood pressure, commonly known as hypertension. Hypertension can be hereditary or can be caused by certain medical conditions, pain, stress, or \"white coat syndrome. \"       Please make an appointment with your health care provider(s) for follow up of your Emergency Department visit. VITALS:   Patient Vitals for the past 8 hrs:   Temp Pulse Resp BP SpO2   01/09/18 2249 98 °F (36.7 °C) 60 16 123/64 99 %   01/09/18 2229 97.6 °F (36.4 °C) 60 20 110/58 96 %   01/09/18 1814 98 °F (36.7 °C) 60 18 127/72 100 %          Thank you for allowing us to provide you with medical care today. We realize that you have many choices for your emergency care needs.   Please choose us in the future for any continued health care cher Starks MD    [address] [facility].   Office: [phone]            Recent Results (from the past 24 hour(s))   CBC WITH AUTOMATED DIFF    Collection Time: 01/09/18  7:09 PM   Result Value Ref Range    WBC 3.4 (L) 4.1 - 11.1 K/uL    RBC 2.50 (L) 4.10 - 5.70 M/uL    HGB 10.0 (L) 12.1 - 17.0 g/dL    HCT 27.4 (L) 36.6 - 50.3 %    .6 (H) 80.0 - 99.0 FL    MCH 40.0 (H) 26.0 - 34.0 PG    MCHC 36.5 30.0 - 36.5 g/dL    RDW 16.0 (H) 11.5 - 14.5 %    PLATELET 61 (L) 592 - 400 K/uL    NEUTROPHILS 67 32 - 75 %    LYMPHOCYTES 12 12 - 49 %    MONOCYTES 18 (H) 5 - 13 %    EOSINOPHILS 2 0 - 7 %    BASOPHILS 1 0 - 1 %    ABS. NEUTROPHILS 2.3 1.8 - 8.0 K/UL    ABS. LYMPHOCYTES 0.4 (L) 0.8 - 3.5 K/UL    ABS. MONOCYTES 0.6 0.0 - 1.0 K/UL    ABS. EOSINOPHILS 0.1 0.0 - 0.4 K/UL    ABS. BASOPHILS 0.0 0.0 - 0.1 K/UL    DF SMEAR SCANNED      RBC COMMENTS ANISOCYTOSIS  1+        RBC COMMENTS MACROCYTOSIS  1+        RBC COMMENTS OVALOCYTES  PRESENT       METABOLIC PANEL, COMPREHENSIVE    Collection Time: 01/09/18  7:09 PM   Result Value Ref Range    Sodium 128 (L) 136 - 145 mmol/L    Potassium 4.5 3.5 - 5.1 mmol/L    Chloride 97 97 - 108 mmol/L    CO2 24 21 - 32 mmol/L    Anion gap 7 5 - 15 mmol/L    Glucose 116 (H) 65 - 100 mg/dL    BUN 17 6 - 20 MG/DL    Creatinine 1.15 0.70 - 1.30 MG/DL    BUN/Creatinine ratio 15 12 - 20      GFR est AA >60 >60 ml/min/1.73m2    GFR est non-AA >60 >60 ml/min/1.73m2    Calcium 8.3 (L) 8.5 - 10.1 MG/DL    Bilirubin, total 4.5 (H) 0.2 - 1.0 MG/DL    ALT (SGPT) 33 12 - 78 U/L    AST (SGOT) 59 (H) 15 - 37 U/L    Alk.  phosphatase 177 (H) 45 - 117 U/L    Protein, total 6.7 6.4 - 8.2 g/dL    Albumin 2.9 (L) 3.5 - 5.0 g/dL    Globulin 3.8 2.0 - 4.0 g/dL    A-G Ratio 0.8 (L) 1.1 - 2.2     ETHYL ALCOHOL    Collection Time: 01/09/18  7:09 PM   Result Value Ref Range    ALCOHOL(ETHYL),SERUM 102 (H) <10 MG/DL   MAGNESIUM    Collection Time: 01/09/18 11:06 PM Result Value Ref Range    Magnesium 1.8 1.6 - 2.4 mg/dL   PHOSPHORUS    Collection Time: 01/09/18 11:06 PM   Result Value Ref Range    Phosphorus 2.9 2.6 - 4.7 MG/DL   URINALYSIS W/ REFLEX CULTURE    Collection Time: 01/09/18 11:06 PM   Result Value Ref Range    Color YELLOW/STRAW      Appearance CLEAR CLEAR      Specific gravity 1.012 1.003 - 1.030      pH (UA) 5.5 5.0 - 8.0      Protein NEGATIVE  NEG mg/dL    Glucose NEGATIVE  NEG mg/dL    Ketone NEGATIVE  NEG mg/dL    Bilirubin NEGATIVE  NEG      Blood NEGATIVE  NEG      Urobilinogen 1.0 0.2 - 1.0 EU/dL    Nitrites NEGATIVE  NEG      Leukocyte Esterase NEGATIVE  NEG      WBC 0-4 0 - 4 /hpf    RBC 0-5 0 - 5 /hpf    Epithelial cells FEW FEW /lpf    Bacteria NEGATIVE  NEG /hpf    UA:UC IF INDICATED CULTURE NOT INDICATED BY UA RESULT CNI      Hyaline cast 2-5 0 - 5 /lpf   AMMONIA    Collection Time: 01/09/18 11:06 PM   Result Value Ref Range    Ammonia 27 <32 UMOL/L   POC LACTIC ACID    Collection Time: 01/09/18 11:27 PM   Result Value Ref Range    Lactic Acid (POC) 1.5 0.4 - 2.0 mmol/L   EKG, 12 LEAD, INITIAL    Collection Time: 01/09/18 11:38 PM   Result Value Ref Range    Ventricular Rate 60 BPM    Atrial Rate 60 BPM    QRS Duration 152 ms    Q-T Interval 490 ms    QTC Calculation (Bezet) 490 ms    Calculated P Axis 0 degrees    Calculated R Axis -112 degrees    Calculated T Axis 2 degrees    Diagnosis       Ventricular-paced rhythm with frequent AV dual-paced complexes  Biventricular pacemaker detected  When compared with ECG of 06-DEC-2017 16:18,  No significant change was found         Ct Abd Pelv W Cont    Result Date: 1/9/2018  EXAM:  CT ABD PELV W CONT Clinical history: Cirrhosis, INDICATION: abd pain COMPARISON: 5/25/2017 CONTRAST:  100 mL of Isovue-370. TECHNIQUE: Following the uneventful intravenous administration of contrast, thin axial images were obtained through the abdomen and pelvis. Coronal and sagittal reconstructions were generated.  Oral contrast was not administered. CT dose reduction was achieved through use of a standardized protocol tailored for this examination and automatic exposure control for dose modulation. FINDINGS: LUNG BASES: Basilar atelectasis. . INCIDENTALLY IMAGED HEART AND MEDIASTINUM: Unremarkable. LIVER: Regular cirrhotic morphology. Left lobe atrophy. Lobulation. Hepatic hypodensities are unchanged in overall appearance. Portal vein is patent. GALLBLADDER: Cholelithiasis SPLEEN: Splenomegaly. PANCREAS: No mass or ductal dilatation. ADRENALS: Unremarkable. KIDNEYS: No mass, calculus, or hydronephrosis. Hyperdense cyst unchanged on the left. STOMACH: Unremarkable. SMALL BOWEL: No dilatation or wall thickening. COLON: No dilatation or wall thickening. APPENDIX: Unremarkable. PERITONEUM: Interval ascites compared to the prior study. No free air. Marshia Minder RETROPERITONEUM: No lymphadenopathy or aortic aneurysm. REPRODUCTIVE ORGANS: Marked interval URINARY BLADDER: No mass or calculus. BONES: Severe degenerative change in the lumbar spine. There is severe canal and foraminal stenosis at L3-4. ADDITIONAL COMMENTS: N/A     IMPRESSION: Findings consistent with progression of cirrhotic change, splenomegaly, nodular liver contour. Interval mild to moderate intraperitoneal ascites. Cholelithiasis. Severe degenerative change in the lumbar spine    Xr Chest Port    Result Date: 1/9/2018  Clinical history: Cirrhosis INDICATION: Cirrhosis, sepsis COMPARISON: 12/6/2017 FINDINGS: AP semiupright view of the chest is obtained . The cardiopericardial silhouette is stable. There is no pleural effusion, pneumothorax or focal consolidation present. No acute osseous finding. Dual-lead cardiac pacer. IMPRESSION: No acute thoracic disease.

## 2018-01-12 NOTE — MR AVS SNAPSHOT
Visit Information Date & Time Provider Department Dept. Phone Encounter #  
 1/12/2018  4:15 PM Cecil Vargas Morgan [facility] of [facility] 219 [id number] Your Appointments 2/5/2018  9:15 AM  
Follow Up with MARTÍN Lopez 75 ([address]) Appt Note: 1 month follow up; follow up [address] [facility] [place] [phone] [address] [place] [address]  
[phone]  
  
   
 [address] [facility] [address]  
  
    
 3/2/2018  7:30 AM  
PROCEDURE with MD Rosa Vargas 75 [address]) Appt Note: EGD  
 [address] [facility] [place] [phone] [facility] [address]  
59 [facility] [address] Upcoming Health Maintenance Date Due  
 LIPID PANEL Q1 1960 MICROALBUMIN Q1 1/17/1968 EYE EXAM RETINAL OR DILATED Q1 1/17/1968 DTaP/Tdap/Td series (1 - Tdap) 1/17/1979 FOBT Q 1 YEAR AGE 50-75 1/17/2008 FOOT EXAM Q1 1/31/2016 Influenza Age 5 to Adult 8/1/2017 ZOSTER VACCINE AGE 60> 11/17/2017 HEMOGLOBIN A1C Q6M 6/7/2018 Allergies as of 1/12/2018  Review Complete On: 1/12/2018 By: Juany Carranza LPN No Known Allergies Current Immunizations  Reviewed on 11/24/2017 Name Date Influenza Vaccine 11/12/2014 Influenza Vaccine PF 11/5/2013  6:09 PM  
 Pneumococcal Vaccine (Unspecified Type) 2/1/2012 Not reviewed this visit Vitals BP Pulse Temp Height(growth percentile) 123/49 (BP 1 Location: Left arm, BP Patient Position: Sitting) 69 99.2 °F (37.3 °C) (Tympanic) 5' 10\" (1.778 m) Weight(growth percentile) SpO2 BMI Smoking Status 218 lb 6.4 oz (99.1 kg) 97% 31.34 kg/m2 Former Smoker Vitals History BMI and BSA Data Body Mass Index Body Surface Area  
 31.34 kg/m 2 2.21 m 2 Preferred Pharmacy Pharmacy Name Phone Esteban Fry [address] [place] [address], [address] [place] [address] [phone] Your Updated Medication List  
  
   
This list is accurate as of: 1/12/18  4:46 PM.  Always use your most recent med list.  
  
  
  
  
 amiodarone 200 mg tablet Commonly known as:  CORDARONE Take 200 mg by mouth nightly. CENTRUM SILVER Tab tablet Generic drug:  multivitamins-minerals-lutein Take 1 Tab by mouth daily. ciprofloxacin HCl 500 mg tablet Commonly known as:  CIPRO Take 1 Tab by mouth daily. digoxin 0.125 mg tablet Commonly known as:  LANOXIN Take 0.125 mg by mouth nightly. ENTRESTO 24-26 mg tablet Generic drug:  sacubitril-valsartan Take 1 Tab by mouth two (2) times a day. folic acid 1 mg tablet Commonly known as:  [employer] Take 1 Tab by mouth daily. furosemide 20 mg tablet Commonly known as:  LASIX Take 1 Tab by mouth daily. glimepiride 1 mg tablet Commonly known as:  AMARYL  
  
 metoprolol succinate 25 mg XL tablet Commonly known as:  TOPROL XL Take 0.5 Tabs by mouth daily. thiamine 100 mg tablet Commonly known as:  B-1 Take 1 Tab by mouth daily. TRADJENTA 5 mg tablet Generic drug:  linagliptin Take 5 mg by mouth every morning. Prescriptions Sent to Pharmacy Refills  
 ciprofloxacin HCl (CIPRO) 500 mg tablet 3 Sig: Take 1 Tab by mouth daily. Class: Normal  
 Pharmacy: [facility] [place] [address], [address] [facility] #: [phone] Route: Oral  
  
Introducing [facility]! [facility] introduces [employer] patient portal. Now you can access parts of your medical record, email your doctor's office, and request medication refills online. 1. In your internet browser, go to [email or web address] 2. Click on the First Time User? Click Here link in the Sign In box. You will see the New Member Sign Up page. 3. Enter your [employer] Access Code exactly as it appears below.  You will not need to use this code after youve completed the sign-up process. If you do not sign up before the expiration date, you must request a new code. · [email or web address] Access Code: [email or web address] Expires: 2/20/2018  2:42 PM 
 
4. Enter the last four digits of your Social Security Number (xxxx) and Date of Birth (mm/dd/yyyy) as indicated and click Submit. You will be taken to the next sign-up page. 5. Create a [employer] ID. This will be your [employer] login ID and cannot be changed, so think of one that is secure and easy to remember. 6. Create a [employer] password. You can change your password at any time. 7. Enter your Password Reset Question and Answer. This can be used at a later time if you forget your password. 8. Enter your e-mail address. You will receive e-mail notification when new information is available in [address] 9. Click Sign Up. You can now view and download portions of your medical record. 10. Click the Download Summary menu link to download a portable copy of your medical information. If you have questions, please visit the Frequently Asked Questions section of the [email or web address] website. Remember, [email or web address] is NOT to be used for urgent needs. For medical emergencies, dial 911. Now available from your iPhone and Android! Please provide this summary of care documentation to your next provider. Your primary care clinician is listed as Wilfredo Guzman. If you have any questions after today's visit, please call [phone].

## 2018-01-22 NOTE — PROGRESS NOTES
70 Steffi Durand MD, FACP, [facility], [place]       Pennye Pallas, NP [place], PA-C Bernice Kallman, ARIASP-MARTÍN Thapa NP [facility] 136    at [address], [facility] [place]  22.    [phone]    FAX: [address] [place] [address], [address]    [phone]    FAX: [phone]       Patient Care Team:  Kari Tompkins MD as PCP - General (Family Practice)  Kari Tompkins MD (Family Practice)  Carlos Manuel Lozoya MD (Gastroenterology)      Problem List  Date Reviewed: 1/3/2018          Codes Class Noted    Alcoholic cirrhosis of liver with ascites [facility]) ICD-10-CM: K70.31  ICD-9-CM: 571.2  1/3/2018        Secondary esophageal varices without bleeding ([facility].) ICD-10-CM: I85.10  ICD-9-CM: 456.21  1/3/2018        Alcohol abuse ICD-10-CM: F10.10  ICD-9-CM: 305.00  1/3/2018        Chronic systolic heart failure ([facility] 75.) (Chronic) ICD-10-CM: I50.22  ICD-9-CM: 428.22  11/21/2017        Neuropathy ICD-10-CM: G62.9  ICD-9-CM: 355.9  3/30/2017        S/P BKA (below knee amputation) ([facility] 75.) ICD-10-CM: H58.995  ICD-9-CM: V49.75  3/30/2017        Hypertension ICD-10-CM: I10  ICD-9-CM: 401.9  3/30/2017        Cardiac defibrillator in place ICD-10-CM: Z95.810  ICD-9-CM: V45.02  3/30/2017        Type II diabetes mellitus ([facility] 75.) ICD-10-CM: E11.9  ICD-9-CM: 250.00  2/1/2015              Buffy Vazquez returns to the [address] for management of cirrhosis secondary to alcohol. The active problem list, all pertinent past medical history, medications, and laboratory findings related to the liver disorder were reviewed with the patient. The patient is a 59 y.o.   male who was first noted to have abnormalities in liver enzymes in 2012. Serologic evaluation for markers of chronic liver disease were all negative. Ultrasound of the liver was performed in 2012. The results of the imaging suggested cirrhosis. A liver biopsy was performed in 2012. The results are not available. The patient was hospitalized last week at [facility] because of hyponatremia and a syncopal episode. He had a positive alcohol level in the ED. During the hospitalization he admitted to consuuing alcohol on a regular basis all of these years. Ascites has resolved with diuretics and paracentesis     Lower extremity edema has resolved with current dose of diuretics. Hepatic encephalopathy  as not developed to date. The patient has esophageal varices without bleeding. Varices have been treated with band ligation     The patient has no symptoms which could be attributed to the liver disorder. The patient completes all daily activities without any functional limitations. The patient has not experienced fatigue, pain in the right side over the liver, problems concentrating, swelling of the abdomen, swelling of the lower extremities, hematemesis, hematochezia. ALLERGIES  No Known Allergies    MEDICATIONS  Current Outpatient Prescriptions   Medication Sig    ciprofloxacin HCl (CIPRO) 500 mg tablet Take 1 Tab by mouth daily.  glimepiride (AMARYL) 1 mg tablet     furosemide (LASIX) 20 mg tablet Take 1 Tab by mouth daily.  sacubitril-valsartan (ENTRESTO) 24 mg/26 mg tablet Take 1 Tab by mouth two (2) times a day.  thiamine (B-1) 100 mg tablet Take 1 Tab by mouth daily.  metoprolol succinate (TOPROL XL) 25 mg XL tablet Take 0.5 Tabs by mouth daily.  amiodarone (CORDARONE) 200 mg tablet Take 200 mg by mouth nightly.  multivitamins-minerals-lutein (CENTRUM SILVER) Tab Take 1 Tab by mouth daily.  digoxin (LANOXIN) 0.125 mg tablet Take 0.125 mg by mouth nightly.     folic acid (FOLVITE) 1 mg tablet Take 1 Tab by mouth daily.  linagliptin (TRADJENTA) 5 mg tablet Take 5 mg by mouth every morning. No current facility-administered medications for this visit. SYSTEM REVIEW NOT RELATED TO LIVER DISEASE OR REVIEWED ABOVE:  Constitution systems: Negative for fever, chills, weight gain, weight loss. Eyes: Negative for visual changes. ENT: Negative for sore throat, painful swallowing. Respiratory: Negative for cough, hemoptysis, SOB. Cardiology: Negative for chest pain, palpitations. GI:  Negative for constipation or diarrhea. : Negative for urinary frequency, dysuria, hematuria, nocturia. Skin: Negative for rash. Hematology: Negative for easy bruising, blood clots. Musculo-skeletal: Negative for back pain, muscle pain, weakness. Neurologic: Negative for headaches, dizziness, vertigo, memory problems not related to HE. Psychology: Negative for anxiety, depression. FAMILY HISTORY:  The father  of unknown cause. The mother  of heart disease. There is no family history of liver disease. SOCIAL HISTORY:  The patient is . The patient has 3 children,   The patient has never used tobacco products. The patient consumes 6+ alcoholic beverages per day. This has been his long-standing pattern. The patient used to work . The patient retired in . PHYSICAL EXAMINATION:  /49 (BP 1 Location: Left arm, BP Patient Position: Sitting)  Pulse 69  Temp 99.2 °F (37.3 °C) (Tympanic)   Ht 5' 10\" (1.778 m)  Wt 218 lb 6.4 oz (99.1 kg)  SpO2 97%  BMI 31.34 kg/m2  General: No acute distress. Eyes: Sclera anicteric. ENT: No oral lesions. Thyroid normal.  Nodes: No adenopathy. Skin: Numerous spider angiomata. No jaundice. No palmar erythema. Respiratory: Lungs clear to auscultation. Cardiovascular: Regular heart rate. No murmurs. No JVD. Abdomen: Soft non-tender.   Liver size normal to percussion/palpation. Spleen not palpable. No obvious ascites. Extremities: No edema. No muscle wasting. No gross arthritic changes. Neurologic: Alert and oriented. Cranial nerves grossly intact. No asterixis. LABORATORY STUDIES:  [facility] [place] [address] & Units 2018 1/3/2018   WBC 4.1 - 11.1 K/uL 3.4 (L) 3.4   ANC 1.8 - 8.0 K/UL 2.3    HGB 12.1 - 17.0 g/dL 10.0 (L) 9.8 (L)    - 400 K/uL 61 (L) 71 (LL)   INR 0.8 - 1.2  1.3 (H)   AST 15 - 37 U/L 59 (H) 68 (H)   ALT 12 - 78 U/L 33 32   Alk Phos 45 - 117 U/L 177 (H) 178 (H)   Bili, Total 0.2 - 1.0 MG/DL 4.5 (H) 5.5 (H)   Bili, Direct 0.00 - 0.40 mg/dL  3.04 (H)   Albumin 3.5 - 5.0 g/dL 2.9 (L) 3.4 (L)   BUN 6 - 20 MG/DL 17 9   Creat 0.70 - 1.30 MG/DL 1.15 0.88   Na 136 - 145 mmol/L 128 (L) 132 (L)   K 3.5 - 5.1 mmol/L 4.5 4.4   Cl 97 - 108 mmol/L 97 97   CO2 21 - 32 mmol/L 24 22   Glucose 65 - 100 mg/dL 116 (H) 172 (H)   Magnesium 1.6 - 2.4 mg/dL 1.8    Ammonia <32 UMOL/L 27      SEROLOGIES:  Serologies Latest Ref Rng & Units 3/30/2017   Hep A Ab, Total Negative Positive (A)   Hep B Surface Ag Negative Negative   Hep B Core Ab, Total Negative Negative   Hep B Surface AB QL  Non Reactive   Hep C Ab 0.0 - 0.9 s/co ratio 0.1   Ferritin 30 - 400 ng/mL 399   Iron % Saturation 15 - 55 % 76 (HH)   Alpha-1 antitrypsin level 90 - 200 mg/dL 181     LIVER HISTOLOGY:  2017. FibroScan performed at [facility]. EkPa was 75. Suggested fibrosis level is F4. ENDOSCOPIC PROCEDURES:  2017. EGD performed by [facility]. Medium esophageal varices. Banding performed. Moderate portal gastropathy. RADIOLOGY:  10/2012. Ultrasound of liver. Echogenic nodular consistent with cirrhosis. No liver mass lesions. No dilated bile ducts. No ascites. 2018. CT scan abdomen with IV contrast.  Changes consistent with cirrhosis. No liver mass lesions. No dilated bile ducts. Mild-Moderate ascites. OTHER TESTIN2018.   Blood ETOH 102    ASSESSMENT AND PLAN:  Cirrhosis that is secondary to alcohol. Have performed laboratory testing to monitor liver function and degree of liver injury. This included BMP, hepatic panel, CBC with platelet count, INR. The liver transaminases are elevated. The ALP is elevated. Liver function is depressed, platelet count is depressed. The CTP is 9. Child class B. The MELD score is 23. There is an elevation in iron saturation with normal ferritin. The elevation in ferritin is secondary to alcohol use. The patient is developed decompenstion of cirrhosis and should ideally be considered as a candidate for liver transplant. The patient is not a candidate for liver transplantation because of recent or ongoing alcohol use. The patient will have been abstinent for 6 months in 7/2018. If the patient stops consuming alcohol the acute decompensation may improve, MELD score could improve and he may not require a liver transplant. Hyponatremia is secondary to cirrhosis and diuretics. Will leave on low dose lasix alone for now. Ascites has resolved with current dose of diuretics. Lower extremity edema has resolved with current dose of diuretics. Hepatic encephalopathy  has not developed to date. There is no need for treatment with lactulose and/or xifaxan at this time. No need to restrict dietary protein at this time. Esophageal varicies without prior bleeding. Varices have been treated with banding. Will schedule for EGD to assess for varices and need for banding. The patient was advised never to consume alcohol again. Vaccination for viral hepatitis B is recommended since the patient has no serologic evidence of previous exposure or vaccination with immunity. Vaccination for viral hepatitis A is not needed. The patient has serologic evidence of prior exposure or vaccination with immunity. All of the above issues were discussed with the patient. All questions were answered. The patient expressed a clear understanding of the above. [address] [place] [address] in 6 weeks to review all additional lab data, EGD and US findings and to monitor the impact of alcohol cessation.     Aaliyah Preston PA-C  Liver [place] [address], [address] [address]  22.  [phone]

## 2018-01-23 PROBLEM — K92.2 GI BLEED: Status: ACTIVE | Noted: 2018-01-01

## 2018-01-23 NOTE — ED NOTES
Pt placed on monitor x 3, side rails up x 2, call light placed within reach, bed in lowest and locked position, pt changed into gown and provided with warm blanket.

## 2018-01-23 NOTE — ED TRIAGE NOTES
Pt stated he has been having body aches x 3 days, diarrhea x 2 days, bilateral abd pain, denies n/v, no appetite, pt has not eaten in 2 days, +dark tarry diarrhea with bright red blood when wiping, recently dx with peritonitis and has been on antibiotics, family stated his blood pressure is also low and he has been dizzy , +pt is jaundice

## 2018-01-23 NOTE — PROCEDURES
[address] Yelena Quiñones MD, [address], [place], [place]       Marcy Denney, KULDEEP Valadez, ACNP-BC   MARTÍN Tucker NP [facility] [place] [address]    at [place] [address] [address], [address] [address]  22.    [phone]    FAX: [address] [place] [address], [address]    [phone]    FAX: [phone]       UPPER ENDOSCOPY PROCEDURE NOTE    Sandrine Giovanna  1960    INDICATION: Cirrhosis. Screening for esophageal varices with variceal ligation if indicated. : Laverne Dominguez MD    ANESTHESIA/SEDATION: Versed 8 mg and Fentanyl 175 mcg      PROCEDURE DESCRIPTION:  [employer] consent was obtained from the patient for the procedure. All risks and benefits of the procedure explained. The patient was taken to the endoscopy suite and placed in the left lateral decubitus position. Following sequential administration of sedation to doses as indicated above the endoscope was inserted into the mouth and advanced under direct vision to the second portion of the duodenum. Careful inspection of upper gastrointestinal tract was made as the endoscope was inserted and withdrawn. Retroflexion of the endoscope to view of the cardia of the stomach was performed. After withdrawing the endoscope the banding devise was placed on the tip of the endoscope. The scope was then reinserted under direct inspection and advanced to the esophagus. Banding of esophageal varices was performed as described below. The scope was then removed. FINDINGS:  Esophagus:    Multiple medium esophageal varices were identified. Banding of esophageal varices was performed.   Excellent hemostasis was achieved after banding. Stomach:   Large blood clot in fundus. No gastric varicies identified. Duodenum:   Normal bulb and second portion    INTERVENTION:   6 bands placed were placed on esophageal varices. COMPLICATIONS: None. The patient tolerated the procedure well. EBL: Negligible. RECOMMENDATIONS:  Continue IV octreotide.   Continue IV protonix   Monitor HB Q6H  If rebleeds go to emergent TIPS      Makayla Mckeon MD  1/23/2018  6:09 PM

## 2018-01-23 NOTE — PROGRESS NOTES
Admission Medication Reconciliation:    Comments/Recommendations: This medication history was obtained from patient and wife with list; (s)he appears to be a good historian. An RX Query is available. Medications added: none  Medications deleted: tradjenta (not current)  Medications amended: added sig for amaryl    Last doses of medication: patient did not take medications today  Review of Allergies: nkda    Significant PMH/Disease States:   Past Medical History:   Diagnosis Date    Arthritis     [facility]rial [facility] ([facility] 2014    CHF (congestive heart failure) [facility]     Diabetes ([facility]     Diabetic ulcer of left foot ([facility] 2/2015 2/2015 Lt BKA    History of blood transfusion 2015    During Lt BKA; pt denies any adverse reaction    Hypertension     Liver disease     CIRRHOSIS    Sepsis ([facility] 02/2015    From diabetic Left foot wound;  had a BKA ;  as of 11/21/16 pt states back to his baseline        Chief Complaint for this Admission:    Chief Complaint   Patient presents with    Abdominal Pain    Diarrhea       Allergies:  Review of patient's allergies indicates no known allergies. Prior to Admission Medications:   Prior to Admission Medications   Prescriptions Last Dose Informant Patient Reported? Taking?   amiodarone (CORDARONE) 200 mg tablet 1/22/2018  Yes Yes   Sig: Take 200 mg by mouth nightly. ciprofloxacin HCl (CIPRO) 500 mg tablet 1/22/2018  No Yes   Sig: Take 1 Tab by mouth daily. digoxin (LANOXIN) 0.125 mg tablet 1/22/2018  Yes Yes   Sig: Take 0.125 mg by mouth nightly. folic acid (FOLVITE) 1 mg tablet 1/22/2018  No Yes   Sig: Take 1 Tab by mouth daily. furosemide (LASIX) 20 mg tablet 1/22/2018  No Yes   Sig: Take 1 Tab by mouth daily. glimepiride (AMARYL) 1 mg tablet 1/22/2018  Yes Yes   Sig: Take 1 mg by mouth daily. metoprolol succinate (TOPROL XL) 25 mg XL tablet 1/22/2018  No Yes   Sig: Take 0.5 Tabs by mouth daily.    multivitamins-minerals-lutein (CENTRUM SILVER) Tab 1/22/2018  Yes Yes   Sig: Take 1 Tab by mouth daily. sacubitril-valsartan (ENTRESTO) 24 mg/26 mg tablet 1/22/2018  Yes Yes   Sig: Take 1 Tab by mouth two (2) times a day. thiamine (B-1) 100 mg tablet 1/22/2018  No Yes   Sig: Take 1 Tab by mouth daily. Facility-Administered Medications: None         Thank you for allowing me to participate in the care of this patient. Please contact the pharmacy () or the medication reconciliation pharmacy () with any questions.     Dorita Ayala, RajendraD

## 2018-01-23 NOTE — ED NOTES
TRANSFER - OUT REPORT:    Verbal report given to [facility], RN (name) on Bronson Clayton  being transferred to ICU room 8 (unit) for routine progression of care       Report consisted of patients Situation, Background, Assessment and   Recommendations(SBAR). Information from the following report(s) SBAR, Kardex, ED Summary, OR Summary, Procedure Summary, Intake/Output, MAR, Accordion, Recent Results and Cardiac Rhythm Paced was reviewed with the receiving nurse. Opportunity for questions and clarification was provided.       Patient transported with:   Monitor  Registered Nurse

## 2018-01-23 NOTE — CONSULTS
70 Steffi Durand MD, FACP, [place], [place]       Areli [place], KULDEEP Beckwith, ACNP-BC   Maureen Barrera, MARTÍN Villanueva, MARTÍN Ulloa [facility] [address]    at [address], [address] [address]  22.    [phone]    FAX: [address], [address]    [phone]    FAX: [phone]       HEPATOLOGY CONSULT NOTE  The patient is well known to me and regularly cared for at [address] [place] [address]. He is a 59year old male with cirrhosis of the liver secondary to alcohol. He has continued to consume alcohol even though he knows he has cirrhosis. He was last hospitalized at [facility] for a syncopal episode in 12/2017. He had a positive blood alcohol at that time. He has continued to consume alcohol and tell me that he had some beers while watching the football game this weekend. His blood alcohol was negative in the ED this AM.    He came to the ED because of passing dark blood per rectum. He has not had hematemesis. In the ED he was found to have a drop in HB from 10 to 8 gms and passed more blood. He is alert and oriented. VS are all stable. He was sent to ED and I will perform emergent EGD. I have reviewed the Emergency room note, Hospital admission note, Notes by all other physicians who have seen the patient during this hospitalization to date. I have reviewed the problem list and the reason for this hospitalization. I have reviewed the allergies and the medications the patient was taking at home prior to this hospitalization. ASSESSMENT AND PLAN:  Cirrhosis  This is secodary to alcohol. The CTP score is 9, Child class B, MELD score 24.   He is not a candidate for liver transplant because on ongoing alcohol use. GI bleeding  This is likely secondary to esophageal varices. The last EGD was in 6/2017. He had varices that were banded at that time. Will proceed with EGD to evaluate for site of bleeding and to band varices if bleeding is from this. He is on octreotide and IV protonix. Will start IV albumin which has been shown to reduce mortality from varcieal bleeding if EGD confirms varices. Acute blood loss anemia  HB is down from 10 at baseline to 8 gms currently. He is getting blood transfusion. Will monitor HB and keep HB just above 8 gms. Thrombocytopenia  This is secondary to cirrhosis. No treatment needed. The platelet count is adequate even with GI bleeding. No platelet transfusion needed at this time. Hyponatremia  This is secondary to a combination of cirrhosis and diuretics. Hold diuretics. Will give some IV albumin to help treat hyponatremia. LORENZA  There is mild increase in Screat. Probably related to acute blood loss. Will treat with volume expansion blood and IV albumin. Alcohol abuse  He continue to consume alcohol knowing that he has cirrhosis and complications of cirrhosis. This has been discussed with him numerous times. During the last hospitalization he told me he was done with drinking. But then he had beer this past weekend. SYSTEM REVIEW:  Constitution systems: Negative for fever, chills, weight gain, weight loss. Eyes: Negative for visual changes. ENT: Negative for sore throat, painful swallowing. Respiratory: Negative for cough, hemoptysis, SOB. Cardiology: Negative for chest pain, palpitations. GI:  Hematochezia. : Negative for urinary frequency, dysuria, hematuria, nocturia. Skin: Negative for rash. Hematology: Negative for easy bruising, blood clots. Musculo-skelatal: Negative for back pain, muscle pain, weakness.   Neurologic: Negative for headaches, dizziness, vertigo, memory problems not related to HE. Psychology: Negative for anxiety, depression. FAMILY HISTORY:  The father  of unknown cause. The mother  of heart disease. There is no family history of liver disease.       SOCIAL HISTORY:  The patient is . The patient has 3 children. The patient has never used tobacco products. The patient consumes 6+ alcoholic beverages per day. This has been his long-standing pattern. He states he has tried to make a recent effort to cut back, is vague on actual intake. The patient used to work . The patient retired in . PHYSICAL EXAMINATION:  VS: per nursing note  General:  No acute distress. Eyes:  Sclera anicteric. ENT:  No oral lesions. Thyroid normal.  Nodes:  No adenopathy. Skin:  Spider angiomata. No jaundice. Respiratory:  Lungs clear to auscultation. Cardiovascular:  Regular heart rate. Abdomen:  Soft non-tender, No obvious ascites. Extremities:  No lower extremity edema. No muscle wasting. Neurologic:  Alert and oriented. Cranial nerves grossly intact. No asterixis. LABORATORY:  Results for Peng Scott (MRN [id number]) as of 2018 08:09   Ref.  Range 2018 19:09 2018 08:30   WBC Latest Ref Range: 4.1 - 11.1 K/uL 3.4 (L) 5.1   HGB Latest Ref Range: 12.1 - 17.0 g/dL 10.0 (L) 7.9 (L)   PLATELET Latest Ref Range: 150 - 400 K/uL 61 (L) 95 (L)   INR Latest Ref Range: 0.9 - 1.1    1.4 (H)   Sodium Latest Ref Range: 136 - 145 mmol/L 128 (L) 128 (L)   Potassium Latest Ref Range: 3.5 - 5.1 mmol/L 4.5 4.4   Chloride Latest Ref Range: 97 - 108 mmol/L 97 95 (L)   CO2 Latest Ref Range: 21 - 32 mmol/L 24 24   Glucose Latest Ref Range: 65 - 100 mg/dL 116 (H) 114 (H)   BUN Latest Ref Range: 6 - 20 MG/DL 17 32 (H)   Creatinine Latest Ref Range: 0.70 - 1.30 MG/DL 1.15 1.34 (H)   Bilirubin, total Latest Ref Range: 0.2 - 1.0 MG/DL 4.5 (H) 4.8 (H)   Albumin Latest Ref Range: 3.5 - 5.0 g/dL 2.9 (L) 2.5 (L)   ALT (SGPT) Latest Ref Range: 12 - 78 U/L 33 30   AST Latest Ref Range: 15 - 37 U/L 59 (H) 66 (H)   Alk. phosphatase Latest Ref Range: 45 - 117 U/L 177 (H) 114   Ammonia Latest Ref Range: <32 UMOL/L 27        RADIOLOGY:  CT scan abdomen without IV contrast.  Changes consistent with cirrhosis. No liver mass lesions. No dilated bile ducts. Moderate ascites.       Evans Gallagher MD  [facility] [place] [facility] [employer] [address] 22.  [phone]

## 2018-01-23 NOTE — IP AVS SNAPSHOT
2700 [facility] [address] [place] [address] 
[phone] Patient: Joel Bright MRN: [id number] RAL:[id number]/45/4726 About your hospitalization You were admitted on:  January 23, 2018 You last received care in the:  [facility] 4 SURG/BARIATRICS You were discharged on:  January 28, 2018 Why you were hospitalized Your primary diagnosis was:  Gi Bleed Follow-up Information Follow up With Details Comments Contact Info Toby Marino MD Schedule an appointment as soon as possible for a visit  [address] [place] [address] 
[phone] Sophia Maciel MD Schedule an appointment as soon as possible for a visit in 1 week for follow-up of liver function 200 [facility] [address] [place] [address] 
[phone] Your Scheduled Appointments Monday February 05, 2018  9:15 AM EST Follow Up with Sreekanth Stanley NP [address] ([facility] 200 [facility] [phone] [address] [place] [address]  
[phone] Friday March 02, 2018  7:30 AM EST PROCEDURE with Sophia Maciel MD  
[address] ([facility] 200 [facility] [phone] [address] [place] [address]  
[phone] Discharge Orders None A check suzanne indicates which time of day the medication should be taken. My Medications START taking these medications Instructions Each Dose to Equal  
 Morning Noon Evening Bedtime  
 gabapentin 300 mg capsule Commonly known as:  NEURONTIN Your last dose was: Your next dose is: Take 1 Cap by mouth three (3) times daily. 300 mg CHANGE how you take these medications Instructions Each Dose to Equal  
 Morning Noon Evening Bedtime  
 furosemide 40 mg tablet Commonly known as:  LASIX What changed:   
- medication strength 
- how much to take Your last dose was: Your next dose is: Take 1 Tab by mouth daily. 40 mg CONTINUE taking these medications Instructions Each Dose to Equal  
 Morning Noon Evening Bedtime  
 amiodarone 200 mg tablet Commonly known as:  CORDARONE Your last dose was: Your next dose is: Take 200 mg by mouth nightly. 200 mg CENTRUM SILVER Tab tablet Generic drug:  multivitamins-minerals-lutein Your last dose was: Your next dose is: Take 1 Tab by mouth daily. 1 Tab  
    
   
   
   
  
 ciprofloxacin HCl 500 mg tablet Commonly known as:  CIPRO Your last dose was: Your next dose is: Take 1 Tab by mouth daily. 500 mg  
    
   
   
   
  
 digoxin 0.125 mg tablet Commonly known as:  LANOXIN Your last dose was: Your next dose is: Take 0.125 mg by mouth nightly. 0.125 mg ENTRESTO 24-26 mg tablet Generic drug:  sacubitril-valsartan Your last dose was: Your next dose is: Take 1 Tab by mouth two (2) times a day. 1 Tab  
    
   
   
   
  
 folic acid 1 mg tablet Commonly known as:  [employer] Your last dose was: Your next dose is: Take 1 Tab by mouth daily. 1 mg  
    
   
   
   
  
 glimepiride 1 mg tablet Commonly known as:  AMARYL Your last dose was: Your next dose is: Take 1 mg by mouth daily. 1 mg  
    
   
   
   
  
 metoprolol succinate 25 mg XL tablet Commonly known as:  TOPROL XL Your last dose was: Your next dose is: Take 0.5 Tabs by mouth daily. 12.5 mg  
    
   
   
   
  
 thiamine 100 mg tablet Commonly known as:  B-1 Your last dose was: Your next dose is: Take 1 Tab by mouth daily. 100 mg Where to Get Your Medications Information on where to get these meds will be given to you by the nurse or doctor. ! Ask your nurse or doctor about these medications  
  furosemide 40 mg tablet  
 gabapentin 300 mg capsule Discharge Instructions DISCHARGE SUMMARY from Nurse PATIENT INSTRUCTIONS: 
 
These are general instructions for a healthy lifestyle: No smoking/ No tobacco products/ Avoid exposure to second hand smoke Surgeon General's Warning:  Quitting smoking now greatly reduces serious risk to your health. Obesity, smoking, and sedentary lifestyle greatly increases your risk for illness A healthy diet, regular physical exercise & weight monitoring are important for maintaining a healthy lifestyle You may be retaining fluid if you have a history of heart failure or if you experience any of the following symptoms:  Weight gain of 3 pounds or more overnight or 5 pounds in a week, increased swelling in our hands or feet or shortness of breath while lying flat in bed. Please call your doctor as soon as you notice any of these symptoms; do not wait until your next office visit. Recognize signs and symptoms of STROKE: 
 
F-face looks uneven A-arms unable to move or move unevenly S-speech slurred or non-existent T-time-call 911 as soon as signs and symptoms begin-DO NOT go Back to bed or wait to see if you get better-TIME IS BRAIN. Warning Signs of HEART ATTACK Call 911 if you have these symptoms: 
? Chest discomfort. Most heart attacks involve discomfort in the center of the chest that lasts more than a few minutes, or that goes away and comes back. It can feel like uncomfortable pressure, squeezing, fullness, or pain. ? Discomfort in other areas of the upper body. Symptoms can include pain or discomfort in one or both arms, the back, neck, jaw, or stomach. ? Shortness of breath with or without chest discomfort. ? Other signs may include breaking out in a cold sweat, nausea, or lightheadedness. Don't wait more than five minutes to call [address]! Fast action can save your life. Calling 911 is almost always the fastest way to get lifesaving treatment. Emergency Medical Services staff can begin treatment when they arrive  up to an hour sooner than if someone gets to the hospital by car. The discharge information has been reviewed with the {PATIENT PARENT GUARDIAN:26643}. The {PATIENT PARENT GUARDIAN:50927} verbalized understanding. Discharge medications reviewed with the {Dishcarge meds reviewed PMMQ:23623} and appropriate educational materials and side effects teaching were provided. ___________________________________________________________________________________________________________________________________ Discharge Instructions PATIENT ID: Sylvester Long MRN: [id number] YOB: 1960 DATE OF ADMISSION: 1/23/2018  8:08 AM   
DATE OF DISCHARGE: 1/28/2018 PRIMARY CARE PROVIDER: Valentina Calabrese MD  
 
ATTENDING PHYSICIAN: Layo Chino MD 
DISCHARGING PROVIDER: Layo Chino MD   
To contact this individual call [phone] and ask the  to page. If unavailable ask to be transferred the Adult Hospitalist Department. DISCHARGE DIAGNOSES gastrointestinal bleeding due to esophageal varices CONSULTATIONS: IP CONSULT TO HEPATOLOGY PROCEDURES/SURGERIES: Procedure(s) with comments: 
ESOPHAGOGASTRODUODENOSCOPY (EGD)@ BEDSIDE - egd / bedside ENDOSCOPIC BANDING OR LIGATION PENDING TEST RESULTS:  
At the time of discharge the following test results are still pending: n/a FOLLOW UP APPOINTMENTS:  
Follow-up Information Follow up With Details Comments Contact Info Valentina Calabrese MD Schedule an appointment as soon as possible for a visit  [address] [place] [address] 
[phone] Jennifer Peterson MD Schedule an appointment as soon as possible for a visit in 1 week for follow-up of liver function 200 [facility] [address] [place] [address] 
[phone] ADDITIONAL CARE RECOMMENDATIONS:  
Your Lasix dose was increase. Follow-up with your doctors as instructed. DIET: low sodium (less than 2 grams daily), avoid alcohol completely ACTIVITY: Activity as tolerated WOUND CARE: n/a 
 
EQUIPMENT needed: n/a DISCHARGE MEDICATIONS: 
 See Medication Reconciliation Form · It is important that you take the medication exactly as they are prescribed. · Keep your medication in the bottles provided by the pharmacist and keep a list of the medication names, dosages, and times to be taken in your wallet. · Do not take other medications without consulting your doctor. NOTIFY YOUR PHYSICIAN FOR ANY OF THE FOLLOWING:  
Fever over 101 degrees for 24 hours. Chest pain, shortness of breath, fever, chills, nausea, vomiting, diarrhea, change in mentation, falling, weakness, bleeding. Severe pain or pain not relieved by medications. Or, any other signs or symptoms that you may have questions about. DISPOSITION: 
 x Home With: 
 OT  PT  [facility]  RN  
  
 SNF/Inpatient Rehab/LTAC Independent/assisted living Hospice Other: CDMP Checked:  
Yes x PROBLEM LIST Updated: 
Yes x Signed:  
Toby Lewis MD 
1/28/2018 10:36 AM 
 
 
  
  
  
[employer] Announcement We are excited to announce that we are making your provider's discharge notes available to you in [employer]. You will see these notes when they are completed and signed by the physician that discharged you from your recent hospital stay. If you have any questions or concerns about any information you see in [employer], please call the Health Information Department where you were seen or reach out to your Primary Care Provider for more information about your plan of care. Introducing [facility] & HEALTH SERVICES! Guillermina Baldo introduces WellDoc patient portal. Now you can access parts of your medical record, email your doctor's office, and request medication refills online. 1. In your internet browser, go to [email or web address]. [facility]/[employer] 2. Click on the First Time User? Click Here link in the Sign In box. You will see the New Member Sign Up page. 3. Enter your WellDoc Access Code exactly as it appears below. You will not need to use this code after youve completed the sign-up process. If you do not sign up before the expiration date, you must request a new code. · WellDoc Access Code: [email or web address] Expires: 2/20/2018  2:42 PM 
 
4. Enter the last four digits of your Social Security Number (xxxx) and Date of Birth (mm/dd/yyyy) as indicated and click Submit. You will be taken to the next sign-up page. 5. Create a WellDoc ID. This will be your WellDoc login ID and cannot be changed, so think of one that is secure and easy to remember. 6. Create a WellDoc password. You can change your password at any time. 7. Enter your Password Reset Question and Answer. This can be used at a later time if you forget your password. 8. Enter your e-mail address. You will receive e-mail notification when new information is available in [address] 9. Click Sign Up. You can now view and download portions of your medical record. 10. Click the Download Summary menu link to download a portable copy of your medical information. If you have questions, please visit the Frequently Asked Questions section of the WellDoc website. Remember, WellDoc is NOT to be used for urgent needs. For medical emergencies, dial 911. Now available from your iPhone and Android! Providers Seen During Your Hospitalization Provider Specialty Primary office phone Bernardino Rich MD Emergency Medicine [phone] Ansley Brooke MD Hospitalist [phone] Cristina Donahue MD Hospitalist [phone] Your Primary Care Physician (PCP) Primary Care Physician Office Phone Office Fax Kathleen Humphreys [phone] You are allergic to the following No active allergies Recent Documentation Height Weight BMI Smoking Status 1.778 m 92.4 kg 29.23 kg/m2 Former Smoker Emergency Contacts Name Discharge Info Relation Home Work Mobile Cass Gonzalez DISCHARGE CAREGIVER [3] Spouse [3] [phone] Patient Belongings The following personal items are in your possession at time of discharge: 
  Dental Appliances: None  Visual Aid: None Please provide this summary of care documentation to your next provider. Signatures-by signing, you are acknowledging that this After Visit Summary has been reviewed with you and you have received a copy. Patient Signature:  ____________________________________________________________ Date:  ____________________________________________________________  
  
[facility] Provider Signature:  ____________________________________________________________ Date:  ____________________________________________________________

## 2018-01-23 NOTE — ROUTINE PROCESS
1225 TRANSFER - IN REPORT:    Verbal report received from Clarisse PENA(name) on Sylvester Long  being received from ED(unit) for routine progression of care      Report consisted of patients Situation, Background, Assessment and   Recommendations(SBAR). Information from the following report(s) SBAR, Kardex, ED Summary, Procedure Summary, Intake/Output, MAR, Accordion and Recent Results was reviewed with the receiving nurse. Opportunity for questions and clarification was provided. Assessment completed upon patients arrival to unit and care assumed. Primary Nurse Armand Sandoval RN and Brandon Fuentes RN performed a dual skin assessment on this patient No impairment noted  Spencer score is     1930 Bedside and Verbal shift change report given to [address] (oncoming nurse) by Magui Trevizo RN (offgoing nurse). Report included the following information SBAR, Kardex, ED Summary, Procedure Summary, Intake/Output, MAR, Accordion and Recent Results.

## 2018-01-23 NOTE — ED PROVIDER NOTES
HPI Comments: 59 y.o. male with past medical history significant for CHF, HTN, AFib, diabetes, and liver cirrhosis who presents from home via private vehicle with chief complaint of diarrhea. Pt reports 3 days of decreased energy, decreased appetite, and a distended abdomen with 2 days of dark tarry diarrhea with bright red blood when wiping. Pt reports he has been dizzy and has decreased urination. Pt reports being a pt of Dr. Herlinda Person and is on a small dose of lasix, had a paracentesis 1 month ago. Pt reports having an endoscopy in June 2017 and has been on Cipro since. Pt reports he is scheduled for another endoscopy next month. Pt reports he has a pacemaker placed. Pt denies any recent colonoscopy. Pt denies any abdominal pain or fever. Pt denies any history of similar symptoms. There are no other acute medical concerns at this time. Social hx: Former smoker (Quit 2013); Occasional EtOH use  PCP: Glenna Russo MD  Hepatologist: Epifanio Mcclelland MD      Note written by Radhika Reese, as dictated by Romel Cao MD 8:35 AM      The history is provided by the patient and the spouse. No  was used.         Past Medical History:   Diagnosis Date    Arthritis     Atrial fibrillation ([address] 2014    CHF (congestive heart failure) ([address]     Diabetes ([address]     Diabetic ulcer of left foot ([address] 2/2015 2/2015 Lt BKA    History of blood transfusion 2015    During Lt BKA; pt denies any adverse reaction    Hypertension     Liver disease     CIRRHOSIS    Sepsis ([address] 02/2015    From diabetic Left foot wound;  had a BKA ;  as of 11/21/16 pt states back to his baseline        Past Surgical History:   Procedure Laterality Date    HX AMPUTATION Left 2/10/2015    BKA    HX COLONOSCOPY      HX IMPLANTABLE CARDIOVERTER DEFIBRILLATOR  08/04/2015    [employer] cardio defibrillator; Dr London Roldan; as of 11/21/16 pt denies defibrillator going off         Family History:   Problem Relation Age of Onset    Heart Disease Brother     Heart Failure Brother     Heart Failure Brother        Social History     Social History    Marital status:      Spouse name: N/A    Number of children: N/A    Years of education: N/A     Occupational History    Not on file. Social History Main Topics    Smoking status: Former Smoker     Packs/day: 1.00     Years: 5.00     Quit date: 1/1/2013    Smokeless tobacco: Never Used    Alcohol use Yes      Comment: occ    Drug use: No    Sexual activity: Not on file     Other Topics Concern    Not on file     Social History Narrative         ALLERGIES: Review of patient's allergies indicates no known allergies. Review of Systems   Constitutional: Positive for appetite change and fatigue. Negative for activity change, chills and fever. HENT: Negative for nosebleeds, sore throat, trouble swallowing and voice change. Eyes: Negative for visual disturbance. Respiratory: Negative for shortness of breath. Cardiovascular: Negative for chest pain and palpitations. Gastrointestinal: Positive for abdominal distention, anal bleeding, blood in stool and diarrhea. Negative for abdominal pain, constipation and nausea. Genitourinary: Positive for decreased urine volume. Negative for difficulty urinating, dysuria, hematuria and urgency. Musculoskeletal: Negative for back pain, neck pain and neck stiffness. Skin: Positive for color change. Allergic/Immunologic: Negative for immunocompromised state. Neurological: Positive for dizziness. Negative for seizures, syncope, weakness, light-headedness, numbness and headaches. Psychiatric/Behavioral: Negative for behavioral problems, confusion, hallucinations, self-injury and suicidal ideas. All other systems reviewed and are negative.       Vitals:    01/23/18 0812   BP: 103/52   Pulse: 77   Resp: 16   Temp: 98.1 °F (36.7 °C)   SpO2: 96%   Height: 5' 10\" (1.778 m)            Physical Exam Constitutional: He is oriented to person, place, and time. He appears well-developed and well-nourished. No distress. BKA amputation of LLE with prosthetic in place. HENT:   Head: Normocephalic and atraumatic. Eyes: Pupils are equal, round, and reactive to light. Neck: Normal range of motion. Neck supple. Cardiovascular: Normal rate, regular rhythm and normal heart sounds. Exam reveals no gallop and no friction rub. No murmur heard. Regular rate and rhythm. Pulmonary/Chest: Effort normal and breath sounds normal. No respiratory distress. He has no wheezes. Clear lungs. Abdominal: Soft. Bowel sounds are normal. He exhibits distension. There is no tenderness. There is no rebound and no guarding. Distended soft abdomen. Genitourinary:   Genitourinary Comments: Dark tarry stool on rectal exam, consistent with GI bleed. Musculoskeletal: Normal range of motion. Neurological: He is alert and oriented to person, place, and time. Skin: Skin is warm. No rash noted. He is not diaphoretic. Psychiatric: He has a normal mood and affect. His behavior is normal. Judgment and thought content normal.   Nursing note and vitals reviewed. Note written by Radhika Louis, as dictated by Clovis Francisco MD 8:35 AM    [facility]  ED Course     This is a 24-year-old male presenting with complaints of fatigue, malaise, and several days of diarrhea, characterized as dark and tarry. Patient denies chest pain, shortness breath, states ongoing abdominal distention, secondary to Thursdays, denies active abdominal pain at this time. He denies fevers, chills, nausea, vomiting, chest pain or shortness of breath. Patient history significant for hepatitis, esophageal varices, last took several months ago, with no active bleeding, patient previously banded. She denies vomiting, or hematemesis.   Physical exam unremarkable for chronically ill-appearing male, in no acute distress, was distended firm abdomen, no tenderness, noted to be afebrile, without tachycardia, clear breath sounds, regular rate and rhythm without murmurs cups rubs, rectal exam positive for dark tarry stool, suggestive of active upper GI bleeding, consider esophageal source. Plan to provide octreotide, obtain CMP, CBC, UA, type and screen. Will consult with hepatology, and likely admit for further care and evaluation. Procedures      9:00 AM  Patient with drop in Hb, positive FOBT, will consult Hepatology, consult Hospitalist for admission. CONSULT NOTE:  9:09 AM Shaggy Dalal MD spoke with Dr. Linda May, Consult for Hepatology. Discussed available diagnostic tests and clinical findings. He is in agreement with care plans as outlined. Dr. Linda May will get the pt scheduled for an endoscopy today. CONSULT NOTE:  9:15 AM Shaggy Dalal MD spoke with Dr. Saralyn Cheadle, Consult for Hospitalist.  Discussed available diagnostic tests and clinical findings. He is in agreement with care plans as outlined. Dr. Saralyn Cheadle will see and admit the pt.

## 2018-01-23 NOTE — H&P
[address]  ACUTE CARE HISTORY AND PHYSICAL    Aaron RANKIN  MR#: [id number]  : 1960  ACCOUNT #: [de-identified]   DATE OF SERVICE: 2018    CHIEF COMPLAINT:  Diarrhea. HISTORY OF PRESENT ILLNESS:  The patient is a 76-year-old gentleman with past medical history of anasarca, nonischemic cardiomyopathy, alcoholic liver cirrhosis, hyponatremia, syncope, hypomagnesemia, chronic systolic CHF, NYHA class 1 with nonischemic cardiomyopathy, alcohol abuse, thrombocytopenia, macrocytic anemia, atrial fibrillation, hypertension, left BKA, who presents to the hospital with the above. The patient reports that his symptoms started about 3 days back. He reports that he started experiencing some decreased appetite with weakness, diarrhea and dark tarry diarrhea associated with bright-red blood when he would wipe. The patient reports that he got slight dizzy, his urination decreased, got concerned and decided to come to the hospital. The patient also reports that he started having some dizziness associated with this symptoms and just had \"significant generalized weakness. \"  The patient reports that he had a paracentesis done about a month back, but his belly seems to be getting bigger. The patient reports that he had an endoscopy in 2017 and post that had been put on Cipro for \"an infection,\" which he has been taking on a regular basis. The patient reports that he was in the ER a few days back. There were concerns that he may have some SBP  and he was put on Cipro as per Dr. Rita Case  recommendation. The patient reports he has been taking his medications on a regular basis. The patient denies any other complaints or problems. Denies any headache, blurry vision, sore throat, trouble swallowing, trouble with speech. The patient was told that he has significantly cut down on his alcohol consumption, but still drinks about 8-10 beers on a daily basis.   The patient denies any headache, blurry vision, sore throat, trouble swallowing, trouble with speech, any chest pain, shortness of breath, cough, fever, chills, urinary symptoms, focal or generalized neurological weakness, recent travel, sick contacts falls, injuries or any other concerns or problems. PAST MEDICAL HISTORY:  See above. HOME MEDICATIONS  1. Ciprofloxacin 500 mg daily. 2.  Glimepiride 1 mg daily. 3.  Lasix 20 mg daily. 4.  Entresto 24/26 mg b.i.d.  5.  Thiamine. 6.  Folic acid. 7.  Toprol 25 daily. 8.  Amiodarone 200 mg nightly. 9. Tradjenta 5 mg daily. 10.  Multivitamin. 11.  Digoxin  0.125 mg p.o. daily. SOCIAL HISTORY:  Former smoker, used to smoke 1 pack per day for five years, quit in 2013. There is no alcohol. Denies any drug abuse. FAMILY HISTORY:  Mother history of heart disease and heart failure. One brother has a history of heart disease and heart failure. history of heart failure. CODE STATUS:  FULL. REVIEW OF SYSTEMS:  All systems were reviewed and were found to be essentially negative except for the symptoms mentioned above. PHYSICAL EXAMINATION:  VITAL SIGNS:  Temperature 98.1, pulse 71, respiratory rate 11, blood pressure 112/45,  pulse oximetry  97% on room air. GENERAL:  Alert x3, awake, mildly distressed, pleasant male, appears to be stated age. HEENT:  Pupils equal and reactive to light. Dry mucous membranes. Tympanic membranes clear. NECK:  Supple. CHEST:  Clear to auscultation bilaterally. HEART:  S1, S2 were heard. ABDOMEN:  Soft, mildly distended. Bowel sounds hypoactive, nontender. EXTREMITIES:  No clubbing, no cyanosis, 1+ pitting edema. Left BKA with prosthesis was noted. PSYCHIATRIC:  Pleasant mood and affect. Cranial nerves II-XII grossly intact. Sensory grossly within normal limits. DTR 2+, strength 5. SKIN:  Warm. LABORATORY DATA:  White count 5.1, hemoglobin 7.9, hematocrit 41.9, platelets 24,284. INR 1.4.   Sodium 128, potassium 4.4, chloride 95, bicarbonate 24, glucose 114, BUN 32, creatinine 1.34. Sodium 128, potassium 4.4, chloride 95, bicarbonate 24, glucose 114, BUN 30, creatinine 1.34, calcium 8.7, bilirubin total 4.8, ALT 30, AST 66, alkaline phosphatase 114 lactic acid 1.6. Stool occult blood is positive. ASSESSMENT AND PLAN  1. Gastrointestinal bleeding, most likely variceal secondary to alcoholic liver disease and cirrhosis. We will admit the patient on a telemetry bed. Start Protonix and octreotide. Dr. Maria C Dumont has been consulted and he will probably perform an EGD today or tomorrow. We will await EGD results. We will keep patient n.p.o. for now. Patient hypotensive when I went into the room. Blood pressure systolic is around upper 97A, but will transfuse 2 units PRBC stat. We will monitor the patient in a critical care setting and will await GI input. Further intervention will be per hospital course. We will continue to monitor and reassess as needed. We will continue octreotide. 2.  Alcoholic liver disease with cirrhosis. The patient appears to have mild distention of the belly. May have some ascites. We will get a CT of the abdomen and pelvis, n.p.o. for now  and we will provide close monitoring. GI has been consulted. Their recommendations will be incorporated. Further intervention will be per hospital course. Liver enzymes slightly elevated, probably secondary to the same. Patient continues to drink in spite of his multiple comorbidities. Continue to monitor. 3.  Anemia, acute on chronic, most likely secondary to blood loss due to gastrointestinal bleed. We will transfuse 2 units PRBC. Await endoscopy. Further intervention will be per hospital course. Reassess as needed. Continue to monitor H and H q.8h.  4.  Thrombocytopenia, most likely secondary to alcohol. We will monitor. 5.  Hyponatremia, most likely secondary to . We will restrict oral fluid intake. Closely monitor sodium levels. Could be hypervolemic secondary to ascites, but with patient being hypertensive, not possible to diurese. We will start patient on albumin for now for volume resuscitation and water restriction will hopefully help. We will provide neurovascular checks and continue to closely monitor. Repeat BMP in 12 hours. Further intervention will be per hospital course. Reassess as needed. 6.  History of atrial fibrillation. Currently in atrial fibrillation, but rate controlled. Continue to monitor and continue home medication. 7.  Diabetes. The patient will be on sliding scale NovoLog insulin, Accu-Cheks, diet control. Continue to monitor. 8.  Congestive heart failure. Does not appear to be short of breath and having pulmonary edema at this point in time. Closely monitor volume status with strict I's and O's for the duration of her hospital course. Reassess as needed. 9.  GI and DVT prophylaxis. The patient will be on sequential compression devices.       MD Davina Washington / [email or web address]  D: 01/23/2018 10:08     T: 01/23/2018 11:09  JOB #: [id number]

## 2018-01-24 NOTE — PROGRESS NOTES
Hospitalist Progress Note      Hospital summary: 59 y.o man with alcoholic cirrhosis, systolic CHF, alcohol abuse, afib, pacemaker placement, who presented on 1/23/2018 with hematemesis. Assessment/Plan:  Variceal bleeding with acute blood loss anemia: (POA)   -s/p EGD w/ banding 1/23  -continue octreotide and PPI per hepatology  -monitor H/H    Alcohol-related cirrhosis:  -resume diuretics when appropriate    Afib:  -continue amiodarone and digoxin    Type 2 DM:  -continue SSI  -hold glimepiride    HTN:  -stable off meds currently    Hyponatremia: likely due to cirrhosis; monitor. Code status: full  DVT prophylaxis: SCDs  Disposition: home when medically appropriate  ----------------------------------------------    CC: f/u bleeding    S: no further bleeding, denies pain, dyspnea, n/v/d     Review of Systems:  Pertinent items are noted in HPI.     O:  Visit Vitals    /50    Pulse 65    Temp 97.8 °F (36.6 °C)    Resp 11    Ht 5' 10\" (1.778 m)    Wt 103 kg (227 lb 1.2 oz)    SpO2 94%    BMI 32.58 kg/m2       PHYSICAL EXAM:  Gen: NAD, non-toxic  HEENT: anicteric sclerae, pale conjunctiva, oropharynx clear, MM moist  Neck: supple, trachea midline, no adenopathy  Heart: RRR, no MRG, no JVD, no peripheral edema  Lungs: CTA b/l, non-labored respirations  Abd: soft, NT, mildly distended, BS+  Extr: warm  Skin: dry, no rash  Neuro: CN II-XII grossly intact, normal speech, moves all extremities  Psych: normal mood, appropriate affect      Intake/Output Summary (Last 24 hours) at 01/24/18 1431  Last data filed at 01/24/18 1045   Gross per 24 hour   Intake           2926.7 ml   Output              550 ml   Net           2376.7 ml        Recent labs & imaging reviewed:  Recent Labs      01/24/18   1136  01/24/18   0225   01/23/18   0830   WBC  5.1   --    --   5.1   HGB  8.3*  6.7*   < >  7.9*   HCT  23.1*   --    --   21.9*   PLT  51*   --    --   95*    < > = values in this interval not displayed. Recent Labs      01/24/18 0225 01/23/18   0830   NA  131*  128*   K  4.6  4.4   CL  101  95*   CO2  23  24   BUN  29*  32*   CREA  1.06  1.34*   GLU  132*  114*   CA  7.9*  8.7     Recent Labs      01/24/18 0225 01/23/18   0830   SGOT  49*  66*   ALT  24  30   AP  71  114   TBILI  5.1*  4.8*   TP  5.3*  6.3*   ALB  2.6*  2.5*   GLOB  2.7  3.8     Recent Labs      01/23/18   0830   INR  1.4*   PTP  14.4*   APTT  30.9      No results for input(s): FE, TIBC, PSAT, FERR in the last 72 hours.    Lab Results   Component Value Date/Time    Folate 19.9 12/07/2017 04:25 AM        Lab Results   Component Value Date/Time    Glucose (POC) 149 01/24/2018 11:56 AM    Glucose (POC) 135 01/24/2018 06:16 AM    Glucose (POC) 149 01/23/2018 11:34 PM    Glucose (POC) 154 01/23/2018 06:35 PM    Glucose (POC) 155 01/23/2018 01:15 PM     Lab Results   Component Value Date/Time    Color YELLOW/STRAW 01/09/2018 11:06 PM    Appearance CLEAR 01/09/2018 11:06 PM    Specific gravity 1.012 01/09/2018 11:06 PM    pH (UA) 5.5 01/09/2018 11:06 PM    Protein NEGATIVE  01/09/2018 11:06 PM    Glucose NEGATIVE  01/09/2018 11:06 PM    Ketone NEGATIVE  01/09/2018 11:06 PM    Bilirubin NEGATIVE  01/09/2018 11:06 PM    Urobilinogen 1.0 01/09/2018 11:06 PM    Nitrites NEGATIVE  01/09/2018 11:06 PM    Leukocyte Esterase NEGATIVE  01/09/2018 11:06 PM    Epithelial cells FEW 01/09/2018 11:06 PM    Bacteria NEGATIVE  01/09/2018 11:06 PM    WBC 0-4 01/09/2018 11:06 PM    RBC 0-5 01/09/2018 11:06 PM       Med list reviewed  Current Facility-Administered Medications   Medication Dose Route Frequency    0.9% sodium chloride infusion 250 mL  250 mL IntraVENous PRN    fentaNYL citrate (PF) injection 25 mcg  25 mcg IntraVENous Q4H PRN    amiodarone (CORDARONE) tablet 200 mg  200 mg Oral QHS    digoxin (LANOXIN) tablet 0.125 mg  0.125 mg Oral QHS    folic acid (FOLVITE) tablet 1 mg  1 mg Oral DAILY    multivitamin, tx-iron-ca-min (THERA-M w/ IRON) tablet 1 Tab  1 Tab Oral DAILY    thiamine (B-1) tablet 100 mg  100 mg Oral DAILY    sodium chloride (NS) flush 5-10 mL  5-10 mL IntraVENous Q8H    sodium chloride (NS) flush 5-10 mL  5-10 mL IntraVENous PRN    0.9% sodium chloride infusion  50 mL/hr IntraVENous CONTINUOUS    pantoprazole (PROTONIX) 40 mg in sodium chloride 0.9 % 10 mL injection  40 mg IntraVENous Q12H    octreotide (SANDOSTATIN) 500 mcg in 0.9% sodium chloride 500 mL infusion  50 mcg/hr IntraVENous CONTINUOUS    glucose chewable tablet 16 g  4 Tab Oral PRN    dextrose (D50W) injection syrg 12.5-25 g  12.5-25 g IntraVENous PRN    glucagon (GLUCAGEN) injection 1 mg  1 mg IntraMUSCular PRN    insulin lispro (HUMALOG) injection   SubCUTAneous Q6H    0.9% sodium chloride infusion 250 mL  250 mL IntraVENous PRN    simethicone (MYLICON) 26DJ/0.7RM oral drops 80 mg  1.2 mL Oral Multiple       Care Plan discussed with:  Patient/Family    Layo Chino MD  Internal Medicine  Date of Service: 1/24/2018

## 2018-01-24 NOTE — CONSULTS
[facility]    INTERVAL HISTORY / SUBJECTIVE:  Hospital Day: 2     1/24/2018    Reason For Consult:   Yahir Thompson is a 59 y.o. WHITE OR  male  has a past medical history of Arthritis; Atrial fibrillation ([address] (2014); CHF (congestive heart failure) ([address] Diabetes ([address] Diabetic ulcer of left foot ([address] (2/2015); History of blood transfusion (2015); Hypertension; Liver disease; and Sepsis ([address] (02/2015). is admitted on 1/23/2018 by Garett Chamberlain MD for whom we were asked see in consultation for General ICU Care. Chief complaint of continuous moderate Hematemesis over several hours. Course has been rapidly worsening and is exacerbated by ethanol use cirrhosis but relieved by variceal banding. ROS:  Decrease bleeding - Other than noted above, a 12 point review of systems is negative for any other constitutional, opthalmologic, ENT, cardiovascular, pulmonary, gastrointestinal, urinary, neurologic, psychiatric, lymphatic, hematologic, oncologic,  integument or musculoskeletal issues. ICU bed 8    852 AM    Mr. Mame Rivas is a 59-year-old gentleman admitted with variceal bleed requiring intervention by GI. Findings of variceal bleed requiring banding. He is in the ICU receiving blood for anemia with hemoglobin down to 6.7. We'll replenish blood and reassess need for ICU once he has stabilized. Mild hyponatremia is noted. Add on magnesium and phosphorus to blood in labs. Continue ICU support otherwise. Continue octreotide PPI and keep n.p.o. until discuss with hepatologist to ensure that they did not want to reinterrogate his gastrointestinal tract.  If bleeding recurs consideration for transjugular intrahepatic portosystemic shunt      ASSESSMENT and PLAN:    · GI [id number]-navyqusy by Dr. Denilson Bennett - octreotide -PPI  · Blood loss anemia-transfuse-monitor  · Alcohol use with liver disease and cirrhosis-watch for withdrawal  · Thrombocytopenia-due to above monitor  · Hyponatremia-mild due to the above monitor  · History of atrial fibrillation-controlled rate poor anticoagulation candidate  · Diabetes-control blood sugar sliding scale    Hospital Problems  Date Reviewed: 2018          Codes Class Noted POA    * (Principal)GI bleed ICD-10-CM: K92.2  ICD-9-CM: 578.9  2018 Unknown              Medical Decision Making Today  · I have reviewed the flowsheet and previous days notes  · One or more chronic illnesses with severe exacerbation, progression or side effects of treatment  · Review and Order of Radiology tests  · Review and Order of Medicine tests  · I have personally reviewed the patients ECG / Tele       VITALS    Visit Vitals    /45    Pulse 68    Temp 97.7 °F (36.5 °C)    Resp 11    Ht 5' 10\" (1.778 m)    Wt 103 kg (227 lb 1.2 oz)    SpO2 97%    BMI 32.58 kg/m2        Temp (24hrs), Av °F (36.7 °C), Min:97.7 °F (36.5 °C), Max:98.7 °F (37.1 °C)                   Intake/Output Summary (Last 24 hours) at 18 1132  Last data filed at 18 1045   Gross per 24 hour   Intake          3764.64 ml   Output              550 ml   Net          3214.64 ml         Other:     GEN: Nontoxic Nondistressed   EYE: Anicteric Noninjected   ENT: Moist No Thrush   CARD: Regular No murmurs   RESP: Clear Equal BS   GI: NABS Nontender   : Clear Urine Normal Genitalia   SKEL: WD WN No Clubbing   SKIN: Perfused No drug rash   NEURO: A & O x3 Nonfocal   PSYCH: Nonagitated Good Insight   LYMPH: No GINA No edema       Recent Labs      18   0830   NA  131*  128*   K  4.6  4.4   CL  101  95*   CO2  23  24   BUN  29*  32*   CREA  1.06  1.34*   GLU  132*  114*   CA  7.9*  8.7   LAC   --   1.6   TBILI  5.1*  4.8*   ALT  24  30   SGOT  49*  66*   AP  71  114   TP  5.3*  6.3*   ALB  2.6*  2.5*   GLOB  2.7  3.8       Recent Labs      18   0830   WBC   --    --   5.1   HGB  6.7*   < >  7.9*   PLT   --    --   95*   INR --    --   1.4*   APTT   --    --   30.9    < > = values in this interval not displayed. No results for input(s): PHI, PCO2I, PO2I, HCO3I, FIO2I in the last 72 hours. XRAY Result:    Results from Hospital Encounter encounter on 01/09/18   XR CHEST PORT   Narrative Clinical history: Cirrhosis    INDICATION: Cirrhosis, sepsis    COMPARISON: 12/6/2017    FINDINGS:   AP semiupright view of the chest is obtained . The cardiopericardial silhouette is stable. There is no pleural effusion,  pneumothorax or focal consolidation present. No acute osseous finding. Dual-lead  cardiac pacer. Impression IMPRESSION:  No acute thoracic disease. CT Result:    Results from Hospital Encounter encounter on 01/23/18   CT ABD PELV WO CONT   Narrative EXAM:  CT ABD PELV WO CONT    INDICATION: Diarrhea. History of diabetes, cirrhosis, atrial fibulation. COMPARISON: 1.9.2018 and 5/25/2017    CONTRAST:  None. TECHNIQUE:   Thin axial images were obtained through the abdomen and pelvis. Coronal and  sagittal reconstructions were generated. Oral contrast was not administered. CT  dose reduction was achieved through use of a standardized protocol tailored for  this examination and automatic exposure control for dose modulation. The absence of intravenous contrast material reduces the sensitivity for  evaluation of the solid parenchymal organs of the abdomen. FINDINGS:   LUNG BASES: Right basilar subsegmental atelectasis  INCIDENTALLY IMAGED HEART AND MEDIASTINUM: Unremarkable. Cardiac device in  place. LIVER: Macrolobulated, small, and heterogeneous. GALLBLADDER: Dependent gallstones (axial image 45). SPLEEN: No mass. Mild splenic enlargement. PANCREAS: No mass or ductal dilatation. ADRENALS: Unremarkable. KIDNEYS/URETERS: No mass, calculus, or hydronephrosis. STOMACH: Unremarkable. SMALL BOWEL: No dilatation or wall thickening. COLON: No dilatation or wall thickening.   APPENDIX: Unremarkable. PERITONEUM: Massive, increased, ascites with density measurement of 1 HU  RETROPERITONEUM: No lymphadenopathy or aortic aneurysm. REPRODUCTIVE ORGANS: Mild prostate enlargement. URINARY BLADDER: No mass or calculus. BONES: No destructive bone lesion. Endplate irregularity at the L3-4 level,  unchanged as compared to May, 2017. Possible vacuum phenomena. Erosive changes  L4-5 facets bilaterally. ADDITIONAL COMMENTS: N/A         Impression IMPRESSION:  Cirrhosis with massive, increased, ascites and mild splenic enlargement  Incidental mild prostate enlargement  Incidental cholelithiasis          PMH:  has a past medical history of Arthritis; Atrial fibrillation ([facility] (2014); CHF (congestive heart failure) ([facility] Diabetes ([facility] Diabetic ulcer of left foot ([facility] (2/2015); History of blood transfusion (2015); Hypertension; Liver disease; and Sepsis ([facility] (02/2015). PSH:   has a past surgical history that includes hx amputation (Left, 2/10/2015); hx colonoscopy; and hx implantable cardioverter defibrillator (08/04/2015). FHX: family history includes Heart Disease in his brother; Heart Failure in his brother and brother. SHX: reports that he quit smoking about 5 years ago. He has a 5.00 pack-year smoking history. He has never used smokeless tobacco. He reports that he drinks alcohol. He reports that he does not use illicit drugs.     ALL: No Known Allergies     MEDS:   [x] Reviewed  [] Not reviewed    Current Facility-Administered Medications   Medication Dose Route Frequency    0.9% sodium chloride infusion 250 mL  250 mL IntraVENous PRN    fentaNYL citrate (PF) injection 25 mcg  25 mcg IntraVENous Q4H PRN    amiodarone (CORDARONE) tablet 200 mg  200 mg Oral QHS    digoxin (LANOXIN) tablet 0.125 mg  0.125 mg Oral QHS    folic acid (FOLVITE) tablet 1 mg  1 mg Oral DAILY    multivitamin, tx-iron-ca-min (THERA-M w/ IRON) tablet 1 Tab  1 Tab Oral DAILY    thiamine (B-1) tablet 100 mg  100 mg Oral DAILY    sodium chloride (NS) flush 5-10 mL  5-10 mL IntraVENous Q8H    sodium chloride (NS) flush 5-10 mL  5-10 mL IntraVENous PRN    0.9% sodium chloride infusion  50 mL/hr IntraVENous CONTINUOUS    pantoprazole (PROTONIX) 40 mg in sodium chloride 0.9 % 10 mL injection  40 mg IntraVENous Q12H    octreotide (SANDOSTATIN) 500 mcg in 0.9% sodium chloride 500 mL infusion  50 mcg/hr IntraVENous CONTINUOUS    glucose chewable tablet 16 g  4 Tab Oral PRN    dextrose (D50W) injection syrg 12.5-25 g  12.5-25 g IntraVENous PRN    glucagon (GLUCAGEN) injection 1 mg  1 mg IntraMUSCular PRN    insulin lispro (HUMALOG) injection   SubCUTAneous Q6H    0.9% sodium chloride infusion 250 mL  250 mL IntraVENous PRN    simethicone (MYLICON) 40UQ/8.0KL oral drops 80 mg  1.2 mL Oral Multiple       Isra Kwon MD [facility]

## 2018-01-24 NOTE — PROGRESS NOTES
1900 SHIFT SUMMARY: Patient admitted from ED. Patient is alert and orientedx4. Two units total transfused, HGB level sent after transfusion, then trending q6hrs. Upper endoscopy done today by Dr Negar Griffiths (Hepatology), see note. CAM negative, RASS 0.

## 2018-01-24 NOTE — PROGRESS NOTES
Problem: Falls - Risk of  Goal: *Absence of Falls  Document Stefania Fall Risk and appropriate interventions in the flowsheet.    Outcome: Progressing Towards Goal  Fall Risk Interventions:  Mobility Interventions: Communicate number of staff needed for ambulation/transfer         Medication Interventions: Bed/chair exit alarm    Elimination Interventions: Bed/chair exit alarm

## 2018-01-24 NOTE — PROGRESS NOTES
Care Management Interventions  PCP Verified by CM: Yes  Mode of Transport at Discharge: Other (see comment) (private vehicle)  Discharge Durable Medical Equipment: No (has cane, crutches, shower chair at home)  Physical Therapy Consult: No  Occupational Therapy Consult: No  Speech Therapy Consult: No  Current Support Network: Lives with Spouse (independent with ADLs)  Confirm Follow Up Transport: Family  Plan discussed with Pt/Family/Caregiver: Yes  Freedom of Choice Offered: Yes  [place] Resource Information Provided?: No  Discharge Location  Discharge Placement: Home    CM reviewed chart. Pt is independent with ADLs and IADLs. Pt lives with his wife and 8year old son. Pt has a cane, crutches, and shower chair at home. Pt's PCP is Dr. Heladio Kerr, and he gets his prescriptions filled at his local [facility]. Pt has been to [facility] for inpatient rehab in the past.  Plan is to return home at time of discharge, family will provide transportation home.   YVONNE Giron, ACM

## 2018-01-24 NOTE — PROGRESS NOTES
[address]     MARIAMA King [address] [place] MD [place]ah, [address] [facility], [place], [place]        April MARTÍN Salcedo PA-C Hosey Poisson, [facility]   Toney Bradley, MARTÍN Gonzáles [facility] [place] [facility]    at [facility]    [address] [facility], [address] [place]    [facility], [place]  [address].    [phone]    FAX: [address] [place] [address], [address] [facility], [address]    [phone]    FAX: [phone]     HEPATOLOGY PROGRESS NOTE    I have reviewed the events of the past 24 hours including all physician notes, vital signs, medications the patient has received, laboratory studies and radiology exams. The patient is well known to me and regularly cared for at [address] [address]. He is a 59year old male with cirrhosis of the liver secondary to alcohol. He has continued to consume alcohol even though he knows he has cirrhosis. He was last hospitalized at [facility] for a syncopal episode in 12/2017. He had a positive blood alcohol at that time. He has continued to consume alcohol and tell me that he had some beers while watching the football game this weekend. His blood alcohol was negative in the ED this AM.     He came to the ED because of passing dark blood per rectum. He has not had hematemesis. In the ED he was found to have a drop in HB from 10 to 8 gms and passed more blood. He is alert and oriented. VS are all stable.        ASSESSMENT AND PLAN:  Cirrhosis  This is secondary to alcohol. The CTP score is 11, Child class C, MELD score 22. He is not a candidate for liver transplant because of ongoing alcohol use. Ascites  Massive per CT scan. LVP with cell count ordered. BP and HB have been stable.      GI bleeding  EGD showed multiple medium sized bleeding varices which were banded. The previous last EGD was in 2017. He had varices that were banded at that time. He is on octreotide and IV Protonix. Continue IV albumin which has been shown to reduce mortality from variceal bleeding. Pt has passed fluids but no BM. Improved color over yesterday.      Acute blood loss anemia  HB is up to 8.3 after 2 units. Goal HB just above 8 gms. If acutely rebleeds, needs emergent TIPS.    Thrombocytopenia  This is secondary to cirrhosis. No treatment needed. The platelet count is adequate even with GI bleeding. No platelet transfusion needed at this time.     Hyponatremia  This is secondary to a combination of cirrhosis and diuretics. Hold diuretics. Will give some IV albumin to help treat hyponatremia.     LORENZA  There is mild increase in Screat. Probably related to acute blood loss. Will treat with volume expansion blood and IV albumin.     Alcohol abuse  He continue to consume alcohol knowing he has cirrhosis and complications of cirrhosis. This has been discussed with him numerous times. During the last hospitalization he told me he was done with drinking. But then he had beer this past weekend.       SYSTEM REVIEW:  Constitution systems: Negative for fever, chills, weight gain, weight loss. Eyes: Negative for visual changes. ENT: Negative for sore throat, painful swallowing. Respiratory: Negative for cough, hemoptysis, SOB. Cardiology: Negative for chest pain, palpitations. GI:  Hematochezia (resolved). : Negative for urinary frequency, dysuria, hematuria, nocturia. Skin: Negative for rash. Hematology: Negative for easy bruising, blood clots. Musculo-skeletal: Negative for back pain, muscle pain, weakness. Neurologic: Negative for headaches, dizziness, vertigo, memory problems not related to HE.   Psychology: Negative for anxiety, depression.       FAMILY HISTORY:  The father  of unknown cause.    The mother  of heart disease.    There is no family history of liver disease.        SOCIAL HISTORY:  The patient is .    The patient has 3 children. The patient has never used tobacco products.    The patient consumes 6-10 alcoholic beverages per day.  This has been his long-standing pattern. Shahzad Alegria states he has tried to make a recent effort to cut back, is vague on actual intake. The patient used to work . The patient retired in 2014.       PHYSICAL EXAMINATION:  VS: per nursing note  General:  No acute distress. Eyes:  Sclera anicteric. ENT:  No oral lesions. Nodes:  No adenopathy. Skin:  Spider angiomata. No jaundice. Respiratory:  Lungs clear to auscultation. Cardiovascular:  100% v-paced. Regular heart rate. Abdomen:  Soft non-tender, obvious ascites. Extremities:  No lower extremity edema in right. LLE BKA. Ruthann Matar No muscle wasting. Neurologic:  Alert and oriented. Cranial nerves grossly intact. No asterixis.     LABORATORY:  Results for Greg Boyle (MRN [id number]) as of 1/24/2018 12:31   Ref. Range 1/23/2018 18:51 1/24/2018 02:25 1/24/2018 11:36   WBC Latest Ref Range: 4.1 - 11.1 K/uL   5.1   HGB Latest Ref Range: 12.1 - 17.0 g/dL 8.4 (L) 6.7 (L) 8.3 (L)   PLATELET Latest Ref Range: 150 - 400 K/uL   51 (L)   Sodium Latest Ref Range: 136 - 145 mmol/L  131 (L)    Potassium Latest Ref Range: 3.5 - 5.1 mmol/L  4.6    Chloride Latest Ref Range: 97 - 108 mmol/L  101    CO2 Latest Ref Range: 21 - 32 mmol/L  23    Glucose Latest Ref Range: 65 - 100 mg/dL  132 (H)    BUN Latest Ref Range: 6 - 20 MG/DL  29 (H)    Creatinine Latest Ref Range: 0.70 - 1.30 MG/DL  1.06    Calcium Latest Ref Range: 8.5 - 10.1 MG/DL  7.9 (L)    Bilirubin, total Latest Ref Range: 0.2 - 1.0 MG/DL  5.1 (H)    Protein, total Latest Ref Range: 6.4 - 8.2 g/dL  5.3 (L)    Albumin Latest Ref Range: 3.5 - 5.0 g/dL  2.6 (L)    ALT (SGPT) Latest Ref Range: 12 - 78 U/L  24    AST Latest Ref Range: 15 - 37 U/L  49 (H)    Alk.  phosphatase Latest Ref Range: 45 - 117 U/L  71    RADIOLOGY:  1/23/2018: CT scan abdomen without IV contrast.  Changes consistent with cirrhosis. No liver mass lesions. No dilated bile ducts.   Massive ascites.     Peggie Boas, ACNP-BC  [facility] [place] [facility], [address]  [address].  [phone]

## 2018-01-24 NOTE — PROGRESS NOTES
2000: report received from rosy. 2000: dr Shwetha Jones paged re: neuropathy pain. 1 mg morphine iv ordered. dr Leighann Mcdowell paged re: pt able to take po amio and dig. 2045: 2nd call. Dr Leighann Mcdowell called back. Ok to give iv not po.   2110: dr Shwetha Jones pagesherman. Order for digoxin 100 mcg iv received. 0400: dr Sara Kwong paged re: hgb 6.7. 2 units rbc's ordered.

## 2018-01-24 NOTE — ROUTINE PROCESS
0730 Bedside and Verbal shift change report given to DENIA Elkins RN (oncoming nurse) by Demetris Mayer RN (offgoing nurse). Report included the following information SBAR, Kardex, ED Summary, Procedure Summary, Intake/Output, MAR, Accordion and Recent Results. 1530 Bedside and Verbal shift change report given to [address] (oncoming nurse) by Alcira Gaitan RN (offgoing nurse). Report included the following information SBAR, Kardex, ED Summary, Procedure Summary, Intake/Output, MAR, Accordion and Recent Results.

## 2018-01-25 NOTE — PROGRESS NOTES
[facility]    INTERVAL HISTORY / SUBJECTIVE:  Hospital Day: 3     2018    Patients hemoglobins have been stable. No obvious active bleeding. Paracentesis tolerated. Albumin given. Medicines oral as possible. Octreotide drip may be handled on floor.  Transferred to step down      ASSESSMENT and PLAN:    · GI zxfus-ccwmdgub-hvmxwopx by Dr. Noemi Corey - octreotide -PPI  · Blood loss anemia-transfuse-monitor  · Alcohol use with liver disease and cirrhosis-watch for withdrawal  · Thrombocytopenia-due to above monitor  · Hyponatremia-mild due to the above monitor  · History of atrial fibrillation-controlled rate poor anticoagulation candidate  · Diabetes-control blood sugar sliding scale    Hospital Problems  Date Reviewed: 2018          Codes Class Noted POA    * (Principal)GI bleed ICD-10-CM: K92.2  ICD-9-CM: 578.9  2018 Unknown              Medical Decision Making Today  · I have reviewed the flowsheet and previous days notes  · One or more chronic illnesses with severe exacerbation, progression or side effects of treatment  · Review and Order of Radiology tests  · Review and Order of Medicine tests  · I have personally reviewed the patients ECG / Tele       VITALS    Visit Vitals    /57    Pulse 66    Temp 98.2 °F (36.8 °C)    Resp 10    Ht 5' 10\" (1.778 m)    Wt 103 kg (227 lb 1.2 oz)    SpO2 96%    BMI 32.58 kg/m2        Temp (24hrs), Av.1 °F (36.7 °C), Min:97.7 °F (36.5 °C), Max:98.5 °F (36.9 °C)                   Intake/Output Summary (Last 24 hours) at 18 0859  Last data filed at 18 0400   Gross per 24 hour   Intake           2481.3 ml   Output             5350 ml   Net          -2868.7 ml         Other:     GEN: Nontoxic Nondistressed   EYE: Anicteric Noninjected   ENT: Moist No Thrush   CARD: Regular No murmurs   RESP: Clear Equal BS   GI: NABS Nontender   : Clear Urine Normal Genitalia   SKEL: WD WN No Clubbing   SKIN: Perfused No drug rash NEURO: A & O x3 Nonfocal   PSYCH: Nonagitated Good Insight   LYMPH: No GINA No edema       Recent Labs      01/25/18   0600   01/23/18   0830   NA  136   < >  128*   K  4.2   < >  4.4   CL  105   < >  95*   CO2  23   < >  24   BUN  20   < >  32*   CREA  0.89   < >  1.34*   GLU  124*   < >  114*   CA  8.0*   < >  8.7   MG  1.7   --    --    PHOS  2.2*   --    --    LAC   --    --   1.6   TBILI  4.7*   < >  4.8*   ALT  22   < >  30   SGOT  60*   < >  66*   AP  61   < >  114   TP  5.1*   < >  6.3*   ALB  2.5*   < >  2.5*   GLOB  2.6   < >  3.8    < > = values in this interval not displayed. Recent Labs      01/25/18   0600   01/23/18   0830   WBC  3.0*   < >  5.1   HGB  7.6*   < >  7.9*   PLT  45*   < >  95*   INR   --    --   1.4*   APTT   --    --   30.9    < > = values in this interval not displayed. No results for input(s): PHI, PCO2I, PO2I, HCO3I, FIO2I in the last 72 hours. XRAY Result:    Results from Hospital Encounter encounter on 01/23/18   XR CHEST PORT   Narrative EXAM:  XR CHEST PORT    INDICATION:  dyspnea    COMPARISON:  1/9/2018    FINDINGS: A portable AP radiograph of the chest was obtained at 0350 hours. The  patient is on a cardiac monitor. The pacemaker remains in place. The lung volumes are low. There is increasing opacity at the right lung base  consistent with increasing atelectasis. Impression IMPRESSION:  1. Increasing atelectasis in the right lower lobe. CT Result:    Results from Hospital Encounter encounter on 01/23/18   CT ABD PELV WO CONT   Narrative EXAM:  CT ABD PELV WO CONT    INDICATION: Diarrhea. History of diabetes, cirrhosis, atrial fibulation. COMPARISON: 1.9.2018 and 5/25/2017    CONTRAST:  None. TECHNIQUE:   Thin axial images were obtained through the abdomen and pelvis. Coronal and  sagittal reconstructions were generated. Oral contrast was not administered.  CT  dose reduction was achieved through use of a standardized protocol tailored for  this examination and automatic exposure control for dose modulation. The absence of intravenous contrast material reduces the sensitivity for  evaluation of the solid parenchymal organs of the abdomen. FINDINGS:   LUNG BASES: Right basilar subsegmental atelectasis  INCIDENTALLY IMAGED HEART AND MEDIASTINUM: Unremarkable. Cardiac device in  place. LIVER: Macrolobulated, small, and heterogeneous. GALLBLADDER: Dependent gallstones (axial image 45). SPLEEN: No mass. Mild splenic enlargement. PANCREAS: No mass or ductal dilatation. ADRENALS: Unremarkable. KIDNEYS/URETERS: No mass, calculus, or hydronephrosis. STOMACH: Unremarkable. SMALL BOWEL: No dilatation or wall thickening. COLON: No dilatation or wall thickening. APPENDIX: Unremarkable. PERITONEUM: Massive, increased, ascites with density measurement of 1 HU  RETROPERITONEUM: No lymphadenopathy or aortic aneurysm. REPRODUCTIVE ORGANS: Mild prostate enlargement. URINARY BLADDER: No mass or calculus. BONES: No destructive bone lesion. Endplate irregularity at the L3-4 level,  unchanged as compared to May, 2017. Possible vacuum phenomena. Erosive changes  L4-5 facets bilaterally. ADDITIONAL COMMENTS: N/A         Impression IMPRESSION:  Cirrhosis with massive, increased, ascites and mild splenic enlargement  Incidental mild prostate enlargement  Incidental cholelithiasis          PMH:  has a past medical history of Arthritis; Atrial fibrillation ([address] (2014); CHF (congestive heart failure) ([address] Diabetes ([address] Diabetic ulcer of left foot ([address] (2/2015); History of blood transfusion (2015); Hypertension; Liver disease; and Sepsis ([address] (02/2015). PSH:   has a past surgical history that includes hx amputation (Left, 2/10/2015); hx colonoscopy; and hx implantable cardioverter defibrillator (08/04/2015). FHX: family history includes Heart Disease in his brother; Heart Failure in his brother and brother.      SHX: reports that he quit smoking about 5 years ago. He has a 5.00 pack-year smoking history. He has never used smokeless tobacco. He reports that he drinks alcohol. He reports that he does not use illicit drugs.     ALL: No Known Allergies     MEDS:   [x] Reviewed  [] Not reviewed    Current Facility-Administered Medications   Medication Dose Route Frequency    magnesium sulfate 2 g/50 ml IVPB (premix or compounded)  2 g IntraVENous ONCE    potassium, sodium phosphates (NEUTRA-PHOS) packet 2 Packet  2 Packet Oral QID    pantoprazole (PROTONIX) tablet 40 mg  40 mg Oral ACB&D    0.9% sodium chloride infusion 250 mL  250 mL IntraVENous PRN    fentaNYL citrate (PF) injection 25 mcg  25 mcg IntraVENous Q4H PRN    albumin human 25% (BUMINATE) solution 25 g  25 g IntraVENous Q6H    amiodarone (CORDARONE) tablet 200 mg  200 mg Oral QHS    digoxin (LANOXIN) tablet 0.125 mg  0.125 mg Oral QHS    folic acid (FOLVITE) tablet 1 mg  1 mg Oral DAILY    multivitamin, tx-iron-ca-min (THERA-M w/ IRON) tablet 1 Tab  1 Tab Oral DAILY    thiamine (B-1) tablet 100 mg  100 mg Oral DAILY    sodium chloride (NS) flush 5-10 mL  5-10 mL IntraVENous Q8H    sodium chloride (NS) flush 5-10 mL  5-10 mL IntraVENous PRN    0.9% sodium chloride infusion  50 mL/hr IntraVENous CONTINUOUS    octreotide (SANDOSTATIN) 500 mcg in 0.9% sodium chloride 500 mL infusion  50 mcg/hr IntraVENous CONTINUOUS    glucose chewable tablet 16 g  4 Tab Oral PRN    dextrose (D50W) injection syrg 12.5-25 g  12.5-25 g IntraVENous PRN    glucagon (GLUCAGEN) injection 1 mg  1 mg IntraMUSCular PRN    insulin lispro (HUMALOG) injection   SubCUTAneous Q6H    0.9% sodium chloride infusion 250 mL  250 mL IntraVENous PRN    simethicone (MYLICON) 99AR/3.7QD oral drops 80 mg  1.2 mL Oral Multiple       Young Hannah MD [facility]

## 2018-01-25 NOTE — PROGRESS NOTES
Hospitalist Progress Note      Hospital summary: 59 y.o man with alcoholic cirrhosis, systolic CHF, alcohol abuse, afib, pacemaker placement, who presented on 1/23/2018 with hematemesis. Assessment/Plan:  Variceal bleeding with acute blood loss anemia: (POA)   -s/p EGD w/ banding 1/23  -continue octreotide and PPI per hepatology  -monitor H/H, currently stable    Alcohol-related cirrhosis:  -s/p LVP w/ drain placement  -continue IV albumin  -d/c IV NS  -resume diuretics when appropriate    Afib:  -continue amiodarone and digoxin    Type 2 DM:  -continue SSI  -hold glimepiride    HTN:  -stable off meds currently    Hyponatremia: likely due to cirrhosis; monitor. Code status: full  DVT prophylaxis: SCDs  Disposition: home when medically appropriate  ----------------------------------------------    CC: f/u bleeding    S: no further bleeding, denies pain, dyspnea, n/v/d     Review of Systems:  Pertinent items are noted in HPI.     O:  Visit Vitals    /60    Pulse 63    Temp 98.4 °F (36.9 °C)    Resp 13    Ht 5' 10\" (1.778 m)    Wt 98.3 kg (216 lb 11.4 oz)    SpO2 98%    BMI 31.09 kg/m2       PHYSICAL EXAM:  Gen: NAD, non-toxic  HEENT: anicteric sclerae, pale conjunctiva, oropharynx clear, MM moist  Neck: supple, trachea midline, no adenopathy  Heart: RRR, no MRG, no JVD, no peripheral edema  Lungs: CTA b/l, non-labored respirations  Abd: soft, NT, moderately distended, BS+  Extr: warm  Skin: dry, no rash  Neuro: CN II-XII grossly intact, normal speech, moves all extremities  Psych: normal mood, appropriate affect      Intake/Output Summary (Last 24 hours) at 01/25/18 1552  Last data filed at 01/25/18 1200   Gross per 24 hour   Intake             2300 ml   Output            84815 ml   Net            -8050 ml        Recent labs & imaging reviewed:  Recent Labs      01/25/18   0600  01/25/18   0105   WBC  3.0*  2.6*   HGB  7.6*  7.3*   HCT  21.3*  20.6*   PLT  45*  50* Recent Labs      01/25/18   0600  01/24/18   0225  01/23/18   0830   NA  136  131*  128*   K  4.2  4.6  4.4   CL  105  101  95*   CO2  23  23  24   BUN  20  29*  32*   CREA  0.89  1.06  1.34*   GLU  124*  132*  114*   CA  8.0*  7.9*  8.7   MG  1.7   --    --    PHOS  2.2*   --    --      Recent Labs      01/25/18   0600  01/24/18 0225  01/23/18   0830   SGOT  60*  49*  66*   ALT  22  24  30   AP  61  71  114   TBILI  4.7*  5.1*  4.8*   TP  5.1*  5.3*  6.3*   ALB  2.5*  2.6*  2.5*   GLOB  2.6  2.7  3.8     Recent Labs      01/23/18   0830   INR  1.4*   PTP  14.4*   APTT  30.9      No results for input(s): FE, TIBC, PSAT, FERR in the last 72 hours.    Lab Results   Component Value Date/Time    Folate 19.9 12/07/2017 04:25 AM        Lab Results   Component Value Date/Time    Glucose (POC) 171 01/25/2018 12:06 PM    Glucose (POC) 134 01/25/2018 06:04 AM    Glucose (POC) 140 01/25/2018 01:06 AM    Glucose (POC) 223 01/24/2018 05:15 PM    Glucose (POC) 149 01/24/2018 11:56 AM     Lab Results   Component Value Date/Time    Color YELLOW/STRAW 01/09/2018 11:06 PM    Appearance CLEAR 01/09/2018 11:06 PM    Specific gravity 1.012 01/09/2018 11:06 PM    pH (UA) 5.5 01/09/2018 11:06 PM    Protein NEGATIVE  01/09/2018 11:06 PM    Glucose NEGATIVE  01/09/2018 11:06 PM    Ketone NEGATIVE  01/09/2018 11:06 PM    Bilirubin NEGATIVE  01/09/2018 11:06 PM    Urobilinogen 1.0 01/09/2018 11:06 PM    Nitrites NEGATIVE  01/09/2018 11:06 PM    Leukocyte Esterase NEGATIVE  01/09/2018 11:06 PM    Epithelial cells FEW 01/09/2018 11:06 PM    Bacteria NEGATIVE  01/09/2018 11:06 PM    WBC 0-4 01/09/2018 11:06 PM    RBC 0-5 01/09/2018 11:06 PM       Med list reviewed  Current Facility-Administered Medications   Medication Dose Route Frequency    potassium, sodium phosphates (NEUTRA-PHOS) packet 2 Packet  2 Packet Oral QID    pantoprazole (PROTONIX) tablet 40 mg  40 mg Oral ACB&D    albumin human 25% (BUMINATE) solution 25 g  25 g IntraVENous Q6H    0.9% sodium chloride infusion 250 mL  250 mL IntraVENous PRN    fentaNYL citrate (PF) injection 25 mcg  25 mcg IntraVENous Q4H PRN    amiodarone (CORDARONE) tablet 200 mg  200 mg Oral QHS    digoxin (LANOXIN) tablet 0.125 mg  0.125 mg Oral QHS    folic acid (FOLVITE) tablet 1 mg  1 mg Oral DAILY    multivitamin, tx-iron-ca-min (THERA-M w/ IRON) tablet 1 Tab  1 Tab Oral DAILY    thiamine (B-1) tablet 100 mg  100 mg Oral DAILY    sodium chloride (NS) flush 5-10 mL  5-10 mL IntraVENous Q8H    sodium chloride (NS) flush 5-10 mL  5-10 mL IntraVENous PRN    octreotide (SANDOSTATIN) 500 mcg in 0.9% sodium chloride 500 mL infusion  50 mcg/hr IntraVENous CONTINUOUS    glucose chewable tablet 16 g  4 Tab Oral PRN    dextrose (D50W) injection syrg 12.5-25 g  12.5-25 g IntraVENous PRN    glucagon (GLUCAGEN) injection 1 mg  1 mg IntraMUSCular PRN    insulin lispro (HUMALOG) injection   SubCUTAneous Q6H    0.9% sodium chloride infusion 250 mL  250 mL IntraVENous PRN    simethicone (MYLICON) 35JS/1.4NX oral drops 80 mg  1.2 mL Oral Multiple       Care Plan discussed with:  Patient/Family    Tc Agustin MD  Internal Medicine  Date of Service: 1/25/2018

## 2018-01-25 NOTE — PROGRESS NOTES
[address]  Abimbola Marie MD, FACP, [address], [address] [address]  Natalya Cadena, MARTÍN Leach, KULDEEP SARKAR, ACNP-BC   Parth MARKS, MARTÍN Dunlap, NP   [address] [facility]  [address] [facility], [address] [address]  [place], [address]    [phone]    FAX: [phone] [address]  [address] [facility], [address]  [place], [address] [facility]    [phone]    FAX: [phone]      HEPATOLOGY PROGRESS NOTE  The patient is a 59year old male with cirrhosis of the liver secondary to alcohol. [place] has continued to consume alcohol even though he knows he has cirrhosis. [place] was last hospitalized at [facility] for a syncopal episode in 12/2017. [place] had a positive blood alcohol at that time. [place] has continued to consume alcohol and tell me that he had some beers while watching the football game this weekend. [facility] blood alcohol was negative in the ED this AM.      He came to the ED because of passing dark blood per rectum. [place] has not had hematemesis.  In the ED he was found to have a drop in HB from 10 to 8 gms and passed more blood.  He is alert and oriented.  VS are all stable.      Emergent EGD on Tuesday 1/23 demonstrated esophageal varices which were banded.      ASSESSMENT AND PLAN:  Cirrhosis  This is secondary to alcohol. The CTP score is 11, Child class C, MELD score 22.  He is not a candidate for liver transplant because of ongoing alcohol use.     Ascites  Large volume paracentesis yesterday. Still draining ascites today. Cell counts negative for SBP.     GI bleeding  EGD showed multiple medium sized bleeding varices which were banded.  The previous last EGD was in 6/2017. [place] had varices that were banded at that time. [place] is on octreotide and IV Protonix.  Continue IV albumin which has been shown to reduce mortality from variceal bleeding. Pt has passed fluids but no BM. Improved color over yesterday. Total of 4 units transfused so far. None in past 24 hrs.      Acute blood loss anemia  HB is stable at 7.6 gms. He has had a total of 4 units of blood. Goal HB just above 8 gms. If acutely rebleeds, needs emergent TIPS.     Thrombocytopenia  This is secondary to cirrhosis. No treatment needed. The platelet count is adequate even with GI bleeding. No platelet transfusion needed at this time.      Alcohol abuse  He continue to consume alcohol knowing he has cirrhosis and complications of cirrhosis.  This has been discussed with him numerous times.  During the last hospitalization he told me he was done with drinking.  But then he had beer this past weekend.        PHYSICAL EXAMINATION:  VS: per nursing note  General:  No acute distress. Eyes:  Sclera anicteric. ENT:  No oral lesions.    Nodes:  No adenopathy. Skin:  Spider angiomata.  No jaundice. Respiratory:  Lungs clear to auscultation. Cardiovascular:  100% v-paced. Regular heart rate. Abdomen:  Soft non-tender, obvious ascites. Extremities:  No lower extremity edema in right. LLE BKA. . No muscle wasting. Neurologic:  Alert and oriented.  Cranial nerves grossly intact.  No asterixis.      LABORATORY:  Results for Greg Boyle (MRN [id number]) as of 1/25/2018 14:08   Ref.  Range 1/24/2018 02:25 1/25/2018 06:00   WBC Latest Ref Range: 4.1 - 11.1 K/uL 5.1 3.0 (L)   HGB Latest Ref Range: 12.1 - 17.0 g/dL 6.7 (L) 7.6 (L)   PLATELET Latest Ref Range: 150 - 400 K/uL 51 (L) 45 (LL)   Sodium Latest Ref Range: 136 - 145 mmol/L 131 (L) 136   Potassium Latest Ref Range: 3.5 - 5.1 mmol/L 4.6 4.2   Chloride Latest Ref Range: 97 - 108 mmol/L 101 105   CO2 Latest Ref Range: 21 - 32 mmol/L 23 23   Glucose Latest Ref Range: 65 - 100 mg/dL 132 (H) 124 (H)   BUN Latest Ref Range: 6 - 20 MG/DL 29 (H) 20   Creatinine Latest Ref Range: 0.70 - 1.30 MG/DL 1.06 0.89   Bilirubin, total Latest Ref Range: 0.2 - 1.0 MG/DL 5.1 (H) 4.7 (H)   Albumin Latest Ref Range: 3.5 - 5.0 g/dL 2.6 (L) 2.5 (L)   ALT (SGPT) Latest Ref Range: 12 - 78 U/L 24 22   AST Latest Ref Range: 15 - 37 U/L 49 (H) 60 (H)   Alk.  phosphatase Latest Ref Range: 45 - 117 U/L [address] [place], MD  [facility] [place] [address] [address] Thierry Mcdaniels [address] [place], [place] [address]. [phone]

## 2018-01-25 NOTE — PROGRESS NOTES
0730. Bedside and Verbal shift change report given to Michael Jacques RN by Patric Alfred RN. Report included the following information SBAR, Kardex, ED Summary, OR Summary, Procedure Summary, Intake/Output, MAR, Accordion, Recent Results, Med Rec Status and Cardiac Rhythm paced. 1645. Wife at bedside. States she would like to speak to Dr. Anton Damon regarding patient's status and what the next step or plan will be.

## 2018-01-25 NOTE — PROGRESS NOTES
CM reviewed chart and CM assessment 1/24/18. Patient's plan is to discharge home with his wife, Tom Chavez [phone]. CM will follow for transition of care needs. Patient qualifies for [facility] visit as he is CHF-- will make referral if ordered.

## 2018-01-25 NOTE — PROGRESS NOTES
Rounded on Orthodox patients and provided Anointing of the Sick at request of patient    Fr. Charo Hinojosa

## 2018-01-26 NOTE — PROGRESS NOTES
[address]  Lacy Silvestre MD, FACP, [address], [address] [facility]  Karl Hart, MARTÍN Charles, KULDEEP MURRELLP, ACNP-BC   MARTÍN Leon, MARTÍN   [address] [facility]  [address] [facility], [address] [address]  [place], [address]    [phone]    FAX: Cale [place] [facility] of [facility]  [address], [address]  [place], [address] [facility]    [phone]    FAX: [phone]       HEPATOLOGY PROGRESS NOTE  The patient is a 59year old male with cirrhosis of the liver secondary to alcohol. [facility] has continued to consume alcohol even though he knows he has cirrhosis. [facility] was last hospitalized at [facility] for a syncopal episode in 12/2017. [facility] had a positive blood alcohol at that time. [facility] has continued to consume alcohol and tell me that he had some beers while watching the football game this weekend. [facility] blood alcohol was negative in the ED this AM.      He came to the ED because of passing dark blood per rectum.  He has not had hematemesis.  In the ED he was found to have a drop in HB from 10 to 8 gms and passed more blood. Emergent EGD on 1/23 demonstrated esophageal varices which were banded. NO evidence of any further bleeding in 48 hrs. HB stable at 7.4 gms. Can stop IV octerotide and protonix drips today. He is still draining a lot of ascites. Will restart diuretics step 1 and stopping all the IVs will help. If ascites formation is not reduced may need TIPS. I will not be here Saturday but you can call if any questions. I will see him Sunday. ASSESSMENT AND PLAN:  Cirrhosis  This is secondary to alcohol.  The CTP score is 11, Child class C, MELD score 22.  He is not a candidate for liver transplant because of ongoing alcohol use.      Ascites  Continues to drain a lot of ascites daily. Let's keep catheter in and restart diuretics at step 1 and then increase to step 2 Saturday before we pull catheter.      GI bleeding  EGD showed multiple medium sized bleeding varices which were banded.  No bleeding in past 48 hours since banding. Can stop octreotide and IV Protonix. Continue IV albumin for now.       Acute blood loss anemia  HB is stable at 7.4 gms this AM.  He has had a total of 4 units of blood.        Thrombocytopenia  This is secondary to cirrhosis. No treatment needed. The platelet count is adequate even with GI bleeding. No platelet transfusion needed at this time.      Alcohol abuse  He continue to consume alcohol knowing he has cirrhosis and complications of cirrhosis.  This has been discussed with him numerous times.  During the last hospitalization he told me he was done with drinking.  But then he had beer this past weekend.        PHYSICAL EXAMINATION:  VS: per nursing note  General:  No acute distress. Eyes:  Sclera anicteric. ENT:  No oral lesions.    Nodes:  No adenopathy. Skin:  Spider angiomata.  No jaundice. Respiratory:  Lungs clear to auscultation. Cardiovascular:  100% v-paced. Regular heart rate. Abdomen:  Soft non-tender, obvious ascites. Extremities:  No lower extremity edema in right. LLE BKA. . No muscle wasting. Neurologic:  Alert and oriented.  Cranial nerves grossly intact.  No asterixis.      LABORATORY:  Results for Alex Deras (MRN [id number]) as of 1/26/2018 06:13   Ref.  Range 1/25/2018 06:00 1/25/2018 11:59 1/26/2018 04:35   WBC Latest Ref Range: 4.1 - 11.1 K/uL 3.0 (L)  1.8 (L)   HGB Latest Ref Range: 12.1 - 17.0 g/dL 7.6 (L) 7.4 (L) 7.4 (L)   PLATELET Latest Ref Range: 150 - 400 K/uL 45 (LL)  49 (LL)   Sodium Latest Ref Range: 136 - 145 mmol/L 136  138   Potassium Latest Ref Range: 3.5 - 5.1 mmol/L 4.2  3.9   Chloride Latest Ref Range: 97 - 108 mmol/L 105  106   CO2 Latest Ref Range: 21 - 32 mmol/L 23  24   Glucose Latest Ref Range: 65 - 100 mg/dL 124 (H)  94   BUN Latest Ref Range: 6 - 20 MG/DL 20  11   Creatinine Latest Ref Range: 0.70 - 1.30 MG/DL 0.89  0.86   Bilirubin, total Latest Ref Range: 0.2 - 1.0 MG/DL 4.7 (H)  4.5 (H)   Albumin Latest Ref Range: 3.5 - 5.0 g/dL 2.5 (L)  3.4 (L)   ALT (SGPT) Latest Ref Range: 12 - 78 U/L 22  23   AST Latest Ref Range: 15 - 37 U/L 60 (H)  53 (H)   Alk.  phosphatase Latest Ref Range: 45 - 117 U/L 61  610 Sara Friedman MD  [facility] [place] [address] [address] Thierry Mcdaniels [address] [place] [address] 22.  [phone]

## 2018-01-26 NOTE — PROGRESS NOTES
Hospitalist Progress Note      Hospital summary: 59 y.o man with alcoholic cirrhosis, systolic CHF, alcohol abuse, afib, pacemaker placement, who presented on 1/23/2018 with hematemesis. Assessment/Plan:  Variceal bleeding with acute blood loss anemia: (POA)   -s/p EGD w/ banding 1/23  -off PPI and octreotide  -monitor H/H, currently stable    Alcohol-related cirrhosis:  -s/p LVP w/ drain placement  -continue IV albumin  -resume diuretics    Afib:  -continue amiodarone and digoxin    Type 2 DM:  -continue SSI  -hold glimepiride  -c/o neuropathic pain of the R foot as well as phantom pain of the L stump; trial of gabapentin qhs    HTN:  -stable off meds currently    Hyponatremia: likely due to cirrhosis; monitor. Code status: full  DVT prophylaxis: SCDs  Disposition: home when medically appropriate  ----------------------------------------------    CC: \"neuropathy\"    S: no further bleeding, dyspnea, n/v/d, does report burning right foot pain like pins and needles which is chronic    Review of Systems:  Pertinent items are noted in HPI.     O:  Visit Vitals    /54 (BP 1 Location: Left arm, BP Patient Position: At rest)    Pulse 78    Temp 98.6 °F (37 °C)    Resp 21    Ht 5' 10\" (1.778 m)    Wt 91.8 kg (202 lb 6.1 oz)    SpO2 95%    BMI 29.04 kg/m2       PHYSICAL EXAM:  Gen: NAD, non-toxic  HEENT: anicteric sclerae, pale conjunctiva, oropharynx clear, MM moist  Neck: supple, trachea midline, no adenopathy  Heart: RRR, no MRG, no JVD, no peripheral edema  Lungs: CTA b/l, non-labored respirations  Abd: soft, NT, mildly distended, BS+  Extr: warm, L BKA  Skin: dry, no rash  Neuro: CN II-XII grossly intact, normal speech, moves all extremities  Psych: normal mood, appropriate affect      Intake/Output Summary (Last 24 hours) at 01/26/18 1650  Last data filed at 01/26/18 1600   Gross per 24 hour   Intake              450 ml   Output             7200 ml   Net -6750 ml        Recent labs & imaging reviewed:  Recent Labs      01/26/18   0435  01/25/18   1159  01/25/18   0600   WBC  1.8*   --   3.0*   HGB  7.4*  7.4*  7.6*   HCT  20.9*   --   21.3*   PLT  49*   --   45*     Recent Labs      01/26/18   0435  01/25/18   0600 01/24/18   0225   NA  138  136  131*   K  3.9  4.2  4.6   CL  106  105  101   CO2  24  23  23   BUN  11  20  29*   CREA  0.86  0.89  1.06   GLU  94  124*  132*   CA  8.0*  8.0*  7.9*   MG  1.8  1.7   --    PHOS  3.2  2.2*   --      Recent Labs      01/26/18 0435 01/25/18   0600 01/24/18   0225   SGOT  53*  60*  49*   ALT  23  22  24   AP  53  61  71   TBILI  4.5*  4.7*  5.1*   TP  5.5*  5.1*  5.3*   ALB  3.4*  2.5*  2.6*   GLOB  2.1  2.6  2.7     No results for input(s): INR, PTP, APTT in the last 72 hours. No lab exists for component: INREXT, INREXT   No results for input(s): FE, TIBC, PSAT, FERR in the last 72 hours.    Lab Results   Component Value Date/Time    Folate 19.9 12/07/2017 04:25 AM        Lab Results   Component Value Date/Time    Glucose (POC) 175 01/26/2018 11:54 AM    Glucose (POC) 90 01/26/2018 06:28 AM    Glucose (POC) 127 01/25/2018 11:37 PM    Glucose (POC) 131 01/25/2018 06:23 PM    Glucose (POC) 171 01/25/2018 12:06 PM     Lab Results   Component Value Date/Time    Color YELLOW/STRAW 01/09/2018 11:06 PM    Appearance CLEAR 01/09/2018 11:06 PM    Specific gravity 1.012 01/09/2018 11:06 PM    pH (UA) 5.5 01/09/2018 11:06 PM    Protein NEGATIVE  01/09/2018 11:06 PM    Glucose NEGATIVE  01/09/2018 11:06 PM    Ketone NEGATIVE  01/09/2018 11:06 PM    Bilirubin NEGATIVE  01/09/2018 11:06 PM    Urobilinogen 1.0 01/09/2018 11:06 PM    Nitrites NEGATIVE  01/09/2018 11:06 PM    Leukocyte Esterase NEGATIVE  01/09/2018 11:06 PM    Epithelial cells FEW 01/09/2018 11:06 PM    Bacteria NEGATIVE  01/09/2018 11:06 PM    WBC 0-4 01/09/2018 11:06 PM    RBC 0-5 01/09/2018 11:06 PM       Med list reviewed  Current Facility-Administered Medications Medication Dose Route Frequency    furosemide (LASIX) tablet 40 mg  40 mg Oral DAILY    spironolactone (ALDACTONE) tablet 100 mg  100 mg Oral DAILY    gabapentin (NEURONTIN) capsule 300 mg  300 mg Oral QHS    pantoprazole (PROTONIX) tablet 40 mg  40 mg Oral ACB&D    albumin human 25% (BUMINATE) solution 25 g  25 g IntraVENous Q6H    fentaNYL citrate (PF) injection 25 mcg  25 mcg IntraVENous Q4H PRN    amiodarone (CORDARONE) tablet 200 mg  200 mg Oral QHS    digoxin (LANOXIN) tablet 0.125 mg  0.125 mg Oral QHS    folic acid (FOLVITE) tablet 1 mg  1 mg Oral DAILY    multivitamin, tx-iron-ca-min (THERA-M w/ IRON) tablet 1 Tab  1 Tab Oral DAILY    thiamine (B-1) tablet 100 mg  100 mg Oral DAILY    sodium chloride (NS) flush 5-10 mL  5-10 mL IntraVENous Q8H    sodium chloride (NS) flush 5-10 mL  5-10 mL IntraVENous PRN    glucose chewable tablet 16 g  4 Tab Oral PRN    dextrose (D50W) injection syrg 12.5-25 g  12.5-25 g IntraVENous PRN    glucagon (GLUCAGEN) injection 1 mg  1 mg IntraMUSCular PRN    insulin lispro (HUMALOG) injection   SubCUTAneous Q6H    simethicone (MYLICON) 52XB/5.1RH oral drops 80 mg  1.2 mL Oral Multiple       Care Plan discussed with:  Patient/Family    Pollyann Holter, MD  Internal Medicine  Date of Service: 1/26/2018

## 2018-01-26 NOTE — PROGRESS NOTES
0930 Bedside and Verbal shift change report given to Racheal Matos RN (oncoming nurse) by Tex Sandoval RN (offgoing nurse). Report included the following information SBAR, Kardex, Procedure Summary, Intake/Output, MAR, Recent Results and Cardiac Rhythm PACED.

## 2018-01-27 NOTE — PROGRESS NOTES
Problem: Mobility Impaired (Adult and Pediatric)  Goal: *Acute Goals and Plan of Care (Insert Text)  Physical Therapy Goals  Initiated 1/27/2018  1. Patient will move from supine to sit and sit to supine  in bed with modified independence within 7 day(s). 2.  Patient will transfer from bed to chair and chair to bed with modified independence using the least restrictive device within 7 day(s). 3.  Patient will perform sit to stand with modified independence within 7 day(s). 4.  Patient will ambulate with modified independence for 200 feet with the least restrictive device within 7 day(s). 5.  Patient will ascend/descend 14 stairs with 1 handrail(s) with modified independence within 7 day(s). physical Therapy EVALUATION  Patient: Selene Guzman (13 y.o. male)  Date: 1/27/2018  Primary Diagnosis: GI bleed  bleed  Procedure(s) (LRB):  ESOPHAGOGASTRODUODENOSCOPY (EGD)@ BEDSIDE (N/A)  ENDOSCOPIC BANDING OR LIGATION (N/A) 4 Days Post-Op   Precautions:   Fall (old L BKA with prosthesis)    ASSESSMENT :  Based on the objective data described below, the patient presents with slight decline in functional mobility secondary to generalized weakness from hospitalization. Prior to admit patient was independent with mobility and had BKA approx 3 year prior. Lives in a 2 level home with approx 3 EDNA and reports no difficulty with stairs prior. Currently needing CGA for sit to stand transfers and amb approx 120 feet without an assistive device with CGA. Patient has mild path deviation and instability but attributes this to being in bed for prolonged period. Patient returned to room and positioned up in chair. Anticipate that as patient medical condition improves his mobility will return to baseline. Do not anticipate he will need HH PT but will continue to follow 1-2 more session to ensure independence with mobility. Patient will benefit from skilled intervention to address the above impairments.   Patients rehabilitation potential is considered to be Good  Factors which may influence rehabilitation potential include:   []         None noted  [x]         Mental ability/status  []         Medical condition  []         Home/family situation and support systems  []         Safety awareness  []         Pain tolerance/management  []         Other:      PLAN :  Recommendations and Planned Interventions:  [x]           Bed Mobility Training             []    Neuromuscular Re-Education  [x]           Transfer Training                   []    Orthotic/Prosthetic Training  [x]           Gait Training                         []    Modalities  [x]           Therapeutic Exercises           []    Edema Management/Control  [x]           Therapeutic Activities            [x]    Patient and Family Training/Education  []           Other (comment):    Frequency/Duration: Patient will be followed by physical therapy  4 times a week to address goals. Discharge Recommendations: Home Health VS None  Further Equipment Recommendations for Discharge: TBD     SUBJECTIVE:   Patient stated I get around well normally.     OBJECTIVE DATA SUMMARY:   HISTORY:    Past Medical History:   Diagnosis Date    Arthritis     Atrial fibrillation ([facility] [address] 2014    CHF (congestive heart failure) [facility] [address] [facility]     Diabetes ([facility] [address] [facility]     Diabetic ulcer of left foot ([facility] [address] 2/2015 2/2015 Lt BKA    History of blood transfusion 2015    During Lt BKA; pt denies any adverse reaction    Hypertension     Liver disease     CIRRHOSIS    Sepsis ([facility] [address] 02/2015    From diabetic Left foot wound;  had a BKA ;  as of 11/21/16 pt states back to his baseline      Past Surgical History:   Procedure Laterality Date    HX AMPUTATION Left 2/10/2015    BKA    HX COLONOSCOPY      HX IMPLANTABLE CARDIOVERTER DEFIBRILLATOR  08/04/2015    [employer] [facility] cardio defibrillator; Dr Dulce Bangura; as of 11/21/16 pt denies defibrillator going off     Prior Level of Function/Home Situation: Independent with mobility at baseline. Lives with family. Personal factors and/or comorbidities impacting plan of care:     Home Situation  Home Environment: Private residence  # Steps to Enter: 3  Rails to Enter: Yes  One/Two Story Residence: Two story  Living Alone: No  Support Systems: Child(bala), [employer] / david community, Family member(s), Friends \ neighbors  Patient Expects to be Discharged to[de-identified] Private residence  Current DME Used/Available at Home: [address], [place]    EXAMINATION/PRESENTATION/DECISION MAKING:   Critical Behavior:  Neurologic State: Alert  Orientation Level: Oriented X4  Cognition: Follows commands     Hearing: Auditory  Auditory Impairment: Hard of hearing, bilateral    Range Of Motion:  AROM: Generally decreased, functional                       Strength:    Strength: Generally decreased, functional         Functional Mobility:  Bed Mobility:   not tested           Transfers:  Sit to Stand: Contact guard assistance  Stand to Sit: Contact guard assistance                       Balance:   Sitting: Intact  Standing: Impaired  Standing - Static: Good  Standing - Dynamic : Fair  Ambulation/Gait Training:  Distance (ft): 120 Feet (ft)  Assistive Device: Gait belt  Ambulation - Level of Assistance: Contact guard assistance     Gait Description (WDL): Exceptions to WDL  Gait Abnormalities: Decreased step clearance        Base of Support: Narrowed     Speed/Charlee: Pace decreased (<100 feet/min); Slow  Step Length: Left shortened;Right shortened          Functional Measure:  Barthel Index:    Bathin  Bladder: 10  Bowels: 10  Groomin  Dressin  Feeding: 10  Mobility: 10  Stairs: 5  Toilet Use: 5  Transfer (Bed to Chair and Back): 10  Total: 70       Barthel and G-code impairment scale:  Percentage of impairment CH  0% CI  1-19% CJ  20-39% CK  40-59% CL  60-79% CM  80-99% CN  100%   Barthel Score 0-100 100 99-80 79-60 59-40 20-39 1-19   0   Barthel Score 0-20 20 17-19 13-16 9-12 5-8 1-4 0      The Barthel ADL Index: Guidelines  1. The index should be used as a record of what a patient does, not as a record of what a patient could do. 2. The main aim is to establish degree of independence from any help, physical or verbal, however minor and for whatever reason. 3. The need for supervision renders the patient not independent. 4. A patient's performance should be established using the best available evidence. Asking the patient, friends/relatives and nurses are the usual sources, but direct observation and common sense are also important. However direct testing is not needed. 5. Usually the patient's performance over the preceding 24-48 hours is important, but occasionally longer periods will be relevant. 6. Middle categories imply that the patient supplies over 50 per cent of the effort. 7. Use of aids to be independent is allowed. Rehan Watson., Barthel, TOMMIE. (7077). Functional evaluation: the Barthel Index. [facility] (14)2. Chandrika Hwang mague FRANSISCO Banks, Estelle Larson., Liang Sher., José Schilling, [address] [place] [address] (1999). Measuring the change indisability after inpatient rehabilitation; comparison of the responsiveness of the Barthel Index and Functional Dumas Measure. Journal of Neurology, Neurosurgery, and Psychiatry, 66(4), 467-076. Simona Esteban, N.J.A, CARLOTTA Alexander, & Taniya Goetz, M.A. (2004.) Assessment of post-stroke quality of life in cost-effectiveness studies: The usefulness of the Barthel Index and the EuroQoL-5D. Quality of Life Research, 13, 467-01         G codes: In compliance with CMSs Claims Based Outcome Reporting, the following G-code set was chosen for this patient based on their primary functional limitation being treated: The outcome measure chosen to determine the severity of the functional limitation was the Barthel with a score of 70/100 which was correlated with the impairment scale.     ? Mobility - Walking and Moving Around:     - CURRENT STATUS: CJ - 20%-39% impaired, limited or restricted    - GOAL STATUS: CI - 1%-19% impaired, limited or restricted    - D/C STATUS:  ---------------To be determined---------------        Pain:  Pain Scale 1: Numeric (0 - 10)  Pain Intensity 1: 0              Activity Tolerance:   VSS  Please refer to the flowsheet for vital signs taken during this treatment. After treatment:   [x]         Patient left in no apparent distress sitting up in chair  []         Patient left in no apparent distress in bed  [x]         Call bell left within reach  [x]         Nursing notified  []         Caregiver present  []         Bed alarm activated    COMMUNICATION/EDUCATION:   The patients plan of care was discussed with: Physical Therapist, Occupational Therapist and Registered Nurse. [x]         Fall prevention education was provided and the patient/caregiver indicated understanding. [x]         Patient/family have participated as able in goal setting and plan of care. [x]         Patient/family agree to work toward stated goals and plan of care. []         Patient understands intent and goals of therapy, but is neutral about his/her participation. []         Patient is unable to participate in goal setting and plan of care.     Thank you for this referral.  General Rasp, PT, DPT   Time Calculation: 10 mins

## 2018-01-27 NOTE — ROUTINE PROCESS
0800 Bedside and Verbal shift change report given to Ferny Talbot RN (oncoming nurse) by Muna Carey RN (offgoing nurse). Report included the following information SBAR, Kardex, ED Summary, Procedure Summary, Intake/Output, MAR and Recent Results. 1115 TRANSFER - OUT REPORT:    Verbal report given to Kristen Arauz RN(name) on Wendy Mtz  being transferred to [facility](unit) for routine progression of care       Report consisted of patients Situation, Background, Assessment and   Recommendations(SBAR). Information from the following report(s) SBAR, Kardex, ED Summary, Procedure Summary, Intake/Output, MAR and Recent Results was reviewed with the receiving nurse. Lines:   Peripheral IV 01/23/18 Right Antecubital (Active)   Site Assessment Clean, dry, & intact 1/27/2018  8:00 AM   Phlebitis Assessment 0 1/27/2018  8:00 AM   Infiltration Assessment 0 1/27/2018  8:00 AM   Dressing Status Clean, dry, & intact 1/27/2018  8:00 AM   Dressing Type Transparent 1/27/2018  8:00 AM   Hub Color/Line Status Pink;Capped 1/27/2018  8:00 AM   Action Taken Open ports on tubing capped 1/27/2018  8:00 AM   Alcohol Cap Used Yes 1/27/2018  8:00 AM       Peripheral IV 01/23/18 Left Forearm (Active)   Site Assessment Clean, dry, & intact 1/27/2018  8:00 AM   Phlebitis Assessment 0 1/27/2018  8:00 AM   Infiltration Assessment 0 1/27/2018  8:00 AM   Dressing Status Clean, dry, & intact 1/27/2018  8:00 AM   Dressing Type Transparent 1/27/2018  8:00 AM   Hub Color/Line Status Pink; Infusing;Patent 1/27/2018  8:00 AM   Action Taken Open ports on tubing capped 1/27/2018  8:00 AM   Alcohol Cap Used Yes 1/27/2018  8:00 AM        Opportunity for questions and clarification was provided.       Patient transported with:   Registered Nurse  Tech

## 2018-01-27 NOTE — PROGRESS NOTES
Hospitalist Progress Note      Hospital summary: 59 y.o man with alcoholic cirrhosis, systolic CHF, alcohol abuse, afib, pacemaker placement, who presented on 1/23/2018 with hematemesis. Assessment/Plan:  Variceal bleeding with acute blood loss anemia: (POA)   -s/p EGD w/ banding 1/23  -off PPI and octreotide  -monitor H/H, currently stable    Alcohol-related cirrhosis:  -s/p LVP w/ drain placement  -remove drain today  -stop IV albumin  -continue diuretics    Afib:  -continue amiodarone and digoxin    Type 2 DM:  -continue SSI  -hold glimepiride  -c/o neuropathic pain of the R foot as well as phantom pain of the L stump; increase gabapentin to TID    HTN:  -stable off meds currently    Hyponatremia: likely due to cirrhosis; monitor. Code status: full  DVT prophylaxis: SCDs  Disposition: home when medically appropriate  ----------------------------------------------    CC: \"neuropathy\"    S: no further bleeding, dyspnea, n/v/d, still c/o neuropathic pain which is chronic, feels like qhs gabapentin helped a little    Review of Systems:  Pertinent items are noted in HPI. O:  Visit Vitals    /53 (BP 1 Location: Left arm, BP Patient Position: Sitting; At rest)    Pulse 60    Temp 98.3 °F (36.8 °C)    Resp 16    Ht 5' 10\" (1.778 m)    Wt 92 kg (202 lb 13.2 oz)    SpO2 100%    BMI 29.1 kg/m2       PHYSICAL EXAM:  Gen: NAD, non-toxic  HEENT: anicteric sclerae, pale conjunctiva, oropharynx clear, MM moist  Neck: supple, trachea midline, no adenopathy  Heart: RRR, no MRG, no JVD, no peripheral edema  Lungs: CTA b/l, non-labored respirations  Abd: soft, NT, mildly distended, BS+, RLQ drain in place with small amount of straw-colored fluid  Extr: warm, L BKA  Skin: dry, no rash  Neuro: CN II-XII grossly intact, normal speech, moves all extremities  Psych: normal mood, appropriate affect      Intake/Output Summary (Last 24 hours) at 01/27/18 1604  Last data filed at 01/27/18 1527   Gross per 24 hour   Intake              400 ml   Output             1250 ml   Net             -850 ml        Recent labs & imaging reviewed:  Recent Labs      01/27/18 0458  01/26/18   0435   WBC  3.4*  1.8*   HGB  7.2*  7.4*   HCT  20.3*  20.9*   PLT  48*  49*     Recent Labs      01/27/18   0458  01/26/18   0435  01/25/18   0600   NA  135*  138  136   K  3.5  3.9  4.2   CL  102  106  105   CO2  24  24  23   BUN  11  11  20   CREA  0.92  0.86  0.89   GLU  110*  94  124*   CA  8.1*  8.0*  8.0*   MG  1.5*  1.8  1.7   PHOS  3.0  3.2  2.2*     Recent Labs      01/27/18 0458 01/26/18   0435  01/25/18   0600   SGOT  41*  53*  60*   ALT  23  23  22   AP  53  53  61   TBILI  4.7*  4.5*  4.7*   TP  5.4*  5.5*  5.1*   ALB  3.6  3.4*  2.5*   GLOB  1.8*  2.1  2.6     No results for input(s): INR, PTP, APTT in the last 72 hours. No lab exists for component: INREXT, INREXT   No results for input(s): FE, TIBC, PSAT, FERR in the last 72 hours.    Lab Results   Component Value Date/Time    Folate 19.9 12/07/2017 04:25 AM        Lab Results   Component Value Date/Time    Glucose (POC) 192 01/27/2018 11:46 AM    Glucose (POC) 118 01/27/2018 07:08 AM    Glucose (POC) 135 01/26/2018 09:47 PM    Glucose (POC) 160 01/26/2018 05:08 PM    Glucose (POC) 175 01/26/2018 11:54 AM     Lab Results   Component Value Date/Time    Color YELLOW/STRAW 01/09/2018 11:06 PM    Appearance CLEAR 01/09/2018 11:06 PM    Specific gravity 1.012 01/09/2018 11:06 PM    pH (UA) 5.5 01/09/2018 11:06 PM    Protein NEGATIVE  01/09/2018 11:06 PM    Glucose NEGATIVE  01/09/2018 11:06 PM    Ketone NEGATIVE  01/09/2018 11:06 PM    Bilirubin NEGATIVE  01/09/2018 11:06 PM    Urobilinogen 1.0 01/09/2018 11:06 PM    Nitrites NEGATIVE  01/09/2018 11:06 PM    Leukocyte Esterase NEGATIVE  01/09/2018 11:06 PM    Epithelial cells FEW 01/09/2018 11:06 PM    Bacteria NEGATIVE  01/09/2018 11:06 PM    WBC 0-4 01/09/2018 11:06 PM    RBC 0-5 01/09/2018 11:06 PM Med list reviewed  Current Facility-Administered Medications   Medication Dose Route Frequency    furosemide (LASIX) tablet 40 mg  40 mg Oral DAILY    spironolactone (ALDACTONE) tablet 100 mg  100 mg Oral DAILY    gabapentin (NEURONTIN) capsule 300 mg  300 mg Oral QHS    insulin lispro (HUMALOG) injection   SubCUTAneous AC&HS    pantoprazole (PROTONIX) tablet 40 mg  40 mg Oral ACB&D    fentaNYL citrate (PF) injection 25 mcg  25 mcg IntraVENous Q4H PRN    amiodarone (CORDARONE) tablet 200 mg  200 mg Oral QHS    digoxin (LANOXIN) tablet 0.125 mg  0.125 mg Oral QHS    folic acid (FOLVITE) tablet 1 mg  1 mg Oral DAILY    multivitamin, tx-iron-ca-min (THERA-M w/ IRON) tablet 1 Tab  1 Tab Oral DAILY    thiamine (B-1) tablet 100 mg  100 mg Oral DAILY    sodium chloride (NS) flush 5-10 mL  5-10 mL IntraVENous Q8H    sodium chloride (NS) flush 5-10 mL  5-10 mL IntraVENous PRN    glucose chewable tablet 16 g  4 Tab Oral PRN    dextrose (D50W) injection syrg 12.5-25 g  12.5-25 g IntraVENous PRN    glucagon (GLUCAGEN) injection 1 mg  1 mg IntraMUSCular PRN    simethicone (MYLICON) 28II/8.6JC oral drops 80 mg  1.2 mL Oral Multiple       Care Plan discussed with:  Patient/Family and Nurse    Joselo Gan MD  Internal Medicine  Date of Service: 1/27/2018

## 2018-01-27 NOTE — PROGRESS NOTES
Problem: Falls - Risk of  Goal: *Absence of Falls  Document Stefania Fall Risk and appropriate interventions in the flowsheet. Outcome: Progressing Towards Goal  Fall Risk Interventions:  Mobility Interventions: Patient to call before getting OOB         Medication Interventions: Evaluate medications/consider consulting pharmacy    Elimination Interventions:  Toileting schedule/hourly rounds, Call light in reach, Patient to call for help with toileting needs

## 2018-01-27 NOTE — ROUTINE PROCESS
TRANSFER - IN REPORT:    Verbal report received from [facility] (name) on Nikole Green  being received from ICU (unit) for routine progression of care      Report consisted of patients Situation, Background, Assessment and   Recommendations(SBAR). Information from the following report(s) SBAR, Intake/Output, MAR, Med Rec Status and Cardiac Rhythm Paced was reviewed with the receiving nurse. Opportunity for questions and clarification was provided. Assessment completed upon patients arrival to unit and care assumed.

## 2018-01-27 NOTE — PROGRESS NOTES
0000: Bedside shift change report given to Tesfaye Garner RN (oncoming nurse) by Lorenzo Nelson RN (offgoing nurse). Report included the following information SBAR, Kardex, ED Summary, Procedure Summary, Intake/Output, MAR, Accordion and Recent Results. 0730: Bedside shift change report given to Vignesh Pedroza RN (oncoming nurse) by Tesfaye Garner RN (offgoing nurse). Report included the following information SBAR, Kardex, ED Summary, Procedure Summary, Intake/Output, MAR, Accordion and Recent Results.

## 2018-01-28 PROBLEM — K92.2 GI BLEED: Status: RESOLVED | Noted: 2018-01-01 | Resolved: 2018-01-01

## 2018-01-28 NOTE — ROUTINE PROCESS
Bedside shift change report given to JEAN Stewart (oncoming nurse) by Pura Herrera RN (offgoing nurse). Report included the following information SBAR, Procedure Summary, Intake/Output and Recent Results.

## 2018-01-28 NOTE — ROUTINE PROCESS
1038: CM to assist with discharge planning. Dr. Felice Salas voiced do not delay d/c if OT cannot be set up prior to discharge. 1130:  Pt informed me he does not want PT/OT for home therapy. He voiced \" I have them before and they do not help. I know how to do it from last time. \"  Will discharge patient as pt refused home PT/OT. 1200:  Reviwed discharge instruction with patient and his family. Allow time for questions an answer. Will call md for Gabapenen script. 1244:  Called script in for patient.

## 2018-01-28 NOTE — PROGRESS NOTES
Problem: Falls - Risk of  Goal: *Absence of Falls  Document Stefania Fall Risk and appropriate interventions in the flowsheet.    Outcome: Progressing Towards Goal  Fall Risk Interventions:  Mobility Interventions: Patient to call before getting OOB         Medication Interventions: Patient to call before getting OOB    Elimination Interventions: Call light in reach

## 2018-01-28 NOTE — PROGRESS NOTES
CM received consult for home health services. CM met with patient and his wife at the bedside. Patient declined home health services stating that he did not need home health services at this time. Patient is aware of how to access home health services should they be needed in the future. No further CM action indicated at this time.

## 2018-01-28 NOTE — DISCHARGE INSTRUCTIONS
DISCHARGE SUMMARY from Nurse    PATIENT INSTRUCTIONS:    These are general instructions for a healthy lifestyle:    No smoking/ No tobacco products/ Avoid exposure to second hand smoke  Surgeon General's Warning:  Quitting smoking now greatly reduces serious risk to your health. Obesity, smoking, and sedentary lifestyle greatly increases your risk for illness    A healthy diet, regular physical exercise & weight monitoring are important for maintaining a healthy lifestyle    You may be retaining fluid if you have a history of heart failure or if you experience any of the following symptoms:  Weight gain of 3 pounds or more overnight or 5 pounds in a week, increased swelling in our hands or feet or shortness of breath while lying flat in bed. Please call your doctor as soon as you notice any of these symptoms; do not wait until your next office visit. Recognize signs and symptoms of STROKE:    F-face looks uneven    A-arms unable to move or move unevenly    S-speech slurred or non-existent    T-time-call 911 as soon as signs and symptoms begin-DO NOT go       Back to bed or wait to see if you get better-TIME IS BRAIN. Warning Signs of HEART ATTACK     Call 911 if you have these symptoms:   Chest discomfort. Most heart attacks involve discomfort in the center of the chest that lasts more than a few minutes, or that goes away and comes back. It can feel like uncomfortable pressure, squeezing, fullness, or pain.  Discomfort in other areas of the upper body. Symptoms can include pain or discomfort in one or both arms, the back, neck, jaw, or stomach.  Shortness of breath with or without chest discomfort.  Other signs may include breaking out in a cold sweat, nausea, or lightheadedness. Don't wait more than five minutes to call 911 - MINUTES MATTER! Fast action can save your life. Calling 911 is almost always the fastest way to get lifesaving treatment.  Emergency Medical Services staff can begin treatment when they arrive -- up to an hour sooner than if someone gets to the hospital by car. The discharge information has been reviewed with the patient and spouse. The patient and spouse verbalized understanding. Discharge medications reviewed with the patient and spouse and appropriate educational materials and side effects teaching were provided. ___________________________________________________________________________________________________________________________________   Discharge Instructions       PATIENT ID: Shonda Osborne  MRN: [id number]   YOB: 1960    DATE OF ADMISSION: 1/23/2018  8:08 AM    DATE OF DISCHARGE: 1/28/2018    PRIMARY CARE PROVIDER: Branden Scott MD     ATTENDING PHYSICIAN: Jose Mckeon MD  DISCHARGING PROVIDER: Jose Mckeon MD    To contact this individual call [phone] and ask the  to page. If unavailable ask to be transferred the Adult Hospitalist Department. DISCHARGE DIAGNOSES gastrointestinal bleeding due to esophageal varices    CONSULTATIONS: IP CONSULT TO HEPATOLOGY    PROCEDURES/SURGERIES: Procedure(s) with comments:  ESOPHAGOGASTRODUODENOSCOPY (EGD)@ BEDSIDE - egd / bedside  ENDOSCOPIC BANDING OR LIGATION    PENDING TEST RESULTS:   At the time of discharge the following test results are still pending: n/a    FOLLOW UP APPOINTMENTS:   Follow-up Information     Follow up With Details Comments Contact Info    Branden Scott MD Schedule an appointment as soon as possible for a visit  [address] [place] [address]      Azalia Waldron MD Schedule an appointment as soon as possible for a visit in 1 week for follow-up of liver function [address] [place] [address]  [phone]             ADDITIONAL CARE RECOMMENDATIONS:   Your Lasix dose was increase. Follow-up with your doctors as instructed.     DIET: low sodium (less than 2 grams daily), avoid alcohol completely    ACTIVITY: Activity as tolerated    WOUND CARE: n/a    EQUIPMENT needed: n/a      DISCHARGE MEDICATIONS:   See Medication Reconciliation Form    · It is important that you take the medication exactly as they are prescribed. · Keep your medication in the bottles provided by the pharmacist and keep a list of the medication names, dosages, and times to be taken in your wallet. · Do not take other medications without consulting your doctor. NOTIFY YOUR PHYSICIAN FOR ANY OF THE FOLLOWING:   Fever over 101 degrees for 24 hours. Chest pain, shortness of breath, fever, chills, nausea, vomiting, diarrhea, change in mentation, falling, weakness, bleeding. Severe pain or pain not relieved by medications. Or, any other signs or symptoms that you may have questions about.       DISPOSITION:   x Home With:   OT  PT  HH  RN       SNF/Inpatient Rehab/LTAC    Independent/assisted living    Hospice    Other:     CDMP Checked:   Yes x     PROBLEM LIST Updated:  Yes x       Signed:   Lita Stevens MD  1/28/2018  10:36 AM

## 2018-01-28 NOTE — DISCHARGE SUMMARY
Discharge Summary     Patient: Jennifer Pierce MRN: [id number]  SSN: [id number]    YOB: 1960  Age: 59 y.o. Sex: male       Admit Date: 1/23/2018    Discharge Date: 1/28/2018      Admission Diagnoses: GI bleed    Discharge Diagnoses:   GI bleeding  Esophageal varices, s/p banding  Alcoholic cirrhosis with ascites  Type 2 DM with peripheral neuropathy  Chronic systolic heart failure, NYHA class III  Alcohol abuse  HTN  hypokalemia  History of L BKA      Discharge Condition: Stable    Hospital Course: 59 y.o man with alcoholic cirrhosis, systolic CHF, alcohol abuse, afib, pacemaker placement, who presented on 1/23/2018 with hematemesis. He was found to have esophageal varices     Variceal bleeding with acute blood loss anemia: (POA)   -s/p EGD w/ banding 1/23  -received IV octreotide and albumin  -hemoglobin stable 8.7 at discharge     Alcohol-related cirrhosis:  -s/p LVP w/ drain placement  -furosemide dose increased to 40 mg daily.   -aldactone 100 mg daily started but stopped on discharge due to hypotension: he is on Entresto and metoprolol at home and would not tolerate the addition of Aldactone at this time. He has a history of orthostatic syncope so caution should be taken.     Afib:  -continue amiodarone and digoxin     Type 2 DM:  -started on gabapentin for peripheral neuropathy; this helped his pain     HTN     Hyponatremia: due to cirrhosis    Alcohol dependence: advised to stop drinking completely    Consults: hepatology, pulm/critical care    Significant Diagnostic Studies: see above    Disposition: home with home health    S: fells well today, no dizziness, no pain, no n/v/d, wants to go home.     O:   Visit Vitals    /60 (BP 1 Location: Right arm, BP Patient Position: Sitting)    Pulse 68    Temp 98.2 °F (36.8 °C)    Resp 18    Ht 5' 10\" (1.778 m)    Wt 92.4 kg (203 lb 11.3 oz)    SpO2 98%    BMI 29.23 kg/m2     Gen: chronically-ill appearing, stigmata of chronic liver disease  HEENT: feint icterus  Heart RRR  Lungs CTAB  Abd: soft, moderately distended, bs+  Extr: warm, no edema, L BKA  Neuro: grossly non-focal    Discharge Medications:   Current Discharge Medication List      START taking these medications    Details   gabapentin (NEURONTIN) 300 mg capsule Take 1 Cap by mouth three (3) times daily. Qty: 90 Cap, Refills: 0         CONTINUE these medications which have CHANGED    Details   furosemide (LASIX) 40 mg tablet Take 1 Tab by mouth daily. Qty: 30 Tab, Refills: 0    Associated Diagnoses: Alcoholic cirrhosis of liver with ascites ([address]         CONTINUE these medications which have NOT CHANGED    Details   ciprofloxacin HCl (CIPRO) 500 mg tablet Take 1 Tab by mouth daily. Qty: 90 Tab, Refills: 3      glimepiride (AMARYL) 1 mg tablet Take 1 mg by mouth daily. Associated Diagnoses: Alcoholic cirrhosis of liver with ascites (HCC)      sacubitril-valsartan (ENTRESTO) 24 mg/26 mg tablet Take 1 Tab by mouth two (2) times a day. thiamine (B-1) 100 mg tablet Take 1 Tab by mouth daily. Qty: 30 Tab, Refills: 0      folic acid (FOLVITE) 1 mg tablet Take 1 Tab by mouth daily. Qty: 30 Tab, Refills: 0      metoprolol succinate (TOPROL XL) 25 mg XL tablet Take 0.5 Tabs by mouth daily. Qty: 15 Tab, Refills: 0      amiodarone (CORDARONE) 200 mg tablet Take 200 mg by mouth nightly. multivitamins-minerals-lutein (CENTRUM SILVER) Tab Take 1 Tab by mouth daily. digoxin (LANOXIN) 0.125 mg tablet Take 0.125 mg by mouth nightly.              Follow-up Information     Follow up With Details Comments Contact Info    Perla Nova MD Schedule an appointment as soon as possible for a visit  [address] [place] [address]      Castro Gonzalez MD Schedule an appointment as soon as possible for a visit in 1 week for follow-up of liver function [address] [place] [address]  [phone]            Signed By: Tc Agustin MD January 28, 2018      Greater than 30 minutes spent on discharge management.

## 2018-02-08 PROBLEM — I85.10 SECONDARY ESOPHAGEAL VARICES WITHOUT BLEEDING (HCC): Status: RESOLVED | Noted: 2018-01-01 | Resolved: 2018-01-01

## 2018-02-08 PROBLEM — I85.11 SECONDARY ESOPHAGEAL VARICES WITH BLEEDING (HCC): Status: ACTIVE | Noted: 2018-01-01

## 2018-02-08 NOTE — PROGRESS NOTES
1. Have you been to the ER, urgent care clinic since your last visit? Hospitalized since your last visit? NO    2. Have you seen or consulted any other health care providers outside of the [address] [place] [address] since your last visit? Include any pap smears or colon screening.  No  Chief Complaint   Patient presents with    Follow-up     Visit Vitals    /59 (BP 1 Location: Left arm, BP Patient Position: Sitting)    Pulse 66    Temp 97.5 °F (36.4 °C) (Tympanic)    Wt 217 lb (98.4 kg)    SpO2 100%    BMI 31.14 kg/m2     PHQ over the last two weeks 2/8/2018   Little interest or pleasure in doing things Not at all   Feeling down, depressed or hopeless Not at all   Total Score PHQ 2 0     Learning Assessment 2/8/2018   PRIMARY LEARNER Patient   PRIMARY LANGUAGE ENGLISH   LEARNER PREFERENCE PRIMARY LISTENING   ANSWERED BY patient   RELATIONSHIP SELF

## 2018-02-08 NOTE — MR AVS SNAPSHOT
110 [facility] [id number] 1400 [address] [place] [address] 
[phone] Patient: Vael Kerr MRN: [id number] ODR:8/25/4934 Visit Information Date & Time Provider Department Dept. Phone Encounter #  
 2/8/2018  9:30 AM Cody Spencer, [address] [facility] 219 [id number] Follow-up Instructions Return for [place] f/u after EGD. Routing History Your Appointments 3/2/2018  7:30 AM  
PROCEDURE with Chacho Viera MD  
[address] Marisa Melville) Appt Note: EGD  
 [address] [facility] [id number] [address] [place] [address]  
[phone]  
  
   
 [address] [address]  
  
    
 3/8/2018 12:45 PM  
Follow Up with Cody Spencer NP [address] (Marisa [place]) Appt Note: Follow up [address] [facility] [id number] [facility] [address]  
59 [facility] [place] [address] Upcoming Health Maintenance Date Due  
 LIPID PANEL Q1 1960 MICROALBUMIN Q1 1/17/1968 EYE EXAM RETINAL OR DILATED Q1 1/17/1968 DTaP/Tdap/Td series (1 - Tdap) 1/17/1979 FOOT EXAM Q1 1/31/2016 Influenza Age 5 to Adult 8/1/2017 ZOSTER VACCINE AGE 60> 11/17/2017 HEMOGLOBIN A1C Q6M 7/23/2018 FOBT Q 1 YEAR AGE 50-75 1/23/2019 Allergies as of 2/8/2018  Review Complete On: 2/8/2018 By: Suzie Spencer NP No Known Allergies Current Immunizations  Reviewed on 11/24/2017 Name Date Influenza Vaccine 11/12/2014 Influenza Vaccine PF 11/5/2013  6:09 PM  
 Pneumococcal Vaccine (Unspecified Type) 2/1/2012 Not reviewed this visit You Were Diagnosed With   
  
 Codes Comments Alcoholic cirrhosis of liver with ascites ([facility]    -  Primary ICD-10-CM: K70.31 ICD-9-CM: 571.2 Secondary esophageal varices with bleeding (HCC)     ICD-10-CM: I85.11 ICD-9-CM: 456.20 Vitals BP Pulse Temp Weight(growth percentile) SpO2 BMI  
 124/59 (BP 1 Location: Left arm, BP Patient Position: Sitting) 66 97.5 °F (36.4 °C) (Tympanic) 217 lb (98.4 kg) 100% 31.14 kg/m2 Smoking Status Former Smoker Vitals History BMI and BSA Data Body Mass Index Body Surface Area  
 31.14 kg/m 2 2.2 m 2 Preferred Pharmacy Pharmacy Name Phone [facility] [address] [phone] Your Updated Medication List  
  
   
This list is accurate as of: 2/8/18 10:10 AM.  Always use your most recent med list.  
  
  
  
  
 amiodarone 200 mg tablet Commonly known as:  CORDARONE Take 200 mg by mouth nightly. CENTRUM SILVER Tab tablet Generic drug:  multivitamins-minerals-lutein Take 1 Tab by mouth daily. ciprofloxacin HCl 500 mg tablet Commonly known as:  CIPRO Take 1 Tab by mouth daily. digoxin 0.125 mg tablet Commonly known as:  LANOXIN Take 0.125 mg by mouth nightly. ENTRESTO 24-26 mg tablet Generic drug:  sacubitril-valsartan Take 1 Tab by mouth two (2) times a day. folic acid 1 mg tablet Commonly known as:  [employer] Take 1 Tab by mouth daily. * furosemide 20 mg tablet Commonly known as:  LASIX * furosemide 40 mg tablet Commonly known as:  LASIX Take 1 Tab by mouth daily. gabapentin 300 mg capsule Commonly known as:  NEURONTIN Take 1 Cap by mouth three (3) times daily. glimepiride 1 mg tablet Commonly known as:  AMARYL Take 1 mg by mouth daily. metoprolol succinate 25 mg XL tablet Commonly known as:  TOPROL XL Take 0.5 Tabs by mouth daily. spironolactone 100 mg tablet Commonly known as:  ALDACTONE  
  
 thiamine 100 mg tablet Commonly known as:  B-1 Take 1 Tab by mouth daily. * Notice: This list has 2 medication(s) that are the same as other medications prescribed for you.  Read the directions carefully, and ask your doctor or other care provider to review them with you. We Performed the Following CBC W/O DIFF [33094 CPT(R)] ETHYL ALCOHOL [id number] CPT(R)] HEPATIC FUNCTION PANEL (6) [[id number] Custom] METABOLIC PANEL, BASIC [22996 CPT(R)] PROTHROMBIN TIME + INR [53559 CPT(R)] UPPER GI ENDOSCOPY,DIAGNOSIS [44597 CPT(R)] Comments:  
 Jazmyn. 6-8 weeks from 1/23/18 Follow-up Instructions Return for nicky f/u after EGD. Introducing [facility]! Jose Luis Lopez introduces [employer] patient portal. Now you can access parts of your medical record, email your doctor's office, and request medication refills online. 1. In your internet browser, go to [email or web address] 2. Click on the First Time User? Click Here link in the Sign In box. You will see the New Member Sign Up page. 3. Enter your [employer] Access Code exactly as it appears below. You will not need to use this code after youve completed the sign-up process. If you do not sign up before the expiration date, you must request a new code. · [employer] Access Code: [email or web address] Expires: 2/20/2018  2:42 PM 
 
4. Enter the last four digits of your Social Security Number (xxxx) and Date of Birth (mm/dd/yyyy) as indicated and click Submit. You will be taken to the next sign-up page. 5. Create a [employer] ID. This will be your [employer] login ID and cannot be changed, so think of one that is secure and easy to remember. 6. Create a [employer] password. You can change your password at any time. 7. Enter your Password Reset Question and Answer. This can be used at a later time if you forget your password. 8. Enter your e-mail address. You will receive e-mail notification when new information is available in [address] 9. Click Sign Up. You can now view and download portions of your medical record. 10. Click the Download Summary menu link to download a portable copy of your medical information. If you have questions, please visit the Frequently Asked Questions section of the [employer] website. Remember, [employer] is NOT to be used for urgent needs. For medical emergencies, dial 911. Now available from your iPhone and Android! Please provide this summary of care documentation to your next provider. Your primary care clinician is listed as Bailey Cervantes. If you have any questions after today's visit, please call [phone].

## 2018-02-08 NOTE — PROGRESS NOTES
70 Steffi Durand MD, FACP, [place], [place]       MARTÍN Pacheco PA-C Lovetta Mais, ARIASP-BC   Kimmy Packer, MARTÍN [facility] [address]    at [facility]    [address], [address] [address]  [address].    [phone]    FAX: [address] [place] [address], [address]    [phone]    FAX: [phone]       Patient Care Team:  Alondra Trujillo MD as PCP - General (Family Practice)  Alondra Trujillo MD (Family Practice)  Teola Apley, MD (Gastroenterology)      Problem List  Date Reviewed: 2/8/2018          Codes Class Noted    Secondary esophageal varices with bleeding [facility]) ICD-10-CM: I85.11  ICD-9-CM: 456.20  6/5/0257        Alcoholic cirrhosis of liver with ascites ([facility].) ICD-10-CM: K70.31  ICD-9-CM: 571.2  1/3/2018        Alcohol abuse ICD-10-CM: F10.10  ICD-9-CM: 305.00  1/3/2018        Chronic systolic heart failure ([facility].) (Chronic) ICD-10-CM: S92.63  ICD-9-CM: 428.22  11/21/2017        Neuropathy ICD-10-CM: G62.9  ICD-9-CM: 355.9  3/30/2017        S/P BKA (below knee amputation) ([facility].) ICD-10-CM: L90.431  ICD-9-CM: V49.75  3/30/2017        Hypertension ICD-10-CM: I10  ICD-9-CM: 401.9  3/30/2017        Cardiac defibrillator in place ICD-10-CM: Z95.810  ICD-9-CM: V45.02  3/30/2017        Type II diabetes mellitus ([facility].) ICD-10-CM: E11.9  ICD-9-CM: 250.00  2/1/2015            Zeeshan Rodríguez returns to the [place] [address] for management of cirrhosis secondary to alcohol. The active problem list, all pertinent past medical history, medications, and laboratory findings related to the liver disorder were reviewed with the patient. The patient is a 59 y.o.   male who was first noted to have abnormalities in liver enzymes in 2012. Serologic evaluation for markers of chronic liver disease were all negative. Ultrasound of the liver was performed in 2012. The results of the imaging suggested cirrhosis. A liver biopsy was performed in 2012. The results are not available. The patient was hospitalized January 23rd for GI bleed. He had an EGD with banding performed. The patient denies any alcohol use. Ascites has resolved with diuretics and paracentesis. Lower extremity edema has resolved with current dose of diuretics. Hepatic encephalopathy as not developed to date. The patient has esophageal varices with bleeding. Varices have been treated with band ligation x 2. The patient has no symptoms which could be attributed to the liver disorder. The patient completes all daily activities without any functional limitations. The patient has not experienced fatigue, pain in the right side over the liver, problems concentrating, swelling of the abdomen, swelling of the lower extremities, hematemesis, hematochezia. ALLERGIES  No Known Allergies    MEDICATIONS  Current Outpatient Prescriptions   Medication Sig    gabapentin (NEURONTIN) 300 mg capsule Take 1 Cap by mouth three (3) times daily.  furosemide (LASIX) 40 mg tablet Take 1 Tab by mouth daily.  ciprofloxacin HCl (CIPRO) 500 mg tablet Take 1 Tab by mouth daily.  glimepiride (AMARYL) 1 mg tablet Take 1 mg by mouth daily.  sacubitril-valsartan (ENTRESTO) 24 mg/26 mg tablet Take 1 Tab by mouth two (2) times a day.  folic acid (FOLVITE) 1 mg tablet Take 1 Tab by mouth daily.  metoprolol succinate (TOPROL XL) 25 mg XL tablet Take 0.5 Tabs by mouth daily.  amiodarone (CORDARONE) 200 mg tablet Take 200 mg by mouth nightly.  multivitamins-minerals-lutein (CENTRUM SILVER) Tab Take 1 Tab by mouth daily.  digoxin (LANOXIN) 0.125 mg tablet Take 0.125 mg by mouth nightly.  furosemide (LASIX) 20 mg tablet     spironolactone (ALDACTONE) 100 mg tablet     thiamine (B-1) 100 mg tablet Take 1 Tab by mouth daily. No current facility-administered medications for this visit. SYSTEM REVIEW NOT RELATED TO LIVER DISEASE OR REVIEWED ABOVE:  Constitution systems: Negative for fever, chills, weight gain, weight loss. Eyes: Negative for visual changes. ENT: Negative for sore throat, painful swallowing. Respiratory: Negative for cough, hemoptysis, SOB. Cardiology: Negative for chest pain, palpitations. GI:  Negative for constipation or diarrhea. : Negative for urinary frequency, dysuria, hematuria, nocturia. Skin: Negative for rash. Hematology: Negative for easy bruising, blood clots. Musculo-skeletal: Negative for back pain, muscle pain, weakness. Neurologic: Negative for headaches, dizziness, vertigo, memory problems not related to HE. Psychology: Negative for anxiety, depression. FAMILY HISTORY:  The father  of unknown cause. The mother  of heart disease. There is no family history of liver disease. SOCIAL HISTORY:  The patient is . The patient has 3 children. The patient has never used tobacco products. The patient consumes 6+ alcoholic beverages per day. This has been his long-standing pattern. The patient used to work . The patient retired in . PHYSICAL EXAMINATION:  /59 (BP 1 Location: Left arm, BP Patient Position: Sitting)  Pulse 66  Temp 97.5 °F (36.4 °C) (Tympanic)   Wt 217 lb (98.4 kg)  SpO2 100%  BMI 31.14 kg/m2  General: No acute distress. Eyes: Sclera anicteric. ENT: No oral lesions. Nodes: No adenopathy. Skin: Numerous spider angiomata. No jaundice. No palmar erythema. Respiratory: Lungs clear to auscultation. Cardiovascular: Regular heart rate. No murmurs. No JVD. Abdomen: Soft non-tender. Liver size normal to percussion/palpation. Spleen not palpable.  No obvious ascites. Extremities: No edema. No muscle wasting. No gross arthritic changes. Left BKA. Neurologic: Alert and oriented. Cranial nerves grossly intact. No asterixis. LABORATORY STUDIES:  Liver [address] & Units 1/28/2018 1/27/2018   WBC 4.1 - 11.1 K/uL 2.6 (L) 3.4 (L)   ANC 1.8 - 8.0 K/UL  2.6   HGB 12.1 - 17.0 g/dL 8.7 (L) 7.2 (L)    - 400 K/uL 50 (L) 48 (LL)   INR 0.9 - 1.1       AST 15 - 37 U/L  41 (H)   ALT 12 - 78 U/L  23   Alk Phos 45 - 117 U/L  53   Bili, Total 0.2 - 1.0 MG/DL  4.7 (H)   Bili, Direct 0.00 - 0.40 mg/dL     Albumin 3.5 - 5.0 g/dL  3.6   BUN 6 - 20 MG/DL 11 11   Creat 0.70 - 1.30 MG/DL 1.04 0.92   Na 136 - 145 mmol/L 136 135 (L)   K 3.5 - 5.1 mmol/L 3.4 (L) 3.5   Cl 97 - 108 mmol/L 102 102   CO2 21 - 32 mmol/L 25 24   Glucose 65 - 100 mg/dL 179 (H) 110 (H)   Magnesium 1.6 - 2.4 mg/dL 1.8 1.5 (L)   Ammonia <32 UMOL/L       Liver [facility] Latest Ref Rng & Units 1/26/2018   WBC 4.1 - 11.1 K/uL 1.8 (L)   ANC 1.8 - 8.0 K/UL 1.3 (L)   HGB 12.1 - 17.0 g/dL 7.4 (L)    - 400 K/uL 49 (LL)   INR 0.9 - 1.1      AST 15 - 37 U/L 53 (H)   ALT 12 - 78 U/L 23   Alk Phos 45 - 117 U/L 53   Bili, Total 0.2 - 1.0 MG/DL 4.5 (H)   Bili, Direct 0.00 - 0.40 mg/dL    Albumin 3.5 - 5.0 g/dL 3.4 (L)   BUN 6 - 20 MG/DL 11   Creat 0.70 - 1.30 MG/DL 0.86   Na 136 - 145 mmol/L 138   K 3.5 - 5.1 mmol/L 3.9   Cl 97 - 108 mmol/L 106   CO2 21 - 32 mmol/L 24   Glucose 65 - 100 mg/dL 94   Magnesium 1.6 - 2.4 mg/dL 1.8   Ammonia <32 UMOL/L        SEROLOGIES:  Serologies Latest Ref Rng & Units 3/30/2017   Hep A Ab, Total Negative Positive (A)   Hep B Surface Ag Negative Negative   Hep B Core Ab, Total Negative Negative   Hep B Surface AB QL  Non Reactive   Hep C Ab 0.0 - 0.9 s/co ratio 0.1   Ferritin 30 - 400 ng/mL 399   Iron % Saturation 15 - 55 % 76 (HH)   Alpha-1 antitrypsin level 90 - 200 mg/dL 181     LIVER HISTOLOGY:  5/2017.   FibroScan performed at [place] [address] of [place]. EkPa was 75. Suggested fibrosis level is F4. ENDOSCOPIC PROCEDURES:  2017. EGD performed by [facility]. Medium esophageal varices. Banding performed. Moderate portal gastropathy. 2018. EGD performed by [facility]. Medium esophageal varices. 6 banded. Large blood clot in fundus. RADIOLOGY:  10/2012. Ultrasound of liver. Echogenic nodular consistent with cirrhosis. No liver mass lesions. No dilated bile ducts. No ascites. 2018. CT scan abdomen with IV contrast.  Changes consistent with cirrhosis. No liver mass lesions. No dilated bile ducts. Mild-Moderate ascites. OTHER TESTIN2018. Blood ETOH 102    ASSESSMENT AND PLAN:  Cirrhosis that is secondary to alcohol. Have performed laboratory testing to monitor liver function and degree of liver injury. This included BMP, hepatic panel, CBC with platelet count and INR. The liver transaminases are elevated. The ALP is elevated. Liver function is depressed, platelet count is depressed. The CTP is 7. Child class B. The MELD score is 17. There is an elevation in iron saturation with normal ferritin. The elevation in ferritin is secondary to alcohol use. The patient is developed decompensation of cirrhosis and should ideally be considered as a candidate for liver transplant. The patient is not a candidate for liver transplantation because of recent or ongoing alcohol use. The patient will have been abstinent for 6 months in 2018. If the patient stops consuming alcohol, the acute decompensation may improve, MELD score could improve and he may not require a liver transplant. Hyponatremia is secondary to cirrhosis and diuretics. It is normal last check. Will recheck today. Ascites has resolved with current dose of diuretics. Lower extremity edema has resolved with current dose of diuretics. Hepatic encephalopathy has not developed to date.   There is no need for treatment with lactulose and/or Xifaxan at this time. No need to restrict dietary protein at this time. Esophageal varices with prior bleeding. Varices have been treated with banding. The patient was advised never to consume alcohol again. Vaccination for viral hepatitis B is recommended since the patient has no serologic evidence of previous exposure or vaccination with immunity. Vaccination for viral hepatitis A is not needed. The patient has serologic evidence of prior exposure or vaccination with immunity. All of the above issues were discussed with the patient. All questions were answered. The patient expressed a clear understanding of the above. [facility] [place] [address] in 6 weeks to review all additional lab data, EGD and US findings and to monitor the impact of alcohol cessation.     FIDENCIO Cooper-BC  Liver [place] of [address], [address] [address]  [address].  [phone]

## 2018-02-12 NOTE — TELEPHONE ENCOUNTER
Patient called and indicated that he has gained 30 pounds in 1 week and wanted to know what to do in that regard.

## 2018-02-12 NOTE — TELEPHONE ENCOUNTER
I have doubled his diuretics and sent him for a paracentesis with cell count. Thank you. I left him a VM indicating same with my call back number. He is scheduled for para at 12:30 today. I am also ordering an echo in case he is a TIPS candidate. He needs a follow up with Jazmyn to make the TIPS decision and to recheck labs in about 2 weeks. Thanks.

## 2018-02-12 NOTE — DISCHARGE INSTRUCTIONS
[address] PARACENTESIS DISCHARGE INSTRUCTIONS    General Information:  During this procedure, the doctor will insert a needle into the abdomen to drain fluid. After the procedure, you will be able to take a deep breath much easier. The site of the puncture may ooze the first day. This will decrease and eventually stop. Paracentesis (draining fluid from the abdomen) sometimes makes patients hypotensive (low blood pressure). Your doctor may order for you to receive fluids or albumin (a volume booster) during the procedure through an IV site. Home Care Instructions:  Keep the puncture site clean and dry. No tub baths or swimming until puncture site heals. Showering is acceptable. Resume your normal diet, and resume your normal activity slowly and as you tolerate. If you are short of breath, rest. If shortness of breath does not ease, please call your ordering doctor. Fluid can re-accumulate in the abdomen. If this should occur, your doctor needs to know as you may need to have the procedure done again. Call If:     You should call your Physician and/or the Radiology Nurse if you notice any signs of infection, like pus draining, or if it is swollen or reddened. Also call if you have a fever, or if you are bleeding from the puncture site more than a small amount on the dressing. Call if the puncture site keeps draining fluid. Some oozing is to be expected, but should slow and then stop. Call if you feel like you have pressure in your abdomen. SEEK IMMEDIATE CARE OR CALL 911 IF YOU SUDDENLY HAVE TROUBLE BREATHING, OR IF YOUR LIPS TURN BLUE, OR IF YOU NOTICE BLOOD IN YOUR SPUTUM. Follow-Up Instructions: Please see your ordering doctor as he/she has requested. To Reach Us: Side effects of sedation medications and other medications used today have been reviewed. Notify us of nausea, itching, hives, dizziness, or anything else out of the ordinary.        Should you experience any of these significant changes, please call [phone] between the hours of 7:30 am and 10 pm or [phone] after hours.  After hours, ask the  to page the [address] Technologist, and describe the problem to the technologist.            Date: 2/12/2018  Discharging Nurse: Arely Verdin RN

## 2018-02-17 PROBLEM — K92.2 GI BLEED: Status: ACTIVE | Noted: 2018-01-01

## 2018-02-17 NOTE — ED NOTES
H&H unchanged from previous draw, verbal orders received from Dr. Afsaneh Muhammad to hold blood transfusion.

## 2018-02-17 NOTE — IP AVS SNAPSHOT
1111 [facility] 1400 [address] [place] [address] 
[phone] Patient: Kirk Gamboa MRN: [id number] DHT:0/41/0296 A check suzanne indicates which time of day the medication should be taken. My Medications START taking these medications Instructions Each Dose to Equal  
 Morning Noon Evening Bedtime  
 pantoprazole 40 mg tablet Commonly known as:  PROTONIX Your last dose was: Your next dose is: Take 1 Tab by mouth Before breakfast and dinner. 40 mg CONTINUE taking these medications Instructions Each Dose to Equal  
 Morning Noon Evening Bedtime  
 amiodarone 200 mg tablet Commonly known as:  CORDARONE Your last dose was: Your next dose is: Take 200 mg by mouth nightly. 200 mg CENTRUM SILVER Tab tablet Generic drug:  multivitamins-minerals-lutein Your last dose was: Your next dose is: Take 1 Tab by mouth daily. 1 Tab  
    
   
   
   
  
 ciprofloxacin HCl 500 mg tablet Commonly known as:  CIPRO Your last dose was: Your next dose is: Take 1 Tab by mouth daily. 500 mg  
    
   
   
   
  
 digoxin 0.125 mg tablet Commonly known as:  LANOXIN Your last dose was: Your next dose is: Take 0.125 mg by mouth nightly. 0.125 mg  
    
   
   
   
  
 folic acid 1 mg tablet Commonly known as:  [employer] Your last dose was: Your next dose is: Take 1 Tab by mouth daily. 1 mg  
    
   
   
   
  
 furosemide 40 mg tablet Commonly known as:  LASIX Your last dose was: Your next dose is: Take 2 Tabs by mouth daily. Indications: EDEMA DUE TO HEPATIC CIRRHOSIS  
 80 mg  
    
   
   
   
  
 gabapentin 300 mg capsule Commonly known as:  NEURONTIN Your last dose was: Your next dose is: Take 1 Cap by mouth three (3) times daily. 300 mg  
    
   
   
   
  
 glimepiride 1 mg tablet Commonly known as:  AMARYL Your last dose was: Your next dose is: Take 1 mg by mouth daily. 1 mg  
    
   
   
   
  
 metoprolol succinate 25 mg XL tablet Commonly known as:  TOPROL XL Your last dose was: Your next dose is: Take 0.5 Tabs by mouth daily. 12.5 mg  
    
   
   
   
  
 spironolactone 100 mg tablet Commonly known as:  ALDACTONE Your last dose was: Your next dose is: Take 2 Tabs by mouth daily. Indications: EDEMA DUE TO HEPATIC CIRRHOSIS  
 200 mg  
    
   
   
   
  
 thiamine 100 mg tablet Commonly known as:  B-1 Your last dose was: Your next dose is: Take 1 Tab by mouth daily. 100 mg  
    
   
   
   
  
  
STOP taking these medications ENTRESTO 24-26 mg tablet Generic drug:  sacubitril-valsartan Where to Get Your Medications Information on where to get these meds will be given to you by the nurse or doctor. ! Ask your nurse or doctor about these medications  
  pantoprazole 40 mg tablet

## 2018-02-17 NOTE — ED NOTES
Patient vomiting coffee ground emesis, 100ml noted in emesis bag. MD notified, orders received for reglan.

## 2018-02-17 NOTE — PROCEDURES
[address] Yelena Quiñones MD, [address], [facility] [place]       MARTÍN King PA-C Aloysius Frisk, ACNP-BC   MARTÍN Quiñones NP [address] [facility] 136    at [address], [address] [facility], [address].    [phone]    FAX: [address] [place] [address], [address] [facility], [address]    [phone]    FAX: [phone]       UPPER ENDOSCOPY PROCEDURE NOTE    Rg Jean  1960    INDICATION: Cirrhosis. Screening for esophageal varices with variceal ligation if  indicated. : Makayla Mckeon MD    ANESTHESIA/SEDATION: Versed 6 mg and Fentanyl 125 mcg      PROCEDURE DESCRIPTION:  [employer] consent was obtained from the patient for the procedure. All risks and benefits of the procedure explained. The patient was taken to the endoscopy suite and placed in the left lateral decubitus position. Following sequential administration of sedation to doses as indicated above the endoscope was inserted into the mouth and advanced under direct vision to the second portion of the duodenum. Careful inspection of upper gastrointestinal tract was made as the endoscope was inserted and withdrawn. Retroflexion of the endoscope to view of the cardia of the stomach was performed. The findings and interventions are described below. FINDINGS:  Esophagus:    Normal.    Scars of prior banding. No Meenu Maki tear. Stomach:   Mild portal hypertensive gastropathy of the body of the stomach. Small amount of blood in fundus. No ulcers. No obvious bleeding source. No gastric varices identified. Duodenum:   Normal bulb and second portion    INTERVENTION:   None    COMPLICATIONS: None.   The patient tolerated the procedure well. EBL: Negligible. RECOMMENDATIONS:  Continue IV octrotide for 24 hours  Continue IV protonix for 24 hours  HB Q 12 hrs  Can do to floor.   Does not need ICU bed      Laverne Dominguez MD  2/17/2018  8:56 AM

## 2018-02-17 NOTE — ED NOTES
Hospitalist made aware of patient's increasing K+, hospitalist in agreement with ER MD's orders for albuterol, insulin and D50. MD also notified of pt's throat pain, verbal order received for hurricane spray.

## 2018-02-17 NOTE — ED NOTES
Paged hospitalist for clarification on:    NG removed during endoscopy, order still active. Jazmyn's note states patient does not need ICU bed.

## 2018-02-17 NOTE — ROUTINE PROCESS
TRANSFER - OUT REPORT:    Verbal report given to RN(name) on Jennifer Pierce  being transferred to (unit) for routine progression of care       Report consisted of patients Situation, Background, Assessment and   Recommendations(SBAR). Information from the following report(s) SBAR, ED Summary, Intake/Output, MAR and Recent Results was reviewed with the receiving nurse. Lines:   Peripheral IV 02/17/18 Left Forearm (Active)       Peripheral IV 02/17/18 Right Forearm (Active)   Site Assessment Clean, dry, & intact 2/17/2018  2:45 AM   Phlebitis Assessment 0 2/17/2018  2:45 AM   Infiltration Assessment 0 2/17/2018  2:45 AM   Dressing Status Clean, dry, & intact 2/17/2018  2:45 AM   Dressing Type Transparent 2/17/2018  2:45 AM   Hub Color/Line Status Green;Patent; Flushed 2/17/2018  2:45 AM       Peripheral IV 02/17/18 Left Forearm (Active)   Site Assessment Clean, dry, & intact 2/17/2018  8:40 AM   Phlebitis Assessment 0 2/17/2018  8:40 AM   Infiltration Assessment 0 2/17/2018  8:40 AM   Dressing Status Clean, dry, & intact 2/17/2018  8:40 AM   Dressing Type Transparent 2/17/2018  8:40 AM   Hub Color/Line Status Pink;Flushed;Patent 2/17/2018  8:40 AM        Opportunity for questions and clarification was provided.       Patient transported with:   [employer]

## 2018-02-17 NOTE — PROGRESS NOTES
Hospitalist Progress Note  Radha Swan MD  Answering service: [phone] from in house phone  Cell: [phone]      Date of Service:  2018  NAME:  Diana Sifuentes  :  1960  MRN:  [id number]      Admission Summary:   A 59 y.o. male with past medical history significant for CHF, Defibrillator, HTN, Sepsis, Afib, Cirrhosis, Left BKA who presents from home with chief complaint of hematemsis    Interval history / Subjective:   Abdominal pain, throat pain,      Assessment & Plan:     Upper GI bleeding, with hx of variceal bleeding s/p banding on 18  -continue on octreotide and protonix gtt  -monitor H/H  -INR 1.3, platelet 54, continue monitoring for now  -GI consulted    Alcoholic liver cirrhosis with ascites   -on albumin IV   -he said he had paracentesis on , need large volume paracentesis   -will resume his home lasix and spironolactone when he is stable    Chronic systolic CHF NHYA Class I, EF 35-40% 17, s/p AICD   -stable  -will resume his metoprolol, lasix, spironolactone and entresto once he starts   -monitor I/O     Paroxysmal A Fib, rate controlled  -amiodarone is held due to hematemesis     Hyperkalemia   -receive iv regular insulin with D5W and albuterol   -repeat bmp    DM-II  -continue sliding scale and monitor finger stick glucose    HTN  -BP normal, continue monitoring BP    Hyponatremia due to liver cirrhosis   -monitor electrolyte     CKD stage I  -creatinine normal   -monitor renal function     Chronic thrombocytopenia due to liver cirrhosis  -monitor H/H and platelet    S/p Left BKA   -stable        Code status: Full Code  DVT prophylaxis: SCD    Care Plan discussed with: Patient/Family, Nurse and   Disposition: TBD     Hospital Problems  Date Reviewed: 2018          Codes Class Noted POA    * (Principal)GI bleed ICD-10-CM: K92.2  ICD-9-CM: 578.9  2018 Unknown Vital Signs:    Last 24hrs VS reviewed since prior progress note. Most recent are:  Visit Vitals    /55    Pulse 74    Temp 97.7 °F (36.5 °C)    Resp 13    Ht 5' 10\" (1.778 m)    Wt 97.5 kg (215 lb)    SpO2 97%    BMI 30.85 kg/m2       No intake or output data in the 24 hours ending 02/17/18 0728     Physical Examination:             Constitutional:  No acute distress, cooperative, pleasant    ENT:  Oral mucous moist, oropharynx benign. Neck supple,    Resp:  Decrease bronchial breath sound bilaterally. No wheezing/rhonchi/rales. No accessory muscle use   CV:  Regular rhythm, normal rate, no murmurs, gallops, rubs    GI:  Soft, non distended, non tender. normoactive bowel sounds, no hepatosplenomegaly     Musculoskeletal:  bilateral pretibial and pedal edema    Neurologic:  Moves all extremities. AAOx3, CN II-XII reviewed     Skin:  Good turgor, no rashes or ulcers       Data Review:    Review and/or order of clinical lab test      Labs:     Recent Labs      02/17/18 0613 02/17/18 0422  02/17/18 0238   WBC  5.2   --   5.3   HGB  8.1*  8.8*  8.8*   HCT  23.1*  25.1*  25.3*   PLT  54*   --   57*     Recent Labs      02/17/18 0613 02/17/18 0238   NA  131*  132*   K  5.9*  5.4*   CL  102  100   CO2  22  24   BUN  27*  25*   CREA  1.32*  1.47*   GLU  130*  127*   CA  8.3*  8.6     Recent Labs      02/17/18 0613 02/17/18 0238   SGOT  64*  65*   ALT  27  38   AP  163*  176*   TBILI  3.5*  3.1*   TP  6.2*  6.3*   ALB  2.8*  2.8*   GLOB  3.4  3.5   AML   --   73   LPSE   --   534*     Recent Labs      02/17/18   0238   INR  1.3*   PTP  13.3*   APTT  30.0      No results for input(s): FE, TIBC, PSAT, FERR in the last 72 hours. Lab Results   Component Value Date/Time    Folate 19.9 12/07/2017 04:25 AM      No results for input(s): PH, PCO2, PO2 in the last 72 hours. No results for input(s): CPK, CKNDX, TROIQ in the last 72 hours.     No lab exists for component: CPKMB  No results found for: CHOL, CHOLX, CHLST, CHOLV, HDL, LDL, LDLC, DLDLP, TGLX, TRIGL, TRIGP, CHHD, CHHDX  Lab Results   Component Value Date/Time    Glucose (POC) 237 (H) 01/28/2018 11:35 AM    Glucose (POC) 153 (H) 01/28/2018 06:53 AM    Glucose (POC) 148 (H) 01/27/2018 10:12 PM    Glucose (POC) 109 (H) 01/27/2018 04:34 PM    Glucose (POC) 192 (H) 01/27/2018 11:46 AM     Lab Results   Component Value Date/Time    Color YELLOW/STRAW 01/09/2018 11:06 PM    Appearance CLEAR 01/09/2018 11:06 PM    Specific gravity 1.012 01/09/2018 11:06 PM    pH (UA) 5.5 01/09/2018 11:06 PM    Protein NEGATIVE  01/09/2018 11:06 PM    Glucose NEGATIVE  01/09/2018 11:06 PM    Ketone NEGATIVE  01/09/2018 11:06 PM    Bilirubin NEGATIVE  01/09/2018 11:06 PM    Urobilinogen 1.0 01/09/2018 11:06 PM    Nitrites NEGATIVE  01/09/2018 11:06 PM    Leukocyte Esterase NEGATIVE  01/09/2018 11:06 PM    Epithelial cells FEW 01/09/2018 11:06 PM    Bacteria NEGATIVE  01/09/2018 11:06 PM    WBC 0-4 01/09/2018 11:06 PM    RBC 0-5 01/09/2018 11:06 PM         Medications Reviewed:     Current Facility-Administered Medications   Medication Dose Route Frequency    pantoprazole (PROTONIX) 40 mg in 0.9% sodium chloride (MBP/ADV) 50 mL  8 mg/hr IntraVENous CONTINUOUS    0.9% sodium chloride infusion 250 mL  250 mL IntraVENous PRN    metoclopramide HCl (REGLAN) injection 10 mg  10 mg IntraVENous Q6H    sodium chloride (NS) flush 5-10 mL  5-10 mL IntraVENous Q8H    sodium chloride (NS) flush 5-10 mL  5-10 mL IntraVENous PRN    octreotide (SANDOSTATIN) 500 mcg in 0.9% sodium chloride 500 mL infusion  50 mcg/hr IntraVENous CONTINUOUS    0.9% sodium chloride infusion 250 mL  250 mL IntraVENous PRN    albumin human 25% (BUMINATE) solution 25 g  25 g IntraVENous Q6H    glucose chewable tablet 16 g  4 Tab Oral PRN    dextrose (D50W) injection syrg 12.5-25 g  12.5-25 g IntraVENous PRN    glucagon (GLUCAGEN) injection 1 mg  1 mg IntraMUSCular PRN    insulin lispro (HUMALOG) injection   SubCUTAneous AC&HS    albuterol (PROVENTIL VENTOLIN) nebulizer solution 5 mg  5 mg Nebulization ONCE    insulin regular (NOVOLIN R, HUMULIN R) injection 5 Units  5 Units IntraVENous NOW    dextrose (D50W) injection syrg 25 g  25 g IntraVENous ONCE     Current Outpatient Prescriptions   Medication Sig    furosemide (LASIX) 40 mg tablet Take 2 Tabs by mouth daily. Indications: EDEMA DUE TO HEPATIC CIRRHOSIS    spironolactone (ALDACTONE) 100 mg tablet Take 2 Tabs by mouth daily. Indications: EDEMA DUE TO HEPATIC CIRRHOSIS    gabapentin (NEURONTIN) 300 mg capsule Take 1 Cap by mouth three (3) times daily.  ciprofloxacin HCl (CIPRO) 500 mg tablet Take 1 Tab by mouth daily.  glimepiride (AMARYL) 1 mg tablet Take 1 mg by mouth daily.  sacubitril-valsartan (ENTRESTO) 24 mg/26 mg tablet Take 1 Tab by mouth two (2) times a day.  thiamine (B-1) 100 mg tablet Take 1 Tab by mouth daily.  folic acid (FOLVITE) 1 mg tablet Take 1 Tab by mouth daily.  metoprolol succinate (TOPROL XL) 25 mg XL tablet Take 0.5 Tabs by mouth daily.  amiodarone (CORDARONE) 200 mg tablet Take 200 mg by mouth nightly.  multivitamins-minerals-lutein (CENTRUM SILVER) Tab Take 1 Tab by mouth daily.     digoxin (LANOXIN) 0.125 mg tablet Take 0.125 mg by mouth nightly.     ______________________________________________________________________  EXPECTED LENGTH OF STAY: - - -  ACTUAL LENGTH OF STAY:          0                 Davian Mckinney MD

## 2018-02-17 NOTE — ED TRIAGE NOTES
Pt to ED for c/o vomiting blood starting this evening. States he's vomited 4 times. Thought it was the tomato juice he had drank, but his wife states that it appeared to be blood. Pt dizzy in triage.

## 2018-02-17 NOTE — H&P
[address]  ACUTE CARE HISTORY AND PHYSICAL    Shelby RANKIN  MR#: [id number]  : 1960  ACCOUNT #: [de-identified]   DATE OF SERVICE: 2018    CHIEF COMPLAINT:  Vomiting blood. HISTORY OF PRESENT ILLNESS:  A 49-year-old white male with past medical history of arthritis, atrial fibrillation, CHF, type 2 diabetes mellitus, diabetic ulcer of left foot, status post left BKA, anemia, status post blood transfusion, hypertension, liver cirrhosis sepsis, presented to the Emergency Department from home with chief complaint of vomiting blood (hematemesis). The patient noted onset of symptoms starting yesterday where after dinner reported onset of acute bout of nausea and vomiting, started vomiting blood (dark red in appearance). He reportedly had a recurrent episode of the same. He complained to have worsening abdominal distention over the past 5 days with acute generalized abdominal pain, constant aching without specific alleviating factors, exacerbated with any movement or touch. Patient reported was last hospitalized in [facility] from 2018 to 2018 with diagnosis of GI bleed, esophageal varices status post (requiring banding procedure), alcoholic liver cirrhosis with ascites, alcohol abuse, hypokalemia. The patient underwent EGD and banding on 2018, had been treated with IV octreotide, albumin. Patient underwent drainage procedure with paracentesis. The patient notes he has reduced his alcohol consumption. Patient was seen by hepatologist, Dr. Micki Zapata during noted hospitalization.   Now the patient is not complaining of any dizziness, lightheadedness, focal weakness, numbness, paresthesias, slurred speech, facial droop, headache, neck pain, back pain, chest pain, current shortness of breath, cough, congestion, diarrhea, melena, dysuria, hematuria, calf pain, swelling, edema, fever, chills, rash or other constitutional symptoms other than those mentioned. On arrival in the Emergency Department, initial recorded vital signs:  Blood pressure 115/61, heart rate is 73, respiratory rate of 18, O2 saturation 98% on room air. Hemoglobin 8.8. Per the ED, an NG tube was inserted. ED MD fully discussed the case with hepatologist, Dr. Remy Ross. The patient is now seen for admission to hospitalist service for continued evaluation and treatment. PAST MEDICAL HISTORY:  1. Arthritis. 2.  Atrial fibrillation. 3.  CHF per chart record. 2D echocardiogram 11/21/2017 showed left ventricular ejection fraction 35%-40%. 4.  Type 2 diabetes mellitus with diabetic ulcer, left foot with infection. 5.  Anemia requiring blood transfusion. 6.  Hypertension. 7.  Liver cirrhosis. 8.  Ascites. 9.  Sepsis secondary to diabetic left foot infection. 10.  Alcohol abuse. PAST SURGICAL HISTORY:  1. Left below-the-knee amputation. 2.  Colonoscopy. 3.  Upper GI endoscopy and banding procedure. 4.  Implantable cardioverter defibrillator 08/04/2015. MEDICATIONS:  The full medication list reviewed and noted on chart records. Taking Last Dose Start Date End Date Provider        amiodarone (CORDARONE) 200 mg tablet  2/17/2018  --  --  Historical Provider      Take 200 mg by mouth nightly.      ciprofloxacin HCl (CIPRO) 500 mg tablet  2/16/2018 01/12/18  -- Christine Anderson MD      Take 1 Tab by mouth daily.      digoxin (LANOXIN) 0.125 mg tablet  2/16/2018  --  --  Huyen Larson MD      Take 0.125 mg by mouth nightly.      folic acid (FOLVITE) 1 mg tablet  2/16/2018 11/25/17  --  Christine Paiz V NP      Take 1 Tab by mouth daily.      furosemide (LASIX) 40 mg tablet  2/16/2018 02/12/18  -- Richard Del Castillo NP      Take 2 Tabs by mouth daily.  Indications: EDEMA DUE TO HEPATIC CIRRHOSIS      gabapentin (NEURONTIN) 300 mg capsule  2/17/2018 01/28/18  -- Bridget Jackson MD      Take 1 Cap by mouth three (3) times daily.      glimepiride (AMARYL) 1 mg tablet  2/16/2018 11/14/17  --  Historical Provider      Take 1 mg by mouth daily.      Notes:          metoprolol succinate (TOPROL XL) 25 mg XL tablet  2/16/2018 11/24/17  --  Christine KERR NP      Take 0.5 Tabs by mouth daily.      multivitamins-minerals-lutein (CENTRUM SILVER) Tab  2/16/2018  --  --  Historical Provider      Take 1 Tab by mouth daily.      sacubitril-valsartan (ENTRESTO) 24 mg/26 mg tablet  2/16/2018  --  --  Historical Provider      Take 1 Tab by mouth two (2) times a day.      spironolactone (ALDACTONE) 100 mg tablet  2/16/2018 02/12/18  -- Warren Garcia NP      Take 2 Tabs by mouth daily. Indications: EDEMA DUE TO HEPATIC CIRRHOSIS      thiamine (B-1) 100 mg tablet  2/16/2018 11/25/17  --  Christine KERR NP      Take 1 Tab by mouth daily.           ALLERGIES:  NO KNOWN DRUG ALLERGIES. SOCIAL HISTORY:  Father, history of alcohol abuse. Former smoker of cigarettes, formally quit in 2014. No reports of illicit drugs. FAMILY HISTORY:  Heart disease, brother; heart failure, brother. REVIEW OF SYSTEMS:  All systems reviewed. Pertinent positives were as per HPI, otherwise negative. PHYSICAL EXAMINATION:  VITAL SIGNS:  Temperature 97.7 degrees Fahrenheit, blood pressure 137/54, heart rate 66, respiration rate is 16, O2 saturation 97% on room air. Recorded weight 215 pounds (97.5 kg), recorded height of 5 feet 10 inches tall. GENERAL:  Obese male in no acute respiratory distress. Patient is awake, alert and oriented x3. NEUROLOGIC:  GCS of 15, moves extremities x4. Sensation grossly intact without slurred speech or facial droop. HEENT:  Normocephalic, atraumatic. PERRLA. EOMs intact. Sclerae is anicteric. Conjunctivae clear. NG tube in right naris with dark blood drainage. No rhinorrhea or epistaxis. Oropharynx is clear. Tongue is midline, not edematous. NECK:  Supple without lymphadenopathy, JVD, carotid bruits, thyromegaly.     LYMPHATIC:  Negative cervical or supraclavicular adenopathy. RESPIRATORY:  Lungs clear to auscultation bilaterally. CARDIOVASCULAR:  Heart regular rate and rhythm, normal S1, S2, without murmurs or gallops. GASTROINTESTINAL:  Abdomen distended, protuberant, nontender, no rebound, guarding, rigidity. No auscultated abdominal bruits. No pulsatile mass. Normoactive bowel sounds. BACK:  No CVA tenderness. No step-off deformity. MUSCULOSKELETAL:  Right BKA stump, prosthetic device in place. Negative for right calf tenderness. VASCULAR:  2+ radial, 1+ right dorsalis pedis pulse without cyanosis or clubbing. Has not been edema of the right lower extremity. SKIN:  Warm and dry. LABORATORY DATA:  I reviewed. Sodium 132, potassium 5.4, chloride 100, CO2 of 24, BUN 25, creatinine 1.47, glucose 127, anion gap 8, calcium 8.6, GFR 49, total bilirubin 3.1, total protein 6.3, albumin is 2.8, ALT 38, AST 65, alkaline phosphatase is 176, amylase 73, lipase 534, ammonia is 82. WBC 5.3, hemoglobin 8.8, hematocrit 25.3, platelets 57, neutrophils 81%. INR 1.3, PT of 13.3 and PTT of 30.0. Chest x-ray, portable, no acute process. 12-lead EKG results are reviewed and noted in HPI. IMPRESSION AND PLAN:    1.  GI bleed with hematemesis. Admit to the ICU. The patient is currently on octreotide IV infusion. Consult hepatologist.  Patient n.p.o., IV fluids. Repeat H and H. The patient was placed on albumin IV q.6 hours. Plan for endoscopy. The patient is on continuous Protonix IV infusion. 2.  Generalized abdominal pain, distention. Plan as noted above. NG tube is in place to wall suction. 3.  Nausea, vomiting. Provide Zofran 4 mg IV q.6 hours p.r.n.  4.  Acute hyperkalemia. The patient receiving IV fluid replacement. Following the same, repeat potassium level. Continue telemetry monitoring. 5.  Dehydration. Provide IV fluids as mentioned. 6.  Elevated lipase, possible pancreatitis. Plan of care as noted above.   N.p.o., IV fluid resuscitation. 7.  Hyperammonemia. Concern for hepatic encephalopathy. Currently, patient is oriented. Consult with liver specialist about the same. 8.  Type 2 diabetes mellitus. The patient on Humalog insulin correction coverage, scheduled Accu-Chek. 9.  Chronic systolic CHF. Monitor fluid status closely. Strict I's and O's. 10.  Status post AICD. Continue telemetry. 11.  Hypertension. Monitor blood pressure and provide antihypertensive medications as needed. 12. VTE prophylaxis, SCDs bilateral lower extremities. MD CJ Ponce / Dominique Dean  D: 02/17/2018 06:39     T: 02/17/2018 09:27  JOB #: [id number]

## 2018-02-17 NOTE — ED NOTES
Patient is currently resting in ED stretcher. Easily roused to soft verbal stimuli. VSS, no acute distress. Patient reports feeling much better since NG tube removal. Bed low and locked. Siderails up. Call bell within reach. Will continue to monitor.

## 2018-02-17 NOTE — IP AVS SNAPSHOT
[address] [facility] [address] [place] [address] 
[phone] Patient: Kirk Gamboa MRN: [id number] About your hospitalization You were admitted on:  February 17, 2018 You last received care in the:  [facility] 2N MED SURG You were discharged on:  February 19, 2018 Why you were hospitalized Your primary diagnosis was:  Gi Bleed Follow-up Information Follow up With Details Comments Contact Info Adia Burgos MD   [facility] [facility] 83. [phone] Your Scheduled Appointments Friday March 02, 2018  7:30 AM EST PROCEDURE with Ramírez Brady MD  
[address] ([facility]) 200 [facility] [phone] [address] [place] [address]  
[phone] Thursday March 08, 2018 12:45 PM EST Follow Up with Patra Siemens L. Karn Diones, NP [address] ([facility]) 200 [facility] [phone] [address] [place] [address]  
[phone] Discharge Orders None A check suzanne indicates which time of day the medication should be taken. My Medications START taking these medications Instructions Each Dose to Equal  
 Morning Noon Evening Bedtime  
 pantoprazole 40 mg tablet Commonly known as:  PROTONIX Your last dose was: Your next dose is: Take 1 Tab by mouth Before breakfast and dinner. 40 mg CONTINUE taking these medications Instructions Each Dose to Equal  
 Morning Noon Evening Bedtime  
 amiodarone 200 mg tablet Commonly known as:  CORDARONE Your last dose was: Your next dose is: Take 200 mg by mouth nightly. 200 mg CENTRUM SILVER Tab tablet Generic drug:  multivitamins-minerals-lutein Your last dose was: Your next dose is: Take 1 Tab by mouth daily. 1 Tab ciprofloxacin HCl 500 mg tablet Commonly known as:  CIPRO Your last dose was: Your next dose is: Take 1 Tab by mouth daily. 500 mg  
    
   
   
   
  
 digoxin 0.125 mg tablet Commonly known as:  LANOXIN Your last dose was: Your next dose is: Take 0.125 mg by mouth nightly. 0.125 mg  
    
   
   
   
  
 folic acid 1 mg tablet Commonly known as:  Google Your last dose was: Your next dose is: Take 1 Tab by mouth daily. 1 mg  
    
   
   
   
  
 furosemide 40 mg tablet Commonly known as:  LASIX Your last dose was: Your next dose is: Take 2 Tabs by mouth daily. Indications: EDEMA DUE TO HEPATIC CIRRHOSIS  
 80 mg  
    
   
   
   
  
 gabapentin 300 mg capsule Commonly known as:  NEURONTIN Your last dose was: Your next dose is: Take 1 Cap by mouth three (3) times daily. 300 mg  
    
   
   
   
  
 glimepiride 1 mg tablet Commonly known as:  AMARYL Your last dose was: Your next dose is: Take 1 mg by mouth daily. 1 mg  
    
   
   
   
  
 metoprolol succinate 25 mg XL tablet Commonly known as:  TOPROL XL Your last dose was: Your next dose is: Take 0.5 Tabs by mouth daily. 12.5 mg  
    
   
   
   
  
 spironolactone 100 mg tablet Commonly known as:  ALDACTONE Your last dose was: Your next dose is: Take 2 Tabs by mouth daily. Indications: EDEMA DUE TO HEPATIC CIRRHOSIS  
 200 mg  
    
   
   
   
  
 thiamine 100 mg tablet Commonly known as:  B-1 Your last dose was: Your next dose is: Take 1 Tab by mouth daily. 100 mg  
    
   
   
   
  
  
STOP taking these medications ENTRESTO 24-26 mg tablet Generic drug:  sacubitril-valsartan Where to Get Your Medications Information on where to get these meds will be given to you by the nurse or doctor. ! Ask your nurse or doctor about these medications  
  pantoprazole 40 mg tablet Discharge Instructions Discharge Instructions PATIENT ID: Mihir Garcia MRN: [id number] YOB: 1960 DATE OF ADMISSION: 2/17/2018  1:58 AM   
DATE OF DISCHARGE: 2/19/2018 PRIMARY CARE PROVIDER: Gladys Johnson MD  
 
ATTENDING PHYSICIAN: Madhu Garnett MD 
DISCHARGING PROVIDER: Radha Swan MD   
To contact this individual call [phone] and ask the  to page. If unavailable ask to be transferred the Adult Hospitalist Department. DISCHARGE DIAGNOSES Upper GI bleeding, with hx of variceal bleeding s/p banding on 1/23/18 Alcoholic liver cirrhosis with ascites Chronic systolic CHF NHYA Class I, EF 35-40% 12/21/17, s/p AICD Paroxysmal A Fib, rate controlled Hyperkalemia DM-II 
HTN Hyponatremia due to liver cirrhosis CKD stage I Chronic thrombocytopenia due to liver cirrhosis S/p Left BKA CONSULTATIONS: IP CONSULT TO GASTROENTEROLOGY 
IP CONSULT TO HOSPITALIST 
 
PROCEDURES/SURGERIES: Procedure(s): ESOPHAGOGASTRODUODENOSCOPY (EGD) PENDING TEST RESULTS:  
At the time of discharge the following test results are still pending: none FOLLOW UP APPOINTMENTS:  
Follow-up Information Follow up With Details Comments Contact Info 1. Glayds Johnson MD In one week  [address] [place] [address] 
[phone] 2. Talisha Kellogg MD in one week 3. Cardiologist in one week ADDITIONAL CARE RECOMMENDATIONS:  
 
DIET: Diabetic Diet ACTIVITY: Activity as tolerated WOUND CARE: none EQUIPMENT needed: none DISCHARGE MEDICATIONS: 
 See Medication Reconciliation Form · It is important that you take the medication exactly as they are prescribed.   
· Keep your medication in the bottles provided by the pharmacist and keep a list of the medication names, dosages, and times to be taken in your wallet. · Do not take other medications without consulting your doctor. NOTIFY YOUR PHYSICIAN FOR ANY OF THE FOLLOWING:  
Fever over 101 degrees for 24 hours. Chest pain, shortness of breath, fever, chills, nausea, vomiting, diarrhea, change in mentation, falling, weakness, bleeding. Severe pain or pain not relieved by medications. Or, any other signs or symptoms that you may have questions about. DISPOSITION: 
  Home With: 
 OT  PT  HH  RN  
  
 SNF/Inpatient Rehab/LTAC  
x Independent/assisted living Hospice Other: CDMP Checked:  
Yes x PROBLEM LIST Updated: 
Yes x Signed:  
Ramon Osorio MD 
2/19/2018 
12:06 PM 
 
  
  
  
NatSent Announcement We are excited to announce that we are making your provider's discharge notes available to you in NatSent. You will see these notes when they are completed and signed by the physician that discharged you from your recent hospital stay. If you have any questions or concerns about any information you see in NatSent, please call the Health Information Department where you were seen or reach out to your Primary Care Provider for more information about your plan of care. Introducing [facility]! Barbara Nair introduces NatSent patient portal. Now you can access parts of your medical record, email your doctor's office, and request medication refills online. 1. In your internet browser, go to [email or web address] 2. Click on the First Time User? Click Here link in the Sign In box. You will see the New Member Sign Up page. 3. Enter your NatSent Access Code exactly as it appears below. You will not need to use this code after youve completed the sign-up process. If you do not sign up before the expiration date, you must request a new code. · NatSent Access Code: [email or web address] Expires: 5/19/2018  9:28 PM 
 
 4. Enter the last four digits of your Social Security Number (xxxx) and Date of Birth (mm/dd/yyyy) as indicated and click Submit. You will be taken to the next sign-up page. 5. Create a [employer] ID. This will be your [employer] login ID and cannot be changed, so think of one that is secure and easy to remember. 6. Create a [employer] password. You can change your password at any time. 7. Enter your Password Reset Question and Answer. This can be used at a later time if you forget your password. 8. Enter your e-mail address. You will receive e-mail notification when new information is available in [address] 9. Click Sign Up. You can now view and download portions of your medical record. 10. Click the Download Summary menu link to download a portable copy of your medical information. If you have questions, please visit the Frequently Asked Questions section of the [employer] website. Remember, [employer] is NOT to be used for urgent needs. For medical emergencies, dial 911. Now available from your iPhone and Android! Providers Seen During Your Hospitalization Provider Specialty Primary office phone Pito Christopher MD Emergency Medicine [phone] Nayan Ramos MD Internal Medicine [phone] Chino Zapata MD Family Practice [phone] Your Primary Care Physician (PCP) Primary Care Physician Office Phone Office Fax Renetta Talita [phone] You are allergic to the following No active allergies Recent Documentation Height Weight BMI Smoking Status 1.778 m 101.7 kg 32.17 kg/m2 Former Smoker Emergency Contacts Name Discharge Info Relation Home Work Mobile Cass Gonzalez DISCHARGE CAREGIVER [3] Spouse [3] [phone] Patient Belongings The following personal items are in your possession at time of discharge: 
  Dental Appliances: None  Visual Aid: Glasses, With patient Please provide this summary of care documentation to your next provider. Signatures-by signing, you are acknowledging that this After Visit Summary has been reviewed with you and you have received a copy. Patient Signature:  ____________________________________________________________ Date:  ____________________________________________________________  
  
Sedalia Favorite Provider Signature:  ____________________________________________________________ Date:  ____________________________________________________________

## 2018-02-17 NOTE — PROGRESS NOTES
Juancarlos [facility]  1960  [id number]    Situation:    Scheduled Procedure: Procedure(s):  ESOPHAGOGASTRODUODENAL (EGD) BIOPSY  Verbal report received from: JEAN Devine  Preoperative diagnosis: varices    Background:    Procedure: Procedure(s):  ESOPHAGOGASTRODUODENAL (EGD) BIOPSY  Physician performing procedure; Dr. Otf Muñoz RN    NPO Status/Last PO Intake: 2/16/18    Pregnancy Test:Not applicable If yes, result: none    Is the patient taking Blood Thinners: NO If yes, list:  and last taken   Is the patient diabetic:yes       If yes, what was the last BS:  159  Time taken?  0726  Anything given? 2 units sliding, 5 units regular     Does the patient have a Pacemaker/Defibrillator in place?: yes   Does the patient need antibiotics before/during/after procedure: no   If the patient is having a colon, How much prep was drank? What were the Colon prep results? Does the patient have SCD in place:no   Is patient on CONTACT precautions:no        If yes, what kind of CONTACT precautions:     Assessment:  Are the vital signs stable prior to patient coming to ENDO?  yes  Is the patient alert/oriented and able to sign consent for the procedures:yes  How does the patient's abdomen feel prior to coming to ENDO? round and soft yes   Does the patient have a patient IV in place?  yes     Recommendation:  Family or Friend present no     Permission to share finding with Family or Friend yes and n/a

## 2018-02-17 NOTE — ED PROVIDER NOTES
HPI Comments: 59 y.o. male with past medical history significant for CHF, Defibrillator, HTN, Sepsis, Afib, Cirrhosis, Left BKA who presents from home with chief complaint of hematemsis. Pt reports gradual onset of nausea after dinner tonight (2000) which resulted in vomiting. Pt describes emesis has bloody. Pt notes that he has had worsening abdominal distention since Monday (5 days) and presently c/o mild diffuse abdominal pain. Pt denies hx of abdominal surgery. NKDA. There are no other acute medical concerns at this time. Chart review: Pt admitted 1/23/18-1/28/18 for GI bleed/hematemesis found to have esophagel varices (s/p banding), alcoholic cirrhosis with ascites. HGB 7.9 on admission, 8.7 on discharge. EGD performed which showed multiple esophagel varices. Banding was performed. Social hx: No EtOH. PCP: Santino Martinez MD  Hepatology: Steve Madsen MD    Note written by Radhika Robert, as dictated by Giuliana Cabello MD 2:25 AM    The history is provided by the patient. No  was used.         Past Medical History:   Diagnosis Date    Arthritis     Atrial fibrillation ([address] 2014    CHF (congestive heart failure) ([address]     Diabetes ([address]     Diabetic ulcer of left foot ([address] 2/2015 2/2015 Lt BKA    History of blood transfusion 2015    During Lt BKA; pt denies any adverse reaction    Hypertension     Liver disease     CIRRHOSIS    Sepsis ([address] 02/2015    From diabetic Left foot wound;  had a BKA ;  as of 11/21/16 pt states back to his baseline        Past Surgical History:   Procedure Laterality Date    HX AMPUTATION Left 2/10/2015    BKA    HX COLONOSCOPY      HX IMPLANTABLE CARDIOVERTER DEFIBRILLATOR  08/04/2015    [employer] cardio defibrillator; Dr Birdie Gamez; as of 11/21/16 pt denies defibrillator going off         Family History:   Problem Relation Age of Onset    Heart Disease Brother     Heart Failure Brother     Heart Failure Brother Social History     Social History    Marital status:      Spouse name: N/A    Number of children: N/A    Years of education: N/A     Occupational History    Not on file. Social History Main Topics    Smoking status: Former Smoker     Packs/day: 1.00     Years: 5.00     Quit date: 1/1/2013    Smokeless tobacco: Never Used    Alcohol use Yes      Comment: occ    Drug use: No    Sexual activity: Not on file     Other Topics Concern    Not on file     Social History Narrative         ALLERGIES: Review of patient's allergies indicates no known allergies. Review of Systems   Constitutional: Negative for fever. HENT: Negative for congestion. Respiratory: Negative for cough and shortness of breath. Cardiovascular: Negative for chest pain. Gastrointestinal: Positive for abdominal pain, nausea and vomiting (hematemesis). Genitourinary: Negative for dysuria. Musculoskeletal: Negative for back pain and neck pain. Neurological: Negative for dizziness, weakness, light-headedness, numbness and headaches. Vitals:    02/17/18 0158   BP: 115/61   Pulse: 73   Resp: 18   Temp: 97.7 °F (36.5 °C)   SpO2: 98%   Weight: 97.5 kg (215 lb)   Height: 5' 10\" (1.778 m)            Physical Exam   Nursing note and vitals reviewed. CONSTITUTIONAL: Well-appearing; well-nourished; in mild distress  HEAD: Normocephalic; atraumatic  EYES: PERRL; EOM intact; conjunctiva and sclera are clear bilaterally. ENT: No rhinorrhea; normal pharynx with no tonsillar hypertrophy; mucous membranes pink/moist, no erythema, no exudate. NECK: Supple; non-tender; no cervical lymphadenopathy  CARD: Normal S1, S2; no murmurs, rubs, or gallops. Regular rate and rhythm. RESP: Normal respiratory effort; breath sounds clear and equal bilaterally; no wheezes, rhonchi, or rales. ABD: Normal bowel sounds; non-distended; non-tender; no palpable organomegaly, no masses, no bruits.   Back Exam: Normal inspection; no vertebral point tenderness, no CVA tenderness. Normal range of motion. EXT: Normal ROM in all four extremities; non-tender to palpation; no swelling or deformity; distal pulses are normal, no edema. SKIN: Warm; dry; no rash. NEURO:Alert and oriented x 3, coherent, WILLI-XII grossly intact, sensory and motor are non-focal.        MDM  Number of Diagnoses or Management Options  Acute blood loss anemia:   Alcoholic cirrhosis of liver with ascites [facility]):   Gastrointestinal hemorrhage with hematemesis:   Hyperkalemia:   Secondary esophageal varices with bleeding [facility]):   Diagnosis management comments: Assessment: acute GI bleed with hematemesis and melena  Secondary to esophageal varices in a patient with history of anemia, normocytic anemia, cirrhosis of the liver with ascites, alcoholism, with continuous drinking behavior. Plan: EKG/ chest x-ray/ lab/ IV fluid/ NG tube/ Protonix bolus and drip/ octreotide bolus and drip/ consult hospitalist and GI/ peptic process with blood transfusion as needed. If hemoglobin level dropped below 7/ Monitor and Reevaluate.          Amount and/or Complexity of Data Reviewed  Clinical lab tests: ordered and reviewed  Tests in the radiology section of CPT®: ordered and reviewed  Tests in the medicine section of CPT®: reviewed and ordered  Discussion of test results with the performing providers: yes  Decide to obtain previous medical records or to obtain history from someone other than the patient: yes  Obtain history from someone other than the patient: yes  Review and summarize past medical records: yes  Discuss the patient with other providers: yes  Independent visualization of images, tracings, or specimens: yes    Risk of Complications, Morbidity, and/or Mortality  Presenting problems: high  Diagnostic procedures: high  Management options: high    Critical Care  Total time providing critical care: (Total critical care time spent exclusive of procedures: 60 minutes)    Patient Progress  Patient progress: stable        ED Course       Procedures     ED EKG interpretation:  Rhythm: paced; and regular . Rate (approx.): 65; Axis: left axis deviation;; Other findings: abnormal ekg. This EKG was interpreted by Pito Christopher MD,ED Provider. XRAY INTERPRETATION (ED MD)  Chest Xray  No acute process seen. Normal heart size. No bony abnormalities. No infiltrate. Pito Christopher MD 2:50 AM    ABDOMINAL XRAY INTERPRETATION (ED MD)  NGT in stomach; No free air. No evidence of obstruction. No air-fluid levels. Pito Christopher MD 02:55 AM      PROGRESS NOTE:  Pt has been reexamined by Pito Christopher MD all available results have been reviewed with pt and any available family. Pt understands sx, dx, and tx in ED. Care plan has been outlined and questions have been answered. Pt and any available family understands and agrees to need for admission to hospital for further tx not available in ED. Pt is ready for admission. Written by Pito Christopher MD,  3:15 AM    CONSULT NOTE:  4:20 AM  Spoke with Dr. Ranulfo Gonzalez MD (GI) twice. Referred to call Dr. Tereso Dobbins who is the patient's hepatologist. Attempted communication for over 1 hour without any success. Requested that Dr. Tariq Flynn to come see the patinet due to patient's persistent bleeding. Dr. Tariq Flynn state that he will see the patient first thing in the morning and to continue resuscitation that is currently initiated. 4:29 AM  Dr. Tereso Dobbins notified. Called cell phone. CONSULT NOTE:  Pito Christopher MD spoke with Dr. Gladis Madrid of the adult hospitalist team. Discussed patient's presentation, history, physical assessment, and available diagnostic results.  He will evaluate, write orders and admit the patient to the hospital. 04:30 AM

## 2018-02-17 NOTE — CONSULTS
70 Steffi Durand MD, FACP, [place] Frant[place] Pined[place], [place]       Dami Gastelum, NP Magda Kocher, PA-C Sherryn Carina, FIDENCIO-ALLEY Walker, MARTÍN Mcmanus Dep[facility] [address] 136    at [address]    [address], [address] [facility], [address].    [phone]    FAX: [address]    [address], [address] [facility], [address]    [phone]    FAX: [phone]       HEPATOLOGY CONSULT NOTE  The patient is well known to me and regularly cared for at [facility]. He is a 59year old male with cirrhosis of the liver secondary to alcohol. He has continued to consume alcohol even though he knows he has cirrhosis. He was last hospitalized at [address] [place] [address] Sw was for variceal bleeding in 1/2018. Emergent EGD at that time demonstrated no active bleeding but blood in the stomach presumably from variceal origin. Varcies were treated with band ligation. He came to the ED because of hematemesis. HB was 8.8 gm which was stable compared to the last HB of 9.1 gm 9 days ago. An NG tube was placed. Repeat HB 2 hours later was still 8.8 gms. He is on IV octreotide and IV protonix. He has not been given any blood so far. I have reviewed the Emergency room note, Hospital admission note, Notes by all other physicians who have seen the patient during this hospitalization to date. I have reviewed the problem list and the reason for this hospitalization. I have reviewed the allergies and the medications the patient was taking at home prior to this hospitalization.     ASSESSMENT AND PLAN:  Cirrhosis  This is secodary to alcohol. The CTP score is 9, Child class B, MELD score 24.   He is not a candidate for liver transplant because on ongoing alcohol use.     GI bleeding  This is likely secondary to esophageal varices. The last EGD was in 1/2018. He had varices that were banded at that time. Will proceed with emergent EGD in the ED unless a bed become available in ICU later this AM.  He is on IV octreotide and IV protonix. Will start IV albumin which has been shown to reduce mortality from varcieal bleeding. ADDENDUM:  EGD in the ED demonstrated no varices, mild portal gastropathy. No gastric or duodenal ulcer. Small amount of blood in stomach. No active bleeding or source of bleeding.     Acute blood loss anemia  HB is currently stable but he will probably drop 1 grm when everything equilibrates. Monitor HB Q6H.       Thrombocytopenia  This is secondary to cirrhosis. No treatment needed. The platelet count is adequate even with GI bleeding. No platelet transfusion needed at this time.     Hyponatremia  This is only mildly reduced from baseline 9 days ago. This is secondary to a combination of cirrhosis and diuretics. Hold diuretics. Will will be getting IV albumin which will correct hyponatremia.     LORENZA  There is mild increase in Screat from baseline of 1.1 to 1.47 mg.  Probably dehydration from acute blood loss. Will treat with volume expansion using IV albumin. Does not need blood right now.     Alcohol abuse  He continue to consume alcohol knowing that he has cirrhosis and complications of cirrhosis. This has been discussed with him numerous times. During the last hospitalization he told me he was done with drinking. But then he had beer this past weekend.       SYSTEM REVIEW NOT RELATED TO LIVER DISEASE OR REVIEWED ABOVE:  Constitution systems: Negative for fever, chills, weight gain, weight loss. Eyes: Negative for visual changes. ENT: Negative for sore throat, painful swallowing. Respiratory: Negative for cough, hemoptysis, SOB. Cardiology: Negative for chest pain, palpitations. GI:  Negative for constipation or diarrhea.   : Negative for urinary frequency, dysuria, hematuria, nocturia. Skin: Negative for rash. Hematology: Negative for easy bruising, blood clots. Musculo-skelatal: Negative for back pain, muscle pain, weakness. Neurologic: Negative for headaches, dizziness, vertigo, memory problems not related to HE. Psychology: Negative for anxiety, depression.      FAMILY HISTORY:  The father  of unknown cause.    The mother  of heart disease.    There is no family history of liver disease.        SOCIAL HISTORY:  The patient is .    The patient has 3 children. The patient has never used tobacco products.    The patient consumes 6+ alcoholic beverages per day.  This has been his long-standing pattern. Petrachayito Archer states he has tried to make a recent effort to cut back, is vague on actual intake. The patient used to work . The patient retired in .        PHYSICAL EXAMINATION:  VS: per nursing note  General:  No acute distress. Ill appearing  Eyes:  Sclera anicteric. ENT:  No oral lesions. Thyroid normal.  Nodes:  No adenopathy. Skin:  Spider angiomata. No jaundice. Respiratory:  Lungs clear to auscultation. Cardiovascular:  Regular heart rate. Abdomen:  Soft non-tender, Distended with obvious ascites. Extremities:  2+ lower extremity edema. Muscle wasting. Neurologic:  Alert and oriented. Cranial nerves grossly intact. No asterixis. LABORATORY:  Results for Janett Nava (MRN [id number]) as of 2018 05:42   Ref.  Range 2018 08:39 2018 00:00 2018 02:38 2018 04:22   WBC Latest Ref Range: 4.1 - 11.1 K/uL 2.6 (L) 3.4 5.3    HGB Latest Ref Range: 12.1 - 17.0 g/dL 8.7 (L) 9.1 (L) 8.8 (L) 8.8 (L)   PLATELET Latest Ref Range: 150 - 400 K/uL 50 (L) 59 (LL) 57 (L)    INR Latest Ref Range: 0.9 - 1.1    1.3 (H) 1.3 (H)    Sodium Latest Ref Range: 136 - 145 mmol/L 136 139 132 (L)    Potassium Latest Ref Range: 3.5 - 5.1 mmol/L 3.4 (L) 4.1 5.4 (H)    Chloride Latest Ref Range: 97 - 108 mmol/L 102 101 100    CO2 Latest Ref Range: 21 - 32 mmol/L 25 24 24    Glucose Latest Ref Range: 65 - 100 mg/dL 179 (H) 154 (H) 127 (H)    BUN Latest Ref Range: 6 - 20 MG/DL 11 20 25 (H)    Creatinine Latest Ref Range: 0.70 - 1.30 MG/DL 1.04 1.12 1.47 (H)    Bilirubin, total Latest Ref Range: 0.2 - 1.0 MG/DL  3.7 (H) 3.1 (H)    Albumin Latest Ref Range: 3.5 - 5.0 g/dL  3.9 2.8 (L)    ALT (SGPT) Latest Ref Range: 12 - 78 U/L  27 38    AST Latest Ref Range: 15 - 37 U/L  58 (H) 65 (H)    Alk. phosphatase Latest Ref Range: 45 - 117 U/L  140 (H) 176 (H)    Ammonia Latest Ref Range: <32 UMOL/L   82 (H)        RADIOLOGY:  CXR. Clear lung fields. Normal heart size. XR ABD. NG tube in stomach.       Gareth Echols MD  Liver [place] of [address] 22. [phone]

## 2018-02-17 NOTE — ED NOTES
Bedside shift change report given to Peewee Spencer (oncoming nurse) by Earline Urbano (offgoing nurse). Report included the following information, SBAR, summary, Recent Results, MAR. Opportunity for questions was provided.

## 2018-02-18 NOTE — PROGRESS NOTES
Hospitalist Progress Note  Thiago Carson MD  Answering service: [phone] OR [phone] from in house phone  Cell: [phone]      Date of Service:  2018  NAME:  Leslie Cameron  :  1960  MRN:  [id number]      Admission Summary:   A 59 y.o. male with past medical history significant for CHF, Defibrillator, HTN, Sepsis, Afib, Cirrhosis, Left BKA who presents from home with chief complaint of hematemsis    Interval history / Subjective:   No abdominal pain, vomiting and his last bowel movement 2 days ago     Assessment & Plan:     Upper GI bleeding, with hx of variceal bleeding s/p banding on 18  -s/p EGD on  no varices, mild portal gastropathy, no gastric or duodenal ucler, small amount of blood in stomach, no active bleeding or source of bleeding  -on octreotide and protonix gtt  -H/H trending down.  Hg will transfuse 2 units pRBC on , with lasix, repeat H/H post transfusion  -INR 1.3, platelet 54, continue monitoring for now  -hepatologist on board    Alcoholic liver cirrhosis with ascites   -on albumin IV   -he said he had paracentesis on , need large volume paracentesis   -continue lasix  -hold his spironolactone, will resume after k level checked    Chronic systolic CHF NHYA Class I, EF 35-40% 17, s/p AICD   -stable  -continue metoprolol, lasix and spironolactone  -will start entresto once he stable  -monitor I/O     Paroxysmal A Fib, rate controlled  -continue amiodarone, not on OAC due to GI bleeding, liver cirrhosis and thrombocytopenia    Hyperkalemia   -receive iv regular insulin with D5W and albuterol and resolved  -repeat bmp    DM-II  -A1c normal  -continue sliding scale and monitor finger stick glucose    HTN  -BP normal, on metoprolol, lasix and spironolactone, continue monitoring BP    Hyponatremia due to liver cirrhosis   -improving, monitor electrolyte     CKD stage I  -creatinine normal -monitor renal function     Chronic thrombocytopenia due to liver cirrhosis  -platelet trending down, monitor H/H and platelet    S/p Left BKA   -stable        Code status: Full Code  DVT prophylaxis: SCD    Care Plan discussed with: Patient/Family, Nurse and   Disposition: TBD     Hospital Problems  Date Reviewed: 2/17/2018          Codes Class Noted POA    * (Principal)GI bleed ICD-10-CM: K92.2  ICD-9-CM: 578.9  2/17/2018 Unknown              Vital Signs:    Last 24hrs VS reviewed since prior progress note. Most recent are:  Visit Vitals    /55 (BP 1 Location: Left arm, BP Patient Position: At rest)    Pulse 64    Temp 97.6 °F (36.4 °C)    Resp 16    Ht 5' 10\" (1.778 m)    Wt 103.4 kg (227 lb 15.3 oz)    SpO2 97%    BMI 32.71 kg/m2         Intake/Output Summary (Last 24 hours) at 02/18/18 0846  Last data filed at 02/17/18 1920   Gross per 24 hour   Intake                0 ml   Output              700 ml   Net             -700 ml        Physical Examination:             Constitutional:  No acute distress, cooperative, pleasant    ENT:  Oral mucous moist, oropharynx benign. Neck supple,    Resp:  Decrease bronchial breath sound bilaterally. No wheezing/rhonchi/rales. No accessory muscle use   CV:  Regular rhythm, normal rate, no murmurs, gallops, rubs    GI:  Distended, Ascites, soft, non tender. normoactive bowel sounds, no hepatosplenomegaly     Musculoskeletal:  Left BKA, bilateral pretibial and pedal edema    Neurologic:  Moves all extremities.   AAOx3, CN II-XII reviewed     Skin:  Good turgor, no rashes or ulcers       Data Review:    Review and/or order of clinical lab test      Labs:     Recent Labs      02/18/18   0024  02/17/18   1426  02/17/18   0613   WBC  2.9*   --   5.2   HGB  6.7*  7.5*  8.1*   HCT  18.9*  21.8*  23.1*   PLT  42*   --   54*     Recent Labs      02/17/18   0949  02/17/18   0613  02/17/18   0238   NA  134*  131*  132*   K  5.1  5.9*  5.4*   CL  104  102  100 CO2  24  22  24   BUN  28*  27*  25*   CREA  1.32*  1.32*  1.47*   GLU  195*  130*  127*   CA  8.1*  8.3*  8.6     Recent Labs      02/17/18 0613 02/17/18 0238   SGOT  64*  65*   ALT  27  38   AP  163*  176*   TBILI  3.5*  3.1*   TP  6.2*  6.3*   ALB  2.8*  2.8*   GLOB  3.4  3.5   AML   --   73   LPSE   --   534*     Recent Labs      02/17/18 0238   INR  1.3*   PTP  13.3*   APTT  30.0      No results for input(s): FE, TIBC, PSAT, FERR in the last 72 hours. Lab Results   Component Value Date/Time    Folate 19.9 12/07/2017 04:25 AM      No results for input(s): PH, PCO2, PO2 in the last 72 hours. No results for input(s): CPK, CKNDX, TROIQ in the last 72 hours.     No lab exists for component: CPKMB  No results found for: CHOL, CHOLX, CHLST, CHOLV, HDL, LDL, LDLC, DLDLP, TGLX, TRIGL, TRIGP, CHHD, CHHDX  Lab Results   Component Value Date/Time    Glucose (POC) 145 (H) 02/18/2018 06:49 AM    Glucose (POC) 177 (H) 02/17/2018 09:48 PM    Glucose (POC) 180 (H) 02/17/2018 04:10 PM    Glucose (POC) 232 (H) 02/17/2018 01:08 PM    Glucose (POC) 201 (H) 02/17/2018 09:20 AM     Lab Results   Component Value Date/Time    Color YELLOW/STRAW 01/09/2018 11:06 PM    Appearance CLEAR 01/09/2018 11:06 PM    Specific gravity 1.012 01/09/2018 11:06 PM    pH (UA) 5.5 01/09/2018 11:06 PM    Protein NEGATIVE  01/09/2018 11:06 PM    Glucose NEGATIVE  01/09/2018 11:06 PM    Ketone NEGATIVE  01/09/2018 11:06 PM    Bilirubin NEGATIVE  01/09/2018 11:06 PM    Urobilinogen 1.0 01/09/2018 11:06 PM    Nitrites NEGATIVE  01/09/2018 11:06 PM    Leukocyte Esterase NEGATIVE  01/09/2018 11:06 PM    Epithelial cells FEW 01/09/2018 11:06 PM    Bacteria NEGATIVE  01/09/2018 11:06 PM    WBC 0-4 01/09/2018 11:06 PM    RBC 0-5 01/09/2018 11:06 PM         Medications Reviewed:     Current Facility-Administered Medications   Medication Dose Route Frequency    pantoprazole (PROTONIX) 40 mg in 0.9% sodium chloride (MBP/ADV) 50 mL  8 mg/hr IntraVENous CONTINUOUS    0.9% sodium chloride infusion 250 mL  250 mL IntraVENous PRN    metoclopramide HCl (REGLAN) injection 10 mg  10 mg IntraVENous Q6H    sodium chloride (NS) flush 5-10 mL  5-10 mL IntraVENous Q8H    sodium chloride (NS) flush 5-10 mL  5-10 mL IntraVENous PRN    octreotide (SANDOSTATIN) 500 mcg in 0.9% sodium chloride 500 mL infusion  50 mcg/hr IntraVENous CONTINUOUS    0.9% sodium chloride infusion 250 mL  250 mL IntraVENous PRN    albumin human 25% (BUMINATE) solution 25 g  25 g IntraVENous Q6H    glucose chewable tablet 16 g  4 Tab Oral PRN    dextrose (D50W) injection syrg 12.5-25 g  12.5-25 g IntraVENous PRN    glucagon (GLUCAGEN) injection 1 mg  1 mg IntraMUSCular PRN    insulin lispro (HUMALOG) injection   SubCUTAneous AC&HS    simethicone (MYLICON) 48UU/6.5VY oral drops 80 mg  1.2 mL Oral Multiple    furosemide (LASIX) tablet 40 mg  40 mg Oral BID    spironolactone (ALDACTONE) tablet 100 mg  100 mg Oral BID    metoprolol succinate (TOPROL-XL) XL tablet 12.5 mg  12.5 mg Oral DAILY    amiodarone (CORDARONE) tablet 200 mg  200 mg Oral DAILY    gabapentin (NEURONTIN) capsule 300 mg  300 mg Oral TID     ______________________________________________________________________  EXPECTED LENGTH OF STAY: - - -  ACTUAL LENGTH OF STAY:          1                 So Rodriguez MD

## 2018-02-18 NOTE — PROGRESS NOTES
0800 Bedside, Verbal and Written shift change report given to JEAN Mazariegos (oncoming nurse) by Melissa Cuello RN (offgoing nurse). Report included the following information SBAR, Kardex, Intake/Output, MAR and Recent Results. 0  Notified Dr. Dean Held regarding blood pressure reading post first unit of blood infusing. \" Stated to give Lasix 40 mg po that is scheduled at 1600 , administer after second unit of blood complete . Will relay information to next shift.

## 2018-02-18 NOTE — PROGRESS NOTES
Primary Nurse Soledad Romero and Jaime Packer RN performed a dual skin assessment on this patient Impairment noted- see wound doc flow sheet  Spencer score is 20    Pt has two small wounds on L lower stump. Will consult wound care.

## 2018-02-18 NOTE — PROGRESS NOTES
[address] [facility] MD Norah, [address], [place], [place]        April Leo Sanchez, MARTÍN Sanches, PA-C Gaylan Kocher, [facility]ALLEY Coleman, MARTÍN Blair, MARTÍN Ulloa Marek [address]    at [address], [address] [address]  22.    [phone]    FAX: [address] [place] [address], [address]    [phone]    FAX: [phone]         HEPATOLOGY PROGRESS NOTE  The patient is a 59year old male with cirrhosis of the liver secondary to alcohol. Xavier Orellana has continued to consume alcohol even though he knows he has cirrhosis. Xavier Orellana was last hospitalized at [facility] was for variceal bleeding in 1/2018. Emergent EGD at that time demonstrated no active bleeding but blood in the stomach presumably from variceal origin. Varcies were treated with band ligation.       He came to the ED because of hematemesis. HB was 8.8 gm which was stable compared to the last HB of 9.1 gm 9 days ago. An NG tube was placed. Repeat HB 2 hours later was still 8.8 gms. EGD in the ED did not show any source for bleeding. There was a small amount of blood in the fundus. However, he had no esophageal varices, mild portal gastropathy, no MW tear, no gastric or duodenal ulcers. He has remained stable overnight. HB this AM is down to 6.7 gms. Probably dilutional.  Agree with transfusing 2 units of blood. I will DC octreotide. I have changed him to regular JOSEPH diet.   If he remains stable overnight he could go home.         ASSESSMENT AND PLAN:  Cirrhosis  This is secodary to alcohol.  The CTP score is 9, Child class B, MELD score 24.  He is not a candidate for liver transplant because on ongoing alcohol use.      GI bleeding  No source of bleeding was identified during EGD yesterday. He had no varices, ulcers, MW tear and only mild portal gastropathy. I have stopped octreotide. Drop in HB is probably dilutional.  Agree with 2 units RBC.       Acute blood loss anemia  Getting 2 units of blood.     Thrombocytopenia  This is secondary to cirrhosis.  No treatment needed.  The platelet count is adequate even with GI bleeding.  No platelet transfusion needed at this time.      Hyponatremia  This is only mildly reduced from baseline 9 days ago. This is secondary to a combination of cirrhosis and diuretics.  Hold diuretics.  Will will be getting IV albumin which will correct hyponatremia.      LORENZA  There is mild increase in Screat from baseline of 1.1 to 1.47 mg.  Probably dehydration from acute blood loss.  Will treat with volume expansion using IV albumin. Does not need blood right now.      Alcohol abuse  He says he has not had any alcohol in 2 weeks. Encouraged to remain abstinent.       PHYSICAL EXAMINATION:  VS: per nursing note  General:  No acute distress. Ill appearing  Eyes:  Sclera anicteric. ENT:  No oral lesions. Thyroid normal.  Nodes:  No adenopathy. Skin:  Spider angiomata. No jaundice. Respiratory:  Lungs clear to auscultation. Cardiovascular:  Regular heart rate. Abdomen:  Soft non-tender, Distended with obvious ascites. Extremities:  2+ lower extremity edema. Muscle wasting. Neurologic:  Alert and oriented. Cranial nerves grossly intact. No asterixis.     LABORATORY:  Results for Rashmi Mello (MRN [id number]) as of 2/18/2018 14:00   Ref.  Range 2/17/2018 06:13 2/17/2018 09:49 2/17/2018 14:26 2/18/2018 00:24   WBC Latest Ref Range: 4.1 - 11.1 K/uL 5.2   2.9 (L)   HGB Latest Ref Range: 12.1 - 17.0 g/dL 8.1 (L)  7.5 (L) 6.7 (L)   PLATELET Latest Ref Range: 150 - 400 K/uL 54 (L)   42 (LL)   Sodium Latest Ref Range: 136 - 145 mmol/L 131 (L) 134 (L)     Potassium Latest Ref Range: 3.5 - 5.1 mmol/L 5.9 (H) 5.1     Chloride Latest Ref Range: 97 - 108 mmol/L 102 104     CO2 Latest Ref Range: 21 - 32 mmol/L 22 24     Glucose Latest Ref Range: 65 - 100 mg/dL 130 (H) 195 (H)     BUN Latest Ref Range: 6 - 20 MG/DL 27 (H) 28 (H)     Creatinine Latest Ref Range: 0.70 - 1.30 MG/DL 1.32 (H) 1.32 (H)     Bilirubin, total Latest Ref Range: 0.2 - 1.0 MG/DL 3.5 (H)      Albumin Latest Ref Range: 3.5 - 5.0 g/dL 2.8 (L)      ALT (SGPT) Latest Ref Range: 12 - 78 U/L 27      AST Latest Ref Range: 15 - 37 U/L 64 (H)      Alk.  phosphatase Latest Ref Range: 45 - 117 U/L 163 (H)        Chetan Rob MD  Liver [place] of [address] [place] [address] Thierry Mcdaniels [address] [place] [address] 22.  [phone]

## 2018-02-18 NOTE — PROGRESS NOTES
Problem: Falls - Risk of  Goal: *Absence of Falls  Document Stefania Fall Risk and appropriate interventions in the flowsheet.    Outcome: Progressing Towards Goal  Fall Risk Interventions:            Medication Interventions: Patient to call before getting OOB         History of Falls Interventions: Door open when patient unattended

## 2018-02-18 NOTE — PROGRESS NOTES
TRANSFER - IN REPORT:    Verbal report received from Pam Day (name) on Ricky Asher  being received from ED (unit) for routine progression of care      Report consisted of patients Situation, Background, Assessment and   Recommendations(SBAR). Information from the following report(s) SBAR, Kardex and MAR was reviewed with the receiving nurse. Opportunity for questions and clarification was provided. Assessment completed upon patients arrival to unit and care assumed.

## 2018-02-18 NOTE — ROUTINE PROCESS
Bedside shift change report given to vinay lopes rn (oncoming nurse) by Brenden Silver rn (offgoing nurse). Report included the following information SBAR and Kardex.

## 2018-02-19 NOTE — WOUND CARE
WOCN Note:   New consult placed by RN for skin assessment; Chart review revealed:   Admitted for GI bleed; history of DM, CHF, BKA, HTN, alcoholic cirrhosis, varices; Admitted from home    Assessment:   Patient is alert, verbal, denied pain;   Bed: Tok  Patient continent;   Diet: regular, 2gm Na; Bilateral buttocks, right stump and sacral skin intact and without erythema. Palpable DP pulse in right foot;     1. POA minute wounds on right stump lateral aspect 0.1cm x 0.1cm x <0.1cm 100% pink, dry, + 0.1cm x 0.3cm x 0.0cm 100% dry, stable scab;     Patient repositioned without assist from left to sitting then back to left on Tok bed with pillows to offload bony prominences; discussed pressure ulcer prevention with patient;     Skin Care & Pressure Injury Prevention Recommendations:  1. Minimize layers of linen/pads under patient to optimize support surface. 2.  Encourage patient to reposition approximately every 2 hours;  3. Float heels with pillow under calves. 4.  promote continence;   5. Continue on Tok bed for pressure redistribution. 6.  Assess/protect skin in contact with medical devices. Keep skin moisturized;   7. Ensure that patient is repositioning in chair shifting weight approximately every 15 minutes and standing up every hour.      Discussed above assessment and recommendations with Noam Durbin (JEAN);    Transition of Care: Plan to follow weekly and as needed while admitted to hospital.    Certified Wound, Ostomy, Continence Nurse  office [phone]  pager [phone] \[facility]\""

## 2018-02-19 NOTE — PROGRESS NOTES
Care Management Interventions  PCP Verified by CM: Yes  Mode of Transport at Discharge: Other (see comment) (private vehicle)  Discharge Durable Medical Equipment: No (has cane, crutches, and shower chair at home)  Physical Therapy Consult: No  Occupational Therapy Consult: No  Speech Therapy Consult: No  Current Support Network: Lives with Spouse (independent with ADLs)  Confirm Follow Up Transport: Self  Plan discussed with Pt/Family/Caregiver: Yes  Freedom of Choice Offered: Yes  [place] Resource Information Provided?: No  Discharge Location  Discharge Placement: Home    CM reviewed chart. Pt is independent with ADLs and IADLs. Pt lives with his wife and 8year old son. Pt has a cane, crutches, and shower chair at home. Pt's PCP is Dr. Dione Castillo, and he gets his prescriptions filled at his local [facility]. Pt has been to [facility] for inpatient rehab in the past.  [address] has been offered in the past, but patient usually declines. Plan is to return home at time of discharge, family will provide transportation home.   YVONNE Shepherd, ACM

## 2018-02-19 NOTE — PROGRESS NOTES
In response to code query, the patient is noted to have a BMI of 32. 17.       Presentation: 5'10\", 224 lbs = BMI 32.17    The patient is:   =>Obese (BMI 30 - 39.9)

## 2018-02-19 NOTE — CDMP QUERY
Patient is noted to have a BMI of 32.17. Please clarify if this patient is:     =>Morbidly obese (BMI ³ 40)  =>Obese (BMI 30 - 39.9)  =>Overweight (BMI 25 - 29.9)  =>Other explanation of clinical findings  =>Unable to determine (no explanation for clinical findings)    Presentation: 5'10\", 224 lbs = BMI 32.17    REFERENCE:  The [address] [place] [address] has issued a statement indicating that, \"Individuals who are overweight, obese, or morbidly obese are at an increased risk for certain medical conditions when compared to persons of normal weight. Therefore, these conditions are always clinically significant and reportable when documented by the provider. Please clarify and document your clinical opinion in the progress notes and discharge summary, including the definitive and or presumptive diagnosis, (suspected or probable), related to the above clinical findings. Please include clinical findings supporting your diagnosis. Thank You  [email or web address]  [phone]

## 2018-02-19 NOTE — PROGRESS NOTES
Problem: Falls - Risk of  Goal: *Absence of Falls  Document Stefania Fall Risk and appropriate interventions in the flowsheet.    Outcome: Progressing Towards Goal  Fall Risk Interventions:  Mobility Interventions: Patient to call before getting OOB         Medication Interventions: Patient to call before getting OOB         History of Falls Interventions: Door open when patient unattended

## 2018-02-19 NOTE — PROGRESS NOTES
Bedside shift change report given to [facility] (oncoming nurse) by Suyapa Calvillo (offgoing nurse). Report included the following information SBAR, Kardex, Intake/Output, MAR and Recent Results.

## 2018-02-19 NOTE — PROGRESS NOTES
Hospitalist Progress Note  Abigail Noyola MD  Answering service: [phone] OR [phone] from in house phone  Cell: [phone]      Date of Service:  2018  NAME:  Satnam Sanchez  :  1960  MRN:  [id number]      Admission Summary:   A 59 y.o. male with past medical history significant for CHF, Defibrillator, HTN, Sepsis, Afib, Cirrhosis, Left BKA who presents from home with chief complaint of hematemsis    Interval history / Subjective:   No abdominal pain or vomiting, tolerated his diet     Assessment & Plan:     Upper GI bleeding, with hx of variceal bleeding s/p banding on 18  -s/p EGD on  no varices, mild portal gastropathy, no gastric or duodenal ucler, small amount of blood in stomach, no active bleeding or source of bleeding  -on octreotide and protonix gtt  -H/H trending down.  Hg will transfuse 2 units pRBC on , with lasix, repeat H/H post transfusion  -INR 1.3, platelet 41,   -hepatologist on board    Alcoholic liver cirrhosis with ascites   -on albumin IV   -he said he had paracentesis on   -continue lasix and spironolactone  -outpatient follow up with hepatologist    Chronic systolic CHF NHYA Class I, EF 35-40% 17, s/p AICD   -stable  -continue metoprolol, lasix and spironolactone  -enteresto is held due to borderline BP  -outpatient follow up with cardiologist    Paroxysmal A Fib, rate controlled  -continue amiodarone, not on [address] due to GI bleeding, liver cirrhosis and thrombocytopenia    Hyperkalemia   -received iv regular insulin with D5W and albuterol and resolved    DM-II  -A1c normal  -continue sliding scale and monitor finger stick glucose    HTN  -BP normal, on metoprolol, lasix and spironolactone, continue monitoring BP    Hyponatremia due to liver cirrhosis   -improved, monitor electrolyte     CKD stage I  -creatinine normal   -monitor renal function     Chronic thrombocytopenia due to liver cirrhosis  -platelet trending down, monitor H/H and platelet    S/p Left BKA   -stable        Code status: Full Code  DVT prophylaxis: SCD    Care Plan discussed with: Patient/Family, Nurse and   Disposition: [facility] with [facility] FOR SURGERY Problems  Date Reviewed: 2/17/2018          Codes Class Noted POA    * (Principal)GI bleed ICD-10-CM: K92.2  ICD-9-CM: 578.9  2/17/2018 Unknown              Vital Signs:    Last 24hrs VS reviewed since prior progress note. Most recent are:  Visit Vitals    /65 (BP 1 Location: Left arm, BP Patient Position: At rest)    Pulse 69    Temp 98.2 °F (36.8 °C)    Resp 17    Ht 5' 10\" (1.778 m)    Wt 101.7 kg (224 lb 3.3 oz)    SpO2 100%    BMI 32.17 kg/m2         Intake/Output Summary (Last 24 hours) at 02/19/18 1153  Last data filed at 02/19/18 0851   Gross per 24 hour   Intake             1030 ml   Output             4650 ml   Net            -3620 ml        Physical Examination:             Constitutional:  No acute distress, cooperative, pleasant    ENT:  Oral mucous moist, oropharynx benign. Neck supple,    Resp:  Decrease bronchial breath sound bilaterally. No wheezing/rhonchi/rales. No accessory muscle use   CV:  Regular rhythm, normal rate, no murmurs, gallops, rubs    GI:  Distended, Ascites, soft, non tender. normoactive bowel sounds, no hepatosplenomegaly     Musculoskeletal:  Left BKA, bilateral pretibial and pedal edema    Neurologic:  Moves all extremities.   AAOx3, CN II-XII reviewed     Skin:  Good turgor, no rashes or ulcers       Data Review:    Review and/or order of clinical lab test      Labs:     Recent Labs      02/19/18   0643  02/18/18   1909  02/18/18   0024   WBC  2.0*   --   2.9*   HGB  8.2*  8.6*  6.7*   HCT  22.9*  24.3*  18.9*   PLT  41*   --   42*     Recent Labs      02/19/18   0643  02/18/18   1909  02/17/18   0949   NA  136  135*  134*   K  4.3  4.6  5.1   CL  102  103  104   CO2  26  25  24   BUN  19  19  28*   CREA  1.31*  1.34* 1.32*   GLU  134*  113*  195*   CA  9.3  9.0  8.1*     Recent Labs      02/18/18   1909  02/17/18   0613  02/17/18   0238   SGOT  59*  64*  65*   ALT  35  27  38   AP  86  163*  176*   TBILI  4.2*  3.5*  3.1*   TP  6.5  6.2*  6.3*   ALB  3.9  2.8*  2.8*   GLOB  2.6  3.4  3.5   AML   --    --   73   LPSE   --    --   534*     Recent Labs      02/17/18 0238   INR  1.3*   PTP  13.3*   APTT  30.0      No results for input(s): FE, TIBC, PSAT, FERR in the last 72 hours. Lab Results   Component Value Date/Time    Folate 19.9 12/07/2017 04:25 AM      No results for input(s): PH, PCO2, PO2 in the last 72 hours. No results for input(s): CPK, CKNDX, TROIQ in the last 72 hours.     No lab exists for component: CPKMB  No results found for: CHOL, CHOLX, CHLST, CHOLV, HDL, LDL, LDLC, DLDLP, TGLX, TRIGL, TRIGP, CHHD, CHHDX  Lab Results   Component Value Date/Time    Glucose (POC) 153 (H) 02/19/2018 07:20 AM    Glucose (POC) 100 02/18/2018 09:56 PM    Glucose (POC) 190 (H) 02/18/2018 03:16 PM    Glucose (POC) 207 (H) 02/18/2018 11:33 AM    Glucose (POC) 145 (H) 02/18/2018 06:49 AM     Lab Results   Component Value Date/Time    Color YELLOW/STRAW 01/09/2018 11:06 PM    Appearance CLEAR 01/09/2018 11:06 PM    Specific gravity 1.012 01/09/2018 11:06 PM    pH (UA) 5.5 01/09/2018 11:06 PM    Protein NEGATIVE  01/09/2018 11:06 PM    Glucose NEGATIVE  01/09/2018 11:06 PM    Ketone NEGATIVE  01/09/2018 11:06 PM    Bilirubin NEGATIVE  01/09/2018 11:06 PM    Urobilinogen 1.0 01/09/2018 11:06 PM    Nitrites NEGATIVE  01/09/2018 11:06 PM    Leukocyte Esterase NEGATIVE  01/09/2018 11:06 PM    Epithelial cells FEW 01/09/2018 11:06 PM    Bacteria NEGATIVE  01/09/2018 11:06 PM    WBC 0-4 01/09/2018 11:06 PM    RBC 0-5 01/09/2018 11:06 PM         Medications Reviewed:     Current Facility-Administered Medications   Medication Dose Route Frequency    spironolactone (ALDACTONE) tablet 100 mg  100 mg Oral BID    pantoprazole (PROTONIX) tablet 40 mg  40 mg Oral ACB&D    0.9% sodium chloride infusion 250 mL  250 mL IntraVENous PRN    0.9% sodium chloride infusion 250 mL  250 mL IntraVENous PRN    metoclopramide HCl (REGLAN) injection 10 mg  10 mg IntraVENous Q6H    sodium chloride (NS) flush 5-10 mL  5-10 mL IntraVENous Q8H    sodium chloride (NS) flush 5-10 mL  5-10 mL IntraVENous PRN    0.9% sodium chloride infusion 250 mL  250 mL IntraVENous PRN    albumin human 25% (BUMINATE) solution 25 g  25 g IntraVENous Q6H    glucose chewable tablet 16 g  4 Tab Oral PRN    dextrose (D50W) injection syrg 12.5-25 g  12.5-25 g IntraVENous PRN    glucagon (GLUCAGEN) injection 1 mg  1 mg IntraMUSCular PRN    insulin lispro (HUMALOG) injection   SubCUTAneous AC&HS    simethicone (MYLICON) 45EC/7.4YC oral drops 80 mg  1.2 mL Oral Multiple    furosemide (LASIX) tablet 40 mg  40 mg Oral BID    metoprolol succinate (TOPROL-XL) XL tablet 12.5 mg  12.5 mg Oral DAILY    amiodarone (CORDARONE) tablet 200 mg  200 mg Oral DAILY    gabapentin (NEURONTIN) capsule 300 mg  300 mg Oral TID     ______________________________________________________________________  EXPECTED LENGTH OF STAY: 3d 2h  ACTUAL LENGTH OF STAY:          2                 Radha Swan MD

## 2018-02-19 NOTE — PROGRESS NOTES
Hospital follow-up PCP transitional care appointment has been scheduled with Dr. Dimple Cano for Monday, 2/26/18 at 4:00 p.m. Pending patient discharge.   Kellie Albarado, Care Management Specialist.

## 2018-02-19 NOTE — DISCHARGE SUMMARY
Discharge Summary       PATIENT ID: Shamika Gaytan  MRN: [id number]   YOB: 1960    DATE OF ADMISSION: 2/17/2018  1:58 AM    DATE OF DISCHARGE: 2/19/2018   PRIMARY CARE PROVIDER: Jasmina Gordon MD     ATTENDING PHYSICIAN: Alexsander Yates MD  DISCHARGING PROVIDER: Amparo Morelos MD    To contact this individual call [phone] and ask the  to page. If unavailable ask to be transferred the Adult Hospitalist Department.     CONSULTATIONS: IP CONSULT TO GASTROENTEROLOGY  IP CONSULT TO HOSPITALIST    PROCEDURES/SURGERIES: Procedure(s):  ESOPHAGOGASTRODUODENOSCOPY (EGD)    ADMITTING [address] [place] [address] COURSE:     A 59 y.o. male with past medical history significant for CHF, Defibrillator, HTN, Sepsis, Afib, Cirrhosis, Left BKA who presents from home with chief complaint of hematemsis    Upper GI bleeding, with hx of variceal bleeding s/p banding on 1/23/18  -s/p EGD on 2/17 no varices, mild portal gastropathy, no gastric or duodenal ucler, small amount of blood in stomach, no active bleeding or source of bleeding  -received octreotide and protonix gtt for 24 hrs  -H/H trending down, transfused 2 units pRBC on 2/18, with lasix, repeat H/H post transfusion  -INR 1.3, platelet 41,   -seen by hepatologist  Alcoholic liver cirrhosis with ascites   -has received albumin IV   -he said he had paracentesis on 2/12  -continue lasix and spironolactone  -outpatient follow up with hepatologist   Chronic systolic CHF NHYA Class I, EF 35-40% 12/21/17, s/p AICD   -stable  -continue metoprolol, lasix and spironolactone  -enteresto is held due to borderline BP  -outpatient follow up with cardiologist  Paroxysmal A Fib, rate controlled  -continue amiodarone, not on [address] due to GI bleeding, liver cirrhosis and thrombocytopenia  Hyperkalemia   -received iv regular insulin with D5W and albuterol and resolved   DM-II  -A1c normal  -continue sliding scale and monitor finger stick glucose   HTN  -BP normal, on metoprolol, lasix and spironolactone, continue monitoring BP   Hyponatremia due to liver cirrhosis   -improved, monitor electrolyte    CKD stage I  -creatinine normal   -monitor renal function    Chronic thrombocytopenia due to liver cirrhosis  -platelet trending down, monitor H/H and platelet   S/p Left BKA   -stable        Code status: Full Code  DVT prophylaxis: SCD      DISCHARGE DIAGNOSES / PLAN:      Upper GI bleeding, with hx of variceal bleeding s/p banding on 1/23/18  -s/p EGD on 2/17 no varices, mild portal gastropathy, no gastric or duodenal ucler, small amount of blood in stomach, no active bleeding or source of bleeding  -s/p transfused 2 units pRBC on 2/18, H/H improved  -seen by hepatologist  Alcoholic liver cirrhosis with ascites   -continue lasix and spironolactone  -outpatient follow up with hepatologist   Chronic systolic CHF NHYA Class I, EF 35-40% 12/21/17, s/p AICD   -continue metoprolol, lasix and spironolactone  -enteresto is held due to borderline BP  -outpatient follow up with cardiologist  Paroxysmal A Fib, rate controlled  -continue amiodarone, not on [facility] due to GI bleeding  Hyperkalemia   -resolved   DM-II  -continue sliding scale and monitor finger stick glucose   HTN  -continue metoprolol, lasix and spironolactone, continue monitoring BP   Hyponatremia due to liver cirrhosis   -improved, monitor electrolyte    CKD stage I  -creatinine normal   -monitor renal function    Chronic thrombocytopenia due to liver cirrhosis  -platelet trending down, monitor H/H and platelet   S/p Left BKA   -stable    PENDING TEST RESULTS:   At the time of discharge the following test results are still pending: none    FOLLOW UP APPOINTMENTS:    Follow-up Information     Follow up With Details Comments Contact Info   1. Alondra Trujillo MD  in one week  [address]  [phone]       2. Andreina Yu MD in one week  3.  Cardiologist in one week    ADDITIONAL CARE RECOMMENDATIONS:    DIET: Diabetic Diet    ACTIVITY: Activity as tolerated    WOUND CARE: none    EQUIPMENT needed: none      DISCHARGE MEDICATIONS:  Current Discharge Medication List      START taking these medications    Details   pantoprazole (PROTONIX) 40 mg tablet Take 1 Tab by mouth Before breakfast and dinner. Qty: 30 Tab, Refills: 0         CONTINUE these medications which have NOT CHANGED    Details   furosemide (LASIX) 40 mg tablet Take 2 Tabs by mouth daily. Indications: EDEMA DUE TO HEPATIC CIRRHOSIS  Qty: 30 Tab, Refills: 3    Associated Diagnoses: Alcoholic cirrhosis of liver with ascites (HCC)      spironolactone (ALDACTONE) 100 mg tablet Take 2 Tabs by mouth daily. Indications: EDEMA DUE TO HEPATIC CIRRHOSIS  Qty: 60 Tab, Refills: 3    Associated Diagnoses: Alcoholic cirrhosis of liver with ascites (HCC)      gabapentin (NEURONTIN) 300 mg capsule Take 1 Cap by mouth three (3) times daily. Qty: 90 Cap, Refills: 0      ciprofloxacin HCl (CIPRO) 500 mg tablet Take 1 Tab by mouth daily. Qty: 90 Tab, Refills: 3      glimepiride (AMARYL) 1 mg tablet Take 1 mg by mouth daily. Associated Diagnoses: Alcoholic cirrhosis of liver with ascites (HCC)      thiamine (B-1) 100 mg tablet Take 1 Tab by mouth daily. Qty: 30 Tab, Refills: 0      folic acid (FOLVITE) 1 mg tablet Take 1 Tab by mouth daily. Qty: 30 Tab, Refills: 0      metoprolol succinate (TOPROL XL) 25 mg XL tablet Take 0.5 Tabs by mouth daily. Qty: 15 Tab, Refills: 0      amiodarone (CORDARONE) 200 mg tablet Take 200 mg by mouth nightly. multivitamins-minerals-lutein (CENTRUM SILVER) Tab Take 1 Tab by mouth daily. digoxin (LANOXIN) 0.125 mg tablet Take 0.125 mg by mouth nightly. STOP taking these medications       sacubitril-valsartan (ENTRESTO) 24 mg/26 mg tablet Comments:   Reason for Stopping:                 NOTIFY YOUR PHYSICIAN FOR ANY OF THE FOLLOWING:   Fever over 101 degrees for 24 hours.    Chest pain, shortness of breath, fever, chills, nausea, vomiting, diarrhea, change in mentation, falling, weakness, bleeding. Severe pain or pain not relieved by medications. Or, any other signs or symptoms that you may have questions about.     DISPOSITION:    Home With:   OT  PT  HH  RN       Long term SNF/Inpatient Rehab   x Independent/assisted living    Hospice    Other:       PATIENT CONDITION AT DISCHARGE:     Functional status    Poor     Deconditioned    x Independent      Cognition   x  Lucid     Forgetful     Dementia      Catheters/lines (plus indication)    Vizcarra     PICC     PEG    x None      Code status   x  Full code     DNR      PHYSICAL EXAMINATION AT DISCHARGE:   Refer to Progress Note      CHRONIC MEDICAL DIAGNOSES:  Problem List as of 2/19/2018  Date Reviewed: 2/17/2018          Codes Class Noted - Resolved    * (Principal)GI bleed ICD-10-CM: K92.2  ICD-9-CM: 578.9  2/17/2018 - Present        Secondary esophageal varices with bleeding ([facility] ICD-10-CM: I85.11  ICD-9-CM: 456.20  2/8/2018 - Present        Alcoholic cirrhosis of liver with ascites ([facility] ICD-10-CM: K70.31  ICD-9-CM: 571.2  1/3/2018 - Present        Alcohol abuse ICD-10-CM: F10.10  ICD-9-CM: 305.00  1/3/2018 - Present        Chronic systolic heart failure (HCC) (Chronic) ICD-10-CM: I50.22  ICD-9-CM: 428.22  11/21/2017 - Present        Neuropathy ICD-10-CM: G62.9  ICD-9-CM: 355.9  3/30/2017 - Present        S/P BKA (below knee amputation) ([facility] ICD-10-CM: Z89.519  ICD-9-CM: V49.75  3/30/2017 - Present        Hypertension ICD-10-CM: I10  ICD-9-CM: 401.9  3/30/2017 - Present        Cardiac defibrillator in place ICD-10-CM: Z95.810  ICD-9-CM: V45.02  3/30/2017 - Present        Type II diabetes mellitus ([facility] ICD-10-CM: E11.9  ICD-9-CM: 250.00  2/1/2015 - Present        RESOLVED: GI bleed ICD-10-CM: K92.2  ICD-9-CM: 578.9  1/23/2018 - 1/28/2018        RESOLVED: Secondary esophageal varices without bleeding ([facility] ICD-10-CM: I85.10  ICD-9-CM: 456.21  1/3/2018 - 2/8/2018        RESOLVED: Hypomagnesemia ICD-10-CM: E83.42  ICD-9-CM: 275.2  12/6/2017 - 12/10/2017        RESOLVED: Suicidal ideations ICD-10-CM: N41.483  ICD-9-CM: V62.84  12/6/2017 - 12/10/2017        RESOLVED: Anasarca ICD-10-CM: R60.1  ICD-9-CM: 782.3  11/20/2017 - 11/24/2017        RESOLVED: Hyponatremia ICD-10-CM: E87.1  ICD-9-CM: 276.1  11/20/2017 - 12/10/2017        RESOLVED: Cirrhosis of liver without ascites ([facility] ICD-10-CM: K74.60  ICD-9-CM: 571.5  5/2/2017 - 1/3/2018        RESOLVED: Extremity pain ICD-10-CM: M79.609  ICD-9-CM: 729.5  2/18/2015 - 3/30/2017        RESOLVED: Cellulitis and abscess of foot ICD-10-CM: L03.119, L02.619  ICD-9-CM: 682.7  2/1/2015 - 2/13/2015        RESOLVED: Hyperglycemia ICD-10-CM: R73.9  ICD-9-CM: 790.29  1/31/2015 - 2/13/2015              Greater than 30 minutes were spent with the patient on counseling and coordination of care    Signed:   Abigail Noyola MD  2/19/2018  12:09 PM

## 2018-02-19 NOTE — DISCHARGE INSTRUCTIONS
Discharge Instructions       PATIENT ID: Charlotte Murillo  MRN: [id number]   YOB: 1960    DATE OF ADMISSION: 2/17/2018  1:58 AM    DATE OF DISCHARGE: 2/19/2018    PRIMARY CARE PROVIDER: Fern Agudelo MD     ATTENDING PHYSICIAN: Henna Acosta MD  DISCHARGING PROVIDER: Heather Brock MD    To contact this individual call [phone] and ask the  to page. If unavailable ask to be transferred the Adult Hospitalist Department. DISCHARGE DIAGNOSES   Upper GI bleeding, with hx of variceal bleeding s/p banding on 5/75/48  Alcoholic liver cirrhosis with ascites   Chronic systolic CHF NHYA Class I, EF 35-40% 12/21/17, s/p AICD   Paroxysmal A Fib, rate controlled  Hyperkalemia   DM-II  HTN  Hyponatremia due to liver cirrhosis   CKD stage I  Chronic thrombocytopenia due to liver cirrhosis  S/p Left BKA     CONSULTATIONS: IP CONSULT TO GASTROENTEROLOGY  IP CONSULT TO HOSPITALIST    PROCEDURES/SURGERIES: Procedure(s):  ESOPHAGOGASTRODUODENOSCOPY (EGD)    PENDING TEST RESULTS:   At the time of discharge the following test results are still pending: none    FOLLOW UP APPOINTMENTS:   Follow-up Information     Follow up With Details Comments Contact Info   1. Fern Agudelo MD In one week  [address] [place] [address]  [phone]       2. Hazel Garcia MD in one week  3. Cardiologist in one week    ADDITIONAL CARE RECOMMENDATIONS:     DIET: Diabetic Diet    ACTIVITY: Activity as tolerated    WOUND CARE: none    EQUIPMENT needed: none      DISCHARGE MEDICATIONS:   See Medication Reconciliation Form    · It is important that you take the medication exactly as they are prescribed. · Keep your medication in the bottles provided by the pharmacist and keep a list of the medication names, dosages, and times to be taken in your wallet. · Do not take other medications without consulting your doctor.        NOTIFY YOUR PHYSICIAN FOR ANY OF THE FOLLOWING:   Fever over 101 degrees for 24 hours. Chest pain, shortness of breath, fever, chills, nausea, vomiting, diarrhea, change in mentation, falling, weakness, bleeding. Severe pain or pain not relieved by medications. Or, any other signs or symptoms that you may have questions about.       DISPOSITION:    Home With:   OT  PT  HH  RN       SNF/Inpatient Rehab/LTAC   x Independent/assisted living    Hospice    Other:     CDMP Checked:   Yes x     PROBLEM LIST Updated:  Yes x       Signed:   Jessica Richards MD  2/19/2018  12:06 PM

## 2018-02-21 NOTE — ADT AUTH CERT NOTES
Utilization Review            MEDICAL PROGRESS NOTE by Sam Orozco RN        Review Status Review Entered       In Primary 2018       Details         Date of Service:  2018  NAME: Christel Finnegan  :  1960  MRN:  [id number]        Admission Summary:   A 59 y.o. male with past medical history significant for CHF, Defibrillator, HTN, Sepsis, Afib, Cirrhosis, Left BKA who presents from home with chief complaint of hematemsis     Interval history / Subjective:   No abdominal pain, vomiting and his last bowel movement 2 days ago      Assessment & Plan:      Upper GI bleeding, with hx of variceal bleeding s/p banding on 18  -s/p EGD on  no varices, mild portal gastropathy, no gastric or duodenal ucler, small amount of blood in stomach, no active bleeding or source of bleeding  -on octreotide and protonix gtt  -H/H trending down.  Hg will transfuse 2 units pRBC on , with lasix, repeat H/H post transfusion  -INR 1.3, platelet 54, continue monitoring for now  -hepatologist on board     Alcoholic liver cirrhosis with ascites   -on albumin IV   -he said he had paracentesis on , need large volume paracentesis   -continue lasix  -hold his spironolactone, will resume after k level checked     Chronic systolic CHF NHYA Class I, EF 35-40% 17, s/p AICD   -stable  -continue metoprolol, lasix and spironolactone  -will start entresto once he stable  -monitor I/O      Paroxysmal A Fib, rate controlled  -continue amiodarone, not on OAC due to GI bleeding, liver cirrhosis and thrombocytopenia     Hyperkalemia   -receive iv regular insulin with D5W and albuterol and resolved  -repeat bmp     DM-II  -A1c normal  -continue sliding scale and monitor finger stick glucose     HTN  -BP normal, on metoprolol, lasix and spironolactone, continue monitoring BP     Hyponatremia due to liver cirrhosis   -improving, monitor electrolyte      CKD stage I  -creatinine normal   -monitor renal function    Chronic thrombocytopenia due to liver cirrhosis  -platelet trending down, monitor H/H and platelet     S/p Left BKA   -stable           Code status: Full Code  DVT prophylaxis: SCD     Care Plan discussed with: Patient/Family, Nurse and   Disposition: TBD                 Hospital Problems  Date Reviewed: 2/17/2018           Codes Class Noted POA     * (Principal)GI bleed ICD-10-CM: K92.2  ICD-9-CM: 605. 9   2/17/2018 Unknown                   Vital Signs:    Last 24hrs VS reviewed since prior progress note. Most recent are:          Visit Vitals    /55 (BP 1 Location: Left arm, BP Patient Position: At rest)    Pulse 64    Temp 97.6 °F (36.4 °C)    Resp 16    Ht 5' 10\" (1.778 m)    Wt 103.4 kg (227 lb 15.3 oz)    SpO2 97%    BMI 32.71 kg/m2            Intake/Output Summary (Last 24 hours) at 02/18/18 0846  Last data filed at 02/17/18 1920    Gross per 24 hour   Intake                0 ml   Output              700 ml   Net             -700 ml         Physical Examination:            Constitutional:  No acute distress, cooperative, pleasant    ENT:  Oral mucous moist, oropharynx benign. Neck supple,    Resp:  Decrease bronchial breath sound bilaterally. No wheezing/rhonchi/rales. No accessory muscle use   CV:  Regular rhythm, normal rate, no murmurs, gallops, rubs    GI:  Distended, Ascites, soft, non tender. normoactive bowel sounds, no hepatosplenomegaly     Musculoskeletal:  Left BKA, bilateral pretibial and pedal edema    Neurologic:  Moves all extremities.   AAOx3, CN II-XII reviewed                                             Skin:  Good turgor, no rashes or ulcers         Data Review:   Rowena Patel and/or order of clinical lab test        Labs:                Recent Labs      02/18/18   0024  02/17/18   1426  02/17/18   0613   WBC  2.9*   --   5.2   HGB  6.7*  7.5*  8.1*   HCT  18.9*  21.8*  23.1*   PLT  42*   --   54*                Recent Labs      02/17/18   4747  02/17/18   1563  02/17/18   0238   NA  134*  131*  132*   K  5.1  5.9*  5.4*   CL  104  102  100   CO2  24  22  24   BUN  28*  27*  25*   CREA  1.32*  1.32*  1.47*   GLU  195*  130*  127*   CA  8.1*  8.3*  8.6              Recent Labs      02/17/18   7715  02/17/18 0238   SGOT  64*  65*   ALT  27  38   AP  163*  176*   TBILI  3.5*  3.1*   TP  6.2*  6.3*   ALB  2.8*  2.8*   GLOB  3.4  3.5   AML   --   73   LPSE   --   534*            Recent Labs      02/17/18   9778   INR  1.3*   PTP  13.3*   APTT  30.0      No results for input(s): FE, TIBC, PSAT, FERR in the last 72 hours.             Lab Results   Component Value Date/Time     Folate 19.9 12/07/2017 04:25 AM      No results for input(s): PH, PCO2, PO2 in the last 72 hours.   No results for input(s): CPK, CKNDX, TROIQ in the last 72 hours.     No lab exists for component: CPKMB  No results found for: CHOL, CHOLX, CHLST, CHOLV, HDL, LDL, LDLC, DLDLP, TGLX, TRIGL, TRIGP, CHHD, CHHDX            Lab Results   Component Value Date/Time     Glucose (POC) 145 (H) 02/18/2018 06:49 AM     Glucose (POC) 177 (H) 02/17/2018 09:48 PM     Glucose (POC) 180 (H) 02/17/2018 04:10 PM     Glucose (POC) 232 (H) 02/17/2018 01:08 PM     Glucose (POC) 201 (H) 02/17/2018 09:20 AM                Lab Results   Component Value Date/Time     Color YELLOW/STRAW 01/09/2018 11:06 PM     Appearance CLEAR 01/09/2018 11:06 PM     Specific gravity 1.012 01/09/2018 11:06 PM     pH (UA) 5.5 01/09/2018 11:06 PM     Protein NEGATIVE  01/09/2018 11:06 PM     Glucose NEGATIVE  01/09/2018 11:06 PM     Ketone NEGATIVE  01/09/2018 11:06 PM     Bilirubin NEGATIVE  01/09/2018 11:06 PM     Urobilinogen 1.0 01/09/2018 11:06 PM     Nitrites NEGATIVE  01/09/2018 11:06 PM     Leukocyte Esterase NEGATIVE  01/09/2018 11:06 PM     Epithelial cells FEW 01/09/2018 11:06 PM     Bacteria NEGATIVE  01/09/2018 11:06 PM     WBC 0-4 01/09/2018 11:06 PM     RBC 0-5 01/09/2018 11:06 PM            Medications Reviewed:                  Current Facility-Administered Medications   Medication Dose Route Frequency    pantoprazole (PROTONIX) 40 mg in 0.9% sodium chloride (MBP/ADV) 50 mL  8 mg/hr IntraVENous CONTINUOUS    0.9% sodium chloride infusion 250 mL  250 mL IntraVENous PRN    metoclopramide HCl (REGLAN) injection 10 mg  10 mg IntraVENous Q6H    sodium chloride (NS) flush 5-10 mL  5-10 mL IntraVENous Q8H    sodium chloride (NS) flush 5-10 mL  5-10 mL IntraVENous PRN    octreotide (SANDOSTATIN) 500 mcg in 0.9% sodium chloride 500 mL infusion  50 mcg/hr IntraVENous CONTINUOUS    0.9% sodium chloride infusion 250 mL  250 mL IntraVENous PRN    albumin human 25% (BUMINATE) solution 25 g  25 g IntraVENous Q6H    glucose chewable tablet 16 g  4 Tab Oral PRN    dextrose (D50W) injection syrg 12.5-25 g  12.5-25 g IntraVENous PRN    glucagon (GLUCAGEN) injection 1 mg  1 mg IntraMUSCular PRN    insulin lispro (HUMALOG) injection   SubCUTAneous AC&HS    simethicone (MYLICON) 62ZB/2.5YY oral drops 80 mg  1.2 mL Oral Multiple    furosemide (LASIX) tablet 40 mg  40 mg Oral BID    spironolactone (ALDACTONE) tablet 100 mg  100 mg Oral BID    metoprolol succinate (TOPROL-XL) XL tablet 12.5 mg  12.5 mg Oral DAILY    amiodarone (CORDARONE) tablet 200 mg  200 mg Oral DAILY    gabapentin (NEURONTIN) capsule 300 mg  300 mg Oral TID                    2/18 HEPATOLOGY PROGESS NOTE by Cindy Kim RN        Review Status Review Entered       In Primary 2/21/2018       Details         HEPATOLOGY PROGRESS NOTE  The patient is a 59year old male with cirrhosis of the liver secondary to alcohol. Shlomo Esparza has continued to consume alcohol even though he knows he has cirrhosis. Shlomo Esparza was last hospitalized at [facility] was for variceal bleeding in 1/2018.  Emergent EGD at that time demonstrated no active bleeding but blood in the stomach presumably from variceal origin.  Varcies were treated with band ligation.       He came to the ED because of hematemesis.  HB was 8.8 gm which was stable compared to the last HB of 9.1 gm 9 days ago.  An NG tube was placed.  Repeat HB 2 hours later was still 8.8 gms.  EGD in the ED did not show any source for bleeding. Xena Si was a small amount of blood in the fundus.  However, he had no esophageal varices, mild portal gastropathy, no MW tear, no gastric or duodenal ulcers.     He has remained stable overnight.  HB this AM is down to 6.7 gms.  Probably dilutional.  Agree with transfusing 2 units of blood.  I will DC octreotide.  I have changed him to regular JOSEPH diet.  If he remains stable overnight he could go home.       ASSESSMENT AND PLAN:  Cirrhosis  This is secodary to alcohol.  The CTP score is 9, Child class B, MELD score 24.  He is not a candidate for liver transplant because on ongoing alcohol use.     GI bleeding  No source of bleeding was identified during EGD yesterday. Adria Morrison had no varices, ulcers, MW tear and only mild portal gastropathy.  I have stopped octreotide.  Drop in HB is probably dilutional.  Agree with 2 units RBC.       Acute blood loss anemia  Getting 2 units of blood.     Thrombocytopenia  This is secondary to cirrhosis.  No treatment needed.  The platelet count is adequate even with GI bleeding.  No platelet transfusion needed at this time.     Hyponatremia  This is only mildly reduced from baseline 9 days ago. Carly Moulder is secondary to a combination of cirrhosis and diuretics.  Hold diuretics.  Will will be getting IV albumin which will correct hyponatremia.     LORENZA  There is mild increase in Screat from baseline of 1.1 to 1.47 mg.  Probably dehydration from acute blood loss.  Will treat with volume expansion using IV albumin.  Does not need blood right now.     Alcohol abuse  He says he has not had any alcohol in 2 weeks.  Encouraged to remain abstinent.        PHYSICAL EXAMINATION:  VS: per nursing note  General:  No acute distress. Ill appearing  Eyes:  Sclera anicteric.    ENT:  No oral lesions.  Thyroid normal.  Nodes:  No adenopathy. Skin:  Spider angiomata.  No jaundice. Respiratory:  Lungs clear to auscultation. Cardiovascular:  Regular heart rate. Abdomen:  Soft non-tender, Distended with obvious ascites. Extremities:  2+ lower extremity edema.  Muscle wasting. Neurologic:  Alert and oriented.  Cranial nerves grossly intact.  No asterixis.     LABORATORY:  Results for Alden Lemons (MRN [id number]) as of 2/18/2018 14:00    Ref. Range 2/17/2018 06:13 2/17/2018 09:49 2/17/2018 14:26 2/18/2018 00:24   WBC Latest Ref Range: 4.1 - 11.1 K/uL 5.2     2.9 (L)   HGB Latest Ref Range: 12.1 - 17.0 g/dL 8.1 (L)   7.5 (L) 6.7 (L)   PLATELET Latest Ref Range: 150 - 400 K/uL 54 (L)     42 (LL)   Sodium Latest Ref Range: 136 - 145 mmol/L 131 (L) 134 (L)       Potassium Latest Ref Range: 3.5 - 5.1 mmol/L 5.9 (H) 5.1       Chloride Latest Ref Range: 97 - 108 mmol/L 102 104       CO2 Latest Ref Range: 21 - 32 mmol/L 22 24       Glucose Latest Ref Range: 65 - 100 mg/dL 130 (H) 195 (H)       BUN Latest Ref Range: 6 - 20 MG/DL 27 (H) 28 (H)       Creatinine Latest Ref Range: 0.70 - 1.30 MG/DL 1.32 (H) 1.32 (H)       Bilirubin, total Latest Ref Range: 0.2 - 1.0 MG/DL 3.5 (H)         Albumin Latest Ref Range: 3.5 - 5.0 g/dL 2.8 (L)         ALT (SGPT) Latest Ref Range: 12 - 78 U/L 27         AST Latest Ref Range: 15 - 37 U/L 64 (H)         Alk. phosphatase Latest Ref Range: 45 - 117 U/L 163 (H)            Jose Miguel Charles MD  [facility] [place]                      VOMITING DATA by Salvatore Apple RN        Review Status Review Entered       In Primary 2/21/2018       Details         2/17/18:   Pt to ED for c/o vomiting blood starting this evening. States he's vomited 4 times. Thought it was the tomato juice he had drank, but his wife states that it appeared to be blood. Pt dizzy in triage. Gastrointestinal: Positive for abdominal pain, nausea and vomiting (hematemesis). NG tube placed, 60cm at the nares. Placement confirmed with XR, hooked to continuous suction  Patient vomiting coffee ground emesis, 100ml noted in emesis bag.  MD notified, orders received for reglan.

## 2018-02-21 NOTE — PROGRESS NOTES
Called wife back Monica Martinez at [phone]) to update her about EGD cancelled, f/u appt made and no need for TIPS at this time. Answered all questions. She will be taking \"over\" and attending all appointments moving forward. They also have a 8year old. Faiza Wolff NP  2:54 PM

## 2018-02-28 NOTE — PROGRESS NOTES
Patient's wife [facility] called. Stated patient is requesting paracentesis. Patients stomach feels tight and full. [facility] stated that patient has gone from 205lb -220lb in one week. Spoke with Gunjan Bishop NP, who approved paracentesis. Attempted to call Coordination of Care to schedule for 3/1/18 or 3/2/18 - left message. Nasir Becker asked that patient be moved from her schedule to follow up with Dr. Janene Noel. Appointment time moved, patient and NP informed.

## 2018-02-28 NOTE — PROGRESS NOTES
Pt called in to office with increased weight gain of 15 pounds and needs para. Cam Coad called pt wife and coordination of care to set up. Had increased him to step 2 last visit but he has not been in to get labs checked after increasing diuretics yet - it is next week. Ordered BMP and cell count. Need to check sodium and kidney function before increasing to step 3. Faiza Sparks NP  11:26 AM

## 2018-03-02 NOTE — DISCHARGE INSTRUCTIONS
[address] [facility] [address] Procedures/Radiology Department      Radiologist:  Tova Apgar    Date:  3/2/18/    Paracentesis Discharge Instructions    You may have an aching pain in your abdomen at the puncture site tonight. You may take Tylenol, as directed on the label, for the pain or discomfort. Resume your previous diet and follow the medication reconciliation form. Rest today. Watch for signs of infection at the puncture site:  redness, swelling, pus, fever or chills. If this occurs, call your doctor immediately or go to the nearest Emergency Room. If you experience severe sweating, severe abdominal pain, dizziness or faintness, go to the nearest Emergency Room immediately. If you have any questions or concerns, please call [phone], and ask to speak to the nurse on-call.     Other:

## 2018-03-08 NOTE — PROGRESS NOTES
Chief Complaint   Patient presents with    Follow-up     possible TIPS     Visit Vitals    /59 (BP 1 Location: Left arm, BP Patient Position: Sitting)    Pulse 60    Temp 97.9 °F (36.6 °C) (Tympanic)    Ht 5' 10\" (1.778 m)    Wt 205 lb 6.4 oz (93.2 kg)    SpO2 99%    BMI 29.47 kg/m2     PHQ over the last two weeks 3/8/2018   Little interest or pleasure in doing things Not at all   Feeling down, depressed or hopeless Not at all   Total Score PHQ 2 0

## 2018-03-08 NOTE — MR AVS SNAPSHOT
[address] [facility] [phone] [address] [place] [address] 
[phone] Patient: Lyndall Brunner MRN: [id number] Visit Information Date & Time Provider Department Dept. Phone Encounter #  
 3/8/2018  4:15 PM Cecil Rogers Morgan Turner of [facility] [id number] Follow-up Instructions Return in about 4 weeks (around 2018) for MLS. Upcoming Health Maintenance Date Due  
 LIPID PANEL Q1 1960 MICROALBUMIN Q1 1968 EYE EXAM RETINAL OR DILATED Q1 1968 DTaP/Tdap/Td series (1 - Tdap) 1979 FOOT EXAM Q1 2016 Influenza Age 5 to Adult 2017 ZOSTER VACCINE AGE 60> 2017 HEMOGLOBIN A1C Q6M 2018 FOBT Q 1 YEAR AGE 50-75 2019 Allergies as of 3/8/2018  Review Complete On: 3/8/2018 By: Ulysses Álvarez LPN No Known Allergies Current Immunizations  Reviewed on 2017 Name Date Influenza Vaccine 2014 Influenza Vaccine PF 2013  6:09 PM  
 Pneumococcal Vaccine (Unspecified Type) 2012 Not reviewed this visit Vitals BP Pulse Temp Height(growth percentile) 122/59 (BP 1 Location: Left arm, BP Patient Position: Sitting) 60 97.9 °F (36.6 °C) (Tympanic) 5' 10\" (1.778 m) Weight(growth percentile) SpO2 BMI Smoking Status 205 lb 6.4 oz (93.2 kg) 99% 29.47 kg/m2 Former Smoker Vitals History BMI and BSA Data Body Mass Index Body Surface Area  
 29.47 kg/m 2 2.15 m 2 Preferred Pharmacy Pharmacy Name Phone [facility]ait[facility] [address], [address] [phone] Your Updated Medication List  
  
   
This list is accurate as of 3/8/18  5:15 PM.  Always use your most recent med list.  
  
  
  
  
 amiodarone 200 mg tablet Commonly known as:  CORDARONE Take 200 mg by mouth nightly. CENTRUM SILVER Tab tablet Generic drug:  multivitamins-minerals-lutein Take 1 Tab by mouth daily. ciprofloxacin HCl 500 mg tablet Commonly known as:  CIPRO Take 1 Tab by mouth daily. digoxin 0.125 mg tablet Commonly known as:  LANOXIN Take 0.125 mg by mouth nightly. folic acid 1 mg tablet Commonly known as:  Google Take 1 Tab by mouth daily. furosemide 40 mg tablet Commonly known as:  LASIX Take 2 Tabs by mouth daily. Indications: EDEMA DUE TO HEPATIC CIRRHOSIS  
  
 gabapentin 300 mg capsule Commonly known as:  NEURONTIN Take 1 Cap by mouth three (3) times daily. glimepiride 1 mg tablet Commonly known as:  AMARYL Take 1 mg by mouth daily. metoprolol succinate 25 mg XL tablet Commonly known as:  TOPROL XL Take 0.5 Tabs by mouth daily. pantoprazole 40 mg tablet Commonly known as:  PROTONIX Take 1 Tab by mouth Before breakfast and dinner. spironolactone 100 mg tablet Commonly known as:  ALDACTONE Take 2 Tabs by mouth daily. Indications: EDEMA DUE TO HEPATIC CIRRHOSIS  
  
 thiamine 100 mg tablet Commonly known as:  B-1 Take 1 Tab by mouth daily. valsartan 40 mg tablet Commonly known as:  DIOVAN Take  by mouth daily. Follow-up Instructions Return in about 4 weeks (around 4/5/2018) for MLS. Please provide this summary of care documentation to your next provider. Your primary care clinician is listed as Ramya Flores. If you have any questions after today's visit, please call [phone].

## 2018-03-08 NOTE — PROGRESS NOTES
70 Steffi Durand MD, FACP, [facility], [place]       Christiano Guzmán, KULDEEP Whittaker, ARIASP-MARTÍN Gutierrez NP [address] [facility] [address] [place] [address]    at [address], [address] [place] [address] Esther Toro  22.    [phone]    FAX: [address] [place] [address], [address]    [phone]    FAX: [phone]       Patient Care Team:  Rashad Easley MD as PCP - General (Family Practice)  Rashad Easley MD (Family Practice)  Malgorzata Cortez MD (Gastroenterology)  Nick Ye MD as Physician (Cardiology)      Problem List  Date Reviewed: 4/19/2018          Codes Class Noted    Esophageal varices ([facility].) ICD-10-CM: I85.00  ICD-9-CM: 456.1  2/8/2018        Cirrhosis, alcoholic ([facility].) PGF-26-AY: K70.30  ICD-9-CM: 571.2  1/3/2018        Alcohol abuse, in remission ICD-10-CM: F10.11  ICD-9-CM: 305.03  1/3/2018        Chronic systolic heart failure ([facility].) (Chronic) ICD-10-CM: F46.88  ICD-9-CM: 428.22  11/21/2017        Neuropathy ICD-10-CM: G62.9  ICD-9-CM: 355.9  3/30/2017        S/P BKA (below knee amputation) ([facility].) ICD-10-CM: U99.649  ICD-9-CM: V49.75  3/30/2017        Hypertension ICD-10-CM: I10  ICD-9-CM: 401.9  3/30/2017        Cardiac defibrillator in place ICD-10-CM: Z95.810  ICD-9-CM: V45.02  3/30/2017        Type II diabetes mellitus ([facility].) ICD-10-CM: E11.9  ICD-9-CM: 250.00  2/1/2015                Emmet Severs returns to the [place] [address] for management of cirrhosis secondary to alcohol. The active problem list, all pertinent past medical history, medications, and laboratory findings related to the liver disorder were reviewed with the patient. The patient is a 59 y.o.   male who was found to have cirrhosis in 2012. The patient had variceal bleeding in 1/2018. This was treated with banding. Ascites has become refractory to diuretics and paracentesis since the hospitalization in 1/2018. Lower extremity edema has not resolved with the current dose of diuretics. Hepatic encephalopathy as not developed to date. He has remained abstinent from alcohol since 1/2018. The patient notes fatigue, swelling of the abdomen, swelling of the lower extremities    The patient has not experienced fevers, chills, problems concentrating, hematemesis, hematochezia. The patient has limitations in functional activities which can be attributed to the liver disease. ALLERGIES  No Known Allergies    MEDICATIONS  No current facility-administered medications for this visit. No current outpatient prescriptions on file. Facility-Administered Medications Ordered in Other Visits   Medication    lidocaine (PF) (XYLOCAINE) 10 mg/mL (1 %) injection 5 mL    sodium chloride (NS) flush 5-10 mL    sodium chloride (NS) flush 5-10 mL    acetaminophen (TYLENOL) tablet 650 mg    ondansetron (ZOFRAN) injection 4 mg    furosemide (LASIX) tablet 40 mg    lactulose (CHRONULAC) solution 20 g     SYSTEM REVIEW NOT RELATED TO LIVER DISEASE OR REVIEWED ABOVE:  Constitution systems: Negative for fever, chills, weight gain, weight loss. Eyes: Negative for visual changes. ENT: Negative for sore throat, painful swallowing. Respiratory: Negative for cough, hemoptysis, SOB. Cardiology: Negative for chest pain, palpitations. GI:  Negative for constipation or diarrhea. : Negative for urinary frequency, dysuria, hematuria, nocturia. Skin: Negative for rash. Hematology: Negative for easy bruising, blood clots. Musculo-skeletal: Negative for back pain, muscle pain, weakness. Neurologic: Negative for headaches, dizziness, vertigo, memory problems not related to HE.   Psychology: Negative for anxiety, depression. FAMILY HISTORY:  The father  of unknown cause. The mother  of heart disease. There is no family history of liver disease. SOCIAL HISTORY:  The patient is . The patient has 3 children. The patient has never used tobacco products. The patient consumes 6+ alcoholic beverages per day. He has been abstinent from alcohol since 2018. The patient used to work . The patient retired in . PHYSICAL EXAMINATION:  /59 (BP 1 Location: Left arm, BP Patient Position: Sitting)  Pulse 60  Temp 97.9 °F (36.6 °C) (Tympanic)   Ht 5' 10\" (1.778 m)  Wt 205 lb 6.4 oz (93.2 kg)  SpO2 99%  BMI 29.47 kg/m2  General: No acute distress. Eyes: Sclera anicteric. ENT: No oral lesions. Nodes: No adenopathy. Skin: Numerous spider angiomata. No jaundice. No palmar erythema. Respiratory: Lungs clear to auscultation. Cardiovascular: Regular heart rate. No murmurs. No JVD. Abdomen: Distended with ascites. Extremities: 2+ lower edema on right. Left BKA. Neurologic: Alert and oriented. Cranial nerves grossly intact. No asterixis.     LABORATORY STUDIES:  Liver [place] of [address] [place] [address] & Units 2018   WBC 4.1 - 11.1 K/uL 2.0 (L)    ANC 1.8 - 8.0 K/UL     HGB 12.1 - 17.0 g/dL 8.2 (L) 8.6 (L)    - 400 K/uL 41 (LL)    INR 0.9 - 1.1       AST 15 - 37 U/L  59 (H)   ALT 12 - 78 U/L  35   Alk Phos 45 - 117 U/L  86   Bili, Total 0.2 - 1.0 MG/DL  4.2 (H)   Bili, Direct 0.00 - 0.40 mg/dL     Albumin 3.5 - 5.0 g/dL  3.9   BUN 6 - 20 MG/DL 19    Creat 0.70 - 1.30 MG/DL 1.31 (H) 1.34 (H)   Na 136 - 145 mmol/L 136 135 (L)   K 3.5 - 5.1 mmol/L 4.3 4.6   Cl 97 - 108 mmol/L 102 103   CO2 21 - 32 mmol/L 26 25   Glucose 65 - 100 mg/dL 134 (H) 113 (H)   Magnesium 1.6 - 2.4 mg/dL     Ammonia <32 UMOL/L         SEROLOGIES:  Serologies Latest Ref Rng & Units 3/30/2017   Hep A Ab, Total Negative Positive (A)   Hep B Surface Ag Negative Negative   Hep B Core Ab, Total Negative Negative   Hep B Surface AB QL  Non Reactive   Hep C Ab 0.0 - 0.9 s/co ratio 0.1   Ferritin 30 - 400 ng/mL 399   Iron % Saturation 15 - 55 % 76 (HH)   Alpha-1 antitrypsin level 90 - 200 mg/dL 181     LIVER HISTOLOGY:  2017. FibroScan performed at [address] EkPa was 75. Suggested fibrosis level is F4. ENDOSCOPIC PROCEDURES:  2017. EGD performed by [facility]. Medium esophageal varices. Banding performed. Moderate portal gastropathy. 2018. EGD performed by [facility]. Medium esophageal varices. 6 banded. Large blood clot in fundus. 2017. EGD by [facility]. Scars of previous banding. NO esophageal varices. Portal gastropathy. RADIOLOGY:  10/2012. Ultrasound of liver. Echogenic nodular consistent with cirrhosis. No liver mass lesions. No dilated bile ducts. No ascites. 2018. CT scan abdomen with IV contrast.  Changes consistent with cirrhosis. No liver mass lesions. No dilated bile ducts. Mild-Moderate ascites. OTHER TESTIN2018. Blood ETOH 102    ASSESSMENT AND PLAN:  Cirrhosis that is secondary to alcohol. The liver transaminases are elevated. The ALP is elevated. Liver function is depressed, platelet count is depressed. There is mild LORENZA with baseline Screat of 1.3 mg    The CTP is 7. Child class B. The MELD score is 17. There is an elevation in iron saturation with normal ferritin. The elevation in ferritin is secondary to alcohol use. The patient is not a candidate for liver transplantation because of recent or ongoing alcohol use. The patient will have been abstinent for 6 months in 2018. If the patient stops consuming alcohol, the acute decompensation may improve, MELD score could improve and he may not require a liver transplant. Ascites is now persistent despite current dose of step 2 diuretics. Lower extremity edema is persistent despite step 2 diuretics.       Hepatic encephalopathy has not developed to date. There is no need for treatment with lactulose and/or Xifaxan at this time. No need to restrict dietary protein at this time. Esophageal varices with prior bleeding. Varices have been treated with banding. THe last EGD was in 2/2018. No varices were present. The patient was advised never to consume alcohol again. Vaccination for viral hepatitis B is recommended since the patient has no serologic evidence of previous exposure or vaccination with immunity. Vaccination for viral hepatitis A is not needed. The patient has serologic evidence of prior exposure or vaccination with immunity. All of the above issues were discussed with the patient. All questions were answered. The patient expressed a clear understanding of the above. [address] [place] [address] in 6 weeks to review all additional lab data, EGD and US findings and to monitor the impact of alcohol cessation.     Antwan Cohn MD  [facility] [place] of [address] [place], [place] [address]  22.  [phone]

## 2018-03-19 NOTE — PROGRESS NOTES
Patient and wife called to see if PCP faxed over lab results from beginning of March. Informed that labs were not received. Looked them up on [address] [place] [address] to give results for A1c. Informed patient wife have patient return to the office to have lab work drawn that was placed during 2/28/18 appointment. Patient wife stated she would have  call at a late date to make an appointment.

## 2018-03-22 NOTE — IP AVS SNAPSHOT
2700 [facility] [address] [place] [address] 
[phone] Patient: Gayla Knowles MRN: [id number] CQQ:5/19/2058 About your hospitalization You were admitted on:  March 22, 2018 You last received care in the:  [address] You were discharged on:  March 22, 2018 Why you were hospitalized Your primary diagnosis was:  Not on File Follow-up Information None Your Scheduled Appointments Friday April 20, 2018  9:15 AM EDT Follow Up with MD Rosa Rushing 75 ([facility]) 200 [facility] [phone] [address] [place] [address]  
[phone] Discharge Orders None A check suzanne indicates which time of day the medication should be taken. My Medications ASK your doctor about these medications Instructions Each Dose to Equal  
 Morning Noon Evening Bedtime  
 amiodarone 200 mg tablet Commonly known as:  CORDARONE Your last dose was: Your next dose is: Take 200 mg by mouth nightly. 200 mg CENTRUM SILVER Tab tablet Generic drug:  multivitamins-minerals-lutein Your last dose was: Your next dose is: Take 1 Tab by mouth daily. 1 Tab  
    
   
   
   
  
 ciprofloxacin HCl 500 mg tablet Commonly known as:  CIPRO Your last dose was: Your next dose is: Take 1 Tab by mouth daily. 500 mg  
    
   
   
   
  
 digoxin 0.125 mg tablet Commonly known as:  LANOXIN Your last dose was: Your next dose is: Take 0.125 mg by mouth nightly. 0.125 mg  
    
   
   
   
  
 folic acid 1 mg tablet Commonly known as:  Google Your last dose was: Your next dose is: Take 1 Tab by mouth daily. 1 mg  
    
   
   
   
  
 furosemide 40 mg tablet Commonly known as:  LASIX Your last dose was: Your next dose is: Take 2 Tabs by mouth daily. Indications: EDEMA DUE TO HEPATIC CIRRHOSIS  
 80 mg  
    
   
   
   
  
 gabapentin 300 mg capsule Commonly known as:  NEURONTIN Your last dose was: Your next dose is: Take 1 Cap by mouth three (3) times daily. 300 mg  
    
   
   
   
  
 glimepiride 1 mg tablet Commonly known as:  AMARYL Your last dose was: Your next dose is: Take 1 mg by mouth daily. 1 mg  
    
   
   
   
  
 metoprolol succinate 25 mg XL tablet Commonly known as:  TOPROL XL Your last dose was: Your next dose is: Take 0.5 Tabs by mouth daily. 12.5 mg  
    
   
   
   
  
 pantoprazole 40 mg tablet Commonly known as:  PROTONIX Your last dose was: Your next dose is: Take 1 Tab by mouth Before breakfast and dinner. 40 mg  
    
   
   
   
  
 spironolactone 100 mg tablet Commonly known as:  ALDACTONE Your last dose was: Your next dose is: Take 2 Tabs by mouth daily. Indications: EDEMA DUE TO HEPATIC CIRRHOSIS  
 200 mg  
    
   
   
   
  
 thiamine 100 mg tablet Commonly known as:  B-1 Your last dose was: Your next dose is: Take 1 Tab by mouth daily. 100 mg  
    
   
   
   
  
 valsartan 40 mg tablet Commonly known as:  DIOVAN Your last dose was: Your next dose is: Take  by mouth daily. Discharge Instructions [facility] Department of Interventional Radiology [phone] PARACENTESIS DISCHARGE INSTRUCTIONS General Information: 
During this procedure, the doctor will insert a needle into the abdomen to drain fluid. After the procedure, you will be able to take a deep breath much easier. The site of the puncture may ooze the first day.  This will decrease and eventually stop. Paracentesis (draining fluid from the abdomen) sometimes makes patients hypotensive (low blood pressure). Your doctor may order for you to receive fluids or albumin (a volume booster) during the procedure through an IV site. Home Care Instructions: 
Keep the puncture site clean and dry. No tub baths or swimming until puncture site heals. Showering is acceptable. Resume your normal diet, and resume your normal activity slowly and as you tolerate. If you are short of breath, rest. If shortness of breath does not ease, please call your ordering doctor. Fluid can re-accumulate in the chest and/or in the abdomen. If this should occur, your doctor needs to know as you may need to have the procedure done again. Call If: 
   You should call your Physician and/or the Radiology Nurse if you notice any signs of infection, like pus draining, or if it is swollen or reddened. Also call if you have a fever, or if you are bleeding from the puncture site more than a small amount on the dressing. Call if the puncture site keeps draining fluid. Some oozing is to be expected, but should slow and then stop. Call if you feel like you have pressure in your abdomen. SEEK IMMEDIATE CARE OR CALL 911 IF YOU SUDDENLY HAVE TROUBLE BREATHING, OR IF YOUR LIPS TURN BLUE, OR IF YOU NOTICE BLOOD IN YOUR SPUTUM. Follow-Up Instructions: Please see your ordering doctor as he/she has requested. To Reach Us: Side effects of sedation medications and other medications used today have been reviewed. Notify us of nausea, itching, hives, dizziness, or anything else out of the ordinary. Should you experience any of these significant changes, please call [phone] between the hours of 7:30 am and 10 pm or [phone] after hours. After hours, ask the  to page the X-ray Technologist, and describe the problem to the technologist.  
9,500 cc's of paracentesis fluid obtained today Unresulted Labs-Please follow up with your PCP about these lab tests Order Current Status US PARACENTESIS ABD W IMAGING In process Providers Seen During Your Hospitalization Provider Specialty Primary office phone Brea Gonzalez MD Hepatology [phone] Your Primary Care Physician (PCP) Primary Care Physician Office Phone Office Fax Makeda Hernandez [phone] You are allergic to the following No active allergies Recent Documentation Height Weight BMI Smoking Status 1.778 m 102.1 kg 32.28 kg/m2 Former Smoker Emergency Contacts Name Discharge Info Relation Home Work Mobile CarlosCass DISCHARGE CAREGIVER [3] Spouse [3] [phone] Patient Belongings The following personal items are in your possession at time of discharge: 
                             
 
  
  
 Please provide this summary of care documentation to your next provider. Signatures-by signing, you are acknowledging that this After Visit Summary has been reviewed with you and you have received a copy. Patient Signature:  ____________________________________________________________ Date:  ____________________________________________________________  
  
Lupe Mcguire Provider Signature:  ____________________________________________________________ Date:  ____________________________________________________________

## 2018-03-22 NOTE — PROGRESS NOTES
1115: pt to ED US for paracentesis. Labs sent and IV placed. 1128: procedure start  1135: 6 [place] paracentesis catheter inserted into R abdomen by Dr. Dharmesh Eldridge- currently draining  1310: 9500 mls serous fluid drained- drain removed. I have reviewed discharge instructions with the patient. The patient verbalized understanding.

## 2018-03-22 NOTE — IP AVS SNAPSHOT
[facility] [address] [place] [address] 
[phone] Patient: Leslie Cameron MRN: [id number] LCAUDIO:[birth year] A check suzanne indicates which time of day the medication should be taken. My Medications ASK your doctor about these medications Instructions Each Dose to Equal  
 Morning Noon Evening Bedtime  
 amiodarone 200 mg tablet Commonly known as:  CORDARONE Your last dose was: Your next dose is: Take 200 mg by mouth nightly. 200 mg CENTRUM SILVER Tab tablet Generic drug:  multivitamins-minerals-lutein Your last dose was: Your next dose is: Take 1 Tab by mouth daily. 1 Tab  
    
   
   
   
  
 ciprofloxacin HCl 500 mg tablet Commonly known as:  CIPRO Your last dose was: Your next dose is: Take 1 Tab by mouth daily. 500 mg  
    
   
   
   
  
 digoxin 0.125 mg tablet Commonly known as:  LANOXIN Your last dose was: Your next dose is: Take 0.125 mg by mouth nightly. 0.125 mg  
    
   
   
   
  
 folic acid 1 mg tablet Commonly known as:  Google Your last dose was: Your next dose is: Take 1 Tab by mouth daily. 1 mg  
    
   
   
   
  
 furosemide 40 mg tablet Commonly known as:  LASIX Your last dose was: Your next dose is: Take 2 Tabs by mouth daily. Indications: EDEMA DUE TO HEPATIC CIRRHOSIS  
 80 mg  
    
   
   
   
  
 gabapentin 300 mg capsule Commonly known as:  NEURONTIN Your last dose was: Your next dose is: Take 1 Cap by mouth three (3) times daily. 300 mg  
    
   
   
   
  
 glimepiride 1 mg tablet Commonly known as:  AMARYL Your last dose was: Your next dose is: Take 1 mg by mouth daily. 1 mg  
    
   
   
   
  
 metoprolol succinate 25 mg XL tablet Commonly known as:  TOPROL XL Your last dose was: Your next dose is: Take 0.5 Tabs by mouth daily. 12.5 mg  
    
   
   
   
  
 pantoprazole 40 mg tablet Commonly known as:  PROTONIX Your last dose was: Your next dose is: Take 1 Tab by mouth Before breakfast and dinner. 40 mg  
    
   
   
   
  
 spironolactone 100 mg tablet Commonly known as:  ALDACTONE Your last dose was: Your next dose is: Take 2 Tabs by mouth daily. Indications: EDEMA DUE TO HEPATIC CIRRHOSIS  
 200 mg  
    
   
   
   
  
 thiamine 100 mg tablet Commonly known as:  B-1 Your last dose was: Your next dose is: Take 1 Tab by mouth daily. 100 mg  
    
   
   
   
  
 valsartan 40 mg tablet Commonly known as:  DIOVAN Your last dose was: Your next dose is: Take  by mouth daily.

## 2018-03-22 NOTE — IP AVS SNAPSHOT
Summary of Care Report The Summary of Care report has been created to help improve care coordination. Users with access to [employer] or [address] (Web-based application) may access additional patient information including the Discharge Summary. If you are not currently a [address] user and need more information, please call the number listed below in the [place] [address] section and ask to be connected with Medical Records. Facility Information Name Address Phone [address] [id number] [phone] Patient Information Patient Name Sex SARAH Davila ([id number]) Male 1960 Discharge Information Admitting Provider Service Area Unit  
 (none) [facility] [phone] Discharge Provider Discharge Date/Time Discharge Disposition Destination (none) (none) (none) (none) Patient Language Language ENGLISH [13] Hospital Problems as of 3/22/2018  Reviewed: 3/8/2018  5:02 PM by Elle Carlson MD  
 None Non-Hospital Problems as of 3/22/2018  Reviewed: 3/8/2018  5:02 PM by Elle Carlson MD  
  
  
  
 Class Noted - Resolved Last Modified Active Problems   Type II diabetes mellitus ([facility]  2015 - Present 3/30/2017 by Elle Carlson MD  
  Entered by Kasi Sheffield DPM  
  Neuropathy  3/30/2017 - Present 3/30/2017 by Elle Carlson MD  
  Entered by Elle Carlson MD  
  S/P BKA (below knee amputation) ([facility] [address]  3/30/2017 - Present 3/30/2017 by Elle Carlson MD  
  Entered by Elle Carlson MD  
  Hypertension  3/30/2017 - Present 3/30/2017 by Elle Carlson MD  
  Entered by Elle Carlson MD  
  Cardiac defibrillator in place  3/30/2017 - Present 3/30/2017 by Elle Carlson MD  
  Entered by Elle Carlson MD  
 Chronic systolic heart failure ([facility] (Chronic)  11/21/2017 - Present 11/21/2017 by Suyapa Lange RN Entered by Suyapa Lange RN Alcoholic cirrhosis of liver with ascites ([facility]  1/3/2018 - Present 1/3/2018 by Chris Mckeon. Jared Leal, NP Entered by Chris Mckeon. Jared Leal NP Alcohol abuse  1/3/2018 - Present 1/3/2018 by Chris Mckeon. Jared Leal NP Entered by Chris Mckeon. Jared Leal NP Secondary esophageal varices with bleeding ([facility]  2/8/2018 - Present 2/8/2018 by Chris Mckeon. Jared Leal NP Entered by Chris Mckeon. Jared Leal NP  
  GI bleed  2/17/2018 - Present 2/17/2018 by Patricio Bowling MD  
  Entered by Patricio Bowling MD  
  
You are allergic to the following No active allergies Current Discharge Medication List  
  
ASK your doctor about these medications Dose & Instructions Dispensing Information Comments  
 amiodarone 200 mg tablet Commonly known as:  CORDARONE Dose:  200 mg Take 200 mg by mouth nightly. Refills:  0 CENTRUM SILVER Tab tablet Generic drug:  multivitamins-minerals-lutein Dose:  1 Tab Take 1 Tab by mouth daily. Refills:  0  
   
 ciprofloxacin HCl 500 mg tablet Commonly known as:  CIPRO Dose:  500 mg Take 1 Tab by mouth daily. Quantity:  90 Tab Refills:  3  
   
 digoxin 0.125 mg tablet Commonly known as:  LANOXIN Dose:  0.125 mg Take 0.125 mg by mouth nightly. Refills:  0  
   
 folic acid 1 mg tablet Commonly known as:  Google Dose:  1 mg Take 1 Tab by mouth daily. Quantity:  30 Tab Refills:  0  
   
 furosemide 40 mg tablet Commonly known as:  LASIX Dose:  80 mg Take 2 Tabs by mouth daily. Indications: EDEMA DUE TO HEPATIC CIRRHOSIS Quantity:  30 Tab Refills:  3  
   
 gabapentin 300 mg capsule Commonly known as:  NEURONTIN Dose:  300 mg Take 1 Cap by mouth three (3) times daily. Quantity:  90 Cap Refills:  0  
   
 glimepiride 1 mg tablet Commonly known as:  AMARYL Dose:  1 mg Take 1 mg by mouth daily. Refills:  0  
   
 metoprolol succinate 25 mg XL tablet Commonly known as:  TOPROL XL Dose:  12.5 mg Take 0.5 Tabs by mouth daily. Quantity:  15 Tab Refills:  0  
   
 pantoprazole 40 mg tablet Commonly known as:  PROTONIX Dose:  40 mg Take 1 Tab by mouth Before breakfast and dinner. Quantity:  30 Tab Refills:  0  
   
 spironolactone 100 mg tablet Commonly known as:  ALDACTONE Dose:  200 mg Take 2 Tabs by mouth daily. Indications: EDEMA DUE TO HEPATIC CIRRHOSIS Quantity:  60 Tab Refills:  3  
   
 thiamine 100 mg tablet Commonly known as:  B-1 Dose:  100 mg Take 1 Tab by mouth daily. Quantity:  30 Tab Refills:  0  
   
 valsartan 40 mg tablet Commonly known as:  DIOVAN Take  by mouth daily. Refills:  0 Current Immunizations Name Date Influenza Vaccine 11/12/2014 Influenza Vaccine PF 11/5/2013 Pneumococcal Vaccine (Unspecified Type) 2/1/2012 Follow-up Information None Discharge Instructions [id number] [facility] Department of Interventional Radiology [phone] PARACENTESIS DISCHARGE INSTRUCTIONS General Information: 
During this procedure, the doctor will insert a needle into the abdomen to drain fluid. After the procedure, you will be able to take a deep breath much easier. The site of the puncture may ooze the first day. This will decrease and eventually stop. Paracentesis (draining fluid from the abdomen) sometimes makes patients hypotensive (low blood pressure). Your doctor may order for you to receive fluids or albumin (a volume booster) during the procedure through an IV site. Home Care Instructions: 
Keep the puncture site clean and dry. No tub baths or swimming until puncture site heals. Showering is acceptable. Resume your normal diet, and resume your normal activity slowly and as you tolerate.  If you are short of breath, rest. If shortness of breath does not ease, please call your ordering doctor. Fluid can re-accumulate in the chest and/or in the abdomen. If this should occur, your doctor needs to know as you may need to have the procedure done again. Call If: 
   You should call your Physician and/or the Radiology Nurse if you notice any signs of infection, like pus draining, or if it is swollen or reddened. Also call if you have a fever, or if you are bleeding from the puncture site more than a small amount on the dressing. Call if the puncture site keeps draining fluid. Some oozing is to be expected, but should slow and then stop. Call if you feel like you have pressure in your abdomen. SEEK IMMEDIATE CARE OR CALL 911 IF YOU SUDDENLY HAVE TROUBLE BREATHING, OR IF YOUR LIPS TURN BLUE, OR IF YOU NOTICE BLOOD IN YOUR SPUTUM. Follow-Up Instructions: Please see your ordering doctor as he/she has requested. To Reach Us: Side effects of sedation medications and other medications used today have been reviewed. Notify us of nausea, itching, hives, dizziness, or anything else out of the ordinary. Should you experience any of these significant changes, please call [phone] between the hours of 7:30 am and 10 pm or [phone] after hours. After hours, ask the  to page the X-ray Technologist, and describe the problem to the technologist.  
9,500 cc's of paracentesis fluid obtained today Chart Review Routing History Recipient Method Report Sent By Sarika Burgos MD  
Fax: [phone] Phone: [phone] Fax Reyesside MD NOTES AUTO ROUTING REPORT Neema Vega MD [[id number]] 11/20/2017  6:24 PM 11/20/2017 Adia Burgos MD  
Fax: [phone] Phone: [phone] Fax CHARISSA VARGAS MD NOTES AUTO ROUTING REPORT Portia Mendoza MD [[id number]] 11/25/2017  5:53 PM 11/25/2017 Adia Burgos MD  
Fax: [phone] Phone: [phone] Fax Dayanara Jessica MD NOTES AUTO ROUTING REPORT Ramona Mayes MD [[id number]] 12/7/2017  1:51 AM 12/07/2017 Adia Burgos MD  
Fax: [phone] Phone: [phone] Fax Dayanara Jessica MD NOTES AUTO ROUTING REPORT Tino Cuello MD [[id number]] 12/10/2017 12:32 PM 12/10/2017 Adia Burgos MD  
Fax: [phone] Phone: [phone] Fax Dayanara Jessica MD NOTES AUTO ROUTING REPORT Bryan Westbrook MD [[id number]] 1/24/2018  7:52 AM 01/24/2018 Adia Burgos MD  
Fax: [phone] Phone: [phone] Fax Dayanara Jessica MD NOTES AUTO ROUTING REPORT Heidi Gimenez MD [[id number]] 1/28/2018 10:49 AM 01/28/2018 Adia Burgos MD  
Fax: [phone] Phone: [phone] Fax Dayanara Jessica MD NOTES AUTO ROUTING REPORT Marielos Tucker MD [[id number]] 2/17/2018  6:40 AM 02/17/2018 Adia Burgos MD  
Fax: [phone] Phone: [phone] Fax Dayanara Jessica MD NOTES AUTO ROUTING REPORT Marielos Tucker MD [[id number]] 2/19/2018  9:11 AM 02/19/2018 Adia Burgos MD  
Fax: [phone] Phone: [phone] Fax Dayanara Jessica MD NOTES AUTO ROUTING REPORT Candis Wang MD [[id number]] 2/19/2018  3:42 PM 02/19/2018

## 2018-03-22 NOTE — ROUTINE PROCESS
Pt. Discharged to home and transported to d/c lot via w/c. Discharge instructions given to patient, pt.  Aware that 9500 cc's removed today

## 2018-03-22 NOTE — DISCHARGE INSTRUCTIONS
[address] of Interventional Radiology  [phone]    PARACENTESIS DISCHARGE INSTRUCTIONS    General Information:  During this procedure, the doctor will insert a needle into the abdomen to drain fluid. After the procedure, you will be able to take a deep breath much easier. The site of the puncture may ooze the first day. This will decrease and eventually stop. Paracentesis (draining fluid from the abdomen) sometimes makes patients hypotensive (low blood pressure). Your doctor may order for you to receive fluids or albumin (a volume booster) during the procedure through an IV site. Home Care Instructions:  Keep the puncture site clean and dry. No tub baths or swimming until puncture site heals. Showering is acceptable. Resume your normal diet, and resume your normal activity slowly and as you tolerate. If you are short of breath, rest. If shortness of breath does not ease, please call your ordering doctor. Fluid can re-accumulate in the chest and/or in the abdomen. If this should occur, your doctor needs to know as you may need to have the procedure done again. Call If:     You should call your Physician and/or the Radiology Nurse if you notice any signs of infection, like pus draining, or if it is swollen or reddened. Also call if you have a fever, or if you are bleeding from the puncture site more than a small amount on the dressing. Call if the puncture site keeps draining fluid. Some oozing is to be expected, but should slow and then stop. Call if you feel like you have pressure in your abdomen. SEEK IMMEDIATE CARE OR CALL 911 IF YOU SUDDENLY HAVE TROUBLE BREATHING, OR IF YOUR LIPS TURN BLUE, OR IF YOU NOTICE BLOOD IN YOUR SPUTUM. Follow-Up Instructions: Please see your ordering doctor as he/she has requested. To Reach Us: Side effects of sedation medications and other medications used today have been reviewed.   Notify us of nausea, itching, hives, dizziness, or anything else out of the ordinary. Should you experience any of these significant changes, please call [phone] between the hours of 7:30 am and 10 pm or [phone] after hours.  After hours, ask the  to page the X-ray Technologist, and describe the problem to the technologist.   9,500 cc's of paracentesis fluid obtained today

## 2018-03-22 NOTE — TELEPHONE ENCOUNTER
Patients wife left voice mail on 3/21/18 late that [facility] had not called to schedule a paracentesis for patient. Spoke with Mary with [facility]. She will contact patient and/or wife to schedule paracentesis. Will check to ensure it is scheduled.

## 2018-03-30 NOTE — TELEPHONE ENCOUNTER
Requested Prescriptions     Pending Prescriptions Disp Refills    pantoprazole (PROTONIX) 40 mg tablet 30 Tab 0     Sig: Take 1 Tab by mouth Before breakfast and dinner.

## 2018-04-02 NOTE — TELEPHONE ENCOUNTER
Left voice mail for patient that the prescription that he requested has been sent to his pharmacy of choice.

## 2018-04-04 NOTE — TELEPHONE ENCOUNTER
Scheduled appointment for patient to return for lab work ( 2/28/18 orders). Patient scheduled for 4/12/18. Will arrive sometime in the morning. Lab orders reprinted. Gave to Juju byrnes.

## 2018-04-13 NOTE — ED NOTES
Pt. Reports feeling \"so much better\". Pt. Able to stand to provide UA.  Denies orthostatic dizziness or disequilibrium

## 2018-04-13 NOTE — ED PROVIDER NOTES
HPI Comments: 59 y.o. male with past medical history significant for CHF, HTN, sepsis, AF, arthritis, DM, cirrhosis, L BKA, and AICD who presents from paracentesis with chief complaint of hypotension. The pt reports that he was admitted from the ED this morning to have paracentesis performed. The pt reports that he was feeling bloated and nauseated which resolved after paracentesis. The pt was found to be hypotensive after paracentesis and was sent back to the ED. The pt reports that he currently feels tired because he \"has not slept in days:. After speaking his wife on the phone, she reports that the pt was mildly lethargic prior to paracentesis, it improved, and he currently sounds mildly lethargic again. The pt denies CP, SOB, lightheadedness, and abdominal pain. There are no other acute medical concerns at this time. Social hx: former smoker, former EtOH use  PCP: Andi Lesch, MD    Old Chart Review: The pt was seen by Delia Swann in the ED this morning. The pt received 500 mL fL and 37g of Albumin. The pt may have taken Diovan. Note written by Radhika Cochran, as dictated by Dale Andino MD 4:59 PM        The history is provided by the patient. No  was used.         Past Medical History:   Diagnosis Date    Arthritis     Atrial fibrillation ([address] 2014    CHF (congestive heart failure) ([address]     Diabetes ([address]     Diabetic ulcer of left foot ([address] 2/2015 2/2015 Lt BKA    History of blood transfusion 2015    During Lt BKA; pt denies any adverse reaction    Hypertension     Liver disease     CIRRHOSIS    Sepsis ([address] 02/2015    From diabetic Left foot wound;  had a BKA ;  as of 11/21/16 pt states back to his baseline        Past Surgical History:   Procedure Laterality Date    HX AMPUTATION Left 2/10/2015    BKA    HX COLONOSCOPY      HX IMPLANTABLE CARDIOVERTER DEFIBRILLATOR  08/04/2015    [employer] cardio defibrillator; Dr Lucila Patel; as of 11/21/16 pt denies defibrillator going off         Family History:   Problem Relation Age of Onset    Heart Disease Brother     Heart Failure Brother     Heart Failure Brother        Social History     Social History    Marital status:      Spouse name: N/A    Number of children: N/A    Years of education: N/A     Occupational History    Not on file. Social History Main Topics    Smoking status: Former Smoker     Packs/day: 1.00     Years: 5.00     Quit date: 1/1/2013    Smokeless tobacco: Never Used    Alcohol use Yes      Comment: Last drink January 2018    Drug use: No    Sexual activity: Not on file     Other Topics Concern    Not on file     Social History Narrative         ALLERGIES: Review of patient's allergies indicates no known allergies. Review of Systems   Constitutional: Negative for activity change, chills and fever. HENT: Negative for nosebleeds, sore throat, trouble swallowing and voice change. Eyes: Negative for visual disturbance. Respiratory: Negative for shortness of breath. Cardiovascular: Negative for chest pain and palpitations. Gastrointestinal: Negative for abdominal pain, constipation, diarrhea and nausea. Genitourinary: Negative for difficulty urinating, dysuria, hematuria and urgency. Musculoskeletal: Negative for back pain, neck pain and neck stiffness. Skin: Negative for color change. Allergic/Immunologic: Negative for immunocompromised state. Neurological: Negative for dizziness, seizures, syncope, weakness, light-headedness, numbness and headaches. Psychiatric/Behavioral: Negative for behavioral problems, confusion, hallucinations, self-injury and suicidal ideas. All other systems reviewed and are negative. Vitals:    04/13/18 1611   BP: 135/44   Pulse: 62   Resp: 12   Temp: 97.7 °F (36.5 °C)   SpO2: 97%   Weight: 102 kg (224 lb 13.9 oz)            Physical Exam   Constitutional: He is oriented to person, place, and time.  He appears well-developed and well-nourished. No distress. HENT:   Head: Normocephalic and atraumatic. Eyes: Pupils are equal, round, and reactive to light. Neck: Normal range of motion. Neck supple. Cardiovascular: Normal rate, regular rhythm and normal heart sounds. Exam reveals no gallop and no friction rub. No murmur heard. Pulmonary/Chest: Effort normal and breath sounds normal. No respiratory distress. He has no wheezes. Abdominal: Soft. Bowel sounds are normal. He exhibits no distension. There is no tenderness. There is no rebound and no guarding. Musculoskeletal: Normal range of motion. Neurological: He is alert and oriented to person, place, and time. Slightly sluggish;  Slurred speech;     Skin: Skin is warm. No rash noted. He is not diaphoretic. Psychiatric: He has a normal mood and affect. His behavior is normal. Judgment and thought content normal.   Nursing note and vitals reviewed. Note written by Radhika Ahmadi, as dictated by Kelly Coats MD 5:02 PM    MDM  Number of Diagnoses or Management Options  Diagnosis management comments: Total critical care time spent exclusive of procedures:  75      Critical Care  Total time providing critical care:  minutes        ED Course     This is a 69-year-old male with past medical history, review of systems, physical exam as above, presenting with complaints of hypotension, and decreased mental status, the setting of recent paracentesis today. Patient was in the emergency department earlier today, with complaints of abdominal pain, from the emergency department to radiology for paracentesis, became hypotensive during that episode, necessitating replacement of approximately 800 cc of fluid and 37 g of albumin. Patient returned to the emergency department for persistent hypotension. Upon arrival he is awake, alert, with slow speech, however oriented. His blood pressure upon evaluation was 138/54.  I discussed the patient over the phone with his wife, who mentioned that he seemed also slow to respond. Physical exam otherwise remarkable for elderly male in no acute distress, with soft distended abdomen, clear breath sounds, regular rate and rhythm without murmurs gallops or rubs. I will repeat his lab work, his wife does not want him to receive additional CT imaging of his brain. Improvement in his blood pressure, and continued slow speech, we may simply be seen fatigue as well. We will make a disposition based on the patient's diagnostics and response to therapy. Procedures  6:56 PM  Hemoglobin has dropped 2 points sine this morning. K is increasing. Will obtain type and screen and abdominal CT scan. The pt will likely be admitted to the hospital.     8:34 PM  Patient with now BRBPR, negative CT, elevated lactate. Already type and screened, blood ordered, will add pantoprazole, octreotide, consult hospitalist and GI.     CONSULT NOTE:  8:44 PM Alfonso Barrera MD spoke with Dr. Mamie Jay, Consult for Hospitalist.  Discussed available diagnostic tests and clinical findings. He will see and admit the pt.     11:08 PM  Patient discussed with Dr. Michael Guerrero who will come perform endoscopy.

## 2018-04-13 NOTE — IP AVS SNAPSHOT
Summary of Care Report The Summary of Care report has been created to help improve care coordination. Users with access to [place] or [address] (Web-based application) may access additional patient information including the Discharge Summary. If you are not currently a [address] user and need more information, please call the number listed below in the [place] [address] section and ask to be connected with Medical Records. Facility Information Name Address Phone [address] [id number] [phone] Patient Information Patient Name Sex SARAH Doss ([id number]) Male 1960 Discharge Information Admitting Provider Service Area Unit Sandra Carrasco MD /  [facility] 4 Surg/Bariatrics / [phone] Discharge Provider Discharge Date/Time Discharge Disposition Destination (none) 2018 (Pending) AHR (none) Patient Language Language ENGLISH [13] Hospital Problems as of 2018  Reviewed: 2018 10:02 PM by Sandra Carrasco MD  
  
  
  
 Class Noted - Resolved Last Modified POA Active Problems * (Principal)GI bleed  2018 - Present 2018 by Sandra Carrasco MD Unknown Entered by Cecil Stewart MD  
  
Non-Hospital Problems as of 2018  Reviewed: 2018 10:02 PM by Sandra Carrasco MD  
  
  
  
 Class Noted - Resolved Last Modified Active Problems   Type II diabetes mellitus ([facility] [address]  2015 - Present 3/30/2017 by Diana Jimenez MD  
  Entered by Prema Calixto DPM  
  Neuropathy  3/30/2017 - Present 3/30/2017 by Diana Jimenez MD  
  Entered by Diana Jimenez MD  
  S/P BKA (below knee amputation) ([facility] [address]  3/30/2017 - Present 3/30/2017 by Diana Jimenez MD  
  Entered by Diana Jimenez MD  
 Hypertension  3/30/2017 - Present 3/30/2017 by Governor Leon MD  
  Entered by Governor Leon MD  
  Cardiac defibrillator in place  3/30/2017 - Present 3/30/2017 by Governor Leon MD  
  Entered by Governor Leon MD  
  Chronic systolic heart failure [facility]) (Chronic)  11/21/2017 - Present 11/21/2017 by Yeni Sanon RN Entered by Yeni Sanon RN Alcoholic cirrhosis of liver with ascites ([facility] [address]  1/3/2018 - Present 1/3/2018 by Lo Gene. Gemma Pandey NP Entered by Lo Gene. Gemma Pandey NP Alcohol abuse  1/3/2018 - Present 1/3/2018 by Lo Gene. Gemma Pandey NP Entered by Lo Gene. Gemma Pandey NP Secondary esophageal varices with bleeding ([facility] [address]  2/8/2018 - Present 2/8/2018 by Lo Gene. Gemma Pandey NP Entered by Lo Gene. Gemma Pandey NP You are allergic to the following No active allergies Current Discharge Medication List  
  
START taking these medications Dose & Instructions Dispensing Information Comments  
 pantoprazole 40 mg tablet Commonly known as:  PROTONIX Dose:  40 mg Take 1 Tab by mouth daily. Quantity:  60 Tab Refills:  1  
   
 temazepam 7.5 mg capsule Commonly known as:  RESTORIL Dose:  7.5 mg Take 1 Cap by mouth nightly as needed for Sleep. Max Daily Amount: 7.5 mg.  
 Quantity:  5 Cap Refills:  0 CONTINUE these medications which have CHANGED Dose & Instructions Dispensing Information Comments  
 furosemide 40 mg tablet Commonly known as:  LASIX What changed:  how much to take Dose:  120 mg Take 3 Tabs by mouth daily. Indications: EDEMA DUE TO HEPATIC CIRRHOSIS Quantity:  30 Tab Refills:  0  
   
 spironolactone 100 mg tablet Commonly known as:  ALDACTONE What changed:  how much to take Dose:  300 mg Take 3 Tabs by mouth daily. Indications: EDEMA DUE TO HEPATIC CIRRHOSIS Quantity:  60 Tab Refills:  3 CONTINUE these medications which have NOT CHANGED Dose & Instructions Dispensing Information Comments  
 amiodarone 200 mg tablet Commonly known as:  CORDARONE Dose:  200 mg Take 200 mg by mouth nightly. Refills:  0 CENTRUM SILVER Tab tablet Generic drug:  multivitamins-minerals-lutein Dose:  1 Tab Take 1 Tab by mouth daily. Refills:  0  
   
 digoxin 0.125 mg tablet Commonly known as:  LANOXIN Dose:  0.125 mg Take 0.125 mg by mouth nightly. Refills:  0  
   
 gabapentin 300 mg capsule Commonly known as:  NEURONTIN Dose:  300 mg Take 1 Cap by mouth three (3) times daily. Quantity:  90 Cap Refills:  0  
   
 metoprolol succinate 25 mg XL tablet Commonly known as:  TOPROL XL Dose:  12.5 mg Take 0.5 Tabs by mouth daily. Quantity:  15 Tab Refills:  0  
   
 thiamine 100 mg tablet Commonly known as:  B-1 Dose:  100 mg Take 1 Tab by mouth daily. Quantity:  30 Tab Refills:  0  
   
 valsartan 40 mg tablet Commonly known as:  DIOVAN Take  by mouth daily. Refills:  0 STOP taking these medications Comments  
 ciprofloxacin HCl 500 mg tablet Commonly known as:  CIPRO  
   
   
 oxyCODONE IR 5 mg immediate release tablet Commonly known as:  Maddie Ayala Current Immunizations Name Date Influenza Vaccine 11/12/2014 Influenza Vaccine PF 11/5/2013 Pneumococcal Vaccine (Unspecified Type) 2/1/2012 Surgery Information ID Date/Time Status Primary Surgeon All Procedures Location [id number] 4/13/2018 [address] [place] MD Mary ESOPHAGOGASTRODUODENOSCOPY (EGD) ENDOSCOPIC BANDING OR LIGATION [facility] ENDOSCOPY Follow-up Information Follow up With Details Comments Contact Info Augustus Smith MD On 4/19/2018 Discharge follow up with PCP on Thursday, 4/19/18 @ 11:30 a.m. with Dr. Modesto Mercer. Patient needs to provide d/c papers. [facility] 
[phone] Renita Garcia MD  follow up on 4/20/18 [address] [place] [address] [address] [place] [address] 
[phone] Discharge Instructions Please bring this form with you to show your primary care provider at your follow-up appointment. Primary care provider:  Dr. Heather Brooks MD 
 
Discharging provider:  Salma Cleaning MD 
 
You have been admitted to the hospital with the following diagnoses: 
· GI bleed · GI bleed FOLLOW-UP CARE RECOMMENDATIONS: 
 
APPOINTMENTS: 
Follow-up Information Follow up With Details Comments Contact Info Heather Brooks MD In 2 weeks Discharge follow up  [address] [address] [phone] Renita Garcia MD  follow up on 4/20/18 [address] [place] [address] [address] [place] [address] [address] 
[phone] FOLLOW-UP TESTS recommended:  
- Check BP in AM 4/17, If upper number SBP>100, start taking Diovan. If Not, HOLD and recheck in Another 1-2 days. 
- Please make a follow with your Cardiologist Willem Zhou for follow up and med adjustment. - Increase Lasix to 3 pills and Aldactone to 3 pills as well. PENDING TEST RESULTS: 
At the time of your discharge the following test results are still pending: NONE Please make sure you review these results with your outpatient follow-up provider(s). SYMPTOMS to watch for: chest pain, shortness of breath, fever, chills, nausea, vomiting, diarrhea, change in mentation, falling, weakness, bleeding. DIET/what to eat:  Resume previous diet ACTIVITY:  Activity as tolerated WOUND CARE: NONE 
 
EQUIPMENT needed:  NONE What to do if new or unexpected symptoms occur? If you experience any of the above symptoms (or should other concerns or questions arise after discharge) please call your primary care physician. Return to the emergency room if you cannot get hold of your doctor. · It is very important that you keep your follow-up appointment(s). · Please bring discharge papers, medication list (and/or medication bottles) to your follow-up appointments for review by your outpatient provider(s). · Please check the list of medications and be sure it includes every medication (even non-prescription medications) that your provider wants you to take. · It is important that you take the medication exactly as they are prescribed. · Keep your medication in the bottles provided by the pharmacist and keep a list of the medication names, dosages, and times to be taken in your wallet. · Do not take other medications without consulting your doctor. · If you have any questions about your medications or other instructions, please talk to your nurse or care provider before you leave the hospital. 
 
I understand that if any problems occur once I am at home I am to contact my physician. These instructions were explained to me and I had the opportunity to ask questions. Chart Review Routing History Recipient Method Report Sent By Belen Cordoba MD  
Fax: [phone] Phone: [phone] Fax Reyesside MD NOTES AUTO ROUTING REPORT Buck Lin MD [[id number]] 11/20/2017  6:24 PM 11/20/2017 Roma Cordoba MD  
Fax: [phone] Phone: [phone] Fax CHARISSA VARGAS MD NOTES AUTO ROUTING REPORT Veronica Moses MD [[id number]] 11/25/2017  5:53 PM 11/25/2017 Roma Cordoba MD  
Fax: [phone] Phone: [phone] Fax Reyesside MD NOTES AUTO ROUTING REPORT Nishi Arzate MD [[id number]] 12/7/2017  1:51 AM 12/07/2017 Roma Cordoba MD  
Fax: [phone] Phone: [phone] Fax Reyesside MD NOTES AUTO ROUTING REPORT Juan David Meade MD [[id number]] 12/10/2017 12:32 PM 12/10/2017 Roma Cordoba MD  
Fax: [phone] Phone: [phone] Fax Reyesside MD NOTES AUTO ROUTING REPORT Elena Norman MD [[id number]] 1/24/2018  7:52 AM 01/24/2018 Roma Cordoba MD  
Fax: [phone] Phone: [phone] Fax Siddharth Carnes MD NOTES AUTO ROUTING REPORT Nishant Hickman MD [[id number]] 1/28/2018 10:49 AM 01/28/2018 Rashad Easley MD  
Fax: [phone] Phone: [phone] Fax Siddharth Carnes MD NOTES AUTO ROUTING REPORT Jodie Perez MD [[id number]] 2/17/2018  6:40 AM 02/17/2018 Rashad Easley MD  
Fax: [phone] Phone: [phone] Fax Siddharth Carnes MD NOTES AUTO ROUTING REPORT Jodie Perez MD [[id number]] 2/19/2018  9:11 AM 02/19/2018 Rashad Easley MD  
Fax: [phone] Phone: [phone] Fax Siddharth Carnes MD NOTES AUTO ROUTING REPORT Nickolas Menjivar MD [[id number]] 2/19/2018  3:42 PM 02/19/2018 Rashad Easley MD  
Fax: [phone] Phone: [phone] Fax Siddharth Carnes MD NOTES AUTO ROUTING REPORT Gill Bennett MD [[id number]] 4/16/2018 11:45 AM 04/16/2018 Rashad Easley MD  
Fax: [phone] Phone: [phone] Fax Siddharth Carnes MD NOTES AUTO ROUTING REPORT Gill Bennett MD [[id number]] 4/16/2018 12:59 PM 04/16/2018 Rashad Easley MD  
Fax: [phone] Phone: [phone] Fax Siddharth Carnes MD NOTES AUTO ROUTING REPORT Samina Turk MD [[id number]] 4/16/2018  1:03 PM 04/16/2018

## 2018-04-13 NOTE — IP AVS SNAPSHOT
[address] 
[phone] Patient: Eduardo Mora MRN: [id number] WPA:[birth year] A check suzanne indicates which time of day the medication should be taken. My Medications START taking these medications Instructions Each Dose to Equal  
 Morning Noon Evening Bedtime  
 pantoprazole 40 mg tablet Commonly known as:  PROTONIX Your last dose was: Your next dose is: Take 1 Tab by mouth daily. 40 mg  
    
   
   
   
  
 temazepam 7.5 mg capsule Commonly known as:  RESTORIL Your last dose was: Your next dose is: Take 1 Cap by mouth nightly as needed for Sleep. Max Daily Amount: 7.5 mg.  
 7.5 mg  
    
   
   
   
  
  
CHANGE how you take these medications Instructions Each Dose to Equal  
 Morning Noon Evening Bedtime  
 furosemide 40 mg tablet Commonly known as:  LASIX What changed:  how much to take Your last dose was: Your next dose is: Take 3 Tabs by mouth daily. Indications: EDEMA DUE TO HEPATIC CIRRHOSIS  
 120 mg  
    
   
   
   
  
 spironolactone 100 mg tablet Commonly known as:  ALDACTONE What changed:  how much to take Your last dose was: Your next dose is: Take 3 Tabs by mouth daily. Indications: EDEMA DUE TO HEPATIC CIRRHOSIS  
 300 mg CONTINUE taking these medications Instructions Each Dose to Equal  
 Morning Noon Evening Bedtime  
 amiodarone 200 mg tablet Commonly known as:  CORDARONE Your last dose was: Your next dose is: Take 200 mg by mouth nightly. 200 mg CENTRUM SILVER Tab tablet Generic drug:  multivitamins-minerals-lutein Your last dose was: Your next dose is: Take 1 Tab by mouth daily. 1 Tab  
    
   
   
   
  
 digoxin 0.125 mg tablet Commonly known as:  LANOXIN Your last dose was: Your next dose is: Take 0.125 mg by mouth nightly. 0.125 mg  
    
   
   
   
  
 gabapentin 300 mg capsule Commonly known as:  NEURONTIN Your last dose was: Your next dose is: Take 1 Cap by mouth three (3) times daily. 300 mg  
    
   
   
   
  
 metoprolol succinate 25 mg XL tablet Commonly known as:  TOPROL XL Your last dose was: Your next dose is: Take 0.5 Tabs by mouth daily. 12.5 mg  
    
   
   
   
  
 thiamine 100 mg tablet Commonly known as:  B-1 Your last dose was: Your next dose is: Take 1 Tab by mouth daily. 100 mg  
    
   
   
   
  
 valsartan 40 mg tablet Commonly known as:  DIOVAN Your last dose was: Your next dose is: Take  by mouth daily. STOP taking these medications   
 ciprofloxacin HCl 500 mg tablet Commonly known as:  CIPRO  
   
  
 oxyCODONE IR 5 mg immediate release tablet Commonly known as:  Eliseomarc Gregorylpine Where to Get Your Medications Information on where to get these meds will be given to you by the nurse or doctor. ! Ask your nurse or doctor about these medications  
  furosemide 40 mg tablet  
 pantoprazole 40 mg tablet  
 spironolactone 100 mg tablet  
 temazepam 7.5 mg capsule

## 2018-04-13 NOTE — DISCHARGE INSTRUCTIONS
Cirrhosis: Care Instructions  Your Care Instructions    Cirrhosis occurs when healthy tissue in your liver gets scarred. This keeps the liver from working well. It usually happens after a liver has been inflamed for years. Cirrhosis is most often caused by alcohol abuse or hepatitis infection. But there are other causes too. These include medicines and too much fat in the liver. Conditions passed down in families and other disorders can also cause it. In some cases, no cause can be found. Treatment can't completely fix liver damage. But you may be able to slow or prevent more damage if you don't drink alcohol or use drugs that harm your liver. Follow-up care is a key part of your treatment and safety. Be sure to make and go to all appointments, and call your doctor if you are having problems. It's also a good idea to know your test results and keep a list of the medicines you take. How can you care for yourself at home? · Do not drink any alcohol. It can harm your liver. Talk to your doctor if you need help to stop drinking. · Be safe with medicines. Take your medicines exactly as prescribed. Call your doctor if you think you are having a problem with your medicine. · Talk to your doctor before you take any other medicines. These include over-the-counter medicines and herbal products. · Be careful taking acetaminophen (Tylenol), ibuprofen (Advil, Motrin), or naproxen (Aleve). These can sometimes cause more liver damage. Talk with your doctor if you're not sure which medicines are safe. · If your cirrhosis causes extra fluid to build up in your body, try not to eat a lot of salt. Use less salt when you cook and at the table. Don't eat fast foods or snack foods with a lot of salt. Extra fluid in your belly, legs, and chest can cause serious problems. · Work with your doctor or a dietitian to be sure you eat the right amount of carbohydrate, protein, fat, and sodium (salt).  It's very important to choose the best foods for the health of your liver. · If your doctor recommends it, limit how much fluid you drink. · If your doctor recommends it, get more exercise. Walking is a good choice. Bit by bit, increase the amount you walk every day. Try for at least 30 minutes on most days of the week. You also may want to swim, bike, or do other activities. When should you call for help? Call 911 anytime you think you may need emergency care. For example, call if:  ? · You have trouble breathing. ? · You vomit blood or what looks like coffee grounds. ?Call your doctor now or seek immediate medical care if:  ? · You feel very sleepy or confused. ? · You have new belly pain, or your pain gets worse. ? · You have a fever. ? · There is a new or increasing yellow tint to your skin or the whites of your eyes. ? · You have any abnormal bleeding, such as:  ¨ Nosebleeds. ¨ Vaginal bleeding that is different (heavier, more frequent, at a different time of the month) than what you are used to. ¨ Bloody or black stools, or rectal bleeding. ¨ Bloody or pink urine. ? Watch closely for changes in your health, and be sure to contact your doctor if:  ? · You have any problems. ? · Your belly is getting bigger. ? · You are gaining weight. Where can you learn more? Go to http://precious-vita.info/. Enter M412 in the search box to learn more about \"Cirrhosis: Care Instructions. \"  Current as of: May 12, 2017  Content Version: 11.4  © 2495-8752 [employer]. Care instructions adapted under license by [employer] (which disclaims liability or warranty for this information). If you have questions about a medical condition or this instruction, always ask your healthcare professional. [place] [address] any warranty or liability for your use of this information.          Liver Disease Diet: Care Instructions  Your Care Instructions    The liver does many jobs that are vital to the rest of your body. When something is wrong with the liver, your body may not get the nutrition it needs. It is important that you eat a healthy diet that includes a variety of foods from the basic food groups: grains, vegetables, fruits, dairy, and protein foods. Follow your doctor's instructions for eating carbohydrate, protein, and fat in the right amounts for you. Your doctor also may limit salt or take salt out of your diet to help protect the liver. Always talk with your doctor or dietitian before you make changes in your diet. Follow-up care is a key part of your treatment and safety. Be sure to make and go to all appointments, and call your doctor if you are having problems. It's also a good idea to know your test results and keep a list of the medicines you take. How can you care for yourself at home? · Work with your doctor or dietitian to create a food plan that guides your daily food choices. · Eat a balanced diet. Do not skip meals or go for many hours without eating. If you eat several small meals during the day, you have a better chance of getting the extra calories your body needs for energy. · Your doctor may recommend a high-carbohydrate diet to get enough calories, since you may have to limit fat. You may need to spread carbohydrate throughout your meals and snacks to control the amount of sugar in your blood. Eat six small meals a day, rather than three big ones. Carbohydrate is found in:  ¨ Whole-grain and refined breads and cereals. ¨ Some vegetables. ¨ Cooked beans (such as kidney or black beans) and peas (such as lentils or split peas). ¨ Fruits. ¨ Low-fat or nonfat milk and milk products, which also supply protein. ¨ Candy, table sugar, and sugary soda drinks (try to limit these). · Follow your doctor's or dietitian's instructions on how to get the right amount of protein in your diet. Examples of animal protein are:  [employer], fish, and poultry. ¨ Eggs.   ¨ Milk and milk products. · Your doctor or dietitian may ask you to eat a certain amount of protein that comes from plants (rather than protein that comes from animals). You can get plant protein from foods such as:  ¨ Cooked dried beans and peas. ¨ Peanut butter, nuts, and seeds. ¨ Tofu. · Limit salt, if your doctor tells you to. This will help prevent fluid buildup in your belly and chest, which can cause serious problems. Salt is in many prepared foods, such as shepherd, canned foods, snack foods, sauces, and soups. Look for reduced-salt products. · Your doctor may recommend vitamin and mineral supplements. However, do not take any other medicine, including over-the-counter medicines, vitamins, and herbal products, without talking to your doctor first.  · Do NOT take acetaminophen (Tylenol), ibuprofen (Advil, Motrin), or naproxen (Aleve). These can cause more liver damage. · Do not drink any alcohol. It can harm your liver. Talk to your doctor if you need help to stop drinking. · If you have a loss of appetite or have nausea or vomiting, try to:  ¨ Stay away from foods and food smells that make you feel worse. ¨ Avoid greasy or fatty foods. ¨ Eat food that settles your stomach when it feels upset. Try crackers, dry toast, or caty (caty tea, hard caty candy, or crystallized caty). When should you call for help? Watch closely for changes in your health, and be sure to contact your doctor if you have any problems. Where can you learn more? Go to http://precious-vita.info/. Enter W859 in the search box to learn more about \"Liver Disease Diet: Care Instructions. \"  Current as of: May 12, 2017  Content Version: 11.4  © 8790-2534 [employer]. Care instructions adapted under license by [employer] (which disclaims liability or warranty for this information).  If you have questions about a medical condition or this instruction, always ask your healthcare professional. [employer] disclaims any warranty or liability for your use of this information.

## 2018-04-13 NOTE — ED TRIAGE NOTES
Scheduled to have paracentesis today d/t ascites. Pt more confused with increased fluid retention onset last night. Dizziness onset 3/31/18 progressively getting worse resulting in a fall on 4/4/18. Denies injury after fall.

## 2018-04-13 NOTE — ED PROVIDER NOTES
HPI Comments: 59 y.o. male with past medical history significant for alcoholic cirrhosis of the liver with ascites, HTN, CHF, sepsis, afib, DM, and arthritis who presents ambulatory from home accompanied by his wife with chief complaint of confusion. Patient reports disorientation described as \"inability to sleep or stay awake\" onset last night. Patient states he was off-balance briefly this morning. Per wife, patient was confused and unable to recall the day of the week or the year this morning. She states he was contradicting himself last night, saying he felt disoriented and then saying he felt normal within a few minutes. Wife states patient sustained unwitnessed GLF on 4/2/18 without head trauma or LOC. Patient is scheduled for paracentesis today at 1300. Wife denies history of hepatic encephalopathy. Patient does not take lactulose. Patient reports compliance with prescribed diuretics Lasix and spirolactone. Wife notes doses were not increased recently secondary to concerns over kidney function. Patient has a f/u appointment with Dr. Beverley Nair on 4/20/18. Patient specifically denies fevers and change in urinary output. Abd feels \"full. \"    There are no other acute medical concerns at this time. Old Chart Review:  Patient admitted for 2 nights in February 2018 for upper GI bleed and alcoholic liver cirrhosis with ascites. Patient discharged home on Lasix and spirolactone with f/u with Dr. Beverley Nair, hepatology, and Dr. Ajay Borrego, PCP. Last paracentesis performed on 3/22/18 with 9500 ccs fluid removed. BUN 36, creatinine 2.02 on 4/6/18. Creatinine 1.3 on 4/19/18. Ammonia 82 in February 2018. Ammonia 27 on 1/9/18.     Social hx: former tobacco smoker; denies EtOH use (abstinent since 4/2411); denies illicit drug use;    PCP: Gretchen Gu MD  Hepatology: Tresa Newberry MD    Note written by Radhika Lawson, as dictated by Keaton Smith DO 9:20 AM        The history is provided by the patient, medical records and the spouse (wife). No  was used. Past Medical History:   Diagnosis Date    Arthritis     [facility]rial fibrillation ([facility] 2014    CHF (congestive heart failure) ([facility]     Diabetes ([facility]     Diabetic ulcer of left foot ([facility] 2/2015 2/2015 Lt BKA    History of blood transfusion 2015    During Lt BKA; pt denies any adverse reaction    Hypertension     Liver disease     CIRRHOSIS    Sepsis ([facility] 02/2015    From diabetic Left foot wound;  had a BKA ;  as of 11/21/16 pt states back to his baseline        Past Surgical History:   Procedure Laterality Date    HX AMPUTATION Left 2/10/2015    BKA    HX COLONOSCOPY      HX IMPLANTABLE CARDIOVERTER DEFIBRILLATOR  08/04/2015    [employer] cardio defibrillator; Dr Mathew Arce; as of 11/21/16 pt denies defibrillator going off         Family History:   Problem Relation Age of Onset    Heart Disease Brother     Heart Failure Brother     Heart Failure Brother        Social History     Social History    Marital status:      Spouse name: N/A    Number of children: N/A    Years of education: N/A     Occupational History    Not on file. Social History Main Topics    Smoking status: Former Smoker     Packs/day: 1.00     Years: 5.00     Quit date: 1/1/2013    Smokeless tobacco: Never Used    Alcohol use Yes      Comment: Last drink January 2018    Drug use: No    Sexual activity: Not on file     Other Topics Concern    Not on file     Social History Narrative         ALLERGIES: Review of patient's allergies indicates no known allergies. Review of Systems   Constitutional: Negative for fever. Genitourinary: Negative for difficulty urinating. Psychiatric/Behavioral: Positive for confusion. All other systems reviewed and are negative.       Vitals:    04/13/18 0818   BP: 131/76   Pulse: 68   Resp: 18   Temp: 98.5 °F (36.9 °C)   SpO2: 98%   Height: 5' 10\" (1.778 m)            Physical Exam Constitutional: No distress. Chronically ill   HENT:   Head: Normocephalic. Nose: Nose normal.   Mouth/Throat: Oropharynx is clear and moist.   Eyes: Conjunctivae are normal. Pupils are equal, round, and reactive to light. Neck: No tracheal deviation present. Cardiovascular: Normal rate, regular rhythm, normal heart sounds and intact distal pulses. Pulmonary/Chest: Effort normal and breath sounds normal. Wheezes: scattered. Abdominal: Soft. He exhibits distension. + ascites. nontender. Musculoskeletal: He exhibits no deformity. Neurological: He is alert. Skin: Skin is warm and dry. He is not diaphoretic. Psychiatric: He has a normal mood and affect. [facility]      ED Course       Procedures    ED EKG Interpretation:  Rhythm: paced and regular . Rate (approx.): 64; Axis: normal; Intervals: normal; ST/T wave: normal. No ectopy. Note written by Radhika Gardiner, as dictated by Jyoti Vyas, DO 8:39 AM    Dr. Priscilla Cortez [place] hepat[place] Paged but could not contact. D/w wife  I think pt will feel better after paracentesis which is scheduled as an outpt at 1 pm.  He is mostly restless in the bed. Doubt cva. Doubt HE. No indication for admission on this visit.     Labs reviewed

## 2018-04-13 NOTE — ED TRIAGE NOTES
Triage Note: Patient was seen this am in this ED for Altered level of conc. Patient went down to IR for pare centesis. Patient was given 850 of NS and 37.5 of albumin and stayed Hypotensive. Physician in IR believed PT needed to be seen again in ER.

## 2018-04-13 NOTE — PROGRESS NOTES
1325 -  Patient discharged from ED and came over to [address] for scheduled paracentesis. Wife at bedside    Victorinomikenoreen Sklinkfrank 90 with Jesus Rivera with Dr. Duque Aidee office reported hypotension. New orders received to give normal saline bolus 500 ml then 12. g albumin additional dose to 25 g received during paracentesis. Patient asymptomatic at rest but wife reports patient has been increasingly dizzy when ambulating. And may have taken his home medications which include Diovan. Reported to Jesus Rivera.    5770 - spoke with Dr. James Berg reporting blood pressure. Dr. James Berg ordered to take patient to ED    1619 - patient in ED bed 21. Reported off to [address]. Patient awake and alert, following commands. BP diastolic   continues to run low. See doc flow sheet.

## 2018-04-13 NOTE — ED NOTES
Assumed care of patient. Verbal and bedside report received from JEAN Ho. Patient resting quietly on stretcher, visitor at bedside. Pt appears in no acute distress, respirations equal and unlabored. VS WNL. Call bell within reach.

## 2018-04-13 NOTE — IP AVS SNAPSHOT
[address] [facility] 13 
[phone] Patient: Eduardo Mora MRN: [id number] CSE:[id number]/63/[id number] About your hospitalization You were admitted on:  April 13, 2018 You last received care in the:  [facility] 4 SURG/BARIATRICS You were discharged on:  April 16, 2018 Why you were hospitalized Your primary diagnosis was:  Gi Bleed Follow-up Information Follow up With Details Comments Contact Info Ngozi Swanson MD On 4/19/2018 Discharge follow up with PCP on Thursday, 4/19/18 @ 11:30 a.m. with Dr. Usama Pham. Patient needs to provide d/c papers. [address] [place] [address] 
[phone] Sunday Leger MD  follow up on 4/20/18 [address] [facility] [address] [facility] 13 
[phone] Your Scheduled Appointments Friday April 20, 2018  9:15 AM EDT Follow Up with Sunday Leger MD  
[facility] ([address]) [address] [facility] [phone] [facility] 13  
[phone] Discharge Orders None A check suzanne indicates which time of day the medication should be taken. My Medications START taking these medications Instructions Each Dose to Equal  
 Morning Noon Evening Bedtime  
 pantoprazole 40 mg tablet Commonly known as:  PROTONIX Your last dose was: Your next dose is: Take 1 Tab by mouth daily. 40 mg  
    
   
   
   
  
 temazepam 7.5 mg capsule Commonly known as:  RESTORIL Your last dose was: Your next dose is: Take 1 Cap by mouth nightly as needed for Sleep. Max Daily Amount: 7.5 mg.  
 7.5 mg  
    
   
   
   
  
  
CHANGE how you take these medications Instructions Each Dose to Equal  
 Morning Noon Evening Bedtime  
 furosemide 40 mg tablet Commonly known as:  LASIX What changed:  how much to take Your last dose was: Your next dose is: Take 3 Tabs by mouth daily. Indications: EDEMA DUE TO HEPATIC CIRRHOSIS  
 120 mg  
    
   
   
   
  
 spironolactone 100 mg tablet Commonly known as:  ALDACTONE What changed:  how much to take Your last dose was: Your next dose is: Take 3 Tabs by mouth daily. Indications: EDEMA DUE TO HEPATIC CIRRHOSIS  
 300 mg CONTINUE taking these medications Instructions Each Dose to Equal  
 Morning Noon Evening Bedtime  
 amiodarone 200 mg tablet Commonly known as:  CORDARONE Your last dose was: Your next dose is: Take 200 mg by mouth nightly. 200 mg CENTRUM SILVER Tab tablet Generic drug:  multivitamins-minerals-lutein Your last dose was: Your next dose is: Take 1 Tab by mouth daily. 1 Tab  
    
   
   
   
  
 digoxin 0.125 mg tablet Commonly known as:  LANOXIN Your last dose was: Your next dose is: Take 0.125 mg by mouth nightly. 0.125 mg  
    
   
   
   
  
 gabapentin 300 mg capsule Commonly known as:  NEURONTIN Your last dose was: Your next dose is: Take 1 Cap by mouth three (3) times daily. 300 mg  
    
   
   
   
  
 metoprolol succinate 25 mg XL tablet Commonly known as:  TOPROL XL Your last dose was: Your next dose is: Take 0.5 Tabs by mouth daily. 12.5 mg  
    
   
   
   
  
 thiamine 100 mg tablet Commonly known as:  B-1 Your last dose was: Your next dose is: Take 1 Tab by mouth daily. 100 mg  
    
   
   
   
  
 valsartan 40 mg tablet Commonly known as:  DIOVAN Your last dose was: Your next dose is: Take  by mouth daily. STOP taking these medications   
 ciprofloxacin HCl 500 mg tablet Commonly known as:  CIPRO oxyCODONE IR 5 mg immediate release tablet Commonly known as:  Damion De Luna Where to Get Your Medications Information on where to get these meds will be given to you by the nurse or doctor. ! Ask your nurse or doctor about these medications  
  furosemide 40 mg tablet  
 pantoprazole 40 mg tablet  
 spironolactone 100 mg tablet  
 temazepam 7.5 mg capsule Discharge Instructions Please bring this form with you to show your primary care provider at your follow-up appointment. Primary care provider:  Dr. Margareth Ramachandran MD 
 
Discharging provider:  Fan Waterman MD 
 
You have been admitted to the hospital with the following diagnoses: 
· GI bleed · GI bleed FOLLOW-UP CARE RECOMMENDATIONS: 
 
APPOINTMENTS: 
Follow-up Information Follow up With Details Comments Contact Info Margareth Ramachandran MD In 2 weeks Discharge follow up  [address] [place] [address] 
[phone] Lynn Novoa MD  follow up on 4/20/18 [address] [address] 
[phone] FOLLOW-UP TESTS recommended:  
- Check BP in AM 4/17, If upper number SBP>100, start taking Diovan. If Not, HOLD and recheck in Another 1-2 days. 
- Please make a follow with your Cardiologist Babs Mohr for follow up and med adjustment. - Increase Lasix to 3 pills and Aldactone to 3 pills as well. PENDING TEST RESULTS: 
At the time of your discharge the following test results are still pending: NONE Please make sure you review these results with your outpatient follow-up provider(s). SYMPTOMS to watch for: chest pain, shortness of breath, fever, chills, nausea, vomiting, diarrhea, change in mentation, falling, weakness, bleeding. DIET/what to eat:  Resume previous diet ACTIVITY:  Activity as tolerated WOUND CARE: NONE 
 
EQUIPMENT needed:  NONE What to do if new or unexpected symptoms occur? If you experience any of the above symptoms (or should other concerns or questions arise after discharge) please call your primary care physician. Return to the emergency room if you cannot get hold of your doctor. · It is very important that you keep your follow-up appointment(s). · Please bring discharge papers, medication list (and/or medication bottles) to your follow-up appointments for review by your outpatient provider(s). · Please check the list of medications and be sure it includes every medication (even non-prescription medications) that your provider wants you to take. · It is important that you take the medication exactly as they are prescribed. · Keep your medication in the bottles provided by the pharmacist and keep a list of the medication names, dosages, and times to be taken in your wallet. · Do not take other medications without consulting your doctor. · If you have any questions about your medications or other instructions, please talk to your nurse or care provider before you leave the hospital. 
 
I understand that if any problems occur once I am at home I am to contact my physician. These instructions were explained to me and I had the opportunity to ask questions. Quirky Announcement We are excited to announce that we are making your provider's discharge notes available to you in Quirky. You will see these notes when they are completed and signed by the physician that discharged you from your recent hospital stay. If you have any questions or concerns about any information you see in BIG Launchert, please call the Health Information Department where you were seen or reach out to your Primary Care Provider for more information about your plan of care. Introducing Ace Lutz As a [facility] patient, I wanted to make you aware of our electronic visit tool called Ace Rosajessi. ideacts innovations allows you to connect within minutes with a medical provider 24 hours a day, seven days a week via a mobile device or tablet or logging into a secure website from your computer. You can access Frenzoo from anywhere in the [place]. A virtual visit might be right for you when you have a simple condition and feel like you just dont want to get out of bed, or cant get away from work for an appointment, when your regular [employer] provider is not available (evenings, weekends or holidays), or when youre out of town and need minor care. Electronic visits cost only $49 and if the [email or web address] provider determines a prescription is needed to treat your condition, one can be electronically transmitted to a nearby pharmacy*. Please take a moment to enroll today if you have not already done so. The enrollment process is free and takes just a few minutes. To enroll, please download the ideacts innovations nicol to your tablet or phone, or visit [email or web address] to enroll on your computer. And, as an [address] [place] [address] patient with a [employer] account, the results of your visits will be scanned into your electronic medical record and your primary care provider will be able to view the scanned results. We urge you to continue to see your regular [employer] provider for your ongoing medical care. And while your primary care provider may not be the one available when you seek a [employer] virtual visit, the peace of mind you get from getting a real diagnosis real time can be priceless. For more information on [employer], view our Frequently Asked Questions (FAQs) at [email or web address]. Sincerely, 
 
Bernadine Rouse MD 
Chief Medical Officer [employer] Mendy Marks *:  certain medications cannot be prescribed via [employer] Providers Seen During Your Hospitalization Provider Specialty Primary office phone Enriqueta Valentin MD Emergency Medicine [phone] Alise Mack MD Hospitalist [phone] Fan Waterman MD Internal Medicine [phone] Your Primary Care Physician (PCP) Primary Care Physician Office Phone Office Fax Vishal Edwards [phone] You are allergic to the following No active allergies Recent Documentation Weight BMI Smoking Status 99.1 kg 31.35 kg/m2 Former Smoker Emergency Contacts Name Discharge Info Relation Home Work Mobile Cass Gonzalez DISCHARGE CAREGIVER [3] Spouse [3] [phone] Patient Belongings The following personal items are in your possession at time of discharge: 
  Dental Appliances: None  Visual Aid: None Please provide this summary of care documentation to your next provider. Signatures-by signing, you are acknowledging that this After Visit Summary has been reviewed with you and you have received a copy. Patient Signature:  ____________________________________________________________ Date:  ____________________________________________________________  
  
[facility] Signature:  ____________________________________________________________ Date:  ____________________________________________________________

## 2018-04-13 NOTE — IP AVS SNAPSHOT
[address] [facility] [address] [place] [address] 
[phone] Patient: Debbie Carlson MRN: [id number] PLS:[id number] About your hospitalization You were admitted on:  April 13, 2018 You last received care in the:  [facility] 4 SURG/BARIATRICS You were discharged on:  April 16, 2018 Why you were hospitalized Your primary diagnosis was:  Gi Bleed Follow-up Information Follow up With Details Comments Contact Info Hannah Senior MD On 4/19/2018 Discharge follow up with PCP on Thursday, 4/19/18 @ 11:30 a.m. with Dr. Marissa Herrera. Patient needs to provide d/c papers. [address] [address] 
[phone] Jose Berrios MD  follow up on 4/20/18 [address] [place] [address] [address] [place] [address] 
[phone] Your Scheduled Appointments Friday April 20, 2018  9:15 AM EDT Follow Up with Jose Berrios MD  
[address] ([facility]) [address] [place] [address] [phone] [address] [place] [address]  
[phone] Discharge Orders None A check suzanne indicates which time of day the medication should be taken. My Medications START taking these medications Instructions Each Dose to Equal  
 Morning Noon Evening Bedtime  
 pantoprazole 40 mg tablet Commonly known as:  PROTONIX Your last dose was: Your next dose is: Take 1 Tab by mouth daily. 40 mg  
    
   
   
   
  
 temazepam 7.5 mg capsule Commonly known as:  RESTORIL Your last dose was: Your next dose is: Take 1 Cap by mouth nightly as needed for Sleep. Max Daily Amount: 7.5 mg.  
 7.5 mg  
    
   
   
   
  
  
CHANGE how you take these medications Instructions Each Dose to Equal  
 Morning Noon Evening Bedtime  
 furosemide 40 mg tablet Commonly known as:  LASIX What changed:  how much to take Your last dose was: Your next dose is: Take 3 Tabs by mouth daily. Indications: EDEMA DUE TO HEPATIC CIRRHOSIS  
 120 mg  
    
   
   
   
  
 spironolactone 100 mg tablet Commonly known as:  ALDACTONE What changed:  how much to take Your last dose was: Your next dose is: Take 3 Tabs by mouth daily. Indications: EDEMA DUE TO HEPATIC CIRRHOSIS  
 300 mg CONTINUE taking these medications Instructions Each Dose to Equal  
 Morning Noon Evening Bedtime  
 amiodarone 200 mg tablet Commonly known as:  CORDARONE Your last dose was: Your next dose is: Take 200 mg by mouth nightly. 200 mg CENTRUM SILVER Tab tablet Generic drug:  multivitamins-minerals-lutein Your last dose was: Your next dose is: Take 1 Tab by mouth daily. 1 Tab  
    
   
   
   
  
 digoxin 0.125 mg tablet Commonly known as:  LANOXIN Your last dose was: Your next dose is: Take 0.125 mg by mouth nightly. 0.125 mg  
    
   
   
   
  
 gabapentin 300 mg capsule Commonly known as:  NEURONTIN Your last dose was: Your next dose is: Take 1 Cap by mouth three (3) times daily. 300 mg  
    
   
   
   
  
 metoprolol succinate 25 mg XL tablet Commonly known as:  TOPROL XL Your last dose was: Your next dose is: Take 0.5 Tabs by mouth daily. 12.5 mg  
    
   
   
   
  
 thiamine 100 mg tablet Commonly known as:  B-1 Your last dose was: Your next dose is: Take 1 Tab by mouth daily. 100 mg  
    
   
   
   
  
 valsartan 40 mg tablet Commonly known as:  DIOVAN Your last dose was: Your next dose is: Take  by mouth daily. STOP taking these medications   
 ciprofloxacin HCl 500 mg tablet Commonly known as:  CIPRO oxyCODONE IR 5 mg immediate release tablet Commonly known as:  Benigno Martell Where to Get Your Medications Information on where to get these meds will be given to you by the nurse or doctor. ! Ask your nurse or doctor about these medications  
  furosemide 40 mg tablet  
 pantoprazole 40 mg tablet  
 spironolactone 100 mg tablet  
 temazepam 7.5 mg capsule Discharge Instructions Please bring this form with you to show your primary care provider at your follow-up appointment. Primary care provider:  Dr. Mamta Kraus MD 
 
Discharging provider:  Azalea Hills MD 
 
You have been admitted to the hospital with the following diagnoses: 
· GI bleed · GI bleed FOLLOW-UP CARE RECOMMENDATIONS: 
 
APPOINTMENTS: 
Follow-up Information Follow up With Details Comments Contact Info Mamta Kraus MD In 2 weeks Discharge follow up  [address] [place] [address] 
[phone] Heide Tellez MD  follow up on 4/20/18 [address] 
[phone] FOLLOW-UP TESTS recommended:  
- Check BP in AM 4/17, If upper number SBP>100, start taking Diovan. If Not, HOLD and recheck in Another 1-2 days. 
- Please make a follow with your Cardiologist [facility] for follow up and med adjustment. - Increase Lasix to 3 pills and Aldactone to 3 pills as well. PENDING TEST RESULTS: 
At the time of your discharge the following test results are still pending: NONE Please make sure you review these results with your outpatient follow-up provider(s). SYMPTOMS to watch for: chest pain, shortness of breath, fever, chills, nausea, vomiting, diarrhea, change in mentation, falling, weakness, bleeding. DIET/what to eat:  Resume previous diet ACTIVITY:  Activity as tolerated WOUND CARE: NONE 
 
EQUIPMENT needed:  NONE What to do if new or unexpected symptoms occur? If you experience any of the above symptoms (or should other concerns or questions arise after discharge) please call your primary care physician. Return to the emergency room if you cannot get hold of your doctor. · It is very important that you keep your follow-up appointment(s). · Please bring discharge papers, medication list (and/or medication bottles) to your follow-up appointments for review by your outpatient provider(s). · Please check the list of medications and be sure it includes every medication (even non-prescription medications) that your provider wants you to take. · It is important that you take the medication exactly as they are prescribed. · Keep your medication in the bottles provided by the pharmacist and keep a list of the medication names, dosages, and times to be taken in your wallet. · Do not take other medications without consulting your doctor. · If you have any questions about your medications or other instructions, please talk to your nurse or care provider before you leave the hospital. 
 
I understand that if any problems occur once I am at home I am to contact my physician. These instructions were explained to me and I had the opportunity to ask questions. [employer] Announcement We are excited to announce that we are making your provider's discharge notes available to you in [employer]. You will see these notes when they are completed and signed by the physician that discharged you from your recent hospital stay. If you have any questions or concerns about any information you see in [employer], please call the Health Information Department where you were seen or reach out to your Primary Care Provider for more information about your plan of care. Introducing [employer] As a [employer] patient, I wanted to make you aware of our electronic visit tool called [employer]. [employer] 24/7 allows you to connect within minutes with a medical provider 24 hours a day, seven days a week via a mobile device or tablet or logging into a secure website from your computer. You can access [employer] from anywhere in the [place]. A virtual visit might be right for you when you have a simple condition and feel like you just dont want to get out of bed, or cant get away from work for an appointment, when your regular [employer] provider is not available (evenings, weekends or holidays), or when youre out of town and need minor care. Electronic visits cost only $49 and if the [employer] provider determines a prescription is needed to treat your condition, one can be electronically transmitted to a nearby pharmacy*. Please take a moment to enroll today if you have not already done so. The enrollment process is free and takes just a few minutes. To enroll, please download the [employer] 24/7 nicol to your tablet or phone, or visit [email or web address] to enroll on your computer. And, as an [address] [place] [address] patient with a [employer] account, the results of your visits will be scanned into your electronic medical record and your primary care provider will be able to view the scanned results. We urge you to continue to see your regular [employer] provider for your ongoing medical care. And while your primary care provider may not be the one available when you seek a [employer] virtual visit, the peace of mind you get from getting a real diagnosis real time can be priceless. For more information on [employer], view our Frequently Asked Questions (FAQs) at [email or web address]. Sincerely, 
 
Jovanni Wolfe MD 
Chief Medical Officer Cookie Marks *:  certain medications cannot be prescribed via [employer] Providers Seen During Your Hospitalization Provider Specialty Primary office phone Carlos Tolentino MD Emergency Medicine [phone] Ngoc Aguila MD Hospitalist [phone] Pardeep Forman MD Internal Medicine [phone] Your Primary Care Physician (PCP) Primary Care Physician Office Phone Office Fax Jose Alejandro No [phone] You are allergic to the following No active allergies Recent Documentation Weight BMI Smoking Status 99.1 kg 31.35 kg/m2 Former Smoker Emergency Contacts Name Discharge Info Relation Home Work Mobile Cass Gonzalez DISCHARGE CAREGIVER [3] Spouse [3] [phone] Patient Belongings The following personal items are in your possession at time of discharge: 
  Dental Appliances: None  Visual Aid: None Please provide this summary of care documentation to your next provider. Signatures-by signing, you are acknowledging that this After Visit Summary has been reviewed with you and you have received a copy. Patient Signature:  ____________________________________________________________ Date:  ____________________________________________________________  
  
[facility] Provider Signature:  ____________________________________________________________ Date:  ____________________________________________________________

## 2018-04-14 NOTE — PROGRESS NOTES
Hospitalist Progress Note  Fan Waterman MD  Answering service: [phone] OR 36 from in house phone  Cell: [phone]      Date of Service:  2018  NAME:  Ximena Gama  :  1960  MRN:  [id number]      Admission Summary:   60 yo male with PMHx of Alcoholic liver cirrhosis, Hx of Variceal bleed s/o banding on , Chronic systolic CHF NYHA 1, paroxysmal Atrial fib, DM, HTN, CKD 3, Chronic thrombocytopenia, Left BKA admitted for GI bleeding. Pt feeling weak, lethargic.  Pt came to ER hypotensive, Hb down to 7.1    Interval history / Subjective:     Pt seen and examined in CCU this AM   Awake   C/o pain in legs due to neuropathy  No abdominal pain  Had melena this AM      Assessment & Plan:     Acute Blood Loss Anemia due to UGI Bleed due to variceal bleed  - s/p EGD with 2 bands  - s/p 1U PRBCs  - H/H improved  - PPI  - c/w Octreotide gtt  - GI following   - clears okay     Hyperkalemia  - resolved     Elevated Lactate, no signs of sepsis  - may be related to acute bleeding  - improving with IVF  - monitor until < 2    Elevated Ammonia, no signs of Encephalopathy yet  - monitor  - will add lactulose if necessary    Paroxysmal Atrial Fib, NSR  - c/w Amiodarone   - hold Dig for now , Dig level 1.4 on admission  - will add Dig in AM     DM type 2  - sliding scale     Cirrhosis of Liver, alcoholic  - resume Lasix and Aldactone in AM if BP stable    Chronic systolic CHF NYHA 1 on admission  - resume Toprol XL in AM   - no ACEI/ARBs due to CKD 3  - Monitor I/Os  - daily wts    Code status: FULL  DVT prophylaxis: SCDs    Care Plan discussed with: Patient/Family and Nurse  Disposition: Home w/Family     Hospital Problems  Date Reviewed: 2018          Codes Class Noted POA    * (Principal)GI bleed ICD-10-CM: K92.2  ICD-9-CM: 578.9  2018 Unknown                Review of Systems:   A comprehensive review of systems was negative except for that written in the HPI. Vital Signs:    Last 24hrs VS reviewed since prior progress note. Most recent are:  Visit Vitals    /61    Pulse 69    Temp 98.2 °F (36.8 °C)    Resp 18    Wt 91.7 kg (202 lb 1.6 oz)    SpO2 100%    BMI 29 kg/m2         Intake/Output Summary (Last 24 hours) at 04/14/18 1435  Last data filed at 04/14/18 1300   Gross per 24 hour   Intake          1092.08 ml   Output              800 ml   Net           292.08 ml        Physical Examination:             Constitutional:  No acute distress, cooperative, pleasant    ENT:  Oral mucous moist, oropharynx benign. Neck supple,    Resp:  CTA bilaterally. CV:  Regular rhythm, normal rate,     GI:  Soft, non distended, non tender. normoactive bowel sounds,    Musculoskeletal:  No edema, warm, 2+ pulses throughout    Neurologic:  Moves all extremities. AAOx3,      Skin:  ecchymosis aroun legs, hips, arms       Data Review:    Review and/or order of clinical lab test  Review and/or order of tests in the radiology section of CPT  Review and/or order of tests in the medicine section of CPT      Labs:     Recent Labs      04/14/18   1029  04/14/18   0425  04/13/18 1757 04/13/18   0834   WBC   --    --   4.4  4.4   HGB  8.7*  8.0*  7.1*  8.9*   HCT   --    --   20.3*  24.9*   PLT   --    --   66*  87*     Recent Labs      04/14/18   0425  04/13/18   2239  04/13/18   1757   NA  136  133*  129*   K  5.1  5.2*  6.1*   CL  107  103  101   CO2  19*  17*  24   BUN  38*  39*  43*   CREA  1.35*  1.48*  1.60*   GLU  172*  188*  189*   CA  8.0*  8.4*  8.4*     Recent Labs      04/13/18   1757 04/13/18   0834   SGOT  49*  75*   ALT  39  48   AP  163*  248*   TBILI  2.8*  2.8*   TP  5.8*  7.1   ALB  2.7*  3.2*   GLOB  3.1  3.9     Recent Labs      04/13/18   0834   INR  1.2*   PTP  12.7*      No results for input(s): FE, TIBC, PSAT, FERR in the last 72 hours.    Lab Results   Component Value Date/Time    Folate 19.9 12/07/2017 04:25 AM      No results for input(s): PH, PCO2, PO2 in the last 72 hours.   Recent Labs      04/13/18   0834   TROIQ  <0.04     No results found for: CHOL, CHOLX, CHLST, CHOLV, HDL, LDL, LDLC, DLDLP, TGLX, TRIGL, TRIGP, CHHD, CHHDX  Lab Results   Component Value Date/Time    Glucose (POC) 199 (H) 04/14/2018 11:36 AM    Glucose (POC) 177 (H) 04/14/2018 06:56 AM    Glucose (POC) 221 (H) 04/14/2018 01:42 AM    Glucose (POC) 218 (H) 04/13/2018 03:27 PM    Glucose (POC) 211 (H) 02/19/2018 11:30 AM     Lab Results   Component Value Date/Time    Color YELLOW/STRAW 04/13/2018 05:55 PM    Appearance CLEAR 04/13/2018 05:55 PM    Specific gravity 1.016 04/13/2018 05:55 PM    Specific gravity 1.015 04/13/2018 10:10 AM    pH (UA) 5.5 04/13/2018 05:55 PM    Protein NEGATIVE  04/13/2018 05:55 PM    Glucose NEGATIVE  04/13/2018 05:55 PM    Ketone NEGATIVE  04/13/2018 05:55 PM    Bilirubin NEGATIVE  04/13/2018 05:55 PM    Urobilinogen 0.2 04/13/2018 05:55 PM    Nitrites NEGATIVE  04/13/2018 05:55 PM    Leukocyte Esterase NEGATIVE  04/13/2018 05:55 PM    Epithelial cells FEW 04/13/2018 05:55 PM    Bacteria NEGATIVE  04/13/2018 05:55 PM    WBC 0-4 04/13/2018 05:55 PM    RBC 5-10 04/13/2018 05:55 PM         Medications Reviewed:     Current Facility-Administered Medications   Medication Dose Route Frequency    sodium chloride (NS) flush 5-10 mL  5-10 mL IntraVENous Q8H    sodium chloride (NS) flush 5-10 mL  5-10 mL IntraVENous PRN    cefTRIAXone (ROCEPHIN) 1 g in 0.9% sodium chloride (MBP/ADV) 50 mL  1 g IntraVENous Q24H    pantoprazole (PROTONIX) 40 mg in sodium chloride 0.9% 10 mL injection  40 mg IntraVENous Q12H    octreotide (SANDOSTATIN) 500 mcg in 0.9% sodium chloride 500 mL infusion  25 mcg/hr IntraVENous CONTINUOUS    albumin human 25% (BUMINATE) solution 12.5 g  12.5 g IntraVENous Multiple    gabapentin (NEURONTIN) capsule 300 mg  300 mg Oral TID    amiodarone (CORDARONE) tablet 200 mg  200 mg Oral QHS    [START ON 4/15/2018] thiamine (B-1) tablet 100 mg  100 mg Oral DAILY    therapeutic multivitamin (THERAGRAN) tablet 1 Tab  1 Tab Oral DAILY    0.9% sodium chloride infusion 250 mL  250 mL IntraVENous PRN    glucose chewable tablet 16 g  4 Tab Oral PRN    dextrose (D50W) injection syrg 12.5-25 g  12.5-25 g IntraVENous PRN    glucagon (GLUCAGEN) injection 1 mg  1 mg IntraMUSCular PRN    insulin lispro (HUMALOG) injection   SubCUTAneous Q6H     Facility-Administered Medications Ordered in Other Encounters   Medication Dose Route Frequency    ondansetron (ZOFRAN ODT) tablet 4 mg  4 mg Oral Q8H PRN    albumin human 25% (BUMINATE) solution 25 g  25 g IntraVENous Multiple    0.9% sodium chloride infusion  150 mL/hr IntraVENous CONTINUOUS     ______________________________________________________________________  EXPECTED LENGTH OF STAY: - - -  ACTUAL LENGTH OF STAY:          1                 Sraah Frye MD

## 2018-04-14 NOTE — PROGRESS NOTES
0700: TRANSFER - IN REPORT:    Verbal report received from [id number] RN(name) on [facility]  being received from ED(unit) for routine progression of care      Report consisted of patients Situation, Background, Assessment and   Recommendations(SBAR). Information from the following report(s) SBAR, Kardex, Procedure Summary, Intake/Output, MAR and Accordion was reviewed with the receiving nurse. Opportunity for questions and clarification was provided. Assessment completed upon patients arrival to unit and care assumed. Primary Nurse Rainell Snellen and Bradley Dodson RN performed a dual skin assessment on this patient Impairment noted- see wound doc flow sheet  -left flank/hip/buttocks open lesions   Spencer score is 16    Patient resting on [facility] bed.

## 2018-04-14 NOTE — PROGRESS NOTES
Problem: Falls - Risk of  Goal: *Absence of Falls  Document Stefania Fall Risk and appropriate interventions in the flowsheet.    Outcome: Progressing Towards Goal  Fall Risk Interventions:            Medication Interventions: Evaluate medications/consider consulting pharmacy    Elimination Interventions: Call light in reach, Elevated toilet seat, Toileting schedule/hourly rounds

## 2018-04-14 NOTE — PROGRESS NOTES
TRANSFER - IN REPORT:    Verbal report received from JEAN Anna (name) on [facility]      Report consisted of patients Situation, Background, Assessment and   Recommendations(SBAR). Information from the following report(s) Procedure Summary was reviewed with the receiving nurse. Opportunity for questions and clarification was provided. Assessment completed upon patients arrival to unit and care assumed.

## 2018-04-14 NOTE — H&P
[facility]  ACUTE CARE HISTORY AND PHYSICAL    Kelsy RANKIN  MR#: [id number]  : 1960  ACCOUNT #: [de-identified]   DATE OF SERVICE: 2018    CHIEF COMPLAINT:  GI bleeding. HISTORY OF PRESENT ILLNESS:  Patient is a 57-year-old with past medical history of alcoholic liver cirrhosis; history of chronic arthritis; history of upper GI bleed; history of variceal bleed status post banding ligation on ; chronic systolic CHF, NYHA class 1; paroxysmal atrial fibrillation, rate controlled; hyperkalemia; diabetes mellitus type 2; hypertension; hyponatremia; CKD, stage III; chronic thrombocytopenia; and left BKA, who presents to the hospital with the above-mentioned symptoms. The patient reports that he was admitted from the ED this morning to have paracentesis performed. Patient reports that he was feeling weak, lethargic, devoid of any energy this morning. Patient reports that he contacted Dr. Dunia Hoskins office and was told to come to the hospital to get evaluated. In the hospital, patient was told to get paracentesis. Patient went to get the paracentesis done and was found to be hypotensive there. The patient was also found to be bloated and nauseated prior to paracentesis, which patient reports resolved. Patient was sent back to the ED for hypertension. The patient also reports that he has had generalized weakness for the past many days. He was also having some shortness of breath, but that resolved after the paracentesis. The patient came to the ER, continued to be hypotensive. The patient also started having some GI bleeding, dropped his hemoglobin, 9.4 in 2018 to 7.1. The patient was thus requested and admitted under the hospitalist service. The patient currently is resting in bed. Reports that he feels \"weak,\" but denies any other complaints or problems. Denies any headache, blurry vision, sore throat, trouble swallowing, trouble with speech.   Denies any chest pain. Denies any shortness of breath while lying down. Denies any abdominal pain, constipation, diarrhea, urinary symptoms, focal or generalized neurological weakness, recent travel, sick contacts falls, injuries, or any other concerns or problems. PAST MEDICAL HISTORY:  See above. HOME MEDICATIONS:  Patient currently is on Roxicodone, ciprofloxacin, valsartan 40 mg daily, Lasix 80 mg daily, spironolactone 200 mg daily, gabapentin 300 mg t.i.d., thiamine, metoprolol 12.5 mg daily, amiodarone 200 mg nightly, and digoxin 0.125 mg nightly. SOCIAL HISTORY:  Patient is a former smoker, used to smoke 1 pack per day for about 20 years, quitting in 2013. Reports that he used to use alcohol heavily, but quit in 01/2018. ALLERGIES:  NO KNOWN DRUG ALLERGIES. FAMILY HISTORY:  Brother, history of heart disease, heart failure. REVIEW OF SYMPTOMS:  All systems were reviewed and were found to be essentially negative except for the symptoms mentioned above. PHYSICAL EXAMINATION:  VITAL SIGNS:  Temperature 97.7, pulse 76, respiratory rate 17, blood pressure 125/85, pulse ox 95% on room air. GENERAL:  Alert x3, awake, moderately distressed, pleasant male, appears to be stated age. HEENT:  Pupils equal, reactive to light. Dry mucous membranes. Tympanic membranes clear. NECK:  Supple. CHEST:  Clear to auscultation bilaterally. CORONARY:  S1, S2 heard. ABDOMEN:  Soft, nontender, nondistended. Bowel sounds are physiologic. EXTREMITIES:  No clubbing, no cyanosis. Left BKA noted with prosthesis on. NEUROPSYCHIATRIC:  Cranial nerves II-XII grossly intact. No focal neurological deficits were noted. SKIN:  Warm. LABORATORY DATA:  White count 4.4, hemoglobin 7.1, hematocrit 20.3, platelets 86,618. Sodium 129, potassium 6.1, chloride 101, bicarbonate 24, glucose 189, BUN 43, creatinine 1.60, calcium 8.4, bilirubin total 2.8, ALT 39, AST 49, alkaline phosphatase 163. Lactic acid 2.6. CT of the abdomen and pelvis shows end-stage hepatic cirrhosis, moderate ascites which is improved from the prior splenomegaly. EKG was done, which showed ventricular paced rhythm and nonspecific ST changes. ASSESSMENT AND PLAN:  1. Hematochezia/gastrointestinal bleeding with acute blood-loss anemia requiring critical care monitoring secondary to hypotension. Patient will be admitted to a critical care bed. Patient is being transfused 2 units of blood. Start patient on Protonix. Keep n.p.o. for now. Very gentle IV hydration in light of ascites. Also, start patient on albumin q.6 hours x3 doses. GI has been consulted, and awaiting Dr. Patricia Prajapati input. We will closely monitor blood pressure. Any acute hypotensive will mandate emergent intervention and the patient may need emergent scoping. Further intervention will be per hospital course. We will continue to closely monitor, reassess as needed. 2.  Acute blood-loss anemia. See  above. Patient being transfused 2 units PRBC, monitor H and H q.8h.  Patient on Protonix  Continue to monitor. 3.  Hyperkalemia. Patient was given D50 insulin and albuterol in the ER. EKG does not show any hyperacute T waves, although this is of poor quality. I will hold calcium gluconate for now as the patient is on digoxin, but will repeat a BMP, IV hydration, albumin, and continue to closely monitor on telemetry bed. 4.  Hyponatremia. Gentle IV hydration, neurovascular status, and continue to closely monitor. 5.  History of alcoholic cirrhosis with ascities. Patient is status post paracentesis today. Albumin. Monitor volume status. Continue to monitor. I will hold the diuretics tonight as the patient is hypotensive, but closely monitor fluid status and further intervention will be per hospital course. 6.  Systolic congestive heart failure, New York Heart Association class 1 and ejection fraction EF 35-40%.   I will hold beta blockers and diuretics tonight as patient hypotensive. Will continue to closely monitor. Strict I's and O's and daily weights. Further intervention will be per hospital course. 7.  Paroxysmal atrial fibrillation. I will hold the digoxin and amiodarone tonight as patient is actively bleeding and is n.p.o. We will continue to closely monitor. Patient is currently rate controlled. Further intervention will be per hospital course. 8.  Diabetes. The patient placed on sliding scale NovoLog insulin, Accu-Cheks, diet control, and close monitoring. 9.  Hypertension, currently hypotensive  will reassess as needed. Hold antihypertensive medication. 10.  Chronic kidney disease, stage III. Stable. Continue to monitor. 11.  Gastrointestinal and deep venous thrombosis prophylaxis, patient will be on sequential compression devices.       Hernesto Roa MD       MM / PN  D: 04/13/2018 21:29     T: 04/13/2018 22:33  JOB #: [id number]

## 2018-04-14 NOTE — PROCEDURES
[facility] [place] [id number] Belkis Noel M.D.  [address] [place] [address]  [phone]               Esophagogastroduodenoscopy (EGD) Procedure Note    NAME: Yuliana Becerra  :  1960  MRN:  [id number]    Indications: Hematochezia, history of varices, cirrhosis     : Rachid Ivan MD    Referring Provider:  Bonifacio Wright MD    Medicine:  Fentanyl 25 mcg IV, Versed 2 mg IV      Procedure Details:  After informed consent was obtained with all risks and benefits of the procedure explained and preprocedure exam completed, the patient was placed in the left lateral decubitus position. Universal protocol for patient identification was performed and documented in the nursing notes. Throughout the procedure, the patient's blood pressure was monitored at least every five minutes; pulse, and oxygen saturations were monitored continuously. All vital signs were documented in the nursing notes. The endoscope was inserted into the mouth and advanced under direct vision to second portion of the duodenum. A careful inspection was made as the gastroscope was withdrawn, including a retroflexed view of the proximal stomach; findings and interventions are described below. Findings:   Esophagus: previous banding scars, few large varices with red abdoulaye sign s/p 2 bands placed  Stomach: no gastric varices, mild mosaic pattern in the body consistent with portal HTN gastropathy  Duodenum: normal    Interventions:    variceal ablation performed with 2 of bands placed    Specimens:   * No specimens in log *     EBL: None          Complications:     No immediate complications        Impression:  -As above. No active bleeding, but varices with red abdoulaye sign s/p banding. Recommendations:  -ICU monitoring  -PPI/Octreotide gtt  -Ceftriaxone  -CLD    Signed by:  Rachid Ivan MD         2018 12:31 AM

## 2018-04-14 NOTE — ED NOTES
Pt. Noted to have persistent dry heaving unrelieved by antiemetics. Pt. Also noted to have large amount of maroon colored stool with bowel movement. Dr. Miguel A Graff notified.

## 2018-04-14 NOTE — H&P
[facility] [address], [address] [place] [address]          Pre-procedure History and Physical       NAME:  Helyn Bosworth   :   1960   MRN:   [id number]     CHIEF COMPLAINT/HPI: See procedure note    PMH:  Past Medical History:   Diagnosis Date    Arthritis     Atrial fibrillation ([address]     CHF (congestive heart failure) ([address]     Diabetes ([address]     Diabetic ulcer of left foot ([facility] 2015 Lt BKA    History of blood transfusion     During Lt BKA; pt denies any adverse reaction    Hypertension     Liver disease     CIRRHOSIS    Sepsis ([address] 2015    From diabetic Left foot wound;  had a BKA ;  as of 16 pt states back to his baseline        PSH:  Past Surgical History:   Procedure Laterality Date    HX AMPUTATION Left 2/10/2015    BKA    HX COLONOSCOPY      HX IMPLANTABLE CARDIOVERTER DEFIBRILLATOR  2015    [employer] cardio defibrillator; Dr Blanquita Zhou; as of 16 pt denies defibrillator going off       Allergies:  No Known Allergies    Home Medications:  Prior to Admission Medications   Prescriptions Last Dose Informant Patient Reported? Taking?   amiodarone (CORDARONE) 200 mg tablet 2018 at Unknown time  Yes Yes   Sig: Take 200 mg by mouth nightly. ciprofloxacin HCl (CIPRO) 500 mg tablet 2018 at Unknown time  No Yes   Sig: Take 1 Tab by mouth daily. digoxin (LANOXIN) 0.125 mg tablet 2018 at Unknown time  Yes Yes   Sig: Take 0.125 mg by mouth nightly. furosemide (LASIX) 40 mg tablet 2018 at Unknown time  No Yes   Sig: Take 2 Tabs by mouth daily. Indications: EDEMA DUE TO HEPATIC CIRRHOSIS   gabapentin (NEURONTIN) 300 mg capsule 2018 at Unknown time  No Yes   Sig: Take 1 Cap by mouth three (3) times daily. metoprolol succinate (TOPROL XL) 25 mg XL tablet 2018 at Unknown time  No Yes   Sig: Take 0.5 Tabs by mouth daily.    multivitamins-minerals-lutein (CENTRUM SILVER) Tab Unknown at Unknown time  Yes No   Sig: Take 1 Tab by mouth daily. oxyCODONE IR (ROXICODONE) 5 mg immediate release tablet 4/13/2018 at Unknown time  No Yes   Sig: Take 1-2 Tabs by mouth every four (4) hours as needed for Pain. Max Daily Amount: 60 mg.   spironolactone (ALDACTONE) 100 mg tablet 4/12/2018 at Unknown time  No Yes   Sig: Take 2 Tabs by mouth daily. Indications: EDEMA DUE TO HEPATIC CIRRHOSIS   thiamine (B-1) 100 mg tablet 4/12/2018 at Unknown time  No Yes   Sig: Take 1 Tab by mouth daily. valsartan (DIOVAN) 40 mg tablet 4/12/2018 at Unknown time  Yes Yes   Sig: Take  by mouth daily.       Facility-Administered Medications: None       Hospital Medications:  Current Facility-Administered Medications   Medication Dose Route Frequency    0.9% sodium chloride infusion 250 mL  250 mL IntraVENous PRN    glucose chewable tablet 16 g  4 Tab Oral PRN    dextrose (D50W) injection syrg 12.5-25 g  12.5-25 g IntraVENous PRN    glucagon (GLUCAGEN) injection 1 mg  1 mg IntraMUSCular PRN    insulin lispro (HUMALOG) injection   SubCUTAneous Q6H    thiamine (B-1) 100 mg in 0.9% sodium chloride 50 mL IVPB  100 mg IntraVENous DAILY    0.9% sodium chloride infusion  50 mL/hr IntraVENous CONTINUOUS    sodium chloride (NS) flush 5-10 mL  5-10 mL IntraVENous Q8H    sodium chloride (NS) flush 5-10 mL  5-10 mL IntraVENous PRN    midazolam (VERSED) injection 0.25-10 mg  0.25-10 mg IntraVENous Multiple    fentaNYL citrate (PF) injection 100 mcg  100 mcg IntraVENous Multiple    naloxone (NARCAN) injection 0.4 mg  0.4 mg IntraVENous Multiple    flumazenil (ROMAZICON) 0.1 mg/mL injection 0.2 mg  0.2 mg IntraVENous Multiple    benzocaine (HURRICANE) 20 % spray   Mucous Membrane ONCE    simethicone (MYLICON) 23SY/5.0CH oral drops 80 mg  1.2 mL Oral Multiple    diphenhydrAMINE (BENADRYL) injection 50 mg  50 mg IntraVENous ONCE    atropine injection 0.5 mg  0.5 mg IntraVENous ONCE PRN    EPINEPHrine (ADRENALIN) 0.1 mg/mL syringe 1 mg  1 mg Endoscopically ONCE PRN     Facility-Administered Medications Ordered in Other Encounters   Medication Dose Route Frequency    sodium bicarbonate (4%) (NEUT) injection 1 mL  1 mL SubCUTAneous RAD ONCE    ondansetron hcl (ZOFRAN) 4 mg/5 mL oral solution 4 mg  4 mg Oral ONCE    ondansetron (ZOFRAN ODT) tablet 4 mg  4 mg Oral Q8H PRN    albumin human 25% (BUMINATE) solution 25 g  25 g IntraVENous Multiple    0.9% sodium chloride infusion  150 mL/hr IntraVENous CONTINUOUS       Family History:  Family History   Problem Relation Age of Onset    Heart Disease Brother     Heart Failure Brother     Heart Failure Brother        Social History:  Social History   Substance Use Topics    Smoking status: Former Smoker     Packs/day: 1.00     Years: 5.00     Quit date: 1/1/2013    Smokeless tobacco: Never Used    Alcohol use Yes      Comment: Last drink January 2018         PHYSICAL EXAM PRIOR TO SEDATION:  General: Alert, in no acute distress    Lungs:            CTA bilaterally  Heart:  Normal S1, S2    Abdomen: Soft, distended, Non tender. Normoactive bowel sounds. Assessment:   Stable for sedation administration. Emergency procedure for possible UGIB. Drop in hemoglobin. History of varices and banding.   Plan:   · Endoscopic procedure with sedation     Signed By: Remy Galicia MD     4/14/2018  12:04 AM

## 2018-04-14 NOTE — ED NOTES
Assumed care of patient with verbal bedside report. Endoscopy is at the bedside performing endoscopy.

## 2018-04-14 NOTE — PROGRESS NOTES
0730- Bedside report received from Stephen Cisneros RN.   0800- Pt very agitated but oriented. Wife states this is not his baseline.  at bedside assessing patient and lesions on L flank. States lesions are not Shingles; contact precautions discontinued. 0930- Pt started on a clear liquid diet per . 1025- repeat hemoglobin and lactic drawn and sent to lab. 1130- Hemoglobin results passed along to ; ok to move to stepdown from GI standpoint. Transfer orders received from [facility] opinions.h . 1545- TRANSFER - OUT REPORT:  Verbal report given to Randall Gutierrez RN(name) on Helyn Bosworth  being transferred to [place], RN(unit) for routine progression of care     Report consisted of patients Situation, Background, Assessment and   Recommendations(SBAR). Information from the following report(s) SBAR, ED Summary, Procedure Summary, Intake/Output, Recent Results and Cardiac Rhythm paced was reviewed with the receiving nurse. Lines:   Peripheral IV 04/13/18 Left Forearm (Active)   Site Assessment Clean, dry, & intact 4/14/2018 12:00 PM   Phlebitis Assessment 0 4/14/2018 12:00 PM   Infiltration Assessment 0 4/14/2018 12:00 PM   Dressing Status Clean, dry, & intact 4/14/2018 12:00 PM   Dressing Type Transparent 4/14/2018 12:00 PM   Hub Color/Line Status Green; Infusing 4/14/2018 12:00 PM   Action Taken Open ports on tubing capped 4/14/2018 12:00 PM   Alcohol Cap Used Yes 4/14/2018 12:00 PM       Peripheral IV 04/14/18 Right Wrist (Active)   Site Assessment Clean, dry, & intact 4/14/2018 12:00 PM   Phlebitis Assessment 0 4/14/2018 12:00 PM   Infiltration Assessment 0 4/14/2018 12:00 PM   Dressing Status Clean, dry, & intact 4/14/2018 12:00 PM   Dressing Type Transparent 4/14/2018 12:00 PM   Hub Color/Line Status Pink;Capped 4/14/2018 12:00 PM   Action Taken Open ports on tubing capped 4/14/2018  8:00 AM   Alcohol Cap Used Yes 4/14/2018 12:00 PM      Opportunity for questions and clarification was provided. Patient transported with:   Monitor  Registered Nurse

## 2018-04-14 NOTE — ED NOTES
TRANSFER - OUT REPORT:    Verbal report given to Maritza Feldman RN on Jimi Mayes  being transferred to CCU (unit) for routine progression of care       Report consisted of patients Situation, Background, Assessment and   Recommendations(SBAR). Information from the following report(s) ED Summary was reviewed with the receiving nurse. Lines:   Peripheral IV 04/13/18 Left Hand (Active)   Site Assessment Clean, dry, & intact 4/13/2018  6:05 PM   Phlebitis Assessment 0 4/13/2018  6:05 PM   Infiltration Assessment 0 4/13/2018  6:05 PM   Dressing Status Clean, dry, & intact 4/13/2018  6:05 PM       Peripheral IV 04/13/18 Left Forearm (Active)   Site Assessment Clean, dry, & intact 4/13/2018  7:12 PM   Phlebitis Assessment 0 4/13/2018  7:12 PM   Infiltration Assessment 0 4/13/2018  7:12 PM   Dressing Status Clean, dry, & intact 4/13/2018  7:12 PM       Peripheral IV 04/14/18 Right Wrist (Active)        Opportunity for questions and clarification was provided.       Patient transported with:   Monitor  Registered Nurse

## 2018-04-15 NOTE — PROGRESS NOTES
Spiritual Care Partner Volunteer visited patient in Rm 433 on 4/15/18.   Documented by:  Chaplain Tomlinson MDiv, MS, [facility]  [phone] ([phone])

## 2018-04-15 NOTE — PROGRESS NOTES
[facility] [address], M.D.  [phone]               GASTROENTEROLOGY PROGRESS NOTE        NAME: Helyn Bosworth   :  1960   MRN:  [id number]       Subjective:   Non-bloody BMs, no vomiting of blood. Objective:   VITALS:   Last 24hrs VS reviewed. Most recent are:  Visit Vitals    /47 (BP 1 Location: Left arm, BP Patient Position: At rest)    Pulse 62    Temp 97.8 °F (36.6 °C)    Resp 18    Wt 98.9 kg (218 lb)    SpO2 98%    BMI 31.28 kg/m2       Intake/Output Summary (Last 24 hours) at 04/15/18 1542  Last data filed at 04/15/18 1454   Gross per 24 hour   Intake              360 ml   Output             1825 ml   Net            -1465 ml       PHYSICAL EXAM:  General: Alert, in no acute distress    Lungs:            CTA Bilaterally  Heart:  Normal S1, S2    Abdomen: Soft, Non distended, Non tender. Normoactive bowel sounds, no HSM,   no rebound/guarding  Skin:   Warm to touch  Psych:   Good insight. Not anxious nor agitated. Lab Data Reviewed:   Recent Labs      04/15/18   1117  04/15/18   0314   18   1757   WBC   --   4.8   --   4.4   HGB  7.7*  7.2*   < >  7.1*   HCT   --   20.2*   --   20.3*   PLT   --   55*   --   66*    < > = values in this interval not displayed. Recent Labs      04/15/18   0314  18   0425   NA  128*  136   K  5.1  5.1   CL  100  107   CO2  22  19*   BUN  26*  38*   CREA  1.05  1. 35*   GLU  100  172*   CA  8.3*  8.0*   MG  1.6   --    PHOS  2.1*   --      Recent Labs      18   1757  18   0834   SGOT  49*  75*   AP  163*  248*   TP  5.8*  7.1   ALB  2.7*  3.2*   GLOB  3.1  3.9     Recent Labs      18   0834   INR  1.2*   PTP  12.7*      No results for input(s): FE, TIBC, PSAT, FERR in the last 72 hours. No results for input(s): CPK, CKMB in the last 72 hours.     No lab exists for component: MIKAYLA    See Electronic Medical Record for all procedure/radiology reports and details which were not copied into this note but were reviewed prior to the creation of the Plan. Assessment:   · Esophageal varices s/p banding     Patient Active Problem List   Diagnosis Code    Type II diabetes mellitus (HCC) E11.9    Neuropathy G62.9    S/P BKA (below knee amputation) ([facility] Z89.519    Hypertension I10    Cardiac defibrillator in place Z95.810    Chronic systolic heart failure (HCC) V32.02    Alcoholic cirrhosis of liver with ascites ([facility] K70.31    Alcohol abuse F10.10    Secondary esophageal varices with bleeding (HCC) I85.11    GI bleed K92.2       Plan:   · Soft mechanical diet  · Med recs as yesterday  · Serial CBCs, transfuse PRN  · Possible discharge tomorrow  · Hepatology follow-up       Signed by:  Michele Holloway MD         4/15/2018  3:42 PM

## 2018-04-15 NOTE — PROGRESS NOTES
Hospitalist Progress Note  Meghana Howard MD  Answering service: [phone] OR [phone] from in house phone  Cell: [phone]      Date of Service:  4/15/2018  NAME:  Yolande Adaem YOB: 1960  MRN:  [id number]      Admission Summary:   60 yo male with PMHx of Alcoholic liver cirrhosis, Hx of Variceal bleed s/o banding on , Chronic systolic CHF NYHA 1, paroxysmal Atrial fib, DM, HTN, CKD 3, Chronic thrombocytopenia, Left BKA admitted for GI bleeding. Pt feeling weak, lethargic.  Pt came to ER hypotensive, Hb down to 7.1    Interval history / Subjective:     Pt seen and examined in CCU this AM   Awake   Feels much better  No further melena or cisco blood  No abdominal pain      Assessment & Plan:     Acute Blood Loss Anemia due to UGI Bleed due to variceal bleed  - s/p EGD with 2 bands  - s/p 1U PRBCs  - H/H stable  - transfuse for Hb<7  - PPI  - c/w Octreotide gtt for 24 more hours  - GI following , recommends to remain inpatient for 24 hours for observation due to high risk for rebleed    Hyperkalemia  - resolved     Elevated Lactate, no signs of sepsis  - may be related to acute bleeding  - resolved with IVF    Elevated Ammonia, no signs of Encephalopathy yet  - monitor  - Improved, no need for lactulose    Paroxysmal Atrial Fib, NSR  - c/w Amiodarone   -  Resume Dig    DM type 2  - sliding scale     Cirrhosis of Liver, alcoholic with ascites  - resume Lasix and Aldactone  - s/p US Paracentesis on  with ~ 6-7L removal    Chronic systolic CHF NYHA 1 on admission  - will slowly resume BB/ACEI in next 2-3 days  - start lasix and aldactone  - Monitor I/Os  - daily wts    Code status: FULL  DVT prophylaxis: SCDs    Care Plan discussed with: Patient/Family and Nurse  Disposition: Home w/Family home in AM      Hospital Problems  Date Reviewed: 2018          Codes Class Noted POA    * (Principal)GI bleed ICD-10-CM: K92.2  ICD-9-CM: 578.9 2/17/2018 Unknown                Review of Systems:   A comprehensive review of systems was negative except for that written in the HPI. Vital Signs:    Last 24hrs VS reviewed since prior progress note. Most recent are:  Visit Vitals    /59 (BP 1 Location: Right arm, BP Patient Position: At rest)    Pulse 62    Temp 98.3 °F (36.8 °C)    Resp 17    Wt 98.9 kg (218 lb)    SpO2 100%    BMI 31.28 kg/m2         Intake/Output Summary (Last 24 hours) at 04/15/18 1129  Last data filed at 04/15/18 1121   Gross per 24 hour   Intake              415 ml   Output             1425 ml   Net            -1010 ml        Physical Examination:             Constitutional:  No acute distress, cooperative, pleasant    ENT:  Oral mucous moist, oropharynx benign. Neck supple,    Resp:  CTA bilaterally. CV:  Regular rhythm, normal rate,     GI:  Soft, non distended, non tender. normoactive bowel sounds,    Musculoskeletal:  No edema, warm, 2+ pulses throughout    Neurologic:  Moves all extremities. AAOx3,      Skin:  ecchymosis aroun legs, hips, arms       Data Review:    Review and/or order of clinical lab test  Review and/or order of tests in the radiology section of CPT  Review and/or order of tests in the medicine section of CPT      Labs:     Recent Labs      04/15/18   0314  04/14/18   1845   04/13/18   1757   WBC  4.8   --    --   4.4   HGB  7.2*  8.4*   < >  7.1*   HCT  20.2*   --    --   20.3*   PLT  55*   --    --   66*    < > = values in this interval not displayed.      Recent Labs      04/15/18   0314  04/14/18   0425  04/13/18   2239   NA  128*  136  133*   K  5.1  5.1  5.2*   CL  100  107  103   CO2  22  19*  17*   BUN  26*  38*  39*   CREA  1.05  1.35*  1.48*   GLU  100  172*  188*   CA  8.3*  8.0*  8.4*   MG  1.6   --    --    PHOS  2.1*   --    --      Recent Labs      04/13/18   1757  04/13/18   0834   SGOT  49*  75*   ALT  39  48   AP  163*  248*   TBILI  2.8*  2.8*   TP  5.8*  7.1   ALB  2.7*  3.2* GLOB  3.1  3.9     Recent Labs      04/13/18   0834   INR  1.2*   PTP  12.7*      No results for input(s): FE, TIBC, PSAT, FERR in the last 72 hours. Lab Results   Component Value Date/Time    Folate 19.9 12/07/2017 04:25 AM      No results for input(s): PH, PCO2, PO2 in the last 72 hours.   Recent Labs      04/13/18   0834   TROIQ  <0.04     No results found for: CHOL, CHOLX, CHLST, CHOLV, HDL, LDL, LDLC, DLDLP, TGLX, TRIGL, TRIGP, CHHD, CHHDX  Lab Results   Component Value Date/Time    Glucose (POC) 262 (H) 04/15/2018 11:11 AM    Glucose (POC) 109 (H) 04/15/2018 05:56 AM    Glucose (POC) 166 (H) 04/14/2018 11:45 PM    Glucose (POC) 112 (H) 04/14/2018 05:32 PM    Glucose (POC) 199 (H) 04/14/2018 11:36 AM     Lab Results   Component Value Date/Time    Color YELLOW/STRAW 04/13/2018 05:55 PM    Appearance CLEAR 04/13/2018 05:55 PM    Specific gravity 1.016 04/13/2018 05:55 PM    Specific gravity 1.015 04/13/2018 10:10 AM    pH (UA) 5.5 04/13/2018 05:55 PM    Protein NEGATIVE  04/13/2018 05:55 PM    Glucose NEGATIVE  04/13/2018 05:55 PM    Ketone NEGATIVE  04/13/2018 05:55 PM    Bilirubin NEGATIVE  04/13/2018 05:55 PM    Urobilinogen 0.2 04/13/2018 05:55 PM    Nitrites NEGATIVE  04/13/2018 05:55 PM    Leukocyte Esterase NEGATIVE  04/13/2018 05:55 PM    Epithelial cells FEW 04/13/2018 05:55 PM    Bacteria NEGATIVE  04/13/2018 05:55 PM    WBC 0-4 04/13/2018 05:55 PM    RBC 5-10 04/13/2018 05:55 PM         Medications Reviewed:     Current Facility-Administered Medications   Medication Dose Route Frequency    furosemide (LASIX) tablet 80 mg  80 mg Oral DAILY    spironolactone (ALDACTONE) tablet 200 mg  200 mg Oral DAILY    digoxin (LANOXIN) tablet 0.125 mg  0.125 mg Oral QHS    sodium chloride (NS) flush 5-10 mL  5-10 mL IntraVENous Q8H    sodium chloride (NS) flush 5-10 mL  5-10 mL IntraVENous PRN    cefTRIAXone (ROCEPHIN) 1 g in 0.9% sodium chloride (MBP/ADV) 50 mL  1 g IntraVENous Q24H    pantoprazole (PROTONIX) 40 mg in sodium chloride 0.9% 10 mL injection  40 mg IntraVENous Q12H    octreotide (SANDOSTATIN) 500 mcg in 0.9% sodium chloride 500 mL infusion  25 mcg/hr IntraVENous CONTINUOUS    albumin human 25% (BUMINATE) solution 12.5 g  12.5 g IntraVENous Multiple    gabapentin (NEURONTIN) capsule 300 mg  300 mg Oral TID    amiodarone (CORDARONE) tablet 200 mg  200 mg Oral QHS    thiamine (B-1) tablet 100 mg  100 mg Oral DAILY    therapeutic multivitamin (THERAGRAN) tablet 1 Tab  1 Tab Oral DAILY    0.9% sodium chloride infusion 250 mL  250 mL IntraVENous PRN    glucose chewable tablet 16 g  4 Tab Oral PRN    dextrose (D50W) injection syrg 12.5-25 g  12.5-25 g IntraVENous PRN    glucagon (GLUCAGEN) injection 1 mg  1 mg IntraMUSCular PRN    insulin lispro (HUMALOG) injection   SubCUTAneous Q6H     Facility-Administered Medications Ordered in Other Encounters   Medication Dose Route Frequency    ondansetron (ZOFRAN ODT) tablet 4 mg  4 mg Oral Q8H PRN    albumin human 25% (BUMINATE) solution 25 g  25 g IntraVENous Multiple    0.9% sodium chloride infusion  150 mL/hr IntraVENous CONTINUOUS     ______________________________________________________________________  EXPECTED LENGTH OF STAY: - - -  ACTUAL LENGTH OF STAY:          2                 Salma Cleaning MD

## 2018-04-15 NOTE — PROGRESS NOTES
0745  Bedside and Verbal shift change report given to JEAN South (oncoming nurse) by Heather Calderon RN (offgoing nurse). Report included the following information SBAR, Kardex, ED Summary, Procedure Summary, Intake/Output, MAR, Recent Results and Cardiac Rhythm NSr. Hourly rounding performed.

## 2018-04-16 NOTE — PROGRESS NOTES
Bedside shift change report given to Mitchell Tadeo RN (oncoming nurse) by Kalani Godoy RN (offgoing nurse). Report included the following information SBAR, Kardex, Intake/Output, MAR, Recent Results and Cardiac Rhythm paced.

## 2018-04-16 NOTE — DISCHARGE SUMMARY
Discharge Summary     Patient:  Alejandro Lebron       MRN: [id number]       YOB: 1960       Age: 59 y.o. Date of admission:  4/13/2018    Date of discharge:  4/16/2018    Primary care provider: Dr. Heather Brooks MD    Admitting provider:  Levi Phillips MD    Discharging provider:  Salma Cleaning MD - [phone]  If unavailable, call [phone] and ask the  to page the triage hospitalist.    Consultations  · IP CONSULT TO HOSPITALIST    Procedures  · Procedure(s):  · ESOPHAGOGASTRODUODENOSCOPY (EGD)  · ENDOSCOPIC BANDING OR LIGATION    Discharge destination: HOME  . The patient is stable for discharge. Admission diagnosis  · GI bleed  · GI bleed    Current Discharge Medication List      START taking these medications    Details   temazepam (RESTORIL) 7.5 mg capsule Take 1 Cap by mouth nightly as needed for Sleep. Max Daily Amount: 7.5 mg.  Qty: 5 Cap, Refills: 0    Associated Diagnoses: Primary insomnia      pantoprazole (PROTONIX) 40 mg tablet Take 1 Tab by mouth daily. Qty: 60 Tab, Refills: 1         CONTINUE these medications which have CHANGED    Details   furosemide (LASIX) 40 mg tablet Take 3 Tabs by mouth daily. Indications: EDEMA DUE TO HEPATIC CIRRHOSIS  Qty: 30 Tab, Refills: 0    Associated Diagnoses: Alcoholic cirrhosis of liver with ascites (HCC)      spironolactone (ALDACTONE) 100 mg tablet Take 3 Tabs by mouth daily. Indications: EDEMA DUE TO HEPATIC CIRRHOSIS  Qty: 60 Tab, Refills: 3    Associated Diagnoses: Alcoholic cirrhosis of liver with ascites ([facility]         CONTINUE these medications which have NOT CHANGED    Details   valsartan (DIOVAN) 40 mg tablet Take  by mouth daily. gabapentin (NEURONTIN) 300 mg capsule Take 1 Cap by mouth three (3) times daily. Qty: 90 Cap, Refills: 0      thiamine (B-1) 100 mg tablet Take 1 Tab by mouth daily.   Qty: 30 Tab, Refills: 0 metoprolol succinate (TOPROL XL) 25 mg XL tablet Take 0.5 Tabs by mouth daily. Qty: 15 Tab, Refills: 0      amiodarone (CORDARONE) 200 mg tablet Take 200 mg by mouth nightly. digoxin (LANOXIN) 0.125 mg tablet Take 0.125 mg by mouth nightly. multivitamins-minerals-lutein (CENTRUM SILVER) Tab Take 1 Tab by mouth daily. STOP taking these medications       oxyCODONE IR (ROXICODONE) 5 mg immediate release tablet Comments:   Reason for Stopping:         ciprofloxacin HCl (CIPRO) 500 mg tablet Comments:   Reason for Stopping: Follow-up Information     Follow up With Details Comments [facility] MD Eleuterio In 2 weeks Discharge follow up  [facility]      Sunday Leger MD  follow up on 4/20/18 [address] [place] [address]  [phone]            Final discharge diagnoses and brief hospital course  Please also refer to the admission H&P for details on the presenting problem. 58 yo male with PMHx of Alcoholic liver cirrhosis, Hx of Variceal bleed s/o banding on 1/79, Chronic systolic CHF NYHA 1, paroxysmal Atrial fib, DM, HTN, CKD 3, Chronic thrombocytopenia, Left BKA admitted for GI bleeding. Pt feeling weak, lethargic.  Pt came to ER hypotensive, Hb down to 7.1     Acute Blood Loss Anemia due to UGI Bleed due to variceal bleed  - s/p EGD with 2 bands  - s/p 1U PRBCs  - H/H stable  - transfuse for Hb<7  - PPI BID  - c/w Octreotide gtt given for 72 hours  - outpatient follow up with       Hyperkalemia  - resolved      Elevated Lactate, no signs of sepsis  - may be related to acute bleeding  - resolved with IVF     Elevated Ammonia, no signs of Encephalopathy yet  - monitor  - Improved, no need for lactulose     Paroxysmal Atrial Fib, NSR  - c/w Amiodarone   -  Resume Dig     DM type 2  - sliding scale      Cirrhosis of Liver, alcoholic with ascites  - resume Lasix and Aldactone  - s/p US Paracentesis on 4/13 with ~ 6-7L removal     Chronic systolic CHF NYHA 1 on admission  - resume BB, wife instructed to resume ACEI if BP allows  - lasix and aldactone  - Monitor I/Os  - daily wts         FOLLOW-UP TESTS recommended:   - Check BP in AM 4/17, If upper number SBP>100, start taking Diovan. If Not, HOLD and recheck in Another 1-2 days.  - Please make a follow with your Cardiologist Greer Severance for follow up and med adjustment.      PENDING TEST RESULTS:  At the time of your discharge the following test results are still pending: NONE  Please make sure you review these results with your outpatient follow-up provider(s).     SYMPTOMS to watch for: chest pain, shortness of breath, fever, chills, nausea, vomiting, diarrhea, change in mentation, falling, weakness, bleeding.     DIET/what to eat:  Resume previous diet     ACTIVITY:  Activity as tolerated     WOUND CARE: NONE     EQUIPMENT needed:  NONE       Physical examination at discharge  Visit Vitals    /48 (BP 1 Location: Right arm, BP Patient Position: At rest)    Pulse 61    Temp 98 °F (36.7 °C)    Resp 16    Wt 99.1 kg (218 lb 7.6 oz)    SpO2 97%    BMI 31.35 kg/m2     Ao3  PERRLA  EOMI  Lung: CTA  CVS: RRR  Abd; soft, mildly distended, NT ND  Ext: LLE BKA, rt no edema    Pertinent imaging studies:    EGD:  Findings:   Esophagus: previous banding scars, few large varices with red abdoulaye sign s/p 2 bands placed  Stomach: no gastric varices, mild mosaic pattern in the body consistent with portal HTN gastropathy  Duodenum: normal     Interventions:    variceal ablation performed with 2 of bands placed      Recent Labs      04/16/18   0339  04/15/18   1837  04/15/18   1117  04/15/18   0314   04/13/18   1757   WBC  4.8   --    --   4.8   --   4.4   HGB  7.2*  7.8*  7.7*  7.2*   < >  7.1*   HCT  20.3*   --    --   20.2*   --   20.3*   PLT  60*   --    --   55*   --   66*    < > = values in this interval not displayed.      Recent Labs      04/16/18 4294  04/15/18   0314  04/14/18   0425   NA  126*  128*  136   K  5.0  5.1  5.1   CL  96*  100  107   CO2  22  22  19*   BUN  24*  26*  38*   CREA  1.18  1.05  1. 35*   GLU  150*  100  172*   CA  8.2*  8.3*  8.0*   MG  1.8  1.6   --    PHOS  2.5*  2.1*   --      Recent Labs      04/13/18   1757   SGOT  49*   AP  163*   TP  5.8*   ALB  2.7*   GLOB  3.1     No results for input(s): INR, PTP, APTT in the last 72 hours. No lab exists for component: INREXT   No results for input(s): FE, TIBC, PSAT, FERR in the last 72 hours. No results for input(s): PH, PCO2, PO2 in the last 72 hours. No results for input(s): CPK, CKMB in the last 72 hours.     No lab exists for component: TROPONINI  No components found for: [place]    Chronic Diagnoses:    Problem List as of 4/16/2018  Date Reviewed: 4/13/2018          Codes Class Noted - Resolved    * (Principal)GI bleed ICD-10-CM: K92.2  ICD-9-CM: 578.9  2/17/2018 - Present        Secondary esophageal varices with bleeding ([facility] ICD-10-CM: I85.11  ICD-9-CM: 456.20  2/8/2018 - Present        Alcoholic cirrhosis of liver with ascites ([facility] ICD-10-CM: K70.31  ICD-9-CM: 571.2  1/3/2018 - Present        Alcohol abuse ICD-10-CM: F10.10  ICD-9-CM: 305.00  1/3/2018 - Present        Chronic systolic heart failure (HCC) (Chronic) ICD-10-CM: I50.22  ICD-9-CM: 428.22  11/21/2017 - Present        Neuropathy ICD-10-CM: G62.9  ICD-9-CM: 355.9  3/30/2017 - Present        S/P BKA (below knee amputation) ([facility] ICD-10-CM: Z89.519  ICD-9-CM: V49.75  3/30/2017 - Present        Hypertension ICD-10-CM: I10  ICD-9-CM: 401.9  3/30/2017 - Present        Cardiac defibrillator in place ICD-10-CM: Z95.810  ICD-9-CM: V45.02  3/30/2017 - Present        Type II diabetes mellitus ([facility] ICD-10-CM: E11.9  ICD-9-CM: 250.00  2/1/2015 - Present        RESOLVED: GI bleed ICD-10-CM: K92.2  ICD-9-CM: 578.9  1/23/2018 - 1/28/2018        RESOLVED: Secondary esophageal varices without bleeding ([facility] ICD-10-CM: I85.10  ICD-9-CM: 456.21  1/3/2018 - 2/8/2018        RESOLVED: Hypomagnesemia ICD-10-CM: E83.42  ICD-9-CM: 275.2  12/6/2017 - 12/10/2017        RESOLVED: Suicidal ideations ICD-10-CM: O99.850  ICD-9-CM: V62.84  12/6/2017 - 12/10/2017        RESOLVED: Anasarca ICD-10-CM: R60.1  ICD-9-CM: 782.3  11/20/2017 - 11/24/2017        RESOLVED: Hyponatremia ICD-10-CM: E87.1  ICD-9-CM: 276.1  11/20/2017 - 12/10/2017        RESOLVED: Cirrhosis of liver without ascites ([facility] ICD-10-CM: K74.60  ICD-9-CM: 571.5  5/2/2017 - 1/3/2018        RESOLVED: Extremity pain ICD-10-CM: M79.609  ICD-9-CM: 729.5  2/18/2015 - 3/30/2017        RESOLVED: Cellulitis and abscess of foot ICD-10-CM: L03.119, L02.619  ICD-9-CM: 682.7  2/1/2015 - 2/13/2015        RESOLVED: Hyperglycemia ICD-10-CM: R73.9  ICD-9-CM: 790.29  1/31/2015 - 2/13/2015              Time spent on discharge related activities today greater than 30 minutes.       Signed:  Chantelle Baptiste MD                 Hospitalist, Internal Medicine      Cc: Natacha Ruiz MD

## 2018-04-16 NOTE — ROUTINE PROCESS
Bedside shift change report given to [employer]  (oncoming nurse) by Maryuri Foster (offgoing nurse). Report included the following information Kardex, ED Summary, Procedure Summary, Intake/Output, Med Rec Status and Cardiac Rhythm V-Paced.

## 2018-04-16 NOTE — DISCHARGE INSTRUCTIONS
Please bring this form with you to show your primary care provider at your follow-up appointment. Primary care provider:  Dr. Zeinab Wise MD    Discharging provider:  Bernard James MD    You have been admitted to the hospital with the following diagnoses:  · GI bleed  · GI bleed    FOLLOW-UP CARE RECOMMENDATIONS:    APPOINTMENTS:  Follow-up Information     Follow up With Details Comments Contact Info    Zeinab Wise MD In 2 weeks Discharge follow up  [facility]      Frandy Dill MD  follow up on 4/20/18 [address] recommended:   - Check BP in AM 4/17, If upper number SBP>100, start taking Diovan. If Not, HOLD and recheck in Another 1-2 days.  - Please make a follow with your Cardiologist Ricardo Franks for follow up and med adjustment. - Increase Lasix to 3 pills and Aldactone to 3 pills as well. PENDING TEST RESULTS:  At the time of your discharge the following test results are still pending: NONE  Please make sure you review these results with your outpatient follow-up provider(s). SYMPTOMS to watch for: chest pain, shortness of breath, fever, chills, nausea, vomiting, diarrhea, change in mentation, falling, weakness, bleeding. DIET/what to eat:  Resume previous diet    ACTIVITY:  Activity as tolerated    WOUND CARE: NONE    EQUIPMENT needed:  NONE      What to do if new or unexpected symptoms occur? If you experience any of the above symptoms (or should other concerns or questions arise after discharge) please call your primary care physician. Return to the emergency room if you cannot get hold of your doctor. · It is very important that you keep your follow-up appointment(s). · Please bring discharge papers, medication list (and/or medication bottles) to your follow-up appointments for review by your outpatient provider(s).   · Please check the list of medications and be sure it includes every medication (even non-prescription medications) that your provider wants you to take. · It is important that you take the medication exactly as they are prescribed. · Keep your medication in the bottles provided by the pharmacist and keep a list of the medication names, dosages, and times to be taken in your wallet. · Do not take other medications without consulting your doctor. · If you have any questions about your medications or other instructions, please talk to your nurse or care provider before you leave the hospital.    I understand that if any problems occur once I am at home I am to contact my physician. These instructions were explained to me and I had the opportunity to ask questions.

## 2018-04-16 NOTE — PROGRESS NOTES
Patient's wife had left me a VM that Maegan Agudelo had been admitted on Friday, EGD Friday night which showed varices and were banded. He had a substantial hemoglobin drop between AM Friday ER visit and ER visit after paracentesis. Some issues with hypotension after procedure left to going back to ER. Came by as a courtesy since he is well known to me. Spoke with wife Alex Love and patient. He needs a TIPS procedure. He has been restarted on his Step 2 diuretics. His kidney function looks great, so advise them to increase to Step 3 diuretics (120 mg lasix or 3 pills and 300 mg spironolactone or 3 pills). The patient has an appointment already scheduled for Friday. At that point, we will recheck his kidney function and see how he is doing. Did bring up transplant. Confirmed last alcohol intake was end of February. Discussed patient with Dr. Krystin Heath. Faiza Greenberg NP  12:17 PM

## 2018-04-16 NOTE — PROGRESS NOTES
Problem: Falls - Risk of  Goal: *Absence of Falls  Document Stefania Fall Risk and appropriate interventions in the flowsheet.    Outcome: Progressing Towards Goal  Fall Risk Interventions:  Mobility Interventions: Assess mobility with egress test, Communicate number of staff needed for ambulation/transfer, Patient to call before getting OOB, Strengthening exercises (ROM-active/passive), Utilize gait belt for transfers/ambulation         Medication Interventions: Assess postural VS orthostatic hypotension, Evaluate medications/consider consulting pharmacy, Patient to call before getting OOB, Teach patient to arise slowly, Utilize gait belt for transfers/ambulation    Elimination Interventions: Call light in reach, Patient to call for help with toileting needs, Toilet paper/wipes in reach, Toileting schedule/hourly rounds, Urinal in reach

## 2018-04-16 NOTE — PROGRESS NOTES
Discharge instructions given to wife and reviewed. Patient sleeping. Two prescriptions given for Protonix and Restoril. Medication education given and follow up appointments made for end of this week. PIV's d/c'd with bruising on both arms evident. Discharged by wheelchair.

## 2018-04-16 NOTE — PROGRESS NOTES
Bedside and Verbal shift change report given to Yaritza (oncoming nurse) by Pat Ravi (offgoing nurse). Report included the following information SBAR and Kardex.

## 2018-04-16 NOTE — PROGRESS NOTES
Problem: Discharge Planning  Goal: *Discharge to safe environment  Outcome: Progressing Towards Goal  See CM Notes.  CRM: Beatriz Randhawa, MPH; Z: [phone]

## 2018-04-16 NOTE — PROGRESS NOTES
Paged patient's hospitalist Team 7 to ask if pateint would be discharged today per patient request. No answer as of yet. Next every 8 hour HGB due at 11:30 am today.  Last Hgb was 7.2

## 2018-04-16 NOTE — PROGRESS NOTES
Plan-Discharge to home. Follow up on Friday for lab work and to see Dr. Emely Thornton.  Future TIPPS procedure and possible workup for liver transplant

## 2018-04-16 NOTE — PROGRESS NOTES
Hospital follow-up PCP transitional care appointment has been scheduled with Dr. Siddiqui Both for Thursday, 4/19/18 at 11:30 a.m. Office is requesting medical records to be faxed to [phone]. Pending patient discharge.   Faith Najera, Care Management Specialist.

## 2018-04-16 NOTE — PROGRESS NOTES
Reason for Admission:              \"disoriented, low BP, bleeding\"; GI Bleed  RRAT Score:              23  Resources/supports as identified by patient/family:                Patient lives with spouse and a dependent child in 2 story home with 14 interior steps and 3 exterior steps, 2nd floor bedroom. House is multi-ADA supports equipped, including: shower chair, elevated commode, and assistive bars  Top Challenges facing patient (as identified by patient/family and CM): Finances/Medication cost?                None identified  Transportation? Does not drive, but spouse is at bedside  Support system or lack thereof? Patient stated that he has extensive support available, including spouse, older child, and youngest child          Living arrangements? Lives with spouse and dependent child in 2 story home with 14 interior steps, 3 exterior steps, 2nd floor bedroom, and multiple assistive devices      Self-care/ADLs/Cognition? Independent but not driving. Has BP cuff, canes, prosthetic leg, raised commode, shower chair, and multiple grab bars that are ADA appropriate  Current Advanced Care Plan: On File      Plan for utilizing home health:              Patient declined due to family support          Likelihood of readmission:              Due to comorbidity diagnoses including PMHx of Alcoholic liver cirrhosis, Hx of Variceal bleed s/o banding on 1/07, Chronic systolic CHF NYHA 1, paroxysmal Atrial fib, DM, HTN, CKD 3, Chronic thrombocytopenia, and Left BKA: HIGH  Transition of Care Plan:             Likely discharge today, 4/16/18, home in care of spouse  Care Management Interventions  PCP Verified by CM: Yes  Last Visit to PCP: 04/10/18  Palliative Care Criteria Met (RRAT>21 & CHF Dx)?: Yes  Palliative Consult Recommended?: No  Reason Palliative Care Not Recommended?: Provider preference to wait  Mode of Transport at Discharge:  Other (see comment) (Spouse at bedside.)  Transition of Care Consult (CM Consult): Discharge Planning  MyChart Signup: No  Discharge Durable Medical Equipment: No  Health Maintenance Reviewed: Yes  Physical Therapy Consult: No  Occupational Therapy Consult: No  Speech Therapy Consult: No  Current Support Network: Lives with Spouse, Own Home (also lives with a 8year old child)  Confirm Follow Up Transport: Family  Plan discussed with Pt/Family/Caregiver: Yes (spouse at bedside)  PHARMACY: [facility].

## 2018-04-18 NOTE — TELEPHONE ENCOUNTER
Patients wife Padilla Hwang called stating that patient needed a paracentesis. Spoke with  Beverly Patel NP who approved. Order was placed. Patient and wife informed.

## 2018-04-19 PROBLEM — N17.9 AKI (ACUTE KIDNEY INJURY) (HCC): Status: ACTIVE | Noted: 2018-01-01

## 2018-04-19 PROBLEM — I85.00 ESOPHAGEAL VARICES (HCC): Status: ACTIVE | Noted: 2018-01-01

## 2018-04-19 PROBLEM — K70.30 CIRRHOSIS, ALCOHOLIC (HCC): Status: ACTIVE | Noted: 2018-01-01

## 2018-04-19 PROBLEM — K92.2 GI BLEED: Status: RESOLVED | Noted: 2018-01-01 | Resolved: 2018-01-01

## 2018-04-19 PROBLEM — F10.11 ALCOHOL ABUSE, IN REMISSION: Status: ACTIVE | Noted: 2018-01-01

## 2018-04-19 PROBLEM — N17.9 AKI (ACUTE KIDNEY INJURY) (HCC): Status: RESOLVED | Noted: 2018-01-01 | Resolved: 2018-01-01

## 2018-04-19 NOTE — CONSULTS
70 Steffi Durand MD, FACP, [address], [place]       Brenda Lutz, MARTÍN Burton, KULDEEP Gr, ARIASP-BC   Cameron Hernandes, MARTÍN [facility] 136    at [address], [address] [address]  22.    [phone]    FAX: [address] [place] [address] Drive, [address] [facility], [address]    [phone]    FAX: [phone]       HEPATOLOGY CONSULT NOTE  The patient is well known to me and regularly cared for at [address] [address]. He is a 59year old  male with cirrhosis secondary to alcohol. He finally stopped consuming alcohol in 1/2018. He had a varcieal bleed in 4/2016 and was sent home 3 days ago. He called the office earlier this week complaining of increased swelling of the abdomen and his leg. He was given an appointment for tomorrow. He developed increasing SOB and did not feel he could wait another day and came to the ED and was hospitalized. He had a paracentesis and 9 liter of ascites removed in ED. I have reviewed the Emergency room note, Hospital admission note, Notes by all other physicians who have seen the patient during this hospitalization to date. I have reviewed the problem list and the reason for this hospitalization. I have reviewed the allergies and the medications the patient was taking at home prior to this hospitalization. ASSESSMENT AND PLAN:  Cirrhosis  This is secondary to alcohol. The CTP score is 9, Child class C, MELD score 27. I am concerned that he has limited hepatic reserve since he has had another variceal bleed and recurrent ascites 4 months after he has been abstinent from alcohol.     Alcohol abuse in remission  He has been abstinent from alcohol since 2018. He could be considered a candidate for LT if this is needed in 2018. Ascites  He had 9 liters of ascites removed in the ED. Still draining ascites. Will remove paracentesis catheter in AM.  Will use IV albumin 25 gm Q6H. Hold diuretics. May need TIPS. LORENZA  Screat 2.17 mg. Secondary to diuretics and dehydration. Doubt HRS. Will give IV albumin and expect     Hyponatremia  Secondary to cirrhosis and diuretics. Will hold diuretics. On IV Albumin. Anemia  Secondary to GI blood loss from portal HTN and recent GI bleeding. Will check Ferritin and FE panel. Will give IV FE if FE strores are low. Thrombocytopenia  This is secondary to cirrhosis. No treatment needed. SYSTEM REVIEW:  Constitution systems: Negative for fever, chills, weight gain, weight loss. Eyes: Negative for visual changes. ENT: Negative for sore throat, painful swallowing. Respiratory: No longer SOB since paracentesis  Cardiology: Negative for chest pain, palpitations. GI:  Negative for constipation or diarrhea. : Negative for urinary frequency, dysuria, hematuria, nocturia. Skin: Negative for rash. Hematology: Negative for easy bruising, blood clots. Musculo-skelatal: Negative for back pain, muscle pain, weakness. Neurologic: Negative for headaches, dizziness, vertigo, memory problems not related to HE. Psychology: Negative for anxiety, depression. FAMILY HISTORY:  The father  of unknown cause. The mother  of heart disease. There is no family history of liver disease.       SOCIAL HISTORY:  The patient is . The patient has 3 children. The patient has never used tobacco products. The patient consumes 6+ alcoholic beverages per day. He has been abstinent from alcohol since 2018. The patient used to work . The patient retired in .         PHYSICAL EXAMINATION:  VS: per nursing note  General:  No acute distress. Eyes:  Sclera anicteric.    ENT:  No oral lesions. Thyroid normal.  Nodes:  No adenopathy. Skin:  Spider angiomata. No jaundice. Respiratory:  Lungs clear to auscultation. Cardiovascular:  Regular heart rate. Abdomen:  Soft non-tender, Some ascites appears to be present. Paracentesis catheter in right flank. Extremities:  2+ lower extremity edema. Left side BKA with prosthetic leg. Neurologic:  Alert and oriented. Cranial nerves grossly intact. No asterixis. LABORATORY:  Results for Carlos Myles (MRN [id number]) as of 4/19/2018 18:45   Ref. Range 4/19/2018 10:45   WBC Latest Ref Range: 4.1 - 11.1 K/uL 8.3   HGB Latest Ref Range: 12.1 - 17.0 g/dL 8.4 (L)   PLATELET Latest Ref Range: 150 - 400 K/uL 89 (L)   Sodium Latest Ref Range: 136 - 145 mmol/L 126 (L)   Potassium Latest Ref Range: 3.5 - 5.1 mmol/L 4.9   Chloride Latest Ref Range: 97 - 108 mmol/L 94 (L)   CO2 Latest Ref Range: 21 - 32 mmol/L 21   Glucose Latest Ref Range: 65 - 100 mg/dL 127 (H)   BUN Latest Ref Range: 6 - 20 MG/DL 30 (H)   Creatinine Latest Ref Range: 0.70 - 1.30 MG/DL 2.17 (H)   Bilirubin, total Latest Ref Range: 0.2 - 1.0 MG/DL 2.9 (H)   Albumin Latest Ref Range: 3.5 - 5.0 g/dL 2.8 (L)   ALT (SGPT) Latest Ref Range: 12 - 78 U/L 46   AST Latest Ref Range: 15 - 37 U/L 62 (H)   Alk. phosphatase Latest Ref Range: 45 - 117 U/L 238 (H)   Ammonia Latest Ref Range: <32 UMOL/L        RADIOLOGY:  4/2018. CT scan abdomen without IV contrast.  Changes consistent with cirrhosis. No liver mass lesions. No dilated bile ducts. Large ascites.     Addy Cao MD  Liver [place] of [address] [place] [address] 1778 [facility] [address].  [phone]

## 2018-04-19 NOTE — IP AVS SNAPSHOT
[address] 
[phone] Patient: Arthur Wright MRN: [id number] TKY:[id number]/[id number] A check suzanne indicates which time of day the medication should be taken. My Medications START taking these medications Instructions Each Dose to Equal  
 Morning Noon Evening Bedtime  
 lactulose 10 gram/15 mL solution Commonly known as:  Johana Cheryl Your last dose was: Your next dose is: Take 30 mL by mouth two (2) times a day. You can adjust the frequency to achieve two bowel movements per day. Indications: Hepatic Encephalopathy 20 g  
    
   
   
   
  
 rifAXIMin 550 mg tablet Commonly known as:  Leland Lucero Your last dose was: Your next dose is: Take 1 Tab by mouth two (2) times a day. Indications: Hepatic Encephalopathy 550 mg CONTINUE taking these medications Instructions Each Dose to Equal  
 Morning Noon Evening Bedtime  
 amiodarone 200 mg tablet Commonly known as:  CORDARONE Your last dose was: Your next dose is: Take 200 mg by mouth nightly. 200 mg CENTRUM SILVER Tab tablet Generic drug:  multivitamins-minerals-lutein Your last dose was: Your next dose is: Take 1 Tab by mouth daily. 1 Tab  
    
   
   
   
  
 digoxin 0.125 mg tablet Commonly known as:  LANOXIN Your last dose was: Your next dose is: Take 0.125 mg by mouth nightly. 0.125 mg  
    
   
   
   
  
 furosemide 40 mg tablet Commonly known as:  LASIX Your last dose was: Your next dose is: Take 3 Tabs by mouth daily. Indications: EDEMA DUE TO HEPATIC CIRRHOSIS  
 120 mg  
    
   
   
   
  
 gabapentin 100 mg capsule Commonly known as:  NEURONTIN Your last dose was: Your next dose is: Take 100 mg by mouth three (3) times daily. 100 mg  
    
   
   
   
  
 metoprolol succinate 25 mg XL tablet Commonly known as:  TOPROL XL Your last dose was: Your next dose is: Take 0.5 Tabs by mouth daily. 12.5 mg  
    
   
   
   
  
 pantoprazole 40 mg tablet Commonly known as:  PROTONIX Your last dose was: Your next dose is: Take 1 Tab by mouth daily. 40 mg  
    
   
   
   
  
 spironolactone 100 mg tablet Commonly known as:  ALDACTONE Your last dose was: Your next dose is: Take 3 Tabs by mouth daily. Indications: EDEMA DUE TO HEPATIC CIRRHOSIS  
 300 mg  
    
   
   
   
  
 temazepam 7.5 mg capsule Commonly known as:  RESTORIL Your last dose was: Your next dose is: Take 1 Cap by mouth nightly as needed for Sleep. Max Daily Amount: 7.5 mg.  
 7.5 mg  
    
   
   
   
  
 thiamine 100 mg tablet Commonly known as:  B-1 Your last dose was: Your next dose is: Take 1 Tab by mouth daily. 100 mg  
    
   
   
   
  
 valsartan 40 mg tablet Commonly known as:  DIOVAN Your last dose was: Your next dose is: Take 40 mg by mouth daily. 40 mg Where to Get Your Medications Information on where to get these meds will be given to you by the nurse or doctor. ! Ask your nurse or doctor about these medications  
  lactulose 10 gram/15 mL solution  
 rifAXIMin 550 mg tablet

## 2018-04-19 NOTE — PROGRESS NOTES
Admission Medication Reconciliation:    Information obtained from: patient, patient's wife, rx query, recent discharge instructions    Significant PMH/Disease States:   Past Medical History:   Diagnosis Date    Arthritis     Atrial fibrillation ([facility] 2014    CHF (congestive heart failure) [facility]     Diabetes ([facility]     Diabetic ulcer of left foot ([facility] 2/2015 2/2015 Lt BKA    History of blood transfusion 2015    During Lt BKA; pt denies any adverse reaction    Hypertension     Liver disease     CIRRHOSIS    Sepsis ([facility] 02/2015    From diabetic Left foot wound;  had a BKA ;  as of 11/21/16 pt states back to his baseline        Chief Complaint for this Admission:  shortness of breath    Allergies:  Review of patient's allergies indicates no known allergies. Prior to Admission Medications:   Prior to Admission Medications   Prescriptions Last Dose Informant Patient Reported? Taking?   amiodarone (CORDARONE) 200 mg tablet 4/18/2018 at Unknown time  Yes Yes   Sig: Take 200 mg by mouth nightly. digoxin (LANOXIN) 0.125 mg tablet 4/18/2018 at Unknown time  Yes Yes   Sig: Take 0.125 mg by mouth nightly. furosemide (LASIX) 40 mg tablet 4/19/2018 at Unknown time  No Yes   Sig: Take 3 Tabs by mouth daily. Indications: EDEMA DUE TO HEPATIC CIRRHOSIS   gabapentin (NEURONTIN) 100 mg capsule 4/19/2018 at Unknown time  Yes Yes   Sig: Take 100 mg by mouth three (3) times daily. metoprolol succinate (TOPROL XL) 25 mg XL tablet 4/19/2018 at Unknown time  No Yes   Sig: Take 0.5 Tabs by mouth daily. multivitamins-minerals-lutein (CENTRUM SILVER) Tab 4/19/2018 at Unknown time  Yes Yes   Sig: Take 1 Tab by mouth daily. pantoprazole (PROTONIX) 40 mg tablet 4/19/2018 at Unknown time  No Yes   Sig: Take 1 Tab by mouth daily. spironolactone (ALDACTONE) 100 mg tablet 4/19/2018 at Unknown time  No Yes   Sig: Take 3 Tabs by mouth daily.  Indications: EDEMA DUE TO HEPATIC CIRRHOSIS   temazepam (RESTORIL) 7.5 mg capsule Not Taking at Unknown time  No No   Sig: Take 1 Cap by mouth nightly as needed for Sleep. Max Daily Amount: 7.5 mg.   thiamine (B-1) 100 mg tablet 4/19/2018 at Unknown time  No Yes   Sig: Take 1 Tab by mouth daily. valsartan (DIOVAN) 40 mg tablet 4/18/2018 at Unknown time  Yes Yes   Sig: Take 40 mg by mouth daily. Facility-Administered Medications: None         Comments/Recommendations: Changed gabapentin from 300 mg TID to 100 mg TID. Of note, patient has not filled script for restoril yet. We discussed tips for sleep hygiene. Patient was instructed by provider PTA to restart valsartan if systolic BP was >578 mmHg; patient restarted two days ago. Confirmed NKDA. Patient and his wife did not have any questions at this time.       Nolvia Oconnell, PharmD  ED EXT [id number]

## 2018-04-19 NOTE — IP AVS SNAPSHOT
2700 [facility] [address] [place] [address] 
[phone] Patient: Skyler Guadarrama MRN: [id number] YX About your hospitalization You were admitted on:  2018 You last received care in the:  [facility] 2N MED SURG You were discharged on:  2018 Why you were hospitalized Your primary diagnosis was:  Quentin (Acute Kidney Injury) (Hcc) Your diagnoses also included:  Quentin (Acute Kidney Injury) (Hcc) Follow-up Information Follow up With Details Comments Contact Info Randa Mishra MD Schedule an appointment as soon as possible for a visit in 1 week For follow up of liver disease 200 [facility] [address] [place] [address] 
[phone] Melchor Forrester MD Call As needed for primary care [address] [place] [address] 
[phone] Discharge Orders None A check suzanne indicates which time of day the medication should be taken. My Medications START taking these medications Instructions Each Dose to Equal  
 Morning Noon Evening Bedtime  
 lactulose 10 gram/15 mL solution Commonly known as:  Dione Eves Your last dose was: Your next dose is: Take 30 mL by mouth two (2) times a day. You can adjust the frequency to achieve two bowel movements per day. Indications: Hepatic Encephalopathy 20 g  
    
   
   
   
  
 rifAXIMin 550 mg tablet Commonly known as:  [employer] Your last dose was: Your next dose is: Take 1 Tab by mouth two (2) times a day. Indications: Hepatic Encephalopathy 550 mg CONTINUE taking these medications Instructions Each Dose to Equal  
 Morning Noon Evening Bedtime  
 amiodarone 200 mg tablet Commonly known as:  CORDARONE Your last dose was: Your next dose is: Take 200 mg by mouth nightly.   
 200 mg  
    
   
   
   
  
 CENTRUM SILVER Tab tablet Generic drug:  multivitamins-minerals-lutein Your last dose was: Your next dose is: Take 1 Tab by mouth daily. 1 Tab  
    
   
   
   
  
 digoxin 0.125 mg tablet Commonly known as:  LANOXIN Your last dose was: Your next dose is: Take 0.125 mg by mouth nightly. 0.125 mg  
    
   
   
   
  
 furosemide 40 mg tablet Commonly known as:  LASIX Your last dose was: Your next dose is: Take 3 Tabs by mouth daily. Indications: EDEMA DUE TO HEPATIC CIRRHOSIS  
 120 mg  
    
   
   
   
  
 gabapentin 100 mg capsule Commonly known as:  NEURONTIN Your last dose was: Your next dose is: Take 100 mg by mouth three (3) times daily. 100 mg  
    
   
   
   
  
 metoprolol succinate 25 mg XL tablet Commonly known as:  TOPROL XL Your last dose was: Your next dose is: Take 0.5 Tabs by mouth daily. 12.5 mg  
    
   
   
   
  
 pantoprazole 40 mg tablet Commonly known as:  PROTONIX Your last dose was: Your next dose is: Take 1 Tab by mouth daily. 40 mg  
    
   
   
   
  
 spironolactone 100 mg tablet Commonly known as:  ALDACTONE Your last dose was: Your next dose is: Take 3 Tabs by mouth daily. Indications: EDEMA DUE TO HEPATIC CIRRHOSIS  
 300 mg  
    
   
   
   
  
 temazepam 7.5 mg capsule Commonly known as:  RESTORIL Your last dose was: Your next dose is: Take 1 Cap by mouth nightly as needed for Sleep. Max Daily Amount: 7.5 mg.  
 7.5 mg  
    
   
   
   
  
 thiamine 100 mg tablet Commonly known as:  B-1 Your last dose was: Your next dose is: Take 1 Tab by mouth daily. 100 mg  
    
   
   
   
  
 valsartan 40 mg tablet Commonly known as:  DIOVAN Your last dose was: Your next dose is: Take 40 mg by mouth daily. 40 mg Where to Get Your Medications Information on where to get these meds will be given to you by the nurse or doctor. ! Ask your nurse or doctor about these medications  
  lactulose 10 gram/15 mL solution  
 rifAXIMin 550 mg tablet Discharge Instructions Please bring this form with you to show your primary care provider at your follow-up appointment. Primary care provider:  Dr. Augustus Smith MD 
 
Discharging provider:  Jaron Nicholson MD 
 
You have been admitted to the hospital with the following diagnoses: · LORENZA (acute kidney injury) · Hepatic encephalopathy FOLLOW-UP CARE RECOMMENDATIONS: 
 
APPOINTMENTS: 
Follow-up Information Follow up With Details Comments Contact Info Aura Romberg, MD Schedule an appointment as soon as possible for a visit in 1 week For follow up of liver disease [address] [address] [address] 
[phone] Augustus Smith MD Call As needed for primary care [address] [place] [address] 
[phone] FOLLOW-UP TESTS recommended: Basic metabolic panel in 1 week to evaluate kidney function SYMPTOMS to watch for: chest pain, shortness of breath, abdominal swelling, confusion, falling, weakness, bleeding. DIET/what to eat:  Cardiac Diet ACTIVITY:  Activity as tolerated What to do if new or unexpected symptoms occur? If you experience any of the above symptoms (or should other concerns or questions arise after discharge) please call your primary care physician. Return to the emergency room if you cannot get hold of your doctor. · It is very important that you keep your follow-up appointment(s). · Please bring discharge papers, medication list (and/or medication bottles) to your follow-up appointments for review by your outpatient provider(s).  
· Please check the list of medications and be sure it includes every medication (even non-prescription medications) that your provider wants you to take. · It is important that you take the medication exactly as they are prescribed. · Keep your medication in the bottles provided by the pharmacist and keep a list of the medication names, dosages, and times to be taken in your wallet. · Do not take other medications without consulting your doctor. · If you have any questions about your medications or other instructions, please talk to your nurse or care provider before you leave the hospital. 
 
I understand that if any problems occur once I am at home I am to contact my physician. These instructions were explained to me and I had the opportunity to ask questions. Omnikles Announcement We are excited to announce that we are making your provider's discharge notes available to you in Omnikles. You will see these notes when they are completed and signed by the physician that discharged you from your recent hospital stay. If you have any questions or concerns about any information you see in Omnikles, please call the Health Information Department where you were seen or reach out to your Primary Care Provider for more information about your plan of care. Introducing Ace Lutz As a [employer] patient, I wanted to make you aware of our electronic visit tool called Ace Me[employer]essi. [employer] 24/7 allows you to connect within minutes with a medical provider 24 hours a day, seven days a week via a mobile device or tablet or logging into a secure website from your computer. You can access Ace Lutz from anywhere in the [place].  
 
A virtual visit might be right for you when you have a simple condition and feel like you just dont want to get out of bed, or cant get away from work for an appointment, when your regular [employer] provider is not available (evenings, weekends or holidays), or when youre out of town and need minor care. Electronic visits cost only $49 and if the [employer] 24/7 provider determines a prescription is needed to treat your condition, one can be electronically transmitted to a nearby pharmacy*. Please take a moment to enroll today if you have not already done so. The enrollment process is free and takes just a few minutes. To enroll, please download the [employer] 24/7 nicol to your tablet or phone, or visit [email or web address] to enroll on your computer. And, as an [address] [place] [address] patient with a [employer]ia account, the results of your visits will be scanned into your electronic medical record and your primary care provider will be able to view the scanned results. We urge you to continue to see your regular [employer] provider for your ongoing medical care. And while your primary care provider may not be the one available when you seek a [employer] virtual visit, the peace of mind you get from getting a real diagnosis real time can be priceless. For more information on [employer], view our Frequently Asked Questions (FAQs) at [email or web address]. Sincerely, 
 
Samm Kwon MD 
Chief Medical Officer [address] *:  certain medications cannot be prescribed via [employer] Providers Seen During Your Hospitalization Provider Specialty Primary office phone Luan Mendoza MD Emergency Medicine [phone] Braxton Molina MD Hospitalist [phone] Nissa Evans MD Internal Medicine [phone] Your Primary Care Physician (PCP) Primary Care Physician Office Phone Office Fax Jayla Tijerina [phone] You are allergic to the following No active allergies Recent Documentation Height Weight BMI Smoking Status 1.778 m 90.6 kg 28.66 kg/m2 Former Smoker Emergency Contacts Name Discharge Info Relation Home Work Mobile CarlosCass DISCHARGE CAREGIVER [3] Spouse [3] [phone] Cass Jolly  Spouse [3] [phone] Patient Belongings The following personal items are in your possession at time of discharge: 
  Dental Appliances: None  Visual Aid: Glasses, With patient      Home Medications: None   Jewelry: Ring  Clothing: Shirt, Undergarments    Other Valuables: Prosthesis (L LEG) Please provide this summary of care documentation to your next provider. Signatures-by signing, you are acknowledging that this After Visit Summary has been reviewed with you and you have received a copy. Patient Signature:  ____________________________________________________________ Date:  ____________________________________________________________  
  
Claudene Born Provider Signature:  ____________________________________________________________ Date:  ____________________________________________________________

## 2018-04-19 NOTE — ED TRIAGE NOTES
Reports SOB, fluid retention in abdomen. Had paracentesis Friday, discharged from hospital Monday for this and GI bleed. Gained 30 lbs since last Friday.

## 2018-04-19 NOTE — ED NOTES
9:52 AM  I have evaluated the patient as the Provider in Triage. I have reviewed His vital signs and the triage nurse assessment. I have talked with the patient and any available family and advised that I am the provider in triage and have ordered the appropriate study to initiate their work up based on the clinical presentation during my assessment. I have advised that the patient will be accommodated in the Main ED as soon as possible. I have also requested to contact the triage nurse or myself immediately if the patient experiences any changes in their condition during this brief waiting period.   Mendy Vazquez NP

## 2018-04-19 NOTE — ED NOTES
hospitalist at bedside, paracentesis has drained 3L clear, straw colored fluid. BP stable. Requested IV albumin for volume replacement.

## 2018-04-19 NOTE — ED PROVIDER NOTES
HPI Comments: 59 y.o. male with past medical history significant for CHF, HTN, AFib, diabetes, and liver cirrhosis who presents from home via private vehicle with chief complaint of SOB. Pt reports he was discharged home 3 days ago from here after a GI bleed and hypotension secondary to a paracentesis. Pt states since being home, the fluid has slowly built up, causing SOB. Pt reports he has an appointment with his hepatologist tomorrow and a scheduled paracentesis, but continued to worsen, prompting his ED visit today. Pt also notes some decreased urine output. Pt reports gaining 30 pounds since being home. 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 Pt denies any fever, chills, nausea, vomiting, CP, diarrhea, constipation, or blood in his stool. There are no other acute medical concerns at this time. Old Chart Review: Pt was admitted here from 4/13-4/16 for hypotension and a GI bleed, was discharged home on restoril and protonix, along with a continuation of his lasix and aldactone. Social hx: Former smoker (Quit 2013); Recently quit drinking  PCP: Cortney Rogers MD    Note written by Radhika Mejia, as dictated by Gabriela Franklin MD 11:56 AM          The history is provided by the patient, medical records and the spouse. No  was used.         Past Medical History:   Diagnosis Date    Arthritis     Atrial fibrillation ([address] 2014    CHF (congestive heart failure) ([address]     Diabetes ([address]     Diabetic ulcer of left foot ([address] 2/2015 2/2015 Lt BKA    History of blood transfusion 2015    During Lt BKA; pt denies any adverse reaction    Hypertension     Liver disease     CIRRHOSIS    Sepsis ([address] 02/2015    From diabetic Left foot wound;  had a BKA ;  as of 11/21/16 pt states back to his baseline        Past Surgical History:   Procedure Laterality Date    HX AMPUTATION Left 2/10/2015    BKA    HX COLONOSCOPY      HX IMPLANTABLE CARDIOVERTER DEFIBRILLATOR  08/04/2015    [employer] cardio defibrillator; Dr Dane Durham; as of 11/21/16 pt denies defibrillator going off         Family History:   Problem Relation Age of Onset    Heart Disease Brother     Heart Failure Brother     Heart Failure Brother        Social History     Social History    Marital status:      Spouse name: N/A    Number of children: N/A    Years of education: N/A     Occupational History    Not on file. Social History Main Topics    Smoking status: Former Smoker     Packs/day: 1.00     Years: 5.00     Quit date: 1/1/2013    Smokeless tobacco: Never Used    Alcohol use Yes      Comment: Last drink January 2018    Drug use: No    Sexual activity: Not on file     Other Topics Concern    Not on file     Social History Narrative         ALLERGIES: Review of patient's allergies indicates no known allergies. Review of Systems   Constitutional: Negative for chills and fever. HENT: Negative for rhinorrhea and sore throat. Respiratory: Positive for shortness of breath. Negative for cough. Cardiovascular: Negative for chest pain. Gastrointestinal: Positive for abdominal distention. Negative for abdominal pain, blood in stool, diarrhea, nausea and vomiting. Genitourinary: Positive for decreased urine volume. Negative for dysuria and hematuria. Musculoskeletal: Negative for arthralgias and myalgias. Skin: Negative for pallor and rash. Neurological: Negative for dizziness, weakness and light-headedness. All other systems reviewed and are negative. Vitals:    04/19/18 0953 04/19/18 1042   BP: 108/63 97/60   Pulse: 60    Resp: 24    Temp: 98.1 °F (36.7 °C)    SpO2: 99%    Weight: 108.1 kg (238 lb 6.4 oz)    Height: 5' 10\" (1.778 m)             Physical Exam   Vital signs reviewed. Nursing notes reviewed.     Const:  No acute distress, well developed, well nourished  Head:  Atraumatic, normocephalic  Eyes:  PERRL, conjunctiva normal, scleral icterus. Neck:  Supple, trachea midline  Cardiovascular:  RRR, no murmurs, no gallops, no rubs  Resp:  No resp distress, no increased work of breathing, no wheezes, no rhonchi, no rales,  Abd:  Soft, non-tender, no rebound, no guarding, no CVA tenderness. Abdomen is distended. :  Deferred  MSK:  Normal ROM. R sided pedal edema. L BKA. Neuro:  Alert and oriented x3, no cranial nerve defect  Skin:  Warm, dry, intact  Psych: normal mood and affect, behavior is normal, judgement and thought content is normal    Note written by Radhika Mercer, as dictated by Raysa Ackerman MD 11:56 AM     MDM  Number of Diagnoses or Management Options  Acute renal failure, unspecified acute renal failure type [facility]):   Cirrhosis of liver with ascites, unspecified hepatic cirrhosis type [facility]): Other ascites:      Amount and/or Complexity of Data Reviewed  Clinical lab tests: ordered and reviewed  Tests in the radiology section of CPT®: ordered and reviewed  Review and summarize past medical records: yes    Patient Progress  Patient progress: stable      ED Course     Pt. Presents to the ER with complaints of abdominal pain and abdominal distension. Pt. With cirrhosis and ascites. Pt. Also found to have an elevated creatinine. Pt.  To be evaluated for admission by the hospitalist.    Procedures

## 2018-04-20 PROBLEM — N17.9 AKI (ACUTE KIDNEY INJURY) (HCC): Status: ACTIVE | Noted: 2018-01-01

## 2018-04-20 NOTE — PROGRESS NOTES
Problem: Falls - Risk of  Goal: *Absence of Falls  Document Stefania Fall Risk and appropriate interventions in the flowsheet.    Outcome: Progressing Towards Goal  Fall Risk Interventions:  Mobility Interventions: Bed/chair exit alarm    Mentation Interventions: Door open when patient unattended, Bed/chair exit alarm    Medication Interventions: Patient to call before getting OOB

## 2018-04-20 NOTE — H&P
[address]  HISTORY AND PHYSICAL      Nasim RANKIN  MR#: [id number]  : 1960  ACCOUNT #: [de-identified]   ADMIT DATE: 2018    DATE OF EVALUATION:  2018      CHIEF COMPLAINT:  Significant weight gain in 3-4 days, abdominal distention, and some shortness of breath. HISTORY OF PRESENT ILLNESS:  The patient is a 49-year-old gentleman with known alcoholic cirrhosis, varices. Patient was recently discharged on 2018. At that time, he was admitted for hypotension following paracentesis. Had a GI bleed and got EGD with banding. Patient also has history of chronic systolic heart failure, paroxysmal AFib, rate controlled, not on any oral anticoagulation, diabetes mellitus type 2, hypertension, CKD stage III, chronic thrombocytopenia, left below-knee amputation. Patient follows with Dr. Imani Rojas. He actually has an appointment this morning, but he could not go for the appointment, as he was not feeling well with his abdominal distention, weight gain, and shortness of breath. Patient mentioned he has some decreased urine output, almost 30-pound weight gain since discharge. Denies any fever, nausea or throwing up, abdominal pain as such. In the ER, on basic labs, his creatinine is elevated at 2.17, which was 1.18 on 2018, that is on day of discharge. Patient is getting paracentesis done by radiologist in ER. REVIEW OF SYSTEMS:  All other systems reviewed and all others are negative. ALLERGIES:  NO KNOWN DRUG ALLERGIES. HOME MEDICATIONS:    1.  Neurontin 100 mg t.i.d.    2.  Pantoprazole 40 mg p.o. daily. 3.  Lasix 40 mg b.i.d.    4.  Spironolactone 300 mg p.o. daily. 5.  Diovan 40 mg p.o. daily. 6.  Toprol-XL 12.5 mg p.o. daily. 7.  Cordarone 200 mg p.o. daily. 8.  Digoxin 0.125 mg p.o. daily. 9.  Restoril 7.5 mg p.o. p.r.n. for sleep. PAST MEDICAL HISTORY:    1.   Alcoholic cirrhosis with esophageal varices, status post banding and ligation in January and 04/2018. 2.  Upper GI bleed related to varices. 3.  Chronic arthritis. 4.  Chronic systolic CHF. 5.  Paroxysmal AFib. 6.  Diabetes mellitus type 2.    7.  Hypertension. 8.  Chronic hyponatremia. 9.  CKD, stage III. 10.  Chronic thrombocytopenia, likely related to cirrhosis. 11.  Left below-knee amputation. SOCIAL HISTORY:  Patient is a former smoker, used to smoke 1 pack per day for about 20 years, quit in 2013. Patient used to be a heavy alcohol drinker, quit in 01/2018. FAMILY HISTORY:  Brother has a history of heart disease and heart failure. PHYSICAL EXAMINATION:    VITAL SIGNS:  Temperature afebrile, heart rate 61, respiratory rate 16, blood pressure 96/58, pulse ox 99% on room air. GENERAL:  Not in acute distress, appears jaundiced. HEENT:  Atraumatic, normocephalic. Extraocular muscles intact, scleral icterus, pale conjunctivae, moist mucous membranes. NECK:  Supple. CARDIOVASCULAR:  S1, S2 within normal limits. No murmurs. RESPIRATORY:  Clear to auscultate. No wheezing. GASTROINTESTINAL:  Significant abdominal distention, positive bowel sounds, no tenderness. EXTREMITIES:  Left below knee amputation. 3+ pitting edema in the right lower extremity. Skin appears jaundiced, petechiae. In upper extremities, some flapping tremors. NEUROLOGIC:  Patient is alert and oriented x3. No focal motor deficits. DIAGNOSTIC DATA:  Chest x-ray, no acute abnormality. CBC:  Hemoglobin 8.4, hematocrit 24, , platelets 89. Urinalysis:  Trace leukocyte esterase, negative for bacteria, negative for nitrites.       Chemistry:  Sodium 126, BUN 30, creatinine 2.17, lipase 439, T-bili 2.9.      ASSESSMENT AND PLAN:  The patient is a 58-year-old gentleman with known alcoholic cirrhosis, varices with previous banding, heart failure, CKD, AFib, now presented with significant weight gain, abdominal distention, ascites, shortness of breath. PROBLEM LIST:    1. Significant ascites and weight gain related to alcoholic cirrhosis. 2.  Anemia and thrombocytopenia related to cirrhosis. 3.  LORENZA on CKD, stage III, possible hepatorenal syndrome or prerenal secondary to intravascular volume depletion. 4.  Mildly elevated lipase. 5.  Chronic hyponatremia. PLAN:    1. We will keep him under observation. Patient is getting paracentesis. So far, 3 liters of fluid removed (later, nurse informed that removed 10 liters of fluid). I ordered a total of 2 doses of albumin between the paracentesis. Asked not to remove any more fluid. I will monitor vitals closely. Will hold all his antihypertensive medications, including Toprol-XL, Diovan, spironolactone. We will continue with Lasix 40 mg p.o. b.i.d., which is a decreased dose from home, and also will continue with amiodarone. 2.  We will monitor renal function closely. If there is any worsening, we will consider consulting nephrology. 3.  AFib. We will continue with amiodarone, will hold the digoxin. Will get a dig level. Depending on his blood pressure, we will consider restarting his antihypertensive medications. 4.  We will also get ammonia level. For now, patient is not confused or encephalopathic. We will start on lactulose. 5.  We will continue with Neurontin, Protonix, Restoril p.r.n. for sleep. 6.  Consulted hepatologist, Dr. Ale Saavedra. DVT PROPHYLAXIS:  SCDs. CODE STATUS:  FULL CODE. DISPOSITION:  Likely in 1-2 days with improvement in the renal function and if he remains hemodynamically stable.       MD CARLOS Landa/  D: 04/19/2018 19:25     T: 04/19/2018 20:43  JOB #: [id number]

## 2018-04-20 NOTE — PROGRESS NOTES
Bedside shift change report given to [facility] RASHEED (oncoming nurse) by Doak Hodgkins (offgoing nurse). Report included the following information SBAR.     2030- Dr. Manny Garrett informed of a.m labs ( Na, H&H, ammonia). MD will place orders. Patient mentation is clear but slower in thinking, Will continue to monitor. 0656- Rounded with Dr. Sue Jordan at bedside. Plan of care discussed. MD discussed starting patient on a  fluid pill and to d/c drain since no output has occurred. MD to place orders.

## 2018-04-20 NOTE — PROGRESS NOTES
Jessica Finn MD   Phone/text: [phone] (7am to 7pm)  After 7pm please call  for hospitalist on call    Hospitalist Progress Note     4/20/2018   PCP:  Dr. Mamta Kraus MD  Chief Complaint   Patient presents with    Shortness of Breath       Admission Summary:   The patient is a 59-year-old gentleman with known alcoholic cirrhosis, varices. Patient was recently discharged on 04/16/2018. At that time, he was admitted for hypotension following paracentesis. Had a GI bleed and got EGD with banding. Patient follows with Dr. Haylee Leonadro. He actually has an appointment this morning, but he could not go for the appointment, as he was not feeling well with his abdominal distention, weight gain, and shortness of breath. Reason For Visit:    Assessment/Plan   Alcoholic cirrhosis with ascites (chronic)  - Holding diuretics  - Hepatology consulted    Ascites (chronic) - no SBP  - US guided paracentesis 4/19. 10L drained. - Holding diuretics for the moment    Hepatic encephalopathy (POA) - ammonia is much higher than in the past   - Increase lactulose, add rifaximen    LORENZA on CKD3 - suspect pre-renal rather than HRS, improving with albumin  - Continue albumin  - Hold diuretics    Anemia, acute on chronic - Hgb dropping  - Transfuse pRBC 2 units 4/20  - Monitor HH    Esophageal varices (chronic) - just banded  - Monitor for GI bleeding    Hyponatremia (chronic) - due to volume overload  - Holding diuretics right now but he would benefit from some fluid off when he can tolerate    Chronic systolic CHF -   - Echo 29/60 with LVEF 35-40%  - Holding lasix, spironolactone, valstartan and metop xl with hypotension. Add back as able    Paroxysmal AFib (chronic) - currently in sinus  - Holding digoxin due to LORENZA  - Amiodarone is not the best medication given his chronic liver dysfunction but this probably needs to be addressed with Cardiology.     Hypertension (chronic) - meds as above    Chronic thrombocytopenia, due to cirrhosis - no intervention needed    Left below-knee amputation. See orders for other plans. VTE prophylaxis: SCDs  Discussed plan of care with Patient/Family and Nurse   Pre-admission lived at home  Discharge plan: home  Estimated time to discharge: unclear     Subjective   Mr. Sly Hernandez is pretty sleepy and confused. He denies pain, nausea, SOB. Reviewed interval history  Physical examination     Visit Vitals    BP 96/50 (BP 1 Location: Left arm, BP Patient Position: At rest)    Pulse 60    Temp 97.3 °F (36.3 °C)    Resp 16    Ht 5' 10\" (1.778 m)    Wt 94.9 kg (209 lb 3.2 oz)    SpO2 96%    BMI 30.02 kg/m2       Temp (24hrs), Av.8 °F (36.6 °C), Min:97.3 °F (36.3 °C), Max:98.1 °F (36.7 °C)      Intake/Output Summary (Last 24 hours) at 18 1009  Last data filed at 18 0839   Gross per 24 hour   Intake              580 ml   Output            62591 ml   Net           -20746 ml       Gen - NAD  HEENT - MMM  Neck - supple, full ROM  CV - RRR, no MRG  Resp - lungs CTA b/l, no wheezing, normal WOB  GI - abdomen S, NT, distended with + flank dullness to percussion, +BS   - no CVA tenderness, bladder non-palpable in lower abdomen  MSK - normal tone and bulk, 1+ edema, left BKA  Neuro - A&Ox2, +asterixis    Data Review       Telemetry    ECG    Xray x   CT scan    MRI    Echocardiogram    Ultrasound              I have reviewed the flow sheet and recent notes  New labs and data personally reviewed.     Recent Labs      18   0932  18   0437  18   1045   WBC   --   4.1  8.3   HGB  6.8*  7.0*  8.4*   HCT  18.8*  19.5*  24.0*   PLT   --   56*  89*     Recent Labs      18   0437  18   1045   NA  127*  126*   K  5.0  4.9   CL  97  94*   CO2  23  21   GLU  139*  127*   BUN  30*  30*   CREA  1.83*  2.17*   CA  8.3*  8.5   MG   --   1.7   PHOS   --   3.1   ALB  2.6*  2.8*   TBILI  2.6*  2.9*   SGOT  39*  62*   ALT  34  46   INR  1.5* --        Medications reviewed  Current Facility-Administered Medications   Medication Dose Route Frequency    thiamine (B-1) tablet 100 mg  100 mg Oral DAILY    pantoprazole (PROTONIX) tablet 40 mg  40 mg Oral DAILY    lactulose (CHRONULAC) solution 30 g  30 g Oral TID    rifAXIMin (XIFAXAN) tablet 550 mg  550 mg Oral BID    sodium chloride (NS) flush 5-10 mL  5-10 mL IntraVENous Q8H    sodium chloride (NS) flush 5-10 mL  5-10 mL IntraVENous PRN    ondansetron (ZOFRAN) injection 4 mg  4 mg IntraVENous Q6H PRN    gabapentin (NEURONTIN) capsule 100 mg  100 mg Oral TID    oxyCODONE-acetaminophen (PERCOCET) 5-325 mg per tablet 1 Tab  1 Tab Oral Q6H PRN    zolpidem (AMBIEN) tablet 5 mg  5 mg Oral QHS PRN    albumin human 25% (BUMINATE) solution 25 g  25 g IntraVENous Q6H         Simon Blake MD  Internal Medicine  4/20/2018

## 2018-04-20 NOTE — PROGRESS NOTES
[address]     MARIAMA King [address] [address] [facility],[address]       Morena Michelle MD, [address] [facility], [facility], [place]        April MARTÍN Camacho PA-C Casimiro Schuller, [facility]   MARTÍN Dinero NP [facility] [address]    at [address], [address] [address]  22.    [phone]    FAX: [address] [place] [address] Avenue    at [facility]    [address], [address] [facility], [address]    [phone]    FAX: [phone]         HEPATOLOGY PROGRESS NOTE  The patient is a 59year old  male with cirrhosis secondary to alcohol. He finally stopped consuming alcohol in 1/2018. He had a varcieal bleed in 4/2016 and was sent home 3 days ago. He called the office earlier this week complaining of increased swelling of the abdomen and his leg. He was given an appointment for tomorrow. He developed increasing SOB and did not feel he could wait another day and came to the ED and was hospitalized.     He had a paracentesis and 9 liter of ascites removed in ED. Screat down some and Na up a bit this AM.  Continue IV albumin and to hold diuretics.     ASSESSMENT AND PLAN:  Cirrhosis  This is secondary to alcohol. The CTP score is 9, Child class C, MELD score 27. I am concerned that he has limited hepatic reserve since he has had another variceal bleed and recurrent ascites 4 months after he has been abstinent from alcohol.     Alcohol abuse in remission  He has been abstinent from alcohol since 1/2018. He could be considered a candidate for LT if this is needed in 6/2018.     Ascites  He had 9 liters of ascites removed in the ED. Still draining ascites. Will remove paracentesis catheter in AM.  Will use IV albumin 25 gm Q6H. Hold diuretics. May need TIPS.     LORENZA  Screat 2.17 mg.   Secondary to diuretics and dehydration. Doubt HRS. Will give IV albumin and expect      Hyponatremia  Secondary to cirrhosis and diuretics. Will hold diuretics. On IV Albumin.     Anemia  Secondary to GI blood loss from portal HTN, recent GI bleeding and chronic disease. Ferritin and FE sat are normal.  HB is stable in the 7-8 gm range     Thrombocytopenia  This is secondary to cirrhosis. No treatment needed.     PHYSICAL EXAMINATION:  VS: per nursing note  General:  No acute distress. Eyes:  Sclera anicteric. ENT:  No oral lesions. Thyroid normal.  Nodes:  No adenopathy. Skin:  Spider angiomata. No jaundice. Respiratory:  Lungs clear to auscultation. Cardiovascular:  Regular heart rate. Abdomen:  Soft non-tender, Some ascites appears to be present. Paracentesis catheter in right flank. Extremities:  2+ lower extremity edema. Left side BKA with prosthetic leg. Neurologic:  Alert and oriented. Cranial nerves grossly intact. No asterixis.     LABORATORY:  Results for Lynne Murphy (MRN [id number]) as of 4/20/2018 07:31   Ref.  Range 4/16/2018 03:39 4/19/2018 10:45 4/20/2018 04:37   WBC Latest Ref Range: 4.1 - 11.1 K/uL 4.8 8.3 4.1   HGB Latest Ref Range: 12.1 - 17.0 g/dL 7.2 (L) 8.4 (L) 7.0 (L)   PLATELET Latest Ref Range: 150 - 400 K/uL 60 (L) 89 (L) 56 (L)   INR Latest Ref Range: 0.9 - 1.1     1.5 (H)   Sodium Latest Ref Range: 136 - 145 mmol/L 126 (L) 126 (L) 127 (L)   Potassium Latest Ref Range: 3.5 - 5.1 mmol/L 5.0 4.9 5.0   Chloride Latest Ref Range: 97 - 108 mmol/L 96 (L) 94 (L) 97   CO2 Latest Ref Range: 21 - 32 mmol/L 22 21 23   Glucose Latest Ref Range: 65 - 100 mg/dL 150 (H) 127 (H) 139 (H)   BUN Latest Ref Range: 6 - 20 MG/DL 24 (H) 30 (H) 30 (H)   Creatinine Latest Ref Range: 0.70 - 1.30 MG/DL 1.18 2.17 (H) 1.83 (H)   Bilirubin, total Latest Ref Range: 0.2 - 1.0 MG/DL  2.9 (H) 2.6 (H)   Albumin Latest Ref Range: 3.5 - 5.0 g/dL  2.8 (L) 2.6 (L)   ALT (SGPT) Latest Ref Range: 12 - 78 U/L  46 34   AST Latest Ref Range: 15 - 37 U/L  62 (H) 39 (H)   Alk.  phosphatase Latest Ref Range: 45 - 117 U/L  238 (H) 144 (H)     Tanya Petersen MD  [place] [address] [address] Thierry Mcdaniels [address] [place] [address] 22.  [phone]

## 2018-04-21 NOTE — PROGRESS NOTES
Bedside and Verbal shift change report given to [address] [place] [address] 20 (oncoming nurse) by Saira Fonseca RN (offgoing nurse). Report included the following information SBAR, Kardex, Intake/Output, MAR and Recent Results.

## 2018-04-21 NOTE — PROGRESS NOTES
Problem: Falls - Risk of  Goal: *Absence of Falls  Document Stefania Fall Risk and appropriate interventions in the flowsheet.    Outcome: Progressing Towards Goal  Fall Risk Interventions:  Mobility Interventions: Patient to call before getting OOB    Mentation Interventions: Door open when patient unattended    Medication Interventions: Patient to call before getting OOB    Elimination Interventions: Call light in reach

## 2018-04-21 NOTE — PROGRESS NOTES
[address] [facility] MD Jeevan, FACP, [place] Frantz Pined[place], [address] [facility]  Shade Malcoral, NP  Obdulia De Oliveira, KULDEEP SARKAR, ACNP-BC   Parth Abraham NP  Virgen Riser, MARTÍN   [address] [facility]  [address] [facility], [address] [address]  [place], [address]    [phone]    FAX: Cale Solomon [facility]  [address] [facility]  [address], [address]  [place], [address] [facility]    [phone]    FAX: [phone]   [place]  HEPATOLOGY PROGRESS NOTE  The patient is a 59year old  male with cirrhosis secondary to alcohol. Lulu Irizarry finally stopped consuming alcohol in 1/2018. Lulu Irizarry had a varcieal bleed in 4/2016 and was sent home 3 days ago. Lulu Irizarry called the office earlier this week complaining of increased swelling of the abdomen and his leg.  He was given an appointment for tomorrow. Lulu Irizarry developed increasing SOB and did not feel he could wait another day and came to the ED and was hospitalized.      Paracentesis catheter is out. Screat continues to come down. Sna up to 130. Continue IV albumin. Will restart diuretics. If Screat and Sna ok in AM can go home tomorrow.     ASSESSMENT AND PLAN:  Cirrhosis  This is secondary to alcohol.  The CTP score is 9, Child class C, MELD score down from 27 to 25. Will need to initiate LT evaluation as out-patient after hospital DC in anticipation of him being abstinent from alcohol for 6 months in 6/2018.        Alcohol abuse in remission  He has been abstinent from alcohol since 1/2018. Lulu Irizarry could be considered a candidate for LT if this is needed in 6/2018.      Ascites  He had 9 liters of ascites removed. Continue IV albumin. Restarted on step 3 diuretics this AM.  Since he will soon be LT canidate would rather hold off on TIPS.         LORENZA  Screat down to 1.49 mg.  Continue IV albumin. \      Hyponatremia  Secondary to cirrhosis and diuretics.  Continue IV Albumin.      Anemia  Secondary to GI blood loss from portal HTN, recent GI bleeding and chronic disease.  Ferritin and FE sat are normal.  Given 1 unit blood yesterday.        Thrombocytopenia  This is secondary to cirrhosis.  No treatment needed.      PHYSICAL EXAMINATION:  VS: per nursing note  General:  No acute distress. Eyes:  Sclera anicteric. ENT:  No oral lesions.  Thyroid normal.  Nodes:  No adenopathy. Skin:  Spider angiomata.  No jaundice. Respiratory:  Lungs clear to auscultation. Cardiovascular:  Regular heart rate. Abdomen:  Soft non-tender, Some ascites appears to be present.  Paracentesis catheter in right flank. Extremities:  2+ lower extremity edema.  Left side BKA with prosthetic leg. Neurologic:  Alert and oriented.  Cranial nerves grossly intact.  No asterixis.      LABORATORY:  Results for Josette Davenport (MRN [id number]) as of 4/21/2018 11:20   Ref.  Range 4/19/2018 10:45 4/20/2018 04:37 4/21/2018 03:13   WBC Latest Ref Range: 4.1 - 11.1 K/uL 8.3 4.1 3.0 (L)   HGB Latest Ref Range: 12.1 - 17.0 g/dL 8.4 (L) 7.0 (L) 9.1 (L)   PLATELET Latest Ref Range: 150 - 400 K/uL 89 (L) 56 (L) 54 (L)   INR Latest Ref Range: 0.9 - 1.1    1.5 (H)    Sodium Latest Ref Range: 136 - 145 mmol/L 126 (L) 127 (L) 130 (L)   Potassium Latest Ref Range: 3.5 - 5.1 mmol/L 4.9 5.0 4.7   Chloride Latest Ref Range: 97 - 108 mmol/L 94 (L) 97 100   CO2 Latest Ref Range: 21 - 32 mmol/L 21 23 22   Glucose Latest Ref Range: 65 - 100 mg/dL 127 (H) 139 (H) 171 (H)   BUN Latest Ref Range: 6 - 20 MG/DL 30 (H) 30 (H) 23 (H)   Creatinine Latest Ref Range: 0.70 - 1.30 MG/DL 2.17 (H) 1.83 (H) 1.49 (H)   Bilirubin, total Latest Ref Range: 0.2 - 1.0 MG/DL 2.9 (H) 2.6 (H) 3.8 (H)   Albumin Latest Ref Range: 3.5 - 5.0 g/dL 2.8 (L) 2.6 (L) 3.6   ALT (SGPT) Latest Ref Range: 12 - 78 U/L 46 34 37   AST Latest Ref Range: 15 - 37 U/L 62 (H) 39 (H) 37   Alk.  phosphatase Latest Ref Range: 45 - 117 U/L 238 (H) 144 (H) 170 (H)       Doe Baptiste MD  [place] [address] [address] Thierry Mcdaniels [address] [place], [place] [address].  [phone]

## 2018-04-21 NOTE — PROGRESS NOTES
Problem: Falls - Risk of  Goal: *Absence of Falls  Document Stefania Fall Risk and appropriate interventions in the flowsheet.    Outcome: Progressing Towards Goal  Fall Risk Interventions:  Mobility Interventions: Communicate number of staff needed for ambulation/transfer, Patient to call before getting OOB    Mentation Interventions: Door open when patient unattended, Increase mobility, Reorient patient    Medication Interventions: Patient to call before getting OOB, Teach patient to arise slowly    Elimination Interventions: Call light in reach

## 2018-04-21 NOTE — PROGRESS NOTES
Aydin Daniel MD   Phone/text: [phone] (7am to 7pm)  After 7pm please call  for hospitalist on call    Hospitalist Progress Note     4/21/2018   PCP:  Dr. Roma Cordoba MD  Chief Complaint   Patient presents with    Shortness of Breath       Admission Summary:   The patient is a 78-year-old gentleman with known alcoholic cirrhosis, varices. Patient was recently discharged on 04/16/2018. At that time, he was admitted for hypotension following paracentesis. Had a GI bleed and got EGD with banding. Patient follows with Dr. Bhumika Chakraborty. He actually has an appointment this morning, but he could not go for the appointment, as he was not feeling well with his abdominal distention, weight gain, and shortness of breath. Reason For Visit:    Assessment/Plan   Alcoholic cirrhosis with ascites (chronic) - more stable  - Restart diuretics  - Hepatology consulted    Ascites (chronic) - no SBP  - US guided paracentesis 4/19. 10L drained. - Restart diuretics    Hepatic encephalopathy (POA) - ammonia has come down nicely   - Increased lactulose, added rifaximen    LORENZA on CKD3 - suspect pre-renal rather than HRS, improving with albumin and nearing baseline  - Continue albumin  - Restart diuretics    Anemia, acute on chronic - Hgb stable  - Transfuse pRBC 2 units 4/20  - Monitor HH    Esophageal varices (chronic) - just banded  - Monitor for GI bleeding    Hyponatremia (chronic) - due to volume overload, sodium starting to come up    Chronic systolic CHF - NYHA2 on presentation  - Echo 11/17 with LVEF 35-40%  - Restarted lasix, spironolactone. Continue to hold valstartan and metop xl and add back if BP will tolerate    Paroxysmal AFib (chronic) - currently in sinus  - Restart digoxin  - Amiodarone is not the best medication given his chronic liver dysfunction but this probably needs to be addressed with Cardiology.     Hypertension (chronic) - meds as above    Chronic thrombocytopenia, due to cirrhosis - no intervention needed    Left below-knee amputation. See orders for other plans. VTE prophylaxis: SCDs  Discussed plan of care with Patient/Family and Nurse   Pre-admission lived at home  Discharge plan: home  Estimated time to discharge: unclear     Subjective   Mr. Jan Castellanos is looking a lot better. More awake. No abdominal pain. No SOB. Reviewed interval history  Physical examination     Visit Vitals    /70    Pulse 64    Temp 97.9 °F (36.6 °C)    Resp 16    Ht 5' 10\" (1.778 m)    Wt 93.5 kg (206 lb 3.2 oz)    SpO2 98%    BMI 29.59 kg/m2       Temp (24hrs), Av.1 °F (36.7 °C), Min:97.9 °F (36.6 °C), Max:98.4 °F (36.9 °C)      Intake/Output Summary (Last 24 hours) at 18 1320  Last data filed at 18 1200   Gross per 24 hour   Intake           2118.8 ml   Output              500 ml   Net           1618.8 ml       Gen - NAD  HEENT - MMM  Neck - supple, full ROM  CV - RRR, no MRG  Resp - lungs CTA b/l, no wheezing, normal WOB  GI - abdomen S, NT, distended with + flank dullness to percussion, +BS   - no CVA tenderness, bladder non-palpable in lower abdomen  MSK - normal tone and bulk, 1+ edema, left BKA  Neuro - A&Ox2, mild asterixis    Data Review       Telemetry    ECG    Xray    CT scan    MRI    Echocardiogram    Ultrasound              I have reviewed the flow sheet and recent notes  New labs and data personally reviewed. Recent Labs      18   1225  18   0313  18   2101   18   0437  18   1045   WBC   --   3.0*   --    --   4.1  8.3   HGB  9.2*  9.1*  9.4*   < >  7.0*  8.4*   HCT  26.1*  26.2*  26.6*   < >  19.5*  24.0*   PLT   --   54*   --    --   56*  89*    < > = values in this interval not displayed.      Recent Labs      18   0313  18   0437  18   1045   NA  130*  127*  126*   K  4.7  5.0  4.9   CL  100  97  94*   CO2  22  23  21   GLU  171*  139*  127*   BUN  23*  30*  30*   CREA  1.49*  1.83*  2.17*   CA 9.0  8.3*  8.5   MG   --    --   1.7   PHOS   --    --   3.1   ALB  3.6  2.6*  2.8*   TBILI  3.8*  2.6*  2.9*   SGOT  37  39*  62*   ALT  37  34  46   INR   --   1.5*   --        Medications reviewed  Current Facility-Administered Medications   Medication Dose Route Frequency    cetirizine (ZYRTEC) tablet 10 mg  10 mg Oral DAILY    digoxin (LANOXIN) tablet 0.125 mg  0.125 mg Oral QHS    furosemide (LASIX) tablet 120 mg  120 mg Oral DAILY    spironolactone (ALDACTONE) tablet 300 mg  300 mg Oral DAILY    thiamine (B-1) tablet 100 mg  100 mg Oral DAILY    pantoprazole (PROTONIX) tablet 40 mg  40 mg Oral DAILY    lactulose (CHRONULAC) solution 30 g  30 g Oral TID    rifAXIMin (XIFAXAN) tablet 550 mg  550 mg Oral BID    0.9% sodium chloride infusion 250 mL  250 mL IntraVENous PRN    sodium chloride (NS) flush 5-10 mL  5-10 mL IntraVENous Q8H    sodium chloride (NS) flush 5-10 mL  5-10 mL IntraVENous PRN    ondansetron (ZOFRAN) injection 4 mg  4 mg IntraVENous Q6H PRN    gabapentin (NEURONTIN) capsule 100 mg  100 mg Oral TID    oxyCODONE-acetaminophen (PERCOCET) 5-325 mg per tablet 1 Tab  1 Tab Oral Q6H PRN    zolpidem (AMBIEN) tablet 5 mg  5 mg Oral QHS PRN    albumin human 25% (BUMINATE) solution 25 g  25 g IntraVENous Q6H         Mallika Cloud MD  Internal Medicine  4/21/2018

## 2018-04-22 NOTE — DISCHARGE INSTRUCTIONS
Please bring this form with you to show your primary care provider at your follow-up appointment. Primary care provider:  Dr. Jamar Augustine MD    Discharging provider:  Simon Blake MD    You have been admitted to the hospital with the following diagnoses:  · LORENZA (acute kidney injury)  · Hepatic encephalopathy    FOLLOW-UP CARE RECOMMENDATIONS:    APPOINTMENTS:  Follow-up Information     Follow up With Details Comments Contact Info    Doe Baptiste MD Schedule an appointment as soon as possible for a visit in 1 week For follow up of liver disease [address] [place] [address]  [address] [facility] 9      Jamar Augustine MD Call As needed for primary care [address] [place] [address] 83. [phone]           FOLLOW-UP TESTS recommended: Basic metabolic panel in 1 week to evaluate kidney function    SYMPTOMS to watch for: chest pain, shortness of breath, abdominal swelling, confusion, falling, weakness, bleeding. DIET/what to eat:  Cardiac Diet    ACTIVITY:  Activity as tolerated    What to do if new or unexpected symptoms occur? If you experience any of the above symptoms (or should other concerns or questions arise after discharge) please call your primary care physician. Return to the emergency room if you cannot get hold of your doctor. · It is very important that you keep your follow-up appointment(s). · Please bring discharge papers, medication list (and/or medication bottles) to your follow-up appointments for review by your outpatient provider(s). · Please check the list of medications and be sure it includes every medication (even non-prescription medications) that your provider wants you to take. · It is important that you take the medication exactly as they are prescribed. · Keep your medication in the bottles provided by the pharmacist and keep a list of the medication names, dosages, and times to be taken in your wallet.    · Do not take other medications without consulting your doctor. · If you have any questions about your medications or other instructions, please talk to your nurse or care provider before you leave the hospital.    I understand that if any problems occur once I am at home I am to contact my physician. These instructions were explained to me and I had the opportunity to ask questions.

## 2018-04-22 NOTE — PROGRESS NOTES
Problem: Falls - Risk of  Goal: *Absence of Falls  Document Stefania Fall Risk and appropriate interventions in the flowsheet.    Outcome: Progressing Towards Goal  Fall Risk Interventions:  Mobility Interventions: Communicate number of staff needed for ambulation/transfer    Mentation Interventions: Door open when patient unattended    Medication Interventions: Patient to call before getting OOB    Elimination Interventions: Patient to call for help with toileting needs

## 2018-04-22 NOTE — PROGRESS NOTES
Bedside shift change report given to Abril Riggins (oncoming nurse) by Derick Nicolas (offgoing nurse). Report included the following information SBAR and Kardex.

## 2018-04-22 NOTE — DISCHARGE SUMMARY
Discharge Summary     Patient:  Matilda Elaine       MRN: [id number]       YOB: 1960       Age: 59 y.o. Date of admission:  4/19/2018    Date of discharge:  4/22/2018    Primary care provider: Dr. Bonita Shi MD    Admitting provider:  Viridiana Phelps MD    Discharging provider:  Angel Marcial [address].: [phone]. If unavailable, call [phone] and ask the  to page the triage hospitalist.    Consultations  · IP CONSULT TO HOSPITALIST  · IP CONSULT TO HEPATOLOGY    Procedures  · * No surgery found *    Discharge destination: Home. The patient is stable for discharge. Admission diagnosis  · LORENZA (acute kidney injury) ([facility]  · LORENZA (acute kidney injury) ([facility]    Current Discharge Medication List      START taking these medications    Details   lactulose (CHRONULAC) 10 gram/15 mL solution Take 30 mL by mouth two (2) times a day. You can adjust the frequency to achieve two bowel movements per day. Indications: Hepatic Encephalopathy  Qty: 473 mL, Refills: 2      rifAXIMin (XIFAXAN) 550 mg tablet Take 1 Tab by mouth two (2) times a day. Indications: Hepatic Encephalopathy  Qty: 60 Tab, Refills: 2         CONTINUE these medications which have NOT CHANGED    Details   gabapentin (NEURONTIN) 100 mg capsule Take 100 mg by mouth three (3) times daily. pantoprazole (PROTONIX) 40 mg tablet Take 1 Tab by mouth daily. Qty: 60 Tab, Refills: 1      furosemide (LASIX) 40 mg tablet Take 3 Tabs by mouth daily. Indications: EDEMA DUE TO HEPATIC CIRRHOSIS  Qty: 30 Tab, Refills: 0    Associated Diagnoses: Alcoholic cirrhosis of liver with ascites (HCC)      spironolactone (ALDACTONE) 100 mg tablet Take 3 Tabs by mouth daily.  Indications: EDEMA DUE TO HEPATIC CIRRHOSIS  Qty: 60 Tab, Refills: 3    Associated Diagnoses: Alcoholic cirrhosis of liver with ascites ([facility] valsartan (DIOVAN) 40 mg tablet Take 40 mg by mouth daily. thiamine (B-1) 100 mg tablet Take 1 Tab by mouth daily. Qty: 30 Tab, Refills: 0      metoprolol succinate (TOPROL XL) 25 mg XL tablet Take 0.5 Tabs by mouth daily. Qty: 15 Tab, Refills: 0      amiodarone (CORDARONE) 200 mg tablet Take 200 mg by mouth nightly. multivitamins-minerals-lutein (CENTRUM SILVER) Tab Take 1 Tab by mouth daily. digoxin (LANOXIN) 0.125 mg tablet Take 0.125 mg by mouth nightly. temazepam (RESTORIL) 7.5 mg capsule Take 1 Cap by mouth nightly as needed for Sleep. Max Daily Amount: 7.5 mg.  Qty: 5 Cap, Refills: 0    Associated Diagnoses: Primary insomnia             Follow-up Information     Follow up With Details Comments 503 [address], [place] Schedule an appointment as soon as possible for a visit in 1 week For follow up of liver disease [address] [facility]  [address] [facility] [place] [facility]      Wanda Villaseñor MD Call As needed for primary care [address] [place] [address]  [phone]            Final discharge diagnoses and brief hospital course  Please also refer to the admission H&P for details on the presenting problem. The patient is a 67-year-old gentleman with known alcoholic cirrhosis, varices.  Patient was recently discharged on 04/16/2018.  At that time, he was admitted for hypotension following paracentesis.  Had a GI bleed and got EGD with banding.  Patient follows with Dr. Vargas Bruce actually has an appointment this morning, but he could not go for the appointment, as he was not feeling well with his abdominal distention, weight gain, and shortness of breath. Alcoholic cirrhosis with ascites (chronic) - more stable  - Restarted diuretics  - Hepatology consulted - follow up with Dr. Marty Paul for consideration of TIPS     Ascites (chronic) - no SBP  - US guided paracentesis 4/19. 10L drained.   - Restarted diuretics     Hepatic encephalopathy (POA) - ammonia has come down nicely   - Start lactulose and rifaximen     LORENZA on CKD3 - suspect pre-renal rather than HRS, improving with albumin and nearing baseline  - Continue albumin  - Restarted diuretics  - Follow up BMP next week     Anemia, acute on chronic - Hgb stable  - Transfuse pRBC 2 units 4/20  - Follow up CBC next week     Esophageal varices (chronic) - just banded, no evidence of acute bleeding  - Monitor for GI bleeding     Hyponatremia (chronic) - due to volume overload, sodium stable     Chronic systolic CHF - NYHA2 on presentation and discharge  - Echo 11/17 with LVEF 35-40%  - Restarted lasix, spironolactone, valstartan and metop xl     Paroxysmal AFib (chronic) - currently in sinus  - Restarted digoxin  - Amiodarone is not the best medication given his chronic liver dysfunction but this probably needs to be addressed with Cardiology.     Hypertension (chronic) - meds as above     Chronic thrombocytopenia, due to cirrhosis - no intervention needed     Left below-knee amputation. FOLLOW-UP CARE RECOMMENDATIONS:     FOLLOW-UP TESTS recommended: Basic metabolic panel in 1 week to evaluate kidney function    SYMPTOMS to watch for: chest pain, shortness of breath, abdominal swelling, confusion, falling, weakness, bleeding. DIET/what to eat:  Cardiac Diet    ACTIVITY:  Activity as tolerated    What to do if new or unexpected symptoms occur? If you experience any of the above symptoms (or should other concerns or questions arise after discharge) please call your primary care physician. Return to the emergency room if you cannot get hold of your doctor. · It is very important that you keep your follow-up appointment(s). · Please bring discharge papers, medication list (and/or medication bottles) to your follow-up appointments for review by your outpatient provider(s).   · Please check the list of medications and be sure it includes every medication (even non-prescription medications) that your provider wants you to take. · It is important that you take the medication exactly as they are prescribed. · Keep your medication in the bottles provided by the pharmacist and keep a list of the medication names, dosages, and times to be taken in your wallet. · Do not take other medications without consulting your doctor. · If you have any questions about your medications or other instructions, please talk to your nurse or care provider before you leave the hospital.    Physical examination at discharge  Visit Vitals    /63 (BP 1 Location: Left arm, BP Patient Position: At rest)    Pulse 83    Temp 98 °F (36.7 °C)    Resp 18    Ht 5' 10\" (1.778 m)    Wt 90.6 kg (199 lb 11.8 oz)    SpO2 98%    BMI 28.66 kg/m2     Gen - NAD  HEENT - MMM  Neck - supple, full ROM  CV - RRR, no MRG  Resp - lungs CTA b/l, no wheezing, normal WOB  GI - abdomen S, NT, distended with + flank dullness to percussion, +BS   - no CVA tenderness, bladder non-palpable in lower abdomen  MSK - normal tone and bulk, 1+ edema, left BKA  Neuro - A&Ox3, no asterixis    Pertinent imaging studies:    CXR 4/19/18  FINDINGS:   PA and lateral radiographs of the chest were obtained. There is a pacemaker in  the left chest with leads over the right atrium, right ventricle and coronary  sinus. The nasogastric tube has been removed. Lungs: The right hemidiaphragm remains elevated with mild atelectasis at the  right base. The lungs are clear of mass, nodule, airspace disease or edema. Pleura: There is no pleural effusion or pneumothorax. Mediastinum: The cardiac and mediastinal contours and pulmonary vascularity are  normal.  Bones and soft tissues: The bones and soft tissues are within normal limits.     IMPRESSION  IMPRESSION: No acute abnormality and no significant change.     US paracentesis 4/19/18  FINDINGS: The procedure including risks and benefits was explained to the  patient and verbal and written informed consent was obtained. Sonography was  utilized to localize a site for paracentesis. The skin of the right lower  quadrant was marked and prepped and draped in sterile fashion and anesthetized  with 1% lidocaine. A 6-Tajik Safe-T-Centesis catheter  was introduced into the  peritoneal space and clear fluid returned without difficulty. Specimens were  collected and sent for laboratory analysis as requested by the referring  clinician. The catheter was secured to the skin with an adhesive fixation device  and attached to a gravity drainage bag. The patient was monitored with pulse  oximetry and EKG monitoring throughout the procedure. The patient tolerated the  procedure well. The procedure was performed at the bedside in the emergency  department and orders were written to monitor the output of the catheter and  remove the catheter when the drainage stops.     IMPRESSION  IMPRESSION: Ultrasound guided paracentesis performed. Laboratory results  pending. The catheter can be removed when the drainage ceases. Recent Labs      04/22/18 0152 04/21/18   1225  04/21/18 0313 04/20/18 0437   WBC  2.1*   --   3.0*   --   4.1   HGB  8.5*  9.2*  9.1*   < >  7.0*   HCT  23.9*  26.1*  26.2*   < >  19.5*   PLT  51*   --   54*   --   56*    < > = values in this interval not displayed. Recent Labs      04/22/18 0152 04/21/18 0313 04/20/18 0437   NA  129*  130*  127*   K  4.3  4.7  5.0   CL  98  100  97   CO2  24  22  23   BUN  17  23*  30*   CREA  1.41*  1.49*  1.83*   GLU  195*  171*  139*   CA  9.3  9.0  8.3*     Recent Labs      04/22/18 0152 04/21/18 0313 04/20/18 0437   SGOT  38*  37  39*   AP  162*  170*  144*   TP  6.6  6.3*  5.2*   ALB  4.0  3.6  2.6*   GLOB  2.6  2.7  2.6     Recent Labs      04/20/18 0437   INR  1.5*   PTP  15.2*      Recent Labs      04/20/18 0437   TIBC  147*   PSAT  32   FERR  300      No results for input(s): PH, PCO2, PO2 in the last 72 hours.   No results for input(s): CPK, CKMB in the last 72 hours.     No lab exists for component: TROPONINI  No components found for: [place]    Chronic Diagnoses:    Problem List as of 4/22/2018  Date Reviewed: 4/19/2018          Codes Class Noted - Resolved    LORENZA (acute kidney injury) [facility]) ICD-10-CM: N17.9  ICD-9-CM: 584.9  4/20/2018 - Present        Esophageal varices ([facility] ICD-10-CM: I85.00  ICD-9-CM: 456.1  2/8/2018 - Present        Cirrhosis, alcoholic ([facility] SGD-99-DZ: K70.30  ICD-9-CM: 571.2  1/3/2018 - Present        Alcohol abuse, in remission ICD-10-CM: F10.11  ICD-9-CM: 305.03  1/3/2018 - Present        Chronic systolic heart failure (HCC) (Chronic) ICD-10-CM: I50.22  ICD-9-CM: 428.22  11/21/2017 - Present        Neuropathy ICD-10-CM: G62.9  ICD-9-CM: 355.9  3/30/2017 - Present        S/P BKA (below knee amputation) ([facility] ICD-10-CM: Z89.519  ICD-9-CM: V49.75  3/30/2017 - Present        Hypertension ICD-10-CM: I10  ICD-9-CM: 401.9  3/30/2017 - Present        Cardiac defibrillator in place ICD-10-CM: Z95.810  ICD-9-CM: V45.02  3/30/2017 - Present        Type II diabetes mellitus ([facility] ICD-10-CM: E11.9  ICD-9-CM: 250.00  2/1/2015 - Present        * (Principal)RESOLVED: LORENZA (acute kidney injury) ([facility] ICD-10-CM: N17.9  ICD-9-CM: 584.9  4/19/2018 - 4/19/2018        RESOLVED: GI bleed ICD-10-CM: K92.2  ICD-9-CM: 578.9  2/17/2018 - 4/19/2018        RESOLVED: GI bleed ICD-10-CM: K92.2  ICD-9-CM: 578.9  1/23/2018 - 1/28/2018        RESOLVED: Secondary esophageal varices without bleeding ([facility] ICD-10-CM: I85.10  ICD-9-CM: 456.21  1/3/2018 - 2/8/2018        RESOLVED: Hypomagnesemia ICD-10-CM: E83.42  ICD-9-CM: 275.2  12/6/2017 - 12/10/2017        RESOLVED: Suicidal ideations ICD-10-CM: K04.917  ICD-9-CM: V62.84  12/6/2017 - 12/10/2017        RESOLVED: Anasarca ICD-10-CM: R60.1  ICD-9-CM: 782.3  11/20/2017 - 11/24/2017        RESOLVED: Hyponatremia ICD-10-CM: E87.1  ICD-9-CM: 276.1  11/20/2017 - 12/10/2017        RESOLVED: Cirrhosis of liver without ascites ([address] ICD-10-CM: K74.60  ICD-9-CM: 571.5  5/2/2017 - 1/3/2018        RESOLVED: Extremity pain ICD-10-CM: M79.609  ICD-9-CM: 729.5  2/18/2015 - 3/30/2017        RESOLVED: Cellulitis and abscess of foot ICD-10-CM: L03.119, L02.619  ICD-9-CM: 682.7  2/1/2015 - 2/13/2015        RESOLVED: Hyperglycemia ICD-10-CM: R73.9  ICD-9-CM: 790.29  1/31/2015 - 2/13/2015              Time spent on discharge related activities today greater than 30 minutes.       Signed:  Maryhelen Lanes, MD                 Hospitalist, Internal Medicine      Cc: Spencer Solorzano MD

## 2018-04-23 NOTE — TELEPHONE ENCOUNTER
The patients wife called again to request hospital follow up. The pts wife was very upset that they received a letter for not showing up to the appt. The pts wife was also not sure why the pt has to have appts made by the practice manager. I did attempt to advise the pt of the rules/ how we handle missed appointments, but she disconnected the phone call before I was able to do so.

## 2018-04-24 NOTE — TELEPHONE ENCOUNTER
Spoke with patients wife Yaya Beard. Yaya Dossonelia received the [place] for [employer]. She was informed which sections to complete, then return to the office. Patient aware that due to Medicare stipulations, patient will probably not be approved, but may still be a candidate for patient assistance. [employer] form will need to be submitted after receiving prior auth from insurance company,which has not been received as of today. Yaya Beard expressed an understanding and had no further questions. Reminded Yaya Beard of  Robinson Pert follow up with Dr. Marty Paul on 5/3/18.

## 2018-04-25 NOTE — ADT AUTH CERT NOTES
4/21 ADDITIONAL CLINICAL by Fabian Siegel RN        Review Status Review Entered       In Primary 4/25/2018       Details         Assessment/Plan   Alcoholic cirrhosis with ascites (chronic) - more stable  - Restart diuretics  - Hepatology consulted     Ascites (chronic) - no SBP  - US guided paracentesis 4/19. 10L drained. - Restart diuretics     Hepatic encephalopathy (POA) - ammonia has come down nicely   - Increased lactulose, added rifaximen     LORENZA on CKD3 - suspect pre-renal rather than HRS, improving with albumin and nearing baseline  - Continue albumin  - Restart diuretics     Anemia, acute on chronic - Hgb stable  - Transfuse pRBC 2 units 4/20  - Monitor HH      VTE prophylaxis: SCDs  Discussed plan of care with Patient/Family and Nurse   Pre-admission lived at home  Discharge plan: home  Estimated time to discharge: unclear     LABS: WBC 3.0, HGB 9.1, HCT 25.2, PLT 54, , CO2 22, , CR 1.49, AMMONIA 66, ALK PHOS 170     MEDS: BUMINATE 25G Q6H, LANOXIN 0.125MG PO QHS, LASIX 120MG PO DAILY, NEURONTIN PO TID, LACTULOST 30G PO TID, PERCOCET Q6H x4, XIFAXAN 550MG PO BID, SPRINOLACTONE 300MG PO DAILY, THIAMINE 100MG PO DAILY     VITALS: T98, P 62, RR 15, /56, 96% RA                   4/20 ADDITIONAL CLINICAL by Fabian Siegel RN        Review Status Review Entered       In Primary 4/25/2018       Details         4/20:  He had a paracentesis and 9 liter of ascites removed in ED.     Screat down some and Na up a bit this AM.  Continue IV albumin and to hold diuretics.     Assessment/Plan   Alcoholic cirrhosis with ascites (chronic)  - Holding diuretics  - Hepatology consulted     Ascites (chronic) - no SBP  - US guided paracentesis 4/19. 10L drained.   - Holding diuretics for the moment     Hepatic encephalopathy (POA) - ammonia is much higher than in the past   - Increase lactulose, add rifaximen     LORENZA on CKD3 - suspect pre-renal rather than HRS, improving with albumin  - Continue albumin  - Hold diuretics     Anemia, acute on chronic - Hgb dropping  - Transfuse pRBC 2 units 4/20  - Monitor HH        See orders for other plans.   VTE prophylaxis: SCDs  Discussed plan of care with Patient/Family and Nurse   Pre-admission lived at home  Discharge plan: home  Estimated time to discharge: unclear      LABS: WBC 4.1, HGB 7.0, HCT 19.5, INR 1.5, PT 15.2, , BUN 30, CR 1.83, CA 8.3, ALK PHOS 144, TIBC 147, AMMONIA 233  MEDS: BUMINATE 25G Q6H, LANOXIN 0.125MG PO QHS, LASIX 120MG PO DAILY, NEURONTIN PO TID, LACTULOST 30G PO TID, PERCOCET Q6H x4, XIFAXAN 550MG PO BID, SPRINOLACTONE 300MG PO DAILY, THIAMINE 100MG PO DAILY  VITALS: T98.1, P 61, RR 15, /68, 99% RA     TRANSFUSE 2 UNITS

## 2018-04-29 PROBLEM — K76.82 HEPATIC ENCEPHALOPATHY: Status: ACTIVE | Noted: 2018-01-01

## 2018-04-29 NOTE — IP AVS SNAPSHOT
[address] [facility] [address] [place] [address] 
[phone] Patient: Alex Lopez MRN: [id number] JQY:[id number] A check suzanne indicates which time of day the medication should be taken. My Medications CONTINUE taking these medications Instructions Each Dose to Equal  
 Morning Noon Evening Bedtime  
 amiodarone 200 mg tablet Commonly known as:  CORDARONE Your last dose was: Your next dose is: Take 200 mg by mouth nightly. 200 mg CENTRUM SILVER Tab tablet Generic drug:  multivitamins-minerals-lutein Your last dose was: Your next dose is: Take 1 Tab by mouth daily. 1 Tab  
    
   
   
   
  
 digoxin 0.125 mg tablet Commonly known as:  LANOXIN Your last dose was: Your next dose is: Take 0.125 mg by mouth nightly. 0.125 mg  
    
   
   
   
  
 furosemide 40 mg tablet Commonly known as:  LASIX Your last dose was: Your next dose is: Take 40 mg by mouth daily. 40 mg  
    
   
   
   
  
 gabapentin 100 mg capsule Commonly known as:  NEURONTIN Your last dose was: Your next dose is: Take 100 mg by mouth three (3) times daily. 100 mg  
    
   
   
   
  
 lactulose 10 gram/15 mL solution Commonly known as:  Adrien Miner Your last dose was: Your next dose is: Take 30 mL by mouth two (2) times a day. You can adjust the frequency to achieve two bowel movements per day. Indications: Hepatic Encephalopathy 20 g  
    
   
   
   
  
 metoprolol succinate 25 mg XL tablet Commonly known as:  TOPROL XL Your last dose was: Your next dose is: Take 0.5 Tabs by mouth daily. 12.5 mg  
    
   
   
   
  
 pantoprazole 40 mg tablet Commonly known as:  PROTONIX Your last dose was: Your next dose is: Take 1 Tab by mouth daily. 40 mg  
    
   
   
   
  
 rifAXIMin 550 mg tablet Commonly known as:  Servando Jackson Your last dose was: Your next dose is: Take 1 Tab by mouth two (2) times a day. Indications: Hepatic Encephalopathy 550 mg  
    
   
   
   
  
 spironolactone 100 mg tablet Commonly known as:  ALDACTONE Your last dose was: Your next dose is: Take 200 mg by mouth daily. 200 mg  
    
   
   
   
  
 thiamine 100 mg tablet Commonly known as:  B-1 Your last dose was: Your next dose is: Take 1 Tab by mouth daily. 100 mg  
    
   
   
   
  
 valsartan 40 mg tablet Commonly known as:  DIOVAN Your last dose was: Your next dose is: Take 40 mg by mouth daily.   
 40 mg

## 2018-04-29 NOTE — ED TRIAGE NOTES
Pt's wife states that the pt woke this morning disoriented stating that he tried on 3 different occations to start his car with a credit card. Pt's wife states that his blood BP was 98/49 this morning. She states that her  has cirrhosis and has had this happen in the past. She states that the pt has been taking his lactulose. Pt has been more agitated today per the wife.

## 2018-04-29 NOTE — ED PROVIDER NOTES
HPI Comments: 59 y.o. male with past medical history significant for CHF, HTN, sepsis, a-fib, arthritis, DM, diabetic ulcer of left foot, hx of blood transfusion and liver disease who presents from home with chief complaint of AMS. Per spouse, the pt has hx of Cirrhosis of the liver with recent admission on 4/19/2018 after being evaluated for SOB. She reports that the pt was diagnosed with ascitics as well as acute renal failure with admission for four days. Pt's spouse notes that he received a paracentesis and was started on lactulose as well as Xifaxan . She adds that the pt was also noted to have high ammonia levels and anemia during his hospitalization which resulted in blood transfusion. Since being discharged on 4/22/2018, the spouse states that the pt has appeared per usual until this morning. Per spouse, the pt woke up early this morning and appeared disoriented. She makes it known that the pt is usually awake and alert x3 per usual, however today the pt only appeared oriented to person and place. The spouse adds that the pt attempted to get changed twice this morning. Additionally she reports that the pt made three attempts to get into his car today with his credit card. Following his third attempt, the pt spouse states she decided to bring the pt to the ED. Per spouse, the pt's last dose of Lactulose was taken this morning with 1x normal BM prior. No additional BM has occurred since ingestion of the lactulose per spouse. Pt's spouse notes that the pt has a small region of bruising over his second toe on the left, without known fall. The spouse states that the pt's last GLF occurred on 4/2/2018 which he was evaluated for. Per spouse, the pt had negative CT imaging at that time. She states that the pt is currently being followed by Dr. Antia Torres for general surgery who informed that the pt would require a liver transplant for further treatment of his Cirrhosis.  Per spouse, she is unsure if the pt's Cirrhosis of the liver is related to his current AMS. While in the ED, the pt is able to correctly state his relatives name and the place. When asked what the year is the pt states, \"1642\". He is unable to recall the current month, day and [place] President. There are no other acute medical concerns at this time. Full history, physical exam, and ROS unable to be obtained due to: Mental Status Change. Social hx: Former smoker, Current ETOH consumption     PCP: Ezequiel Suazo MD    Note written by Radhika Mendoza, as dictated by Romulo Akbar MD 2:51 PM        The history is provided by the patient and the spouse. No  was used. Past Medical History:   Diagnosis Date    Arthritis     Atrial fibrillation ([address] 2014    CHF (congestive heart failure) ([address]     Diabetes ([address]     Diabetic ulcer of left foot ([address] 2/2015 2/2015 Lt BKA    History of blood transfusion 2015    During Lt BKA; pt denies any adverse reaction    Hypertension     Liver disease     CIRRHOSIS    Sepsis ([address] 02/2015    From diabetic Left foot wound;  had a BKA ;  as of 11/21/16 pt states back to his baseline        Past Surgical History:   Procedure Laterality Date    HX AMPUTATION Left 2/10/2015    BKA    HX COLONOSCOPY      HX IMPLANTABLE CARDIOVERTER DEFIBRILLATOR  08/04/2015    [employer] cardio defibrillator; Dr Mo Echevarria; as of 11/21/16 pt denies defibrillator going off         Family History:   Problem Relation Age of Onset    Heart Disease Brother     Heart Failure Brother     Heart Failure Brother        Social History     Social History    Marital status:      Spouse name: N/A    Number of children: N/A    Years of education: N/A     Occupational History    Not on file.      Social History Main Topics    Smoking status: Former Smoker     Packs/day: 1.00     Years: 5.00     Quit date: 1/1/2013    Smokeless tobacco: Never Used    Alcohol use Yes      Comment: Last drink January 2018    Drug use: No    Sexual activity: Not on file     Other Topics Concern    Not on file     Social History Narrative         ALLERGIES: Review of patient's allergies indicates no known allergies. Review of Systems   Unable to perform ROS: Mental status change       Vitals:    04/29/18 1432 04/29/18 1433   BP: 107/45    Pulse:  60   Resp:  10   SpO2: 99% 98%            Physical Exam   Constitutional: He appears well-developed and well-nourished. No distress. HENT:   Head: Normocephalic and atraumatic. Nose: Nose normal.   Mouth/Throat: Oropharynx is clear and moist. No oropharyngeal exudate. Eyes: Conjunctivae and EOM are normal. Right eye exhibits no discharge. Left eye exhibits no discharge. No scleral icterus. Icteric sclera bilaterally    Neck: Normal range of motion. Neck supple. No JVD present. No tracheal deviation present. No thyromegaly present. Cardiovascular: Normal rate, regular rhythm and intact distal pulses. Exam reveals no gallop and no friction rub. Murmur (2/6 systolic at left sternal border ) heard. Pulmonary/Chest: Effort normal and breath sounds normal. No stridor. No respiratory distress. He has no wheezes. He has no rales. He exhibits no tenderness. Abdominal: He exhibits distension. He exhibits no mass. There is no tenderness. There is no rebound. Fluid wave noted. Musculoskeletal: Normal range of motion. He exhibits no edema or tenderness. Lymphadenopathy:     He has no cervical adenopathy. Neurological: He is alert. No cranial nerve deficit. Coordination normal.   Pt is oriented to person and place only. Is slow to respond to questions. Asterixis present to bilateral UE. Skin: Skin is warm and dry. No rash noted. He is not diaphoretic. No erythema. No pallor. Ecchymosis over 3rd phalanx of 2nd digit on right foot. Open sore present at the distal phalanx of the 2nd toe. Bilateral ecchymosis over forearms with different ages.    Psychiatric: He has a normal mood and affect. His behavior is normal. Judgment and thought content normal.      Note written by Radhika Frankel, as dictated by Ivy Waterman MD 2:51 PM    MDM  Number of Diagnoses or Management Options  Hepatic encephalopathy [facility]):      Amount and/or Complexity of Data Reviewed  Clinical lab tests: ordered and reviewed  Tests in the radiology section of CPT®: ordered and reviewed  Review and summarize past medical records: yes  Discuss the patient with other providers: yes  Independent visualization of images, tracings, or specimens: yes    Risk of Complications, Morbidity, and/or Mortality  Presenting problems: moderate  Diagnostic procedures: moderate  Management options: moderate    Critical Care  Total time providing critical care: 30-74 minutes (Total critical care time spent exclusive of procedures:  35 min)    Patient Progress  Patient progress: stable        ED Course       Procedures      ED EKG interpretation:  Rhythm: Atrial sensed and ventricular paced rhythm; Rate (approx.): 65 bpm; Axis: normal; ST/T wave: normal    Note written by Radhika Frankel, as dictated by Ivy Waterman MD 5:32 PM    10:15 PM  Patient is being admitted to the hospital.  The results of their tests and reasons for their admission have been discussed with them and/or available family. They convey agreement and understanding for the need to be admitted and for their admission diagnosis. Consultation has been made with the inpatient physician specialist for hospitalization. LABORATORY TESTS:  No results found for this or any previous visit (from the past 12 hour(s)). IMAGING RESULTS:  CT HEAD WO CONT   Final Result        No results found.     MEDICATIONS GIVEN:  Medications   sodium chloride (NS) flush 5-10 mL (10 mL IntraVENous Given 5/1/18 2105)   sodium chloride (NS) flush 5-10 mL (not administered)   ondansetron (ZOFRAN) injection 4 mg (not administered)   pantoprazole (PROTONIX) tablet 40 mg (40 mg Oral Given 5/1/18 0847)   lactulose (CHRONULAC) solution 30 g (30 g Oral Given 5/1/18 2104)   rifAXIMin (XIFAXAN) tablet 550 mg (550 mg Oral Given 5/1/18 1728)   metoprolol succinate (TOPROL-XL) XL tablet 12.5 mg (12.5 mg Oral Given 5/1/18 0847)   digoxin (LANOXIN) tablet 0.125 mg (0.125 mg Oral Given 5/1/18 2104)   thiamine (B-1) tablet 100 mg (100 mg Oral Given 5/1/18 0847)   zolpidem (AMBIEN) tablet 5 mg (5 mg Oral Given 5/1/18 0010)   albumin human 25% (BUMINATE) solution 12.5 g (12.5 g IntraVENous New Bag 5/1/18 2056)   gabapentin (NEURONTIN) capsule 100 mg (100 mg Oral Given 5/1/18 2104)   lactulose (CHRONULAC) solution 10 g (10 g Oral Given 4/29/18 1521)   traMADol (ULTRAM) tablet 50 mg (50 mg Oral Given 5/1/18 1728)       IMPRESSION:  1. Hepatic encephalopathy ([address]    2. LORENZA (acute kidney injury) ([address]    3. Alcohol abuse, in remission    4. Alcoholic cirrhosis of liver with ascites ([facility]    5. Secondary esophageal varices without bleeding (HCC)    6. Cardiac defibrillator in place    7. Chronic systolic heart failure (HCC)        PLAN:  1.  Admit to Hospitalist    Total critical care time spent exclusive of procedures:  35 minutes      Jarold Sicard, MD

## 2018-04-29 NOTE — ROUTINE PROCESS
TRANSFER - IN REPORT:    Verbal report received from Jasmyne RN(name) on Dormernae Alert  being received from ED(unit) for routine progression of care      Report consisted of patients Situation, Background, Assessment and   Recommendations(SBAR). Information from the following report(s) SBAR, Kardex, Intake/Output, MAR, Recent Results and Med Rec Status was reviewed with the receiving nurse. Opportunity for questions and clarification was provided. Assessment completed upon patients arrival to unit and care assumed.

## 2018-04-29 NOTE — PROGRESS NOTES
Admission Medication Reconciliation:    Comments/Recommendations:  -Medication history obtained in the emergency department  -Patient is a poor historian due to altered mental status and appeared confused during most of interview  -Patient stated he had been on glimepiride recently, but was taken off by a provider due to \"low A1C\"    Additions: none  Changes: spironolactone dose  Deletions: none    Information obtained from: Patient and RxQuery    Significant PMH/Disease States:   Past Medical History:   Diagnosis Date    Arthritis     Atrial [facility] ([facility] 2014    CHF (congestive heart failure) ([facility]     Diabetes ([facility]     Diabetic ulcer of left foot ([facility] 2/2015 2/2015 Lt BKA    History of blood transfusion 2015    During Lt BKA; pt denies any adverse reaction    Hypertension     Liver disease     CIRRHOSIS    Sepsis ([facility] 02/2015    From diabetic Left foot wound;  had a BKA ;  as of 11/21/16 pt states back to his baseline        Chief Complaint for this Admission:    Chief Complaint   Patient presents with    Altered mental status         Allergies:  Review of patient's allergies indicates no known allergies. Prior to Admission Medications:   Prior to Admission Medications   Prescriptions Last Dose Informant Patient Reported? Taking?   amiodarone (CORDARONE) 200 mg tablet 4/28/2018 at PM  Yes Yes   Sig: Take 200 mg by mouth nightly. digoxin (LANOXIN) 0.125 mg tablet 4/28/2018 at PMtime  Yes Yes   Sig: Take 0.125 mg by mouth nightly. furosemide (LASIX) 40 mg tablet 4/29/2018 at AM  Yes Yes   Sig: Take 40 mg by mouth daily. gabapentin (NEURONTIN) 100 mg capsule 4/29/2018 at AM  Yes Yes   Sig: Take 100 mg by mouth three (3) times daily. lactulose (CHRONULAC) 10 gram/15 mL solution 4/29/2018 at AM  No Yes   Sig: Take 30 mL by mouth two (2) times a day. You can adjust the frequency to achieve two bowel movements per day.   Indications: Hepatic Encephalopathy   metoprolol succinate (TOPROL XL) 25 mg XL tablet 4/29/2018 at AM  No Yes   Sig: Take 0.5 Tabs by mouth daily. multivitamins-minerals-lutein (CENTRUM SILVER) Tab 4/29/2018 at AM  Yes Yes   Sig: Take 1 Tab by mouth daily. pantoprazole (PROTONIX) 40 mg tablet 4/29/2018 at AM  No Yes   Sig: Take 1 Tab by mouth daily. rifAXIMin (XIFAXAN) 550 mg tablet 4/29/2018 at AM  No Yes   Sig: Take 1 Tab by mouth two (2) times a day. Indications: Hepatic Encephalopathy   spironolactone (ALDACTONE) 100 mg tablet 4/29/2018 at AM  Yes Yes   Sig: Take 200 mg by mouth daily. thiamine (B-1) 100 mg tablet 4/29/2018 at AM  No Yes   Sig: Take 1 Tab by mouth daily. valsartan (DIOVAN) 40 mg tablet 4/29/2018 at AM  Yes Yes   Sig: Take 40 mg by mouth daily.       Facility-Administered Medications: None     Thank you,  Jessie Doe, PHARMD

## 2018-04-29 NOTE — H&P
[address]   HISTORY AND PHYSICAL      Dale RANKIN  MR#: [id number]  : 1960  ACCOUNT #: [de-identified]   ADMIT DATE: 2018    CHIEF COMPLAINT:  Confusion, disorientation. SOURCE OF HISTORY:  Reviewed records sent partially, the patient and his wife present. HISTORY OF PRESENT ILLNESS:  This is a 56-year-old  gentleman who has a significant past medical history of hepatic cirrhosis due to alcohol, variceal bleeding, significant ascites requiring paracentesis, acute kidney injury, chronic kidney disease, anemia, chronic systolic heart failure, paroxysmal atrial fibrillation, hypertension, thrombocytopenia due to cirrhosis who was recently released from the hospital 7 days ago, presented with disorientation and agitation. Per ER records wife reported patient was disoriented, that he tried to start his car with a credit card and he was observed to be more agitated. Because of his known history of hepatic encephalopathy presenting similarly, wife was concerned and brought him to the emergency room. In the ER, he has been evaluated. Vital signs:  Hemodynamically, the patient is stable. Blood work:  His complete blood count with differential is quite stable. The serum chemistry showed a low sodium at 132, creatinine 2.47, slightly elevated from the discharge value and most importantly his ammonia level was 144. Last admission was as high as 233, was down to 66 prior to discharge. Treatment provided in the emergency room included 10 g lactulose, and we are consulted for admission evaluation. I have reviewed recent discharge summary and summarized findings in the HPI.     MEDICATIONS:  Patient's current home medications include the following based on the recent discharge paper:  Lactulose 30 mL 2 times a day, rifaximin 550 mg b.i.d., gabapentin 100 mg t.i.d., Protonix 40 mg daily, Lasix 120 mg daily, Aldactone 300 mg daily, valsartan 40 mg daily, thiamine 100 mg daily, metoprolol extended release 12.5 daily, amiodarone 200 mg nightly, digoxin 0.125 nightly, and temazepam 7.5 mg nightly as needed for sleep. ALLERGIES:  He has no known allergy. SOCIAL HISTORY:  The patient is a former alcoholic. He is abstinent since January 2018.  and lives with his wife. Former smoker. REVIEW OF SYSTEMS:  Limited because of the patient's confusion but can be gathered from his wife and as much as he could tell, he is here for disorientation and confusion. FAMILY HISTORY:  Per records indicate heart disease in his brother. PHYSICAL EXAMINATION:  GENERAL:  The patient is alert and disoriented. He knows where he is. He does not know the day of the week, month or the year either. He knows he is in [place], [place]. He could not come up with the president's name. He is not in any distress. VITAL SIGNS:  Pulse 66, blood pressure 118 x 57, respirations 10, oxygen saturation 98% on room air. HEENT:  Mild pallor slight jaundice. Dry buccal mucosa. LUNGS:  Vesicular air entry bilaterally. CARDIOVASCULAR:  S1 and S2 irregularly irregular, but not tachycardic, no murmur. ABDOMEN:  Positive for ascites. No tenderness or rebound. EXTREMITIES:  Left below knee amputation, has prosthesis. EXTREMITIES:  No edema. Poor skin turgor. CENTRAL NERVOUS SYSTEM:  Mental status:  Patient is awake, disoriented, does not know place where he is at, day of the week, month of the year, month or the name of the president. He could tell me he is in [place], [place]. Motor examination:  He has gross asterixis of hands. Otherwise, motor exam is unremarkable. REVIEW OF CLINICAL DATA:  As discussed in HPI. ASSESSMENT AND PLAN:    1. Acute metabolic encephalopathy due to hepatic encephalopathy. Exact trigger not clear; however, the patient appears dehydrated.   He is telling me he did not have any bowel movement for the last 24 hours, although this is of questionable accuracy. He should be admitted to a monitored bed for close monitoring. I will be holding all diuretics. Administer IV fluids. Increase lactulose from 10 b.i.d. to 20 t.i.d.  Monitor clinically ammonia level. Consult Dr. Tanya Lawrence tomorrow. 2.  Chronic kidney disease stage III with bump in creatinine, most probably acute on chronic kidney failure. Patient is on high dose of diuretics and Diovan, all of which will be on hold for now and the patient will be advanced to IV fluid. We will monitor kidney function tomorrow and adjust diuretics as needed. The patient is clinically dry. 3.  Hepatic cirrhosis due to alcohol with liver dysfunction. Follow up with the pathology, probably may be a candidate for liver transplant. He is sober for 6 months. 4.  Chronic thrombocytopenia due to hepatic cirrhosis. Platelets are stable and not bleeding. This will be monitored. Avoid antiplatelets or anticoagulants. 5.  Chronic systolic heart failure, NYHA class 2. Stable. No signs of acute decompensation. 6.  Paroxysmal atrial fibrillation, rate controlled. Toprol will be continued. Will be holding amiodarone and digoxin for now. 7.  Chronic hyponatremia due to cirrhosis. Sodium level is still around 130, to be monitored, but probably this may improve with some normotonic IV fluids. 8.  Ascites, currently asymptomatic. I do not think he needs drainage. Last admission, the patient had a paracentesis for 10 L of fluid. 9.  Hypertension, chronic. Blood pressure is on the low side. Antihypertensives except Toprol are on hold. Home medications reviewed. Lactulose, rifaximin, thiamine, folic acid, digoxin and metoprolol were continued as well as Protonix. The diuretics, Lasix, Aldactone, valsartan and amiodarone are on hold presently. Will continue to evaluate to introduce these agents. 10.  Prophylaxis, GI. He is on Protonix from home. This will be continued.   DVT, he has significant thrombocytopenia and coagulopathy due to hepatic cirrhosis. We will avoid pharmacologic prophylaxis and continue with SCDs. 11.  Code status: The patient wished to be full code.       Hannah Dunlap MD       WTD/AN  D: 04/29/2018 16:24     T: 04/29/2018 17:42  JOB #: [id number]

## 2018-04-29 NOTE — ROUTINE PROCESS
TRANSFER - OUT REPORT:    Verbal report given to [facility] RN(name) on Dahiana Cobb  being transferred to [facility](unit) for routine progression of care       Report consisted of patients Situation, Background, Assessment and   Recommendations(SBAR). Information from the following report(s) SBAR, Kardex, ED Summary and MAR was reviewed with the receiving nurse. Lines:   Peripheral IV 04/29/18 Left Forearm (Active)   Site Assessment Clean, dry, & intact 4/29/2018  1:33 PM   Phlebitis Assessment 0 4/29/2018  1:33 PM   Infiltration Assessment 0 4/29/2018  1:33 PM   Dressing Status Clean, dry, & intact 4/29/2018  1:33 PM   Dressing Type Transparent 4/29/2018  1:33 PM   Hub Color/Line Status Pink;Flushed;Patent 4/29/2018  1:33 PM   Action Taken Blood drawn 4/29/2018  1:33 PM        Opportunity for questions and clarification was provided.       Patient transported with:   [employer]

## 2018-04-29 NOTE — IP AVS SNAPSHOT
[facility] [address] Lebron 13 
[phone] Patient: Jimi Mayes MRN: [id number] About your hospitalization You were admitted on:  April 29, 2018 You last received care in the:  [address] [place] [address] SURG You were discharged on: May 7, 2018 Why you were hospitalized Your primary diagnosis was:  Hepatic Encephalopathy (Hcc) Follow-up Information Follow up With Details Comments Contact Info Charles Fuchs MD On 5/9/2018 Hospital f/u PCP on Wednesday, 5/9/18 @ 10:45 a.m. with Dr. Ketan Prasad [facility] 
[phone] Discharge Orders None A check suzanne indicates which time of day the medication should be taken. My Medications CONTINUE taking these medications Instructions Each Dose to Equal  
 Morning Noon Evening Bedtime  
 amiodarone 200 mg tablet Commonly known as:  CORDARONE Your last dose was: Your next dose is: Take 200 mg by mouth nightly. 200 mg CENTRUM SILVER Tab tablet Generic drug:  multivitamins-minerals-lutein Your last dose was: Your next dose is: Take 1 Tab by mouth daily. 1 Tab  
    
   
   
   
  
 digoxin 0.125 mg tablet Commonly known as:  LANOXIN Your last dose was: Your next dose is: Take 0.125 mg by mouth nightly. 0.125 mg  
    
   
   
   
  
 furosemide 40 mg tablet Commonly known as:  LASIX Your last dose was: Your next dose is: Take 40 mg by mouth daily. 40 mg  
    
   
   
   
  
 gabapentin 100 mg capsule Commonly known as:  NEURONTIN Your last dose was: Your next dose is: Take 100 mg by mouth three (3) times daily. 100 mg  
    
   
   
   
  
 lactulose 10 gram/15 mL solution Commonly known as:  Chantell Sequoyah Your last dose was: Your next dose is: Take 30 mL by mouth two (2) times a day. You can adjust the frequency to achieve two bowel movements per day. Indications: Hepatic Encephalopathy 20 g  
    
   
   
   
  
 metoprolol succinate 25 mg XL tablet Commonly known as:  TOPROL XL Your last dose was: Your next dose is: Take 0.5 Tabs by mouth daily. 12.5 mg  
    
   
   
   
  
 pantoprazole 40 mg tablet Commonly known as:  PROTONIX Your last dose was: Your next dose is: Take 1 Tab by mouth daily. 40 mg  
    
   
   
   
  
 rifAXIMin 550 mg tablet Commonly known as:  Servando Canter Your last dose was: Your next dose is: Take 1 Tab by mouth two (2) times a day. Indications: Hepatic Encephalopathy 550 mg  
    
   
   
   
  
 spironolactone 100 mg tablet Commonly known as:  ALDACTONE Your last dose was: Your next dose is: Take 200 mg by mouth daily. 200 mg  
    
   
   
   
  
 thiamine 100 mg tablet Commonly known as:  B-1 Your last dose was: Your next dose is: Take 1 Tab by mouth daily. 100 mg  
    
   
   
   
  
 valsartan 40 mg tablet Commonly known as:  DIOVAN Your last dose was: Your next dose is: Take 40 mg by mouth daily. 40 mg Discharge Instructions None [facility] Announcement We are excited to announce that we are making your provider's discharge notes available to you in InsideTrack. You will see these notes when they are completed and signed by the physician that discharged you from your recent hospital stay. If you have any questions or concerns about any information you see in InsideTrack, please call the Health Information Department where you were seen or reach out to your Primary Care Provider for more information about your plan of care. Introducing Ace Lutz As a Laila Easley patient, I wanted to make you aware of our electronic visit tool called Ace Lutz. [facility]ail[facility] Eas[facility] 24/7 allows you to connect within minutes with a medical provider 24 hours a day, seven days a week via a mobile device or tablet or logging into a secure website from your computer. You can access Ace Lutz from anywhere in the [place]. A virtual visit might be right for you when you have a simple condition and feel like you just dont want to get out of bed, or cant get away from work for an appointment, when your regular [facility] provider is not available (evenings, weekends or holidays), or when youre out of town and need minor care. Electronic visits cost only $49 and if the Laila Easley 24/7 provider determines a prescription is needed to treat your condition, one can be electronically transmitted to a nearby pharmacy*. Please take a moment to enroll today if you have not already done so. The enrollment process is free and takes just a few minutes. To enroll, please download the [employer] nicol to your tablet or phone, or visit [email or web address] to enroll on your computer. And, as an [address] [place] [address] patient with a [employer] account, the results of your visits will be scanned into your electronic medical record and your primary care provider will be able to view the scanned results. We urge you to continue to see your regular Laila Kaushal provider for your ongoing medical care. And while your primary care provider may not be the one available when you seek a Ace Lutz virtual visit, the peace of mind you get from getting a real diagnosis real time can be priceless. For more information on Ace Lutz, view our Frequently Asked Questions (FAQs) at [email or web address]. Sincerely, 
 
Dustin Tanner MD 
Chief Medical Officer Tete[employer] Mendy Marks *:  certain medications cannot be prescribed via Ace Lutz Providers Seen During Your Hospitalization Provider Specialty Primary office phone Ivy Waterman MD Emergency Medicine [phone] Talon Pugh MD Internal Medicine [phone] Layla Yan MD Internal Medicine [phone] Atul Eubanks MD Hospitalist [phone] Dhara Dixon MD Internal Medicine [phone] Ashly Wheatley MD Internal Medicine [phone] Your Primary Care Physician (PCP) Primary Care Physician Office Phone Office Fax Ronel Madison [phone] You are allergic to the following No active allergies Recent Documentation Weight BMI Smoking Status 89 kg 28.15 kg/m2 Former Smoker Emergency Contacts Name Discharge Info Relation Home Work Mobile Cass Gonzalez DISCHARGE CAREGIVER [3] Spouse [3] [phone] SherriezacCass  Spouse [3] [phone] Patient Belongings The following personal items are in your possession at time of discharge: 
  Dental Appliances: None  Visual Aid: Glasses      Home Medications: None   Jewelry: None  Clothing: At bedside    Other Valuables: None Please provide this summary of care documentation to your next provider. Signatures-by signing, you are acknowledging that this After Visit Summary has been reviewed with you and you have received a copy. Patient Signature:  ____________________________________________________________ Date:  ____________________________________________________________  
  
Leonardo Talbot Provider Signature:  ____________________________________________________________ Date:  ____________________________________________________________

## 2018-04-30 NOTE — PROGRESS NOTES
Bedside and Verbal shift change report given to AMANDA PENA (oncoming nurse) by Farzana Preston (offgoing nurse). Report included the following information SBAR, Kardex, Intake/Output, Recent Results and Cardiac Rhythm Paced.

## 2018-04-30 NOTE — PROGRESS NOTES
Reason for Admission:   Metabolic encephalopathy               RRAT Score:     32             Resources/supports as identified by patient/family:   Pt's wife                Top Challenges facing patient (as identified by patient/family and CM):  none                   Finances/Medication cost?                    Transportation? Support system or lack thereof? Living arrangements? Self-care/ADLs/Cognition? Current Advanced Directive/Advance Care Plan:   On file                          Plan for utilizing home health:    TBD                      Likelihood of readmission: medium                 Transition of Care Plan:          Taye with wife and 8year old child

## 2018-04-30 NOTE — PROGRESS NOTES
Problem: Falls - Risk of  Goal: *Absence of Falls  Document Stefania Fall Risk and appropriate interventions in the flowsheet.    Outcome: Progressing Towards Goal  Fall Risk Interventions:  Mobility Interventions: Communicate number of staff needed for ambulation/transfer, OT consult for ADLs, Patient to call before getting OOB, PT Consult for mobility concerns, PT Consult for assist device competence, Strengthening exercises (ROM-active/passive)    Mentation Interventions: Bed/chair exit alarm, Increase mobility, More frequent rounding, Reorient patient    Medication Interventions: Patient to call before getting OOB, Teach patient to arise slowly    Elimination Interventions: Call light in reach, Patient to call for help with toileting needs, Toileting schedule/hourly rounds

## 2018-04-30 NOTE — PROGRESS NOTES
Bedside and Verbal shift change report given to Charly Covington RN (oncoming nurse) by Talisha Anderson RN (offgoing nurse). Report included the following information SBAR, Kardex, Intake/Output, MAR and Recent Results.

## 2018-04-30 NOTE — PROGRESS NOTES
CM met with pt to introduce him to the role of CM and transition of care. He verbalized understanding. Per pt, he lives at home with his wife and 8year old child. Pt stated that he is indpendent with ADL's and IADL's, still driving. CM will follow him for any needs post d/c. BHARATI Alba  Care Management Interventions  PCP Verified by CM:  Yes  Palliative Care Criteria Met (RRAT>21 & CHF Dx)?: No  Transition of Care Consult (CM Consult): Discharge Planning  Discharge Durable Medical Equipment: No  Physical Therapy Consult: No  Occupational Therapy Consult: No  Speech Therapy Consult: No  Current Support Network: Lives with Spouse  Confirm Follow Up Transport: Family  Plan discussed with Pt/Family/Caregiver: Yes  Freedom of Choice Offered: Yes  [place] Resource Information Provided?: No

## 2018-04-30 NOTE — PROGRESS NOTES
70 Steffi Durand MD, FACP, [place], [place]       Christiano Guzmán, KULDEEP Whittaker, ACNP-BC   Tani Hensley, MARTÍN Cartagena [facility] [address]    at [address], [address] Esther Long  22.    [phone]    FAX: [address] [place] [address]    at [address], [address] [facility], [address]    [phone]    FAX: [phone]   '  HEPATOLOGY CONSULT NOTE  The patient is well known and [facility]. He is a 59 y.o.  male with cirrhosis secondary to alcohol. He finally stopped consuming alcohol in 1/2018. His liver history includes esophageal variceal bleed, refractory ascites requiring several paracenteses, HE and thrombocytopenia. Patient presented to the ED last night with acute hepatic encephalopathy after trying to start his car with a credit card. When he presented to the ED, his ammonia level was 144. He was given lactulose.     Today, he is more alert and oriented. His ammonia level is down to 118. He is currently eating on his own, in no acute distress. I have reviewed the emergency room note, hospital admission note, notes by all other physicians who have seen the patient during this hospitalization to date. Problem list and the reason for this hospitalization were reviewed. Allergies and the medications the patient was taking at home prior to this hospitalization were also reviewed.     ASSESSMENT AND PLAN:  Cirrhosis  This is secondary to alcohol. The CTP score is 9, Child class C, MELD 24.      Alcohol abuse in remission  He has been abstinent from alcohol since 1/2018. He could be considered a candidate for LT if this is needed in 6/2018.  Encouraged patient to remain abstinent.      Hepatic Encephalopathy  Presented to the ED yesterday with AMS. Lactulose was given, symptoms resolved. He is alert and oriented today. Ammonia level is declining. Continue current dose of lactulose and Xifaxan daily. He has had several admissions for exacerbation of HE over the past 3-4 months. Will order a MRI to look for vascular shunting to perform embolization if needed. This could improve his HE. Ascites  He had ~10 liters of ascites removed during his last admission in early 2018 (~2 weeks ago). Ascites is present today but patient is currently asymptomatic. No paracentesis is needed at this time. Will use IV albumin 25 gm Q6H. Hold diuretics.     LORENZA  Screat 2.24 mg. Most likely secondary to dehydration. Continue IV albumin every 6 hrs. Will draw urine and blood cultures to rule out infection.        Thrombocytopenia  This is secondary to cirrhosis. No treatment needed.     SYSTEM REVIEW:  Constitution systems: Negative for fever, chills, weight gain, weight loss. Eyes: Negative for visual changes. ENT: Negative for sore throat, painful swallowing. Respiratory:No SOB, no cough  Cardiology: Negative for chest pain, palpitations. GI:  Negative for constipation or diarrhea. : Negative for urinary frequency, dysuria, hematuria, nocturia. Skin: Negative for rash. Hematology: Negative for easy bruising, blood clots. Musculo-skelatal: Negative for back pain, muscle pain, weakness. Neurologic: Negative for headaches, dizziness, vertigo, memory problems not related to HE. Psychology: Negative for anxiety, depression.      FAMILY HISTORY:  The father  of unknown cause.    The mother  of heart disease.    There is no family history of liver disease.        SOCIAL HISTORY:  The patient is .    The patient has 3 children.    The patient has never used tobacco products.    The patient consumes 6+ alcoholic beverages per day. [facility] has been abstinent from alcohol since 1/2018. The patient used to work . The patient retired in 2014.        PHYSICAL EXAMINATION:  VS: per nursing note  General:  No acute distress. Eyes:  Sclera anicteric. ENT:  No oral lesions. Thyroid normal.  Nodes:  No adenopathy. Skin:  Spider angiomata. No jaundice. Respiratory:  Lungs clear to auscultation. Cardiovascular:  Regular heart rate. Abdomen:  Distended, non-tender. Some ascites appears to be present. Extremities:  No lower extremity edema. Left side BKA with prosthetic leg. Neurologic:  Alert and oriented. Cranial nerves grossly intact. No asterixis.     LABORATORY:  Results for Heriberto Showers (MRN [id number]) as of 4/30/2018 12:43   Ref.  Range 4/22/2018 01:52 4/29/2018 13:30 4/30/2018 03:28   Sodium Latest Ref Range: 136 - 145 mmol/L 129 (L) 132 (L) 134 (L)   Potassium Latest Ref Range: 3.5 - 5.1 mmol/L 4.3 4.7 4.2   Chloride Latest Ref Range: 97 - 108 mmol/L 98 99 100   CO2 Latest Ref Range: 21 - 32 mmol/L 24 24 24   Anion gap Latest Ref Range: 5 - 15 mmol/L 7 9 10   Glucose Latest Ref Range: 65 - 100 mg/dL 195 (H) 218 (H) 186 (H)   BUN Latest Ref Range: 6 - 20 MG/DL 17 35 (H) 35 (H)   Creatinine Latest Ref Range: 0.70 - 1.30 MG/DL 1.41 (H) 2.47 (H) 2.24 (H)   BUN/Creatinine ratio Latest Ref Range: 12 - 20   12 14 16   Calcium Latest Ref Range: 8.5 - 10.1 MG/DL 9.3 9.8 9.3   GFR est non-AA Latest Ref Range: >60 ml/min/1.73m2 51 (L) 27 (L) 30 (L)   GFR est AA Latest Ref Range: >60 ml/min/1.73m2 >60 33 (L) 36 (L)   Bilirubin, total Latest Ref Range: 0.2 - 1.0 MG/DL 3.7 (H) 3.6 (H) 3.2 (H)   Bilirubin, direct Latest Ref Range: 0.0 - 0.2 MG/DL   1.8 (H)   Protein, total Latest Ref Range: 6.4 - 8.2 g/dL 6.6 7.0 6.8   Albumin Latest Ref Range: 3.5 - 5.0 g/dL 4.0 3.5 3.5   Globulin Latest Ref Range: 2.0 - 4.0 g/dL 2.6 3.5 3.3   A-G Ratio Latest Ref Range: 1.1 - 2.2   1.5 1.0 (L) 1.1   ALT (SGPT) Latest Ref Range: 12 - 78 U/L 34 35 35   AST Latest Ref Range: 15 - 37 U/L 38 (H) 39 (H) 41 (H)   Alk. phosphatase Latest Ref Range: 45 - 117 U/L 162 (H) 177 (H) 167 (H)   Lipase Latest Ref Range: 73 - 393 U/L  339    Ammonia Latest Ref Range: <32 UMOL/L  144 (H) 118 (H)     RADIOLOGY:  4/2018. CT of head. No change or acute abnormality. Marily Quevedo NP  [facility] [address] [place] [address] 49, [address] [place] [address]  Ph: [phone]  Fax: [phone]    SUPERVISING MD  I have reviewed the above and records from this and previous hospitalizatios in the EMR. The patient is well known to me and regulary followed by [facility]. This is now the third episode of HE in less than 3 months. I suspect he has developed a vascular shunt which is diverting blood flow from the liver and leading to refractory HE. Will do the MRI with contrast (have to use contrast) once LORENZA has improved.       Aura Romberg, MD  [facility] [place] [address] [facility] [address].  [phone]

## 2018-04-30 NOTE — PROGRESS NOTES
Problem: Falls - Risk of  Goal: *Absence of Falls  Document Stefania Fall Risk and appropriate interventions in the flowsheet. Outcome: Progressing Towards Goal  Fall Risk Interventions:  Mobility Interventions: Bed/chair exit alarm, Patient to call before getting OOB, PT Consult for mobility concerns    Mentation Interventions: Bed/chair exit alarm, More frequent rounding, Reorient patient, Room close to nurse's station, Update white board    Medication Interventions: Bed/chair exit alarm, Evaluate medications/consider consulting pharmacy, Patient to call before getting OOB, Teach patient to arise slowly    Elimination Interventions: Bed/chair exit alarm, Call light in reach, Patient to call for help with toileting needs, Toilet paper/wipes in reach, Urinal in reach             Problem: Pressure Injury - Risk of  Goal: *Prevention of pressure injury  Document Spencer Scale and appropriate interventions in the flowsheet.   Outcome: Progressing Towards Goal  Pressure Injury Interventions:       Moisture Interventions: Absorbent underpads, Offer toileting Q_hr    Activity Interventions: Increase time out of bed, Pressure redistribution bed/mattress(bed type), PT/OT evaluation

## 2018-04-30 NOTE — PROGRESS NOTES
Primary Nurse Perla Bardales and Rosalind Sexton RN performed a dual skin assessment on this patient: Abrasion on left hip, scattered bruising, abrasion on right foot, second toe, non-blanchable spots on left BKA.   Spencer score is 21

## 2018-04-30 NOTE — INTERDISCIPLINARY ROUNDS
IDR/SLIDR Summary          Patient: Salazar Dunlap MRN: [id number]    Age: 59 y.o. YOB: 1960 Room/Bed: [facility]   Admit Diagnosis: Hepatic encephalopathy (HCC)  Principal Diagnosis: Hepatic encephalopathy (HCC)   Goals: safety, lower ammonia level  Readmission: YES  Quality Measure: CHF  VTE Prophylaxis: Mechanical  Influenza Vaccine screening completed? YES  Pneumococcal Vaccine screening completed? NO  Mobility needs: Yes   Nutrition plan:No  Consults: P. T and O.T. Financial concerns:No  Escalated to CM? NO  RRAT Score: 32   Interventions:  Testing due for pt today?  NO  LOS: 2 days Expected length of stay 4 days  Discharge plan: TBD   PCP: Natacha Ruiz MD  Transportation needs: No    Days before discharge:two or more days before discharge   Discharge disposition: Home    Signed:     Jacqui Esparza RN  5/1/2018  2:20 AM

## 2018-04-30 NOTE — PROGRESS NOTES
Hospitalist Progress Note  Tayler Flores NP  Answering service: [phone] from in house phone  Cell: [phone]      Date of Service:  2018  NAME:  Eulalia West  :  1960  MRN:  [id number]      Admission Summary:    58-yom who with pmh of hepatic cirrhosis due to alcohol, variceal bleeding, significant ascites requiring paracentesis, LORENZA, CKD, anemia, chronic systolic CHF, PAF, HTN, thrombocytopenia d/t cirrhosis who was recently released from the hospital 7 days ago,who presented with disorientation and agitation. Per ER records wife reported patient was disoriented, that he tried to start his car with a credit card and he was observed to be more agitated. Because of his known history of hepatic encephalopathy presenting similarly, wife was concerned and brought him to the emergency room. In the ER, labs showed a low Na at 132, Creat 2.47, slightly elevated with elevated ammonia level at 144. Last admission was as high as 233, was down to 66 prior to discharge. Interval history / Subjective:     Patient more oriented today but still with confusion to situation. Patient able to tell me that he is here because he was disoriented but is unable to tell me if he recalls the events of yesterday. Patient's wife at bedside, reporting she believes patient was taking Lactulose at prescribed. Both report titration the medications to have 4-5 BMs per day. Wife notes patient has not had Xifaxan d/t costs and availability. States Gavin told her it was filled with their mail ordered pharmacy, encouraged wife to call to see if medication has been filled. No chest pain, shortness of breath, voiding ok. C/o difficulty with sleeping.       Assessment & Plan:     Acute Hepatic Encephalopathy (POA)- improving  -on admission patient reported no BM in 24 hours, however wife reports had at least 1 in the middle of the night prior to admission -ammonia level 144 on admission now 118  -Lactulose increased to TID on admission   -resume Xifaxan, wife to check to see if pharmacy was able to fill   -hepatology following    LORENZA on CKD stage 3 (POA)  -creat 2.47 on admission, now 2.24  -IV albumin  -hold Lasix and Spironolactone and ARB, hopefully can restart in am if labs continue to improve  -monitor    Hepatic Cirrhosis  -follows Dr Ashleigh Martinez is to start working towards liver transplant w/u     Chronic Thrombocytopenia   -at baseline, monitor for bleeding  -d/t cirrhosis    Chronic Systolic Congestive Heart Failure NYHA class 2   -11/21/2017 Echo ef 35-40%   -follows Dr Seda Riley with cardiology   -well compensated   -ARB on hold d/t LORENZA, continue Metoprolol    PAF-POA  -paced on tele   -continue home digoxin     Chronic Hyponatremia d/t cirrhosis   -stable   -monitor    Ascites   -d/t cirrhosis  -paracentesis on previous admission with only 10mL out  -monitor  -hepatology consulted    HTN   -low side on admission, improved today   -continue Metoprolol  -Lasix, Spironolactone, and Valsartan on hold d/t LORENZA    Code status: Full  DVT prophylaxis: Scds    Care Plan discussed with: Patient/Family Dr Deshawn Ruiz  Disposition: Home w/Family and TBD     Hospital Problems  Date Reviewed: 4/19/2018          Codes Class Noted POA    * (Principal)Hepatic encephalopathy ([facility] [address] ICD-10-CM: K72.90  ICD-9-CM: 572.2  4/29/2018 Unknown                Review of Systems:   A comprehensive review of systems was negative except for that written in the HPI. Vital Signs:    Last 24hrs VS reviewed since prior progress note.  Most recent are:  Visit Vitals    /58 (BP 1 Location: Left arm, BP Patient Position: At rest)    Pulse 67    Temp 97.9 °F (36.6 °C)    Resp 19    Wt 90.8 kg (200 lb 2.8 oz)    SpO2 98%    BMI 28.72 kg/m2       No intake or output data in the 24 hours ending 04/30/18 1036     Physical Examination:             Constitutional:  Resting in bed in no acute distress   ENT:  Oral mucous moist, oropharynx benign. Resp:  CTA bilaterally. No wheezing/rhonchi/rales. No accessory muscle use   CV:  Regular rhythm, normal rate, no murmurs, gallops, rubs    GI:  Ascites, non tender. normoactive bowel sounds,      Musculoskeletal:  No edema, warm, 2+ pulses throughout    Neurologic:  Moves all extremities. AAOx3, Follows commands. Disoriented to situation at times     Psych:  fair insight. Slightly anxious  Skin:  jaundice       Data Review:    Review and/or order of clinical lab test  Review and/or order of tests in the radiology section of CPT  Review and/or order of tests in the medicine section of CPT      Labs:     Recent Labs      04/30/18   0328  04/29/18   1330   WBC  3.6*  4.2   HGB  8.9*  9.7*   HCT  25.6*  28.0*   PLT  58*  68*     Recent Labs      04/30/18   0328  04/29/18   1330   NA  134*  132*   K  4.2  4.7   CL  100  99   CO2  24  24   BUN  35*  35*   CREA  2.24*  2.47*   GLU  186*  218*   CA  9.3  9.8     Recent Labs      04/30/18   0328  04/29/18   1330   SGOT  41*  39*   ALT  35  35   AP  167*  177*   TBILI  3.2*  3.6*   TP  6.8  7.0   ALB  3.5  3.5   GLOB  3.3  3.5   LPSE   --   339     Recent Labs      04/30/18   0328  04/29/18   1330   INR  1.4*  1.4*   PTP  14.0*  13.8*      No results for input(s): FE, TIBC, PSAT, FERR in the last 72 hours. Lab Results   Component Value Date/Time    Folate 19.9 12/07/2017 04:25 AM      No results for input(s): PH, PCO2, PO2 in the last 72 hours. No results for input(s): CPK, CKNDX, TROIQ in the last 72 hours.     No lab exists for component: CPKMB  No results found for: CHOL, CHOLX, CHLST, CHOLV, HDL, LDL, LDLC, DLDLP, TGLX, TRIGL, TRIGP, CHHD, CHHDX  Lab Results   Component Value Date/Time    Glucose (POC) 126 (H) 04/19/2018 04:24 PM    Glucose (POC) 255 (H) 04/16/2018 11:50 AM    Glucose (POC) 149 (H) 04/16/2018 06:07 AM    Glucose (POC) 226 (H) 04/16/2018 12:08 AM    Glucose (POC) 145 (H) 04/15/2018 05:39 PM     Lab Results   Component Value Date/Time    Color YELLOW/STRAW 04/29/2018 03:34 PM    Appearance CLEAR 04/29/2018 03:34 PM    Specific gravity 1.010 04/29/2018 03:34 PM    Specific gravity 1.015 04/13/2018 10:10 AM    pH (UA) 6.0 04/29/2018 03:34 PM    Protein NEGATIVE  04/29/2018 03:34 PM    Glucose NEGATIVE  04/29/2018 03:34 PM    Ketone NEGATIVE  04/29/2018 03:34 PM    Bilirubin NEGATIVE  04/29/2018 03:34 PM    Urobilinogen 0.2 04/29/2018 03:34 PM    Nitrites NEGATIVE  04/29/2018 03:34 PM    Leukocyte Esterase NEGATIVE  04/29/2018 03:34 PM    Epithelial cells FEW 04/29/2018 03:34 PM    Bacteria NEGATIVE  04/29/2018 03:34 PM    WBC 0-4 04/29/2018 03:34 PM    RBC 0-5 04/29/2018 03:34 PM         Medications Reviewed:     Current Facility-Administered Medications   Medication Dose Route Frequency    albumin human 25% (BUMINATE) solution 12.5 g  12.5 g IntraVENous Q6H    sodium chloride (NS) flush 5-10 mL  5-10 mL IntraVENous Q8H    sodium chloride (NS) flush 5-10 mL  5-10 mL IntraVENous PRN    ondansetron (ZOFRAN) injection 4 mg  4 mg IntraVENous Q4H PRN    pantoprazole (PROTONIX) tablet 40 mg  40 mg Oral DAILY    lactulose (CHRONULAC) solution 30 g  30 g Oral TID    rifAXIMin (XIFAXAN) tablet 550 mg  550 mg Oral BID    metoprolol succinate (TOPROL-XL) XL tablet 12.5 mg  12.5 mg Oral DAILY    digoxin (LANOXIN) tablet 0.125 mg  0.125 mg Oral QHS    thiamine (B-1) tablet 100 mg  100 mg Oral DAILY    zolpidem (AMBIEN) tablet 5 mg  5 mg Oral QHS PRN     ______________________________________________________________________  EXPECTED LENGTH OF STAY: 4d 16h  ACTUAL LENGTH OF STAY:          [facility],

## 2018-05-01 NOTE — PROGRESS NOTES
Bedside and Verbal shift change report given to Leila Romero RN (oncoming nurse) by Jameson Grover RN (offgoing nurse). Report included the following information SBAR, Kardex, MAR and Recent Results.

## 2018-05-01 NOTE — PROGRESS NOTES
Problem: Falls - Risk of  Goal: *Absence of Falls  Document Stefania Fall Risk and appropriate interventions in the flowsheet. Outcome: Progressing Towards Goal  Fall Risk Interventions:  Mobility Interventions: Communicate number of staff needed for ambulation/transfer, Bed/chair exit alarm, OT consult for ADLs, Patient to call before getting OOB, PT Consult for mobility concerns    Mentation Interventions: Adequate sleep, hydration, pain control, Bed/chair exit alarm, Door open when patient unattended, More frequent rounding, Increase mobility    Medication Interventions: Patient to call before getting OOB, Teach patient to arise slowly, Bed/chair exit alarm    Elimination Interventions: Call light in reach, Patient to call for help with toileting needs, Toileting schedule/hourly rounds, Bed/chair exit alarm             Problem: Pressure Injury - Risk of  Goal: *Prevention of pressure injury  Document Spencer Scale and appropriate interventions in the flowsheet.    Outcome: Progressing Towards Goal  Pressure Injury Interventions:       Moisture Interventions: Absorbent underpads, Minimize layers    Activity Interventions: Increase time out of bed, Pressure redistribution bed/mattress(bed type), PT/OT evaluation    Mobility Interventions: HOB 30 degrees or less, Pressure redistribution bed/mattress (bed type), PT/OT evaluation    Nutrition Interventions: Document food/fluid/supplement intake

## 2018-05-01 NOTE — PROGRESS NOTES
Bedside and Verbal shift change report given to [address] Ashlyn Durand (oncoming nurse) by Sanket Cortes (offgoing nurse). Report included the following information SBAR, Kardex and Recent Results.

## 2018-05-01 NOTE — PROGRESS NOTES
Patient requesting percocet for back pain. I spoke with Dr. Krystin Heath and he ordered one time dose of tramadol 50mg.

## 2018-05-01 NOTE — PROGRESS NOTES
Primary Nurse Amber Hamm RN and Jayleen Gibbons RN performed a dual skin assessment on this patient Impairment noted- see wound doc flow sheet  Psencer score is 19    Patient is alert, verbal with family at bedside;   Bed: [place]  Patient has a urinal at bedside;   Diet: cardiac regular  Patient denied pain  Right heel pink, blanchable; left stump pink, blanchable;  buttocks, and sacral skin intact and without erythema. Palpable pulse in right heel;     1. POA left hip cluster of scabs verses eschar; patient stated they are a device injury from his last admission; cluster measured 2.5cm x 4.5cm x <0.1cm; duoderm applied for protection and autolytic debridement; recommend weekly and as needed dressing changes;   2. POA minute scab on right foot 2nd toe;  3. POA left BKA blanchable pink with white dry, peeling skin; recommend lotion; Pt was also seen by wound care nurse today.

## 2018-05-01 NOTE — PROGRESS NOTES
TRANSFER - IN REPORT:    Verbal report received from [address] [facility] (name) on Jose F Ortega  being received from [address] [place] [address] (unit) for routine progression of care      Report consisted of patients Situation, Background, Assessment and   Recommendations(SBAR). Information from the following report(s) SBAR, Kardex, [facility] and Recent Results was reviewed with the receiving nurse. Opportunity for questions and clarification was provided. Assessment completed upon patients arrival to unit and care assumed.

## 2018-05-01 NOTE — PROGRESS NOTES
Hospitalist Progress Note  Deonte Nguyen NP  Answering service: [phone] from in house phone  Cell: [phone]      Date of Service:  2018  NAME:  Debbie Carlson  :  1960  MRN:  [id number]      Admission Summary:    58-yom who with pmh of hepatic cirrhosis due to alcohol, variceal bleeding, significant ascites requiring paracentesis, LORENZA, CKD, anemia, chronic systolic CHF, PAF, HTN, thrombocytopenia d/t cirrhosis who was recently released from the hospital 7 days ago,who presented with disorientation and agitation. Per ER records wife reported patient was disoriented, that he tried to start his car with a credit card and he was observed to be more agitated. Because of his known history of hepatic encephalopathy presenting similarly, wife was concerned and brought him to the emergency room. In the ER, labs showed a low Na at 132, Creat 2.47, slightly elevated with elevated ammonia level at 144. Last admission was as high as 233, was down to 66 prior to discharge. Interval history / Subjective:   Patient nearing baseline orientation. Continues to improve, still with periods of situational forgetfulness. Reports about 5 BMs yesterday and 2 today. Complaining of left shoulder pain. Wife at bedside with questions from hepatology standpoint and next steps. Messaged Juancho Gr NP and provided with wife's contact information. Notes showed plan for MRI however patient has an AICD that was placed 3 years ago. Wife to call nursing staff with AICD information to see if it is MRI compatible.       Assessment & Plan:     Acute Hepatic Encephalopathy (POA)- improving  -on admission patient reported no BM in 24 hours, however wife reports had at least 1 in the middle of the night prior to admission   -ammonia level 144 on admission now 81  -Lactulose increased to TID on admission   -resume Xifaxan, spoke with wife, costs would be ~$700/month out of pocket which is not affordable. Staff at Dr Gabino Ortega office working on seeing if patient can get further assistance to make more affordable  -hepatology following    LORENZA on CKD stage 3 (POA)  -creat 2.47 on admission, now 1.68  -IV albumin  -hold Lasix and Spironolactone and ARB, will await hepatology to determine when to restart   -monitor    Hepatic Cirrhosis  -follows Dr Satinder Cordero OP  -per notes plan for MRI to look for shunt however patient has AICD, checking to see if MRI compatible   -plan is to start working towards liver transplant w/u     Chronic Thrombocytopenia   -at baseline, monitor for bleeding  -d/t cirrhosis    Chronic Systolic Congestive Heart Failure NYHA class 2   -11/21/2017 Echo ef 35-40%   -s/p AICD  -follows Dr Jessica Hicks with cardiology   -well compensated   -ARB on hold d/t LORENZA, continue Metoprolol    PAF-POA  -paced on tele   -continue home digoxin     Chronic Hyponatremia d/t cirrhosis   -stable   -monitor    Ascites   -d/t cirrhosis  -paracentesis on previous admission with 10L out  -monitor  -hepatology following    HTN   -periods of lower BP  -continue Metoprolol  -Lasix, Spironolactone, and Valsartan on hold d/t LORENZA    Code status: Full  DVT prophylaxis: [address] Olya Friedman discussed with: Patient/Family and Nurse Dr Angel Lopez, Devonte Lozano Hepatology NP  Disposition: Home w/Family and TBD     Hospital Problems  Date Reviewed: 4/19/2018          Codes Class Noted POA    * (Principal)Hepatic encephalopathy ([facility] [address] ICD-10-CM: K72.90  ICD-9-CM: 572.2  4/29/2018 Unknown                Review of Systems:   A comprehensive review of systems was negative except for that written in the HPI. Vital Signs:    Last 24hrs VS reviewed since prior progress note.  Most recent are:  Visit Vitals    /62 (BP 1 Location: Left arm, BP Patient Position: At rest)    Pulse 65    Temp 98.5 °F (36.9 °C)    Resp 15    Wt 94.6 kg (208 lb 8.9 oz)    SpO2 97%    BMI 29.92 kg/m2 No intake or output data in the 24 hours ending 05/01/18 1115     Physical Examination:             Constitutional:  Resting in bed in no acute distress   ENT:  Oral mucous moist, oropharynx benign. Resp:  CTA bilaterally. No wheezing/rhonchi/rales. No accessory muscle use   CV:  Regular rhythm, normal rate, no murmurs, gallops, rubs    GI:  Ascites, non tender. normoactive bowel sounds,      Musculoskeletal:  No edema, warm, 2+ pulses throughout. L BKA    Neurologic:  Moves all extremities. AAOx3, Follows commands. Disoriented to situation at times     Psych:  fair insight. Slightly anxious  Skin:  jaundice       Data Review:    Review and/or order of clinical lab test  Review and/or order of tests in the radiology section of CPT  Review and/or order of tests in the medicine section of CPT      Labs:     Recent Labs      04/30/18 0328 04/29/18   1330   WBC  3.6*  4.2   HGB  8.9*  9.7*   HCT  25.6*  28.0*   PLT  58*  68*     Recent Labs      05/01/18   0253  04/30/18 0328 04/29/18   1330   NA  131*  134*  132*   K  4.6  4.2  4.7   CL  100  100  99   CO2  24  24  24   BUN  27*  35*  35*   CREA  1.68*  2.24*  2.47*   GLU  110*  186*  218*   CA  9.2  9.3  9.8     Recent Labs      04/30/18   0328  04/29/18   1330   SGOT  41*  39*   ALT  35  35   AP  167*  177*   TBILI  3.2*  3.6*   TP  6.8  7.0   ALB  3.5  3.5   GLOB  3.3  3.5   LPSE   --   339     Recent Labs      04/30/18   0328  04/29/18   1330   INR  1.4*  1.4*   PTP  14.0*  13.8*      No results for input(s): FE, TIBC, PSAT, FERR in the last 72 hours. Lab Results   Component Value Date/Time    Folate 19.9 12/07/2017 04:25 AM      No results for input(s): PH, PCO2, PO2 in the last 72 hours. No results for input(s): CPK, CKNDX, TROIQ in the last 72 hours.     No lab exists for component: CPKMB  No results found for: CHOL, CHOLX, CHLST, CHOLV, HDL, LDL, LDLC, DLDLP, TGLX, TRIGL, TRIGP, CHHD, CHHDX  Lab Results   Component Value Date/Time    Glucose (POC) 126 (H) 04/19/2018 04:24 PM    Glucose (POC) 255 (H) 04/16/2018 11:50 AM    Glucose (POC) 149 (H) 04/16/2018 06:07 AM    Glucose (POC) 226 (H) 04/16/2018 12:08 AM    Glucose (POC) 145 (H) 04/15/2018 05:39 PM     Lab Results   Component Value Date/Time    Color YELLOW/STRAW 04/29/2018 03:34 PM    Appearance CLEAR 04/29/2018 03:34 PM    Specific gravity 1.010 04/29/2018 03:34 PM    Specific gravity 1.015 04/13/2018 10:10 AM    pH (UA) 6.0 04/29/2018 03:34 PM    Protein NEGATIVE  04/29/2018 03:34 PM    Glucose NEGATIVE  04/29/2018 03:34 PM    Ketone NEGATIVE  04/29/2018 03:34 PM    Bilirubin NEGATIVE  04/29/2018 03:34 PM    Urobilinogen 0.2 04/29/2018 03:34 PM    Nitrites NEGATIVE  04/29/2018 03:34 PM    Leukocyte Esterase NEGATIVE  04/29/2018 03:34 PM    Epithelial cells FEW 04/29/2018 03:34 PM    Bacteria NEGATIVE  04/29/2018 03:34 PM    WBC 0-4 04/29/2018 03:34 PM    RBC 0-5 04/29/2018 03:34 PM         Medications Reviewed:     Current Facility-Administered Medications   Medication Dose Route Frequency    albumin human 25% (BUMINATE) solution 12.5 g  12.5 g IntraVENous Q6H    gabapentin (NEURONTIN) capsule 100 mg  100 mg Oral TID    sodium chloride (NS) flush 5-10 mL  5-10 mL IntraVENous Q8H    sodium chloride (NS) flush 5-10 mL  5-10 mL IntraVENous PRN    ondansetron (ZOFRAN) injection 4 mg  4 mg IntraVENous Q4H PRN    pantoprazole (PROTONIX) tablet 40 mg  40 mg Oral DAILY    lactulose (CHRONULAC) solution 30 g  30 g Oral TID    rifAXIMin (XIFAXAN) tablet 550 mg  550 mg Oral BID    metoprolol succinate (TOPROL-XL) XL tablet 12.5 mg  12.5 mg Oral DAILY    digoxin (LANOXIN) tablet 0.125 mg  0.125 mg Oral QHS    thiamine (B-1) tablet 100 mg  100 mg Oral DAILY    zolpidem (AMBIEN) tablet 5 mg  5 mg Oral QHS PRN     ______________________________________________________________________  EXPECTED LENGTH OF STAY: 4d 16h  ACTUAL LENGTH OF STAY:          2                 Enedelia Will NP

## 2018-05-01 NOTE — PROGRESS NOTES
MRI unable to do scan as they are not doing ICD's right now, staff suggested going to [facility] or perhaps [address] [place] [address] [address]. I informed Dav Escobar NP.

## 2018-05-01 NOTE — PROGRESS NOTES
Bedside shift change report given to AK (oncoming nurse) by Familia Portillo (offgoing nurse). Report included the following information SBAR, Kardex, Procedure Summary, MAR, Accordion, Recent Results and Cardiac Rhythm Paced.

## 2018-05-01 NOTE — WOUND CARE
WOCN Note:   New consult placed by RN for skin assessment; Chart review revealed:   Admitted for AMS; history of cirrhosis from ETOH abuse, kidney disease, CHF, LEFT BKA, varices; Admitted from home    Assessment:   Patient is alert, verbal with family at bedside;   Bed: [place]  Patient has a urinal at bedside;   Diet: cardiac regular  Patient denied pain  Right heel pink, blanchable; left stump pink, blanchable;  buttocks, and sacral skin intact and without erythema. Palpable pulse in right heel;    1. POA left hip cluster of scabs verses eschar; patient stated they are a device injury from his last admission; cluster measured 2.5cm x 4.5cm x <0.1cm; duoderm applied for protection and autolytic debridement; recommend weekly and as needed dressing changes;   2. POA minute scab on right foot 2nd toe;  3. POA left BKA blanchable pink with white dry, peeling skin; recommend lotion;    Patient repositioned without assist from supine to right on [place] bed with pillows to offload bony prominences;heel floated on pillow     Skin/wound treatment Recommendations:    Weekly and as needed remove duoderm; cleanse with normal saline, apply duoderm;     Skin Care & Pressure Injury Prevention Recommendations:  1. Minimize layers of linen/pads under patient to optimize support surface. 2.  Encourage patient to reposition approximately every 2 hours;  3. Float heels with pillow under calves. 4.  promote continence;   5. Continue on [place] bed for pressure redistribution. 6.  Assess/protect skin in contact with medical devices. Keep skin moisturized;   7. Ensure that patient is repositioning in chair shifting weight approximately every 15 minutes and standing up every hour.      Discussed above assessment and recommendations with Priscilla Holt (JEAN)    Transition of Care: Plan to follow weekly and as needed while admitted to hospital.    Caitlin Asencio RN  Certified Wound, Ostomy, Continence Nurse  office [phone]  pager [phone]

## 2018-05-01 NOTE — PROGRESS NOTES
[address] [facility] 3643 [facility],6Th Floor       Veronica Baker MD, Mariusz Jeffries, [facility], [place]        April Ulysses Hopes, KULDEEP Landeros, [facility]   Martha Ryder, MARTÍN Reina, MARTÍN [facility] 136    at [address], [address] [address]  22.    [phone]    FAX: [address] [place] [address], [address] [facility], [address]    [phone]    FAX: [phone]   '  HEPATOLOGY PROGRESS NOTE    I have reviewed the events of the past 24 hours, including all clinician notes, vital signs, medications the patient has received, laboratory studies and radiology exams. The patient is well known and regularly cared for at the [place] [address]. He is a 59 y.o.  male with cirrhosis secondary to alcohol.  He finally stopped consuming alcohol in 1/2018. His liver history includes esophageal variceal bleed, refractory ascites requiring several paracenteses, HE and thrombocytopenia. Patient presented to the ED last night with acute hepatic encephalopathy after trying to start his car with a credit card. When he presented to the ED, his ammonia level was 144. He was given lactulose.     Today, he is more alert and oriented. His ammonia level is down to 81. He is currently eating on his own, in no acute distress. I have called Jadiel Soler his wife and spoke with her at length about CT scan, shunting, plans, transplant evaluation, HE management and ascites management. She is going to work tomorrow but asked I call her if something changes or something she needs to know. Happy to keep her updated.      ASSESSMENT AND PLAN:    Cirrhosis  This is secondary to alcohol.  The CTP score is 10, Child class C, MELD 24.       Alcohol abuse in remission  He has been abstinent from alcohol since 2018. [facility] could be considered a candidate for LT if this is needed in 2018. Encouraged patient to remain abstinent. Advised the patient and family at bedside (brother and daughter) he would need to complete work up still to see if eligible for transplant, including cardiac cath.       Hepatic Encephalopathy  Presented to the ED yesterday with AMS. Lactulose was given, symptoms resolved. He is alert and oriented today. Ammonia level is declining. Continue current dose of lactulose and Xifaxan daily. He has had several admissions for exacerbation of HE over the past 3-4 months. He is unable to get an MRI due to AICD. We will order a triple phase CT to evaluate liver vasculature for shunts when his S creat is 1.5 or lower.      Ascites  He had ~10 liters of ascites removed during his last admission in early 2018 (~2 weeks ago). Ascites is present today but patient is currently asymptomatic. No paracentesis is needed at this time.  Will use IV albumin 25 gm Q6H.  Hold diuretics due to LORENZA.     LORENZA  Screat 1.68.  Most likely secondary to dehydration. Continue IV albumin every 6 hrs.      Thrombocytopenia  This is secondary to cirrhosis.  No treatment needed.      SYSTEM REVIEW:  Constitution systems: Negative for fever, chills, weight gain, weight loss. Eyes: Negative for visual changes. ENT: Negative for sore throat, painful swallowing. Respiratory:No SOB, no cough  Cardiology: Negative for chest pain, palpitations. GI:  Negative for constipation or diarrhea. : Negative for urinary frequency, dysuria, hematuria, nocturia. Skin: Negative for rash. Hematology: Negative for easy bruising, blood clots.    Musculo-skelatal: Negative for back pain, muscle pain, weakness. Neurologic: Negative for headaches, dizziness, vertigo, memory problems not related to HE.   Psychology: Negative for anxiety, depression.       FAMILY HISTORY:  The father  of unknown cause.    The mother  of heart disease.    There is no family history of liver disease.        SOCIAL HISTORY:  The patient is .    The patient has 3 children. The patient has never used tobacco products.    The patient consumes 6+ alcoholic beverages per day. [facility] has been abstinent from alcohol since 1/15/2018. The patient used to work . The patient retired in .        PHYSICAL EXAMINATION:  VS: per nursing note  General:  No acute distress. Eyes:  Sclera anicteric. ENT:  No oral lesions.    Nodes:  No adenopathy. Skin:  Spider angiomata.  No jaundice. Respiratory:  Lungs clear to auscultation. Cardiovascular:  Regular heart rate. Abdomen:  Distended, non-tender. Some ascites appears to be present.    Extremities:  No lower extremity edema. Left side BKA with prosthetic leg. Neurologic:  Alert and oriented. His speech is slow. Cranial nerves grossly intact.  No asterixis.      LABORATORY:  [facility] [place] of [address] [place] [address] & Units 2018   WBC 4.1 - 11.1 K/uL  3.6 (L) 4.2   ANC 1.8 - 8.0 K/UL  2.8 3.4   HGB 12.1 - 17.0 g/dL  8.9 (L) 9.7 (L)    - 400 K/uL  58 (L) 68 (L)   INR 0.9 - 1.1    1.4 (H) 1.4 (H)   AST 15 - 37 U/L  41 (H) 39 (H)   ALT 12 - 78 U/L  35 35   Alk Phos 45 - 117 U/L  167 (H) 177 (H)   Bili, Total 0.2 - 1.0 MG/DL  3.2 (H) 3.6 (H)   Bili, Direct 0.0 - 0.2 MG/DL  1.8 (H)    Albumin 3.5 - 5.0 g/dL  3.5 3.5   BUN 6 - 20 MG/DL 27 (H) 35 (H) 35 (H)   Creat 0.70 - 1.30 MG/DL 1.68 (H) 2.24 (H) 2.47 (H)   Na 136 - 145 mmol/L 131 (L) 134 (L) 132 (L)   K 3.5 - 5.1 mmol/L 4.6 4.2 4.7   Cl 97 - 108 mmol/L 100 100 99   CO2 21 - 32 mmol/L 24 24 24   Glucose 65 - 100 mg/dL 110 (H) 186 (H) 218 (H)   Magnesium 1.6 - 2.4 mg/dL      Ammonia <32 UMOL/L 81 (H) 118 (H) 144 (H)      RADIOLOGY:  2018. CT of head.  No change or acute abnormality.      Soraya Mendez, [facility]-BC  [facility] [place] [facility] [address] [facility], [address] [place] [address]  [phone]

## 2018-05-02 NOTE — PROGRESS NOTES
Bedside shift change report given to Qi Boothe, RN, RN (oncoming nurse) by Ayala Hampton RN (offgoing nurse). Report included the following information SBAR.

## 2018-05-02 NOTE — PROGRESS NOTES
TRANSFER - IN REPORT:    Verbal report received from JEAN Edwards (name) on Lyndall Brunner  being received from IR(unit) for routine progression of care      Report consisted of patients Situation, Background, Assessment and   Recommendations(SBAR). Information from the following report(s) SBAR was reviewed with the receiving nurse. Opportunity for questions and clarification was provided. Assessment completed upon patients arrival to unit and care assumed.

## 2018-05-02 NOTE — PROGRESS NOTES
2740 [facility] 3643 [facility],6Th Floor       Melissa Sommer MD, [address], [place], [place]        April MARTÍN Palacios, KULDEEP Rodrigues, [facility]   MARTÍN Tesfaye NP [facility] 136    at [address], [address] [address]  22.    [phone]    FAX: [address] [place] [address]    at [facility], [address] [facility], [address]    [phone]    FAX: [phone]   '  HEPATOLOGY PROGRESS NOTE    I have reviewed the events of the past 24 hours, including all clinician notes, vital signs, medications the patient has received, laboratory studies and radiology exams. The patient is well known and regularly cared for at the [place] [address]. He is a 59 y.o.  male with cirrhosis secondary to alcohol.  He finally stopped consuming alcohol in 1/2018. His liver history includes esophageal variceal bleed, refractory ascites requiring several paracenteses, HE and thrombocytopenia. Patient presented to the ED last night with acute hepatic encephalopathy after trying to start his car with a credit card. When he presented to the ED, his ammonia level was 144. He was given lactulose and Xifaxan.      Today, he is more alert and oriented. His ammonia level is down to 58. He is currently eating on his own, in no acute distress. Went this afternoon hoping to see Alex Block but she was not there. Nothing really to update yet until get CT results back.      ASSESSMENT AND PLAN:    Cirrhosis  This is secondary to alcohol. The CTP score is 9, Child class B, MELD 24.       Alcohol abuse in remission  He has been abstinent from alcohol since 1/15/2018. [facility] could be considered a candidate for LT if this is needed in 7/2018. Encouraged patient to remain abstinent.     Hepatic Encephalopathy  Presented to the ED yesterday with AMS. Lactulose was given, symptoms resolved. He is alert and oriented today. Ammonia level is declining. Continue current dose of lactulose and Xifaxan daily. He has had several admissions for exacerbation of HE over the past 3-4 months. CT scan was ordered today. Read is pending. Manuel Cline is still trying to get patient assistance for Xifaxan at home.      Ascites  He had ~10 liters of ascites removed during his last admission in early 2018 (~2 weeks ago). Ascites is present today and markedly increased over yesterday. Continue use IV albumin 25 gm Q6H.  Hold diuretics due to LORENZA. Ultrasound paracentesis has been ordered. Resume diuretics when able. Need to see hit on Screat after CT first.       LORENZA  Screat 1.43.  Most likely secondary to dehydration. Continue IV albumin every 6 hrs. See what Screat is after CT scan. Need to resume diuretics when able.       Thrombocytopenia  This is secondary to cirrhosis.  No treatment needed.      SYSTEM REVIEW:  Constitution systems: Negative for fever, chills, weight gain, weight loss. Eyes: Negative for visual changes. ENT: Negative for sore throat, painful swallowing. Respiratory:No SOB, no cough  Cardiology: Negative for chest pain, palpitations. GI:  Negative for constipation or diarrhea. : Negative for urinary frequency, dysuria, hematuria, nocturia. Skin: Negative for rash. Hematology: Negative for easy bruising, blood clots.    Musculo-skeletal: Negative for back pain, muscle pain, weakness. Neurologic: Negative for headaches, dizziness, vertigo, memory problems not related to HE. Psychology: Negative for anxiety, depression.       FAMILY HISTORY:  The father  of unknown cause.    The mother  of heart disease.    There is no family history of liver disease.        SOCIAL HISTORY:  The patient is .    The patient has 3 children.    The patient has never used tobacco products.    The patient consumes 6+ alcoholic beverages per day. Elidia Collins has been abstinent from alcohol since 1/15/2018. The patient used to work . The patient retired in 2014.        PHYSICAL EXAMINATION:  VS: per nursing note  General:  No acute distress. Eyes:  Sclera anicteric. ENT:  No oral lesions.    Nodes:  No adenopathy. Skin:  Spider angiomata.  No jaundice. Respiratory:  Lungs clear to auscultation. Cardiovascular:  Regular heart rate. Abdomen:  Distended, non-tender. Abdomen much increased from yesterday, obvious ascites.    Extremities:  No lower extremity edema. Left side BKA with prosthetic leg. Neurologic:  Alert and oriented. His speech is slow. Cranial nerves grossly intact.  No asterixis.      LABORATORY:  [facility] [place] of [address] & Units 5/2/2018 5/1/2018 4/30/2018   WBC 4.1 - 11.1 K/uL 3.2 (L)  3.6 (L)   ANC 1.8 - 8.0 K/UL 2.5  2.8   HGB 12.1 - 17.0 g/dL 7.9 (L)  8.9 (L)    - 400 K/uL 50 (L)  58 (L)   INR 0.9 - 1.1     1.4 (H)   AST 15 - 37 U/L   41 (H)   ALT 12 - 78 U/L   35   Alk Phos 45 - 117 U/L   167 (H)   Bili, Total 0.2 - 1.0 MG/DL   3.2 (H)   Bili, Direct 0.0 - 0.2 MG/DL   1.8 (H)   Albumin 3.5 - 5.0 g/dL   3.5   BUN 6 - 20 MG/DL 21 (H) 27 (H) 35 (H)   Creat 0.70 - 1.30 MG/DL 1.43 (H) 1.68 (H) 2.24 (H)   Na 136 - 145 mmol/L 129 (L) 131 (L) 134 (L)   K 3.5 - 5.1 mmol/L 4.6 4.6 4.2   Cl 97 - 108 mmol/L 100 100 100   CO2 21 - 32 mmol/L 22 24 24   Glucose 65 - 100 mg/dL 145 (H) 110 (H) 186 (H)   Magnesium 1.6 - 2.4 mg/dL      Ammonia <32 UMOL/L 58 (H) 81 (H) 118 (H)      RADIOLOGY:  4/2018. CT of head. No change or acute abnormality. 5/2018.  Triple phase CT scan read pending.     Beverly Patel [facility]  [place] [place] [address], [address] [address]  22.  [phone]

## 2018-05-02 NOTE — ROUTINE PROCESS
Bedside and Verbal shift change report given to Glenis Cuevas RN (oncoming nurse) by Lorna Watson RN (offgoing nurse). Report included the following information SBAR, Kardex, Intake/Output, MAR, Accordion and Recent Results.

## 2018-05-02 NOTE — ROUTINE PROCESS
Bedside and Verbal shift change report given to oncoming nurse by Alena Lynn RN (offgoing nurse). Report given with SBAR, Kardex, MAR and Recent Results.

## 2018-05-02 NOTE — PROGRESS NOTES
Bedside and Verbal shift change report given to [employer] (oncoming nurse) by Rahul Daugherty RN (offgoing nurse). Report included the following information SBAR, Kardex, Intake/Output, MAR, Accordion and Recent Results.

## 2018-05-02 NOTE — PROGRESS NOTES
Hospitalist Progress Note  Jennifer Menon MD  Answering service: [phone] from in house phone  Cell: [phone]      Date of Service:  2018  NAME:  Janneth Carter  :  1960  MRN:  [id number]      Admission Summary:    58-yom who with pmh of hepatic cirrhosis due to alcohol, variceal bleeding, significant ascites requiring paracentesis, LORENZA, CKD, anemia, chronic systolic CHF, PAF, HTN, thrombocytopenia d/t cirrhosis who was recently released from the hospital 7 days ago,who presented with disorientation and agitation. Per ER records wife reported patient was disoriented, that he tried to start his car with a credit card and he was observed to be more agitated. Because of his known history of hepatic encephalopathy presenting similarly, wife was concerned and brought him to the emergency room. In the ER, labs showed a low Na at 132, Creat 2.47, slightly elevated with elevated ammonia level at 144. Last admission was as high as 233, was down to 66 prior to discharge. Interval history / Subjective:   Patient nearing baseline orientation. Continues to improve  Have not had BM today but states had a lot yesterday  Having back pain and shoulder pain       Assessment & Plan:     Acute Hepatic Encephalopathy (POA)- improving  -on admission patient reported no BM in 24 hours, however wife reports had at least 1 in the middle of the night prior to admission   -ammonia level 144 on admission, down to 58  -Lactulose increased to TID on admission   -resume Xifaxan, spoke with wife, costs would be ~$700/month out of pocket which is not affordable.  Staff at Dr Jenna Norton office working on seeing if patient can get further assistance to make more affordable  -hepatology following  - this is 3rd readmission for same problem this month, Will get CT abd/pel to evaluate for Portal venous shunt, hepatology following    LORENZA on CKD stage 3 (POA)  -creat 2.47 on admission, now 1.43  -IV albumin  -hold Lasix and Spironolactone and ARB, will await hepatology to determine when to restart   -monitor    Hepatic Cirrhosis  -follows Dr Tristen Norris OP  -per notes plan for MRI to look for shunt however patient has AICD, checking to see if MRI compatible   -plan is to start working towards liver transplant w/u   - CT abd/pel to evaluate for portal venous shunt    Chronic Thrombocytopenia   -at baseline, monitor for bleeding  -d/t cirrhosis    Chronic Systolic Congestive Heart Failure NYHA class 2   -11/21/2017 Echo ef 35-40%   -s/p AICD  -follows Dr José Virgen with cardiology   -well compensated   -ARB on hold d/t LORENZA, continue Metoprolol    PAF-POA  -paced on tele   -continue home digoxin and Toprol XL    Chronic Hyponatremia d/t cirrhosis   -stable   -monitor    Ascites   -d/t cirrhosis  -paracentesis on previous admission with 10L out  -monitor  -hepatology following  -will order paracentesis in AM as abdomen is starting to distend and dull on exam    HTN   -periods of lower BP  -continue Metoprolol  -Lasix, Spironolactone, and Valsartan on hold d/t LORENZA    Code status: Full  DVT prophylaxis: [address] Olya Friedman discussed with: Patient/Family and Nurse Loreta Hepatology NP  Disposition: Home w/Family and TBD home in 1-2 days     Hospital Problems  Date Reviewed: 4/19/2018          Codes Class Noted POA    * (Principal)Hepatic encephalopathy ([facility] [address] ICD-10-CM: K72.90  ICD-9-CM: 572.2  4/29/2018 Unknown                Review of Systems:   A comprehensive review of systems was negative except for that written in the HPI. Vital Signs:    Last 24hrs VS reviewed since prior progress note.  Most recent are:  Visit Vitals    /62 (BP 1 Location: Left arm, BP Patient Position: At rest)    Pulse 60    Temp 98.1 °F (36.7 °C)    Resp 16    Wt 94.6 kg (208 lb 8.9 oz)    SpO2 99%    BMI 29.92 kg/m2         Intake/Output Summary (Last 24 hours) at 05/02/18 Parrish Carter filed at 05/02/18 0843   Gross per 24 hour   Intake              360 ml   Output              675 ml   Net             -315 ml        Physical Examination:             Constitutional:  Resting in bed in no acute distress   ENT:  Oral mucous moist, oropharynx benign. Resp:  CTA bilaterally. No wheezing/rhonchi/rales. No accessory muscle use   CV:  Regular rhythm, normal rate, no murmurs, gallops, rubs    GI:  distended, dull to precussion, non tender. normoactive bowel sounds,      Musculoskeletal:  No edema, warm, 2+ pulses throughout. L BKA    Neurologic:  Moves all extremities. AAOx3, Follows commands. AO X 3     Psych:  fair insight. Slightly anxious  Skin:  jaundice       Data Review:    Review and/or order of clinical lab test  Review and/or order of tests in the radiology section of CPT  Review and/or order of tests in the medicine section of [facility]      Labs:     Recent Labs      05/02/18 0247 04/30/18 0328   WBC  3.2*  3.6*   HGB  7.9*  8.9*   HCT  22.9*  25.6*   PLT  50*  58*     Recent Labs      05/02/18   0247  05/01/18   0253  04/30/18   0328   NA  129*  131*  134*   K  4.6  4.6  4.2   CL  100  100  100   CO2  22  24  24   BUN  21*  27*  35*   CREA  1.43*  1.68*  2.24*   GLU  145*  110*  186*   CA  9.5  9.2  9.3     Recent Labs      04/30/18   0328   SGOT  41*   ALT  35   AP  167*   TBILI  3.2*   TP  6.8   ALB  3.5   GLOB  3.3     Recent Labs      04/30/18   0328   INR  1.4*   PTP  14.0*      No results for input(s): FE, TIBC, PSAT, FERR in the last 72 hours. Lab Results   Component Value Date/Time    Folate 19.9 12/07/2017 04:25 AM      No results for input(s): PH, PCO2, PO2 in the last 72 hours. No results for input(s): CPK, CKNDX, TROIQ in the last 72 hours.     No lab exists for component: CPKMB  No results found for: CHOL, CHOLX, CHLST, CHOLV, HDL, LDL, LDLC, DLDLP, TGLX, TRIGL, TRIGP, CHHD, CHHDX  Lab Results   Component Value Date/Time    Glucose (POC) 126 (H) 04/19/2018 04:24 PM    Glucose (POC) 255 (H) 04/16/2018 11:50 AM    Glucose (POC) 149 (H) 04/16/2018 06:07 AM    Glucose (POC) 226 (H) 04/16/2018 12:08 AM    Glucose (POC) 145 (H) 04/15/2018 05:39 PM     Lab Results   Component Value Date/Time    Color YELLOW/STRAW 04/29/2018 03:34 PM    Appearance CLEAR 04/29/2018 03:34 PM    Specific gravity 1.010 04/29/2018 03:34 PM    Specific gravity 1.015 04/13/2018 10:10 AM    pH (UA) 6.0 04/29/2018 03:34 PM    Protein NEGATIVE  04/29/2018 03:34 PM    Glucose NEGATIVE  04/29/2018 03:34 PM    Ketone NEGATIVE  04/29/2018 03:34 PM    Bilirubin NEGATIVE  04/29/2018 03:34 PM    Urobilinogen 0.2 04/29/2018 03:34 PM    Nitrites NEGATIVE  04/29/2018 03:34 PM    Leukocyte Esterase NEGATIVE  04/29/2018 03:34 PM    Epithelial cells FEW 04/29/2018 03:34 PM    Bacteria NEGATIVE  04/29/2018 03:34 PM    WBC 0-4 04/29/2018 03:34 PM    RBC 0-5 04/29/2018 03:34 PM         Medications Reviewed:     Current Facility-Administered Medications   Medication Dose Route Frequency    traMADol (ULTRAM) tablet 50 mg  50 mg Oral Q8H PRN    albumin human 25% (BUMINATE) solution 12.5 g  12.5 g IntraVENous Q6H    gabapentin (NEURONTIN) capsule 100 mg  100 mg Oral TID    sodium chloride (NS) flush 5-10 mL  5-10 mL IntraVENous Q8H    sodium chloride (NS) flush 5-10 mL  5-10 mL IntraVENous PRN    ondansetron (ZOFRAN) injection 4 mg  4 mg IntraVENous Q4H PRN    pantoprazole (PROTONIX) tablet 40 mg  40 mg Oral DAILY    lactulose (CHRONULAC) solution 30 g  30 g Oral TID    rifAXIMin (XIFAXAN) tablet 550 mg  550 mg Oral BID    metoprolol succinate (TOPROL-XL) XL tablet 12.5 mg  12.5 mg Oral DAILY    digoxin (LANOXIN) tablet 0.125 mg  0.125 mg Oral QHS    thiamine (B-1) tablet 100 mg  100 mg Oral DAILY    zolpidem (AMBIEN) tablet 5 mg  5 mg Oral QHS PRN     ______________________________________________________________________  EXPECTED LENGTH OF STAY: 4d 16h  ACTUAL LENGTH OF STAY:          3 Jennifer Menon MD

## 2018-05-03 NOTE — PROGRESS NOTES
Bedside shift change report given to Reyes Killian  (oncoming nurse) by Regine Marquez RN  (offgoing nurse). Report included the following information SBAR and MAR.

## 2018-05-03 NOTE — PROGRESS NOTES
Provided pastoral care visit to [facility] 5 patient. Did not include sacramental care.     Ewa Acosta

## 2018-05-03 NOTE — PROGRESS NOTES
Hospitalist Progress Note  Piper Parekh MD  Answering service: [phone] from in house phone  Cell: [phone]      Date of Service:  5/3/2018  NAME:  Elyssa Melchor  :  1960  MRN:  [id number]      Admission Summary:    58-yom who with pmh of hepatic cirrhosis due to alcohol, variceal bleeding, significant ascites requiring paracentesis, LORENZA, CKD, anemia, chronic systolic CHF, PAF, HTN, thrombocytopenia d/t cirrhosis who was recently released from the hospital 7 days ago,who presented with disorientation and agitation. Per ER records wife reported patient was disoriented, that he tried to start his car with a credit card and he was observed to be more agitated. Because of his known history of hepatic encephalopathy presenting similarly, wife was concerned and brought him to the emergency room. In the ER, labs showed a low Na at 132, Creat 2.47, slightly elevated with elevated ammonia level at 144. Last admission was as high as 233, was down to 66 prior to discharge. Interval history / Subjective:   Patient alert x3  C/o pain all over especially in the back and in legs, no trauma, falls, or injuries reported . Denies any other complains or problems      Assessment & Plan:     Acute Hepatic Encephalopathy (POA)  -on admission patient reported no BM in 24 hours, however wife reports had at least 1 in the middle of the night prior to admission   -ammonia level 144 on admission, was down to 58 yesterday up again to 73 today  -Lactulose TID   -resume Xifaxan, spoke with wife, costs would be ~$700/month out of pocket which is not affordable.  Staff at Dr Feliz Lombardi office working on seeing if patient can get further assistance to make more affordable  -hepatology following  - this is 3rd readmission for same problem this month,   - CT abd and pelvis 5/2 Chronic consolidation/atelectasis of the right lung base with air bronchograms and possibly trace pleural effusion. ,Hepatic cirrhosis, Massive ascites. Splenomegaly. , Probable gallstones. Minute bilateral nonobstructing renal calculi. Prostate gland enlargement.   - spoke with Dr. Sarthak Osman no intervenable  shunt noticed on CT    LORENZA on CKD stage 3 (POA)  -creat 2.47 on admission, now 1.57  -IV albumin  -hold Lasix and Spironolactone and ARB, will await hepatology to determine when to restart   -monitor    Hepatic Cirrhosis  -follows Dr Sarthak Osman OP  -per notes plan for MRI to look for shunt however patient has AICD, checking to see if MRI compatible   -plan is to start working towards liver transplant w/u   - s/p large volume paracentesis 5/3  - on IV albumin  - reviewed ascitic fluid results with Dr. Sarthak Osman, no signs of SBP    Chronic Thrombocytopenia   -at baseline, monitor for bleeding  -d/t cirrhosis    Chronic Systolic Congestive Heart Failure NYHA class 2   -11/21/2017 Echo ef 35-40%   -s/p AICD  -follows Dr Shira Contreras with cardiology   -well compensated   -ARB on hold d/t LORENZA, continue Metoprolol    PAF-POA  -paced on tele   -continue home digoxin and Toprol XL    Chronic Hyponatremia d/t cirrhosis   -stable   -monitor    Ascites   -d/t cirrhosis  -paracentesis on previous admission with 10L out  -monitor  -hepatology following    HTN   -periods of lower BP  -continue Metoprolol  -Lasix, Spironolactone, and Valsartan on hold d/t LORENZA    Code status: Full  DVT prophylaxis: 16488 Olya Friedman discussed with: Patient/Family and Nurse ,  Austin Sawyer Hepatology NP  Disposition: Home w/Family and TBD home in 1-2 days     Hospital Problems  Date Reviewed: 4/19/2018          Codes Class Noted POA    * (Principal)Hepatic encephalopathy ([facility] [address] ICD-10-CM: K72.90  ICD-9-CM: 572.2  4/29/2018 Unknown                Review of Systems:   A comprehensive review of systems was negative except for that written in the HPI. Vital Signs:    Last 24hrs VS reviewed since prior progress note.  Most recent are:  Visit Vitals    /65 (BP 1 Location: Left arm, BP Patient Position: At rest)    Pulse 65    Temp 98.3 °F (36.8 °C)    Resp 18    Wt 93 kg (205 lb 0.4 oz)    SpO2 96%    BMI 29.42 kg/m2         Intake/Output Summary (Last 24 hours) at 05/03/18 1449  Last data filed at 05/03/18 1349   Gross per 24 hour   Intake              630 ml   Output             8110 ml   Net            -7480 ml        Physical Examination:             Constitutional:  Resting in bed in no acute distress   ENT:  Oral mucous moist, oropharynx benign. Resp:  CTA bilaterally. No wheezing/rhonchi/rales. No accessory muscle use   CV:  Regular rhythm, normal rate, no murmurs, gallops, rubs    GI:  distended, dull to precussion, non tender. normoactive bowel sounds,      Musculoskeletal:  No edema, warm, 2+ pulses throughout. L BKA    Neurologic:  Moves all extremities. AAOx3, Follows commands. AO X 3     Psych:  fair insight. Slightly anxious  Skin:  jaundice       Data Review:    Review and/or order of clinical lab test  Review and/or order of tests in the radiology section of CPT  Review and/or order of tests in the medicine section of CPT      Labs:     Recent Labs      05/03/18   0213  05/02/18   0247   WBC  2.9*  3.2*   HGB  7.9*  7.9*   HCT  22.7*  22.9*   PLT  57*  50*     Recent Labs      05/03/18   0213  05/02/18   0247  05/01/18   0253   NA  129*  129*  131*   K  4.6  4.6  4.6   CL  98  100  100   CO2  23  22  24   BUN  21*  21*  27*   CREA  1.57*  1.43*  1.68*   GLU  173*  145*  110*   CA  9.6  9.5  9.2     Recent Labs      05/02/18   1600   SGOT  40*   ALT  30   AP  147*   TBILI  3.1*   TP  6.7   ALB  3.6   GLOB  3.1     Recent Labs      05/02/18   1600   INR  1.4*   PTP  14.1*      No results for input(s): FE, TIBC, PSAT, FERR in the last 72 hours. Lab Results   Component Value Date/Time    Folate 19.9 12/07/2017 04:25 AM      No results for input(s): PH, PCO2, PO2 in the last 72 hours.   No results for input(s): CPK, CKNDX, TROIQ in the last 72 hours.     No lab exists for component: CPKMB  No results found for: CHOL, CHOLX, CHLST, CHOLV, HDL, LDL, LDLC, DLDLP, TGLX, TRIGL, TRIGP, CHHD, CHHDX  Lab Results   Component Value Date/Time    Glucose (POC) 126 (H) 04/19/2018 04:24 PM    Glucose (POC) 255 (H) 04/16/2018 11:50 AM    Glucose (POC) 149 (H) 04/16/2018 06:07 AM    Glucose (POC) 226 (H) 04/16/2018 12:08 AM    Glucose (POC) 145 (H) 04/15/2018 05:39 PM     Lab Results   Component Value Date/Time    Color YELLOW/STRAW 04/29/2018 03:34 PM    Appearance CLEAR 04/29/2018 03:34 PM    Specific gravity 1.010 04/29/2018 03:34 PM    Specific gravity 1.015 04/13/2018 10:10 AM    pH (UA) 6.0 04/29/2018 03:34 PM    Protein NEGATIVE  04/29/2018 03:34 PM    Glucose NEGATIVE  04/29/2018 03:34 PM    Ketone NEGATIVE  04/29/2018 03:34 PM    Bilirubin NEGATIVE  04/29/2018 03:34 PM    Urobilinogen 0.2 04/29/2018 03:34 PM    Nitrites NEGATIVE  04/29/2018 03:34 PM    Leukocyte Esterase NEGATIVE  04/29/2018 03:34 PM    Epithelial cells FEW 04/29/2018 03:34 PM    Bacteria NEGATIVE  04/29/2018 03:34 PM    WBC 0-4 04/29/2018 03:34 PM    RBC 0-5 04/29/2018 03:34 PM         Medications Reviewed:     Current Facility-Administered Medications   Medication Dose Route Frequency    albumin human 25% (BUMINATE) solution 25 g  25 g IntraVENous Q6H    oxyCODONE (ROXICODONE INTENSOL) 20 mg/mL concentrated solution 10 mg  10 mg Oral Q6H PRN    traMADol (ULTRAM) tablet 50 mg  50 mg Oral Q8H PRN    gabapentin (NEURONTIN) capsule 100 mg  100 mg Oral TID    sodium chloride (NS) flush 5-10 mL  5-10 mL IntraVENous Q8H    sodium chloride (NS) flush 5-10 mL  5-10 mL IntraVENous PRN    ondansetron (ZOFRAN) injection 4 mg  4 mg IntraVENous Q4H PRN    pantoprazole (PROTONIX) tablet 40 mg  40 mg Oral DAILY    lactulose (CHRONULAC) solution 30 g  30 g Oral TID    rifAXIMin (XIFAXAN) tablet 550 mg  550 mg Oral BID    metoprolol succinate (TOPROL-XL) XL tablet 12.5 mg  12.5 mg Oral DAILY    digoxin (LANOXIN) tablet 0.125 mg  0.125 mg Oral QHS    thiamine (B-1) tablet 100 mg  100 mg Oral DAILY    zolpidem (AMBIEN) tablet 5 mg  5 mg Oral QHS PRN     ______________________________________________________________________  EXPECTED LENGTH OF STAY: 4d 16h  ACTUAL LENGTH OF STAY:          4                 Atul Eubanks MD

## 2018-05-03 NOTE — PROGRESS NOTES
Problem: Pressure Injury - Risk of  Goal: *Prevention of pressure injury  Document Spencer Scale and appropriate interventions in the flowsheet.    Outcome: Progressing Towards Goal  Pressure Injury Interventions:       Moisture Interventions: Absorbent underpads    Activity Interventions: Increase time out of bed, Pressure redistribution bed/mattress(bed type)    Mobility Interventions: Pressure redistribution bed/mattress (bed type), HOB 30 degrees or less    Nutrition Interventions: Document food/fluid/supplement intake

## 2018-05-03 NOTE — ROUTINE PROCESS
TRANSFER - OUT REPORT:    Verbal report given to unit RN on Adia Osborne  being transferred to  for routine progression of care       Report consisted of patients Situation, Background, Assessment and   Recommendations(SBAR). Information from the following report(s) SBAR was reviewed with the receiving nurse. Lines:   Peripheral IV 05/02/18 Left Forearm (Active)   Site Assessment Clean, dry, & intact 5/3/2018  9:18 AM   Phlebitis Assessment 0 5/3/2018  9:18 AM   Infiltration Assessment 0 5/3/2018  9:18 AM   Dressing Status Clean, dry, & intact 5/3/2018  9:18 AM   Dressing Type Transparent;Tape 5/3/2018  9:18 AM   Hub Color/Line Status Pink 5/3/2018  9:18 AM   Action Taken Open ports on tubing capped 5/3/2018  9:18 AM   Alcohol Cap Used Yes 5/3/2018  9:18 AM        Opportunity for questions and clarification was provided. Patient transported with:   transport on stretcher back to unit; right lower abdominal dsg with percustay and hypofix tape intact; drainage tube draining into drainage bag;  Sample taken to lab for cell count by [employer] tech as ordered.

## 2018-05-03 NOTE — PROGRESS NOTES
Care Management Interventions  PCP Verified by CM: Yes  Palliative Care Criteria Met (RRAT>21 & CHF Dx)?: No  Mode of Transport at Discharge: Other (see comment) (private vehicle)  Transition of Care Consult (CM Consult): Discharge Planning  Discharge Durable Medical Equipment: No  Physical Therapy Consult: No  Occupational Therapy Consult: No  Speech Therapy Consult: No  Current Support Network: Lives with Spouse  Confirm Follow Up Transport: Family  Plan discussed with Pt/Family/Caregiver: Yes  Freedom of Choice Offered: Yes  [address] Provided?: No  Discharge Location  Discharge Placement: Home    CM reviewed chart and noted transfer to [facility]. Pt is usually independent with ADLs and IADLs. Pt lives with his wife and he has still been able to drive himself up until this admission. Plan is to be able to return home with his wife. CM will continue to be available incase any needs arise.   Vannesa Corbett, BSW, ACM

## 2018-05-03 NOTE — PROGRESS NOTES
[address]  Axel Bullock MD, FACP, Cite Frantz Pineda, FAASLD       Ayala Sahu, MARTÍN Cedillo, KULDEEP SARKAR, ACNP-BC   Parth Alexander, MARTÍN Ram NP   [address] [facility]    [address], [address] [address], [address]    [phone]    FAX: [phone] [address]    at LTAC, located within [facility]  [address] [facility], [address]  [place], [address] [facility]    [phone]    FAX: [phone]   '  HEPATOLOGY PROGRESS NOTE  The patient is well known and regularly cared for at the [place] [address]. He is a 59 y.o.  male with cirrhosis secondary to alcohol.  He finally stopped consuming alcohol in 1/2018. His liver history includes esophageal variceal bleed, refractory ascites requiring several paracenteses, HE and thrombocytopenia. Patient presented to the ED last night with acute hepatic encephalopathy after trying to start his car with a credit card. When he presented to the ED, his ammonia level was 144. He was given lactulose and Xifaxan.       He remains a bit slow in thought. He can communicate but has to concentrate on what he is saying. CT scan shows no evidence for portal venous shuniting which could contribute to refractory/repeated episodes of HE.        ASSESSMENT AND PLAN:  Cirrhosis  This is secondary to alcohol. The CTP score is 9, Child class B, MELD 24.       Alcohol abuse in remission  He has been abstinent from alcohol since 1/15/2018. [facility] could be considered a candidate for LT if this is needed in 7/2018. Encouraged patient to remain abstinent.       Hepatic Encephalopathy  This is his 3rd episode of HE which has required hospitalization in 2 months.   WE did a CT looking for evidence of a portal-systemic venous shunt that could contribute to refractory HE and could be embolized. I have reviewed the recent CT with Dr Gail Mcgee and no shunts were seen. The most likely cause for the repeated episodes of HE is protein loading and or non-compliance with lactulose and xifaxan after he leaves the hospital.  It is my understanding that he is in an assisted living environment and I wonder if they are assisting in his medications as effectively as need be for his condition. Another factor that could be contributing to the alteration in his mental status is dementia, possibly form previous ETOH. It is often very difficult to differentiate dementia from HE and the relative contributions of each on a daily basis. I would suggest a goo contrast MRI to evaluate for white matter changes. Ascites  He has reacummulated ascites. He is scheduled for LVP today. Continue IV albumin 25 gm Q6H. His Screat is now down to 1.5 gm and we can restart step 1 diuretics.     LORENZA  Screat is down from 2.4 to1.43 mg but up slightly today to 1.57 mg. The bump is likely due to the recent CT. We can start him back on step 1 diuretics. Continue IV albumin every 6 hrs.       Thrombocytopenia  This is secondary to cirrhosis.  No treatment needed.      PHYSICAL EXAMINATION:  VS: per nursing note  General:  No acute distress. Eyes:  Sclera anicteric. ENT:  No oral lesions.    Nodes:  No adenopathy. Skin:  Spider angiomata.  No jaundice. Respiratory:  Lungs clear to auscultation. Cardiovascular:  Regular heart rate. Abdomen:  Distended with obvious ascites. Non-tender. Extremities:  No lower extremity edema. Left side BKA with prosthetic leg. Neurologic:  Alert and oriented. His speech is slow. Cranial nerves grossly intact.  No asterixis.      LABORATORY:  Results for Doris Shah (MRN [id number]) as of 5/3/2018 07:41   Ref.  Range 4/30/2018 03:28 5/1/2018 02:53 5/2/2018 02:47 5/2/2018 16:00 5/3/2018 02:13   WBC Latest Ref Range: 4.1 - 11.1 K/uL 3.6 (L)  3.2 (L)  2.9 (L)   HGB Latest Ref Range: 12.1 - 17.0 g/dL 8.9 (L)  7.9 (L)  7.9 (L)   PLATELET Latest Ref Range: 150 - 400 K/uL 58 (L)  50 (L)  57 (L)   INR Latest Ref Range: 0.9 - 1.1   1.4 (H)   1.4 (H)    Sodium Latest Ref Range: 136 - 145 mmol/L 134 (L) 131 (L) 129 (L)  129 (L)   Potassium Latest Ref Range: 3.5 - 5.1 mmol/L 4.2 4.6 4.6  4.6   Chloride Latest Ref Range: 97 - 108 mmol/L 100 100 100  98   CO2 Latest Ref Range: 21 - 32 mmol/L 24 24 22  23   Glucose Latest Ref Range: 65 - 100 mg/dL 186 (H) 110 (H) 145 (H)  173 (H)   BUN Latest Ref Range: 6 - 20 MG/DL 35 (H) 27 (H) 21 (H)  21 (H)   Creatinine Latest Ref Range: 0.70 - 1.30 MG/DL 2.24 (H) 1.68 (H) 1.43 (H)  1.57 (H)   Bilirubin, total Latest Ref Range: 0.2 - 1.0 MG/DL 3.2 (H)   3.1 (H)    Albumin Latest Ref Range: 3.5 - 5.0 g/dL 3.5   3.6    ALT (SGPT) Latest Ref Range: 12 - 78 U/L 35   30    AST Latest Ref Range: 15 - 37 U/L 41 (H)   40 (H)    Alk.  phosphatase Latest Ref Range: 45 - 117 U/L 167 (H)   147 (H)    Ammonia Latest Ref Range: <32 UMOL/L 118 (H) 81 (H) 58 (H)  73 (H)     Tania Dickinson MD  [facility] [place] [facility] [address] [address] [address]  [address] [facility] [address]. [phone]

## 2018-05-03 NOTE — ADT AUTH CERT NOTES
5/3 PARACENTESIS by Dariela Go, RN        Review Status Review Entered       In Primary 5/3/2018       Details         CT ABD/PELV:  IMPRESSION:     Chronic consolidation/atelectasis of the right lung base with air bronchograms  and possibly trace pleural effusion.     Hepatic cirrhosis.      Massive ascites.     Splenomegaly.     Probable gallstones.     Minute bilateral nonobstructing renal calculi.     Prostate gland enlargement.     Questionable wall thickening involving the anterior bladder. Result Information      Status Provider Status        In process (Exam [facility] 5/3/2018  9:18 AM) Open         Imaging      US PARACENTESIS ABD W IMAGING (Order #[id number]) on 5/2/2018 - Imaging Information           5/3/2018  9:18 AM - [employer] [email or web address] [facility]                    5/2 CLINICAL by Dariela Go, RN        Review Status Review Entered       In Primary 5/3/2018       Details         Today, he is more alert and oriented. His ammonia level is down to 58. He is currently eating on his own  ASSESSMENT AND PLAN:     Cirrhosis  This is secondary to alcohol. The CTP score is 9, Child class B, MELD 24.      Alcohol abuse in remission  He has been abstinent from alcohol since 1/15/2018. [facility] could be considered a candidate for LT if this is needed in 7/2018. Encouraged patient to remain abstinent.      Hepatic Encephalopathy  Presented to the ED yesterday with AMS. Lactulose was given, symptoms resolved. He is alert and oriented today. Ammonia level is declining. Continue current dose of lactulose and Xifaxan daily. He has had several admissions for exacerbation of HE over the past 3-4 months. CT scan was ordered today. Read is pending. Whit Valverde is still trying to get patient assistance for Xifaxan at home.      Ascites  He had ~10 liters of ascites removed during his last admission in early April 2018 (~2 weeks ago). Ascites is present today and markedly increased over yesterday. Continue use IV albumin 25 gm Q6H.  Hold diuretics due to LORENZA. Ultrasound paracentesis has been ordered.  Resume diuretics when able. Need to see hit on Screat after CT first.      LORENZA  Screat 1.43.  Most likely secondary to dehydration. Continue IV albumin every 6 hrs. See what Screat is after CT scan. Need to resume diuretics when able.      Thrombocytopenia  This is secondary to cirrhosis.  No treatment needed.     Assessment & Plan:      Acute Hepatic Encephalopathy (POA)- improving  -on admission patient reported no BM in 24 hours, however wife reports had at least 1 in the middle of the night prior to admission   -ammonia level 144 on admission, down to 58  -Lactulose increased to TID on admission   -resume Xifaxan, spoke with wife, costs would be ~$700/month out of pocket which is not affordable.  Staff at Dr Rica Aldridge office working on seeing if patient can get further assistance to make more affordable  -hepatology following  - this is 3rd readmission for same problem this month, Will get CT abd/pel to evaluate for Portal venous shunt, hepatology following     LORENZA on CKD stage 3 (POA)  -creat 2.47 on admission, now 1.43  -IV albumin  -hold Lasix and Spironolactone and ARB, will await hepatology to determine when to restart   -monitor     Hepatic Cirrhosis  -follows Dr Reji Michelle OP  -per notes plan for MRI to look for shunt however patient has AICD, checking to see if MRI compatible   -plan is to start working towards liver transplant w/u   - CT abd/pel to evaluate for portal venous shunt     Chronic Thrombocytopenia   -at baseline, monitor for bleeding  -d/t cirrhosis     Chronic Systolic Congestive Heart Failure NYHA class 2   -11/21/2017 Echo ef 35-40%   -s/p AICD  -follows Dr Luke Bolds with cardiology   -well compensated   -ARB on hold d/t LORENZA, continue Metoprolol     PAF-POA  -paced on tele   -continue home digoxin and Toprol XL     Chronic Hyponatremia d/t cirrhosis   -stable   -monitor     Ascites   -d/t cirrhosis  -paracentesis on previous admission with 10L out  -monitor  -hepatology following  -will order paracentesis in AM as abdomen is starting to distend and dull on exam     HTN   -periods of lower BP  -continue Metoprolol  -Lasix, Spironolactone, and Valsartan on hold d/t LORENZA     Code status: Full  DVT prophylaxis: Scds     Care Plan discussed with: Patient/Family and Ashley Preston Hepatology NP  Disposition: Home w/Family and TBD home in 1-2 days      CT ABD/PELV:  IMPRESSION:     Chronic consolidation/atelectasis of the right lung base with air bronchograms  and possibly trace pleural effusion.     Hepatic cirrhosis.      Massive ascites.     Splenomegaly.     Probable gallstones.     Minute bilateral nonobstructing renal calculi.     Prostate gland enlargement.     Questionable wall thickening involving the anterior bladder.

## 2018-05-03 NOTE — PROGRESS NOTES
Patient complaining of pain on toes from diabetic neuropathy as well as back and R shoulder. 7/10. States he was receiving Percocet every 4 hrs, not sure how much strength. MD notified.

## 2018-05-03 NOTE — PROGRESS NOTES
Problem: Falls - Risk of  Goal: *Absence of Falls  Document Stefania Fall Risk and appropriate interventions in the flowsheet.    Outcome: Progressing Towards Goal  Fall Risk Interventions:  Mobility Interventions: Patient to call before getting OOB    Mentation Interventions: Bed/chair exit alarm    Medication Interventions: Bed/chair exit alarm, Patient to call before getting OOB    Elimination Interventions: Call light in reach

## 2018-05-03 NOTE — PROGRESS NOTES
Bedside shift change report given to Spero Klinefelter, RN (oncoming nurse) by Maurice Alfred RN (offgoing nurse). Report included the following information SBAR, Kardex and MAR.

## 2018-05-03 NOTE — ROUTINE PROCESS
Bedside and Verbal shift change report given to Cass Wheeler RN (oncoming nurse) by Sarah Batista RN (offgoing nurse). Report included the following information SBAR, Intake/Output, MAR and Recent Results.

## 2018-05-04 NOTE — PROGRESS NOTES
Hospitalist Progress Note  Destinee Gaytan MD  Answering service: [phone] from in house phone  Cell: [phone]      Date of Service:  2018  NAME:  Adia Osborne  :  1960  MRN:  [id number]      Admission Summary:    58-yom who with pmh of hepatic cirrhosis due to alcohol, variceal bleeding, significant ascites requiring paracentesis, LORENZA, CKD, anemia, chronic systolic CHF, PAF, HTN, thrombocytopenia d/t cirrhosis who was recently released from the hospital 7 days ago,who presented with disorientation and agitation. Per ER records wife reported patient was disoriented, that he tried to start his car with a credit card and he was observed to be more agitated. Because of his known history of hepatic encephalopathy presenting similarly, wife was concerned and brought him to the emergency room. In the ER, labs showed a low Na at 132, Creat 2.47, slightly elevated with elevated ammonia level at 144. Last admission was as high as 233, was down to 66 prior to discharge. Interval history / Subjective:   Patient alert x3  Pt was for probable D/C today but had nausea and vomiting today   reports symptoms somewhat better now  Still feels dizzy   Denies any other complains or problems      Assessment & Plan:     Acute Hepatic Encephalopathy (POA)  -on admission patient reported no BM in 24 hours, however wife reports had at least 1 in the middle of the night prior to admission   -ammonia level 144 on admission, was down to 58 yesterday up again to 73 5/3  - lactulose tid  -resume Xifaxan, costs would be ~$700/month out of pocket which is not affordable.  Staff at Dr Mateo Trevizo office working on seeing if patient can get further assistance to make more affordable  -hepatology following  - this is 3rd readmission for same problem this month,   - CT abd and pelvis 5/2 Chronic consolidation/atelectasis of the right lung base with air bronchograms and possibly trace pleural effusion. ,Hepatic cirrhosis, Massive ascites. Splenomegaly. , Probable gallstones. Minute bilateral nonobstructing renal calculi. Prostate gland enlargement.   - spoke with Dr. Juanjose Gupta no intervenable shunt noticed on CT, no need for daily ammonia levels per Dr. Juanjose Gupta    LORENZA on CKD stage 3 (POA)  -creat 2.47 on admission, now 1.10  -IV albumin  -hold Lasix and Spironolactone and ARB, will await hepatology to determine when to restart   -monitor    Hepatic Cirrhosis  -follows Dr Juanjose Gupta OP  -per notes plan for MRI to look for shunt however patient has AICD, checking to see if MRI compatible   -plan is to start working towards liver transplant w/u   - s/p large volume paracentesis 5/3  - on IV albumin  - reviewed ascitic fluid results with Dr. Juanjose Gupta, no signs of SBP    Chronic Thrombocytopenia   -at baseline, monitor for bleeding  -d/t cirrhosis    Chronic Systolic Congestive Heart Failure NYHA class 2   -11/21/2017 Echo ef 35-40%   -s/p AICD  -follows Dr Estefanía Mondragon with cardiology   -well compensated   -ARB on hold d/t LORENZA, continue Metoprolol    PAF-POA  -paced on tele   -continue home digoxin and Toprol XL    Chronic Hyponatremia d/t cirrhosis   -stable   -monitor    Ascites   -d/t cirrhosis  -paracentesis on previous admission with 10L out  -monitor  -hepatology following    HTN   -periods of lower BP  -continue Metoprolol  -Lasix, Spironolactone, and Valsartan on hold d/t LORENZA    Nausea/ vomiting  - resolving  - ?  Secondary to opioids for pain  - d/c Roxicodone  - PRN antiemetics        Code status: Full  DVT prophylaxis: 513[address] Olya Friedman discussed with: Patient/Family and Nurse ,  Grace Patrick Hepatology NP  Disposition: Home w/Family and TBD home tomorrow      Hospital Problems  Date Reviewed: 4/19/2018          Codes Class Noted POA    * (Principal)Hepatic encephalopathy ([facility] [address] ICD-10-CM: K72.90  ICD-9-CM: 572.2  4/29/2018 Unknown                Review of Systems: A comprehensive review of systems was negative except for that written in the HPI. Vital Signs:    Last 24hrs VS reviewed since prior progress note. Most recent are:  Visit Vitals    /64 (BP 1 Location: Left arm, BP Patient Position: At rest)    Pulse 66    Temp 98.4 °F (36.9 °C)    Resp 16    Wt 89.2 kg (196 lb 10.4 oz)    SpO2 97%    BMI 28.22 kg/m2         Intake/Output Summary (Last 24 hours) at 05/04/18 1545  Last data filed at 05/04/18 1325   Gross per 24 hour   Intake              720 ml   Output             1400 ml   Net             -680 ml        Physical Examination:             Constitutional:  Resting in bed in no acute distress   ENT:  Oral mucous moist, oropharynx benign. Resp:  CTA bilaterally. No wheezing/rhonchi/rales. No accessory muscle use   CV:  Regular rhythm, normal rate, no murmurs, gallops, rubs    GI:  distended, dull to precussion, non tender. normoactive bowel sounds,      Musculoskeletal:  No edema, warm, 2+ pulses throughout. L BKA    Neurologic:  Moves all extremities. AAOx3, Follows commands. AO X 3     Psych:  fair insight.  Slightly anxious  Skin:  jaundice       Data Review:    Review and/or order of clinical lab test  Review and/or order of tests in the radiology section of CPT  Review and/or order of tests in the medicine section of CPT      Labs:     Recent Labs      05/04/18   1448  05/03/18   0213   WBC  2.7*  2.9*   HGB  7.9*  7.9*   HCT  22.5*  22.7*   PLT  51*  57*     Recent Labs      05/04/18   1448  05/03/18   0213  05/02/18   0247   NA  129*  129*  129*   K  4.9  4.6  4.6   CL  99  98  100   CO2  23  23  22   BUN  15  21*  21*   CREA  1.10  1.57*  1.43*   GLU  169*  173*  145*   CA  10.3*  9.6  9.5     Recent Labs      05/04/18   1448  05/02/18   1600   SGOT  41*  40*   ALT  29  30   AP  124*  147*   TBILI  2.8*  3.1*   TP  6.9  6.7   ALB  4.2  3.6   GLOB  2.7  3.1     Recent Labs      05/02/18   1600   INR  1.4*   PTP  14.1*      No results for input(s): FE, TIBC, PSAT, FERR in the last 72 hours. Lab Results   Component Value Date/Time    Folate 19.9 12/07/2017 04:25 AM      No results for input(s): PH, PCO2, PO2 in the last 72 hours. No results for input(s): CPK, CKNDX, TROIQ in the last 72 hours.     No lab exists for component: CPKMB  No results found for: CHOL, CHOLX, CHLST, CHOLV, HDL, LDL, LDLC, DLDLP, TGLX, TRIGL, TRIGP, CHHD, CHHDX  Lab Results   Component Value Date/Time    Glucose (POC) 126 (H) 04/19/2018 04:24 PM    Glucose (POC) 255 (H) 04/16/2018 11:50 AM    Glucose (POC) 149 (H) 04/16/2018 06:07 AM    Glucose (POC) 226 (H) 04/16/2018 12:08 AM    Glucose (POC) 145 (H) 04/15/2018 05:39 PM     Lab Results   Component Value Date/Time    Color YELLOW/STRAW 04/29/2018 03:34 PM    Appearance CLEAR 04/29/2018 03:34 PM    Specific gravity 1.010 04/29/2018 03:34 PM    Specific gravity 1.015 04/13/2018 10:10 AM    pH (UA) 6.0 04/29/2018 03:34 PM    Protein NEGATIVE  04/29/2018 03:34 PM    Glucose NEGATIVE  04/29/2018 03:34 PM    Ketone NEGATIVE  04/29/2018 03:34 PM    Bilirubin NEGATIVE  04/29/2018 03:34 PM    Urobilinogen 0.2 04/29/2018 03:34 PM    Nitrites NEGATIVE  04/29/2018 03:34 PM    Leukocyte Esterase NEGATIVE  04/29/2018 03:34 PM    Epithelial cells FEW 04/29/2018 03:34 PM    Bacteria NEGATIVE  04/29/2018 03:34 PM    WBC 0-4 04/29/2018 03:34 PM    RBC 0-5 04/29/2018 03:34 PM         Medications Reviewed:     Current Facility-Administered Medications   Medication Dose Route Frequency    albumin human 25% (BUMINATE) solution 25 g  25 g IntraVENous Q6H    traMADol (ULTRAM) tablet 50 mg  50 mg Oral Q8H PRN    gabapentin (NEURONTIN) capsule 100 mg  100 mg Oral TID    sodium chloride (NS) flush 5-10 mL  5-10 mL IntraVENous Q8H    sodium chloride (NS) flush 5-10 mL  5-10 mL IntraVENous PRN    ondansetron (ZOFRAN) injection 4 mg  4 mg IntraVENous Q4H PRN    pantoprazole (PROTONIX) tablet 40 mg  40 mg Oral DAILY    lactulose (CHRONULAC) solution 30 g  30 g Oral TID    rifAXIMin (XIFAXAN) tablet 550 mg  550 mg Oral BID    metoprolol succinate (TOPROL-XL) XL tablet 12.5 mg  12.5 mg Oral DAILY    digoxin (LANOXIN) tablet 0.125 mg  0.125 mg Oral QHS    thiamine (B-1) tablet 100 mg  100 mg Oral DAILY    zolpidem (AMBIEN) tablet 5 mg  5 mg Oral QHS PRN     ______________________________________________________________________  EXPECTED LENGTH OF STAY: 4d 16h  ACTUAL LENGTH OF STAY:          5                 Alise Mack MD

## 2018-05-04 NOTE — PROGRESS NOTES
Bedside shift change report given to Enedelia Pacheco (oncoming nurse) by Amy Jackson (offgoing nurse). Report included the following information SBAR, Kardex and MAR.

## 2018-05-04 NOTE — PROGRESS NOTES
Bedside and Verbal shift change report given to Roxanne Munguia (oncoming nurse) by Dexter Duane (offgoing nurse). Report included the following information SBAR, Kardex, MAR and Recent Results.

## 2018-05-04 NOTE — PROGRESS NOTES
Pt nauseated and vomited 2x this morning. Wife at bedside and both her/pt think its from roxicodone. Pt says this has happened before when he takes pain medicine. Wife is very upset the patient was put on this medicine and wants to talk to the doctor. Prn antiemetic given per md orders, pt refuses to take any morning meds due to N/V. Will give later when pt is able to keep down, will continue to monitor.

## 2018-05-05 NOTE — PROGRESS NOTES
Hospitalist Progress Note  Katheryn Pino MD  Answering service: [phone] from in house phone         Date of Service:  2018  NAME:  Helyn Bosworth  :  1960  MRN:  [id number]      Admission Summary:   \"58-yom who with pmh of hepatic cirrhosis due to alcohol, variceal bleeding, significant ascites requiring paracentesis, LORENZA, CKD, anemia, chronic systolic CHF, PAF, HTN, thrombocytopenia d/t cirrhosis who was recently released from the hospital 7 days ago,who presented with disorientation and agitation. Per ER records wife reported patient was disoriented, that he tried to start his car with a credit card and he was observed to be more agitated. Because of his known history of hepatic encephalopathy presenting similarly, wife was concerned and brought him to the emergency room. In the ER, labs showed a low Na at 132, Creat 2.47, slightly elevated with elevated ammonia level at 144. Last admission was as high as 233, was down to 66 prior to discharge. \"    Interval history / Subjective: Angered spouse accusatory to weekday hospitalist regarding percocet medication as the reason pt unable to be discharged, lack of f/u with hepatology and also states that weekday nurse did not followup on patient's bm's as spouse thinks pt did not have any. Patient wants to keep the lights off due to headache. Assessment & Plan:     Acute Hepatic Encephalopathy (POA)  -on admission patient reported no BM in 24 hours, however wife reports had at least 1 in the middle of the night prior to admission   -ammonia level 144 on admission-  \"resume Xifaxan, costs would be ~$700/month out of pocket which is not affordable. Staff at Dr Mindy Perkins office working on seeing if patient can get further assistance to make more affordable\" as per weekday hospitalist.  -hepatology following  - this is recurrent readmission for this.   - CT abd and pelvis  \"Chronic consolidation/atelectasis of the right lung base with air bronchograms and possibly trace pleural effusion. ,Hepatic cirrhosis, Massive ascites. Splenomegaly. , Probable gallstones. Minute bilateral nonobstructing renal calculi. Prostate gland enlargement. \"  -weekday hospitalist note: \"spoke with Dr. Sue Jordan no intervenable shunt noticed on CT, no need for daily ammonia levels per Dr. Viola Marino"    LORENZA on CKD stage 3 (POA)  -improved  -IV albumin  -Weekday hospitalist: held Lasix and Spironolactone and ARB, \"will await hepatology to determine when to restart \"    Hepatic Cirrhosis  -follows Dr Sue Jordan OP  -per notes plan for MRI to look for shunt however patient has AICD, checking to see if MRI compatible   -plan is to start working towards liver transplant w/u   - s/p large volume paracentesis 5/3  - on IV albumin  - Weekday hospitalist: \"reviewed ascitic fluid results with Dr. Sue Jordan, no signs of SBP\"    Chronic Thrombocytopenia in setting of cirrhosis  -at baseline, monitor for bleeding     Chronic Systolic Congestive Heart Failure NYHA class 2   -11/21/2017 Echo ef 35-40%   -s/p AICD  -follows Dr Magalie Knight with cardiology   -ARB on hold due to LORENZA, continue Metoprolol as per weekday hospitalist    PAF-POA  -paced on tele   -resume current meds    Chronic Hyponatremia d/t cirrhosis   -stable   -monitor    Ascites   -s/p paracentesis  -hepatology following    HTN   -stable  -Lasix, Spironolactone, and Valsartan on hold due to LORENZA as per weekday hospitalist.    Nausea/ vomiting  -per patient's spouse 5/5/2018 was the percocet which spouse is upset    Headache this morning, per pt wishes to keep lights off      Code status: Full  DVT prophylaxis:  scd    Disposition:  Angered spouse accusatory to weekday hospitalist regarding percocet medication as the reason pt unable to be discharged, lack of f/u with hepatology and also states that weekday nurse did not followup on patient's bm's as spouse thinks pt did not have any.  5/5/2018: spoke with the overnight charge nurse who plans to also inform daytime charge nurse that regarding the above as well as to confirm if pt is having bowel movements as he is on medications for this due to his liver condition.-->5/5/2018 increased lactulose from 30 ml three times daily to 45 ml three times daily. Hospital Problems  Date Reviewed: 4/19/2018          Codes Class Noted POA    * (Principal)Hepatic encephalopathy [facility]) ICD-10-CM: K72.90  ICD-9-CM: 572.2  4/29/2018 Unknown                Review of Systems:   A comprehensive review of systems was negative except for that written in the HPI. Vital Signs:    Last 24hrs VS reviewed since prior progress note. Most recent are:  Visit Vitals    /61 (BP 1 Location: Left arm, BP Patient Position: At rest)    Pulse 64    Temp 98 °F (36.7 °C)    Resp 16    Wt 87.5 kg (192 lb 14.4 oz)    SpO2 97%    BMI 27.68 kg/m2         Intake/Output Summary (Last 24 hours) at 05/05/18 0720  Last data filed at 05/04/18 1854   Gross per 24 hour   Intake              480 ml   Output              580 ml   Net             -100 ml        Physical Examination:             Constitutional:  Resting in bed stating he has a headache   ENT:  unable to assess as pt not cooperating   Resp:  No accessory muscle use, unable to assess as pt not cooperating   CV:  Regular rhythm, normal rate     GI:  distended, soft, non tender. Musculoskeletal:  left bka    Neurologic:  Moves extremities.               Data Review:          Labs:     Recent Labs      05/04/18   1448  05/03/18   0213   WBC  2.7*  2.9*   HGB  7.9*  7.9*   HCT  22.5*  22.7*   PLT  51*  57*     Recent Labs      05/04/18   1448  05/03/18   0213   NA  129*  129*   K  4.9  4.6   CL  99  98   CO2  23  23   BUN  15  21*   CREA  1.10  1.57*   GLU  169*  173*   CA  10.3*  9.6     Recent Labs      05/04/18   1448  05/02/18   1600   SGOT  41*  40*   ALT  29  30   AP  124*  147*   TBILI  2.8*  3.1* TP  6.9  6.7   ALB  4.2  3.6   GLOB  2.7  3.1     Recent Labs      05/02/18   1600   INR  1.4*   PTP  14.1*      No results for input(s): FE, TIBC, PSAT, FERR in the last 72 hours.    Lab Results   Component Value Date/Time    Folate 19.9 12/07/2017 04:25 AM     Lab Results   Component Value Date/Time    Color YELLOW/STRAW 04/29/2018 03:34 PM    Appearance CLEAR 04/29/2018 03:34 PM    Specific gravity 1.010 04/29/2018 03:34 PM    Specific gravity 1.015 04/13/2018 10:10 AM    pH (UA) 6.0 04/29/2018 03:34 PM    Protein NEGATIVE  04/29/2018 03:34 PM    Glucose NEGATIVE  04/29/2018 03:34 PM    Ketone NEGATIVE  04/29/2018 03:34 PM    Bilirubin NEGATIVE  04/29/2018 03:34 PM    Urobilinogen 0.2 04/29/2018 03:34 PM    Nitrites NEGATIVE  04/29/2018 03:34 PM    Leukocyte Esterase NEGATIVE  04/29/2018 03:34 PM    Epithelial cells FEW 04/29/2018 03:34 PM    Bacteria NEGATIVE  04/29/2018 03:34 PM    WBC 0-4 04/29/2018 03:34 PM    RBC 0-5 04/29/2018 03:34 PM         Medications Reviewed:                    Brie Clemente MD

## 2018-05-05 NOTE — ROUTINE PROCESS
Bedside shift change report given to vinay lopes rn (oncoming nurse) by Kalyan Gonzalez (offgoing nurse). Report included the following information SBAR and Kardex.

## 2018-05-06 NOTE — PROGRESS NOTES
[address]  Charisma Cuellar MD, FACP, [facility], [facility]       Janene Baldwin, MARTÍN Bardales, KULDEEP SARKAR, ACNP-BC   Parth Adhikari, MARTÍN Morrow NP   [address] [facility]    [address], [address] [address], [address] [facility]    [phone]    FAX: [phone] [address]  [address] [facility], [address]  [place], [address] [facility]    [phone]    FAX: [phone]   '  HEPATOLOGY PROGRESS NOTE  The patient is well known and regularly cared for at the [place] [address]. He is a 59 y.o.  male with cirrhosis secondary to alcohol.  He finally stopped consuming alcohol in 1/2018. His liver history includes esophageal variceal bleed, refractory ascites requiring several paracenteses, HE and thrombocytopenia. Patient presented to the ED last night with acute hepatic encephalopathy after trying to start his car with a credit card. When he presented to the ED, his ammonia level was 144. He was given lactulose and Xifaxan.       He is now alert and oriented. He is having regular BM. Ammonia has been under 100 for 2 consecutive days. He can be discharged to home in AM.   He should take lactulose TID and xifaxan BID       . He should also limit protein to only 2 protein items daily. I have discussed this with he and wife. They understand. I will see him for follow-up in my office in 2 weeks. ASSESSMENT AND PLAN:  Cirrhosis  This is secondary to alcohol. The CTP score is 9, Child class B, MELD 24.       Alcohol abuse in remission  He has been abstinent from alcohol since 1/15/2018. Ester Brooks could be considered a candidate for LT if this is needed in 7/2018.  Encouraged patient to remain abstinent.       Hepatic Encephalopathy  This is his 3rd episode of HE which has required hospitalization in 2 months. WE did a CT looking for evidence of a portal-systemic venous shunt that could contribute to refractory HE and could be embolized. I have reviewed the recent CT with Dr Kayden Denny and no shunts were seen. The most likely cause for the repeated episodes of HE is protein loading and or non-compliance with lactulose and xifaxan after he leaves the hospital.  I have spoken to his Jadiel Hamburger and we will limit him to 2 protein items daily.     Ascites  Will continue IV albumin until DC. Dc on step 1 diuretics.     LORENZA  THis has resolved. Screat back to baseline.      Thrombocytopenia  This is secondary to cirrhosis.  No treatment needed.      PHYSICAL EXAMINATION:  VS: per nursing note  General:  No acute distress. Eyes:  Sclera anicteric. ENT:  No oral lesions.    Nodes:  No adenopathy. Skin:  Spider angiomata.  No jaundice. Respiratory:  Lungs clear to auscultation. Cardiovascular:  Regular heart rate. Abdomen:  Distended with obvious ascites. Non-tender. Extremities:  No lower extremity edema. Left side BKA with prosthetic leg. Neurologic:  Alert and oriented. His speech is slow. Cranial nerves grossly intact.  No asterixis.      LABORATORY:  Results for Dagmar Barth (MRN [id number]) as of 5/6/2018 18:31   Ref.  Range 5/3/2018 02:13 5/4/2018 14:48 5/6/2018 05:09   WBC Latest Ref Range: 4.1 - 11.1 K/uL 2.9 (L) 2.7 (L) 2.5 (L)   HGB Latest Ref Range: 12.1 - 17.0 g/dL 7.9 (L) 7.9 (L) 7.7 (L)   PLATELET Latest Ref Range: 150 - 400 K/uL 57 (L) 51 (L) 55 (L)   INR Latest Ref Range: 0.9 - 1.1     1.5 (H)   Sodium Latest Ref Range: 136 - 145 mmol/L 129 (L) 129 (L) 130 (L)   Potassium Latest Ref Range: 3.5 - 5.1 mmol/L 4.6 4.9 4.2   Chloride Latest Ref Range: 97 - 108 mmol/L 98 99 100   CO2 Latest Ref Range: 21 - 32 mmol/L 23 23 22   Glucose Latest Ref Range: 65 - 100 mg/dL 173 (H) 169 (H) 155 (H)   BUN Latest Ref Range: 6 - 20 MG/DL 21 (H) 15 11   Creatinine Latest Ref Range: 0.70 - 1.30 MG/DL 1.57 (H) 1.10 1.09   Bilirubin, total Latest Ref Range: 0.2 - 1.0 MG/DL  2.8 (H)    Albumin Latest Ref Range: 3.5 - 5.0 g/dL  4.2    ALT (SGPT) Latest Ref Range: 12 - 78 U/L  29    AST Latest Ref Range: 15 - 37 U/L  41 (H)    Alk.  phosphatase Latest Ref Range: 45 - 117 U/L  124 (H)    Ammonia Latest Ref Range: <32 UMOL/L 73 (H) 80 (H)        Amrita Carrera MD  [facility] [place] [address] [address] [address] [place], [place] [address]carrillofrank  22.  [phone]

## 2018-05-06 NOTE — PROGRESS NOTES
Verbal shift change report given to Jerry (oncoming nurse) by Bharathi Bright (offgoing nurse). Report included the following information SBAR, Kardex and MAR.

## 2018-05-06 NOTE — PROGRESS NOTES
Hospitalist Progress Note  Gisella Young MD  Answering service: [phone] from in house phone         Date of Service:  2018  NAME:  Kristen Juarez  :  1960  MRN:  [id number]      Admission Summary:   \"58-yom who with pmh of hepatic cirrhosis due to alcohol, variceal bleeding, significant ascites requiring paracentesis, LORENZA, CKD, anemia, chronic systolic CHF, PAF, HTN, thrombocytopenia d/t cirrhosis who was recently released from the hospital 7 days ago,who presented with disorientation and agitation. Per ER records wife reported patient was disoriented, that he tried to start his car with a credit card and he was observed to be more agitated. Because of his known history of hepatic encephalopathy presenting similarly, wife was concerned and brought him to the emergency room. In the ER, labs showed a low Na at 132, Creat 2.47, slightly elevated with elevated ammonia level at 144. Last admission was as high as 233, was down to 66 prior to discharge. \"    Interval history / Subjective:     Pt without complaints. He states he had one bm last night. Assessment & Plan:     Acute Hepatic Encephalopathy (POA)  -on admission patient reported no BM in 24 hours, however wife reports had at least 1 in the middle of the night prior to admission   -ammonia level 144 on admission-  \"resume Xifaxan, costs would be ~$700/month out of pocket which is not affordable. Staff at Dr Matthew Stokes office working on seeing if patient can get further assistance to make more affordable\" as per weekday hospitalist.  -hepatology following  - this is recurrent readmission for this. - CT abd and pelvis  \"Chronic consolidation/atelectasis of the right lung base with air bronchograms and possibly trace pleural effusion. ,Hepatic cirrhosis, Massive ascites. Splenomegaly. , Probable gallstones. Minute bilateral nonobstructing renal calculi.   Prostate gland enlargement. \"  -weekday hospitalist note: \"spoke with Dr. Lindsey Has no intervenable shunt noticed on CT, no need for daily ammonia levels per Dr. Salma Fabian"    LORENZA on CKD stage 3 (POA)  -now resolved  -IV albumin  -Weekday hospitalist: held Lasix and Spironolactone and ARB, \"will await hepatology to determine when to restart \"  --5/6/2018 Restarted home dose furosemide 40 mg daily, restarted home dose spironolactone 100 mg daily (at half the original dose). Hepatic Cirrhosis  -follows Dr Lindsey Has OP  -per notes plan for MRI to look for shunt however patient has AICD, checking to see if MRI compatible   -plan is to start working towards liver transplant w/u   - s/p large volume paracentesis 5/3  - on IV albumin  - Weekday hospitalist: \"reviewed ascitic fluid results with Dr. Lindsey Has, no signs of SBP\"  -5/6/2018 Tmax 99, asymptomatic    Chronic Thrombocytopenia in setting of cirrhosis  -at baseline, monitor for bleeding     Chronic Systolic Congestive Heart Failure NYHA class 2   -11/21/2017 Echo ef 35-40%   -s/p AICD  -follows Dr Ramona Bustamante with cardiology   -ARB on hold due to LORENZA, continue Metoprolol as per weekday hospitalist  -refer to below of 5/6/2018 of restarting furosemide and spironolactone    PAF-POA  -paced on tele   -resume current meds    Chronic Hyponatremia d/t cirrhosis   -stable   -monitor    Ascites   -s/p paracentesis  -hepatology following    HTN   -stable  -Lasix, Spironolactone, and Valsartan on hold due to LORENZA as per weekday hospitalist.  -5/6/2018 Restarted home dose furosemide 40 mg daily, restarted home dose spironolactone 100 mg daily (at half the original dose).     Nausea/ vomiting  -per patient's spouse 5/5/2018 was the percocet which spouse is upset  -resolved      Code status: Full  DVT prophylaxis:  scd    Disposition:  Angered spouse accusatory to weekday hospitalist regarding percocet medication as the reason pt unable to be discharged, lack of f/u with hepatology and also states that weekday nurse did not followup on patient's bm's as spouse thinks pt did not have any.  5/5/2018: spoke with the overnight charge nurse who plans to also inform daytime charge nurse that regarding the above as well as to confirm if pt is having bowel movements as he is on medications for this due to his liver condition.-->5/5/2018 increased lactulose from 30 ml three times daily to 45 ml three times daily and later to four times daily    5/6/2018: spoke with Dr. Juanjose Gupta, appreciate his guidance which he plans to see pt later today. 5/6/2018 abd xray ordered     Hospital Problems  Date Reviewed: 4/19/2018          Codes Class Noted POA    * (Principal)Hepatic encephalopathy [facility]) ICD-10-CM: K72.90  ICD-9-CM: 572.2  4/29/2018 Unknown                Review of Systems:   A comprehensive review of systems was negative except for that written in the HPI. Vital Signs:    Last 24hrs VS reviewed since prior progress note. Most recent are:  Visit Vitals    /57 (BP 1 Location: Left arm, BP Patient Position: At rest)    Pulse 67    Temp 99 °F (37.2 °C)    Resp 16    Wt 88.2 kg (194 lb 7.1 oz)    SpO2 100%    BMI 27.9 kg/m2         Intake/Output Summary (Last 24 hours) at 05/06/18 1053  Last data filed at 05/05/18 1907   Gross per 24 hour   Intake              240 ml   Output              300 ml   Net              -60 ml        Physical Examination:             Constitutional:  nad   ENT:  unable to assess as pt not cooperating   Resp:  No accessory muscle use   CV:  Regular rhythm, normal rate     GI:  distended, soft, non tender. Musculoskeletal:  left bka    Neurologic:  Moves extremities.               Data Review:          Labs:     Recent Labs      05/06/18   0509  05/04/18   1448   WBC  2.5*  2.7*   HGB  7.7*  7.9*   HCT  22.1*  22.5*   PLT  55*  51*     Recent Labs      05/06/18   0509  05/04/18   1448   NA  130*  129*   K  4.2  4.9   CL  100  99   CO2  22  23   BUN  11  15   CREA  1.09  1.10 GLU  155*  169*   CA  10.1  10.3*     Recent Labs      05/04/18   1448   SGOT  41*   ALT  29   AP  124*   TBILI  2.8*   TP  6.9   ALB  4.2   GLOB  2.7     Recent Labs      05/06/18   0509   INR  1.5*   PTP  15.3*        Lab Results   Component Value Date/Time    Folate 19.9 12/07/2017 04:25 AM     Lab Results   Component Value Date/Time    Color YELLOW/STRAW 04/29/2018 03:34 PM    Appearance CLEAR 04/29/2018 03:34 PM    Specific gravity 1.010 04/29/2018 03:34 PM    Specific gravity 1.015 04/13/2018 10:10 AM    pH (UA) 6.0 04/29/2018 03:34 PM    Protein NEGATIVE  04/29/2018 03:34 PM    Glucose NEGATIVE  04/29/2018 03:34 PM    Ketone NEGATIVE  04/29/2018 03:34 PM    Bilirubin NEGATIVE  04/29/2018 03:34 PM    Urobilinogen 0.2 04/29/2018 03:34 PM    Nitrites NEGATIVE  04/29/2018 03:34 PM    Leukocyte Esterase NEGATIVE  04/29/2018 03:34 PM    Epithelial cells FEW 04/29/2018 03:34 PM    Bacteria NEGATIVE  04/29/2018 03:34 PM    WBC 0-4 04/29/2018 03:34 PM    RBC 0-5 04/29/2018 03:34 PM         Medications Reviewed:                    Katheryn Pino MD

## 2018-05-07 NOTE — PROGRESS NOTES
1000 Bedside shift change report given to erna (oncoming nurse) by Magda Sargent (offgoing nurse). Report included the following information SBAR, Kardex and MAR.

## 2018-05-07 NOTE — DISCHARGE SUMMARY
Discharge Summary    Patient: Ochoa Reji               Sex: male          DOA: 4/29/2018         YOB: 1960      Age:  59 y.o.        LOS:  LOS: 8 days                Admit Date: 4/29/2018    Discharge Date: 5/7/2018    Admission Diagnoses: Hepatic encephalopathy [facility])    Discharge Diagnoses:    Problem List as of 5/7/2018  Date Reviewed: 4/19/2018          Codes Class Noted - Resolved    * (Principal)Hepatic encephalopathy ([facility] ICD-10-CM: K72.90  ICD-9-CM: 572.2  4/29/2018 - Present        LORENZA (acute kidney injury) ([facility] ICD-10-CM: N17.9  ICD-9-CM: 584.9  4/20/2018 - Present        Esophageal varices ([facility] ICD-10-CM: I85.00  ICD-9-CM: 456.1  2/8/2018 - Present        Cirrhosis, alcoholic ([place] [address].) [id number]: K70.30  ICD-9-CM: 571.2  1/3/2018 - Present        Alcohol abuse, in remission ICD-10-CM: F10.11  ICD-9-CM: 305.03  1/3/2018 - Present        Chronic systolic heart failure ([facility] (Chronic) ICD-10-CM: I50.22  ICD-9-CM: 428.22  11/21/2017 - Present        Neuropathy ICD-10-CM: G62.9  ICD-9-CM: 355.9  3/30/2017 - Present        S/P BKA (below knee amputation) ([facility] ICD-10-CM: Z89.519  ICD-9-CM: V49.75  3/30/2017 - Present        Hypertension ICD-10-CM: I10  ICD-9-CM: 401.9  3/30/2017 - Present        Cardiac defibrillator in place ICD-10-CM: Z95.810  ICD-9-CM: V45.02  3/30/2017 - Present        Type II diabetes mellitus ([facility] ICD-10-CM: E11.9  ICD-9-CM: 250.00  2/1/2015 - Present        RESOLVED: LORENZA (acute kidney injury) ([facility] ICD-10-CM: N17.9  ICD-9-CM: 584.9  4/19/2018 - 4/19/2018        RESOLVED: GI bleed ICD-10-CM: K92.2  ICD-9-CM: 578.9  2/17/2018 - 4/19/2018        RESOLVED: GI bleed ICD-10-CM: K92.2  ICD-9-CM: 578.9  1/23/2018 - 1/28/2018        RESOLVED: Secondary esophageal varices without bleeding ([facility] ICD-10-CM: I85.10  ICD-9-CM: 456.21  1/3/2018 - 2/8/2018        RESOLVED: Hypomagnesemia ICD-10-CM: K04.23  ICD-9-CM: 275.2  12/6/2017 - 12/10/2017        RESOLVED: Suicidal ideations ICD-10-CM: P65.697  ICD-9-CM: V62.84  12/6/2017 - 12/10/2017        RESOLVED: Anasarca ICD-10-CM: R60.1  ICD-9-CM: 782.3  11/20/2017 - 11/24/2017        RESOLVED: Hyponatremia ICD-10-CM: E87.1  ICD-9-CM: 276.1  11/20/2017 - 12/10/2017        RESOLVED: Cirrhosis of liver without ascites ([facility] ICD-10-CM: K74.60  ICD-9-CM: 571.5  5/2/2017 - 1/3/2018        RESOLVED: Extremity pain ICD-10-CM: M79.609  ICD-9-CM: 729.5  2/18/2015 - 3/30/2017        RESOLVED: Cellulitis and abscess of foot ICD-10-CM: L03.119, L02.619  ICD-9-CM: 682.7  2/1/2015 - 2/13/2015        RESOLVED: Hyperglycemia ICD-10-CM: R73.9  ICD-9-CM: 790.29  1/31/2015 - 2/13/2015              Discharge Medications:     Current Discharge Medication List      CONTINUE these medications which have NOT CHANGED    Details   furosemide (LASIX) 40 mg tablet Take 40 mg by mouth daily. spironolactone (ALDACTONE) 100 mg tablet Take 200 mg by mouth daily. lactulose (CHRONULAC) 10 gram/15 mL solution Take 30 mL by mouth two (2) times a day. You can adjust the frequency to achieve two bowel movements per day. Indications: Hepatic Encephalopathy  Qty: 473 mL, Refills: 2      rifAXIMin (XIFAXAN) 550 mg tablet Take 1 Tab by mouth two (2) times a day. Indications: Hepatic Encephalopathy  Qty: 60 Tab, Refills: 2      gabapentin (NEURONTIN) 100 mg capsule Take 100 mg by mouth three (3) times daily. pantoprazole (PROTONIX) 40 mg tablet Take 1 Tab by mouth daily. Qty: 60 Tab, Refills: 1      valsartan (DIOVAN) 40 mg tablet Take 40 mg by mouth daily. thiamine (B-1) 100 mg tablet Take 1 Tab by mouth daily. Qty: 30 Tab, Refills: 0      metoprolol succinate (TOPROL XL) 25 mg XL tablet Take 0.5 Tabs by mouth daily. Qty: 15 Tab, Refills: 0      amiodarone (CORDARONE) 200 mg tablet Take 200 mg by mouth nightly. multivitamins-minerals-lutein (CENTRUM SILVER) Tab Take 1 Tab by mouth daily. digoxin (LANOXIN) 0.125 mg tablet Take 0.125 mg by mouth nightly. Follow-up:pcp,hepatologist    Discharge Condition:good    Activity:as tolerated    Diet:low protein,as per hepatologist    Wound Care:as per routine    Disposition:home    Labs:  Labs: Results:       Chemistry Recent Labs      05/07/18   0230  05/06/18   0509  05/04/18   1448   GLU  127*  155*  169*   NA  132*  130*  129*   K  4.2  4.2  4.9   CL  100  100  99   CO2  23  22  23   BUN  13  11  15   CREA  1.23  1.09  1.10   CA  10.1  10.1  10.3*   AGAP  9  8  7   BUCR  11*  10*  14   AP   --    --   124*   TP   --    --   6.9   ALB   --    --   4.2   GLOB   --    --   2.7   AGRAT   --    --   1.6      CBC w/Diff Recent Labs      05/07/18   0230 05/06/18   0509 05/04/18   1448   WBC  2.4*  2.5*  2.7*   RBC  2.15*  2.26*  2.29*   HGB  7.3*  7.7*  7.9*   HCT  20.8*  22.1*  22.5*   PLT  58*  55*  51*   GRANS  74  74  84*   LYMPH  9*  9*  5*   EOS  1  2  1      Cardiac Enzymes No results for input(s): CPK, CKND1, DEONDRE in the last 72 hours. No lab exists for component: CKRMB, TROIP   Coagulation Recent Labs      05/06/18   0509   PTP  15.3*   INR  1.5*       Lipid Panel No results found for: CHOL, CHOLPOCT, CHOLX, CHLST, CHOLV, 112620, HDL, LDL, LDLC, DLDLP, 214618, VLDLC, VLDL, TGLX, TRIGL, TRIGP, TGLPOCT, CHHD, CHHDX   BNP No results for input(s): BNPP in the last 72 hours. Liver Enzymes Recent Labs      05/04/18   1448   TP  6.9   ALB  4.2   AP  124*   SGOT  41*      Thyroid Studies No results found for: T4, T3U, TSH, TSHEXT       Imaging:  CT head on 4/29:  No change or acute abnormality    CT abdomen  Chronic consolidation/atelectasis of the right lung base with air bronchograms  and possibly trace pleural effusion.     Hepatic cirrhosis.      Massive ascites.     Splenomegaly.     Probable gallstones.     Minute bilateral nonobstructing renal calculi.     Prostate gland enlargement.     Questionable wall thickening involving the anterior bladder.     Consults: hepatologist    Treatment Team: Treatment Team: Attending Provider: Cherrie Chavez MD; Consulting Provider: Cristiane Tran MD; Consulting Provider: Tania Dickinson MD; Utilization Review: Sunday JEAN Miguel; Nurse Practitioner: Narayan Vance. Jh Gómez NP; Care Manager: YVONNE Degroot    Significant Diagnostic Studies: labs: recent lab results. History/Hospital Course:  58-yom who with pmh of hepatic cirrhosis due to alcohol, variceal bleeding, significant ascites requiring paracentesis, LORENZA, CKD, anemia, chronic systolic CHF, PAF, HTN, thrombocytopenia d/t cirrhosis who was recently released from the hospital 7 days ago,who presented with disorientation and agitation.  Per ER records wife reported patient was disoriented, that he tried to start his car with a credit card and he was observed to be more agitated.  Because of his known history of hepatic encephalopathy presenting similarly, wife was concerned and brought him to the emergency room.  In the ER, labs showed a low Na at 132, Creat 2.47, slightly elevated with elevated ammonia level at 144.  Last admission was as high as 233, was down to 66 prior to discharge. \"    Acute Hepatic Encephalopathy (POA)  -on admission patient reported no BM in 24 hours, however wife reports had at least 1 in the middle of the night prior to admission   -ammonia level 144 on admission-  \"resume Xifaxan, costs would be ~$700/month out of pocket which is not affordable. Staff at Dr Duque Aidee office working on seeing if patient can get further assistance to make more affordable\" as per weekday hospitalist.  -hepatology was following patient  - this is recurrent readmission for this. - CT abd and pelvis 5/2 \"Chronic consolidation/atelectasis of the right lung base with air bronchograms and possibly trace pleural effusion. ,Hepatic cirrhosis, Massive ascites. Splenomegaly. , Probable gallstones. Minute bilateral nonobstructing renal calculi. Prostate gland enlargement. \"  -last week hospitalist note: \"spoke with Dr. Jayson Denton no intervenable shunt noticed on CT, no need for daily ammonia levels per Dr. Shola Myers"  -Tomas Lee has cleared patient for discharge  -Ammonia 80 on 5/6/2018     LORENZA on CKD stage 3 (POA)  -now resolved  -IV albumin  -Weekday hospitalist: held Lasix and Spironolactone and ARB, \"will await hepatology to determine when to restart \"  --5/6/2018 Restarted home dose furosemide 40 mg daily, restarted home dose spironolactone 100 mg daily (at half the original dose).     Hepatic Cirrhosis  -follows Dr Jayson Denton OP  -per notes plan for MRI to look for shunt however patient has AICD, checking to see if MRI compatible   -plan is to start working towards liver transplant w/u   - s/p large volume paracentesis 5/3  - on IV albumin  - Last week hospitalist: \"reviewed ascitic fluid results with Dr. Jayson Denton, no signs of SBP\"  -5/6/2018 Tmax 99, asymptomatic     Chronic Thrombocytopenia in setting of cirrhosis  -at baseline, monitor for bleeding      Chronic Systolic Congestive Heart Failure NYHA class 2   -11/21/2017 Echo ef 35-40%   -s/p AICD  -follows Dr Parag Henderson with cardiology   -ARB on hold due to LORENZA, continue Metoprolol as per weekday hospitalist  -refer to below of 5/6/2018 of restarting furosemide and spironolactone     PAF-POA  -paced on tele   -resume current meds     Chronic Hyponatremia d/t cirrhosis   -stable   -monitor     Ascites   -s/p paracentesis  -hepatology following     HTN   -stable  -Lasix, Spironolactone, and Valsartan on hold due to LORENZA as per weekday hospitalist.  -5/6/2018 Restarted home dose furosemide 40 mg daily, restarted home dose spironolactone 100 mg daily (at half the original dose).     Nausea/ vomiting  -per patient's spouse 5/5/2018 was the percocet which spouse is upset  -resolved     Code status: Full    Time for discharge:40 minutes    D/c plan discussed with patient and nurse. Pt clinically stable for discharge. Vital signs wnl    Simon Shields MD  May 7, 2018

## 2018-05-07 NOTE — PROGRESS NOTES
Problem: Falls - Risk of  Goal: *Absence of Falls  Document Stefania Fall Risk and appropriate interventions in the flowsheet.    Outcome: Progressing Towards Goal  Fall Risk Interventions:  Mobility Interventions: Patient to call before getting OOB    Mentation Interventions: Door open when patient unattended    Medication Interventions: Patient to call before getting OOB    Elimination Interventions: Patient to call for help with toileting needs

## 2018-05-07 NOTE — PROGRESS NOTES
1100 Returned call to Mrs. Ramila Dominguez,  is very eager for DC. Jazmyn note reflects ok to go [facility] text Dr Lukas Goodwin, no reply, page x2. He called and said he will come and DC pt. Called wife with update. 1300 I have reviewed discharge instructions with the patient and spouse. The patient and spouse verbalized understanding.

## 2018-05-07 NOTE — PROGRESS NOTES
Hospital follow-up PCP transitional care appointment has been scheduled with Dr. Wisam Banegas for Wednesday, 5/9/18 at 10:45 a.m. Pending patient discharge.   Erin Sanchez, Care Management Specialist.

## 2018-05-09 NOTE — PROGRESS NOTES
Called his wife, Cara Vogt back. She had question about diuretic dosing. He was sent home on 40 mg Lasix and 200 mg Aldactone. I advised her to go up to 80 mg on Lasix because we want them both at the same \"step\" and his BUN/cr were super normal on discharge. She states he looks the best he has in a while. Good energy, etc.     Thanked us for all of the follow up and how nice we have been. We look forward to seeing them in the office next week. Faiza Oconnell, MARTÍN  3:15 PM

## 2018-05-09 NOTE — PROGRESS NOTES
Spoke with patients wife Casi Tilley concerning protein restriction for Mr. Paula Calvillo per  verbal order from Dr. Shade Woodson. Patient is to have two proteins per day. After researching restricted protein, informed Casi Joneliazar that patient can have one meat product per day, no more than 3oz. Rest of the protein can come from plant based vegetables since they contain fibre, which shortens bowel transit time and decrease ammonia absorption. Informed [place] Joneliazar that if 's bowels movements increase, the Lactulose can be reduced as per previous discussion, approved per Dr. Shade Woodson. Casi Tilley expressed an understanding, all questions were answered. Casi Tilley was informed to call if she has future questions.

## 2018-05-11 NOTE — PROGRESS NOTES
Spoke with Bernard Ross from [employer]. Application is still in review process. Wife was informed.
Statement Selected

## 2018-05-18 NOTE — MR AVS SNAPSHOT
[address] [facility] [phone] [address] 13 
[phone] Patient: Terrance Paredes MRN: [id number] ATK:1/85/4365 Visit Information Date & Time Provider Department Dept. Phone Encounter #  
 5/18/2018  8:45 AM Cecil Lukebryan [facility] of [facility] 219 [id number] Follow-up Instructions Return in about 4 weeks (around 6/15/2018) for mls. Upcoming Health Maintenance Date Due  
 LIPID PANEL Q1 1960 MICROALBUMIN Q1 1/17/1968 EYE EXAM RETINAL OR DILATED Q1 1/17/1968 DTaP/Tdap/Td series (1 - Tdap) 1/17/1979 Pneumococcal 19-64 Highest Risk (2 of 3 - PCV13) 2/1/2013 FOOT EXAM Q1 1/31/2016 ZOSTER VACCINE AGE 60> 11/17/2017 MEDICARE YEARLY EXAM 3/14/2018 Influenza Age 5 to Adult 8/1/2018 HEMOGLOBIN A1C Q6M 10/13/2018 FOBT Q 1 YEAR AGE 50-75 4/13/2019 Allergies as of 5/18/2018  Review Complete On: 5/18/2018 By: Rafat Wheatley LPN No Known Allergies Current Immunizations  Reviewed on 5/2/2018 Name Date Influenza Vaccine 11/12/2014 Influenza Vaccine PF 11/5/2013  6:09 PM  
 Pneumococcal Vaccine (Unspecified Type) 2/1/2012 Not reviewed this visit You Were Diagnosed With   
  
 Codes Comments Alcoholic cirrhosis of liver without ascites ([facility]    -  Primary ICD-10-CM: K70.30 ICD-9-CM: 302. 2 Abnormal electrocardiogram     ICD-10-CM: R94.31 
ICD-9-CM: 794.31 Vitals BP Pulse Temp Height(growth percentile) Weight(growth percentile) SpO2  
 93/44 (BP 1 Location: Left arm, BP Patient Position: Sitting) 60 97.2 °F (36.2 °C) (Tympanic) 5' 10\" (1.778 m) 191 lb 9.6 oz (86.9 kg) 99% BMI Smoking Status 27.49 kg/m2 Former Smoker Vitals History BMI and BSA Data Body Mass Index Body Surface Area  
 27.49 kg/m 2 2.07 m 2 Preferred Pharmacy Pharmacy Name Phone [address] [place] [address] [phone] Your Updated Medication List  
  
   
This list is accurate as of 5/18/18  9:53 AM.  Always use your most recent med list.  
  
  
  
  
 amiodarone 200 mg tablet Commonly known as:  CORDARONE Take 200 mg by mouth nightly. CENTRUM SILVER Tab tablet Generic drug:  multivitamins-minerals-lutein Take 1 Tab by mouth daily. digoxin 0.125 mg tablet Commonly known as:  LANOXIN Take 0.125 mg by mouth nightly. furosemide 40 mg tablet Commonly known as:  LASIX Take 40 mg by mouth daily. gabapentin 100 mg capsule Commonly known as:  NEURONTIN Take 100 mg by mouth three (3) times daily. lactulose 10 gram/15 mL solution Commonly known as:  Mo Alfred Take 30 mL by mouth two (2) times a day. You can adjust the frequency to achieve two bowel movements per day. Indications: Hepatic Encephalopathy  
  
 metoprolol succinate 25 mg XL tablet Commonly known as:  TOPROL XL Take 0.5 Tabs by mouth daily. pantoprazole 40 mg tablet Commonly known as:  PROTONIX Take 1 Tab by mouth daily. * rifAXIMin 550 mg tablet Commonly known as:  Wilhemina Sieving Take 1 Tab by mouth two (2) times a day. Indications: Hepatic Encephalopathy * rifAXIMin 550 mg tablet Commonly known as:  Wilhemina Sieving Take 1 Tab by mouth two (2) times a day. spironolactone 100 mg tablet Commonly known as:  ALDACTONE Take 200 mg by mouth daily. thiamine 100 mg tablet Commonly known as:  B-1 Take 1 Tab by mouth daily. valsartan 40 mg tablet Commonly known as:  DIOVAN Take 40 mg by mouth daily. * Notice: This list has 2 medication(s) that are the same as other medications prescribed for you. Read the directions carefully, and ask your doctor or other care provider to review them with you. We Performed the Following AFP WITH AFP-L3% [[id number] Custom] CBC WITH AUTOMATED DIFF [01105 CPT(R)] ETHYL ALCOHOL [id number] CPT(R)] HEPATIC FUNCTION PANEL [20345 CPT(R)] METABOLIC PANEL, BASIC [43554 CPT(R)] PROTHROMBIN TIME + INR [04535 CPT(R)] Follow-up Instructions Return in about 4 weeks (around 6/15/2018) for mls. To-Do List   
 Around 06/17/2018 ECHO:  2D ECHO COMPLETE ADULT (TTE) W OR WO CONTR Please provide this summary of care documentation to your next provider. Your primary care clinician is listed as Daniella Murguia. If you have any questions after today's visit, please call [phone].

## 2018-05-18 NOTE — PROGRESS NOTES
Chief Complaint   Patient presents with    Follow-up     PHQ over the last two weeks 3/8/2018   Little interest or pleasure in doing things Not at all   Feeling down, depressed or hopeless Not at all   Total Score PHQ 2 0     Visit Vitals    BP 93/44 (BP 1 Location: Left arm, BP Patient Position: Sitting)    Pulse 60    Temp 97.2 °F (36.2 °C) (Tympanic)    Ht 5' 10\" (1.778 m)    Wt 191 lb 9.6 oz (86.9 kg)    SpO2 99%    BMI 27.49 kg/m2     Patient states he is felling dizzy. Patient will discuss bp medication with PCP due to 93/44.

## 2018-05-18 NOTE — PROGRESS NOTES
70 Steffi Durand MD, FACP, [place], [place]       Con Larson, KULDEEP Burton, FIDENCIO-BC   Rubens Slaughter, MARTÍN Chambers, MARTÍN Ulloa [facility] 136    at [address], [address] [place] [address], [facility] 22.    [phone]    FAX: [address] [place] [address]    at [facility], [address] [place] [address], [address]    [phone]    FAX: [phone]       Patient Care Team:  Samuel Hood MD as PCP - General (Family Practice)  Samuel Hood MD (Family Practice)  Naheed Sheridan MD (Gastroenterology)  Staci Marino MD as Physician (Cardiology)      Problem List  Date Reviewed: 4/19/2018          Codes Class Noted    Hepatic encephalopathy [facility]) ICD-10-CM: K72.90  ICD-9-CM: 572.2  4/29/2018        LORENZA (acute kidney injury) [facility]) ICD-10-CM: N17.9  ICD-9-CM: 584.9  4/20/2018        Esophageal varices ([facility].) ICD-10-CM: I85.00  ICD-9-CM: 456.1  2/8/2018        Cirrhosis, alcoholic ([facility].) LNK-72-: K70.30  ICD-9-CM: 571.2  1/3/2018        Alcohol abuse, in remission ICD-10-CM: F10.11  ICD-9-CM: 305.03  1/3/2018        Chronic systolic heart failure ([facility] (Chronic) ICD-10-CM: V45.31  ICD-9-CM: 428.22  11/21/2017        Neuropathy ICD-10-CM: G62.9  ICD-9-CM: 355.9  3/30/2017        S/P BKA (below knee amputation) ([facility] ICD-10-CM: G22.122  ICD-9-CM: V49.75  3/30/2017        Hypertension ICD-10-CM: I10  ICD-9-CM: 401.9  3/30/2017        Cardiac defibrillator in place ICD-10-CM: Z95.810  ICD-9-CM: V45.02  3/30/2017        Type II diabetes mellitus ([facility] ICD-10-CM: E11.9  ICD-9-CM: 250.00  2/1/2015                Lajuanda Claude returns to the [facility] for management of cirrhosis secondary to alcohol.   The active problem list, all pertinent past medical history, medications, and laboratory findings related to the liver disorder were reviewed with the patient. The patient is a 59 y.o.  male who was found to have cirrhosis in 2012. Hospitalized 3 times for HE since last office appointment in 3/2018. Limiting to 2 protein items. The patient had variceal bleeding in 1/2018. This was treated with banding. Ascites has become refractory to diuretics and paracentesis since the hospitalization in 1/2018. Lower extremity edema has not resolved with the current dose of diuretics. Hepatic encephalopathy as not developed to date. He has remained abstinent from alcohol since 1/2018. The patient notes fatigue, swelling of the abdomen, swelling of the lower extremities    The patient has not experienced fevers, chills, problems concentrating, hematemesis, hematochezia. The patient has limitations in functional activities which can be attributed to the liver disease. ALLERGIES  No Known Allergies    MEDICATIONS  Current Outpatient Prescriptions   Medication Sig    rifAXIMin (XIFAXAN) 550 mg tablet Take 1 Tab by mouth two (2) times a day.  furosemide (LASIX) 40 mg tablet Take 40 mg by mouth daily.  spironolactone (ALDACTONE) 100 mg tablet Take 200 mg by mouth daily.  lactulose (CHRONULAC) 10 gram/15 mL solution Take 30 mL by mouth two (2) times a day. You can adjust the frequency to achieve two bowel movements per day. Indications: Hepatic Encephalopathy    rifAXIMin (XIFAXAN) 550 mg tablet Take 1 Tab by mouth two (2) times a day. Indications: Hepatic Encephalopathy    gabapentin (NEURONTIN) 100 mg capsule Take 100 mg by mouth three (3) times daily.  pantoprazole (PROTONIX) 40 mg tablet Take 1 Tab by mouth daily.  valsartan (DIOVAN) 40 mg tablet Take 40 mg by mouth daily.  thiamine (B-1) 100 mg tablet Take 1 Tab by mouth daily.     metoprolol succinate (TOPROL XL) 25 mg XL tablet Take 0.5 Tabs by mouth daily.  amiodarone (CORDARONE) 200 mg tablet Take 200 mg by mouth nightly.  multivitamins-minerals-lutein (CENTRUM SILVER) Tab Take 1 Tab by mouth daily.  digoxin (LANOXIN) 0.125 mg tablet Take 0.125 mg by mouth nightly. No current facility-administered medications for this visit. SYSTEM REVIEW NOT RELATED TO LIVER DISEASE OR REVIEWED ABOVE:  Constitution systems: Negative for fever, chills, weight gain, weight loss. Eyes: Negative for visual changes. ENT: Negative for sore throat, painful swallowing. Respiratory: Negative for cough, hemoptysis, SOB. Cardiology: Negative for chest pain, palpitations. GI:  Negative for constipation or diarrhea. : Negative for urinary frequency, dysuria, hematuria, nocturia. Skin: Negative for rash. Hematology: Negative for easy bruising, blood clots. Musculo-skeletal: Negative for back pain, muscle pain, weakness. Neurologic: Negative for headaches, dizziness, vertigo, memory problems not related to HE. Psychology: Negative for anxiety, depression. FAMILY HISTORY:  The father  of unknown cause. The mother  of heart disease. There is no family history of liver disease. SOCIAL HISTORY:  The patient is . The patient has 3 children. The patient has never used tobacco products. The patient consumes 6+ alcoholic beverages per day. He has been abstinent from alcohol since 2018. The patient used to work . The patient retired in . PHYSICAL EXAMINATION:  BP 93/44 (BP 1 Location: Left arm, BP Patient Position: Sitting)  Pulse 60  Temp 97.2 °F (36.2 °C) (Tympanic)   Ht 5' 10\" (1.778 m)  Wt 191 lb 9.6 oz (86.9 kg)  SpO2 99%  BMI 27.49 kg/m2  General: No acute distress. Eyes: Sclera anicteric. ENT: No oral lesions. Nodes: No adenopathy. Skin: Numerous spider angiomata. No jaundice. No palmar erythema. Respiratory: Lungs clear to auscultation.    Cardiovascular: Regular heart rate. No murmurs. No JVD. Abdomen: Distended with ascites. Extremities: 2+ lower edema on right. Left BKA. Neurologic: Alert and oriented. Cranial nerves grossly intact. No asterixis. LABORATORY STUDIES:  Liver [place] of [address] [place] [address] & Units 2/19/2018 2/18/2018   WBC 4.1 - 11.1 K/uL 2.0 (L)    ANC 1.8 - 8.0 K/UL     HGB 12.1 - 17.0 g/dL 8.2 (L) 8.6 (L)    - 400 K/uL 41 (LL)    INR 0.9 - 1.1       AST 15 - 37 U/L  59 (H)   ALT 12 - 78 U/L  35   Alk Phos 45 - 117 U/L  86   Bili, Total 0.2 - 1.0 MG/DL  4.2 (H)   Bili, Direct 0.00 - 0.40 mg/dL     Albumin 3.5 - 5.0 g/dL  3.9   BUN 6 - 20 MG/DL 19 19   Creat 0.70 - 1.30 MG/DL 1.31 (H) 1.34 (H)   Na 136 - 145 mmol/L 136 135 (L)   K 3.5 - 5.1 mmol/L 4.3 4.6   Cl 97 - 108 mmol/L 102 103   CO2 21 - 32 mmol/L 26 25   Glucose 65 - 100 mg/dL 134 (H) 113 (H)   Magnesium 1.6 - 2.4 mg/dL     Ammonia <32 UMOL/L         SEROLOGIES:  Serologies Latest Ref Rng & Units 3/30/2017   Hep A Ab, Total Negative Positive (A)   Hep B Surface Ag Negative Negative   Hep B Core Ab, Total Negative Negative   Hep B Surface AB QL  Non Reactive   Hep C Ab 0.0 - 0.9 s/co ratio 0.1   Ferritin 30 - 400 ng/mL 399   Iron % Saturation 15 - 55 % 76 (HH)   Alpha-1 antitrypsin level 90 - 200 mg/dL 181     LIVER HISTOLOGY:  5/2017. FibroScan performed at [facility]. EkPa was 75. Suggested fibrosis level is F4. ENDOSCOPIC PROCEDURES:  6/2017. EGD performed by [facility]. Medium esophageal varices. Banding performed. Moderate portal gastropathy. 1/2018. EGD performed by [facility]. Medium esophageal varices. 6 banded. Large blood clot in fundus. 2/2017. EGD by [facility]. Scars of previous banding. NO esophageal varices. Portal gastropathy. RADIOLOGY:  10/2012. Ultrasound of liver. Echogenic nodular consistent with cirrhosis. No liver mass lesions. No dilated bile ducts. No ascites. 1/2018.   CT scan abdomen with IV contrast.  Changes consistent with cirrhosis. No liver mass lesions. No dilated bile ducts. Mild-Moderate ascites. OTHER TESTIN2018. Blood ETOH 102    ASSESSMENT AND PLAN:  Cirrhosis that is secondary to alcohol. The liver transaminases are elevated. The ALP is elevated. Liver function is depressed, platelet count is depressed. There is mild LORENZA with baseline Screat of 1.3 mg    The CTP is 7. Child class B. The MELD score is 17. There is an elevation in iron saturation with normal ferritin. The elevation in ferritin is secondary to alcohol use. The patient is not a candidate for liver transplantation because of recent or ongoing alcohol use. The patient will have been abstinent for 6 months in 2018. If the patient stops consuming alcohol, the acute decompensation may improve, MELD score could improve and he may not require a liver transplant. Ascites is now persistent despite current dose of step 2 diuretics. Lower extremity edema is persistent despite step 2 diuretics. Hepatic encephalopathy has not developed to date. There is no need for treatment with lactulose and/or Xifaxan at this time. No need to restrict dietary protein at this time. Esophageal varices with prior bleeding. Varices have been treated with banding. THe last EGD was in 2018. No varices were present. The patient was advised never to consume alcohol again. Vaccination for viral hepatitis B is recommended since the patient has no serologic evidence of previous exposure or vaccination with immunity. Vaccination for viral hepatitis A is not needed. The patient has serologic evidence of prior exposure or vaccination with immunity. All of the above issues were discussed with the patient. All questions were answered. The patient expressed a clear understanding of the above.     [address] [place] [address] in 6 weeks to review all additional lab data, EGD and US findings and to monitor the impact of alcohol cessation.     Mt Fish MD  Liver [place] of [address] [place] [address] Drive [address] [facility] [address] [facility] [place] [address]  Esther Denise  22.  [phone]

## 2018-05-21 NOTE — TELEPHONE ENCOUNTER
Patient wife Chris Lutz called stating patient was almost out of Lactulose. Informed Lakshmi Horn that prescription was Escribed to [facility] of choice. Lakshmi Horn had no further questions.

## 2018-06-18 NOTE — PROGRESS NOTES
Renetta Michaud left me a message saying pt has gained 20 pounds since hospital discharge. Wondering if he needs para. Called pt back. Will schedule para for Thursday prior to vacation on Saturday. They have appt with Dr. aMrley Richard on Friday. Reviewed echo results with her. Put in para with cell count after phone call. Faiza Arteaga NP  9:19 AM

## 2018-06-19 NOTE — TELEPHONE ENCOUNTER
Patients wife called stated she had not heard from Coordination of Care about scheduling paracentesis. Order was placed. Called [facility]. Scheduled for Wednesday 6/20/18 arrival time of 12:30pm. Patient wife informed. She repeated back date/time and had no further questions.

## 2018-06-20 NOTE — PROGRESS NOTES
Patient tolerated paracentesis. Drained 6250. I have reviewed discharge instructions with the patient and spouse. The patient and spouse verbalized understanding.

## 2018-06-20 NOTE — DISCHARGE INSTRUCTIONS
[facility]   Radiology Department  [phone]     Radiologist: Dr. Pulido [facility]    Date: 6/20/18      Paracentesis Discharge Instructions    You may have an aching pain in your abdomen at the puncture site tonight as the numbing medicine wears off. You may take Tylenol, as directed on the label, for  pain or discomfort. Resume your previous diet and medications. Rest the remainder of today. If we have removed a large volume of fluid, you be lightheaded or dizzy when making position changes. You may shower in 24 hours. Keep the dressing clean and dry until the site has healed. Watch for signs of infection at the puncture site. .. redness, swelling, pus, fever or chills. Contact your physician immediately if this occurs. If you experience severe sweating and new, severe abdominal pain seek emergency medical treatment. Follow up with your physician as previously discussed.

## 2018-06-21 PROBLEM — D69.6 THROMBOCYTOPENIA (HCC): Status: ACTIVE | Noted: 2018-01-01

## 2018-06-21 PROBLEM — D50.9 IRON DEFICIENCY ANEMIA: Status: ACTIVE | Noted: 2018-01-01

## 2018-06-21 PROBLEM — K70.31 ASCITES DUE TO ALCOHOLIC CIRRHOSIS (HCC): Status: ACTIVE | Noted: 2018-01-01

## 2018-06-21 NOTE — IP AVS SNAPSHOT
[address] [facility] [address] 
[phone] Patient: Eduardo Mora MRN: [id number] HTC:[birth year] About your hospitalization You were admitted on:  June 21, 2018 You last received care in the:  [facility] 6S NEURO-SCI TELE You were discharged on:  June 24, 2018 Why you were hospitalized Your primary diagnosis was:  Hepatic Encephalopathy (Hcc) Your diagnoses also included:  Iron Deficiency Anemia, Ascites Due To Alcoholic Cirrhosis (Hcc), Thrombocytopenia (Hcc) Follow-up Information Follow up With Details Comments Contact Info [place]   [address] [address] 
[phone] Faiza Mac NP In 5 days Please follow up this upcoming Friday in her office. You may call the office for appointment time [address] [facility] [address] 
[phone] Discharge Orders None A check suzanne indicates which time of day the medication should be taken. My Medications CONTINUE taking these medications Instructions Each Dose to Equal  
 Morning Noon Evening Bedtime  
 amiodarone 200 mg tablet Commonly known as:  CORDARONE Your last dose was: Your next dose is: Take 200 mg by mouth nightly. 200 mg CENTRUM SILVER Tab tablet Generic drug:  multivitamins-minerals-lutein Your last dose was: Your next dose is: Take 1 Tab by mouth daily. 1 Tab  
    
   
   
   
  
 digoxin 0.125 mg tablet Commonly known as:  LANOXIN Your last dose was: Your next dose is: Take 0.125 mg by mouth nightly. 0.125 mg  
    
   
   
   
  
 gabapentin 100 mg capsule Commonly known as:  NEURONTIN Your last dose was: Your next dose is: Take 100 mg by mouth three (3) times daily. 100 mg  
    
   
   
   
  
 lactulose 10 gram/15 mL solution Commonly known as:  Prasad Lopez Your last dose was: Your next dose is: Take 15 mL by mouth three (3) times daily. 10 g  
    
   
   
   
  
 metoprolol succinate 25 mg XL tablet Commonly known as:  TOPROL XL Your last dose was: Your next dose is: Take 0.5 Tabs by mouth daily. 12.5 mg  
    
   
   
   
  
 pantoprazole 40 mg tablet Commonly known as:  PROTONIX Your last dose was: Your next dose is: Take 1 Tab by mouth daily. 40 mg  
    
   
   
   
  
 rifAXIMin 550 mg tablet Commonly known as:  Gayla Hedge Your last dose was: Your next dose is: Take 1 Tab by mouth two (2) times a day. Indications: Hepatic Encephalopathy 550 mg  
    
   
   
   
  
 thiamine 100 mg tablet Commonly known as:  B-1 Your last dose was: Your next dose is: Take 1 Tab by mouth daily. 100 mg  
    
   
   
   
  
 valsartan 40 mg tablet Commonly known as:  DIOVAN Your last dose was: Your next dose is: Take 40 mg by mouth daily. 40 mg  
    
   
   
   
  
  
STOP taking these medications   
 furosemide 40 mg tablet Commonly known as:  LASIX  
   
  
 spironolactone 100 mg tablet Commonly known as:  ALDACTONE Discharge Instructions Discharge Instructions PATIENT ID: Chin Bhandari MRN: [id number] YOB: 1960 DATE OF ADMISSION: 6/21/2018  1:26 PM   
DATE OF DISCHARGE: 6/24/2018 PRIMARY CARE PROVIDER: Vivian Montesinos MD  
 
ATTENDING PHYSICIAN: Luis Nicholas MD 
DISCHARGING PROVIDER: Nate Vasquez NP To contact this individual call [phone] and ask the  to page. If unavailable ask to be transferred the Adult Hospitalist Department. DISCHARGE DIAGNOSES Acute Hepatic Encephalopathy CONSULTATIONS: IP CONSULT TO HOSPITALIST 
IP CONSULT TO GASTROENTEROLOGY PROCEDURES/SURGERIES: * No surgery found * PENDING TEST RESULTS:  
At the time of discharge the following test results are still pending: none FOLLOW UP APPOINTMENTS:  
Follow-up Information Follow up With Details Comments Contact Info [place]ad Easley [place]   [address] [address] 
[phone] Faiza Mac NP In 5 days Please follow up this upcoming Friday in her office. You may call the office for appointment time [address] [place] [address] At [address] 
[phone] ADDITIONAL CARE RECOMMENDATIONS:  
 
1. Please follow up with Beverly Patel NP at Dr. Montenegro Kidney office this upcoming Friday 6/29. Please call the office tomorrow for appointment time. 2. STOP taking diuretics per Dr. Cullen Balderrama order ( Lasix and ALDACTONE) 3. Continue taking Xifaximan two times daily and Latulose three times daily. Do not skip doses or you may end up back in the hospital . DIET: Resume previous diet ACTIVITY: Activity as tolerated DISCHARGE MEDICATIONS: 
 See Medication Reconciliation Form · It is important that you take the medication exactly as they are prescribed. · Keep your medication in the bottles provided by the pharmacist and keep a list of the medication names, dosages, and times to be taken in your wallet. · Do not take other medications without consulting your doctor. NOTIFY YOUR PHYSICIAN FOR ANY OF THE FOLLOWING:  
Fever over 101 degrees for 24 hours. Chest pain, shortness of breath, fever, chills, nausea, vomiting, diarrhea, change in mentation, falling, weakness, bleeding. Severe pain or pain not relieved by medications. Or, any other signs or symptoms that you may have questions about. DISPOSITION: 
 X Home With: none OT  PT  [facility]  RN  
  
 SNF/Inpatient Rehab/LTAC Independent/assisted living Hospice Other: CDMP Checked: Yes X PROBLEM LIST Updated: Yes X Signed:  
Jaswinder Joseph NP 
6/24/2018 2:50 PM 
 
 
 
  
  
  
Introducing Ace Lutz As a Us[employer]rendyanika Triblio patient, I wanted to make you aware of our electronic visit tool called [facility]. [facility] 24/7 allows you to connect within minutes with a medical provider 24 hours a day, seven days a week via a mobile device or tablet or logging into a secure website from your computer. You can access Ace Lutz from anywhere in the [place]. A virtual visit might be right for you when you have a simple condition and feel like you just dont want to get out of bed, or cant get away from work for an appointment, when your regular [facility] [employer] provider is not available (evenings, weekends or holidays), or when youre out of town and need minor care. Electronic visits cost only $49 and if the [facility] 24/7 provider determines a prescription is needed to treat your condition, one can be electronically transmitted to a nearby pharmacy*. Please take a moment to enroll today if you have not already done so. The enrollment process is free and takes just a few minutes. To enroll, please download the [facility] 24/7 nicol to your tablet or phone, or visit [email or web address] to enroll on your computer. And, as an [address] [place] [address] patient with a [facility] account, the results of your visits will be scanned into your electronic medical record and your primary care provider will be able to view the scanned results. We urge you to continue to see your regular [facility] provider for your ongoing medical care. And while your primary care provider may not be the one available when you seek a Ace Lutz virtual visit, the peace of mind you get from getting a real diagnosis real time can be priceless. For more information on Ace Lutz, view our Frequently Asked Questions (FAQs) at [email or web address]. Sincerely, 
 
Bernadine Rouse MD 
Chief Medical Officer Cookie Marks *:  certain medications cannot be prescribed via Ace Lutz Unresulted tests-please follow up with your PCP on these results Procedure/Test Authorizing Provider Naomi Cole MD  
 AMMONIA Laura Lopez, NP  
 AMMONIA Shayne Huynh, NP  
 AMMONIA ARTURO Reinoso  
 CBC W/O DIFF Alena Todd MD  
 CBC WITH AUTOMATED DIFF Laura Pool, NP  
 CBC WITH AUTOMATED DIFF Shayne Lino, NP  
 CBC WITH AUTOMATED DIFF Doris Reinoso MD  
 EKG, 12 LEAD, INITIAL ARTURO Reinoso  
 HEMOGLOBIN A1C WITH EAG Alena Todd MD  
 HEPATIC FUNCTION PANEL Alena Todd MD  
 METABOLIC PANEL, BASIC Alena Todd MD  
 METABOLIC PANEL, BASIC Shayne Huynh NP  
 METABOLIC PANEL, COMPREHENSIVE Laura Lopez NP  
 METABOLIC PANEL, [address] [place] [address] CARDIAC SPECT W STRS/REST MULT Tanya Pandey NP  
 NUCLEAR STRESS TEST Lo Jett. Gemma Pandey NP  
 OCCULT BLOOD, Linda Cabrera MD  
 SAMPLES BEING [place] [address], [address] [place] [address]  
 VITAMIN B12 Alena Todd MD  
  
Providers Seen During Your Hospitalization Provider Specialty Primary office phone Jack Carrera MD Emergency Medicine [phone] Alena Todd MD Hospitalist [phone] Israel Truong MD Internal Medicine [phone] Your Primary Care Physician (PCP) Primary Care Physician Office Phone Office Fax Viri Romero [phone] You are allergic to the following No active allergies Recent Documentation Height Weight BMI Smoking Status 1.778 m 84 kg 26.57 kg/m2 Former Smoker Emergency Contacts Name Discharge Info Relation Home Work Mobile Cass Gonzalez DISCHARGE CAREGIVER [3] Spouse [3] [phone] Patient Belongings The following personal items are in your possession at time of discharge: Dental Appliances: None  Visual Aid: None      Home Medications: None   Jewelry: Watch, With patient  Clothing: Pants, Footwear, Shirt    Other Valuables: Prosthesis, Cell Phone Please provide this summary of care documentation to your next provider. Signatures-by signing, you are acknowledging that this After Visit Summary has been reviewed with you and you have received a copy. Patient Signature:  ____________________________________________________________ Date:  ____________________________________________________________  
  
[facility] Signature:  ____________________________________________________________ Date:  ____________________________________________________________

## 2018-06-21 NOTE — ED PROVIDER NOTES
HPI Comments: 59 y.o. male with past medical history significant for CHF, HTN, A-fib, DM, sepsis, and cirrhosis who presents from home with chief complaint of lethargy. Pt reports he had 6L removed during a paracentesis yesterday, noting he felt fine after the procedure. He c/o nausea since awakening this morning. Per Pt's wife, Pt is altered today w/ mild confusion, decreased memory, lethargy, and difficulty w/ everyday tasks. Per wife, Pt had slightly low hemoglobin 9 days during a PCP visit w/ slightly elevated ammonia. Pt's wife notes Pt has had similar sx w/ high ammonia in the past. Pt self-administers his medication (including lactulose) and denies missed doses. Pt had BM's last night and this morning. Pt denies fever, vomiting, diarrhea, headache, pain, and bloody stool. There are no other acute medical concerns at this time. PCP: Regis Robbins MD    Note written by Radhika Melton, as dictated by Ladan Bowman MD 1:59 PM    The history is provided by the patient.         Past Medical History:   Diagnosis Date    Arthritis     Atrial fibrillation ([address] 2014    CHF (congestive heart failure) ([address]     Diabetes ([address]     Diabetic ulcer of left foot ([address] 2/2015 2/2015 Lt BKA    History of blood transfusion 2015    During Lt BKA; pt denies any adverse reaction    Hypertension     Liver disease     CIRRHOSIS    Sepsis ([address] 02/2015    From diabetic Left foot wound;  had a BKA ;  as of 11/21/16 pt states back to his baseline        Past Surgical History:   Procedure Laterality Date    HX AMPUTATION Left 2/10/2015    BKA    HX COLONOSCOPY      HX IMPLANTABLE CARDIOVERTER DEFIBRILLATOR  08/04/2015    [employer] cardio defibrillator; Dr Amanda Koo; as of 11/21/16 pt denies defibrillator going off         Family History:   Problem Relation Age of Onset    Heart Disease Brother     Heart Failure Brother     Heart Failure Brother        Social History     Social History    Marital status:      Spouse name: N/A    Number of children: N/A    Years of education: N/A     Occupational History    Not on file. Social History Main Topics    Smoking status: Former Smoker     Packs/day: 1.00     Years: 5.00     Quit date: 1/1/2013    Smokeless tobacco: Never Used    Alcohol use Yes      Comment: Last drink January 2018    Drug use: No    Sexual activity: Not on file     Other Topics Concern    Not on file     Social History Narrative         ALLERGIES: Review of patient's allergies indicates no known allergies. Review of Systems   Constitutional: Positive for activity change. Negative for fever. Cardiovascular: Negative for chest pain. Gastrointestinal: Positive for nausea. Negative for abdominal pain, diarrhea and vomiting. Genitourinary: Negative for flank pain. Musculoskeletal: Negative for back pain and neck pain. Psychiatric/Behavioral: Positive for behavioral problems and confusion. All other systems reviewed and are negative. Vitals:    06/21/18 1322   BP: 101/61   Pulse: 60   Resp: 18   Temp: 97.6 °F (36.4 °C)   SpO2: 99%   Weight: 87.5 kg (193 lb)   Height: 5' 10\" (1.778 m)            Physical Exam   Constitutional: He is oriented to person, place, and time. He appears well-developed and well-nourished. No distress. Mild diffuse jaundice. L BKA. HENT:   Head: Normocephalic and atraumatic. Eyes: Conjunctivae are normal.   Neck: Neck supple. No tracheal deviation present. Cardiovascular: Normal rate, regular rhythm and normal heart sounds. Exam reveals no gallop and no friction rub. No murmur heard. Pulmonary/Chest: Effort normal. No respiratory distress. Abdominal: Soft. He exhibits no distension. There is no tenderness. Musculoskeletal: Normal range of motion. Neurological: He is alert and oriented to person, place, and time. Flapping tremor. Slowing, confusion. Skin: Skin is warm and dry. Psychiatric:   Blunt affect.    Nursing note and vitals reviewed. Note written by Radhika Gracia, as dictated by Javier Che MD 1:59 PM    MDM    59 y.o. male presents with lethargy and confusion today after large volume paracentesis yesterday. Per his wife his bowel movements have decreased on lactulose. Ammonia elevated and appears to have symptomatic hepatic encephalopathy. Hepatology NP came to evaluate and will start on lactulose and albumin. Hospitalist was consulted for admission and will see the patient in the emergency department. ED Course       Procedures      CONSULT NOTE:  2:34 PM Javier Che MD [place] Texted Dr. Abdullahi Chang, Consult for Hospitalist.  Discussed available diagnostic tests and clinical findings. Dr. Abdullahi Chang will evaluate Pt for admission. ED EKG interpretation:  Rhythm: AV dual paced; Rate (approx.): 60; widened, paced QRS complex.    Note written by Radhika Gracia, as dictated by Javier Che MD 2:56 PM

## 2018-06-21 NOTE — PROGRESS NOTES
Admission Medication Reconciliation:    Information obtained from: rx query, patient, chart review    Significant PMH/Disease States:   Past Medical History:   Diagnosis Date    Arthritis     Atrial [facility] ([facility] 2014    CHF (congestive heart failure) [facility]     Diabetes ([facility]     [facility] 2/2015 2/2015 Lt BKA    History of blood transfusion 2015    During Lt BKA; pt denies any adverse reaction    Hypertension     Liver disease     CIRRHOSIS    Sepsis ([facility] 02/2015    From diabetic Left foot wound;  had a BKA ;  as of 11/21/16 pt states back to his baseline        Chief Complaint for this Admission:  lethargy    Allergies:  Review of patient's allergies indicates no known allergies. Prior to Admission Medications:   Prior to Admission Medications   Prescriptions Last Dose Informant Patient Reported? Taking?   amiodarone (CORDARONE) 200 mg tablet   Yes Yes   Sig: Take 200 mg by mouth nightly. digoxin (LANOXIN) 0.125 mg tablet   Yes Yes   Sig: Take 0.125 mg by mouth nightly. furosemide (LASIX) 40 mg tablet   Yes Yes   Sig: Take 40 mg by mouth daily. gabapentin (NEURONTIN) 100 mg capsule   Yes Yes   Sig: Take 100 mg by mouth three (3) times daily. lactulose (CHRONULAC) 10 gram/15 mL solution   No Yes   Sig: Take 15 mL by mouth three (3) times daily. metoprolol succinate (TOPROL XL) 25 mg XL tablet   No Yes   Sig: Take 0.5 Tabs by mouth daily. multivitamins-minerals-lutein (CENTRUM SILVER) Tab   Yes Yes   Sig: Take 1 Tab by mouth daily. pantoprazole (PROTONIX) 40 mg tablet   No Yes   Sig: Take 1 Tab by mouth daily. rifAXIMin (XIFAXAN) 550 mg tablet   No Yes   Sig: Take 1 Tab by mouth two (2) times a day. Indications: Hepatic Encephalopathy   spironolactone (ALDACTONE) 100 mg tablet   Yes Yes   Sig: Take 200 mg by mouth daily. thiamine (B-1) 100 mg tablet   No Yes   Sig: Take 1 Tab by mouth daily.    valsartan (DIOVAN) 40 mg tablet   Yes Yes   Sig: Take 40 mg by mouth daily. Facility-Administered Medications: None         Comments/Recommendations: Unable to obtain information regarding last doses. Duplicate rifaximin order removed. No other changes made.

## 2018-06-21 NOTE — ED NOTES
Wife stepped out of department to care for family. Wife took pt wallet and cell phone. Will return in a few hours.

## 2018-06-21 NOTE — ED NOTES
1:18 PM  I have evaluated the patient as the Provider in Triage. I have reviewed His vital signs and the triage nurse assessment. I have talked with the patient and any available family and advised that I am the provider in triage and have ordered the appropriate study to initiate their work up based on the clinical presentation during my assessment. I have advised that the patient will be accommodated in the Main ED as soon as possible. I have also requested to contact the triage nurse or myself immediately if the patient experiences any changes in their condition during this brief waiting period. Hx cirrhosis - had paracentesis yesterday, removed 6.5L. Today woke up feeling nauseated. Lethargic. Wife notes confusion. No fever at home.       ARTURO Morfin

## 2018-06-21 NOTE — ED TRIAGE NOTES
Patient comes to the ER c/o nausea and lethargy post paracentesis yesterday. 6L were removed.  Hx of cirrhosis of the liver

## 2018-06-21 NOTE — CONSULTS
70 Steffi Durand MD, FACP, [place], [place]       MARTÍN Harris PA-C Casimiro Schuller, [facility]   MARTÍN Dinero NP [facility] [address]    at [address], [address] [address]  22.    [phone]    FAX: [address] [place] [address]    at [address], [address] [facility], [address]    [phone]    FAX: [phone]       HEPATOLOGY CONSULT NOTE  The patient is well known to me and [facility] He is a 59year old  male with cirrhosis secondary to alcohol. He finally stopped consuming alcohol in 1/2018. Had an outpatient visit scheduled for tomorrow. Had paracentesis on 6/20 with removal of 6L due to increased weight. Seen as outpatient follow up on 5/18. Was doing well. 4/29/18. Admitted for HE event. 4/19/18. Given appt but was too SOB - admitted via ED after ER tapped 9L off of ascites. SOB improved. 4/14/2018. Variceal bleed    I have reviewed the emergency room note, hospital admission note, notes by all other clinicians who have seen the patient during this hospitalization to date. I have reviewed the problem list and the reason for this hospitalization. I have reviewed the allergies and the medications the patient was taking at home prior to this hospitalization. Spoke with wife, Milton before she headed out to take care of her famiily. ASSESSMENT AND PLAN:  Cirrhosis  This is secondary to alcohol. The CTP score is 9, Child class C, MELD score 27. I am concerned that he has limited hepatic reserve since he has had another variceal bleed and recurrent ascites 4 months after he has been abstinent from alcohol.     Alcohol abuse in remission  He has been abstinent from alcohol since 2018. He could be considered a candidate for LT if this is needed in 2018. Ascites  He had 6 liters of ascites removed yest in out patient para. With Screat and hyponatremia, discontinued diuretics and left the albumin on for more than 3 doses. Increased to 25 grams. May need repeat US - may have some reaccumulation. Hepatic encephalopathy  Agree with restarting home meds lactulose and Xifaxan. Restrict protein in diet. I have altered his diet order to reflect this. LORENZA  Screat 2.01 mg but down from last check. Secondary to diuretics and dehydration. Ordered albumin, discontinued the diuretics. Hyponatremia  Secondary to cirrhosis and diuretics. On IV Albumin. Anemia  Secondary to GI blood loss from portal HTN. Thrombocytopenia  This is secondary to cirrhosis. No treatment needed. SYSTEM REVIEW:  Constitution systems: Negative for fever, chills, weight gain, weight loss. Eyes: Negative for visual changes. ENT: Negative for sore throat, painful swallowing. Respiratory: No longer SOB since paracentesis  Cardiology: Negative for chest pain, palpitations. GI:  Negative for constipation or diarrhea. : Negative for urinary frequency, dysuria, hematuria, nocturia. Skin: Negative for rash. Hematology: Negative for easy bruising, blood clots. Musculo-skeletal: Negative for back pain, muscle pain, weakness. Neurologic: Negative for headaches, dizziness, vertigo, memory problems not related to HE. Psychology: Negative for anxiety, depression. FAMILY HISTORY:  The father  of unknown cause. The mother  of heart disease. There is no family history of liver disease.       SOCIAL HISTORY:  The patient is . The patient has 3 children. The patient has never used tobacco products. The patient consumes 6+ alcoholic beverages per day. He has been abstinent from alcohol since 2018.   The patient used to work as a jong. The patient retired in 2014.       PHYSICAL EXAMINATION:  VS: per nursing note  General:  No acute distress. Eyes:  Sclera anicteric. ENT:  No oral lesions. Nodes:  No adenopathy. Skin:  Spider angiomata. No jaundice. Respiratory:  Lungs clear to auscultation. Cardiovascular:  Regular heart rate. Abdomen:  Soft non-tender, some distention. Extremities:  No edema on right lower extremity. Left side BKA with prosthetic leg. Neurologic:  Knows where he is and who I am but feels \"goofy. \"   Cranial nerves grossly intact. No asterixis. LABORATORY:  [facility] [place] of [address] Units 6/21/2018 5/18/2018 5/7/2018   WBC 4.1 - 11.1 K/uL 4.1 2.6 (L) 2.4 (L)   ANC 1.8 - 8.0 K/UL 3.3 2.0 1.8   HGB 12.1 - 17.0 g/dL 8.8 (L) 8.7 (L) 7.3 (L)    - 400 K/uL 65 (L) 51 (LL) 58 (L)   INR 0.8 - 1.2  1.4 (H)    AST 15 - 37 U/L 41 (H) 34    ALT 12 - 78 U/L 34 22    Alk Phos 45 - 117 U/L 220 (H) 107    Bili, Total 0.2 - 1.0 MG/DL 2.5 (H) 4.5 (H)    Bili, Direct 0.00 - 0.40 mg/dL  1.52 (H)    Albumin 3.5 - 5.0 g/dL 3.2 (L) 4.6    BUN 6 - 20 MG/DL 23 (H) 41 (H) 13   Creat 0.70 - 1.30 MG/DL 2.01 (H) 2.40 (H) 1.23   Na 136 - 145 mmol/L 133 (L) 138 132 (L)   K 3.5 - 5.1 mmol/L 4.7 4.4 4.2   Cl 97 - 108 mmol/L 99 98 100   CO2 21 - 32 mmol/L 24 24 23   Glucose 65 - 100 mg/dL 154 (H) 132 (H) 127 (H)   Magnesium 1.6 - 2.4 mg/dL      Ammonia <32 UMOL/L 134 (H)       RADIOLOGY:  6/20/2018. US guided paracentesis. Drained off 6250 cc. Cell count ordered. 4/2018. CT scan abdomen without IV contrast.  Changes consistent with cirrhosis. No liver mass lesions. No dilated bile ducts. Large ascites. ENDOSCOPY:  4/13/2018. EGD by Kushal Zamora. Few large varices. 2 bands placed. 2/17/2018. EGD by MLS. Mild portal hypertensive gastropathy. No obvious bleeding soure. 1/23/2018. EGD by MLS. Multiple medium varices, large clot in fundus. 6 bands placed. 6/1/2017. EGD by MLS.  Medium to large varices. 4 bands.     Shari Fowler, [facility]-BC  [facility] [place] [facility] [address] [place], [address] [facility], [facility] 22.  [phone]

## 2018-06-21 NOTE — H&P
[address]   HISTORY AND PHYSICAL      Nasim RANKIN  MR#: [id number]  : 1960  ACCOUNT #: [de-identified]   ADMIT DATE: 2018    CHIEF COMPLAINT:  Lethargy and confusion. HISTORY OF PRESENT ILLNESS:  The patient is a 25-year-old gentleman with past medical history of alcoholic cirrhosis, history of esophageal varices, alcohol abuse in remission, chronic systolic heart failure, neuropathy, status post left BKA, hypertension, cardiac defibrillation implantation, diabetes mellitus type 2, who presents to the hospital with the above-mentioned symptoms. History was obtained from the patient as well as the patient's wife. The patient's wife reports that patient has a history of alcoholic cirrhosis associated with chronic ascites. Yesterday, he had 6.5 liters of fluid removed through paracentesis. The patient is doing well, went home, and then started having some nausea. Woke up this morning and was a little confused, disoriented, decreased memory, and was having difficulty with \"everyday tasks. \"  She reports that the patient had low hemoglobin during the PCP visit about 10 days back and also had some elevated ammonia levels. The wife reports that the patient has been taking his lactulose on a regular basis. She reports that the patient did not have any falls or injuries. Reports that the patient's belly looks nondistended to her. She reports that the patient is \"coming around\" and is more responsive after coming to the hospital.  The patient himself reports that he is not having any complaints or problems. The patient is alert x2.   Denies any headache, blurry vision, sore throat, trouble swallowing, trouble with speech, any chest pain, shortness of breath, cough, fever, chills, abdominal pain, constipation, diarrhea, urinary symptoms, focal or generalized neurological weakness, recent travel, sick contacts, any falls, injuries, hematemesis, melena, hemoptysis, or any other concerns or problems. The patient was found to have an ammonia level of 134 and was requested to be admitted to the hospital.    PAST MEDICAL HISTORY:  See above. HOME MEDICATIONS:  Currently, the patient is on lactulose 30 grams t.i.d., furosemide 40 mg daily, spironolactone 200 mg daily, Xifaxan 550 mg daily b.i.d., gabapentin 100 mg daily t.i.d., pantoprazole 40 mg daily, losartan 40 mg daily, thiamine 100 mg daily, metoprolol 12.5 mg daily, amiodarone 200 mg nightly, multivitamin, digoxin 0.125 mg nightly. SOCIAL HISTORY:  Patient has history of tobacco abuse. Patient used to be a heavy drinker, but reports that he has not been drinking since 01/2018. No IV drug abuse. Lives at home. ALLERGIES:  NO KNOWN DRUG ALLERGIES. FAMILY HISTORY:  Was discussed. Per chart, the patient's brother has a history of heart disease and heart failure. PHYSICAL EXAMINATION:  VITAL SIGNS:  Temperature 97.6, pulse 60, respiratory rate 18, blood pressure 118/64, pulse ox 99% on room air. GENERAL:  Alert x2, awake, mildly distressed, pleasant male who appears to be stated age. HEENT:  Pupils equal and reactive to light. Dry mucous membranes, otherwise clear. NECK:  Supple. CHEST:  Clear to auscultation bilaterally. CARDIOVASCULAR:  S1, S2 heard. ABDOMEN:  Soft, mildly distended, nontender. Bowel sounds are hypoactive. EXTREMITIES:  No clubbing, cyanosis, or edema. Left BKA was noted. NEUROPSYCHIATRIC:  Pleasant mood and affect. Cranial nerves II-XII grossly intact. Sensory grossly within normal limits. DTR 2+/4. Strength 5/5. SKIN:  Warm. LABORATORY DATA:  White count 4.1, hemoglobin 8.8, hematocrit 35.3, platelets 46,127, sodium 133, potassium 4.7, chloride 99, bicarbonate 24, anion gap 10, glucose 154, BUN 23, creatinine 2.01, calcium 9.1, bilirubin total 2.5, ALT 34, AST 41, alkaline phosphatase 220, troponin less than 0.05, ammonia 134. EKG shows AV paced rhythm.     ASSESSMENT AND PLAN:  1. Hepatic encephalopathy. The patient was admitted on a telemetry bed. We will start patient on lactulose. We will start patient on albumin. Continue Lasix, neurovascular checks, and close monitoring. We will repeat ammonia level in the morning. We will get GI consult and further intervention will be per hospital course. Patient does not exhibit any focal neurological deficit. Thus, we will continue to closely monitor. Further intervention will be per hospital course. Strict I's and O's and reassess as needed. 2.  History of alcoholic cirrhosis. Continue to closely monitor. See above. Patient on Lasix IV. Further intervention per hospital course. 3.  History of congestive heart failure. Strict I's and O's, daily weights. The patient does not appear to be in exacerbation. We will continue Lasix and provide close monitoring. Further intervention will be per hospital course. 4.  History of chronic kidney disease stage III. Monitor for now. Avoid nephrotoxic medication. Reassess as needed. 5.  Diabetes. The patient will be on sliding scale NovoLog insulin, Accu-Chek, diet control and close monitoring. Further intervention will be per hospital course. Reassess as needed. 6.  Gastrointestinal and deep vein thrombosis prophylaxis. The patient will be on sequential compression devices.       Tariq Atwood MD MM/  D: 06/21/2018 16:14     T: 06/21/2018 17:04  JOB #: [id number]

## 2018-06-22 NOTE — PROGRESS NOTES
Bedside shift change report given to Maria C Da Silva RN (oncoming nurse) by Anabel Tavarez RN (offgoing nurse). Report included the following information SBAR, Kardex, ED Summary, Procedure Summary, Intake/Output, MAR, Accordion, Recent Results and Cardiac Rhythm Paced.

## 2018-06-22 NOTE — PROGRESS NOTES
[address]     MARIAMA King [address] [facility],[address]       Johnny Vogel MD, Tino Loza, [place], [place]        April [facility], KULDEEP Springer, [facility]   Lacinda Boast, MARTÍN Fuchs, [facility] [address]    at [address], [address] [address]  22.    [phone]    FAX: [address] [place] [address] Avenue    at [facility]    [address], [address] [facility], [address]    [phone]    FAX: [phone]         HEPATOLOGY PROGRESS NOTE  The patient is a 59year old  male with cirrhosis secondary to alcohol. He finally stopped consuming alcohol in 1/2018. He was brought to the ED by his wife because of confusion. Laboratory studies demonstrated LORENZA with Screat up to 2 mg and hypoNa to 133. He had a paracentesis 1 day prior removing 6 L of ascites. Cell count was negative for SBP. Since admission he has been treated with IV albumin, lactulose and xifaxan. This AM he is still a bit slow in thinking. A few mental mistankes and forgetfulness in speaking with him. Repeat labs show Screat in down to 1.79 mg.  NA is up to 135. He will need at least another day of treatment.        ASSESSMENT AND PLAN:  Cirrhosis  This is secondary to alcohol. The CTP score is 9, Child class C, MELD score 27. Will need to initiate LT evaluation testing.       Alcohol abuse in remission  He has been abstinent from alcohol for 6 months, since 1/2018. We can now proceed with LT evaluation.     Ascites  He had 6 liters of ascites removed yest in out patient para. With Screat and hyponatremia, discontinued diuretics and left the albumin on for more than 3 doses. Increased to 25 grams.  May need repeat US - may have some reaccumulation.      Hepatic encephalopathy  Agree with restarting home meds lactulose and Xifaxan. Restrict protein in diet. I have altered his diet order to reflect this.       LORENZA  Screat 2.01 mg but down from last check. Secondary to diuretics and dehydration. Ordered albumin, discontinued the diuretics.        Hyponatremia  Secondary to cirrhosis and diuretics. On IV Albumin.     Anemia  Secondary to GI blood loss from portal HTN. Bone marrow suppression from malnutrition and chronic disease.      Thrombocytopenia  This is secondary to cirrhosis. No treatment needed.     PHYSICAL EXAMINATION:  VS: per nursing note  General:  No acute distress. Eyes:  Sclera anicteric. ENT:  No oral lesions. Nodes:  No adenopathy. Skin:  Spider angiomata. No jaundice. Respiratory:  Lungs clear to auscultation. Cardiovascular:  Regular heart rate. Abdomen:  Soft non-tender, some ascites is present. Extremities:  No edema on right lower extremity. Left side BKA with prosthetic leg. Neurologic:  Still a bit slow. Cranial nerves grossly intact. Asterixis present.     LABORATORY:  Results for Reid Santacruz (MRN [id number]) as of 6/22/2018 06:22   Ref. Range 6/21/2018 13:49 6/22/2018 02:42   WBC Latest Ref Range: 4.1 - 11.1 K/uL 4.1 2.9 (L)   HGB Latest Ref Range: 12.1 - 17.0 g/dL 8.8 (L) 8.3 (L)   PLATELET Latest Ref Range: 150 - 400 K/uL 65 (L) 53 (L)   Sodium Latest Ref Range: 136 - 145 mmol/L 133 (L) 135 (L)   Potassium Latest Ref Range: 3.5 - 5.1 mmol/L 4.7 4.3   Chloride Latest Ref Range: 97 - 108 mmol/L 99 102   CO2 Latest Ref Range: 21 - 32 mmol/L 24 24   Glucose Latest Ref Range: 65 - 100 mg/dL 154 (H) 128 (H)   BUN Latest Ref Range: 6 - 20 MG/DL 23 (H) 21 (H)   Creatinine Latest Ref Range: 0.70 - 1.30 MG/DL 2.01 (H) 1.79 (H)   Bilirubin, total Latest Ref Range: 0.2 - 1.0 MG/DL 2.5 (H) 3.0 (H)   Albumin Latest Ref Range: 3.5 - 5.0 g/dL 3.2 (L) 3.4 (L)   ALT (SGPT) Latest Ref Range: 12 - 78 U/L 34 31   AST Latest Ref Range: 15 - 37 U/L 41 (H) 36   Alk.  phosphatase Latest Ref Range: 45 - 117 U/L 220 (H) 160 (H)   Ammonia Latest Ref Range: <32 UMOL/L 134 (H) 54 (H)       MD Xavier Gutierrez [address] of [address] [facility] [address] of [address] [address] [facility] [address] [address] Thierry Mcdaniels 7  [facility] 22.  [phone]

## 2018-06-22 NOTE — DIABETES MGMT
DTC Progress Note    Recommendations/ Comments: Chart reviewed for hyperglycemia, -225 mg/dl. If hyperglycemia persist, consider changing the insulin correction scale to the normal sensitivity. Please obtain PO intake    Current hospital DM medication: Humalog for correction, high sensitivity scale     Chart reviewed on Nazanin Sheridan. Patient is a 59 y.o. male with hx of diabetes on no DM medications at home. PMHx includes alcoholic cirrhosis, CHF    A1c:   Lab Results   Component Value Date/Time    Hemoglobin A1c 5.7 06/21/2018 04:30 PM    Hemoglobin A1c <3.5 (L) 04/13/2018 05:57 PM       Recent Glucose Results:   Lab Results   Component Value Date/Time     (H) 06/22/2018 02:42 AM    GLUCPOC 225 (H) 06/22/2018 11:40 AM    GLUCPOC 202 (H) 06/22/2018 06:45 AM    GLUCPOC 208 (H) 06/21/2018 11:40 PM        Lab Results   Component Value Date/Time    Creatinine 1.79 (H) 06/22/2018 02:42 AM     Estimated Creatinine Clearance: 45.3 mL/min (based on Cr of 1.79). Active Orders   Diet    DIET DIABETIC CONSISTENT CARB Regular; 2 GM NA (House Low NA); 40GM Protein        PO intake: No data found. Will continue to follow as needed.     Thank you  Jay Jay Jeffrey RD

## 2018-06-22 NOTE — CDMP QUERY
There is documentation of \"hepatic encephalopathy\" noted on this chart. Please clarify if this patient is (was) being treated/managed for:     => Acute Hepatic Encephalopathy POA in the setting of cirrhosis requiring Hepatology consult, lactulose, xifaxan, Neuro checks, lab monitoring  => Other explanation of clinical findings  => Clinically Undetermined (no explanation for clinical findings)    The medical record reflects the following clinical findings, treatment, and risk factors. Risk Factors:  alcoholic cirrhosis  Clinical Indicators: ED Note- Per Pt's wife, Pt is altered today w/ mild confusion, decreased memory, lethargy, and difficulty w/ everyday tasks. H&P IMP: Lethargy and confusion. Alert x2; Hepatic encephalopathy. History of alcoholic cirrhosis; Labs Ammonia: 134  Treatment: Hepatology consult, lactulose, xifaxan, Neuro checks, lab monitoring    Please clarify and document your clinical opinion in the progress notes and discharge summary including the definitive and/or presumptive diagnosis, (suspected or probable), related to the above clinical findings. Please include clinical findings supporting your diagnosis.       Thank you,    All Parada RN  [facility]  [phone]

## 2018-06-22 NOTE — ROUTINE PROCESS
Bedside and Verbal shift change report given to Balaji Luna RN (oncoming nurse) by Kristi Goldsmith RN (offgoing nurse). Report included the following information SBAR, Kardex, ED Summary, Procedure Summary, Intake/Output, MAR, Recent Results, Med Rec Status and Cardiac Rhythm Paced. Dianna Núñez

## 2018-06-22 NOTE — PROGRESS NOTES
Problem: Falls - Risk of  Goal: *Absence of Falls  Document Stefania Fall Risk and appropriate interventions in the flowsheet. Outcome: Progressing Towards Goal  Fall Risk Interventions:  Mobility Interventions: OT consult for ADLs, Patient to call before getting OOB, PT Consult for mobility concerns, PT Consult for assist device competence, Utilize gait belt for transfers/ambulation    Mentation Interventions: Adequate sleep, hydration, pain control, Toileting rounds, Reorient patient, More frequent rounding    Medication Interventions: Patient to call before getting OOB, Teach patient to arise slowly, Utilize gait belt for transfers/ambulation    Elimination Interventions: Call light in reach, Toileting schedule/hourly rounds, Urinal in reach     Patient's call bell within reach. Patient aware to use when needing assistance.

## 2018-06-22 NOTE — PROGRESS NOTES
Problem: Falls - Risk of  Goal: *Absence of Falls  Document Stefania Fall Risk and appropriate interventions in the flowsheet. Outcome: Progressing Towards Goal  Fall Risk Interventions:  Mobility Interventions: Assess mobility with egress test, Communicate number of staff needed for ambulation/transfer, OT consult for ADLs, Patient to call before getting OOB, PT Consult for mobility concerns, PT Consult for assist device competence, Strengthening exercises (ROM-active/passive), Utilize walker, cane, or other assitive device    Mentation Interventions: Adequate sleep, hydration, pain control, Door open when patient unattended, Evaluate medications/consider consulting pharmacy, Eyeglasses and hearing aids, Familiar objects from home, Increase mobility, More frequent rounding, Room close to nurse's station, Reorient patient, Toileting rounds, Update white board         Elimination Interventions: Bed/chair exit alarm, Elevated toilet seat, Patient to call for help with toileting needs, Toilet paper/wipes in reach, Toileting schedule/hourly rounds             Problem: Pressure Injury - Risk of  Goal: *Prevention of pressure injury  Document Spencer Scale and appropriate interventions in the flowsheet. Outcome: Progressing Towards Goal  Pressure Injury Interventions:  Sensory Interventions: Assess changes in LOC, Assess need for specialty bed, Check visual cues for pain, Discuss PT/OT consult with provider, Float heels, Keep linens dry and wrinkle-free, Minimize linen layers, Monitor skin under medical devices         Activity Interventions: Assess need for specialty bed, Pressure redistribution bed/mattress(bed type), PT/OT evaluation, Increase time out of bed    Mobility Interventions: Assess need for specialty bed, HOB 30 degrees or less, Pressure redistribution bed/mattress (bed type), PT/OT evaluation, Turn and reposition approx.  every two hours(pillow and wedges)    Nutrition Interventions: Document food/fluid/supplement intake, Offer support with meals,snacks and hydration    Friction and Shear Interventions: Apply protective barrier, creams and emollients, Foam dressings/transparent film/skin sealants, Lift sheet, Lift team/patient mobility team, Minimize layers

## 2018-06-22 NOTE — PROGRESS NOTES
Occupational Therapy EVALUATION/discharge  Patient: Nazanin Sheridan (34 y.o. male)  Date: 6/22/2018  Primary Diagnosis: Hepatic encephalopathy [facility])        Precautions:   Fall    ASSESSMENT:   Pt was cleared for OT, received supine in bed and agreeable to OT with encouragement (pt initially refused to mobilize with therapist stating \"I am not doing any moving around until I get pain medicine\"). Pt presents as functioning at his baseline for ADL and related mobility this date. Pt overall ADL performance modified independent to independent. Pt overall functional transfer performance independent. Pt reportedly resides with wife and [de-identified] year old son. Pt has been retired for several years and wife works full time for [employer] Further skilled acute occupational therapy is not indicated at this time. Discharge Recommendations: None  Further Equipment Recommendations for Discharge: None, Pt has DME at home already      SUBJECTIVE:   Patient stated I guess I will get up because you are nice.     OBJECTIVE DATA SUMMARY:   HISTORY:   Past Medical History:   Diagnosis Date    Arthritis     Atrial fibrillation ([address] 2014    CHF (congestive heart failure) ([address]     Diabetes ([address]     Diabetic ulcer of left foot ([facility] 2/2015 2/2015 Lt BKA    History of blood transfusion 2015    During Lt BKA; pt denies any adverse reaction    Hypertension     Liver disease     CIRRHOSIS    Sepsis ([address] 02/2015    From diabetic Left foot wound;  had a BKA ;  as of 11/21/16 pt states back to his baseline      Past Surgical History:   Procedure Laterality Date    HX AMPUTATION Left 2/10/2015    BKA    HX COLONOSCOPY      HX IMPLANTABLE CARDIOVERTER DEFIBRILLATOR  08/04/2015    [facility] cardio defibrillator; Dr Dung Pennington; as of 11/21/16 pt denies defibrillator going off       Prior Level of Function/Environment/Context: Pt reportedly likes to watch his son play sports (basketball and swimming).   Occupations in which the patient is/was successful, what are the barriers preventing that success:   Performance Patterns (routines, roles, habits, and rituals):   Personal Interests and/or values:   Expanded or extensive additional review of patient history:     Home Situation  Home Environment: Private residence  One/Two Story Residence: Two story  Living Alone: No  Support Systems: Family member(s)  Patient Expects to be Discharged to[de-identified] Private residence  Current DME Used/Available at Home: Paralee Hirschfeld, rollator, Transfer bench, Grab bars, Raised toilet seat  Tub or Shower Type: Tub/Shower combination    Hand dominance: Right    EXAMINATION OF PERFORMANCE DEFICITS:  Cognitive/Behavioral Status:  Neurologic State: Alert  Orientation Level: Oriented X4  Cognition: Follows commands  Perception: Appears intact  Perseveration: No perseveration noted  Safety/Judgement: Awareness of environment    Skin: Appears intact BUE    Edema: None noted BUE    Hearing: Auditory  Auditory Impairment: None    Vision/Perceptual:    Tracking: Able to track stimulus in all quadrants w/o difficulty              Visual Fields:  (Appears intact)       Acuity: Within Defined Limits         Range of Motion:    AROM: Within functional limits                         Strength:    Strength: Within functional limits                Coordination:  Coordination: Within functional limits  Fine Motor Skills-Upper: Left Intact; Right Intact    Gross Motor Skills-Upper: Left Intact; Right Intact    Tone & Sensation:    Tone: Normal                         Balance:  Sitting: Intact  Standing: Impaired  Standing - Static: Good  Standing - Dynamic : Fair (Pt reports this is his baseline)    Functional Mobility and Transfers for ADLs:  Bed Mobility:  Supine to Sit: Independent  Sit to Supine: Independent    Transfers:  Sit to Stand: Independent  Stand to Sit: Independent  Bed to Chair: Independent  Toilet Transfer : Independent (Inferred from chair transfer)    ADL Assessment:  Feeding: Independent    Oral Facial Hygiene/Grooming: Independent (Standing)    Bathing: Modified independent (This is pts reported baseline)    Upper Body Dressing: Independent    Lower Body Dressing: Independent    Toileting: Independent                ADL Intervention and task modifications:     1 staff assist for toileting, OOB-chair for meals                                Cognitive Retraining  Safety/Judgement: Awareness of environment    Functional Measure:  Barthel Index:    Bathin  Bladder: 10  Bowels: 10  Groomin  Dressing: 10  Feeding: 10  Mobility: 15  Stairs: 10  Toilet Use: 10  Transfer (Bed to Chair and Back): 15  Total: 100       Barthel and G-code impairment scale:  Percentage of impairment CH  0% CI  1-19% CJ  20-39% CK  40-59% CL  60-79% CM  80-99% CN  100%   Barthel Score 0-100 100 99-80 79-60 59-40 20-39 1-19   0   Barthel Score 0-20 20 17-19 13-16 9-12 5-8 1-4 0      The Barthel ADL Index: Guidelines  1. The index should be used as a record of what a patient does, not as a record of what a patient could do. 2. The main aim is to establish degree of independence from any help, physical or verbal, however minor and for whatever reason. 3. The need for supervision renders the patient not independent. 4. A patient's performance should be established using the best available evidence. Asking the patient, friends/relatives and nurses are the usual sources, but direct observation and common sense are also important. However direct testing is not needed. 5. Usually the patient's performance over the preceding 24-48 hours is important, but occasionally longer periods will be relevant. 6. Middle categories imply that the patient supplies over 50 per cent of the effort. 7. Use of aids to be independent is allowed. Lissette Puga., Barthel, D.W. ([id number]). Functional evaluation: the Barthel Index. [facility] (14)2.   FRANSISCO Wylie, Pramod Tobias., Gavin Reynolds., Maximo Cole. (1999). Measuring the change indisability after inpatient rehabilitation; comparison of the responsiveness of the Barthel Index and Functional [place] Measure. Journal of Neurology, Neurosurgery, and Psychiatry, 66(4), 995-303. JANE Maharaj, CARLOTTA Alexander, & Elana Oneal M.A. (2004.) Assessment of post-stroke quality of life in cost-effectiveness studies: The usefulness of the Barthel Index and the EuroQoL-5D. Quality of Life Research, 13, 627-79       G codes: In compliance with CMSs Claims Based Outcome Reporting, the following G-code set was chosen for this patient based on their primary functional limitation being treated: The outcome measure chosen to determine the severity of the functional limitation was the Barthel Index with a score of 100/100 which was correlated with the impairment scale. ? Self Care:     - CURRENT STATUS: CH - 0% impaired, limited or restricted    - GOAL STATUS: CH - 0% impaired, limited or restricted    - D/C STATUS:  CH - 0% impaired, limited or restricted     Occupational Therapy Evaluation Charge Determination   History Examination Decision-Making   LOW Complexity : Brief history review  LOW Complexity : 1-3 performance deficits relating to physical, cognitive , or psychosocial skils that result in activity limitations and / or participation restrictions  LOW Complexity : No comorbidities that affect functional and no verbal or physical assistance needed to complete eval tasks       Based on the above components, the patient evaluation is determined to be of the following complexity level: LOW   Pain:  Pain Scale 1: Numeric (0 - 10)  Pain Intensity 1: 5  Pain Location 1: Leg  Pain Orientation 1: Right;Left  Pain Description 1: Aching  Pain Intervention(s) 1: Guided Imagery channel ([facility] only); Family Support  Activity Tolerance:   Pt denied dizziness and dyspnea during session.    After treatment:   []  Patient left in no apparent distress sitting up in chair  [x]  Patient left in no apparent distress in bed  [x]  Call bell left within reach  [x]  Nursing notified  []  Caregiver present  []  Bed alarm activated    COMMUNICATION/EDUCATION:   Communication/Collaboration:  [x]      Home safety education was provided and the patient/caregiver indicated understanding. [x]      Patient/family have participated as able and agree with findings and recommendations. []      Patient is unable to participate in plan of care at this time.   Findings and recommendations were discussed with: Physical Therapist and Registered Nurse    [facility]  Time Calculation: 16 mins

## 2018-06-22 NOTE — INTERDISCIPLINARY ROUNDS
IDR/SLIDR Summary          Patient: Morena Boland MRN: [id number]    Age: 59 y.o. YOB: 1960 Room/Bed: [facility]   Admit Diagnosis: Hepatic encephalopathy (HCC)  Principal Diagnosis: Hepatic encephalopathy (HCC)   Goals: Lactulose, Albumin, Safety, PT/OT, d/c planning  Readmission: YES  Quality Measure: CHF  VTE Prophylaxis: Mechanical  Influenza Vaccine screening completed? YES  Pneumococcal Vaccine screening completed? YES  Mobility needs: No   Nutrition plan:Yes  Consults:P.T, O.T. and Case Management    Financial concerns:No  Escalated to CM? NO  RRAT Score: 32   Interventions:  Testing due for pt today?  YES  LOS: 1 days Expected length of stay 2-3 days  Discharge plan: TBD   PCP: Rayna العلي MD  Transportation needs: No    Days before discharge:two or more days before discharge   Discharge disposition: TBD    Signed:     Leslie Kessler RN  6/22/2018  4:25 AM

## 2018-06-22 NOTE — ROUTINE PROCESS
TRANSFER - OUT REPORT:    Verbal report given to JEAN Palumbo(name) on Alex Lopez  being transferred to [facility](unit) for routine progression of care       Report consisted of patients Situation, Background, Assessment and   Recommendations(SBAR). Information from the following report(s) SBAR, Kardex, [facility] and Recent Results was reviewed with the receiving nurse. Lines:   Peripheral IV 06/21/18 Left Antecubital (Active)   Site Assessment Clean, dry, & intact 6/21/2018  1:48 PM   Phlebitis Assessment 0 6/21/2018  1:48 PM   Infiltration Assessment 0 6/21/2018  1:48 PM   Dressing Status Clean, dry, & intact 6/21/2018  1:48 PM   Dressing Type Transparent 6/21/2018  1:48 PM   Hub Color/Line Status Pink;Flushed;Patent 6/21/2018  1:48 PM   Action Taken Catheter retaped;Blood drawn 6/21/2018  1:48 PM        Opportunity for questions and clarification was provided.       Patient transported with:   Dr. Z

## 2018-06-22 NOTE — CDMP QUERY
There is documentation of \"history of CHF\" noted on this chart. Please clarify if this patient is (was) being treated/managed for:     => Chronic Systolic CHF in the setting of known hx requiring strict I&O, daily weights, lasix, digoxin, metoprolol   => Other explanation of clinical findings  => Clinically Undetermined (no explanation for clinical findings)    The medical record reflects the following clinical findings, treatment, and risk factors. Risk Factors:  known hx, h/o A-Fib  Clinical Indicators:  H&P- History of congestive heart failure. Strict I's and O's, daily weights;  PTA Meds- lasix, digoxin, metoprolol  Treatment: I&O, daily weights, lasix, digoxin, metoprolol     Please clarify and document your clinical opinion in the progress notes and discharge summary including the definitive and/or presumptive diagnosis, (suspected or probable), related to the above clinical findings. Please include clinical findings supporting your diagnosis.       Thank you,    Lalito Puentes RN  [facility]  [phone]

## 2018-06-22 NOTE — PROGRESS NOTES
physical Therapy EVALUATION/DISCHARGE  Patient: Dahiana Cobb (50 y.o. male)  Date: 6/22/2018  Primary Diagnosis: Hepatic encephalopathy [facility])        Precautions:   Fall  ASSESSMENT :  Based on the objective data described below, the patient presents with strength, balance, and mobility at baseline following admission for hepatic encephalopathy. Patient is currently independent for all observed transfers. Ambulated 125 feet without AD and supervision. Patient ambulates with wide ROMANA, possibly due to L BKA. Patient without LOB during gait assessment. Patient reports no h/o falls. He is at baseline for mobility, has no concerns. No acute or discharge skilled PT needs. Will complete order. Skilled physical therapy is not indicated at this time. PLAN :  Discharge Recommendations: None  Further Equipment Recommendations for Discharge: None     SUBJECTIVE:   Patient stated I'm supposed to go to [facility].     OBJECTIVE DATA SUMMARY:   HISTORY:    Past Medical History:   Diagnosis Date    Arthritis     Atrial fibrillation ([facility] 2014    CHF (congestive heart failure) ([facility]     Diabetes ([facility]     Diabetic ulcer of left foot ([facility] 2/2015 2/2015 Lt BKA    History of blood transfusion 2015    During Lt BKA; pt denies any adverse reaction    Hypertension     Liver disease     CIRRHOSIS    Sepsis ([address] 02/2015    From diabetic Left foot wound;  had a BKA ;  as of 11/21/16 pt states back to his baseline      Past Surgical History:   Procedure Laterality Date    HX AMPUTATION Left 2/10/2015    BKA    HX COLONOSCOPY      HX IMPLANTABLE CARDIOVERTER DEFIBRILLATOR  08/04/2015    [employer] [facility] cardio defibrillator; Dr Dane Durham; as of 11/21/16 pt denies defibrillator going off     Prior Level of Function/Home Situation: independent  Personal factors and/or comorbidities impacting plan of care:     Home Situation  Home Environment: Private residence  One/Two Story Residence: Two story  Living Alone: No  Support Systems: Family member(s)  Patient Expects to be Discharged to[de-identified] Private residence  Current DME Used/Available at Home: [address], rollator, Transfer bench, Grab bars, Raised toilet seat  Tub or Shower Type: Tub/Shower combination    EXAMINATION/PRESENTATION/DECISION MAKING:   Critical Behavior:  Neurologic State: Alert  Orientation Level: Oriented X4  Cognition: Follows commands  Safety/Judgement: Awareness of environment  Hearing: Auditory  Auditory Impairment: None  Skin:    Edema:   Range Of Motion:  AROM: Within functional limits                       Strength:    Strength: Within functional limits                    Tone & Sensation:   Tone: Normal                              Coordination:  Coordination: Within functional limits  Vision:   Tracking: Able to track stimulus in all quadrants w/o difficulty  Visual Fields:  (Appears intact)  Acuity: Within Defined Limits  Functional Mobility:  Bed Mobility:     Supine to Sit: Independent  Sit to Supine: Independent     Transfers:  Sit to Stand: Independent  Stand to Sit: Independent        Bed to Chair: Independent              Balance:   Sitting: Intact  Standing: Impaired  Standing - Static: Good  Standing - Dynamic : Fair (Pt reports this is his baseline)  Ambulation/Gait Training:  Distance (ft): 125 Feet (ft)  Assistive Device: Gait belt  Ambulation - Level of Assistance: Supervision        Gait Abnormalities: Decreased step clearance        Base of Support: Widened     Speed/Charlee: Pace decreased (<100 feet/min)  Step Length: Right shortened;Left shortened                         Stairs:                Therapeutic Exercises:       Functional Measure:  Barthel Index:    Bathin  Bladder: 10  Bowels: 10  Groomin  Dressing: 10  Feeding: 10  Mobility: 15  Stairs: 10  Toilet Use: 10  Transfer (Bed to Chair and Back): 15  Total: 100       Barthel and G-code impairment scale:  Percentage of impairment CH  0% CI  1-19% CJ  20-39% CK  40-59% CL  60-79% CM  80-99% CN  100%   Barthel Score 0-100 100 99-80 79-60 59-40 20-39 1-19   0   Barthel Score 0-20 20 17-19 13-16 9-12 5-8 1-4 0      The Barthel ADL Index: Guidelines  1. The index should be used as a record of what a patient does, not as a record of what a patient could do. 2. The main aim is to establish degree of independence from any help, physical or verbal, however minor and for whatever reason. 3. The need for supervision renders the patient not independent. 4. A patient's performance should be established using the best available evidence. Asking the patient, friends/relatives and nurses are the usual sources, but direct observation and common sense are also important. However direct testing is not needed. 5. Usually the patient's performance over the preceding 24-48 hours is important, but occasionally longer periods will be relevant. 6. Middle categories imply that the patient supplies over 50 per cent of the effort. 7. Use of aids to be independent is allowed. Camille Olvera., Barthel, D.W. (7777). Functional evaluation: the Barthel Index. [address] [facility] (14)2. Bela Mejia mague FRANSISCO Banks, Warren Couch., Tom Westbrook., Jr, [address] [place] [address] (1999). Measuring the change indisability after inpatient rehabilitation; comparison of the responsiveness of the Barthel Index and Functional [place] Measure. Journal of Neurology, Neurosurgery, and Psychiatry, 66(4), 277-214. Soha Ash, N.J.DEE, CARLOTTA Alexander, & Kay Dorado M.A. (2004.) Assessment of post-stroke quality of life in cost-effectiveness studies: The usefulness of the Barthel Index and the EuroQoL-5D. Quality of Life Research, 13, 167-21         G codes: In compliance with CMSs Claims Based Outcome Reporting, the following G-code set was chosen for this patient based on their primary functional limitation being treated:     The outcome measure chosen to determine the severity of the functional limitation was the  Barthel with a score of 100/100 which was correlated with the impairment scale. ? Mobility - Walking and Moving Around:     - CURRENT STATUS: CH - 0% impaired, limited or restricted    - GOAL STATUS: CH - 0% impaired, limited or restricted    - D/C STATUS:  CH - 0% impaired, limited or restricted        Pain:  Pain Scale 1: Numeric (0 - 10)  Pain Intensity 1: 5  Pain Location 1: Leg  Activity Tolerance:   Good  Please refer to the flowsheet for vital signs taken during this treatment. After treatment:   [x]   Patient left in no apparent distress sitting up in chair  []   Patient left in no apparent distress in bed  [x]   Call bell left within reach  [x]   Nursing notified  []   Caregiver present  []   Bed alarm activated    COMMUNICATION/EDUCATION:   Communication/Collaboration:  [x]   Fall prevention education was provided and the patient/caregiver indicated understanding. [x]   Patient/family have participated as able and agree with findings and recommendations. []   Patient is unable to participate in plan of care at this time.   Findings and recommendations were discussed with: Registered Nurse    Thank you for this referral.  Ghazala Olivares, PT   Time Calculation: 19 mins

## 2018-06-22 NOTE — PROGRESS NOTES
Hospitalist Progress Note  Mame Valentin NP  Answering service: [phone] from in house phone  Cell: [phone]      Date of Service:  2018  NAME:  Yolande Adame  :  1960  MRN:  [id number]      Admission Summary:   Obtained from Lashell Pulliam note 18: The patient is a 58-year-old gentleman with past medical history of alcoholic cirrhosis, history of esophageal varices, alcohol abuse in remission, chronic systolic heart failure, neuropathy, status post left BKA, hypertension, cardiac defibrillation implantation, diabetes mellitus type 2, who presents to the hospital with the above-mentioned symptoms. .  The patient's wife reports that patient has a history of alcoholic cirrhosis associated with chronic ascites. He had 6.5 liters of fluid removed through paracentesis on 18. The patient is doing well, went home, and then started having some nausea. Woke up this morning and was a little confused, disoriented, decreased memory, and was having difficulty with \"everyday tasks. \"   PCP visit about 10 days back and also had some elevated ammonia levels. The wife reports that the patient has been taking his lactulose on a regular basis. She reports that the patient did not have any falls or injuries. The patient was found to have an ammonia level of 134 and was requested to be admitted to the hospital.    Interval history / Subjective:   Pt seen and examined this morning. LOCx3. C/O of pain in left BKA because he has not had gabapentin in 2 days. Denies n/v/d, abd pain, CP, SOB, or dysuria. Assessment & Plan:      1.  Hepatic encephalopathy. Improving  -Ammonia level 53.   -Continue lactulose and Xifaxan.    -Seen by GI NP and Hepatology: Recs: lactulose and Xifaxan and  Restrict protein in diet. Hold diuretic for LORENZA, and give IV albumin.  -Continue neurovascular checks  -PT/OT Consults  -Repeat ammonia level in the morning. -Strict I's and O's       2. History of alcoholic cirrhosis.    -No CIWA indicated. Pt stopped drinking in January 2018. 3.  History of congestive heart failure.   -The patient does not appear to be in exacerbation  -Monitor I/O daily weight. 4.  LORENZA on History of chronic kidney disease stage III. Improving  -Holding Lasix due to elevated creatinine. Given albumin.   -Continue to Avoid nephrotoxic medications. 5.  Diabetes. -Continue sliding scale NovoLog insulin, Accu-Chek, diet control and close monitoring.    -Start gabapentin for neuropathy    6. Thrombocytopenia 2/2 cirrhosis  -Monitor    Code status:Full  DVT prophylaxis: SCD    Care Plan discussed with: Patient/Family, Nurse and   Disposition: TBD     Hospital Problems  Date Reviewed: 6/21/2018          Codes Class Noted POA    Iron deficiency anemia ICD-10-CM: D50.9  ICD-9-CM: 280.9  6/21/2018 Unknown        Ascites due to alcoholic cirrhosis ([facility] [address] SOB-62-KV: K70.31  ICD-9-CM: 571.2  6/21/2018 Unknown        Thrombocytopenia ([facility] [address] ICD-10-CM: D69.6  ICD-9-CM: 287.5  6/21/2018 Unknown        * (Principal)Hepatic encephalopathy ([facility] [address] ICD-10-CM: K72.90  ICD-9-CM: 572.2  4/29/2018 Unknown                Review of Systems:   Comprehensive ROS was conducted and is negative unless mention in subjective data    Vital Signs:    Last 24hrs VS reviewed since prior progress note. Most recent are:  Visit Vitals    /64    Pulse 73    Temp 98.3 °F (36.8 °C)    Resp 20    Ht 5' 10\" (1.778 m)    Wt 83.7 kg (184 lb 9.6 oz)    SpO2 99%    BMI 26.49 kg/m2         Intake/Output Summary (Last 24 hours) at 06/22/18 1026  Last data filed at 06/22/18 0947   Gross per 24 hour   Intake                0 ml   Output              500 ml   Net             -500 ml        Physical Examination:     General:  Alert, cooperative, NAD. AOx3   HEENT:  Normocephalic, without obvious abnormality, atraumatic. PERRL, EOMs intact.  Ears symmetrical; sclerae jaundice   Neck: Trachea midline, no lymphadenopathy, no thyromegaly    Supple, trachea midline, no adenopathy, thyroid: no enlargement/tenderness/nodules, no carotid bruit and no JVD. Back:   No CVA tenderness. Lungs:   Clear to auscultation bilaterally. Symmetric expansion   Chest wall:  No tenderness or deformity. Heart:  Regular rate and rhythm, S1, S2 normal, no murmur, click, rub or gallop. No edema   Abdomen:   Soft, non-tender, and nondistended Bowel sounds hypoactive No masses appreciated. Genitalia:  Deferred. Musculoskeletal: Extremities normal, atraumatic, no cyanosis or edema. Normal ROM; TAMEZ; left BKA   Pulses: 2+ and symmetric all extremities. Skin: Skin color, texture, turgor normal. No rashes or lesions. Psych: Affect appropriate. Insight and judgement normal   Neurologic: CNII-XII intact. Normal strength, sensation and reflexes throughout. Data Review:    Review and/or order of clinical lab test  Review and/or order of tests in the radiology section of CPT  Review and/or order of tests in the medicine section of CPT      Labs:     Recent Labs      06/22/18   0242  06/21/18   1349   WBC  2.9*  4.1   HGB  8.3*  8.8*   HCT  23.8*  25.3*   PLT  53*  65*     Recent Labs      06/22/18   0242  06/21/18   1349   NA  135*  133*   K  4.3  4.7   CL  102  99   CO2  24  24   BUN  21*  23*   CREA  1.79*  2.01*   GLU  128*  154*   CA  9.5  9.1     Recent Labs      06/22/18   0242  06/21/18   1349   SGOT  36  41*   ALT  31  34   AP  160*  220*   TBILI  3.0*  2.5*   TP  7.3  7.4   ALB  3.4*  3.2*   GLOB  3.9  4.2*     No results for input(s): INR, PTP, APTT in the last 72 hours. No lab exists for component: INREXT   No results for input(s): FE, TIBC, PSAT, FERR in the last 72 hours. Lab Results   Component Value Date/Time    Folate 19.9 12/07/2017 04:25 AM      No results for input(s): PH, PCO2, PO2 in the last 72 hours.   Recent Labs      06/21/18   [place] <0.05     No results found for: CHOL, CHOLX, CHLST, CHOLV, HDL, LDL, LDLC, DLDLP, TGLX, TRIGL, TRIGP, CHHD, CHHDX  Lab Results   Component Value Date/Time    Glucose (POC) 202 (H) 06/22/2018 06:45 AM    Glucose (POC) 208 (H) 06/21/2018 11:40 PM    Glucose (POC) 191 (H) 06/21/2018 06:12 PM    Glucose (POC) 126 (H) 04/19/2018 04:24 PM    Glucose (POC) 255 (H) 04/16/2018 11:50 AM     Lab Results   Component Value Date/Time    Color YELLOW/STRAW 04/29/2018 03:34 PM    Appearance CLEAR 04/29/2018 03:34 PM    Specific gravity 1.010 04/29/2018 03:34 PM    Specific gravity 1.015 04/13/2018 10:10 AM    pH (UA) 6.0 04/29/2018 03:34 PM    Protein NEGATIVE  04/29/2018 03:34 PM    Glucose NEGATIVE  04/29/2018 03:34 PM    Ketone NEGATIVE  04/29/2018 03:34 PM    Bilirubin NEGATIVE  04/29/2018 03:34 PM    Urobilinogen 0.2 04/29/2018 03:34 PM    Nitrites NEGATIVE  04/29/2018 03:34 PM    Leukocyte Esterase NEGATIVE  04/29/2018 03:34 PM    Epithelial cells FEW 04/29/2018 03:34 PM    Bacteria NEGATIVE  04/29/2018 03:34 PM    WBC 0-4 04/29/2018 03:34 PM    RBC 0-5 04/29/2018 03:34 PM         Medications Reviewed:     Current Facility-Administered Medications   Medication Dose Route Frequency    gabapentin (NEURONTIN) capsule 300 mg  300 mg Oral TID    amiodarone (CORDARONE) tablet 200 mg  200 mg Oral QHS    digoxin (LANOXIN) tablet 0.125 mg  0.125 mg Oral QHS    metoprolol succinate (TOPROL-XL) XL tablet 12.5 mg  12.5 mg Oral DAILY    multivitamin, tx-iron-ca-min (THERA-M w/ IRON) tablet 1 Tab  1 Tab Oral DAILY    pantoprazole (PROTONIX) tablet 40 mg  40 mg Oral DAILY    rifAXIMin (XIFAXAN) tablet 550 mg  550 mg Oral BID    thiamine (B-1) tablet 100 mg  100 mg Oral DAILY    sodium chloride (NS) flush 5-10 mL  5-10 mL IntraVENous Q8H    sodium chloride (NS) flush 5-10 mL  5-10 mL IntraVENous PRN    ondansetron (ZOFRAN) injection 4 mg  4 mg IntraVENous Q4H PRN    lactulose (CHRONULAC) solution 20 g  30 mL Oral Q4H    glucose chewable tablet 16 g  4 Tab Oral PRN    dextrose (D50W) injection syrg 12.5-25 g  12.5-25 g IntraVENous PRN    glucagon (GLUCAGEN) injection 1 mg  1 mg IntraMUSCular PRN    insulin lispro (HUMALOG) injection   SubCUTAneous Q6H    albumin human 25% (BUMINATE) solution 25 g  25 g IntraVENous Q6H     Facility-Administered Medications Ordered in Other Encounters   Medication Dose Route Frequency    albumin human 25% (BUMINATE) solution 25 g  25 g IntraVENous Multiple     ______________________________________________________________________  EXPECTED LENGTH OF STAY: - - -  ACTUAL LENGTH OF STAY:          1                 Adriana Rivera, NP

## 2018-06-23 NOTE — PROGRESS NOTES
Problem: Falls - Risk of  Goal: *Absence of Falls  Document Stefania Fall Risk and appropriate interventions in the flowsheet. Outcome: Progressing Towards Goal  Fall Risk Interventions:  Mobility Interventions: OT consult for ADLs, Patient to call before getting OOB, PT Consult for mobility concerns, PT Consult for assist device competence, Strengthening exercises (ROM-active/passive), Communicate number of staff needed for ambulation/transfer    Mentation Interventions: Adequate sleep, hydration, pain control, Toileting rounds, Reorient patient, More frequent rounding    Medication Interventions: Evaluate medications/consider consulting pharmacy, Patient to call before getting OOB, Teach patient to arise slowly    Elimination Interventions: Call light in reach, Toileting schedule/hourly rounds, Urinal in reach             Problem: Pressure Injury - Risk of  Goal: *Prevention of pressure injury  Document Spencer Scale and appropriate interventions in the flowsheet.    Outcome: Progressing Towards Goal  Pressure Injury Interventions:  Sensory Interventions: Assess changes in LOC, Assess need for specialty bed, Avoid rigorous massage over bony prominences, Discuss PT/OT consult with provider, Keep linens dry and wrinkle-free, Minimize linen layers, Monitor skin under medical devices    Moisture Interventions: Absorbent underpads, Assess need for specialty bed, Maintain skin hydration (lotion/cream), Minimize layers    Activity Interventions: Increase time out of bed, PT/OT evaluation, Pressure redistribution bed/mattress(bed type)    Mobility Interventions: Pressure redistribution bed/mattress (bed type), HOB 30 degrees or less, PT/OT evaluation    Nutrition Interventions: Document food/fluid/supplement intake    Friction and Shear Interventions: HOB 30 degrees or less, Lift sheet, Minimize layers

## 2018-06-23 NOTE — PROGRESS NOTES
Physical Therapy:  Orders received. Chart reviewed. Patient evaluated and discharged by PT yesterday (6/22/18.)  Patient's mobility consistent with his baseline, independent for all transfers, and ambulated 125 ft without AD with supervision and no loss of balance. Per report patient without history of falls and no concerns for his mobility. Per report from RN, no new balance or mobility issues overnight. Spoke to patient today and encouraged him to sit in bedside chair and walk throughout his day to maintain his strength. Patient verbalized understanding. RN notified.     Milli English, PT

## 2018-06-23 NOTE — PROGRESS NOTES
Bedside and Verbal shift change report given to Lorena (oncoming nurse) by Delia Mckinnon (offgoing nurse). Report included the following information SBAR, Kardex, Intake/Output, MAR and Recent Results.

## 2018-06-23 NOTE — ROUTINE PROCESS
IDR/SLIDR Summary          Patient: Jimi Mayes MRN: [id number]    Age: 59 y.o. YOB: 1960 Room/Bed: [facility]   Admit Diagnosis: Hepatic encephalopathy (HCC)  Principal [facility]: [facility]   Goals: Albumin, Lactulose, Labs, PT/OT  Readmission: NO  Quality Measure: Not applicable  VTE Prophylaxis: Mechanical  Influenza Vaccine screening completed? YES  Pneumococcal Vaccine screening completed? NO  Mobility needs: Yes   Nutrition plan:Yes  Consults:P.T, O.T. and Case Management    Financial concerns:No  Escalated to CM? YES  RRAT Score: 30   Interventions:  Testing due for pt today?  NO  LOS: 2 days Expected length of stay 4 days  Discharge plan: TBD   PCP: Charles Fuchs MD  Transportation needs: No    Days before discharge:one day until discharge   Discharge disposition: TBD    Signed:     Jasen Barth RN  6/23/2018  8:18 AM

## 2018-06-23 NOTE — ROUTINE PROCESS
Bedside shift change report given to Windy Porter (oncoming nurse) by Lorena (offgoing nurse). Report included the following information SBAR, Kardex, Procedure Summary, Accordion, Recent Results and Cardiac Rhythm AV paced.

## 2018-06-23 NOTE — PROGRESS NOTES
Problem: Falls - Risk of  Goal: *Absence of Falls  Document Stefania Fall Risk and appropriate interventions in the flowsheet. Outcome: Progressing Towards Goal  Fall Risk Interventions:  Mobility Interventions: OT consult for ADLs, Patient to call before getting OOB, PT Consult for mobility concerns, PT Consult for assist device competence, Strengthening exercises (ROM-active/passive), Communicate number of staff needed for ambulation/transfer    Mentation Interventions: Adequate sleep, hydration, pain control, Toileting rounds, Reorient patient, More frequent rounding    Medication Interventions: Evaluate medications/consider consulting pharmacy, Patient to call before getting OOB, Teach patient to arise slowly    Elimination Interventions: Call light in reach, Toileting schedule/hourly rounds, Urinal in reach             Problem: Pressure Injury - Risk of  Goal: *Prevention of pressure injury  Document Specner Scale and appropriate interventions in the flowsheet.    Outcome: Progressing Towards Goal  Pressure Injury Interventions:  Sensory Interventions: Assess changes in LOC, Assess need for specialty bed, Avoid rigorous massage over bony prominences, Discuss PT/OT consult with provider, Keep linens dry and wrinkle-free, Minimize linen layers, Monitor skin under medical devices    Moisture Interventions: Absorbent underpads, Assess need for specialty bed, Maintain skin hydration (lotion/cream), Minimize layers    Activity Interventions: Increase time out of bed, PT/OT evaluation, Pressure redistribution bed/mattress(bed type)    Mobility Interventions: Pressure redistribution bed/mattress (bed type), HOB 30 degrees or less, PT/OT evaluation    Nutrition Interventions: Document food/fluid/supplement intake    Friction and Shear Interventions: HOB 30 degrees or less, Lift sheet, Minimize layers

## 2018-06-23 NOTE — PROGRESS NOTES
Hospitalist Progress Note  Jermaine Rahman NP  Answering service: [phone] from in house phone  Cell: [phone]      Date of Service:  2018  NAME:  Adia Osborne  :  1960  MRN:  [id number]      Admission Summary:   Obtained from 6 [facility] note 18: The patient is a 78-year-old gentleman with past medical history of alcoholic cirrhosis, history of esophageal varices, alcohol abuse in remission, chronic systolic heart failure, neuropathy, status post left BKA, hypertension, cardiac defibrillation implantation, diabetes mellitus type 2, who presents to the hospital with the above-mentioned symptoms. .  The patient's wife reports that patient has a history of alcoholic cirrhosis associated with chronic ascites. He had 6.5 liters of fluid removed through paracentesis on 18. The patient is doing well, went home, and then started having some nausea. Woke up this morning and was a little confused, disoriented, decreased memory, and was having difficulty with \"everyday tasks. \"   PCP visit about 10 days back and also had some elevated ammonia levels. The wife reports that the patient has been taking his lactulose on a regular basis. She reports that the patient did not have any falls or injuries. The patient was found to have an ammonia level of 134 and was requested to be admitted to the hospital.    Interval history / Subjective:   Pt seen and examined. He is complaining of the \"worse pain of his life\" in his left BKA. States he takes percocet at home when it becomes this bad usually. He reports exacerbation once every 2-3 weeks. States he can't live like this and shouldn't have to suffer in the hospital.     Will start very low dose of percocet , one tab every 8 hours. Monitor for changes in mental status related to narcotic, will stop if any changes. 6 BMS yesterday. 2 so far today.  Continue at current dosing of Lactulose. Assessment & Plan:     Acute Hepatic encephalopathy, in the setting of Cirrhosis (POA) -  Improving  -Ammonia level 71 today, patient reports minimal BM's yesterday   -Continue lactulose and Xifaxan  -Seen by GI NP and Hepatology: Recs: lactulose and Xifaxan and  Restrict protein in diet. Hold diuretic for LORENZA, and give IV albumin.  -Continue neurovascular checks  -PT/OT Consults  -follow ammonia levels   -Strict I's and O's     History of alcoholic cirrhosis:  -No CIWA indicated. Pt stopped drinking in January 2018.   - outpatient he is on  STEP 1 Diuretics ( Lasix + Aldactone) , holding lasix related to kidney function, will likely restart tomorrow  - continue with Xifaxan and Lactulose   - patient is well known to Dr. Maria Isabel Hoover and he is following, thankyou    History of Chronic Systolic congestive heart failure, NYHA II on admit  - EF is 40-45% on 6/5/18   -The patient does not appear to be in exacerbation  -Monitor I/O daily weight. - continue with Lasix, Digoxin, and Metoprolol as previously on     LORENZA on History of chronic kidney disease stage III (POA):  - etiology unclear, likely related to pre renal azotemia   - Creatinine is improving, 2.01 --> 1.63 , baseline appears to be around 1.09   - GFR 43 today   -Holding Lasix due to elevated creatinine , will likely restart tomorrow with continued improvement    -Continue to Avoid nephrotoxic medications.       Hx of Diabetes DX:     - patient is DIET CONTROLLED at home  -Continue sliding scale NovoLog insulin, Accu-Chek, diet control and close monitoring.    - A1C is 5.7     Neuropathy in setting of Left BKA:   - phantom limb pain is chronic   - re- start home dosing of  gabapentin     Hx of Thrombocytopenia 2/2 cirrhosis  - Stable   - Will continue to Monitor    Hx of HTN: stable  - continue with amiodarone , metoprolol, and Valsartan , Digoxin   - monitor     Code status:Full  DVT prophylaxis: SCD    Care Plan discussed with: Patient/Family, Nurse and   Disposition: TBD     Hospital Problems  Date Reviewed: 6/21/2018          Codes Class Noted POA    Iron deficiency anemia ICD-10-CM: D50.9  ICD-9-CM: 280.9  6/21/2018 Unknown        Ascites due to alcoholic cirrhosis ([facility] MARY CARMEN-63-GB: K70.31  ICD-9-CM: 571.2  6/21/2018 Unknown        Thrombocytopenia ([facility].) ICD-10-CM: D69.6  ICD-9-CM: 287.5  6/21/2018 Unknown        * (Principal)Hepatic encephalopathy ([facility].) ICD-10-CM: K72.90  ICD-9-CM: 572.2  4/29/2018 Unknown                Review of Systems:   Comprehensive ROS was conducted and is negative unless mention in subjective data    Vital Signs:    Last 24hrs VS reviewed since prior progress note. Most recent are:  Visit Vitals    /54 (BP 1 Location: Left arm, BP Patient Position: At rest)    Pulse 60    Temp 98.4 °F (36.9 °C)    Resp 16    Ht 5' 10\" (1.778 m)    Wt 82.5 kg (181 lb 14.1 oz)    SpO2 98%    BMI 26.1 kg/m2       No intake or output data in the 24 hours ending 06/23/18 1333     Physical Examination:     General:  Alert, cooperative, NAD. AOx3   HEENT:  Normocephalic, without obvious abnormality, atraumatic. PERRL, EOMs intact. Ears symmetrical; sclerae jaundice   Neck: Trachea midline, no lymphadenopathy, no thyromegaly    Supple, trachea midline, no adenopathy, thyroid: no enlargement/tenderness/nodules, no carotid bruit and no JVD. Back:   No CVA tenderness. Lungs:   Clear to auscultation bilaterally. Symmetric expansion   Chest wall:  No tenderness or deformity. Heart:  Regular rate and rhythm, S1, S2 normal, no murmur, click, rub or gallop. No edema   Abdomen:   Soft, non-tender, and nondistended Bowel sounds hypoactive No masses appreciated. Genitalia:  Deferred. Musculoskeletal: Extremities normal, atraumatic, no cyanosis or edema. Normal ROM; TAMEZ; left BKA   Pulses: 2+ and symmetric all extremities. Skin: Skin color, texture, turgor normal. No rashes or lesions. Psych: Affect appropriate.  Insight and judgement normal   Neurologic: CNII-XII intact. Normal strength, sensation and reflexes throughout. Data Review:    Review and/or order of clinical lab test  Review and/or order of tests in the radiology section of CPT  Review and/or order of tests in the medicine section of CPT      Labs:     Recent Labs      06/23/18 0632  06/22/18   0242   WBC  2.6*  2.9*   HGB  7.6*  8.3*   HCT  21.8*  23.8*   PLT  55*  53*     Recent Labs      06/23/18   0632  06/22/18   0242  06/21/18   1349   NA  133*  135*  133*   K  4.2  4.3  4.7   CL  102  102  99   CO2  22  24  24   BUN  18  21*  23*   CREA  1.63*  1.79*  2.01*   GLU  149*  128*  154*   CA  9.9  9.5  9.1     Recent Labs      06/22/18   0242  06/21/18   1349   SGOT  36  41*   ALT  31  34   AP  160*  220*   TBILI  3.0*  2.5*   TP  7.3  7.4   ALB  3.4*  3.2*   GLOB  3.9  4.2*     No results for input(s): INR, PTP, APTT in the last 72 hours. No lab exists for component: INREXT, INREXT   No results for input(s): FE, TIBC, PSAT, FERR in the last 72 hours. Lab Results   Component Value Date/Time    Folate 19.9 12/07/2017 04:25 AM      No results for input(s): PH, PCO2, PO2 in the last 72 hours.   Recent Labs      06/21/18   1349   TROIQ  <0.05     No results found for: CHOL, CHOLX, CHLST, CHOLV, HDL, LDL, LDLC, DLDLP, TGLX, TRIGL, TRIGP, CHHD, CHHDX  Lab Results   Component Value Date/Time    Glucose (POC) 225 (H) 06/23/2018 12:26 PM    Glucose (POC) 147 (H) 06/23/2018 06:26 AM    Glucose (POC) 178 (H) 06/22/2018 11:53 PM    Glucose (POC) 300 (H) 06/22/2018 05:59 PM    Glucose (POC) 225 (H) 06/22/2018 11:40 AM     Lab Results   Component Value Date/Time    Color YELLOW/STRAW 04/29/2018 03:34 PM    Appearance CLEAR 04/29/2018 03:34 PM    Specific gravity 1.010 04/29/2018 03:34 PM    Specific gravity 1.015 04/13/2018 10:10 AM    pH (UA) 6.0 04/29/2018 03:34 PM    Protein NEGATIVE  04/29/2018 03:34 PM    Glucose NEGATIVE  04/29/2018 03:34 PM    Ketone NEGATIVE 04/29/2018 03:34 PM    Bilirubin NEGATIVE  04/29/2018 03:34 PM    Urobilinogen 0.2 04/29/2018 03:34 PM    Nitrites NEGATIVE  04/29/2018 03:34 PM    Leukocyte Esterase NEGATIVE  04/29/2018 03:34 PM    Epithelial cells FEW 04/29/2018 03:34 PM    Bacteria NEGATIVE  04/29/2018 03:34 PM    WBC 0-4 04/29/2018 03:34 PM    RBC 0-5 04/29/2018 03:34 PM         Medications Reviewed:     Current Facility-Administered Medications   Medication Dose Route Frequency    oxyCODONE-acetaminophen (PERCOCET) 5-325 mg per tablet 1 Tab  1 Tab Oral Q8H PRN    gabapentin (NEURONTIN) capsule 300 mg  300 mg Oral TID    digoxin (LANOXIN) tablet 0.125 mg  0.125 mg Oral EVERY OTHER DAY    amiodarone (CORDARONE) tablet 200 mg  200 mg Oral QHS    metoprolol succinate (TOPROL-XL) XL tablet 12.5 mg  12.5 mg Oral DAILY    multivitamin, tx-iron-ca-min (THERA-M w/ IRON) tablet 1 Tab  1 Tab Oral DAILY    pantoprazole (PROTONIX) tablet 40 mg  40 mg Oral DAILY    rifAXIMin (XIFAXAN) tablet 550 mg  550 mg Oral BID    thiamine (B-1) tablet 100 mg  100 mg Oral DAILY    sodium chloride (NS) flush 5-10 mL  5-10 mL IntraVENous Q8H    sodium chloride (NS) flush 5-10 mL  5-10 mL IntraVENous PRN    ondansetron (ZOFRAN) injection 4 mg  4 mg IntraVENous Q4H PRN    lactulose (CHRONULAC) solution 20 g  30 mL Oral Q4H    glucose chewable tablet 16 g  4 Tab Oral PRN    dextrose (D50W) injection syrg 12.5-25 g  12.5-25 g IntraVENous PRN    glucagon (GLUCAGEN) injection 1 mg  1 mg IntraMUSCular PRN    insulin lispro (HUMALOG) injection   SubCUTAneous Q6H    albumin human 25% (BUMINATE) solution 25 g  25 g IntraVENous Q6H     Facility-Administered Medications Ordered in Other Encounters   Medication Dose Route Frequency    albumin human 25% (BUMINATE) solution 25 g  25 g IntraVENous Multiple     ______________________________________________________________________  EXPECTED LENGTH OF STAY: 3d 7h  ACTUAL LENGTH OF STAY:          2 Maritza Singh, NP

## 2018-06-23 NOTE — ROUTINE PROCESS
Bedside and Verbal shift change report given to Feng Gan, [facility] (oncoming nurse) by Ángel Solo RN (offgoing nurse). Report included the following information SBAR, Kardex, ED Summary, Procedure Summary, Intake/Output, MAR, Recent Results and Cardiac Rhythm Paced. Cristiano Arellano

## 2018-06-24 NOTE — PROGRESS NOTES
TRANSFER - IN REPORT:    Verbal report received from [place] (name) on Peter Lockwood  being received from [address] [place] [address] (unit) for routine progression of care      Report consisted of patients Situation, Background, Assessment and   Recommendations(SBAR). Information from the following report(s) SBAR, Kardex, Intake/Output, MAR and Recent Results was reviewed with the receiving nurse. Opportunity for questions and clarification was provided. Assessment completed upon patients arrival to unit and care assumed. 1641 Pt still has not arrived on unit. Donita to get time of pt transfer. [place] RN will call back.

## 2018-06-24 NOTE — PROGRESS NOTES
Problem: Falls - Risk of  Goal: *Absence of Falls  Document Stefania Fall Risk and appropriate interventions in the flowsheet. Outcome: Progressing Towards Goal  Fall Risk Interventions:  Mobility Interventions: Patient to call before getting OOB, OT consult for ADLs, PT Consult for mobility concerns, PT Consult for assist device competence, Strengthening exercises (ROM-active/passive)    Mentation Interventions: Adequate sleep, hydration, pain control, Door open when patient unattended, Increase mobility, More frequent rounding, Room close to nurse's station, Update white board    Medication Interventions: Evaluate medications/consider consulting pharmacy, Patient to call before getting OOB, Teach patient to arise slowly    Elimination Interventions: Call light in reach, Elevated toilet seat, Patient to call for help with toileting needs, Toileting schedule/hourly rounds             Problem: Pressure Injury - Risk of  Goal: *Prevention of pressure injury  Document Spencer Scale and appropriate interventions in the flowsheet.    Outcome: Progressing Towards Goal  Pressure Injury Interventions:  Sensory Interventions: Assess changes in LOC, Assess need for specialty bed, Discuss PT/OT consult with provider, Minimize linen layers    Moisture Interventions: Absorbent underpads, Limit adult briefs, Maintain skin hydration (lotion/cream), Minimize layers    Activity Interventions: Increase time out of bed, Pressure redistribution bed/mattress(bed type), PT/OT evaluation    Mobility Interventions: HOB 30 degrees or less, Pressure redistribution bed/mattress (bed type), PT/OT evaluation    Nutrition Interventions: Document food/fluid/supplement intake    Friction and Shear Interventions: Apply protective barrier, creams and emollients, HOB 30 degrees or less, Lift sheet, Minimize layers

## 2018-06-24 NOTE — ROUTINE PROCESS
TRANSFER - OUT REPORT:    Verbal report given to Rickey Schilling RN (name) on Dahiana Cobb  being transferred to  (unit) for routine progression of care       Report consisted of patients Situation, Background, Assessment and   Recommendations(SBAR). Information from the following report(s) SBAR, Kardex, ED Summary, Procedure Summary, Intake/Output, MAR, Recent Results, Med Rec Status and Cardiac Rhythm Paced. was reviewed with the receiving nurse. Lines:   Peripheral IV 06/22/18 Left Arm (Active)   Site Assessment Clean, dry, & intact 6/24/2018 12:00 PM   Phlebitis Assessment 0 6/24/2018 12:00 PM   Infiltration Assessment 0 6/24/2018 12:00 PM   Dressing Status Clean, dry, & intact 6/24/2018 12:00 PM   Dressing Type Transparent;Tape 6/24/2018 12:00 PM   Hub Color/Line Status Blue;Capped 6/24/2018 12:00 PM   Action Taken Open ports on tubing capped 6/24/2018 12:00 PM   Alcohol Cap Used Yes 6/24/2018 12:00 PM        Opportunity for questions and clarification was provided. Patient transported with:   Chart.

## 2018-06-24 NOTE — PROGRESS NOTES
Hospitalist Progress Note  Nelson Sanchez NP  Answering service: [phone] from in house phone  Cell: [phone]      Date of Service:  2018  NAME:  Helyn Bosworth  :  1960  MRN:  [id number]      Admission Summary:   Obtained from 6 [facility] note 18: The patient is a 23-year-old gentleman with past medical history of alcoholic cirrhosis, history of esophageal varices, alcohol abuse in remission, chronic systolic heart failure, neuropathy, status post left BKA, hypertension, cardiac defibrillation implantation, diabetes mellitus type 2, who presents to the hospital with the above-mentioned symptoms. .  The patient's wife reports that patient has a history of alcoholic cirrhosis associated with chronic ascites. He had 6.5 liters of fluid removed through paracentesis on 18. The patient is doing well, went home, and then started having some nausea. Woke up this morning and was a little confused, disoriented, decreased memory, and was having difficulty with \"everyday tasks. \"   PCP visit about 10 days back and also had some elevated ammonia levels. The wife reports that the patient has been taking his lactulose on a regular basis. She reports that the patient did not have any falls or injuries. The patient was found to have an ammonia level of 134 and was requested to be admitted to the hospital.    Interval history / Subjective:     Mr. Zuleiam Burrell states that his pain is better controlled today. He is resting comfortably. He reports having tons of   Diarrhea yesterday 4-6 BMS within 4 hour time frame. Refused his last dose of Lactulose. Ammonia level continues to increase.      César Has following    Downgrade to medical .      Assessment & Plan:     Acute Hepatic encephalopathy, in the setting of Cirrhosis (POA) -  Improving  -Ammonia level 71 today, patient reports minimal BM's yesterday   -Continue lactulose and Xifaxan  -Seen by GI NP and Hepatology: Recs: lactulose and Xifaxan and  Restrict protein in diet. Hold diuretic for LORENZA, and give IV albumin.  -Continue neurovascular checks  -PT/OT Consults  -follow ammonia levels   -Strict I's and O's     History of alcoholic cirrhosis:  -No CIWA indicated. Pt stopped drinking in January 2018.   - outpatient he is on  STEP 1 Diuretics ( Lasix + Aldactone) , holding lasix related to kidney function, will likely restart tomorrow  - continue with Xifaxan and Lactulose   - patient is well known to Dr. Susan Chin and he is following, thankyou    History of Chronic Systolic congestive heart failure, NYHA II on admit  - EF is 40-45% on 6/5/18   -The patient does not appear to be in exacerbation  -Monitor I/O daily weight. - continue with Lasix, Digoxin, and Metoprolol as previously on     LORENZA on History of chronic kidney disease stage III (POA):  - etiology unclear, likely related to pre renal azotemia   - Creatinine is improving, 2.01 --> 1.63 , baseline appears to be around 1.09   - GFR 43 today   -Holding Lasix due to elevated creatinine , will likely restart tomorrow with continued improvement    -Continue to Avoid nephrotoxic medications.       Hx of Diabetes DX:     - patient is DIET CONTROLLED at home  -Continue sliding scale NovoLog insulin, Accu-Chek, diet control and close monitoring.    - A1C is 5.7     Neuropathy in setting of Left BKA:   - phantom limb pain is chronic   - re- start home dosing of  gabapentin     Hx of Thrombocytopenia 2/2 cirrhosis  - Stable   - Will continue to Monitor    Hx of HTN: stable  - continue with amiodarone , metoprolol, and Valsartan , Digoxin   - monitor     Code status:Full  DVT prophylaxis: SCD    Care Plan discussed with: Patient/Family, Nurse and   Disposition: TBD     Hospital Problems  Date Reviewed: 6/21/2018          Codes Class Noted POA    Iron deficiency anemia ICD-10-CM: D50.9  ICD-9-CM: 280.9  6/21/2018 Unknown Ascites due to alcoholic cirrhosis ([facility] GEF-82-LW: K70.31  ICD-9-CM: 571.2  6/21/2018 Unknown        Thrombocytopenia ([facility] ICD-10-CM: D69.6  ICD-9-CM: 287.5  6/21/2018 Unknown        * (Principal)Hepatic encephalopathy ([facility] ICD-10-CM: K72.90  ICD-9-CM: 572.2  4/29/2018 Unknown                Review of Systems:   Comprehensive ROS was conducted and is negative unless mention in subjective data    Vital Signs:    Last 24hrs VS reviewed since prior progress note. Most recent are:  Visit Vitals    /48 (BP 1 Location: Left arm, BP Patient Position: At rest)    Pulse 60    Temp 98.2 °F (36.8 °C)    Resp 11    Ht 5' 10\" (1.778 m)    Wt 84 kg (185 lb 3 oz)    SpO2 98%    BMI 26.57 kg/m2       No intake or output data in the 24 hours ending 06/24/18 1230     Physical Examination:     General:  Alert, cooperative, NAD. AOx3   HEENT:  Normocephalic, without obvious abnormality, atraumatic. PERRL, EOMs intact. Ears symmetrical; sclerae jaundice   Neck: Trachea midline, no lymphadenopathy, no thyromegaly    Supple, trachea midline, no adenopathy, thyroid: no enlargement/tenderness/nodules, no carotid bruit and no JVD. Back:   No CVA tenderness. Lungs:   Clear to auscultation bilaterally. Symmetric expansion   Chest wall:  No tenderness or deformity. Heart:  Regular rate and rhythm, S1, S2 normal, no murmur, click, rub or gallop. No edema   Abdomen:   Soft, non-tender, and nondistended Bowel sounds hypoactive No masses appreciated. Genitalia:  Deferred. Musculoskeletal: Extremities normal, atraumatic, no cyanosis or edema. Normal ROM; TAMEZ; left BKA   Pulses: 2+ and symmetric all extremities. Skin: Skin color, texture, turgor normal. No rashes or lesions. Psych: Affect appropriate. Insight and judgement normal   Neurologic: CNII-XII intact. Normal strength, sensation and reflexes throughout.          Data Review:    Review and/or order of clinical lab test  Review and/or order of tests in the radiology section of CPT  Review and/or order of tests in the medicine section of CPT      Labs:     Recent Labs      06/24/18   0409  06/23/18   0632   WBC  3.9*  2.6*   HGB  7.7*  7.6*   HCT  22.6*  21.8*   PLT  56*  55*     Recent Labs      06/24/18   0409  06/23/18   0632  06/22/18   0242   NA  133*  133*  135*   K  4.3  4.2  4.3   CL  103  102  102   CO2  22  22  24   BUN  16  18  21*   CREA  1.64*  1.63*  1.79*   GLU  185*  149*  128*   CA  9.6  9.9  9.5     Recent Labs      06/24/18   0409  06/22/18   0242  06/21/18   1349   SGOT  32  36  41*   ALT  27  31  34   AP  123*  160*  220*   TBILI  2.8*  3.0*  2.5*   TP  7.1  7.3  7.4   ALB  4.2  3.4*  3.2*   GLOB  2.9  3.9  4.2*     No results for input(s): INR, PTP, APTT in the last 72 hours. No lab exists for component: INREXT, INREXT   No results for input(s): FE, TIBC, PSAT, FERR in the last 72 hours. Lab Results   Component Value Date/Time    Folate 19.9 12/07/2017 04:25 AM      No results for input(s): PH, PCO2, PO2 in the last 72 hours.   Recent Labs      06/21/18   1349   TROIQ  <0.05     No results found for: CHOL, CHOLX, CHLST, CHOLV, HDL, LDL, LDLC, DLDLP, TGLX, TRIGL, TRIGP, CHHD, CHHDX  Lab Results   Component Value Date/Time    Glucose (POC) 382 (H) 06/24/2018 11:05 AM    Glucose (POC) 205 (H) 06/24/2018 06:28 AM    Glucose (POC) 234 (H) 06/24/2018 01:06 AM    Glucose (POC) 247 (H) 06/23/2018 04:40 PM    Glucose (POC) 225 (H) 06/23/2018 12:26 PM     Lab Results   Component Value Date/Time    Color YELLOW/STRAW 04/29/2018 03:34 PM    Appearance CLEAR 04/29/2018 03:34 PM    Specific gravity 1.010 04/29/2018 03:34 PM    Specific gravity 1.015 04/13/2018 10:10 AM    pH (UA) 6.0 04/29/2018 03:34 PM    Protein NEGATIVE  04/29/2018 03:34 PM    Glucose NEGATIVE  04/29/2018 03:34 PM    Ketone NEGATIVE  04/29/2018 03:34 PM    Bilirubin NEGATIVE  04/29/2018 03:34 PM    Urobilinogen 0.2 04/29/2018 03:34 PM    Nitrites NEGATIVE  04/29/2018 03:34 PM Leukocyte Esterase NEGATIVE  04/29/2018 03:34 PM    Epithelial cells FEW 04/29/2018 03:34 PM    Bacteria NEGATIVE  04/29/2018 03:34 PM    WBC 0-4 04/29/2018 03:34 PM    RBC 0-5 04/29/2018 03:34 PM         Medications Reviewed:     Current Facility-Administered Medications   Medication Dose Route Frequency    gabapentin (NEURONTIN) capsule 300 mg  300 mg Oral TID    digoxin (LANOXIN) tablet 0.125 mg  0.125 mg Oral EVERY OTHER DAY    amiodarone (CORDARONE) tablet 200 mg  200 mg Oral QHS    metoprolol succinate (TOPROL-XL) XL tablet 12.5 mg  12.5 mg Oral DAILY    multivitamin, tx-iron-ca-min (THERA-M w/ IRON) tablet 1 Tab  1 Tab Oral DAILY    pantoprazole (PROTONIX) tablet 40 mg  40 mg Oral DAILY    rifAXIMin (XIFAXAN) tablet 550 mg  550 mg Oral BID    thiamine (B-1) tablet 100 mg  100 mg Oral DAILY    sodium chloride (NS) flush 5-10 mL  5-10 mL IntraVENous Q8H    sodium chloride (NS) flush 5-10 mL  5-10 mL IntraVENous PRN    ondansetron (ZOFRAN) injection 4 mg  4 mg IntraVENous Q4H PRN    lactulose (CHRONULAC) solution 20 g  30 mL Oral Q4H    glucose chewable tablet 16 g  4 Tab Oral PRN    dextrose (D50W) injection syrg 12.5-25 g  12.5-25 g IntraVENous PRN    glucagon (GLUCAGEN) injection 1 mg  1 mg IntraMUSCular PRN    insulin lispro (HUMALOG) injection   SubCUTAneous Q6H    albumin human 25% (BUMINATE) solution 25 g  25 g IntraVENous Q6H     ______________________________________________________________________  EXPECTED LENGTH OF STAY: 3d 7h  ACTUAL LENGTH OF STAY:          3                 Alex MARTÍN Waldron

## 2018-06-24 NOTE — ROUTINE PROCESS
I have reviewed discharge instructions with the patient. The patient verbalized understanding. PIV and telemetry discontinued. Discharge medications reviewed with patient and appropriate educational materials and side effects teaching were provided. Pt discharged home, transferred by his daughter via car. AVS signed & copy placed on chart. No s/s of visible distress. No complaints voiced.

## 2018-06-24 NOTE — DISCHARGE INSTRUCTIONS
Discharge Instructions       PATIENT ID: Sebastian Maza  MRN: [id number]   YOB: 1960    DATE OF ADMISSION: 6/21/2018  1:26 PM    DATE OF DISCHARGE: 6/24/2018    PRIMARY CARE PROVIDER: Jacy Perkins MD     ATTENDING PHYSICIAN: Loyd Drake MD  DISCHARGING PROVIDER: Jemal Elena NP    To contact this individual call [phone] and ask the  to page. If unavailable ask to be transferred the Adult Hospitalist Department. DISCHARGE DIAGNOSES     Acute Hepatic Encephalopathy     CONSULTATIONS: IP CONSULT TO HOSPITALIST  IP CONSULT TO GASTROENTEROLOGY    PROCEDURES/SURGERIES: * No surgery found *    PENDING TEST RESULTS:   At the time of discharge the following test results are still pending: none    FOLLOW UP APPOINTMENTS:   Follow-up Information     Follow up With Details Comments Imer Mcclellan MD   [address] [place] [address]. MARTÍN Mac In 5 days Please follow up this upcoming Friday in her office. You may call the office for appointment time [address]  [phone]             ADDITIONAL CARE RECOMMENDATIONS:     1. Please follow up with Steve Rodrigues NP at Dr. Dhara Thacker office this upcoming Friday 6/29. Please call the office tomorrow for appointment time. 2. STOP taking diuretics per Dr. Susan Chin order ( Lasix and ALDACTONE)    3. Continue taking Xifaximan two times daily and Latulose three times daily. Do not skip doses or you may end up back in the hospital .     DIET: Resume previous diet    ACTIVITY: Activity as tolerated      DISCHARGE MEDICATIONS:   See Medication Reconciliation Form    · It is important that you take the medication exactly as they are prescribed. · Keep your medication in the bottles provided by the pharmacist and keep a list of the medication names, dosages, and times to be taken in your wallet.    · Do not take other medications without consulting your doctor. NOTIFY YOUR PHYSICIAN FOR ANY OF THE FOLLOWING:   Fever over 101 degrees for 24 hours. Chest pain, shortness of breath, fever, chills, nausea, vomiting, diarrhea, change in mentation, falling, weakness, bleeding. Severe pain or pain not relieved by medications. Or, any other signs or symptoms that you may have questions about. DISPOSITION:   X Home With: none   OT  PT  HH  RN       SNF/Inpatient Rehab/LTAC    Independent/assisted living    Hospice    Other:     CDMP Checked: Yes X     PROBLEM LIST Updated:   Yes X       Signed:   Heather Medina NP  6/24/2018  2:50 PM

## 2018-06-24 NOTE — ROUTINE PROCESS
Bedside shift change report given to Darci Laboy (oncoming nurse) by Lorena (offgoing nurse). Report included the following information SBAR, Kardex, Procedure Summary, Accordion, Recent Results and Cardiac Rhythm Paced.

## 2018-06-24 NOTE — PROGRESS NOTES
[address]  Ambar Quiros MD, FACP, [place] [place], [place]       Pablo MARTÍN Cano, KULDEEP SARKAR, ACNP-BC   aPrth Eaton, MARTÍN Smith NP   [address] [facility]    [address], [address] [address], [address]    [phone]    FAX: [phone] [address] [place] [address]  [address], [address]  [place], [address] [facility]    [phone]    FAX: [phone]   [facility]  HEPATOLOGY PROGRESS NOTE  The patient is a 59year old  male with cirrhosis secondary to alcohol.  He finally stopped consuming alcohol in 1/2018. He was brought to the ED by his wife because of confusion. Laboratory studies demonstrated LORENZA with Screat up to 2 mg and hypoNa to 133. He had a paracentesis 1 day prior removing 6 L of ascites. Cell count was negative for SBP.     Since admission he has been treated with IV albumin, lactulose and xifaxan.     Mental status is clear. His daughter and he both think his mental status is back to baseline. He can go home. Please DC on lactulose TID, xifaxan BID and no diuretics. Teresa Hairston NP will see him in my office on Friday or next Monday.     ASSESSMENT AND PLAN:  Cirrhosis  This is secondary to alcohol.    The CTP score is 9, Child class C, MELD score 27. He is not a candidate for liver transplant because of cardiomyopathy, LVEF of 40-50% and implantable defibrilator. Cardiomyopathy  ECHO shows LVEF of only 40-45%. Nuclear stress test was negative for ischemia with EF 49%  The reduced LVEF is secondary to ETOH cardiomyopathy.     Alcohol abuse in remission  He has been abstinent from alcohol for 6 months, since 1/2018.       Ascites  He had 6 liters of ascites removed as out-patient just prior to admission. He has not developed too much recurrent ascites with IV albumin. Cannot restart diuretics because of SCreat of 1.6mg. Will DC on no diuretics.     Hepatic encephalopathy  He should be on lactulose TID and xifaxan BID. His protein is restricted to 1 item per day. He has had multiple admissions for HE. This time it took him about 7 weeks to develop HE and be readmitted. He may have a vascular shunt but we cannot look for this. He cannot have MRI because of the defibrilator and cannot have CT with contrast becasue of LORENZA. We will talk to cardiology and see if we can turn off the defibrilator so we can get an MRI. LORENZA  He was dehydrated on admission with Screat 2.01 mg. He was treated with IV albumin. Screat is down to 1.6 mg.    DC on no diuretics.     Hyponatremia  Secondary to cirrhosis and diuretics.  NA was 133 on admission and has remained stable.      Anemia  Secondary to GI blood loss from portal HTN, Bone marrow suppression from malnutrition and chronic disease. HB is stable. No transfusion needed.      Thrombocytopenia  This is secondary to cirrhosis. No treatment needed.      PHYSICAL EXAMINATION:  VS: per nursing note  General:  No acute distress. Eyes:  Sclera anicteric. ENT:  No oral lesions.    Nodes:  No adenopathy. Skin:  Spider angiomata.  No jaundice. Respiratory:  Lungs clear to auscultation. Cardiovascular:  Regular heart rate. Abdomen:  Soft non-tender, some ascites is present. Extremities:  No edema on right lower extremity.  Left side BKA with prosthetic leg. Neurologic:  Still a bit slow. Cranial nerves grossly intact.  Asterixis present.      LABORATORY:  Results for Doris Shah (MRN [id number]) as of 6/24/2018 13:02   Ref.  Range 6/21/2018 13:49 6/22/2018 02:42 6/23/2018 06:32 6/24/2018 04:09   WBC Latest Ref Range: 4.1 - 11.1 K/uL 4.1 2.9 (L) 2.6 (L) 3.9 (L)   HGB Latest Ref Range: 12.1 - 17.0 g/dL 8.8 (L) 8.3 (L) 7.6 (L) 7.7 (L) PLATELET Latest Ref Range: 150 - 400 K/uL 65 (L) 53 (L) 55 (L) 56 (L)   Sodium Latest Ref Range: 136 - 145 mmol/L 133 (L) 135 (L) 133 (L) 133 (L)   Potassium Latest Ref Range: 3.5 - 5.1 mmol/L 4.7 4.3 4.2 4.3   Chloride Latest Ref Range: 97 - 108 mmol/L 99 102 102 103   CO2 Latest Ref Range: 21 - 32 mmol/L 24 24 22 22   Glucose Latest Ref Range: 65 - 100 mg/dL 154 (H) 128 (H) 149 (H) 185 (H)   BUN Latest Ref Range: 6 - 20 MG/DL 23 (H) 21 (H) 18 16   Creatinine Latest Ref Range: 0.70 - 1.30 MG/DL 2.01 (H) 1.79 (H) 1.63 (H) 1.64 (H)   Bilirubin, total Latest Ref Range: 0.2 - 1.0 MG/DL 2.5 (H) 3.0 (H)  2.8 (H)   Albumin Latest Ref Range: 3.5 - 5.0 g/dL 3.2 (L) 3.4 (L)  4.2   ALT (SGPT) Latest Ref Range: 12 - 78 U/L 34 31  27   AST Latest Ref Range: 15 - 37 U/L 41 (H) 36  32   Alk.  phosphatase Latest Ref Range: 45 - 117 U/L 220 (H) 160 (H)  123 (H)   Ammonia Latest Ref Range: <32 UMOL/L 134 (H) 54 (H) 71 (H) 84 (H)       MD Holden BegumGreater [facility] [address], [facility]  [facility] 22. [phone]

## 2018-06-25 NOTE — ADT AUTH CERT NOTES
Patient Demographics        Patient Name [address] Sex  Address Phone       Denia Bustillo [phone] Male 1960 [address]  [address] [facility] [address] [phone] (Home)  [phone] (Mobile) *Preferred*           CSN:       [id number]           Admit Date: Admit Time Room Bed       2018  1:26  [15168] 01 [53892]           Attending Providers        Provider Pager From To       Sujata Crocekr MD  18       Dahlia Graham MD  18       Nash Kraft MD  18           Emergency Contact(s)        Name Relation Home Work Mobile     Pascale Burk Spouse [phone]         Utilization Review           VITALS by Carson Major RN        Review Entered Review Status       2018 In Primary       Details                                     LABS by Carson Major RN        Review Entered Review Status       2018 In Primary       Details                              PROGRESS NOTES by Carson Major RN        Review Entered Review Status       2018 In Primary       Details               Interval history / Subjective:   Mr. Laura Ramirez states that his pain is better controlled today. He is resting comfortably. He reports having tons of   Diarrhea yesterday 4-6 BMS within 4 hour time frame. Refused his last dose of Lactulose.       Ammonia level continues to increase.      Jazmyn following     Downgrade to medical .       Assessment & Plan:      Acute Hepatic encephalopathy, in the setting of Cirrhosis (POA) -  Improving  -Ammonia level 71 today, patient reports minimal BM's yesterday   -Continue lactulose and Xifaxan  -Seen by GI NP and Hepatology: Recs: lactulose and Xifaxan and  Restrict protein in diet.  Hold diuretic for LORENZA, and give IV albumin.  -Continue neurovascular checks  -PT/OT Consults  -follow ammonia levels   -Strict I's and O's          HEPATOLOGY:  HEPATOLOGY PROGRESS NOTE  The patient is a 59 year old  male with cirrhosis secondary to alcohol. Viral Moore finally stopped consuming alcohol in 1/2018. Viral Moore was brought to the ED by his wife because of confusion.  Laboratory studies demonstrated LORENZA with Screat up to 2 mg and hypoNa to 80.  He had a paracentesis 1 day prior removing 6 L of ascites.  Cell count was negative for SBP.     Since admission he has been treated with IV albumin, lactulose and xifaxan.     Mental status is clear.  His daughter and he both think his mental status is back to baseline.  He can go home.       Please DC on lactulose TID, xifaxan BID and no diuretics.     Alma Castañeda NP will see him in my office on Friday or next Monday.                            6/23 PROGRESS NOTES by Smooth Cantu RN        Review Entered Review Status       6/25/2018 In Primary       Details         6/23  INTERNAL MED:  Interval history / Subjective:   Pt seen and examined. He is complaining of the \"worse pain of his life\" in his left BKA. States he takes percocet at home when it becomes this bad usually. He reports exacerbation once every 2-3 weeks. States he can't live like this and shouldn't have to suffer in the hospital.      Will start very low dose of percocet , one tab every 8 hours. Monitor for changes in mental status related to narcotic, will stop if any changes.      6 BMS yesterday. 2 so far today. Continue at current dosing of Lactulose.       Assessment & Plan:      Acute Hepatic encephalopathy, in the setting of Cirrhosis (POA) -  Improving  -Ammonia level 71 today, patient reports minimal BM's yesterday   -Continue lactulose and Xifaxan  -Seen by GI NP and Hepatology: Recs: lactulose and Xifaxan and  Restrict protein in diet.  Hold diuretic for LORENZA, and give IV albumin.  -Continue neurovascular checks  -PT/OT Consults  -follow ammonia levels   -Strict I's and O's                       6/22 PROGRESS NOTES by Smooth Cantu RN        Review Entered Review Status       6/25/2018 In Primary       Details         6/22:   HEPATOLOGY:  Since admission he has been treated with IV albumin, lactulose and xifaxan.     This AM he is still a bit slow in thinking.  A few mental mistankes and forgetfulness in speaking with him.  Repeat labs show Screat in down to 1.79 mg. Virgene Scales is up to 135.  He will need at least another day of treatment.        ASSESSMENT AND PLAN:  Cirrhosis  This is secondary to alcohol.  The CTP score is 9, Child class C, MELD score 27.  Will need to initiate LT evaluation testing.       Alcohol abuse in remission  He has been abstinent from alcohol for 6 months, since 1/2018.  We can now proceed with LT evaluation.     Ascites  He had 6 liters of ascites removed yest in out patient para. With Screat and hyponatremia, discontinued diuretics and left the albumin on for more than 3 doses. Increased to 25 grams. May need repeat US - may have some reaccumulation.      Hepatic encephalopathy  Agree with restarting home meds lactulose and Xifaxan.  Restrict protein in diet. I have altered his diet order to reflect this.      LORENZA  Screat 2.01 mg but down from last check.  Secondary to diuretics and dehydration.  Ordered albumin, discontinued the diuretics.        Hyponatremia  Secondary to cirrhosis and diuretics.  On IV Albumin.     Anemia  Secondary to GI blood loss from portal HTN.  Bone marrow suppression from malnutrition and chronic disease.      INTERNAL MED:  1.  Hepatic encephalopathy. Improving  -Ammonia level 53.   -Continue lactulose and Xifaxan.    -Seen by GI NP and Hepatology: Recs: lactulose and Xifaxan and  Restrict protein in diet.  Hold diuretic for LORENZA, and give IV albumin.  -Continue neurovascular checks  -PT/OT Consults  -Repeat ammonia level in the morning.     -Strict I's and O's

## 2018-06-27 NOTE — DISCHARGE SUMMARY
Discharge Summary       PATIENT ID: Ximena Gama  MRN: [id number]   YOB: 1960    DATE OF ADMISSION: 6/21/2018  1:26 PM    DATE OF DISCHARGE: 6/24/2018  PRIMARY CARE PROVIDER: Margareth Ramachandran MD       DISCHARGING PHYSICIAN: Rhea Jones NP    To contact this individual call [phone] and ask the  to page. If unavailable ask to be transferred the Adult Hospitalist Department. CONSULTATIONS: IP CONSULT TO GASTROENTEROLOGY    PROCEDURES/SURGERIES: * No surgery found *    [address] COURSE:     Obtained from [address] [place] [address] note 6/21/18: The patient is a 20-year-old gentleman with past medical history of alcoholic cirrhosis, history of esophageal varices, alcohol abuse in remission, chronic systolic heart failure, neuropathy, status post left BKA, hypertension, cardiac defibrillation implantation, diabetes mellitus type 2, who presents to the hospital with the above-mentioned symptoms. Sara Gaines patient's wife reports that patient has a history of alcoholic cirrhosis associated with chronic ascites. He had 6.5 liters of fluid removed through paracentesis on 6/21/18.  The patient is doing well, went home, and then started having some nausea.  Woke up this morning and was a little confused, disoriented, decreased memory, and was having difficulty with \"everyday tasks. \"   PCP visit about 10 days back and also had some elevated ammonia levels.  The wife reports that the patient has been taking his lactulose on a regular basis.  She reports that the patient did not have any falls or injuries. The patient was found to have an ammonia level of 134 and was requested to be admitted to the hospital.    Although patients ammonia level still in 80's on day of discharge, clinically he has much improved. His mental status is back to baseline. Hepatology has seen him and agreed with discharge.  His daughters will take him home today and he will follow up next Friday in Dr. Hussain eZpeda office. DISCHARGE DIAGNOSES / PLAN:      Acute Hepatic encephalopathy, in the setting of Cirrhosis (POA) -  Improving  -Ammonia level 71 today, patient reports minimal BM's yesterday   -Continue lactulose and Xifaxan  -Seen by GI NP and Hepatology: Recs: lactulose and Xifaxan and  Restrict protein in diet. Hold diuretic for LORENZA, and give IV albumin.  -Continue neurovascular checks  -PT/OT Consults  -follow ammonia levels   -Strict I's and O's      History of alcoholic cirrhosis:  -No CIWA indicated. Pt stopped drinking in January 2018.   - outpatient he is on  STEP 1 Diuretics ( Lasix + Aldactone) , holding lasix related to kidney function, will likely restart tomorrow  - continue with Xifaxan and Lactulose   - patient is well known to Dr. Sarthak Osman and he is following, thankyou     History of Chronic Systolic congestive heart failure, NYHA II on admit  - EF is 40-45% on 6/5/18   -The patient does not appear to be in exacerbation  -Monitor I/O daily weight.    - continue with Lasix, Digoxin, and Metoprolol as previously on      LORENZA on History of chronic kidney disease stage III (POA):  - etiology unclear, likely related to pre renal azotemia   - Creatinine is improving, 2.01 --> 1.63 , baseline appears to be around 1.09   - GFR 43 today   -Holding Lasix due to elevated creatinine , will likely restart tomorrow with continued improvement    -Continue to Avoid nephrotoxic medications.       Hx of Diabetes DX:     - patient is DIET CONTROLLED at home  -Continue sliding scale NovoLog insulin, Accu-Chek, diet control and close monitoring.    - A1C is 5.7      Neuropathy in setting of Left BKA:   - phantom limb pain is chronic   - re- start home dosing of  gabapentin      Hx of Thrombocytopenia 2/2 cirrhosis  - Stable   - Will continue to Monitor     Hx of HTN: stable  - continue with amiodarone , metoprolol, and Valsartan , Digoxin   - monitor           PENDING TEST RESULTS:   At the time of discharge the following test results are still pending: none    FOLLOW UP APPOINTMENTS:    Follow-up Information     Follow up With Details Comments Imer Mcclellan MD   [address]      AlReyna Brittafara Joshi I[address] MARTÍN Mac In 5 days Please follow up this upcoming Friday in her office. You may call the office for appointment time [address]  [phone]             ADDITIONAL CARE RECOMMENDATIONS:     1. Please follow up with Loreta Corrigan NP at Dr. Maxwell [place] office this upcoming Friday 6/29. Please call the office tomorrow for appointment time. 2. STOP taking diuretics per  [address] order ( Lasix and ALDACTONE)    3. Continue taking Xifaximan two times daily and Latulose three times daily. Do not skip doses or you may end up back in the hospital .     DIET: Resume previous diet      ACTIVITY: Activity as tolerated      DISCHARGE MEDICATIONS:  Discharge Medication List as of 6/24/2018  5:47 PM      CONTINUE these medications which have NOT CHANGED    Details   lactulose (CHRONULAC) 10 gram/15 mL solution Take 15 mL by mouth three (3) times daily. , Normal, Disp-480 mL, R-3      rifAXIMin (XIFAXAN) 550 mg tablet Take 1 Tab by mouth two (2) times a day. Indications: Hepatic Encephalopathy, Print, Disp-60 Tab, R-2      gabapentin (NEURONTIN) 100 mg capsule Take 100 mg by mouth three (3) times daily. , Historical Med      pantoprazole (PROTONIX) 40 mg tablet Take 1 Tab by mouth daily. , Print, Disp-60 Tab, R-1      valsartan (DIOVAN) 40 mg tablet Take 40 mg by mouth daily. , Historical Med      thiamine (B-1) 100 mg tablet Take 1 Tab by mouth daily. , Print, Disp-30 Tab, R-0      metoprolol succinate (TOPROL XL) 25 mg XL tablet Take 0.5 Tabs by mouth daily. , Print, Disp-15 Tab, R-0      amiodarone (CORDARONE) 200 mg tablet Take 200 mg by mouth nightly., Historical Med      multivitamins-minerals-lutein (CENTRUM SILVER) Tab Take 1 Tab by mouth daily. , Historical Med digoxin (LANOXIN) 0.125 mg tablet Take 0.125 mg by mouth nightly., Historical Med         STOP taking these medications       furosemide (LASIX) 40 mg tablet Comments:   Reason for Stopping:         spironolactone (ALDACTONE) 100 mg tablet Comments:   Reason for Stopping:                 NOTIFY YOUR PHYSICIAN FOR ANY OF THE FOLLOWING:   Fever over 101 degrees for 24 hours. Chest pain, shortness of breath, fever, chills, nausea, vomiting, diarrhea, change in mentation, falling, weakness, bleeding. Severe pain or pain not relieved by medications. Or, any other signs or symptoms that you may have questions about. DISPOSITION:  X  Home With: none   OT  PT  HH  RN       Long term SNF/Inpatient Rehab    Independent/assisted living    Hospice    Other:       PATIENT CONDITION AT DISCHARGE:     Functional status    Poor    X Deconditioned     Independent      Cognition     Lucid    X Forgetful     Dementia      Catheters/lines (plus indication)    Vizcarra     PICC     PEG    X None      Code status   X  Full code     DNR      PHYSICAL EXAMINATION AT DISCHARGE:    General:  Alert, cooperative, NAD. AOx3   HEENT:  Normocephalic, without obvious abnormality, atraumatic.      PERRL, EOMs intact. Ears symmetrical; sclerae jaundice   Neck: Trachea midline, no lymphadenopathy, no thyromegaly     Supple, trachea midline, no adenopathy, thyroid: no enlargement/tenderness/nodules, no carotid bruit and no JVD. Back:   No CVA tenderness. Lungs:   Clear to auscultation bilaterally. Symmetric expansion   Chest wall:  No tenderness or deformity. Heart:  Regular rate and rhythm, S1, S2 normal, no murmur, click, rub or gallop. No edema   Abdomen:   Soft, non-tender, and nondistended Bowel sounds hypoactive No masses appreciated. Genitalia:  Deferred. Musculoskeletal: Extremities normal, atraumatic, no cyanosis or edema. Normal ROM; TAMEZ; left BKA   Pulses: 2+ and symmetric all extremities.    Skin: Skin color, texture, turgor normal. No rashes or lesions. Psych: Affect appropriate.  Insight and judgement normal         CHRONIC MEDICAL DIAGNOSES:  Problem List as of 6/24/2018  Date Reviewed: 6/24/2018          Codes Class Noted - Resolved    Iron deficiency anemia ICD-10-CM: D50.9  ICD-9-CM: 280.9  6/21/2018 - Present        Ascites due to alcoholic cirrhosis ([facility] JSB-77-OU: K70.31  ICD-9-CM: 571.2  6/21/2018 - Present        Thrombocytopenia ([facility] ICD-10-CM: D69.6  ICD-9-CM: 287.5  6/21/2018 - Present        * (Principal)Hepatic encephalopathy ([facility] ICD-10-CM: K72.90  ICD-9-CM: 572.2  4/29/2018 - Present        LORENZA (acute kidney injury) ([facility] ICD-10-CM: N17.9  ICD-9-CM: 584.9  4/20/2018 - Present        Esophageal varices ([facility] ICD-10-CM: I85.00  ICD-9-CM: 456.1  2/8/2018 - Present        Cirrhosis, alcoholic ([facility] ECU-28-TV: K70.30  ICD-9-CM: 571.2  1/3/2018 - Present        Alcohol abuse, in remission ICD-10-CM: F10.11  ICD-9-CM: 305.03  1/3/2018 - Present        Chronic systolic heart failure (HCC) (Chronic) ICD-10-CM: I50.22  ICD-9-CM: 428.22  11/21/2017 - Present        Hyponatremia ICD-10-CM: E87.1  ICD-9-CM: 276.1  11/20/2017 - Present        Neuropathy ICD-10-CM: G62.9  ICD-9-CM: 355.9  3/30/2017 - Present        S/P BKA (below knee amputation) ([facility] ICD-10-CM: Z89.519  ICD-9-CM: V49.75  3/30/2017 - Present        Hypertension ICD-10-CM: I10  ICD-9-CM: 401.9  3/30/2017 - Present        Cardiac defibrillator in place ICD-10-CM: Z95.810  ICD-9-CM: V45.02  3/30/2017 - Present        Type II diabetes mellitus ([facility] ICD-10-CM: E11.9  ICD-9-CM: 250.00  2/1/2015 - Present        RESOLVED: LORENZA (acute kidney injury) ([facility] ICD-10-CM: N17.9  ICD-9-CM: 584.9  4/19/2018 - 4/19/2018        RESOLVED: GI bleed ICD-10-CM: K92.2  ICD-9-CM: 578.9  2/17/2018 - 4/19/2018        RESOLVED: GI bleed ICD-10-CM: K92.2  ICD-9-CM: 578.9  1/23/2018 - 1/28/2018        RESOLVED: Secondary esophageal varices without bleeding ([facility] ICD-10-CM: I85.10  ICD-9-CM: 456.21  1/3/2018 - 2/8/2018        RESOLVED: Hypomagnesemia ICD-10-CM: E83.42  ICD-9-CM: 275.2  12/6/2017 - 12/10/2017        RESOLVED: Suicidal ideations ICD-10-CM: E65.925  ICD-9-CM: V62.84  12/6/2017 - 12/10/2017        RESOLVED: Anasarca ICD-10-CM: R60.1  ICD-9-CM: 782.3  11/20/2017 - 11/24/2017        RESOLVED: Cirrhosis of liver without ascites ([facility] ICD-10-CM: K74.60  ICD-9-CM: 571.5  5/2/2017 - 1/3/2018        RESOLVED: Extremity pain ICD-10-CM: M79.609  ICD-9-CM: 729.5  2/18/2015 - 3/30/2017        RESOLVED: Cellulitis and abscess of foot ICD-10-CM: L03.119, L02.619  ICD-9-CM: 682.7  2/1/2015 - 2/13/2015        RESOLVED: Hyperglycemia ICD-10-CM: R73.9  ICD-9-CM: 790.29  1/31/2015 - 2/13/2015              Greater than 30 minutes were spent with the patient on counseling and coordination of care    Signed:   aLina Howell NP  6/27/2018  8:17 AM

## 2018-06-29 NOTE — PROGRESS NOTES
70 Steffi Durand MD, FACP, [place], [place]       Gerard Mosher, NP Mellissa Boas, KULDEEP Castañeda, ACNP-BC   Kim Dia, MARTÍN Montaño, MARTÍN [facility]    at [place] [address] [address] [facility], [address] [facility]    [facility], [address]    [phone]    FAX: [address] [place] [address] Drive, [address] [facility], [address]    [phone]    FAX: [phone]     HEPATOLOGY PROGRESS NOTE  The patient is a 59year old  male with cirrhosis secondary to alcohol. [facility] stopped consuming alcohol in 1/2018.  On 6/21/2018, he was brought to the ED by his wife because of confusion.  Laboratory studies demonstrated LORENZA with Screat up to 2 mg and hypoNa to [address]  had a paracentesis on 6/21/2018 removing 6 L of ascites.  Cell count was negative for SBP.     During admission he has been treated with IV albumin, lactulose and Xifaxan. He states he has some times during the day where he is tired, but overall feels good. His daughter Anisa Mendez has been with him this week and is very diligent on checking up on meds, etc. He states he takes the lactulose TID and has 2-3 BM daily. He still not on diuretics. He has gained 4-6 pounds since discharge.      ASSESSMENT AND PLAN:  Cirrhosis  This is secondary to alcohol. The CTP score is 9, Child class C, MELD score 27.    He is not a candidate for liver transplant because of cardiomyopathy, LVEF of 40-50% and implantable defibrillator. We discussed this with the patient. For completion, we will bring his case up on the next transplant conference call which will be 7/11/2018 with [facility].      Cardiomyopathy  ECHO shows LVEF of only 40-45%.     Nuclear stress test was negative for ischemia with EF 49%  The reduced LVEF is secondary to ETOH cardiomyopathy.     Alcohol abuse in remission  He has been abstinent from alcohol for 6 months, since 1/2018.        Ascites  He had 6 liters of ascites removed as out-patient just prior to his admission. He has developed some ascites again. We are going to see how he does over the next few days. I will call him and put him back on diuretics if his kidney function is better.       Hepatic encephalopathy  He is on lactulose TID and Xifaxan BID. His protein is restricted to 1 item per day. He has had multiple admissions for HE. This time it took him about 7 weeks to develop HE and be readmitted. He may have a vascular shunt but we cannot look for this. He cannot have MRI because of the defibrillator and cannot have CT with contrast because of LORENZA.       LORENZA  He was dehydrated on admission with Screat 2.01 mg. He was treated with IV albumin. Screat is down to 1.6 mg.    We will check labs today to see where he is and if we can restart diuretics.     Hyponatremia  Secondary to cirrhosis and diuretics.  NA was 133 on admission and has remained stable.      Anemia  Secondary to GI blood loss from portal HTN, bone marrow suppression from malnutrition and chronic disease. Will recheck hemoglobin today.      Thrombocytopenia  This is secondary to cirrhosis. No treatment needed.      PHYSICAL EXAMINATION:  VS: per nursing note  General:  No acute distress. Eyes:  Sclera anicteric. ENT:  No oral lesions.    Nodes:  No adenopathy. Skin:  Spider angiomata.  No jaundice. Respiratory:  Lungs clear to auscultation. Cardiovascular:  Regular heart rate with 2/6 STEVEN. Abdomen:  Soft non-tender, some ascites is present.    Extremities:  No edema on right lower extremity.  Left side BKA with prosthetic leg. Neurologic:  Speech is slow but no obvious HE. Cranial nerves grossly intact. Asterixis not present.     Laboratory:  [address] Units 6/24/2018 6/23/2018   WBC 4.1 - 11.1 K/uL 3.9 (L) 2.6 (L)   ANC 1.8 - 8.0 K/UL 2.9 1.9   HGB 12.1 - 17.0 g/dL 7.7 (L) 7.6 (L)    - 400 K/uL 56 (L) 55 (L)   INR 0.8 - 1.2     AST 15 - 37 U/L 32    ALT 12 - 78 U/L 27    Alk Phos 45 - 117 U/L 123 (H)    Bili, Total 0.2 - 1.0 MG/DL 2.8 (H)    Bili, Direct 0.0 - 0.2 MG/DL     Albumin 3.5 - 5.0 g/dL 4.2    BUN 6 - 20 MG/DL 16 18   Creat 0.70 - 1.30 MG/DL 1.64 (H) 1.63 (H)   Na 136 - 145 mmol/L 133 (L) 133 (L)   K 3.5 - 5.1 mmol/L 4.3 4.2   Cl 97 - 108 mmol/L 103 102   CO2 21 - 32 mmol/L 22 22   Glucose 65 - 100 mg/dL 185 (H) 149 (H)   Magnesium 1.6 - 2.4 mg/dL     Ammonia <32 UMOL/L 84 (H) 71 (H)     [facility] Latest Ref Rng & Units 6/22/2018   WBC 4.1 - 11.1 K/uL 2.9 (L)   ANC 1.8 - 8.0 K/UL    HGB 12.1 - 17.0 g/dL 8.3 (L)    - 400 K/uL 53 (L)   INR 0.8 - 1.2    AST 15 - 37 U/L 36   ALT 12 - 78 U/L 31   Alk Phos 45 - 117 U/L 160 (H)   Bili, Total 0.2 - 1.0 MG/DL 3.0 (H)   Bili, Direct 0.0 - 0.2 MG/DL 1.5 (H)   Albumin 3.5 - 5.0 g/dL 3.4 (L)   BUN 6 - 20 MG/DL 21 (H)   Creat 0.70 - 1.30 MG/DL 1.79 (H)   Na 136 - 145 mmol/L 135 (L)   K 3.5 - 5.1 mmol/L 4.3   Cl 97 - 108 mmol/L 102   CO2 21 - 32 mmol/L 24   Glucose 65 - 100 mg/dL 128 (H)   Magnesium 1.6 - 2.4 mg/dL    Ammonia <32 UMOL/L 54 (H)     Radiology:  6/20/2018. Ultrasound guided paracentesis. 6250 cc ascites removed. 5/3/2018. Ultrasound guided paracentesis with catheter placement. 5/2/2018. Abdominal CT. Chronic consolidation/atelectasis of the right lung base with air bronchograms  and possibly trace pleural effusion. Hepatic cirrhosis. Massive ascites. Splenomegaly. No mention of  shunt.     Jennifer Shea, [facility]-BC  [facility] [address] [place], [address] [place]  [address] [facility] 22.  [phone]

## 2018-06-29 NOTE — MR AVS SNAPSHOT
[address] [facility] [place] [phone] [address] 
[phone] Patient: Eduardo Mora MRN: [id number] OSL:[birth year] Visit Information Date & Time Provider Department Dept. Phone Encounter #  
 6/29/2018  7:45 AM [facility] ANGEL [place], [address] [facility] 219 [id number] Your Appointments 7/12/2018  7:30 AM  
Follow Up with [facility] MARTÍN Ortiz[address]ardaniel[address] 75 ([facility]) Appt Note: Follow up per Alma Castañeda NP  
 [address] [facility] [place] [phone] [facility] [address]  
[address] [facility] [place] [address] Upcoming Health Maintenance Date Due  
 LIPID PANEL Q1 1960 MICROALBUMIN Q1 1/17/1968 EYE EXAM RETINAL OR DILATED Q1 1/17/1968 DTaP/Tdap/Td series (1 - Tdap) 1/17/1979 Pneumococcal 19-64 Highest Risk (2 of 3 - PCV13) 2/1/2013 FOOT EXAM Q1 1/31/2016 ZOSTER VACCINE AGE 60> 11/17/2017 MEDICARE YEARLY EXAM 3/14/2018 Influenza Age 5 to Adult 8/1/2018 HEMOGLOBIN A1C Q6M 12/21/2018 FOBT Q 1 YEAR AGE 50-75 6/21/2019 Allergies as of 6/29/2018  Review Complete On: 6/21/2018 By: Valentin Wheatley RN No Known Allergies Current Immunizations  Reviewed on 5/2/2018 Name Date Influenza Vaccine 11/12/2014 Influenza Vaccine PF 11/5/2013  6:09 PM  
 Pneumococcal Vaccine (Unspecified Type) 2/1/2012 Not reviewed this visit You Were Diagnosed With   
  
 Codes Comments Hepatic encephalopathy ([facility]    -  Primary ICD-10-CM: K72.90 ICD-9-CM: 572.2 Alcoholic cirrhosis of liver with ascites ([facility]     ICD-10-CM: K70.31 ICD-9-CM: 571.2 Vitals BP Pulse Temp Resp Height(growth percentile) Weight(growth percentile) 114/43 88 98 °F (36.7 °C) 20 5' 10\" (1.778 m) 202 lb (91.6 kg) SpO2 BMI Smoking Status 98% 28.98 kg/m2 Former Smoker BMI and BSA Data Body Mass Index Body Surface Area 28.98 kg/m 2 2.13 m 2 Preferred Pharmacy Pharmacy Name Phone Dianna Agrawal [address], [address] [place] [address] [phone] Your Updated Medication List  
  
   
This list is accurate as of 6/29/18  8:46 AM.  Always use your most recent med list.  
  
  
  
  
 amiodarone 200 mg tablet Commonly known as:  CORDARONE Take 200 mg by mouth nightly. CENTRUM SILVER Tab tablet Generic drug:  multivitamins-minerals-lutein Take 1 Tab by mouth daily. digoxin 0.125 mg tablet Commonly known as:  LANOXIN Take 0.125 mg by mouth nightly.  
  
 gabapentin 100 mg capsule Commonly known as:  NEURONTIN Take 100 mg by mouth three (3) times daily. lactulose 10 gram/15 mL solution Commonly known as:  Gwenlyn Gomez Take 15 mL by mouth three (3) times daily. metoprolol succinate 25 mg XL tablet Commonly known as:  TOPROL XL Take 0.5 Tabs by mouth daily. pantoprazole 40 mg tablet Commonly known as:  PROTONIX Take 1 Tab by mouth daily. rifAXIMin 550 mg tablet Commonly known as:  Edna Jose Take 1 Tab by mouth two (2) times a day. Indications: Hepatic Encephalopathy  
  
 thiamine 100 mg tablet Commonly known as:  B-1 Take 1 Tab by mouth daily. valsartan 40 mg tablet Commonly known as:  DIOVAN Take 40 mg by mouth daily. We Performed the Following AMMONIA [id number] CPT(R)] CBC W/O DIFF [63786 CPT(R)] HEPATIC FUNCTION PANEL [03436 CPT(R)] METABOLIC PANEL, BASIC [59329 CPT(R)] PROTHROMBIN TIME + INR [00515 CPT(R)] Please provide this summary of care documentation to your next provider. Your primary care clinician is listed as Anabell Irene. If you have any questions after today's visit, please call [phone].

## 2018-07-09 NOTE — PROGRESS NOTES
Patient tolerated procedure well. I have reviewed discharge instructions with the patient. The patient and spouse verbalized understanding.

## 2018-07-09 NOTE — DISCHARGE INSTRUCTIONS
[address]  Department of Interventional Radiology      PARACENTESIS DISCHARGE INSTRUCTIONS    General Information:  During this procedure, the doctor will insert a needle into the abdomen to drain fluid. After the procedure, you will be able to take a deep breath much easier. The site of the puncture may ooze the first day. This will decrease and eventually stop. Paracentesis (draining fluid from the abdomen) sometimes makes patients hypotensive (low blood pressure). Your doctor may order for you to receive fluids or albumin (a volume booster) during the procedure through an IV site. Home Care Instructions:  Keep the puncture site clean and dry. No tub baths or swimming until puncture site heals. Showering is acceptable. Resume your normal diet, and resume your normal activity slowly and as you tolerate. If you are short of breath, rest. If shortness of breath does not ease, please call your ordering doctor. Fluid can re-accumulate in the chest and/or in the abdomen. If this should occur, your doctor needs to know as you may need to have the procedure done again. Call If:     You should call your Physician and/or the Radiology Nurse if you notice any signs of infection, like pus draining, or if it is swollen or reddened. Also call if you have a fever, or if you are bleeding from the puncture site more than a small amount on the dressing. Call if the puncture site keeps draining fluid. Some oozing is to be expected, but should slow and then stop. Call if you feel like you have pressure in your abdomen. SEEK IMMEDIATE CARE OR CALL 911 IF YOU SUDDENLY HAVE TROUBLE BREATHING, OR IF YOUR LIPS TURN BLUE, OR IF YOU NOTICE BLOOD IN YOUR SPUTUM. Follow-Up Instructions: Please see your ordering doctor as he/she has requested.      To Reach Us:  [phone] 730 to 10 pm then [phone] after 10 pm      Date: 7/9/2018  Discharging Nurse: Marya Woods RN

## 2018-07-12 NOTE — PROGRESS NOTES
70 Steffi Durand MD, FACP, [place], [place]       Gucci Arboleda, MARTÍN Mobley, KULDEEP Gama, ACNP-BC   MARTÍN Pittman NP [address] [facility] 136    at [address], [facility] Esther Long  22.    [phone]    FAX: [address] [place] [address], [address]    [phone]    FAX: [phone]     HEPATOLOGY PROGRESS NOTE  The patient is a 59year old  male with cirrhosis secondary to alcohol. Chiara Lemons stopped consuming alcohol in 1/2018.  On 6/21/2018, he was brought to the ED by his wife because of confusion.  Laboratory studies demonstrated LORENZA with Screat up to 2 mg and hypoNa to 133.  He had a paracentesis on 7/9/2018 removing 10 L of ascites.  Cell count was negative for SBP. He has gained 8 pounds since paracentesis on Monday. He had low grade fever/chills with abdominal pain but all very vague sounding. He is better.        ASSESSMENT AND PLAN:  Cirrhosis  This is secondary to alcohol. The CTP score is 9, Child class C, MELD score 27.    He is not a candidate for liver transplant because of cardiomyopathy, LVEF of 40-50% and implantable defibrillator. We discussed this with the patient. For completion, we will bring his case up on the next transplant conference call which will be 7/18/2018. For some reason, it was not held yesterday.      Cardiomyopathy  ECHO shows LVEF of only 40-45%. Nuclear stress test was negative for ischemia with EF 49%  The reduced LVEF is secondary to ETOH cardiomyopathy.     Alcohol abuse in remission  He has been abstinent from alcohol for 6 months, since 1/2018.        Ascites  He had 10 L drained Monday (7/9/18) with no signs of SBP.  He has gained 8 pounds since then. I ordered another para and cell count. We are limited with diuretic use due to his kidney function. Continue to hold diuretics at this time until get today's numbers. He is not interested in a long term catheter placement (he doesn't want to walk around with a bag) and does not want a TIPS due to the HE risk. Discussed dietary changes with him to minimize sodium drastically.      Hepatic encephalopathy  He is on lactulose TID and Xifaxan BID. His protein is restricted to 1 item per day. He has had multiple admissions for HE. He went 7 weeks between last admissions. He may have a vascular shunt but we cannot look for this. He cannot have MRI because of the defibrillator and cannot have CT with contrast because of LORENZA.       LORENZA  He was dehydrated on last hospital admission with Screat 2.01 mg. He was treated with IV albumin. Screat is back up to 1.85 mg. We will check labs today to see where he is and if we can restart diuretics.     Hyponatremia  Secondary to cirrhosis and diuretics.  NA was 131 at last check.      Anemia  Secondary to GI blood loss from portal HTN, bone marrow suppression from malnutrition and chronic disease. Will recheck hemoglobin today.      Thrombocytopenia  This is secondary to cirrhosis. No treatment needed.      PHYSICAL EXAMINATION:  VS: per nursing note  General:  No acute distress. Eyes:  Sclera anicteric. ENT:  No oral lesions.    Nodes:  No adenopathy. Skin:  Spider angiomata.  No jaundice. Respiratory:  Lungs clear to auscultation. Cardiovascular:  Regular heart rate with 2/6 STEVEN. Abdomen:  Soft non-tender, some ascites is present.    Extremities:  No edema on right lower extremity.  Left side BKA with prosthetic leg. Neurologic:  Speech is slow but no obvious HE. Cranial nerves grossly intact. Asterixis not present.     Laboratory:  Liver [place] [facility] [address] Units 6/24/2018 6/23/2018   WBC 4.1 - 11.1 K/uL 3.9 (L) 2.6 (L)   ANC 1.8 - 8.0 K/UL 2.9 1.9   HGB 12.1 - 17.0 g/dL 7.7 (L) 7.6 (L)    - 400 K/uL 56 (L) 55 (L)   INR 0.8 - 1.2     AST 15 - 37 U/L 32    ALT 12 - 78 U/L 27    Alk Phos 45 - 117 U/L 123 (H)    Bili, Total 0.2 - 1.0 MG/DL 2.8 (H)    Bili, Direct 0.0 - 0.2 MG/DL     Albumin 3.5 - 5.0 g/dL 4.2    BUN 6 - 20 MG/DL 16 18   Creat 0.70 - 1.30 MG/DL 1.64 (H) 1.63 (H)   Na 136 - 145 mmol/L 133 (L) 133 (L)   K 3.5 - 5.1 mmol/L 4.3 4.2   Cl 97 - 108 mmol/L 103 102   CO2 21 - 32 mmol/L 22 22   Glucose 65 - 100 mg/dL 185 (H) 149 (H)   Magnesium 1.6 - 2.4 mg/dL     Ammonia <32 UMOL/L 84 (H) 71 (H)     Liver [facility] Latest Ref Rng & Units 6/22/2018   WBC 4.1 - 11.1 K/uL 2.9 (L)   ANC 1.8 - 8.0 K/UL    HGB 12.1 - 17.0 g/dL 8.3 (L)    - 400 K/uL 53 (L)   INR 0.8 - 1.2    AST 15 - 37 U/L 36   ALT 12 - 78 U/L 31   Alk Phos 45 - 117 U/L 160 (H)   Bili, Total 0.2 - 1.0 MG/DL 3.0 (H)   Bili, Direct 0.0 - 0.2 MG/DL 1.5 (H)   Albumin 3.5 - 5.0 g/dL 3.4 (L)   BUN 6 - 20 MG/DL 21 (H)   Creat 0.70 - 1.30 MG/DL 1.79 (H)   Na 136 - 145 mmol/L 135 (L)   K 3.5 - 5.1 mmol/L 4.3   Cl 97 - 108 mmol/L 102   CO2 21 - 32 mmol/L 24   Glucose 65 - 100 mg/dL 128 (H)   Magnesium 1.6 - 2.4 mg/dL    Ammonia <32 UMOL/L 54 (H)     Radiology:  7/9/2018. Ultrasound guided paracentesis. 10L removed. 6/20/2018. Ultrasound guided paracentesis. 6250 cc ascites removed. 5/3/2018. Ultrasound guided paracentesis with catheter placement. 5/2/2018. Abdominal CT. Chronic consolidation/atelectasis of the right lung base with air bronchograms  and possibly trace pleural effusion. Hepatic cirrhosis. Massive ascites. Splenomegaly. No mention of  shunt.     Ananya Burris, [facility]-BC  Liver [facility] [address] [place], [address] Esther [address]  22.  [phone]

## 2018-07-12 NOTE — PROGRESS NOTES
Chief Complaint   Patient presents with    Follow-up     Visit Vitals    /54 (BP 1 Location: Left arm, BP Patient Position: Sitting)    Pulse 66    Temp 98.6 °F (37 °C) (Tympanic)    Ht 5' 10\" (1.778 m)    Wt 213 lb 9.6 oz (96.9 kg)    SpO2 94%    BMI 30.65 kg/m2     PHQ over the last two weeks 5/18/2018   Little interest or pleasure in doing things Not at all   Feeling down, depressed or hopeless Not at all   Total Score PHQ 2 0

## 2018-07-12 NOTE — MR AVS SNAPSHOT
1111 [facility] [phone] [address] [place] [address] 
[phone] Patient: Home Amaya MRN: [id number] ZDI:4/99/3029 Visit Information Date & Time Provider Department Dept. Phone Encounter #  
 7/12/2018  7:30 AM Libby Wallace, [address] [facility] [id number] Your Appointments 8/15/2018  1:00 PM  
Follow Up with Libby Wallace, MARTÍN Lee 75 ([facility] Appt Note: 1 month follow up [address] [place] [facility] [phone] [facility] [address]  
59 [facility] [place] [address] Upcoming Health Maintenance Date Due  
 LIPID PANEL Q1 1960 MICROALBUMIN Q1 1/17/1968 EYE EXAM RETINAL OR DILATED Q1 1/17/1968 DTaP/Tdap/Td series (1 - Tdap) 1/17/1979 Pneumococcal 19-64 Highest Risk (2 of 3 - PCV13) 2/1/2013 FOOT EXAM Q1 1/31/2016 ZOSTER VACCINE AGE 60> 11/17/2017 MEDICARE YEARLY EXAM 3/14/2018 Influenza Age 5 to Adult 8/1/2018 HEMOGLOBIN A1C Q6M 12/21/2018 FOBT Q 1 YEAR AGE 50-75 6/21/2019 Allergies as of 7/12/2018  Review Complete On: 7/12/2018 By: Mey Suarze LPN No Known Allergies Current Immunizations  Reviewed on 5/2/2018 Name Date Influenza Vaccine 11/12/2014 Influenza Vaccine PF 11/5/2013  6:09 PM  
 Pneumococcal Vaccine (Unspecified Type) 2/1/2012 Not reviewed this visit You Were Diagnosed With   
  
 Codes Comments Alcoholic cirrhosis of liver with ascites ([facility]    -  Primary ICD-10-CM: K70.31 ICD-9-CM: 571.2 Vitals BP Pulse Temp Height(growth percentile) Weight(growth percentile) SpO2  
 113/54 (BP 1 Location: Left arm, BP Patient Position: Sitting) 66 98.6 °F (37 °C) (Tympanic) 5' 10\" (1.778 m) 203 lb 9.6 oz (92.4 kg) 94% BMI Smoking Status 29.21 kg/m2 Former Smoker Vitals History BMI and BSA Data Body Mass Index Body Surface Area 29.21 kg/m 2 2.14 m 2 Preferred Pharmacy Pharmacy Name Phone [facility] [address], [address] [place] [address] Avenue [phone] Your Updated Medication List  
  
   
This list is accurate as of 7/12/18  8:10 AM.  Always use your most recent med list.  
  
  
  
  
 amiodarone 200 mg tablet Commonly known as:  CORDARONE Take 200 mg by mouth nightly. CENTRUM SILVER Tab tablet Generic drug:  multivitamins-minerals-lutein Take 1 Tab by mouth daily. digoxin 0.125 mg tablet Commonly known as:  LANOXIN Take 0.125 mg by mouth nightly.  
  
 gabapentin 100 mg capsule Commonly known as:  NEURONTIN Take 100 mg by mouth three (3) times daily. lactulose 10 gram/15 mL solution Commonly known as:  Clemmie Givens Take 15 mL by mouth three (3) times daily. metoprolol succinate 25 mg XL tablet Commonly known as:  TOPROL XL Take 0.5 Tabs by mouth daily. pantoprazole 40 mg tablet Commonly known as:  PROTONIX Take 1 Tab by mouth daily. rifAXIMin 550 mg tablet Commonly known as:  Kathyanne Bonnet Take 1 Tab by mouth two (2) times a day. Indications: Hepatic Encephalopathy  
  
 thiamine 100 mg tablet Commonly known as:  B-1 Take 1 Tab by mouth daily. valsartan 40 mg tablet Commonly known as:  DIOVAN Take 40 mg by mouth daily. We Performed the Following CBC W/O DIFF [52975 CPT(R)] HEPATIC FUNCTION PANEL [71729 CPT(R)] METABOLIC PANEL, BASIC [23685 CPT(R)] PROTHROMBIN TIME + INR [43268 CPT(R)] Please provide this summary of care documentation to your next provider. Your primary care clinician is listed as Camden Fernandes. If you have any questions after today's visit, please call [phone].

## 2018-07-13 NOTE — DISCHARGE INSTRUCTIONS
[address] INSTRUCTIONS    General Information:  During this procedure, the doctor will insert a needle into the abdomen to drain fluid. After the procedure, you will be able to take a deep breath much easier. The site of the puncture may ooze the first day. This will decrease and eventually stop. Paracentesis (draining fluid from the abdomen) sometimes makes patients hypotensive (low blood pressure). Your doctor may order for you to receive fluids or albumin (a volume booster) during the procedure through an IV site. Home Care Instructions:  Keep the puncture site clean and dry. No tub baths or swimming until puncture site heals. Showering is acceptable. Resume your normal diet, and resume your normal activity slowly and as you tolerate. If you are short of breath, rest. If shortness of breath does not ease, please call your ordering doctor. Fluid can re-accumulate in the chest and/or in the abdomen. If this should occur, your doctor needs to know as you may need to have the procedure done again. Call If:     You should call your Physician and/or the Radiology Nurse if you notice any signs of infection, like pus draining, or if it is swollen or reddened. Also call if you have a fever, or if you are bleeding from the puncture site more than a small amount on the dressing. Call if the puncture site keeps draining fluid. Some oozing is to be expected, but should slow and then stop. Call if you feel like you have pressure in your abdomen. SEEK IMMEDIATE CARE OR CALL 911 IF YOU SUDDENLY HAVE TROUBLE BREATHING, OR IF YOUR LIPS TURN BLUE, OR IF YOU NOTICE BLOOD IN YOUR SPUTUM. Follow-Up Instructions: Please see your ordering doctor as he/she has requested.      To Reach Us:  [phone] 730 am to 10 pm then [phone] after 10 pm      Date: 7/13/2018  Discharging Nurse: Marya Woods RN

## 2018-07-16 PROBLEM — K65.2 SBP (SPONTANEOUS BACTERIAL PERITONITIS) (HCC): Status: ACTIVE | Noted: 2018-01-01

## 2018-07-16 NOTE — ROUTINE PROCESS
TRANSFER - OUT REPORT:    Verbal report given to Coco Burns RN(name) on [facility]  being transferred to [facility](unit) for routine progression of care       Report consisted of patients Situation, Background, Assessment and   Recommendations(SBAR). Information from the following report(s) SBAR and ED Summary was reviewed with the receiving nurse. Lines:   Peripheral IV 07/13/18 Left Hand (Active)        Opportunity for questions and clarification was provided.

## 2018-07-16 NOTE — ED NOTES
1:43 PM  I have evaluated the patient as the Provider in Triage. I have reviewed His vital signs and the triage nurse assessment. I have talked with the patient and any available family and advised that I am the provider in triage and have ordered the appropriate study to initiate their work up based on the clinical presentation during my assessment. I have advised that the patient will be accommodated in the Main ED as soon as possible. I have also requested to contact the triage nurse or myself immediately if the patient experiences any changes in their condition during this brief waiting period. Patient had a paracentesis 3 days ago, today was notified that the peritoneal fluid was infected and was told to come to the ED for abx. Patient denies pain or fever.   ARTURO Gonzalez

## 2018-07-16 NOTE — IP AVS SNAPSHOT
2700 [facility] [address] [place] [address] 
[phone] Patient: Austin Rabago MRN: [id number] PTZ:3/92/3919 About your hospitalization You were admitted on:  July 16, 2018 You last received care in the:  [place] [address] 1339 You were discharged on:  July 20, 2018 Why you were hospitalized Your primary diagnosis was:  Sbp (Spontaneous Bacterial Peritonitis) (Hcc) Follow-up Information Follow up With Details Comments Contact Info Vipul Anderson NP On 7/23/2018 Hospital PCP f/u appointment on Monday July 23 @ 4 p.m. [address] 
[phone] Radha Porras MD   [address] [facility] [address] 
[phone] Phani Patiño MD In 1 week  200 [facility] [address] [place] [address] 
[phone] Your Scheduled Appointments Wednesday August 15, 2018  1:00 PM EDT Follow Up with MARTÍN Madden 75 ([facility]) 200 [facility] [place] [phone] [address] [place] [address]  
[phone] Discharge Orders None A check suzanne indicates which time of day the medication should be taken. My Medications START taking these medications Instructions Each Dose to Equal  
 Morning Noon Evening Bedtime  
 ciprofloxacin HCl 500 mg tablet Commonly known as:  CIPRO Your last dose was: Your next dose is: Take 1 Tab by mouth two (2) times a day for 10 doses. 500 mg  
    
   
   
   
  
 furosemide 40 mg tablet Commonly known as:  LASIX Start taking on:  7/21/2018 Your last dose was: Your next dose is: Take once daily. spironolactone 100 mg tablet Commonly known as:  ALDACTONE Start taking on:  7/21/2018 Your last dose was: Your next dose is: Take 1 Tab by mouth daily for 30 days.   
 100 mg  
    
 CONTINUE taking these medications Instructions Each Dose to Equal  
 Morning Noon Evening Bedtime  
 amiodarone 200 mg tablet Commonly known as:  CORDARONE Your last dose was: Your next dose is: Take 200 mg by mouth nightly. 200 mg CENTRUM SILVER Tab tablet Generic drug:  multivitamins-minerals-lutein Your last dose was: Your next dose is: Take 1 Tab by mouth daily. 1 Tab  
    
   
   
   
  
 digoxin 0.125 mg tablet Commonly known as:  LANOXIN Your last dose was: Your next dose is: Take 0.125 mg by mouth nightly. 0.125 mg  
    
   
   
   
  
 gabapentin 100 mg capsule Commonly known as:  NEURONTIN Your last dose was: Your next dose is: Take 100 mg by mouth three (3) times daily. 100 mg  
    
   
   
   
  
 lactulose 20 gram/30 mL Soln solution Commonly known as:  Anderson Minks Your last dose was: Your next dose is: Take 20 g by mouth three (3) times daily. 20 g  
    
   
   
   
  
 metoprolol succinate 25 mg XL tablet Commonly known as:  TOPROL XL Your last dose was: Your next dose is: Take 0.5 Tabs by mouth daily. 12.5 mg  
    
   
   
   
  
 pantoprazole 40 mg tablet Commonly known as:  PROTONIX Your last dose was: Your next dose is: Take 1 Tab by mouth daily. 40 mg  
    
   
   
   
  
 rifAXIMin 550 mg tablet Commonly known as:  Raulito Alexanderkin Your last dose was: Your next dose is: Take 1 Tab by mouth two (2) times a day. Indications: Hepatic Encephalopathy 550 mg  
    
   
   
   
  
 thiamine  mg tablet Commonly known as:  B-1 Your last dose was: Your next dose is: Take 1 Tab by mouth daily. 100 mg Where to Get Your Medications Information on where to get these meds will be given to you by the nurse or doctor. ! Ask your nurse or doctor about these medications  
  ciprofloxacin HCl 500 mg tablet  
 furosemide 40 mg tablet  
 spironolactone 100 mg tablet Discharge Instructions Notify PCP or come to ED for difficulty breathing or increasing swelling. [facility] Announcement We are excited to announce that we are making your provider's discharge notes available to you in GameWith. You will see these notes when they are completed and signed by the physician that discharged you from your recent hospital stay. If you have any questions or concerns about any information you see in [facility], please call the Health Information Department where you were seen or reach out to your Primary Care Provider for more information about your plan of care. Introducing Ace Lutz As a [facility] patient, I wanted to make you aware of our electronic visit tool called Ace Lutz. [facility] 24/7 allows you to connect within minutes with a medical provider 24 hours a day, seven days a week via a mobile device or tablet or logging into a secure website from your computer. You can access Ace Moedayannafin from anywhere in the [place]. A virtual visit might be right for you when you have a simple condition and feel like you just dont want to get out of bed, or cant get away from work for an appointment, when your regular [facility] provider is not available (evenings, weekends or holidays), or when youre out of town and need minor care. Electronic visits cost only $49 and if the [facility] 24/7 provider determines a prescription is needed to treat your condition, one can be electronically transmitted to a nearby pharmacy*. Please take a moment to enroll today if you have not already done so. The enrollment process is free and takes just a few minutes.   To enroll, please download the Missy Hang 24/7 nicol to your tablet or phone, or visit [email or web address] to enroll on your computer. And, as an [address] [place] [address] patient with a Clothia account, the results of your visits will be scanned into your electronic medical record and your primary care provider will be able to view the scanned results. We urge you to continue to see your regular Missy Mcgheeuel provider for your ongoing medical care. And while your primary care provider may not be the one available when you seek a WordWatch virtual visit, the peace of mind you get from getting a real diagnosis real time can be priceless. For more information on WordWatch, view our Frequently Asked Questions (FAQs) at [email or web address]. Sincerely, 
 
Bell Welch MD 
Chief Medical Officer Tete Mendy Kendrick *:  certain medications cannot be prescribed via WordWatch Providers Seen During Your Hospitalization Provider Specialty Primary office phone Vincent Rosen MD Emergency Medicine [phone] Ivonne Hammans, MD Hospitalist [phone] Daniela Mathis MD Internal Medicine [phone] Julius Walker MD Hospitalist [phone] Your Primary Care Physician (PCP) Primary Care Physician Office Phone Office Fax Lexi Gaytan [phone] You are allergic to the following No active allergies Recent Documentation Height Weight BMI Smoking Status 1.778 m 83.1 kg 26.3 kg/m2 Former Smoker Emergency Contacts Name Discharge Info Relation Home Work Mobile Cass Gonzalez DISCHARGE CAREGIVER [3] Spouse [3] [phone] Patient Belongings The following personal items are in your possession at time of discharge: 
  Dental Appliances: None  Visual Aid: Glasses, At bedside      Home Medications: None   Jewelry: None  Clothing:  At bedside, Footwear, Shirt, Pants    Other Valuables: Prosthesis, At bedside Please provide this summary of care documentation to your next provider. Signatures-by signing, you are acknowledging that this After Visit Summary has been reviewed with you and you have received a copy. Patient Signature:  ____________________________________________________________ Date:  ____________________________________________________________  
  
Stephanie [place] Provider Signature:  ____________________________________________________________ Date:  ____________________________________________________________

## 2018-07-16 NOTE — ED PROVIDER NOTES
HPI Comments: 59 y.o. male with past medical history significant for CHF, HTN, sepsis from diabetic left foot wound, L-BKA, a-fib, DM, cirrhosis, blood transfusion, and arthritis who presents from home for evaluation of abnormal lab results. 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 He states that he was called by his Nephrologist today and told that the results of his paracentesis showed an infection and he was instructed to come into the ED for further evaluation. Pt reports that he had some difficulty sleeping last night with anxiety and nausea but woke up feeling well this morning. He does report that he gained 6 lbs over the past 3 days. He has been experiencing some dizziness over the past few weeks with no recent worsening, but he has f/u with Nephrologist for it and they believe it is due to his medications. Pt is 4 years s/p L-BKA secondary to sepsis. He reports a hx of DM. Pt denies abd pain, fever, or chills. There are no other acute medical concerns at this time. Chart Review: Peritoneal fluid had 8,028 nucleated cells, 75% neutrophils, 6% lymphs, 19% monocytes. Social hx: former tobacco smoker (quit 5 years ago); (+) EtOH use (none in 7 months)  PCP: Cody Laboy MD    Note written by Radhika Pascual, as dictated by Ino Beckwith MD 4:48 PM    The history is provided by the patient and a relative (daughter). No  was used.         Past Medical History:   Diagnosis Date    Arthritis     Atrial fibrillation ([address] 2014    CHF (congestive heart failure) ([address]     Diabetes ([address]     Diabetic ulcer of left foot ([address] 2/2015 2/2015 Lt BKA    History of blood transfusion 2015    During Lt BKA; pt denies any adverse reaction    Hypertension     Liver disease     CIRRHOSIS    Sepsis ([address] 02/2015    From diabetic Left foot wound;  had a BKA ;  as of 11/21/16 pt states back to his baseline        Past Surgical History: Procedure Laterality Date    HX AMPUTATION Left 2/10/2015    BKA    HX COLONOSCOPY      HX IMPLANTABLE CARDIOVERTER DEFIBRILLATOR  08/04/2015    [facility] cardio defibrillator; Dr Estelle Woodruff; as of 11/21/16 pt denies defibrillator going off         Family History:   Problem Relation Age of Onset    Heart Disease Brother     Heart Failure Brother     Heart Failure Brother        Social History     Social History    Marital status:      Spouse name: N/A    Number of children: N/A    Years of education: N/A     Occupational History    Not on file. Social History Main Topics    Smoking status: Former Smoker     Packs/day: 1.00     Years: 5.00     Quit date: 1/1/2013    Smokeless tobacco: Never Used    Alcohol use Yes      Comment: Last drink January 2018    Drug use: No    Sexual activity: Not on file     Other Topics Concern    Not on file     Social History Narrative         ALLERGIES: Review of patient's allergies indicates no known allergies. Review of Systems   Constitutional: Negative for activity change, appetite change, chills, fatigue and fever. HENT: Negative for ear pain, facial swelling, sore throat and trouble swallowing. Eyes: Negative for pain, discharge and visual disturbance. Respiratory: Negative for chest tightness, shortness of breath and wheezing. Cardiovascular: Negative for chest pain and palpitations. Gastrointestinal: Negative for abdominal pain, blood in stool, nausea and vomiting. Genitourinary: Negative for difficulty urinating, flank pain and hematuria. Musculoskeletal: Negative for arthralgias, joint swelling, myalgias and neck pain. Skin: Negative for color change and rash. Neurological: Positive for dizziness. Negative for weakness, numbness and headaches. Hematological: Negative for adenopathy. Does not bruise/bleed easily. Psychiatric/Behavioral: Negative for behavioral problems, confusion and sleep disturbance.    All other systems reviewed and are negative. Vitals:    07/16/18 1341 07/16/18 1628 07/16/18 1700 07/16/18 1730   BP: 120/60 (!) 88/55 134/65 113/52   Pulse: 78 90 61 62   Resp: 18 18 12 10   Temp: 98.3 °F (36.8 °C) 98 °F (36.7 °C)     SpO2: 96% 100% 100% 98%   Weight: 91.5 kg (201 lb 12.8 oz)      Height: 5' 10\" (1.778 m)               Physical Exam   Constitutional: He appears well-developed and well-nourished. HENT:   Head: Normocephalic and atraumatic. Mouth/Throat: Oropharynx is clear and moist.   Eyes: EOM are normal. Pupils are equal, round, and reactive to light. Neck: Normal range of motion. Neck supple. Cardiovascular: Normal rate, regular rhythm, normal heart sounds and intact distal pulses. Exam reveals no gallop and no friction rub. No murmur heard. Pulmonary/Chest: Effort normal. No respiratory distress. He has no wheezes. He has no rales. Abdominal: Soft. There is no tenderness. There is no rebound. Protuberant but soft and nontender. Musculoskeletal: Normal range of motion. He exhibits no tenderness. Left BKA   Neurological: He is alert. No cranial nerve deficit. Motor; symmetric   Skin: No erythema. Psychiatric: He has a normal mood and affect. His behavior is normal.   Nursing note and vitals reviewed. Note written by Radhika Pascual, as dictated by Ino Beckwith MD 4:48 PM    [facility]      ED Course       Procedures    CONSULT NOTE:  5:56 PM Ino Beckwith MD spoke with Dr. Kassidy Samayoa, Consult for Hospitalist.  Discussed available diagnostic tests and clinical findings. Dr. Kassidy Samayoa will admit the pt. CONSULT NOTE:  5:58 PM Ino Beckwith MD spoke with Dr. Linda Westbrook, Consult for Nephrology. Discussed available diagnostic tests and clinical findings. Dr. Linda Westbrook states that they do not routinely culture the paracentesis fluid because it is not very useful. They will treat him empirically with Cephalosporin.

## 2018-07-16 NOTE — Clinical Note
Status[de-identified] Inpatient [101] Type of Bed: Remote Telemetry [29] Inpatient Hospitalization Certified Necessary for the Following Reasons: 9. Other (further clarification in H&P documentation) Admitting Diagnosis: SBP (spontaneous bacterial peritonitis) ([facility] [[id number]] Admitting Physician: Grayson Reilly [[id number]] Attending Physician: Hollie Toney Estimated Length of Stay: 3-4 Midnights Discharge Plan[de-identified] Home with Office Follow-up

## 2018-07-16 NOTE — PROGRESS NOTES
Pt has been advised to come to the hospital for IV antibiotics. Madyson Perez is working on admission.

## 2018-07-16 NOTE — CONSULTS
70 Steffi Durand MD, FACP, [place] [place], [place]       Ashlee[place], KULDEEP Rob President, [facility]   Angela Miller, MARTÍN [facility] [address]    at [facility]    [address], [address] [address]  [address].    [phone]    FAX: [address]    at [address], [address] [facility], [address]    [phone]    FAX: [phone]       HEPATOLOGY CONSULT NOTE  The patient is well known to me and regularly cared for at [address] [address]. He is a 59year old  male with cirrhosis secondary to ETOH. He contined to use alcohol even after knowing he had cirrhosis for several years and finally became abstinent in 2/2018. He has been hospitalized frequently in the past year for repeated bouts of HE. This finally subsided with compliance of his medications and abstinence from alcohol. He has recently developed refractory ascites and has required paracentesis every 2-4 weeks. We do not feel he is a good TIPS candidate because of HE. He had a routine paracentesis for refractory ascites on Friday. Labs came back today positive for SBP. He was called and instructed to come to the ED. I saw him in the ED. He is awake and alert. He has a lot of ascites. The abdomen is not tender. I have reviewed the Emergency room note, Hospital admission note, Notes by all other physicians who have seen the patient during this hospitalization to date. I have reviewed the problem list and the reason for this hospitalization. I have reviewed the allergies and the medications the patient was taking at home prior to this hospitalization. ASSESSMENT AND PLAN:  Cirrhosis  This is secondary to alcohol.    The CTP score is 9, Child class C, MELD score 27.    He is not a candidate for liver transplant because of cardiomyopathy, LVEF of 40-50% and he has an implantable defibrillator. We discussed this with the patient.       Cardiomyopathy  ECHO shows LVEF of only 40-45%.    However, there is no evidence for RV dysfunction and TR. Nuclear stress test was negative for ischemia with EF 49%  The reduced LVEF is secondary to ETOH cardiomyopathy.      Alcohol abuse in remission  He has been abstinent from alcohol for 6 months, since 1/2018.        Ascites  He has required repeated paracentesis every 2-4 weeks for several months. I do not think we can place TIPS with his history of repeatd HE. Will therefore have to continue with repeated paracentesis. SBP  At the last paracentesis on Friday there were 800 WBC 75% polys. Will start CFTX 1 gm Q24hr. He will need 5 days of IV ABX and then convert to oral Cipro BID.      Hepatic encephalopathy  He is on lactulose TID and Xifaxan BID.    His protein is restricted to 1 item per day. He has had multiple admissions for HE.  This time it took him about 7 weeks to develop HE and be readmitted.    He may have a vascular shunt but we cannot look for this.  He cannot have MRI because of the defibrillator and cannot have CT with contrast because of LORENZA.        LORENZA  Screat is up to 1.8 mg. Will treat with IV albumin 25 gm Q6H.      Hyponatremia  Secondary to cirrhosis and diuretics. NA was 129 in the ED. Will treat with IV albumin.      Anemia  Secondary to GI blood loss from portal HTN, bone marrow suppression from malnutrition and chronic disease.    Will recheck hemoglobin today.      Thrombocytopenia  This is secondary to cirrhosis. No treatment needed. SYSTEM REVIEW:  Constitution systems: Negative for fever, chills, weight gain, weight loss. Eyes: Negative for visual changes. ENT: Negative for sore throat, painful swallowing. Respiratory: Negative for cough, hemoptysis, SOB. Cardiology: Negative for chest pain, palpitations. GI:  Negative for constipation or diarrhea. : Negative for urinary frequency, dysuria, hematuria, nocturia. Skin: Negative for rash. Hematology: Negative for easy bruising, blood clots. Musculo-skelatal: Negative for back pain, muscle pain, weakness. Neurologic: Negative for headaches, dizziness, vertigo, memory problems not related to HE. Psychology: Negative for anxiety, depression. FAMILY HISTORY:  The father  of unknown cause. The mother  of heart disease. There is no family history of liver disease.       SOCIAL HISTORY:  The patient is . The patient has 3 children. The patient has never used tobacco products. The patient consumes 6+ alcoholic beverages per day. He has been abstinent from alcohol since 2018. The patient used to work . The patient retired in . PHYSICAL EXAMINATION:  VS: per nursing note  General:  No acute distress. Eyes:  Sclera anicteric. ENT:  No oral lesions. Thyroid normal.  Nodes:  No adenopathy. Skin:  Spider angiomata. No jaundice. Respiratory:  Lungs clear to auscultation. Cardiovascular:  Regular heart rate. Abdomen:  Tight non-tender, Distended with obvious ascites. Extremities:  No lower extremity edema. BKA right leg. Muscle wasting. Neurologic:  Alert and oriented. Cranial nerves grossly intact. No asterixis. LABORATORY:  Results for Erasmo Frias (MRN [id number]) as of 2018 19:55   Ref.  Range 2018 07:55 2018 13:57   WBC Latest Ref Range: 4.1 - 11.1 K/uL 7.6 7.4   HGB Latest Ref Range: 12.1 - 17.0 g/dL 8.9 (L) 9.0 (L)   PLATELET Latest Ref Range: 150 - 400 K/uL 75 (LL) 88 (L)   INR Latest Ref Range: 0.8 - 1.2  1.3 (H)    Sodium Latest Ref Range: 136 - 145 mmol/L 130 (L) 128 (L)   Potassium Latest Ref Range: 3.5 - 5.1 mmol/L 5.1 4.9   Chloride Latest Ref Range: 97 - 108 mmol/L 98 98   CO2 Latest Ref Range: 21 - 32 mmol/L 20 19 (L)   Glucose Latest Ref Range: 65 - 100 mg/dL 208 (H) 186 (H)   BUN Latest Ref Range: 6 - 20 MG/DL 29 (H) 33 (H)   Creatinine Latest Ref Range: 0.70 - 1.30 MG/DL 1.71 (H) 1.86 (H)   Bilirubin, total Latest Ref Range: 0.2 - 1.0 MG/DL 3.8 (H) 3.6 (H)   Albumin Latest Ref Range: 3.5 - 5.0 g/dL 3.5 (L) 3.2 (L)   ALT (SGPT) Latest Ref Range: 12 - 78 U/L 21 33   AST Latest Ref Range: 15 - 37 U/L 33 41 (H)   Alk. phosphatase Latest Ref Range: 45 - 117 U/L 122 (H) 173 (H)   Ammonia Latest Ref Range: <32 UMOL/L  122 (H)       RADIOLOGY:  5/2018. CT scan abdomen with and without IV contrast.  Changes consistent with cirrhosis. No liver mass lesions. No dilated bile ducts. Large ascites.         MD Francine Pepestabitha 13 [facility] [place] of [address] [address] [address]  [facility] [place] [facility]. [phone]

## 2018-07-16 NOTE — IP AVS SNAPSHOT
[address] [address] 
[phone] Patient: Shola Olvera MRN: [id number] XZU:[birth year] A check suzanne indicates which time of day the medication should be taken. My Medications START taking these medications Instructions Each Dose to Equal  
 Morning Noon Evening Bedtime  
 ciprofloxacin HCl 500 mg tablet Commonly known as:  CIPRO Your last dose was: Your next dose is: Take 1 Tab by mouth two (2) times a day for 10 doses. 500 mg  
    
   
   
   
  
 furosemide 40 mg tablet Commonly known as:  LASIX Start taking on:  7/21/2018 Your last dose was: Your next dose is: Take once daily. spironolactone 100 mg tablet Commonly known as:  ALDACTONE Start taking on:  7/21/2018 Your last dose was: Your next dose is: Take 1 Tab by mouth daily for 30 days. 100 mg CONTINUE taking these medications Instructions Each Dose to Equal  
 Morning Noon Evening Bedtime  
 amiodarone 200 mg tablet Commonly known as:  CORDARONE Your last dose was: Your next dose is: Take 200 mg by mouth nightly. 200 mg CENTRUM SILVER Tab tablet Generic drug:  multivitamins-minerals-lutein Your last dose was: Your next dose is: Take 1 Tab by mouth daily. 1 Tab  
    
   
   
   
  
 digoxin 0.125 mg tablet Commonly known as:  LANOXIN Your last dose was: Your next dose is: Take 0.125 mg by mouth nightly. 0.125 mg  
    
   
   
   
  
 gabapentin 100 mg capsule Commonly known as:  NEURONTIN Your last dose was: Your next dose is: Take 100 mg by mouth three (3) times daily. 100 mg  
    
   
   
   
  
 lactulose 20 gram/30 mL Soln solution Commonly known as:  Heath Arciniega Your last dose was: Your next dose is: Take 20 g by mouth three (3) times daily. 20 g  
    
   
   
   
  
 metoprolol succinate 25 mg XL tablet Commonly known as:  TOPROL XL Your last dose was: Your next dose is: Take 0.5 Tabs by mouth daily. 12.5 mg  
    
   
   
   
  
 pantoprazole 40 mg tablet Commonly known as:  PROTONIX Your last dose was: Your next dose is: Take 1 Tab by mouth daily. 40 mg  
    
   
   
   
  
 rifAXIMin 550 mg tablet Commonly known as:  Lian Prow Your last dose was: Your next dose is: Take 1 Tab by mouth two (2) times a day. Indications: Hepatic Encephalopathy 550 mg  
    
   
   
   
  
 thiamine  mg tablet Commonly known as:  B-1 Your last dose was: Your next dose is: Take 1 Tab by mouth daily. 100 mg Where to Get Your Medications Information on where to get these meds will be given to you by the nurse or doctor. ! Ask your nurse or doctor about these medications  
  ciprofloxacin HCl 500 mg tablet  
 furosemide 40 mg tablet  
 spironolactone 100 mg tablet

## 2018-07-16 NOTE — ED TRIAGE NOTES
Pt presents to the ER with paracentesis on Friday and was called by PCP and told to come to ER due to infection in abdomen.

## 2018-07-16 NOTE — H&P
[address]   HISTORY AND PHYSICAL      Clifm Reji RANKIN  MR#: [id number]  : 1960  ACCOUNT #: [de-identified]   ADMIT DATE: 2018    CHIEF COMPLAINT:  Abnormal labs. HISTORY OF PRESENT ILLNESS:  The patient is a 64-year-old gentleman with past medical history of alcoholic cirrhosis, history of ascites with regular paracentesis, history of cardiomyopathy, history of AICD placement, alcohol abuse in remission, hepatic encephalopathy, history of LORENZA, hyponatremia, anemia, thrombocytopenia, esophageal varices, chronic systolic heart failure stage II, status post left BKA, hypertension, diabetes mellitus type 2 who presents to the hospital with the above mentioned symptoms. Patient was recently seen by  hepatologist, was found to have ascites, was sent for paracentesis yesterday, had fluid analysis done and was found to have SBP and was sent to the hospital for further management and evaluation. Patient reports that he does not have any fever, chills, abdominal pain associated with his symptoms. The patient reports that he is not confused per se. Patient also denies any other complaints or problems. The patient reports that his paracentesis was 3 days ago. Patient reports that he had some difficulty sleeping last night, had some nausea and some anxiety last night. Patient reports that he has gained 6 pounds over the last three days. Reports that he has been having some dizziness over the past few weeks, but has not been any worsening in the past few days. The patient was requested to be admitted under the hospitalist service for further management and evaluation.   Patient denies any headache, blurry vision, sore throat, trouble swallowing, trouble with speech, any chest pain, shortness of breath, cough, fever, chills, abdominal pain, constipation, diarrhea, urinary symptoms, focal or generalized neurological weakness, recent travel, sick contacts, any falls, injuries, hematemesis, melena, hemoptysis or any other concerns or problems. PAST MEDICAL HISTORY:  See above. HOME MEDICATIONS:  Currently, the patient is on lactulose 15 mL t.i.d., Xifaxan 550 mg b.i.d., gabapentin 100 mg t.i.d., pantoprazole 40 mg every day, losartan 40 mg daily, thiamine 100 mg daily, amiodarone 200 mg nightly, multivitamin, and digoxin 0.125 mg nightly. SOCIAL HISTORY:  Former smoker, smokes 1 pack per day. No alcohol, no IV drug abuse. FAMILY HISTORY:  Brother has history of heart disease, brother has history of heart failure and another brother has history of heart failure. ALLERGIES:  NO KNOWN DRUG ALLERGIES. REVIEW OF SYSTEMS:  All systems were reviewed and found to be essentially negative except for the symptoms mentioned above. PHYSICAL EXAMINATION:  VITAL SIGNS:  Temperature 98, pulse 64, respiratory rate 14, blood pressure 108/80, pulse ox 100% on room air. GENERAL:  Alert x3, awake, no acute distress, resting in bed, pleasant male, appears to be stated age. HEENT:  Pupils equal, reactive to light. Dry mucous membranes. Tympanic membranes clear. NECK:  Supple. LUNGS:  Clear to auscultation bilaterally. HEART:  S1, S2.  ABDOMEN:  Soft, mildly distended. Positive fluid thrill. No rebound, no guarding. Nontender. Bowel sounds are hypoactive. EXTREMITIES:  No clubbing, no cyanosis. Left BKA, prosthetic leg present. NEUROPSYCHIATRIC:  Pleasant mood and affect. Cranial nerves II-XII grossly intact. No focal neurological deficit. LABORATORY DATA:  White count 7.4, hemoglobin 9, hematocrit 25.6, platelets 55,644. Sodium 138, potassium 4.9, chloride 98, bicarbonate 19, anion gap 11, glucose 186, BUN 30, creatinine 1.86, calcium 8.8, bilirubin total of 3.6, ALT 33, AST 41, alkaline phosphatase 173. Ammonia 122. Peritoneal fluid shows greater than 100 RBCs, 75 neutrophils, 6 lymphs and 19 macrophages with 0-8 nucleated cells. ASSESSMENT AND PLAN:  1. Spontaneous bacterial peritonitis. Patient will be admitted on a telemetry bed. We will start patient on third generation cephalosporin as recommended. We will discontinue beta blockers for now. We will provide supportive care and close monitoring. Further intervention will be per hospital course. Hepatology has been consulted, they recommend no need for cultures at this point of time. The patient also appears to have mild ascites, thus we will start patient on albumin and provide gentle IV diuresis. We will continue to closely monitor. If symptoms persist, may consider further intervention and diagnostics. 2.  Ascites. Recurrent, Sec to alcoholic cirrhosisWe will start patient on albumin, gentle IV diuresis. Strict I's and O's, reassess in the morning. May consider additional paracentesis, we will defer to Hepatology Further intervention will be per hospital.  Continue to monitor. 3.  Hyponatremia, most likely hypervolemic. We will restrict fluid intake and continue to closely monitor. Repeat labs in the morning. Neuro checks have been ordered. Reassess as needed. 4.  Anemia, appears to be chronic. Continue to monitor. No obvious signs of bleeding. 5.  Thrombocytopenia, chronic. No obvious signs of bleeding. Continue to monitor. The patient on bleeding precautions. 6.  Chronic kidney disease, stage III, most likely secondary to above. Closely monitor creatinine and further intervention will be per hospital course. 7.  Elevated liver enzymes, most likely secondary to alcoholic cirrhosis. Continue to monitor. 8.  GI and DVT prophylaxis. The patient will be on SCDs.       Collier Aschoff, MD MM/MN  D: 07/16/2018 18:28     T: 07/16/2018 19:18  JOB #: [id number]

## 2018-07-17 NOTE — PROGRESS NOTES
Hospitalist Progress Note Rebecca Prasad MD 
Answering service: [phone] from in house phone Cell: [phone] Date of Service:  2018 NAME:  Shona Ayoub :  1960 MRN:  [id number] Admission Summary:  
The patient is a 53-year-old gentleman with past medical history of alcoholic cirrhosis, history of ascites with regular paracentesis, history of cardiomyopathy, history of AICD placement, alcohol abuse in remission, hepatic encephalopathy, history of LORENZA, hyponatremia, anemia, thrombocytopenia, esophageal varices, chronic systolic heart failure stage II, status post left BKA, hypertension, diabetes mellitus type 2 who presents to the hospital with abnormal labs. Patient was recently seen by  hepatologist, was found to have ascites, was sent for paracentesis yesterday, had fluid analysis done and was found to have SBP and was sent to the hospital for further management and evaluation. Patient reports that he does not have any fever, chills, abdominal pain associated with his symptoms. The patient reports that he is not confused per se. Patient also denies any other complaints or problems. The patient reports that his paracentesis was 3 days ago. Patient reports that he had some difficulty sleeping last night, had some nausea and some anxiety last night. Patient reports that he has gained 6 pounds over the last three days. Interval history / Subjective:  
  F/u SBP No pain, nausea or vomiting Assessment & Plan: SBP 
-On cefotetan, continue for 5 days and then switch to oral per hepatology 
-[address] Ascites with alcoholic liver cirrhosis -s/p paracentesis with 6-7 l taken off  
-getting paracentesis every 2 weeks 
-per Dr Reginald Myles, the patient not a candidate for TIPS because of repeated HE 
-not a candidate for liver transplant sec to cardiomyopathy Cardiomyopathy, alcoholic 
-s/p ICD 
-Seems compensated 
-not sure if continuing digoxin in a patient with CKD, cirrhosis would be appropriate 
-Outpatient follow up with Dr Vini Jorgensen Hyponatremia 
-sec to above Chronic anemia 
-sec to above CKD stage III 
-stable Cardiac diet Code status: The patient wants to be DNR 
DVT prophylaxis: scd Care Plan discussed with: Patient/Family Disposition: Home w/Family Hospital Problems  Date Reviewed: 7/16/2018 Codes Class Noted POA * (Principal)SBP (spontaneous bacterial peritonitis) ([facility] [address] ICD-10-CM: X29.2 ICD-9-CM: 746.39  7/16/2018 Unknown Review of Systems: A comprehensive review of systems was negative except for that written in the HPI. Vital Signs:  
 Last 24hrs VS reviewed since prior progress note. Most recent are: 
Visit Vitals  /69 (BP 1 Location: Left arm, BP Patient Position: Lying right side; Head of bed elevated (Comment degrees))  Pulse 60  Temp 98.1 °F (36.7 °C)  Resp 18  Ht 5' 10\" (1.778 m)  Wt 91.5 kg (201 lb 12.8 oz)  SpO2 96%  BMI 28.96 kg/m2 Intake/Output Summary (Last 24 hours) at 07/17/18 1443 Last data filed at 07/17/18 2795 Gross per 24 hour Intake              340 ml Output              300 ml Net               40 ml Physical Examination:  
 
 
     
Constitutional:  No acute distress, cooperative, pleasant   
ENT:  Oral mucous moist, oropharynx benign. Neck supple, Resp:  CTA bilaterally. No wheezing/rhonchi/rales. No accessory muscle use CV:  Regular rhythm, normal rate, no murmurs, gallops, rubs GI:  Soft, non distended, non tender. normoactive bowel sounds, no hepatosplenomegaly Musculoskeletal:  No edema, warm, 2+ pulses throughout Neurologic:  Moves all extremities. AAOx3, CN II-XII reviewed Skin:  Good turgor, no rashes or ulcers Data Review:  
 Review and/or order of clinical lab test 
 
 
Labs:  
 
Recent Labs  
   07/17/18 6948  07/16/18 
 1357 WBC  4.9  7.4 HGB  7.8*  9.0*  
HCT  22.0*  25.6*  
PLT  59*  88* Recent Labs  
   07/17/18 
 0436  07/16/18 
 1357 NA  128*  128* K  4.8  4.9 CL  99  98  
CO2  21  19* BUN  30*  33* CREA  1.74*  1.86* GLU  238*  186* CA  8.7  8.8 Recent Labs  
   07/17/18 
 0436  07/16/18 
 1357 SGOT  43*  41* ALT  29  33 AP  149*  173* TBILI  2.4*  3.6* TP  6.1*  7.0 ALB  2.7*  3.2*  
GLOB  3.4  3.8 No results for input(s): INR, PTP, APTT in the last 72 hours. No lab exists for component: INREXT No results for input(s): FE, TIBC, PSAT, FERR in the last 72 hours. Lab Results Component Value Date/Time Folate 19.9 12/07/2017 04:25 AM  
  
No results for input(s): PH, PCO2, PO2 in the last 72 hours. No results for input(s): CPK, CKNDX, TROIQ in the last 72 hours. No lab exists for component: CPKMB No results found for: CHOL, CHOLX, CHLST, CHOLV, HDL, LDL, LDLC, DLDLP, TGLX, TRIGL, TRIGP, CHHD, CHHDX Lab Results Component Value Date/Time Glucose (POC) 297 (H) 07/17/2018 11:48 AM  
 Glucose (POC) 249 (H) 07/17/2018 06:31 AM  
 Glucose (POC) 262 (H) 07/16/2018 11:59 PM  
 Glucose (POC) 226 (H) 07/16/2018 09:05 PM  
 Glucose (POC) 382 (H) 06/24/2018 11:05 AM  
 
Lab Results Component Value Date/Time  Color YELLOW/STRAW 04/29/2018 03:34 PM  
 Appearance CLEAR 04/29/2018 03:34 PM  
 Specific gravity 1.010 04/29/2018 03:34 PM  
 Specific gravity 1.015 04/13/2018 10:10 AM  
 pH (UA) 6.0 04/29/2018 03:34 PM  
 Protein NEGATIVE  04/29/2018 03:34 PM  
 Glucose NEGATIVE  04/29/2018 03:34 PM  
 Ketone NEGATIVE  04/29/2018 03:34 PM  
 Bilirubin NEGATIVE  04/29/2018 03:34 PM  
 Urobilinogen 0.2 04/29/2018 03:34 PM  
 Nitrites NEGATIVE  04/29/2018 03:34 PM  
 Leukocyte Esterase NEGATIVE  04/29/2018 03:34 PM  
 Epithelial cells FEW 04/29/2018 03:34 PM  
 Bacteria NEGATIVE  04/29/2018 03:34 PM  
 WBC 0-4 04/29/2018 03:34 PM  
 RBC 0-5 04/29/2018 03:34 PM Medications Reviewed:  
 
Current Facility-Administered Medications Medication Dose Route Frequency  amiodarone (CORDARONE) tablet 200 mg  200 mg Oral QHS  digoxin (LANOXIN) tablet 0.125 mg  0.125 mg Oral QHS  gabapentin (NEURONTIN) capsule 100 mg  100 mg Oral TID  multivitamin, tx-iron-ca-min (THERA-M w/ IRON) tablet 1 Tab  1 Tab Oral DAILY  rifAXIMin (XIFAXAN) tablet 550 mg  550 mg Oral BID  thiamine HCL (B-1) tablet 100 mg  100 mg Oral DAILY  sodium chloride (NS) flush 5-10 mL  5-10 mL IntraVENous Q8H  
 sodium chloride (NS) flush 5-10 mL  5-10 mL IntraVENous PRN  
 ondansetron (ZOFRAN) injection 4 mg  4 mg IntraVENous Q4H PRN  pantoprazole (PROTONIX) 40 mg in sodium chloride 0.9% 10 mL injection  40 mg IntraVENous Q24H  cefoTEtan (CEFOTAN) 2 g in 0.9% sodium chloride (MBP/ADV) 50 mL  2 g IntraVENous Q12H  furosemide (LASIX) injection 20 mg  20 mg IntraVENous BID  
 glucose chewable tablet 16 g  4 Tab Oral PRN  
 dextrose (D50W) injection syrg 12.5-25 g  12.5-25 g IntraVENous PRN  
 glucagon (GLUCAGEN) injection 1 mg  1 mg IntraMUSCular PRN  
 insulin lispro (HUMALOG) injection   SubCUTAneous Q6H  
 lactulose (CHRONULAC) solution 20 g  20 g Oral TID  
 HYDROcodone-acetaminophen (NORCO) 5-325 mg per tablet 1 Tab  1 Tab Oral Q4H PRN  
 
______________________________________________________________________ EXPECTED LENGTH OF STAY: 4d 2h 
ACTUAL LENGTH OF STAY:          1 Williams Watson MD

## 2018-07-17 NOTE — PROGRESS NOTES
Bedside and Verbal shift change report given to [id number] (oncoming nurse) by DILAN Rodriguez RN (offgoing nurse). Report given with SBAR, Kardex, Intake/Output, MAR and Recent Results.

## 2018-07-17 NOTE — PROGRESS NOTES
Spiritual Care Assessment/Progress Note  [facility]      NAME: Jailene Andrew      MRN: [id number]  AGE: 59 y.o.  SEX: male  Shinto Affiliation: Mormonism   Language: English     7/17/2018           [id number] through conversation with:         [x]Patient        [] Family    [] Friend(s)        Reason for Consult: Advance medical directive consult, Initial/Spiritual assessment, patient floor     Spiritual beliefs: (Please include comment if needed)     [x] Identifies with a david tradition:         [] Supported by a david community:            [] Claims no spiritual orientation:           [] Seeking spiritual identity:                [] Adheres to an individual form of spirituality:           [] Not able to assess:                           Identified resources for coping:      [x] Prayer                               [] Music                  [] Guided Imagery     [x] Family/friends                 [] Pet visits     [] Devotional reading                         [] Unknown     [] Other:                                              Interventions offered during this visit: (See comments for more details)    Patient Interventions: Advance medical directive consult, Affirmation of emotions/emotional suffering, Catharsis/review of pertinent events in supportive environment, Initial/Spiritual assessment, patient floor, Prayer (assurance of)           Plan of Care:     [] Support spiritual and/or cultural needs    [x] Support AMD and/or advance care planning process      [] Support grieving process   [] Coordinate Rites and/or Rituals    [] Coordination with community clergy   [] No spiritual needs identified at this time   [] Detailed Plan of Care below (See Comments)  [] Make referral to Music Therapy  [] Make referral to Pet Therapy     [] Make referral to Addiction services  [] Make referral to [facility]  [] Make referral to Spiritual Care Partner  [] No future visits requested        [x] Follow up visits as needed     Comments: Visited Mr Araceli Larose in room 506/02 regarding an In-Basket request for AMD information. Mr Araceli Larose was lying quietly in bed and no visitors were present at time of visit. Mr Cloud's speech was very slow and he appeared somewhat fatigued. Introduced self and purpose of visit. Mr Araceli Larose acknowledged that he already had an Advanced Directive completed and on his medical record; he said he did not desire to make any changes to the current Directive. Assured Mr Araceli Larose of ongoing availability of chaplains for support &/or questions regarding his AMD. Assured him of prayers on his behalf after receiving his permission to do so. : Rev. Andreas Dumont.  Karina Mullins; [facility], to contact [phone] call: [phone]

## 2018-07-17 NOTE — PROGRESS NOTES
Reason for Readmission:   Patient is being seen for spontaneous bacterial peritonitis. RRAT Score and Risk Level:   32       Level of Readmission:  [address] [facility] Conference scheduled:  Care conference not indicated at this time. Resources/supports as identified by patient/family:  Good family support. Top Challenges facing patient (as identified by patient/family and CM):  Chronic health conditions. Finances/Medication cost?  No financial concerns identified at this time. Patient is retired and his wife is still working. Patient stated \"we do just fine. \"         Transportation? Patient will travel home via car. Support system or lack thereof? Good family support, namely from his wife. Living arrangements? Lives with his wife            Self-care/ADLs/Cognition? Patient completes self-care. Current Advanced Directive/Advance Care Plan:  Patient's wife is MPOA. Plan for utilizing home health: No home health needs identified at this time. Likelihood of additional readmission:  Low              Transition of Care Plan:    Based on readmission, the patient's previous Plan of Care   has been evaluated and/or modified. The current Transition of Care Plan is: Return home with wife. Reviewed medical chart; met with the patient at the bedside. Patient is being seen for spontaneous bacterial peritonitis. Patient lives with his wife. He does not use any DME other than a prosthetic leg. Patient has a PCP - Dr. Coni Cartwright and gets his prescriptions at [place] [address]. Patient is retired; he worked as a  for 40 years. Care Management will continue to follow his disposition. JOHN De Los Santos     Care Management Interventions  PCP Verified by CM:  Yes  Palliative Care Criteria Met (RRAT>21 & CHF Dx)?: No  Mode of Transport at Discharge:  (Patient will travel via car at discharge.  )  Catalina Signup: No  Discharge Durable Medical Equipment: No  Physical Therapy Consult: No  Occupational Therapy Consult: No  Speech Therapy Consult: No  Current Support Network: Lives with Spouse  Confirm Follow Up Transport:  (Patient will travel via car at discharge.  )  Plan discussed with Pt/Family/Caregiver: Yes  Freedom of Choice Offered: Yes  Discharge Location  Discharge Placement: Home with family assistance

## 2018-07-17 NOTE — PROGRESS NOTES
Admission Medication Reconciliation:    Information obtained from: Patient     Significant PMH/Disease States:   Past Medical History:   Diagnosis Date    Arthritis     Atrial fibrillation ([facility] [address] 2014    CHF (congestive heart failure) ([facility] [address]     [facility] ([facility]     Diabetic ulcer of left foot ([facility] 2/2015 2/2015 Lt BKA    History of blood transfusion 2015    During Lt BKA; pt denies any adverse reaction    Hypertension     Liver disease     CIRRHOSIS    Sepsis ([facility] [address] 02/2015    From diabetic Left foot wound;  had a BKA ;  as of 11/21/16 pt states back to his baseline        Chief Complaint for this Admission:  SBP    Allergies:  Review of patient's allergies indicates no known allergies. Prior to Admission Medications:   Prior to Admission Medications   Prescriptions Last Dose Informant Patient Reported? Taking?   amiodarone (CORDARONE) 200 mg tablet 7/15/2018 at Unknown time  Yes Yes   Sig: Take 200 mg by mouth nightly. digoxin (LANOXIN) 0.125 mg tablet 7/15/2018 at Unknown time  Yes Yes   Sig: Take 0.125 mg by mouth nightly.   gabapentin (NEURONTIN) 100 mg capsule 7/16/2018 at Unknown time  Yes Yes   Sig: Take 100 mg by mouth three (3) times daily. lactulose (CHRONULAC) 20 gram/30 mL soln solution 7/16/2018 at Unknown time  Yes Yes   Sig: Take 20 g by mouth three (3) times daily. metoprolol succinate (TOPROL XL) 25 mg XL tablet 7/16/2018 at Unknown time  No Yes   Sig: Take 0.5 Tabs by mouth daily. multivitamins-minerals-lutein (CENTRUM SILVER) Tab 7/16/2018 at Unknown time  Yes Yes   Sig: Take 1 Tab by mouth daily. pantoprazole (PROTONIX) 40 mg tablet 7/16/2018 at Unknown time  No Yes   Sig: Take 1 Tab by mouth daily. rifAXIMin (XIFAXAN) 550 mg tablet 7/16/2018 at Unknown time  No Yes   Sig: Take 1 Tab by mouth two (2) times a day. Indications: Hepatic Encephalopathy   thiamine (B-1) 100 mg tablet 7/16/2018 at Unknown time  No Yes   Sig: Take 1 Tab by mouth daily.       Facility-Administered Medications: None         Comments/Recommendations: Med rec completed after speaking to the patient. Removed Valsartan.   Changed Lactulose to 20 gm tid    aKssidy Peterson, PharmD

## 2018-07-17 NOTE — PROGRESS NOTES
[facility] follow-up appointment on Monday July 23,2018 @ 4:00 p.m. with Lizandro Hines.     Added to AVS.  Jim Gomez CM Specialist

## 2018-07-17 NOTE — DIABETES MGMT
DTC Progress Note Recommendations/ Comments: Chart reviewed due to hyperglycemia. Blood sugars mainly >200. If appropriate, please consider: 1. Adding carb consistent to diet order 2. Adding Lantus 12 units Current hospital DM medication: correction scale Humalog, high sensitivity. Chart reviewed on [facility]. Patient is a 59 y.o. male with known diabetes on no diabetes medications at home. A1c:  
Lab Results Component Value Date/Time Hemoglobin A1c 5.4 07/16/2018 01:57 PM  
 Hemoglobin A1c 5.7 06/21/2018 04:30 PM  
 
 
Recent Glucose Results: Lab Results Component Value Date/Time  (H) 07/17/2018 04:36 AM  
  (H) 07/16/2018 01:57 PM  
 GLUCPOC 297 (H) 07/17/2018 11:48 AM  
 GLUCPOC 249 (H) 07/17/2018 06:31 AM  
 GLUCPOC 262 (H) 07/16/2018 11:59 PM  
  
 
Lab Results Component Value Date/Time Creatinine 1.74 (H) 07/17/2018 04:36 AM  
 
Estimated Creatinine Clearance: 51.3 mL/min (based on Cr of 1.74). Active Orders Diet DIET CARDIAC Regular PO intake: Patient Vitals for the past 72 hrs: 
 % Diet Eaten  
07/17/18 0902 100 % Will continue to follow as needed. Thank you Bhavik Bernard RD, CDE

## 2018-07-17 NOTE — PROGRESS NOTES
Provided pastoral care visit to [facility] 5 patient. Did not include sacramental care.     Shawn Chamberlain

## 2018-07-18 NOTE — PROGRESS NOTES
Bedside shift change report given to [facility] (oncoming nurse) by Gibson Looney (offgoing nurse). Report included the following information SBAR, Kardex, Intake/Output, MAR and Recent Results.

## 2018-07-18 NOTE — DIABETES MGMT
DTC Progress Note Recommendations/ Comments: Chart reviewed due to hyperglycemia. Blood sugars mainly >200. If appropriate, please consider: 1. Adding carb consistent to diet order (diabetic restrictions) 2. Changing correction from every 6 hours to before meals and bedtime to match diet order 3. Adding Lantus 13 units (0.1 units/kg/day)* *weight based dosage calculation (0.1-0.2 units/ kg/ day) based on Pranav Coleman., [place], Sue Mari., Estefania Camarena., Cornell Arvizu., ... & Donnie Eisenmenger. (2017). Consensus statement by the American Association of Clinical Endocrinologists and [address] [place] [address] of Endocrinology on the comprehensive type 2 diabetes management algorithm2017 executive summary. Endocrine Practice, 23(2), 758-528. Current hospital DM medication: correction scale Humalog, high sensitivity. Required 9 units of correction for glucose > 199mg/dL. Chart reviewed on [facility]. Mr. Layne Esteban is a 59 y.o. male with a 60 yo male with a past medical history relevant for DM2. Does not take any medications for DM on an outpatient basis. A1c:  
Lab Results Component Value Date/Time Hemoglobin A1c 5.4 07/16/2018 01:57 PM  
 Hemoglobin A1c 5.7 06/21/2018 04:30 PM  
 
 
Recent Glucose Results:  
Lab Results Component Value Date/Time  (H) 07/18/2018 04:47 AM  
 GLUCPOC 294 (H) 07/18/2018 11:45 AM  
 GLUCPOC 205 (H) 07/18/2018 06:30 AM  
 GLUCPOC 204 (H) 07/18/2018 12:44 AM  
  
 
Lab Results Component Value Date/Time Creatinine 1.70 (H) 07/18/2018 04:47 AM  
 
Estimated Creatinine Clearance: 52.2 mL/min (based on Cr of 1.7). Active Orders Diet DIET CARDIAC Regular PO intake:  
Patient Vitals for the past 72 hrs: 
 % Diet Eaten  
07/17/18 0902 100 % Will continue to follow as needed. Thank you Binh Richardson. Carmel Calhoun, MPH, RN, BSN, CDE [facility] [phone] (office) [phone] (pager)

## 2018-07-18 NOTE — PROGRESS NOTES
[address]     MARIAMA King [address] [facility],[address]       Carmelo Moncada MD, [address] [facility], [facility], [place]        April MARTÍN James PA-C Leonora Makua, [facility]   MARTÍN Anaya, MARTÍN [facility] [address]    at [facility] [address] [address]    [phone]    FAX: [address] [place] [address]    at [address], [place] [address]., [address]    [phone]    FAX: [phone]         HEPATOLOGY PROGRESS NOTE  The patient is a 59year old  male with cirrhosis secondary to ETOH. He contined to use alcohol even after knowing he had cirrhosis for several years and finally became abstinent in 2/2018. He has been hospitalized frequently in the past year for repeated bouts of HE. This finally subsided with compliance of his medications and abstinence from alcohol. He has recently developed refractory ascites and has required paracentesis every 2-4 weeks. We do not feel he is a good TIPS candidate because of HE.     He had a routine paracentesis for refractory ascites on Friday. Labs came back positive for SBP. He was called and instructed to come to the ED.       He feels wel;. . NO nausea, abdominal pain. On IV ABX.     ASSESSMENT AND PLAN:  Cirrhosis  This is secondary to alcohol. The CTP score is 9, Child class C, MELD score 27.    He is not a candidate for liver transplant because of cardiomyopathy, LVEF of 40-50% and he has an implantable defibrillator. We discussed this with the patient.       Cardiomyopathy  ECHO shows LVEF of only 40-45%.    However, there is no evidence for RV dysfunction and TR.   Nuclear stress test was negative for ischemia with EF 49%  The reduced LVEF is secondary to ETOH cardiomyopathy.      Alcohol abuse in remission  He has been abstinent from alcohol for 6 months, since 1/2018.        Ascites  He has required repeated paracentesis every 2-4 weeks for several months. We should not place TIPS with his history of repeated HE, plus his cardiomyopathy  Will therefore have to continue with repeated paracentesis.     SBP  At the last paracentesis on Friday there were 800 WBC 75% polys. Will start CFTX 1 gm Q24hr. He will need 5 days of IV ABX and then convert to oral Cipro BID.      Hepatic encephalopathy  He is on lactulose TID and Xifaxan BID.    His protein is restricted to 1 item per day. He has had multiple admissions for HE.  This time it took him about 7 weeks to develop HE and be readmitted.    He may have a vascular shunt but we cannot look for this.  He cannot have MRI because of the defibrillator and cannot have CT with contrast because of LORENZA.        LORENZA  Screat is up to 1.8 mg. Will treat with IV albumin 25 gm Q6H.      Hyponatremia  Secondary to cirrhosis and diuretics. NA was 129 in the ED. Will treat with IV albumin.      Anemia  Secondary to GI blood loss from portal HTN, bone marrow suppression from malnutrition and chronic disease.    Will recheck hemoglobin today.      Thrombocytopenia  This is secondary to cirrhosis. No treatment needed.        PHYSICAL EXAMINATION:  VS: per nursing note  General:  No acute distress. Eyes:  Sclera anicteric. ENT:  No oral lesions. Thyroid normal.  Nodes:  No adenopathy. Skin:  Spider angiomata. No jaundice. Respiratory:  Lungs clear to auscultation. Cardiovascular:  Regular heart rate. Abdomen:  Tight non-tender, Distended with obvious ascites. Extremities:  No lower extremity edema. BKA right leg. Muscle wasting. Neurologic:  Alert and oriented. Cranial nerves grossly intact. No asterixis.     LABORATORY:  Results for Milad Kidd (MRN [id number]) as of 7/17/2018 20:54   Ref.  Range 7/12/2018 07:55 7/16/2018 13:57 7/17/2018 04:36   WBC Latest Ref Range: 4.1 - 11.1 K/uL 7.6 7.4 4.9   HGB Latest Ref Range: 12.1 - 17.0 g/dL 8.9 (L) 9.0 (L) 7.8 (L)   PLATELET Latest Ref Range: 150 - 400 K/uL 75 (LL) 88 (L) 59 (L)   INR Latest Ref Range: 0.8 - 1.2  1.3 (H)     Sodium Latest Ref Range: 136 - 145 mmol/L 130 (L) 128 (L) 128 (L)   Potassium Latest Ref Range: 3.5 - 5.1 mmol/L 5.1 4.9 4.8   Chloride Latest Ref Range: 97 - 108 mmol/L 98 98 99   CO2 Latest Ref Range: 21 - 32 mmol/L 20 19 (L) 21   Glucose Latest Ref Range: 65 - 100 mg/dL 208 (H) 186 (H) 238 (H)   BUN Latest Ref Range: 6 - 20 MG/DL 29 (H) 33 (H) 30 (H)   Creatinine Latest Ref Range: 0.70 - 1.30 MG/DL 1.71 (H) 1.86 (H) 1.74 (H)   Bilirubin, total Latest Ref Range: 0.2 - 1.0 MG/DL 3.8 (H) 3.6 (H) 2.4 (H)   Albumin Latest Ref Range: 3.5 - 5.0 g/dL 3.5 (L) 3.2 (L) 2.7 (L)   ALT (SGPT) Latest Ref Range: 12 - 78 U/L 21 33 29   AST Latest Ref Range: 15 - 37 U/L 33 41 (H) 43 (H)   Alk.  phosphatase Latest Ref Range: 45 - 117 U/L 122 (H) 173 (H) 149 (H)       MD Xavier Larson [address] of [facility] As[address] of [address] [address] [address]an[address]  [place]  [address]. [phone]

## 2018-07-18 NOTE — PROGRESS NOTES
[address]     MARIAMA King 281 [address] [facility],[address]Th [address]       Sujata Kruse MD, [address] [facility], Cite [facility], [place]        April MARTÍN John, KULDEEP Conway, [facility]   MARTÍN Wise, MARTÍN Dunn [facility] 136    at [facility]    [address] [facility] [place], [address] [address]  22.    [phone]    FAX: [address] [place] [address] Avenue    at [facility]    [address], [address] [facility], [address]    [phone]    FAX: [phone]      HEPATOLOGY PROGRESS NOTE  The patient is a 59year old  male with cirrhosis secondary to ETOH. He continued to use alcohol even after knowing he had cirrhosis for several years and finally became abstinent in 1/2018. He has been hospitalized frequently in the past year for repeated bouts of HE. This finally subsided with compliance of his medications and abstinence from alcohol. He has recently developed refractory ascites and has required paracentesis every 2-4 weeks. We do not feel he is a good TIPS candidate because of HE.     He had a routine paracentesis for refractory ascites on Friday. Labs came back positive for SBP. I called him and instructed him to come to the ED.       He feels well. Had a little nausea last night but thinks it is related to the food. He is making sure he stays active and is walking laps in the hallway.      ASSESSMENT AND PLAN:  Cirrhosis  This is secondary to alcohol. The CTP score is 10, Child class C, MELD score 23.    He is not a candidate for liver transplant because of cardiomyopathy, LVEF of 40-50% and he has an implantable defibrillator. The patient and wife are aware of this.       Cardiomyopathy  ECHO shows LVEF of only 40-45%.    However, there is no evidence for RV dysfunction and TR.   Nuclear stress test was negative for ischemia with EF 49%  The reduced LVEF is secondary to ETOH cardiomyopathy.      Alcohol abuse in remission  He has been abstinent from alcohol for 6 months, since 1/2018.        Ascites  He has required repeated paracentesis every 1-2 weeks for several months. We should not place TIPS with his history of repeated HE and his cardiomyopathy. He will continue to need repeated paracentesis. He should have one completed before discharge.      SBP  At the last paracentesis on Friday there were 8000 WBC 75% polys. Continue CFTX 1 gm Q24hr. He will need 5 days of IV ABX and then convert to oral Cipro BID and can be discharged. After his course, he will need to be on prophylactic therapy.       Hepatic encephalopathy  He is on lactulose TID and Xifaxan BID.    His protein is restricted to 1 item per day. He has had multiple admissions for HE.      He may have a vascular shunt but we cannot look for this.  He cannot have MRI because of the defibrillator and cannot have CT with contrast because of LORENZA.        LORENZA  Screat is down to 1.7. Continue IV albumin 25 gm Q6H.      Hyponatremia  Secondary to cirrhosis and diuretics. NA was 129 in the ED. Continue IV albumin.      Anemia  Secondary to GI blood loss from portal HTN, bone marrow suppression from malnutrition and chronic disease.    Will recheck hemoglobin today.      Thrombocytopenia  This is secondary to cirrhosis. No treatment needed.      PHYSICAL EXAMINATION:  VS: per nursing note  General:  No acute distress. Eyes:  Sclera anicteric. ENT:  No oral lesions. Nodes:  No adenopathy. Skin:  Spider angiomata. No jaundice. Respiratory:  Lungs clear to auscultation. Cardiovascular:  Regular heart rate. Abdomen:  Tight non-tender, distended with obvious ascites. Extremities:  No lower extremity edema. BKA left leg. Muscle wasting. Neurologic:  Alert and oriented. Cranial nerves grossly intact.   No asterixis.     LABORATORY:  Liver [facility] [place] Latest Ref Rng & Units 7/18/2018 7/17/2018   WBC 4.1 - 11.1 K/uL 4.0 (L) 4.9   ANC 1.8 - 8.0 K/UL 3.2 4.1   HGB 12.1 - 17.0 g/dL 8.0 (L) 7.8 (L)    - 400 K/uL 55 (L) 59 (L)   INR 0.8 - 1.2     AST 15 - 37 U/L 36 43 (H)   ALT 12 - 78 U/L 30 29   Alk Phos 45 - 117 U/L 141 (H) 149 (H)   Bili, Total 0.2 - 1.0 MG/DL 2.2 (H) 2.4 (H)   Bili, Direct 0.0 - 0.2 MG/DL  1.3 (H)   Albumin 3.5 - 5.0 g/dL 3.1 (L) 2.7 (L)   BUN 6 - 20 MG/DL 27 (H) 30 (H)   Creat 0.70 - 1.30 MG/DL 1.70 (H) 1.74 (H)   Na 136 - 145 mmol/L 129 (L) 128 (L)   K 3.5 - 5.1 mmol/L 5.2 (H) 4.8   Cl 97 - 108 mmol/L 100 99   CO2 21 - 32 mmol/L 22 21   Glucose 65 - 100 mg/dL 186 (H) 238 (H)   Magnesium 1.6 - 2.4 mg/dL     Ammonia <32 UMOL/L  97 (H)     Liver [facility] Latest Ref Rng & Units 7/16/2018   WBC 4.1 - 11.1 K/uL 7.4   ANC 1.8 - 8.0 K/UL 6.0   HGB 12.1 - 17.0 g/dL 9.0 (L)    - 400 K/uL 88 (L)   INR 0.8 - 1.2    AST 15 - 37 U/L 41 (H)   ALT 12 - 78 U/L 33   Alk Phos 45 - 117 U/L 173 (H)   Bili, Total 0.2 - 1.0 MG/DL 3.6 (H)   Bili, Direct 0.0 - 0.2 MG/DL    Albumin 3.5 - 5.0 g/dL 3.2 (L)   BUN 6 - 20 MG/DL 33 (H)   Creat 0.70 - 1.30 MG/DL 1.86 (H)   Na 136 - 145 mmol/L 128 (L)   K 3.5 - 5.1 mmol/L 4.9   Cl 97 - 108 mmol/L 98   CO2 21 - 32 mmol/L 19 (L)   Glucose 65 - 100 mg/dL 186 (H)   Magnesium 1.6 - 2.4 mg/dL    Ammonia <32 UMOL/L 122 (H)     Sharri Damico [facility]  Liver [facility] [address] [address], [address] [address]  22.  [phone]

## 2018-07-18 NOTE — PROGRESS NOTES
Hospitalist Progress Note Mona Mo MD 
Answering service: [phone] from in house phone Cell: [phone] Date of Service:  2018 NAME:  Sebastian Saleh :  1960 MRN:  [id number] Admission Summary:  
The patient is a 59-year-old gentleman with past medical history of alcoholic cirrhosis, history of ascites with regular paracentesis, history of cardiomyopathy, history of AICD placement, alcohol abuse in remission, hepatic encephalopathy, history of LORENZA, hyponatremia, anemia, thrombocytopenia, esophageal varices, chronic systolic heart failure stage II, status post left BKA, hypertension, diabetes mellitus type 2 who presents to the hospital with abnormal labs. Patient was recently seen by  hepatologist, was found to have ascites, was sent for paracentesis yesterday, had fluid analysis done and was found to have SBP and was sent to the hospital for further management and evaluation. Patient reports that he does not have any fever, chills, abdominal pain associated with his symptoms. The patient reports that he is not confused per se. Patient also denies any other complaints or problems. The patient reports that his paracentesis was 3 days ago. Patient reports that he had some difficulty sleeping last night, had some nausea and some anxiety last night. Patient reports that he has gained 6 pounds over the last three days. Interval history / Subjective:  
  F/u SBP No pain, nausea or vomiting Assessment & Plan: SBP 
-On cefotetan, continue for 5 days and then switch to oral per hepatology 
-[address] Ascites with alcoholic liver cirrhosis -s/p paracentesis with 6-7 l taken off  
-getting paracentesis every 2 weeks 
-per Dr Malgorzata Carr, the patient not a candidate for TIPS because of repeated HE 
-not a candidate for liver transplant sec to cardiomyopathy -?paracentesis, will talk to Dr Kelly Rodarte Cardiomyopathy, alcoholic 
-s/p ICD 
-Seems compensated 
-not sure if continuing digoxin in a patient with CKD, cirrhosis would be appropriate 
-Outpatient follow up with Dr Vipul Browne Hyponatremia 
-sec to above Chronic anemia 
-sec to above CKD stage III 
-stable Cardiac diet Code status: The patient wants to be DNR 
DVT prophylaxis: scd Care Plan discussed with: Patient/Family Disposition: Home w/Family Hospital Problems  Date Reviewed: 7/16/2018 Codes Class Noted POA * (Principal)SBP (spontaneous bacterial peritonitis) ([facility] [address] ICD-10-CM: M23.2 ICD-9-CM: 660.30  7/16/2018 Unknown Review of Systems: A comprehensive review of systems was negative except for that written in the HPI. Vital Signs:  
 Last 24hrs VS reviewed since prior progress note. Most recent are: 
Visit Vitals  /70 (BP 1 Location: Left arm, BP Patient Position: At rest)  Pulse 75  Temp 98 °F (36.7 °C)  Resp 18  Ht 5' 10\" (1.778 m)  Wt 89.9 kg (198 lb 4.8 oz)  SpO2 100%  BMI 28.45 kg/m2 Intake/Output Summary (Last 24 hours) at 07/18/18 1527 Last data filed at 07/18/18 0300 Gross per 24 hour Intake                0 ml Output              600 ml Net             -600 ml Physical Examination:  
 
 
     
Constitutional:  No acute distress, cooperative, pleasant   
ENT:  Oral mucous moist, oropharynx benign. Neck supple, Resp:  CTA bilaterally. No wheezing/rhonchi/rales. No accessory muscle use CV:  Regular rhythm, normal rate, no murmurs, gallops, rubs GI:  Soft, non distended, non tender. normoactive bowel sounds, no hepatosplenomegaly Musculoskeletal:  No edema, warm, 2+ pulses throughout Neurologic:  Moves all extremities. AAOx3, CN II-XII reviewed Skin:  Good turgor, no rashes or ulcers Data Review:  
 Review and/or order of clinical lab test 
 
 
Labs:  
 
Recent Labs  
   07/18/18 
 0447  07/17/18 3926 WBC  4.0*  4.9 HGB  8.0*  7.8* HCT  22.4*  22.0*  
PLT  55*  59* Recent Labs  
   07/18/18 0447 07/17/18 
 0436  07/16/18 
 1357 NA  129*  128*  128*  
K  5.2*  4.8  4.9 CL  100  99  98  
CO2  22  21  19* BUN  27*  30*  33* CREA  1.70*  1.74*  1.86* GLU  186*  238*  186* CA  8.8  8.7  8.8 Recent Labs  
   07/18/18 0447 07/17/18 0436  07/16/18 
 1357 SGOT  36  43*  41* ALT  30  29  33 AP  141*  149*  173* TBILI  2.2*  2.4*  3.6* TP  6.3*  6.1*  7.0 ALB  3.1*  2.7*  3.2*  
GLOB  3.2  3.4  3.8 No results for input(s): INR, PTP, APTT in the last 72 hours. No lab exists for component: INREXT, INREXT No results for input(s): FE, TIBC, PSAT, FERR in the last 72 hours. Lab Results Component Value Date/Time Folate 19.9 12/07/2017 04:25 AM  
  
No results for input(s): PH, PCO2, PO2 in the last 72 hours. No results for input(s): CPK, CKNDX, TROIQ in the last 72 hours. No lab exists for component: CPKMB No results found for: CHOL, CHOLX, CHLST, CHOLV, HDL, LDL, LDLC, DLDLP, TGLX, TRIGL, TRIGP, CHHD, CHHDX Lab Results Component Value Date/Time Glucose (POC) 294 (H) 07/18/2018 11:45 AM  
 Glucose (POC) 205 (H) 07/18/2018 06:30 AM  
 Glucose (POC) 204 (H) 07/18/2018 12:44 AM  
 Glucose (POC) 253 (H) 07/17/2018 09:22 PM  
 Glucose (POC) 185 (H) 07/17/2018 05:11 PM  
 
Lab Results Component Value Date/Time  Color YELLOW/STRAW 04/29/2018 03:34 PM  
 Appearance CLEAR 04/29/2018 03:34 PM  
 Specific gravity 1.010 04/29/2018 03:34 PM  
 Specific gravity 1.015 04/13/2018 10:10 AM  
 pH (UA) 6.0 04/29/2018 03:34 PM  
 Protein NEGATIVE  04/29/2018 03:34 PM  
 Glucose NEGATIVE  04/29/2018 03:34 PM  
 Ketone NEGATIVE  04/29/2018 03:34 PM  
 Bilirubin NEGATIVE  04/29/2018 03:34 PM  
 Urobilinogen 0.2 04/29/2018 03:34 PM  
 Nitrites NEGATIVE  04/29/2018 03:34 PM  
 Leukocyte Esterase NEGATIVE  04/29/2018 03:34 PM  
 Epithelial cells FEW 04/29/2018 03:34 PM  
 Bacteria NEGATIVE  04/29/2018 03:34 PM  
 WBC 0-4 04/29/2018 03:34 PM  
 RBC 0-5 04/29/2018 03:34 PM  
 
 
 
Medications Reviewed:  
 
Current Facility-Administered Medications Medication Dose Route Frequency  amiodarone (CORDARONE) tablet 200 mg  200 mg Oral QHS  digoxin (LANOXIN) tablet 0.125 mg  0.125 mg Oral QHS  gabapentin (NEURONTIN) capsule 100 mg  100 mg Oral TID  multivitamin, tx-iron-ca-min (THERA-M w/ IRON) tablet 1 Tab  1 Tab Oral DAILY  rifAXIMin (XIFAXAN) tablet 550 mg  550 mg Oral BID  thiamine HCL (B-1) tablet 100 mg  100 mg Oral DAILY  sodium chloride (NS) flush 5-10 mL  5-10 mL IntraVENous Q8H  
 sodium chloride (NS) flush 5-10 mL  5-10 mL IntraVENous PRN  
 ondansetron (ZOFRAN) injection 4 mg  4 mg IntraVENous Q4H PRN  pantoprazole (PROTONIX) 40 mg in sodium chloride 0.9% 10 mL injection  40 mg IntraVENous Q24H  cefoTEtan (CEFOTAN) 2 g in 0.9% sodium chloride (MBP/ADV) 50 mL  2 g IntraVENous Q12H  furosemide (LASIX) injection 20 mg  20 mg IntraVENous BID  
 glucose chewable tablet 16 g  4 Tab Oral PRN  
 dextrose (D50W) injection syrg 12.5-25 g  12.5-25 g IntraVENous PRN  
 glucagon (GLUCAGEN) injection 1 mg  1 mg IntraMUSCular PRN  
 insulin lispro (HUMALOG) injection   SubCUTAneous Q6H  
 lactulose (CHRONULAC) solution 20 g  20 g Oral TID  
 HYDROcodone-acetaminophen (NORCO) 5-325 mg per tablet 1 Tab  1 Tab Oral Q4H PRN  
 
______________________________________________________________________ EXPECTED LENGTH OF STAY: 4d 2h 
ACTUAL LENGTH OF STAY:          2 Mona Mo MD

## 2018-07-18 NOTE — PROGRESS NOTES
Bedside and Verbal shift change report given to Arnie Brewster RN (oncoming nurse) by Candie Arana RN (offgoing nurse). Report included the following information SBAR, Kardex, ED Summary, Intake/Output, MAR and Recent Results.

## 2018-07-19 NOTE — PROGRESS NOTES
Clinical Pharmacy Note: IV to PO Automatic Conversion  Please note: Flores Oropeza medication(s) (pantoprazole) has/have been changed from IV to PO based on the following critiera:    - Patient is taking scheduled oral medications  - Patient is tolerating tube feeds at goal rate or a full liquid, soft or regular diet    This IV to PO conversion is based on the P&T approved automatic conversion policy for eligible patients. Please call with questions.

## 2018-07-19 NOTE — PROGRESS NOTES
1138: Paged Dr. Vinayak Constantino to inform him about the pts blood glucose being 360.    1144: Spoke with Dr. Vinayak Constantino and he informed me to give 7 units of insulin.

## 2018-07-19 NOTE — PROGRESS NOTES
Bedside shift change report given to Tania Sinclair RN (oncoming nurse) by Shaquille RN and [facility], RN (offgoing nurse). Report included the following information SBAR, Kardex, Intake/Output, MAR and Recent Results.

## 2018-07-19 NOTE — PROGRESS NOTES
Bedside and Verbal shift change report given to Alyssa Crowley (oncoming nurse) by JEAN MELÉNDEZ (offgoing nurse). Report included the following information SBAR, Kardex, Procedure Summary, Intake/Output, MAR and Recent Results.

## 2018-07-19 NOTE — PROGRESS NOTES
[address]  Esther Cornelius MD, FACP, C[address], [address] [address]  Leon Borrego, MARTÍN Cunningham, KULDEEP SARKAR, ACNP-BC   Parth Ngo, MARTÍN Aldridge, MARTÍN   [address] [facility]  [address] [facility], [address] [address]  [place], [address]    [phone]    FAX: [phone] [facility] of [address]    at [facility]  [address] [facility], [address]  [place], [address] [facility]    [phone]    FAX: [phone]   Emma Gaona  HEPATOLOGY PROGRESS NOTE  The patient is a 59year old  male with cirrhosis secondary to [address] contined to use alcohol even after knowing he had cirrhosis for several years and finally became abstinent in 2/2018. Brenda Meadows has been hospitalized frequently in the past year for repeated bouts of HE.  This finally subsided with compliance of his medications and abstinence from alcohol. Brenda Meadows has recently developed refractory ascites and has required paracentesis every 2-4 weeks.  We do not feel he is a good TIPS candidate because of HE.      He had a routine paracentesis for refractory ascites on Friday.  Labs came back positive for SBP.  He was called and instructed to come to the ED.        He feels well. NO nausea, abdominal pain. On IV ABX. He can DC in AM (Fri) on PO Cipro BID for 5 more days then every day for prophylaxis  Has reacumulated some ascites. Can have paracentesis prior to DC. Do not leave catheter in.     ASSESSMENT AND PLAN:  Cirrhosis  This is secondary to alcohol. The CTP score is 9, Child class C, MELD score 27.    He is not a candidate for liver transplant because of cardiomyopathy, LVEF of 40-50% and he has an implantable defibrillator. We discussed this with the patient.       Cardiomyopathy  ECHO shows LVEF of only 40-45%.     However, there is no evidence for RV dysfunction and TR. Nuclear stress test was negative for ischemia with EF 49%  The reduced LVEF is secondary to ETOH cardiomyopathy.      Alcohol abuse in remission  He has been abstinent from alcohol for 6 months, since 1/2018.        Ascites  He has required repeated paracentesis every 2-4 weeks for several months.    We should not place TIPS with his history of repeated HE, plus his cardiomyopathy  Will therefore have to continue with repeated paracentesis.     SBP  At the last paracentesis on Friday there were 800 WBC 75% polys. Will start CFTX 1 gm Q24hr.    He will need 5 days of IV ABX and then convert to oral Cipro BID.      Hepatic encephalopathy  He is on lactulose TID and Xifaxan BID.    His protein is restricted to 1 item per day. He has had multiple admissions for HE.  This time it took him about 7 weeks to develop HE and be readmitted.    He may have a vascular shunt but we cannot look for this.  He cannot have MRI because of the defibrillator and cannot have CT with contrast because of LORENZA.        LORENZA  Screat is up to 1.8 mg.    Will treat with IV albumin 25 gm Q6H.      Hyponatremia  Secondary to cirrhosis and diuretics. NA was 129 in the ED. Will treat with IV albumin.      Anemia  Secondary to GI blood loss from portal HTN, bone marrow suppression from malnutrition and chronic disease.    Will recheck hemoglobin today.      Thrombocytopenia  This is secondary to cirrhosis. No treatment needed.          PHYSICAL EXAMINATION:  VS: per nursing note  General:  No acute distress. Eyes:  Sclera anicteric. ENT:  No oral lesions.  Thyroid normal.  Nodes:  No adenopathy. Skin:  Spider angiomata.  No jaundice. Respiratory:  Lungs clear to auscultation. Cardiovascular:  Regular heart rate.   Abdomen:  Tight non-tender, Distended with obvious ascites.    Extremities:  No lower extremity edema.  BKA right leg.  Muscle wasting.    Neurologic:  Alert and oriented.  Cranial nerves grossly intact.  No asterixis.      LABORATORY:  Results for Dexter Matias (MRN [id number]) as of 7/19/2018 06:53   Ref. Range 7/16/2018 13:57 7/17/2018 04:36 7/18/2018 04:47 7/19/2018 05:02   WBC Latest Ref Range: 4.1 - 11.1 K/uL 7.4 4.9 4.0 (L) 5.1   HGB Latest Ref Range: 12.1 - 17.0 g/dL 9.0 (L) 7.8 (L) 8.0 (L) 8.1 (L)   PLATELET Latest Ref Range: 150 - 400 K/uL 88 (L) 59 (L) 55 (L) 64 (L)   Sodium Latest Ref Range: 136 - 145 mmol/L 128 (L) 128 (L) 129 (L) 126 (L)   Potassium Latest Ref Range: 3.5 - 5.1 mmol/L 4.9 4.8 5.2 (H) 4.9   Chloride Latest Ref Range: 97 - 108 mmol/L 98 99 100 96 (L)   CO2 Latest Ref Range: 21 - 32 mmol/L 19 (L) 21 22 20 (L)   Glucose Latest Ref Range: 65 - 100 mg/dL 186 (H) 238 (H) 186 (H) 228 (H)   BUN Latest Ref Range: 6 - 20 MG/DL 33 (H) 30 (H) 27 (H) 20   Creatinine Latest Ref Range: 0.70 - 1.30 MG/DL 1.86 (H) 1.74 (H) 1.70 (H) 1.58 (H)   Bilirubin, total Latest Ref Range: 0.2 - 1.0 MG/DL 3.6 (H) 2.4 (H) 2.2 (H) 2.1 (H)   Albumin Latest Ref Range: 3.5 - 5.0 g/dL 3.2 (L) 2.7 (L) 3.1 (L) 2.8 (L)   ALT (SGPT) Latest Ref Range: 12 - 78 U/L 33 29 30 30   AST Latest Ref Range: 15 - 37 U/L 41 (H) 43 (H) 36 41 (H)   Alk.  phosphatase Latest Ref Range: 45 - 117 U/L 173 (H) 149 (H) 141 (H) 161 (H)       MD Xavier Silva [address] of [address] of [address] [address] [address]  [facility], [facility] 22.  [phone]

## 2018-07-19 NOTE — ROUTINE PROCESS
Bedside and Verbal shift change report given to JEAN Solorzano (oncoming nurse) by Rosalba Sever, RN (offgoing nurse). Report included the following information SBAR, Kardex, Intake/Output, MAR, Accordion, Recent Results and Med Rec Status.

## 2018-07-20 NOTE — PROGRESS NOTES
Note that the patient will discharge home with his wife today. A PCP follow up appointment has been scheduled: Follow-up With  Details  Why  Contact Nithin Sandy April  On 7/23/2018  Hospital PCP f/u appointment on Monday July 23 @ 4 p.m.  [address]  [phone]     No other discharge needs identified at this time.    JOHN Bedolla

## 2018-07-20 NOTE — PROGRESS NOTES
[facility] follow-up appointment on Monday July 23,2018 @ 4:00 p.m.  with Simón Erwin NP. [facility] will call the patient on Saturday July 21,2018 @ 9:00 a.m.    Added to AVS.  Phill Zapata CM Specialist

## 2018-07-20 NOTE — PROGRESS NOTES
[address] Regis Ritchie MD, FACP, [facility] [place], [address]  Jessy Alejandre, MARTÍN Almanza, PA-C  NYASIAP, ACNP-BC   Parth Sepulveda, MARTÍN Gonzalez, NP   [address] [facility]  [address] [facility], [address]  [place], [address]    [phone]    FAX: [phone] 225 [facility]  [address] [facility], [address]  [place], [address] [facility]    [phone]    FAX: [phone]   [facility]  HEPATOLOGY PROGRESS NOTE  The patient is a 59year old  male with cirrhosis secondary to [address] contined to use alcohol even after knowing he had cirrhosis for several years and finally became abstinent in 2/2018. [facility] has been hospitalized frequently in the past year for repeated bouts of HE.  This finally subsided with compliance of his medications and abstinence from alcohol. [facility] has recently developed refractory ascites and has required paracentesis every 2-4 weeks.  We do not feel he is a good TIPS candidate because of HE. He had a routine paracentesis for refractory ascites on Friday.  Labs came back positive for SBP.  He was called and instructed to come to the ED.      He had repeat paracentesis yesterday. About 10L of ascites removed. He feels well. He can DC today on PO Cipro BID for 5 more days then every day for prophylaxis. We will see him for follow-up in 1 week.      ASSESSMENT AND PLAN:  Cirrhosis  This is secondary to alcohol. The CTP score is 9, Child class C, MELD score 27.    He is not a candidate for liver transplant because of cardiomyopathy, LVEF of 40-50% and he has an implantable defibrillator. We discussed this with the patient.       Cardiomyopathy  ECHO shows LVEF of only 40-45%.     However, there is no evidence for RV dysfunction and TR. Nuclear stress test was negative for ischemia with EF 49%  The reduced LVEF is secondary to ETOH cardiomyopathy.      Alcohol abuse in remission  He has been abstinent from alcohol for 6 months, since 1/2018.        Ascites  He has required repeated paracentesis every 2-4 weeks for several months.    We should not place TIPS with his history of repeated HE, plus his cardiomyopathy  Will therefore have to continue with repeated paracentesis.     SBP  At the last paracentesis on Friday there were 800 WBC 75% polys. Will start CFTX 1 gm Q24hr.    He will need 5 days of IV ABX and then convert to oral Cipro BID.      Hepatic encephalopathy  He is on lactulose TID and Xifaxan BID.    His protein is restricted to 1 item per day. He has had multiple admissions for HE.  This time it took him about 7 weeks to develop HE and be readmitted.    He may have a vascular shunt but we cannot look for this.  He cannot have MRI because of the defibrillator and cannot have CT with contrast because of LORENZA.        LORENZA  Screat is up to 1.8 mg.    Will treat with IV albumin 25 gm Q6H.      Hyponatremia  Secondary to cirrhosis and diuretics. NA was 129 in the ED. Will treat with IV albumin.      Anemia  Secondary to GI blood loss from portal HTN, bone marrow suppression from malnutrition and chronic disease.    Will recheck hemoglobin today.      Thrombocytopenia  This is secondary to cirrhosis. No treatment needed.          PHYSICAL EXAMINATION:  VS: per nursing note  General:  No acute distress. Eyes:  Sclera anicteric. ENT:  No oral lesions.  Thyroid normal.  Nodes:  No adenopathy. Skin:  Spider angiomata.  No jaundice. Respiratory:  Lungs clear to auscultation. Cardiovascular:  Regular heart rate.   Abdomen:  Tight non-tender, Distended with obvious ascites.    Extremities:  No lower extremity edema.  BKA right leg.  Muscle wasting.    Neurologic:  Alert and oriented.  Cranial nerves grossly intact.  No trenton.      LABORATORY:  Results for Luis E Pastrana (MRN [id number]) as of 7/20/2018 06:54   Ref. Range 7/16/2018 13:57 7/17/2018 04:36 7/18/2018 04:47 7/19/2018 05:02 7/20/2018 02:20   WBC Latest Ref Range: 4.1 - 11.1 K/uL 7.4 4.9 4.0 (L) 5.1 5.1   HGB Latest Ref Range: 12.1 - 17.0 g/dL 9.0 (L) 7.8 (L) 8.0 (L) 8.1 (L) 8.6 (L)   PLATELET Latest Ref Range: 150 - 400 K/uL 88 (L) 59 (L) 55 (L) 64 (L) 63 (L)   Sodium Latest Ref Range: 136 - 145 mmol/L 128 (L) 128 (L) 129 (L) 126 (L) 124 (L)   Potassium Latest Ref Range: 3.5 - 5.1 mmol/L 4.9 4.8 5.2 (H) 4.9 4.4   Chloride Latest Ref Range: 97 - 108 mmol/L 98 99 100 96 (L) 93 (L)   CO2 Latest Ref Range: 21 - 32 mmol/L 19 (L) 21 22 20 (L) 24   Glucose Latest Ref Range: 65 - 100 mg/dL 186 (H) 238 (H) 186 (H) 228 (H) 208 (H)   BUN Latest Ref Range: 6 - 20 MG/DL 33 (H) 30 (H) 27 (H) 20 17   Creatinine Latest Ref Range: 0.70 - 1.30 MG/DL 1.86 (H) 1.74 (H) 1.70 (H) 1.58 (H) 1.62 (H)   Bilirubin, total Latest Ref Range: 0.2 - 1.0 MG/DL 3.6 (H) 2.4 (H) 2.2 (H) 2.1 (H) 1.9 (H)   Albumin Latest Ref Range: 3.5 - 5.0 g/dL 3.2 (L) 2.7 (L) 3.1 (L) 2.8 (L) 3.0 (L)   ALT (SGPT) Latest Ref Range: 12 - 78 U/L 33 29 30 30 33   AST Latest Ref Range: 15 - 37 U/L 41 (H) 43 (H) 36 41 (H) 47 (H)   Alk.  phosphatase Latest Ref Range: 45 - 117 U/L 173 (H) 149 (H) 141 (H) 161 (H) 183 (H)       MD Xavier Rendon [address] of [address] [address] [address] 7  [facility], [facility] 22. [phone]

## 2018-07-20 NOTE — PROGRESS NOTES
Reviewed discharge instructions with patient including follow up appointments, recommended diet and activity, medications, and signs and symptoms to notify physician about. Patient verbalized understanding and had no further questions. IV removed with the tip intact.

## 2018-07-20 NOTE — DISCHARGE SUMMARY
Discharge Summary PATIENT ID: Sebastian Saleh MRN: [id number] YOB: 1960 DATE OF ADMISSION: 7/16/2018  4:17 PM   
DATE OF DISCHARGE: 7/20/2018 PRIMARY CARE PROVIDER: Joe Lott MD  
 
ATTENDING PHYSICIAN: Dari Merritt. Ivan Sifuentes MD 
DISCHARGING PROVIDER: Rashad Jason MD   
To contact this individual call [phone] and ask the  to page. If unavailable ask to be transferred the Adult Hospitalist Department. CONSULTATIONS: IP CONSULT TO GASTROENTEROLOGY 
IP CONSULT TO INTERVENTIONAL RADIOLOGY 
IP CONSULT TO HOSPITALIST 
 
PROCEDURES/SURGERIES: * No surgery found * [address] [facility] COURSE: The patient is a 59-year-old gentleman with past medical history of alcoholic cirrhosis, history of ascites with regular paracentesis, history of cardiomyopathy, history of AICD placement, alcohol abuse in remission, hepatic encephalopathy, history of LORENZA, hyponatremia, anemia, thrombocytopenia, esophageal varices, chronic systolic heart failure stage II, status post left BKA, hypertension, diabetes mellitus type 2 who presented to the hospital with abnormal labs. Lucia Ayoub was recently seen by  hepatologist, was found to have ascites, was sent for paracentesis yesterday, had fluid analysis done and was found to have SBP and was sent to the hospital for further management and evaluation.  Patient denied any fever, chills, abdominal pain associated with his symptoms.   
 
Patient was started on IV abx. He had received Cefotan. He was started on lasix and aldactone as well. Dr. Malgorzata Carr followed patient during hospital course. Patient had paracentesis 7/16/18.  6-7 L fluid removed. He underwent paracentesis again on 7/19/18. He tolerated procedure well. WBC has been normal. 
Na 124, Cr 1.62 on day of discharge. He was fluctuating Na levels. He appears to have CKD. He has been on Digoxin. Dig level was 2.0 on admission. Will continue at time of discharge.  
 Jazmyn recommended that patient be discharged on another 5 days of abx. Will give Cipro at discharge. He will need further abx prophylaxis as outpatient. He should follow up w/ his PCP and Dr. Galdino Rhoades. DISCHARGE DIAGNOSES / PLAN:   
 
SBP Ascites with alcoholic liver cirrhosis 
-getting paracentesis every 2 weeks 
-per Dr Galdino Rhoades, the patient not a candidate for TIPS because of repeated HE 
-not a candidate for liver transplant sec to cardiomyopathy. 
  
Cardiomyopathy, alcoholic 
-s/p ICD 
-Outpatient follow up with Dr Justin Blanco 
  
Hyponatremia 
-sec to above 
  
Chronic anemia 
-sec to above 
  
CKD stage III PENDING TEST RESULTS:  
At the time of discharge the following test results are still pending: none FOLLOW UP APPOINTMENTS:   
Follow-up Information Follow up With Details Comments Contact Info Kiera Mello NP On 7/23/2018 Hospital PCP f/u appointment on Monday July 23 @ 4 p.m. [address] [address] 
[phone] Janett Freeman MD   [address] [address] 
[phone] Debbie Baron MD In 1 week  [address] [place] [address] Suite [facility] [address] 
[phone] ADDITIONAL CARE RECOMMENDATIONS:  
 
DIET: Diabetic Diet Oral Nutritional Supplements: No Oral Supplement prescribed ACTIVITY: Activity as tolerated WOUND CARE: none EQUIPMENT needed: none DISCHARGE MEDICATIONS: 
Current Discharge Medication List  
  
START taking these medications Details  
furosemide (LASIX) 40 mg tablet Take once daily. Qty: 30 Tab, Refills: 0  
  
spironolactone (ALDACTONE) 100 mg tablet Take 1 Tab by mouth daily for 30 days. Qty: 30 Tab, Refills: 0  
  
ciprofloxacin HCl (CIPRO) 500 mg tablet Take 1 Tab by mouth two (2) times a day for 10 doses. Qty: 10 Tab, Refills: 0 CONTINUE these medications which have NOT CHANGED  Details  
lactulose (CHRONULAC) 20 gram/30 mL soln solution Take 20 g by mouth three (3) times daily. rifAXIMin (XIFAXAN) 550 mg tablet Take 1 Tab by mouth two (2) times a day. Indications: Hepatic Encephalopathy 
Qty: 60 Tab, Refills: 2  
  
gabapentin (NEURONTIN) 100 mg capsule Take 100 mg by mouth three (3) times daily. pantoprazole (PROTONIX) 40 mg tablet Take 1 Tab by mouth daily. Qty: 60 Tab, Refills: 1  
  
thiamine (B-1) 100 mg tablet Take 1 Tab by mouth daily. Qty: 30 Tab, Refills: 0  
  
metoprolol succinate (TOPROL XL) 25 mg XL tablet Take 0.5 Tabs by mouth daily. Qty: 15 Tab, Refills: 0  
  
amiodarone (CORDARONE) 200 mg tablet Take 200 mg by mouth nightly. multivitamins-minerals-lutein (CENTRUM SILVER) Tab Take 1 Tab by mouth daily. digoxin (LANOXIN) 0.125 mg tablet Take 0.125 mg by mouth nightly. NOTIFY YOUR PHYSICIAN FOR ANY OF THE FOLLOWING:  
Fever over 101 degrees for 24 hours. Chest pain, shortness of breath, fever, chills, nausea, vomiting, diarrhea, change in mentation, falling, weakness, bleeding. Severe pain or pain not relieved by medications. Or, any other signs or symptoms that you may have questions about. DISPOSITION: 
x  Home With: 
 OT  PT  HH  RN  
  
 Long term SNF/Inpatient Rehab Independent/assisted living Hospice Other:  
 
 
PATIENT CONDITION AT DISCHARGE:  
 
Functional status Poor Deconditioned   
x Independent Cognition  
 x Lucid Forgetful Dementia Catheters/lines (plus indication) Vizcarra PICC   
 PEG   
x None Code status Full code   
x DNR PHYSICAL EXAMINATION AT DISCHARGE: 
 Refer to Progress Note CHRONIC MEDICAL DIAGNOSES: 
Problem List as of 7/20/2018  Date Reviewed: 7/16/2018 Codes Class Noted - Resolved * (Principal)[facility] ICD-10-CM: G16.7 ICD-9-CM: 567.23  7/16/2018 - Present Iron deficiency anemia ICD-10-CM: D50.9 ICD-9-CM: 280.9  6/21/2018 - Present  Ascites due to alcoholic cirrhosis ([place] [address].) HIY-58-EU: K70.31 ICD-9-CM: 571.2  6/21/2018 - Present Thrombocytopenia ([place] [address].) ICD-10-CM: D69.6 ICD-9-CM: 287.5  6/21/2018 - Present Hepatic encephalopathy ([place] [address].) ICD-10-CM: K72.90 ICD-9-CM: 572.2  4/29/2018 - Present LORENZA (acute kidney injury) ([place] [address].) ICD-10-CM: N17.9 ICD-9-CM: 584.9  4/20/2018 - Present Esophageal varices (HCC) ICD-10-CM: I85.00 ICD-9-CM: 456.1  2/8/2018 - Present Cirrhosis, alcoholic ([place] [address].) USQ-40-BF: K70.30 ICD-9-CM: 571.2  1/3/2018 - Present Alcohol abuse, in remission ICD-10-CM: F10.11 ICD-9-CM: 305.03  1/3/2018 - Present Chronic systolic heart failure (HCC) (Chronic) ICD-10-CM: R41.46 ICD-9-CM: 428.22  11/21/2017 - Present Hyponatremia ICD-10-CM: E87.1 ICD-9-CM: 276.1  11/20/2017 - Present Neuropathy ICD-10-CM: G62.9 ICD-9-CM: 355.9  3/30/2017 - Present S/P BKA (below knee amputation) ([place] [address].) ICD-10-CM: D18.228 ICD-9-CM: V49.75  3/30/2017 - Present Hypertension ICD-10-CM: I10 
ICD-9-CM: 401.9  3/30/2017 - Present Cardiac defibrillator in place ICD-10-CM: Z95.810 ICD-9-CM: V45.02  3/30/2017 - Present Type II diabetes mellitus ([place] [address].) ICD-10-CM: E11.9 ICD-9-CM: 250.00  2/1/2015 - Present RESOLVED: LORENZA (acute kidney injury) ([place] [address].) ICD-10-CM: N17.9 ICD-9-CM: 584.9  4/19/2018 - 4/19/2018 RESOLVED: GI bleed ICD-10-CM: K92.2 ICD-9-CM: 578.9  2/17/2018 - 4/19/2018 RESOLVED: GI bleed ICD-10-CM: K92.2 ICD-9-CM: 578.9  1/23/2018 - 1/28/2018 RESOLVED: Secondary esophageal varices without bleeding (HCC) ICD-10-CM: I85.10 ICD-9-CM: 456.21  1/3/2018 - 2/8/2018 RESOLVED: Hypomagnesemia ICD-10-CM: S49.64 
ICD-9-CM: 275.2  12/6/2017 - 12/10/2017 RESOLVED: Suicidal ideations ICD-10-CM: R45.851 ICD-9-CM: V62.84  12/6/2017 - 12/10/2017 RESOLVED: Anasarca ICD-10-CM: R60.1 ICD-9-CM: 782.3  11/20/2017 - 11/24/2017  RESOLVED: Cirrhosis of liver without ascites ([facility] ICD-10-CM: K74.60 ICD-9-CM: 571.5  5/2/2017 - 1/3/2018 RESOLVED: Extremity pain ICD-10-CM: M79.609 ICD-9-CM: 729.5  2/18/2015 - 3/30/2017 RESOLVED: Cellulitis and abscess of foot ICD-10-CM: L03.119, L02.619 ICD-9-CM: 682.7  2/1/2015 - 2/13/2015 RESOLVED: Hyperglycemia ICD-10-CM: R73.9 ICD-9-CM: 790.29  1/31/2015 - 2/13/2015  
   
  
 
 
35 minutes were spent with the patient on counseling and coordination of care Signed:  
Eddi Contreras MD 
7/20/2018 
8:57 AM

## 2018-07-20 NOTE — PROGRESS NOTES
Hospitalist Progress Note Sheila Whitman MD 
Answering service: [phone] OR [phone] from in house phone Cell: [phone] Date of Service:  2018 NAME:  Terrance Paredes :  1960 MRN:  [id number] Admission Summary:  
The patient is a 78-year-old gentleman with past medical history of alcoholic cirrhosis, history of ascites with regular paracentesis, history of cardiomyopathy, history of AICD placement, alcohol abuse in remission, hepatic encephalopathy, history of LORENZA, hyponatremia, anemia, thrombocytopenia, esophageal varices, chronic systolic heart failure stage II, status post left BKA, hypertension, diabetes mellitus type 2 who presents to the hospital with abnormal labs. Patient was recently seen by  hepatologist, was found to have ascites, was sent for paracentesis yesterday, had fluid analysis done and was found to have SBP and was sent to the hospital for further management and evaluation. Patient reports that he does not have any fever, chills, abdominal pain associated with his symptoms. The patient reports that he is not confused per se. Patient also denies any other complaints or problems. The patient reports that his paracentesis was 3 days ago. Patient reports that he had some difficulty sleeping last night, had some nausea and some anxiety last night. Patient reports that he has gained 6 pounds over the last three days. Interval history / Subjective:  
  F/u SBP No pain, nausea or vomiting. No cp/sob. Assessment & Plan: SBP 
-On cefotetan, continue for 5 days () and then switch to oral per hepatology Ascites with alcoholic liver cirrhosis -s/p paracentesis with 6-7 l taken off  
-getting paracentesis every 2 weeks 
-per Dr Franchesca Zepeda, the patient not a candidate for TIPS because of repeated HE 
-not a candidate for liver transplant sec to cardiomyopathy Cardiomyopathy, alcoholic 
-s/p ICD 
-not sure if continuing digoxin in a patient with CKD, cirrhosis would be appropriate 
-Outpatient follow up with Dr Isra Whitmore Hyponatremia 
-sec to above Chronic anemia 
-sec to above CKD stage III 
-stable Cardiac diet Code status: The patient wants to be DNR 
DVT prophylaxis: scd Plan:  Discharge today Care Plan discussed with: Patient/Family Disposition: Home w/Family Hospital Problems  Date Reviewed: 7/16/2018 Codes Class Noted POA * (Principal)SBP (spontaneous bacterial peritonitis) ([facility] ICD-10-CM: V93.1 ICD-9-CM: 455.63  7/16/2018 Unknown Review of Systems: A comprehensive review of systems was negative except for that written in the HPI. Vital Signs:  
 Last 24hrs VS reviewed since prior progress note. Most recent are: 
Visit Vitals  /73 (BP 1 Location: Left arm, BP Patient Position: Head of bed elevated (Comment degrees))  Pulse 72  Temp 98 °F (36.7 °C)  Resp 16  
 Ht 5' 10\" (1.778 m)  Wt 83.1 kg (183 lb 4.8 oz)  SpO2 100%  BMI 26.3 kg/m2 Intake/Output Summary (Last 24 hours) at 07/20/18 0773 Last data filed at 07/20/18 9907 Gross per 24 hour Intake              120 ml Output            06801 ml Net           -06444 ml Physical Examination:  
 
 
     
Constitutional:  No acute distress, cooperative, pleasant   
ENT:  Oral mucous moist, oropharynx benign. Neck supple, Resp:  CTA bilaterally. No wheezing/rhonchi/rales. No accessory muscle use CV:  Regular rhythm, normal rate, no murmurs, gallops, rubs GI:  Soft, non distended, non tender. normoactive bowel sounds, no hepatosplenomegaly Musculoskeletal:  No edema, warm, 2+ pulses throughout Neurologic:  Moves all extremities. AAOx3, CN II-XII reviewed Skin:  Good turgor, no rashes or ulcers Data Review:  
 Review and/or order of clinical lab test 
 
 
Labs:  
 
Recent Labs  
   07/20/18 
 9962 07/19/18 
 0502 WBC  5.1  5.1 HGB  8.6*  8.1* HCT  24.5*  22.7* PLT  63*  64* Recent Labs  
   07/20/18 0220 07/19/18 
 0502  07/18/18 0447 NA  124*  126*  129*  
K  4.4  4.9  5.2*  
CL  93*  96*  100 CO2  24  20*  22 BUN  17  20  27* CREA  1.62*  1.58*  1.70* GLU  208*  228*  186* CA  8.8  8.5  8.8 Recent Labs  
   07/20/18 0220 07/19/18 
 0502  07/18/18 0447 SGOT  47*  41*  36 ALT  33  30  30 AP  183*  161*  141* TBILI  1.9*  2.1*  2.2* TP  6.8  6.3*  6.3* ALB  3.0*  2.8*  3.1*  
GLOB  3.8  3.5  3.2 No results for input(s): INR, PTP, APTT in the last 72 hours. No lab exists for component: INREXT, INREXT No results for input(s): FE, TIBC, PSAT, FERR in the last 72 hours. Lab Results Component Value Date/Time Folate 19.9 12/07/2017 04:25 AM  
  
No results for input(s): PH, PCO2, PO2 in the last 72 hours. No results for input(s): CPK, CKNDX, TROIQ in the last 72 hours. No lab exists for component: CPKMB No results found for: CHOL, CHOLX, CHLST, CHOLV, HDL, LDL, LDLC, DLDLP, TGLX, TRIGL, TRIGP, CHHD, CHHDX Lab Results Component Value Date/Time Glucose (POC) 239 (H) 07/20/2018 06:24 AM  
 Glucose (POC) 334 (H) 07/19/2018 08:58 PM  
 Glucose (POC) 243 (H) 07/19/2018 04:07 PM  
 Glucose (POC) 360 (H) 07/19/2018 11:34 AM  
 Glucose (POC) 271 (H) 07/19/2018 06:12 AM  
 
Lab Results Component Value Date/Time  Color YELLOW/STRAW 04/29/2018 03:34 PM  
 Appearance CLEAR 04/29/2018 03:34 PM  
 Specific gravity 1.010 04/29/2018 03:34 PM  
 Specific gravity 1.015 04/13/2018 10:10 AM  
 pH (UA) 6.0 04/29/2018 03:34 PM  
 Protein NEGATIVE  04/29/2018 03:34 PM  
 Glucose NEGATIVE  04/29/2018 03:34 PM  
 Ketone NEGATIVE  04/29/2018 03:34 PM  
 Bilirubin NEGATIVE  04/29/2018 03:34 PM  
 Urobilinogen 0.2 04/29/2018 03:34 PM  
 Nitrites NEGATIVE  04/29/2018 03:34 PM  
 Leukocyte Esterase NEGATIVE  04/29/2018 03:34 PM  
 Epithelial cells FEW 04/29/2018 03:34 PM  
 Bacteria NEGATIVE  04/29/2018 03:34 PM  
 WBC 0-4 04/29/2018 03:34 PM  
 RBC 0-5 04/29/2018 03:34 PM  
 
 
 
Medications Reviewed:  
 
Current Facility-Administered Medications Medication Dose Route Frequency  insulin lispro (HUMALOG) injection   SubCUTAneous AC&HS  
 glucose chewable tablet 16 g  4 Tab Oral PRN  
 dextrose (D50W) injection syrg 12.5-25 g  12.5-25 g IntraVENous PRN  
 glucagon (GLUCAGEN) injection 1 mg  1 mg IntraMUSCular PRN  
 insulin glargine (LANTUS) injection 10 Units  10 Units SubCUTAneous QHS  furosemide (LASIX) tablet 40 mg  40 mg Oral DAILY  spironolactone (ALDACTONE) tablet 100 mg  100 mg Oral DAILY  pantoprazole (PROTONIX) tablet 40 mg  40 mg Oral ACD  metoprolol succinate (TOPROL-XL) XL tablet 12.5 mg  12.5 mg Oral DAILY WITH DINNER  
 amiodarone (CORDARONE) tablet 200 mg  200 mg Oral QHS  digoxin (LANOXIN) tablet 0.125 mg  0.125 mg Oral QHS  gabapentin (NEURONTIN) capsule 100 mg  100 mg Oral TID  multivitamin, tx-iron-ca-min (THERA-M w/ IRON) tablet 1 Tab  1 Tab Oral DAILY  rifAXIMin (XIFAXAN) tablet 550 mg  550 mg Oral BID  thiamine HCL (B-1) tablet 100 mg  100 mg Oral DAILY  sodium chloride (NS) flush 5-10 mL  5-10 mL IntraVENous Q8H  
 sodium chloride (NS) flush 5-10 mL  5-10 mL IntraVENous PRN  
 ondansetron (ZOFRAN) injection 4 mg  4 mg IntraVENous Q4H PRN  
 cefoTEtan (CEFOTAN) 2 g in 0.9% sodium chloride (MBP/ADV) 50 mL  2 g IntraVENous Q12H  
 lactulose (CHRONULAC) solution 20 g  20 g Oral TID  
 HYDROcodone-acetaminophen (NORCO) 5-325 mg per tablet 1 Tab  1 Tab Oral Q4H PRN  
 
______________________________________________________________________ EXPECTED LENGTH OF STAY: 4d 2h 
ACTUAL LENGTH OF STAY:          4 Peggy Sena MD

## 2018-07-20 NOTE — PROGRESS NOTES
Problem: Falls - Risk of  Goal: *Absence of Falls  Document Stefania Fall Risk and appropriate interventions in the flowsheet.    Outcome: Progressing Towards Goal  Fall Risk Interventions:  Mobility Interventions: Utilize walker, cane, or other assistive device         Medication Interventions: Teach patient to arise slowly

## 2018-07-20 NOTE — DIABETES MGMT
DTC Progress Note Recommendations/ Comments: Chart reviewed due to hyperglycemia. Blood sugars mainly >200. Noted Lantus 10 units added yesterday evening. If appropriate, please consider: 
Increase Lantus to 15 units daily Current hospital DM medication: correction scale Humalog, high sensitivity. Lantus 10 units at bedtime Chart reviewed on Dylon Salas. Mr. Kaelyn Cali is a 59 y.o. male with a 58 yo male with a past medical history relevant for DM2. Does not take any medications for DM on an outpatient basis. A1c:  
Lab Results Component Value Date/Time Hemoglobin A1c 5.4 07/16/2018 01:57 PM  
 Hemoglobin A1c 5.7 06/21/2018 04:30 PM  
 
 
Recent Glucose Results:  
Lab Results Component Value Date/Time  (H) 07/20/2018 02:20 AM  
 GLUCPOC 239 (H) 07/20/2018 06:24 AM  
 GLUCPOC 334 (H) 07/19/2018 08:58 PM  
 GLUCPOC 243 (H) 07/19/2018 04:07 PM  
  
 
Lab Results Component Value Date/Time Creatinine 1.62 (H) 07/20/2018 02:20 AM  
 
Estimated Creatinine Clearance: 50.1 mL/min (based on Cr of 1.62). Active Orders Diet DIET DIABETIC CONSISTENT CARB Regular PO intake:  
No data found. Will continue to follow as needed. Thank you Gudelia Shea RN, CDE Pager [phone]

## 2018-07-26 NOTE — PROGRESS NOTES
Name of procedure: Paracentesis 7 Liters removed, Specimens sent to lab    Complications, if any, r/t procedure: none    Medications given: 25 gm Albumin    VS : Stable     Pt tolerated procedure well. VSS. No C/O pain. Dressing to site D&I. No bleeding or hematoma noted to site. IV D/Cd. Discharge instructions given. Pt and wife verbalize understanding. Copy on chart and copy given to pt. Pt taken to car by wheelchair and taken home by wife. NAD noted at time of discharge.

## 2018-07-26 NOTE — IP AVS SNAPSHOT
[address] [facility] 
[phone] Patient: Sixto Martin MRN: [id number] FDU:1/83/5054 About your hospitalization You were admitted on:  July 26, 2018 You last received care in the:  [facility] Ultrasound You were discharged on:  July 26, 2018 Why you were hospitalized Your primary diagnosis was:  Not on File Follow-up Information None Your Scheduled Appointments Thursday July 26, 2018 12:00 PM EDT  
US PARACENTESIS ABD W IMG with [address] [facility] Ultrasound [address]) 200 [facility]  
[phone] GENERAL INSTRUCTIONS 1. Bring any non [facility] facility films/reports pertaining to the area being studied with you on the day of appointment. 2. Bring a list of all medications you are currently taking, including over the counter medications. 3. A written order with a valid diagnosis and Physicians signature is required for all scheduled tests. 4. Blood thinners and platelet inhibitors should be stopped 3-5 days prior to procedure. Consult your ordering physician prior to stopping them. 5. Check in at registration 30 minutes before your appointment time unless you were instructed to do otherwise. 6. A  is required for the procedure. For some patients, there may be some unsteadiness post procedure. If no  is present, the patient may have to remain in the department for a longer period of time. 7. The procedure may last 1-1 ½ hours. You may be required to stay 4-6 hours after the procedure for observation. --Lab work for bleeding times (INR, PT , PTT and platelets) should be be completed at least the day before the exam.  Without this information the patient is delayed or  rescheduled.   
  
    
Patient should report to outpatient registration ([facility]) 30 minutes prior to the appointment time unless instructed otherwise. Tuesday July 31, 2018  8:15 AM EDT Follow Up with MD Rosa Luke 75 ([facility]) 200 [facility] [phone] Jackson Lebron 13  
[phone] Wednesday August 15, 2018  1:00 PM EDT Follow Up with MARTÍN Goodsoneti 75 ([facility]) 200 [facility] [phone] Jackson Lebron 13  
[phone] Discharge Orders None A check suzanne indicates which time of day the medication should be taken. My Medications ASK your doctor about these medications Instructions Each Dose to Equal  
 Morning Noon Evening Bedtime  
 amiodarone 200 mg tablet Commonly known as:  CORDARONE Your last dose was: Your next dose is: Take 200 mg by mouth nightly. 200 mg CENTRUM SILVER Tab tablet Generic drug:  multivitamins-minerals-lutein Your last dose was: Your next dose is: Take 1 Tab by mouth daily. 1 Tab  
    
   
   
   
  
 ciprofloxacin HCl 500 mg tablet Commonly known as:  CIPRO Ask about: Should I take this medication? Your last dose was: Your next dose is: Take 1 Tab by mouth two (2) times a day for 10 doses. 500 mg  
    
   
   
   
  
 digoxin 0.125 mg tablet Commonly known as:  LANOXIN Your last dose was: Your next dose is: Take 0.125 mg by mouth nightly. 0.125 mg  
    
   
   
   
  
 furosemide 40 mg tablet Commonly known as:  LASIX Your last dose was: Your next dose is: Take once daily. gabapentin 100 mg capsule Commonly known as:  NEURONTIN Your last dose was: Your next dose is: Take 100 mg by mouth three (3) times daily.   
 100 mg  
    
   
   
   
  
 lactulose 20 gram/30 mL Soln solution Commonly known as:  Orval Crafts Your last dose was: Your next dose is: Take 20 g by mouth three (3) times daily. 20 g  
    
   
   
   
  
 metoprolol succinate 25 mg XL tablet Commonly known as:  TOPROL XL Your last dose was: Your next dose is: Take 0.5 Tabs by mouth daily. 12.5 mg  
    
   
   
   
  
 pantoprazole 40 mg tablet Commonly known as:  PROTONIX Your last dose was: Your next dose is: Take 1 Tab by mouth daily. 40 mg  
    
   
   
   
  
 rifAXIMin 550 mg tablet Commonly known as:  Daniel Began Your last dose was: Your next dose is: Take 1 Tab by mouth two (2) times a day. Indications: Hepatic Encephalopathy 550 mg  
    
   
   
   
  
 spironolactone 100 mg tablet Commonly known as:  ALDACTONE Your last dose was: Your next dose is: Take 1 Tab by mouth daily for 30 days. 100 mg  
    
   
   
   
  
 thiamine  mg tablet Commonly known as:  B-1 Your last dose was: Your next dose is: Take 1 Tab by mouth daily. 100 mg Discharge Instructions [facility] Radiology Department [phone] Radiologist: Dr. Tori Jacques Date: 7/26/2018 Paracentesis Discharge Instructions You may have an aching pain in your abdomen at the puncture site tonight as the numbing medicine wears off. You may take Tylenol, as directed on the label, for  pain or discomfort. Resume your previous diet and medications. Rest the remainder of today. If we have removed a large volume of fluid, you be lightheaded or dizzy when making position changes. You may shower in 24 hours. Keep the dressing clean and dry until the site has healed. Watch for signs of infection at the puncture site. .. redness, swelling, pus, fever or chills. Contact your physician immediately if this occurs. If you experience severe sweating and new, severe abdominal pain seek emergency medical treatment. Follow up with your physician as previously discussed. Introducing Ace Lutz As a [employer] patient, I wanted to make you aware of our electronic visit tool called [employer]. [employer] 24/7 allows you to connect within minutes with a medical provider 24 hours a day, seven days a week via a mobile device or tablet or logging into a secure website from your computer. You can access Ace Lutz from anywhere in the [place]. A virtual visit might be right for you when you have a simple condition and feel like you just dont want to get out of bed, or cant get away from work for an appointment, when your regular [employer] provider is not available (evenings, weekends or holidays), or when youre out of town and need minor care. Electronic visits cost only $49 and if the [employer] 24/7 provider determines a prescription is needed to treat your condition, one can be electronically transmitted to a nearby pharmacy*. Please take a moment to enroll today if you have not already done so. The enrollment process is free and takes just a few minutes. To enroll, please download the [employer] 24/7 nicol to your tablet or phone, or visit [email or web address] to enroll on your computer. And, as an [address] [place] [address] patient with a [employer] account, the results of your visits will be scanned into your electronic medical record and your primary care provider will be able to view the scanned results. We urge you to continue to see your regular [employer] provider for your ongoing medical care.   And while your primary care provider may not be the one available when you seek a Ace Lutz virtual visit, the peace of mind you get from getting a real diagnosis real time can be priceless. For more information on Ace Lutz, view our Frequently Asked Questions (FAQs) at [email or web address]. Sincerely, 
 
Cm Zeng MD 
Chief Medical Officer Cookie Marks *:  certain medications cannot be prescribed via Ace Lutz Unresulted Labs-Please follow up with your PCP about these lab tests Order Current Status US PARACENTESIS ABD W IMAGING In process Providers Seen During Your Hospitalization Provider Specialty Primary office phone Faizan Brennan MD Hepatology [phone] Your Primary Care Physician (PCP) Primary Care Physician Office Phone Office Fax Nasrin Lexie [phone] You are allergic to the following No active allergies Recent Documentation Height Weight BMI Smoking Status 1.753 m 90.7 kg 29.53 kg/m2 Former Smoker Emergency Contacts Name Discharge Info Relation Home Work Mobile Cass Gonzalez DISCHARGE CAREGIVER [3] Spouse [3] [phone] Patient Belongings The following personal items are in your possession at time of discharge: 
     Visual Aid: None Please provide this summary of care documentation to your next provider. Signatures-by signing, you are acknowledging that this After Visit Summary has been reviewed with you and you have received a copy. Patient Signature:  ____________________________________________________________ Date:  ____________________________________________________________  
  
[facility] Provider Signature:  ____________________________________________________________ Date:  ____________________________________________________________

## 2018-07-26 NOTE — TELEPHONE ENCOUNTER
Left voice mail for patient wife Chinyere Almonte that order for paracentesis has been placed. Informed Noe Kick to call if the scheduled date does not work for them.

## 2018-07-26 NOTE — DISCHARGE INSTRUCTIONS
[facility]  Radiology Department  [phone]    Radiologist: Dr. Nohemi Rushing    Date: 7/26/2018      Paracentesis Discharge Instructions    You may have an aching pain in your abdomen at the puncture site tonight as the numbing medicine wears off. You may take Tylenol, as directed on the label, for  pain or discomfort. Resume your previous diet and medications. Rest the remainder of today. If we have removed a large volume of fluid, you be lightheaded or dizzy when making position changes. You may shower in 24 hours. Keep the dressing clean and dry until the site has healed. Watch for signs of infection at the puncture site. .. redness, swelling, pus, fever or chills. Contact your physician immediately if this occurs. If you experience severe sweating and new, severe abdominal pain seek emergency medical treatment. Follow up with your physician as previously discussed.

## 2018-07-31 NOTE — PROGRESS NOTES
Chief Complaint Patient presents with  Follow-up Patient states he is felling dizzy and balance is off. Symptoms began a few months ago, but \"is more agressive lately. \"  Patient states he is fatigued as well. Wife stated that Dr. Otf Jon would like potassium rechecked. Elevated. Visit Vitals  /50 (BP 1 Location: Left arm, BP Patient Position: Sitting)  Pulse 61  Temp 96.6 °F (35.9 °C) (Tympanic)  Ht 5' 9\" (1.753 m)  Wt 199 lb 9.6 oz (90.5 kg)  SpO2 100%  BMI 29.48 kg/m2 PHQ over the last two weeks 7/31/2018 Little interest or pleasure in doing things Not at all Feeling down, depressed, irritable, or hopeless Several days Total Score PHQ 2 1 1. Have you been to the ER, urgent care clinic since your last visit? Hospitalized since your last visit? Yes. [facility] -  Infection SBP. 2. Have you seen or consulted any other health care providers outside of the [address] [place] [address] since your last visit? Include any pap smears or colon screening.  No

## 2018-07-31 NOTE — PROGRESS NOTES
[address] [facility],[address] Narayan Eugene MD, Sivakumar Oh [facility] MARTÍN Senior PA-C Lianne Mano, [facility]   MARTÍN Amin NP [facility] 136 
  at [address], [address] [address]  22. 
  [phone] FAX: [address] [place] [address] 
  [address], [address] 98 [facility] Natividad Vega, [address] 
  [phone] FAX: [phone] HEPATOLOGY PROGRESS NOTE The patient is a 59year old  male with cirrhosis secondary to alcohol. Cathy Burks stopped consuming alcohol in 1/2018.  On 6/21/2018, he was brought to the ED by his wife because of confusion.  Laboratory studies demonstrated LORENZA with Screat up to 2 mg and hypoNa to 133.  He had a paracentesis on 7/9/2018 removing 10 L of ascites.  Cell count was negative for SBP. He has gained 8 pounds since paracentesis on Monday. He had low grade fever/chills with abdominal pain but all very vague sounding. He is better.  
 
  
ASSESSMENT AND PLAN: 
Cirrhosis This is secondary to alcohol. The CTP score is 9, Child class C, MELD score 27.   
He is not a candidate for liver transplant because of cardiomyopathy, LVEF of 40-50% and implantable defibrillator. We discussed this with the patient. For completion, we will bring his case up on the next transplant conference call which will be 7/18/2018. For some reason, it was not held yesterday.  
  
Cardiomyopathy ECHO shows LVEF of only 40-45%. Nuclear stress test was negative for ischemia with EF 49% The reduced LVEF is secondary to ETOH cardiomyopathy. 
  
Alcohol abuse in remission He has been abstinent from alcohol for 6 months, since 1/2018.   
   
Ascites He had 10 L drained Monday (7/9/18) with no signs of SBP.  He has gained 8 pounds since then. I ordered another para and cell count. We are limited with diuretic use due to his kidney function. Continue to hold diuretics at this time until get today's numbers. He is not interested in a long term catheter placement (he doesn't want to walk around with a bag) and does not want a TIPS due to the HE risk. Discussed dietary changes with him to minimize sodium drastically. 
   
Hepatic encephalopathy He is on lactulose TID and Xifaxan BID. His protein is restricted to 1 item per day. He has had multiple admissions for HE. He went 7 weeks between last admissions. He may have a vascular shunt but we cannot look for this. He cannot have MRI because of the defibrillator and cannot have CT with contrast because of LORENZA.   
  
LORENZA He was dehydrated on last hospital admission with Screat 2.01 mg. He was treated with IV albumin. Screat is back up to 1.85 mg. We will check labs today to see where he is and if we can restart diuretics.    
Hyponatremia Secondary to cirrhosis and diuretics.  NA was 131 at last check. 
   
Anemia Secondary to GI blood loss from portal HTN, bone marrow suppression from malnutrition and chronic disease. Will recheck hemoglobin today. 
   
Thrombocytopenia This is secondary to cirrhosis. No treatment needed. 
   
PHYSICAL EXAMINATION: 
VS: per nursing note General:  No acute distress. Eyes:  Sclera anicteric. ENT:  No oral lesions.   
Nodes:  No adenopathy. Skin:  Spider angiomata.  No jaundice. Respiratory:  Lungs clear to auscultation. Cardiovascular:  Regular heart rate with 2/6 STEVEN. Abdomen:  Soft non-tender, some ascites is present.   
Extremities:  No edema on right lower extremity.  Left side BKA with prosthetic leg. Neurologic:  Speech is slow but no obvious HE. Cranial nerves grossly intact. Asterixis not present. Laboratory: 
[facility] [place] of [address] [place] [address] Ref Rng & Units 7/31/2018 7/20/2018 WBC 4.1 - 11.1 K/uL 6.8 5.1 ANC 1.8 - 8.0 K/UL 5.6 4.2 HGB 12.1 - 17.0 g/dL 9.8 (L) 8.6 (L)  - 400 K/uL 83 (LL) 63 (L) INR 0.9 - 1.1   1.2 AST 15 - 37 U/L 59 (H) 47 (H) ALT 12 - 78 U/L 44 33 Alk Phos 45 - 117 U/L 247 (H) 183 (H) Bili, Total 0.2 - 1.0 MG/DL 2.2 (H) 1.9 (H) Bili, Direct 0.00 - 0.40 mg/dL 1.08 (H) Albumin 3.5 - 5.0 g/dL 3.6 3.0 (L) BUN 6 - 20 MG/DL 47 (H) 17 Creat 0.70 - 1.30 MG/DL 2.02 (H) 1.62 (H) Na 136 - 145 mmol/L 121 (L) 124 (L)  
K 3.5 - 5.1 mmol/L 6.6 (>) 4.4 Cl 97 - 108 mmol/L 92 (L) 93 (L)  
CO2 21 - 32 mmol/L 20 24 Glucose 65 - 100 mg/dL 184 (H) 208 (H) Ammonia <32 UMOL/L Radiology: 
7/9/2018. Ultrasound guided paracentesis. 10L removed. 6/20/2018. Ultrasound guided paracentesis. 6250 cc ascites removed. 5/3/2018. Ultrasound guided paracentesis with catheter placement. 5/2/2018. Abdominal CT. Chronic consolidation/atelectasis of the right lung base with air bronchograms 
and possibly trace pleural effusion. Hepatic cirrhosis. Massive ascites. Splenomegaly. No mention of  shunt. MD Holden Rain[address] Marek [address] [place] 136 200 [place] [address], [address] [facility], [facility] 22. [phone]

## 2018-07-31 NOTE — MR AVS SNAPSHOT
1111 [facility] [place] [phone] formerly [facility] 
[phone] Patient: Coni Chu MRN: [id number] YEA:[birth year] Visit Information Date & Time Provider Department Dept. Phone Encounter #  
 7/31/2018  8:15 AM Cecil Lutz Morgan Turner of [place] [address] [id number] Follow-up Instructions Return in about 4 weeks (around 8/28/2018) for [facility] [place]. Your Appointments 8/15/2018  1:00 PM  
Follow Up with Berta Araujo NP [address] 75 ([facility]) Appt Note: 1 month follow up 200 [facility] [place] [phone] [facility] [address]  
59 [facility] [place] 1 Pepito Rosen Upcoming Health Maintenance Date Due  
 LIPID PANEL Q1 1960 MICROALBUMIN Q1 1/17/1968 EYE EXAM RETINAL OR DILATED Q1 1/17/1968 DTaP/Tdap/Td series (1 - Tdap) 1/17/1979 Pneumococcal 19-64 Highest Risk (2 of 3 - PCV13) 2/1/2013 FOOT EXAM Q1 1/31/2016 ZOSTER VACCINE AGE 60> 11/17/2017 MEDICARE YEARLY EXAM 3/14/2018 Influenza Age 5 to Adult 8/1/2018 HEMOGLOBIN A1C Q6M 1/16/2019 FOBT Q 1 YEAR AGE 50-75 6/21/2019 Allergies as of 7/31/2018  Review Complete On: 7/31/2018 By: Greg Sanders LPN No Known Allergies Current Immunizations  Reviewed on 5/2/2018 Name Date Influenza Vaccine 11/12/2014 Influenza Vaccine PF 11/5/2013  6:09 PM  
 Pneumococcal Vaccine (Unspecified Type) 2/1/2012 Not reviewed this visit You Were Diagnosed With   
  
 Codes Comments Alcoholic cirrhosis [facility] liver with asc[facility]    -  Primary ICD-10-CM: K70.31 ICD-9-CM: 571.2 Vitals BP Pulse Temp Height(growth percentile) 110/50 (BP 1 Location: Left arm, BP Patient Position: Sitting) 61 96.6 °F (35.9 °C) (Tympanic) 5' 9\" (1.753 m) Weight(growth percentile) SpO2 BMI Smoking Status 199 lb 9.6 oz (90.5 kg) 100% 29.48 kg/m2 Former Smoker Vitals History BMI and BSA Data Body Mass Index Body Surface Area  
 29.48 kg/m 2 2.1 m 2 Preferred Pharmacy Pharmacy Name Phone Bridgett Calderon [address], [address] [place] [address] [phone] Your Updated Medication List  
  
   
This list is accurate as of 7/31/18 10:27 AM.  Always use your most recent med list.  
  
  
  
  
 amiodarone 200 mg tablet Commonly known as:  CORDARONE Take 200 mg by mouth nightly. CENTRUM SILVER Tab tablet Generic drug:  multivitamins-minerals-lutein Take 1 Tab by mouth daily. digoxin 0.125 mg tablet Commonly known as:  LANOXIN Take 0.125 mg by mouth nightly. furosemide 40 mg tablet Commonly known as:  LASIX Take once daily. gabapentin 100 mg capsule Commonly known as:  NEURONTIN Take 100 mg by mouth three (3) times daily. lactulose 20 gram/30 mL Soln solution Commonly known as:  Aloma Parent Take 20 g by mouth three (3) times daily. metoprolol succinate 25 mg XL tablet Commonly known as:  TOPROL XL Take 0.5 Tabs by mouth daily. pantoprazole 40 mg tablet Commonly known as:  PROTONIX Take 1 Tab by mouth daily. rifAXIMin 550 mg tablet Commonly known as:  Clerance Rye Take 1 Tab by mouth two (2) times a day. Indications: Hepatic Encephalopathy  
  
 spironolactone 100 mg tablet Commonly known as:  ALDACTONE Take 1 Tab by mouth daily for 30 days. thiamine  mg tablet Commonly known as:  B-1 Take 1 Tab by mouth daily. We Performed the Following CBC WITH AUTOMATED DIFF [95344 CPT(R)] HEPATIC FUNCTION PANEL [14974 CPT(R)] METABOLIC PANEL, BASIC [99207 CPT(R)] PROTHROMBIN TIME + INR [30384 CPT(R)] Follow-up Instructions Return in about 4 weeks (around 8/28/2018) for [facility] [place]. To-Do List   
 08/06/2018   Imaging:  US PARACENTESIS ABD W IMAGING   
  
 08/06/2018 10:30 AM  
 Appointment with Malissa Larson MD; [facility] US ER 1 at [address] [facility] ([phone]) GENERAL INSTRUCTIONS 1. Bring any non [facility] facility films/reports pertaining to the area being studied with you on the day of appointment. 2. Bring a list of all medications you are currently taking, including over the counter medications. 3. A written order with a valid diagnosis and Physicians signature is required for all scheduled tests. 4. Blood thinners and platelet inhibitors should be stopped 3-5 days prior to procedure. Consult your ordering physician prior to stopping them. 5. Check in at registration 30 minutes before your appointment time unless you were instructed to do otherwise. 6. A  is required for the procedure. For some patients, there may be some unsteadiness post procedure. If no  is present, the patient may have to remain in the department for a longer period of time. 7. The procedure may last 1-1 ½ hours. You may be required to stay 4-6 hours after the procedure for observation. --Lab work for bleeding times (INR, PT , PTT and platelets) should be be completed at least the day before the exam.  Without this information the patient is delayed or  rescheduled. 08/20/2018 Imaging:  US PARACENTESIS ABD W IMAGING   
  
 08/20/2018 10:30 AM  
  Appointment with Mamie Nunez MD; [facility] US ER 1 at [address] [facility] ([phone]) GENERAL INSTRUCTIONS 1. Bring any non [facility] facility films/reports pertaining to the area being studied with you on the day of appointment. 2. Bring a list of all medications you are currently taking, including over the counter medications. 3. A written order with a valid diagnosis and Physicians signature is required for all scheduled tests. 4. Blood thinners and platelet inhibitors should be stopped 3-5 days prior to procedure. Consult your ordering physician prior to stopping them.  5. Check in at registration 30 minutes before your appointment time unless you were instructed to do otherwise. 6. A  is required for the procedure. For some patients, there may be some unsteadiness post procedure. If no  is present, the patient may have to remain in the department for a longer period of time. 7. The procedure may last 1-1 ½ hours. You may be required to stay 4-6 hours after the procedure for observation. --Lab work for bleeding times (INR, PT , PTT and platelets) should be be completed at least the day before the exam.  Without this information the patient is delayed or  rescheduled. 09/03/2018 Imaging:  US PARACENTESIS ABD W IMAGING   
  
 09/04/2018 1:00 PM  
  Appointment with Khari Almeida MD; [facility] ER 1 at [address] ([phone]) GENERAL INSTRUCTIONS 1. [facility]/reports pertaining to the area being studied with you on the day of appointment. 2. Bring a list of all medications you are currently taking, including over the counter medications. 3. A written order with a valid diagnosis and Physicians signature is required for all scheduled tests. 4. Blood thinners and platelet inhibitors should be stopped 3-5 days prior to procedure. Consult your ordering physician prior to stopping them. 5. Check in at registration 30 minutes before your appointment time unless you were instructed to do otherwise. 6. A  is required for the procedure. For some patients, there may be some unsteadiness post procedure. If no  is present, the patient may have to remain in the department for a longer period of time. 7. The procedure may last 1-1 ½ hours. You may be required to stay 4-6 hours after the procedure for observation. --Lab work for bleeding times (INR, PT , PTT and platelets) should be be completed at least the day before the exam.  Without this information the patient is delayed or  rescheduled. Please provide this summary of care documentation to your next provider. Your primary care clinician is listed as An Nelson. If you have any questions after today's visit, please call [phone].

## 2018-08-01 NOTE — PROGRESS NOTES
Reviewed potassium, sodium, and createnine results from 8/1/18  with Dr. Cinda De Santiago. Recommendations patient stop diuretics and return for follow up in one week. Spoke with patients wife Mona Ortiz. on recommendation from Dr. Cinda De Santiago. She was educated on signs and symptoms of elevated potassium and low sodium. Appointment scheduled for Thursday 8/9/18. Mona Ortiz repeated back instructions given and appointment date. She expressed an understanding education given. Mona Ortiz had no further questions.

## 2018-08-02 NOTE — DISCHARGE INSTRUCTIONS
Side effects of sedation medications and other medications used today have been reviewed. Notify us of nausea, itching, hives, dizziness, or anything else out of the ordinary. Should you experience any of these significant changes, please call [phone] between the hours of 7:30 am and 10 pm or [phone] after hours. After hours, ask the  to page the X-ray Technologist, and describe the problem to the technologist.     Beth Smallwood. [address] [facility]  Department of Interventional Radiology        PARACENTESIS DISCHARGE INSTRUCTIONS    General Information:  During this procedure, the doctor will insert a needle into the abdomen to drain fluid. After the procedure, you will be able to take a deep breath much easier. The site of the puncture may ooze the first day. This will decrease and eventually stop. Paracentesis (draining fluid from the abdomen) sometimes makes patients hypotensive (low blood pressure). Your doctor may order for you to receive fluids or albumin (a volume booster) during the procedure through an IV site. Home Care Instructions:  Keep the puncture site clean and dry. No tub baths or swimming until puncture site heals. Showering is acceptable. Resume your normal diet, and resume your normal activity slowly and as you tolerate. If you are short of breath, rest. If shortness of breath does not ease, please call your ordering doctor. Fluid can re-accumulate in the chest and/or in the abdomen. If this should occur, your doctor needs to know as you may need to have the procedure done again. Call If:     You should call your Physician and/or the Radiology Nurse if you notice any signs of infection, like pus draining, or if it is swollen or reddened. Also call if you have a fever, or if you are bleeding from the puncture site more than a small amount on the dressing. Call if the puncture site keeps draining fluid.  Some oozing is to be expected, but should slow and then stop. Call if you feel like you have pressure in your abdomen. SEEK IMMEDIATE CARE OR CALL 911 IF YOU SUDDENLY HAVE TROUBLE BREATHING, OR IF YOUR LIPS TURN BLUE, OR IF YOU NOTICE BLOOD IN YOUR SPUTUM. Follow-Up Instructions: Please see your ordering doctor as he/she has requested. Side effects of medications used today have been reviewed. Notify us of nausea, itching, hives, dizziness, or any unusual symptoms. Should you experience any of these significant changes, please call [phone] between the hours of 7:30 am and 10 pm or [phone] after hours. After hours, ask the  to page the X-ray Technologist, and describe the problem to the technologist.         [address] [facility]  Department of Interventional Radiology        PARACENTESIS DISCHARGE INSTRUCTIONS    General Information:  During this procedure, the doctor will insert a needle into the abdomen to drain fluid. After the procedure, you will be able to take a deep breath much easier. The site of the puncture may ooze the first day. This will decrease and eventually stop. Paracentesis (draining fluid from the abdomen) sometimes makes patients hypotensive (low blood pressure). Your doctor may order for you to receive fluids or albumin (a volume booster) during the procedure through an IV site. Home Care Instructions:  Keep the puncture site clean and dry. No tub baths or swimming until puncture site heals. Showering is acceptable. Resume your normal diet, and resume your normal activity slowly and as you tolerate. If you are short of breath, rest. If shortness of breath does not ease, please call your ordering doctor. Fluid can re-accumulate in the chest and/or in the abdomen. If this should occur, your doctor needs to know as you may need to have the procedure done again.     Call If:     You should call your Physician and/or the Radiology Nurse if you notice any signs of infection, like pus draining, or if it is swollen or reddened. Also call if you have a fever, or if you are bleeding from the puncture site more than a small amount on the dressing. Call if the puncture site keeps draining fluid. Some oozing is to be expected, but should slow and then stop. Call if you feel like you have pressure in your abdomen. SEEK IMMEDIATE CARE OR CALL 911 IF YOU SUDDENLY HAVE TROUBLE BREATHING, OR IF YOUR LIPS TURN BLUE, OR IF YOU NOTICE BLOOD IN YOUR SPUTUM. Follow-Up Instructions: Please see your ordering doctor as he/she has requested. Side effects of medications used today have been reviewed. Notify us of nausea, itching, hives, dizziness, or any unusual symptoms. Should you experience any of these significant changes, please call [phone] between the hours of 7:30 am and 10 pm or [phone] after hours.  After hours, ask the  to page the [address] Technologist, and describe the problem to the technologist.

## 2018-08-02 NOTE — PROGRESS NOTES
Patient in [address] for Paracentesis. Wife, Pinky Montez, at bedside. Dr. Mathis Payor at bedside to explain procedure. Patient and wife verbalize understanding of same. Consent signed by patient. Draining clear dark yellow fluid. Sample to lab. Saline lock placed and Albumin 25% hung per order. Discharge instructions reviewed with patient and wife. Both verbalize understanding of same.

## 2018-08-02 NOTE — PROCEDURES
PROCEDURE:Paracentesis. INDICATION:refractory ascites. ANESTHESIA:local.  COMPLICATION:NONE. SPECIMENS REMOVED:samples to lab. BLOOD LOSS:NONE. /ASSISTANT:YULIANA Altamirano RECOMMENDATIONS:none. CONSENT OBTAINED:YES.  NOTES:none.

## 2018-08-06 PROBLEM — R18.8 ASCITES: Status: ACTIVE | Noted: 2018-01-01

## 2018-08-06 NOTE — ED PROVIDER NOTES
HPI Comments: 59 y.o. male with past medical history significant for CHF, HTN, a-fib, DM and acholic cirrhosis with ascites who presents with chief complaint of fluid accumulation. Wife reports since paracentesis 8/2/2018, 4 days ago, pt has gained 10 pounds. Pt reports yesterday morning he had onset of generalized weakness, fatigue and nausea that subsided but returned 3 hours ago with decreased appetite and dry heaving, prompting trip to the ED. Pt states that he \"feels worse than he ever has in his life\". Wife notes that pt was scheduled for a paracentesis every 2 weeks but has had one weekly for ~1 month; she notes Dr. Priscilla Martinez discussed putting pt on hospice. Pt specifically denies fever, chills, pain and SOB. There are no other acute medical concerns at this time. Social hx: ; Former smoker (quit 1/1/2013 after 1 ppd x 5 years); Positive EtOH use  PCP: Saritha Kang MD  Hepatologist: Tommy Goodwin MD     Note written by Radhika Laurent, as dictated by Humphrey Hernandez MD 6:30 PM       The history is provided by the patient and the spouse. No  was used.         Past Medical History:   Diagnosis Date    Arthritis     Atrial fibrillation ([address] 2014    CHF (congestive heart failure) ([address]     Diabetes ([address]     Diabetic ulcer of left foot ([address] 2/2015 2/2015 Lt BKA    History of blood transfusion 2015    During Lt BKA; pt denies any adverse reaction    Hypertension     Liver disease     CIRRHOSIS    Sepsis ([address] 02/2015    From diabetic Left foot wound;  had a BKA ;  as of 11/21/16 pt states back to his baseline        Past Surgical History:   Procedure Laterality Date    HX AMPUTATION Left 2/10/2015    BKA    HX COLONOSCOPY      HX IMPLANTABLE CARDIOVERTER DEFIBRILLATOR  08/04/2015    [employer] cardio defibrillator; Dr Zari White; as of 11/21/16 pt denies defibrillator going off         Family History:   Problem Relation Age of Onset    Heart Disease Brother     Heart Failure Brother     Heart Failure Brother        Social History     Social History    Marital status:      Spouse name: N/A    Number of children: N/A    Years of education: N/A     Occupational History    Not on file. Social History Main Topics    Smoking status: Former Smoker     Packs/day: 1.00     Years: 5.00     Quit date: 1/1/2013    Smokeless tobacco: Never Used    Alcohol use Yes      Comment: Last drink January 2018    Drug use: No    Sexual activity: Not on file     Other Topics Concern    Not on file     Social History Narrative         ALLERGIES: Review of patient's allergies indicates no known allergies. Review of Systems   Constitutional: Positive for activity change (decrease), appetite change (decrease), fatigue and unexpected weight change (gain). HENT: Negative for ear pain, facial swelling, sore throat and trouble swallowing. Eyes: Negative for pain, discharge and visual disturbance. Respiratory: Negative for chest tightness, shortness of breath and wheezing. Cardiovascular: Negative for chest pain and palpitations. Gastrointestinal: Positive for nausea. Negative for abdominal pain and blood in stool. Genitourinary: Negative for difficulty urinating, flank pain and hematuria. Musculoskeletal: Negative for arthralgias, joint swelling, myalgias and neck pain. Skin: Negative for color change and rash. Neurological: Positive for weakness (generalized). Negative for dizziness, numbness and headaches. Hematological: Negative for adenopathy. Does not bruise/bleed easily. Psychiatric/Behavioral: Negative for behavioral problems, confusion and sleep disturbance. All other systems reviewed and are negative. Vitals:    08/06/18 1750   BP: 117/63   Pulse: 60   Resp: 16   Temp: 98 °F (36.7 °C)   SpO2: 99%   Height: 5' 10\" (1.778 m)            Physical Exam   Constitutional: He is oriented to person, place, and time.  He appears distressed. Appears pale and depressed   HENT:   Head: Normocephalic and atraumatic. Nose: Nose normal.   Mouth/Throat: Oropharynx is clear and moist.   Eyes: Conjunctivae and EOM are normal. Pupils are equal, round, and reactive to light. No scleral icterus. Neck: Normal range of motion. Neck supple. No JVD present. No tracheal deviation present. No thyromegaly present. No carotid bruits noted. Cardiovascular: Normal rate, regular rhythm, normal heart sounds and intact distal pulses. Exam reveals no gallop and no friction rub. No murmur heard. Pulmonary/Chest: Effort normal and breath sounds normal. No respiratory distress. He has no wheezes. He has no rales. He exhibits no tenderness. Abdominal: Soft. Bowel sounds are normal. He exhibits distension. He exhibits no mass. There is tenderness. There is guarding. There is no rebound. Protuberant   Musculoskeletal: Normal range of motion. He exhibits edema. He exhibits no tenderness. Artificial leg on left, no edema on right   Lymphadenopathy:     He has no cervical adenopathy. Neurological: He is alert and oriented to person, place, and time. He has normal reflexes. No cranial nerve deficit. Coordination normal.   Skin: Skin is warm and dry. No rash noted. No erythema. Psychiatric: His behavior is normal. Judgment and thought content normal.   Nursing note and vitals reviewed.      Note written by Radhika Dasilva, as dictated by Marixa Jennings MD 6:30 PM     MDM  Number of Diagnoses or Management Options     Amount and/or Complexity of Data Reviewed  Clinical lab tests: ordered and reviewed  Tests in the radiology section of CPT®: ordered and reviewed  Decide to obtain previous medical records or to obtain history from someone other than the patient: yes  Obtain history from someone other than the patient: yes  Review and summarize past medical records: yes  Discuss the patient with other providers: yes  Independent visualization of images, tracings, or specimens: yes    Risk of Complications, Morbidity, and/or Mortality  Presenting problems: high  Diagnostic procedures: high  Management options: high    Patient Progress  Patient progress: stable        ED Course       Procedures    PROGRESS NOTE:  6:30 PM  Put in consult for Dr. Phill Zapata. PROGRESS NOTE:  7:53 PM  Waiting to hear from Dr. Phill Zapata. Will contact hospitalist and see if they will admit. CONSULT NOTE:  8:00 PM Darian Adams MD TigerTexted Dr. Delia uJarez, Consult for Hospitalist.  Discussed available diagnostic tests and clinical findings. He will see and admit.

## 2018-08-06 NOTE — IP AVS SNAPSHOT
[address] [facility] [address] [place] [address] 
[phone] Patient: Tenzin Gonzalez MRN: [id number] YPT:[birth year] A check suzanne indicates which time of day the medication should be taken. My Medications START taking these medications Instructions Each Dose to Equal  
 Morning Noon Evening Bedtime  
 levoFLOXacin 500 mg tablet Commonly known as:  Augustineerika Dooley Your last dose was: Your next dose is: Take 1 Tab by mouth daily for 10 days. 500 mg Saccharomyces boulardii 250 mg capsule Commonly known as:  Dago Higgins Your last dose was: Your next dose is: Take 1 Cap by mouth two (2) times a day for 30 days. Can be obtained over-the-counter. OK to substitute other probiotic  
 250 mg  
    
   
   
   
  
 traMADol 50 mg tablet Commonly known as:  ULTRAM  
   
Your last dose was: Your next dose is: Take 1 Tab by mouth every six (6) hours as needed. Max Daily Amount: 200 mg.  
 50 mg CONTINUE taking these medications Instructions Each Dose to Equal  
 Morning Noon Evening Bedtime  
 amiodarone 200 mg tablet Commonly known as:  CORDARONE Your last dose was: Your next dose is: Take 200 mg by mouth nightly. 200 mg CENTRUM SILVER Tab tablet Generic drug:  multivitamins-minerals-lutein Your last dose was: Your next dose is: Take 1 Tab by mouth daily. 1 Tab  
    
   
   
   
  
 furosemide 40 mg tablet Commonly known as:  LASIX Your last dose was: Your next dose is: Take once daily. gabapentin 100 mg capsule Commonly known as:  NEURONTIN Your last dose was: Your next dose is: Take 100 mg by mouth three (3) times daily.   
 100 mg  
    
   
   
   
  
 * lactulose 20 gram/30 mL Soln solution Commonly known as:  Fanny Russell Your last dose was: Your next dose is: Take 15 mL by mouth three (3) times daily. 10 g * lactulose 10 gram/15 mL solution Commonly known as:  Fanny Russell Your last dose was: Your next dose is: TAKE 15 ML (1 TABLESPOON) BY MOUTH THREE TIMES A DAY  
     
   
   
   
  
 metoprolol succinate 25 mg XL tablet Commonly known as:  TOPROL XL Your last dose was: Your next dose is: Take 0.5 Tabs by mouth daily. 12.5 mg  
    
   
   
   
  
 pantoprazole 40 mg tablet Commonly known as:  PROTONIX Your last dose was: Your next dose is: Take 1 Tab by mouth daily. 40 mg  
    
   
   
   
  
 rifAXIMin 550 mg tablet Commonly known as:  Julian Milling Your last dose was: Your next dose is: Take 1 Tab by mouth two (2) times a day. Indications: Hepatic Encephalopathy 550 mg  
    
   
   
   
  
 thiamine  mg tablet Commonly known as:  B-1 Your last dose was: Your next dose is: Take 1 Tab by mouth daily. 100 mg * Notice: This list has 2 medication(s) that are the same as other medications prescribed for you. Read the directions carefully, and ask your doctor or other care provider to review them with you. STOP taking these medications   
 digoxin 0.125 mg tablet Commonly known as:  LANOXIN  
   
  
 LOSARTAN PO  
   
  
 spironolactone 100 mg tablet Commonly known as:  ALDACTONE Where to Get Your Medications Information on where to get these meds will be given to you by the nurse or doctor. ! Ask your nurse or doctor about these medications  
  levoFLOXacin 500 mg tablet Saccharomyces boulardii 250 mg capsule  
 traMADol 50 mg tablet

## 2018-08-06 NOTE — ED TRIAGE NOTES
Patient arrives from home for abd pain and distention worsening over the last 48 hours. Patient is scheduled for paracentesis Wednesday and appointment with Faina Vega. Family denies confusion. Reports nausea and attempting to vomit but nothing comes up. Last paracentesis 8/2.

## 2018-08-06 NOTE — IP AVS SNAPSHOT
2700 [facility] [address] [place] [address] 
[phone] Patient: Home Amaya MRN: [id number] About your hospitalization You were admitted on:  August 6, 2018 You last received care in the:  [facility] 4 SURG/BARIATRICS You were discharged on:  August 10, 2018 Why you were hospitalized Your primary diagnosis was:  Ascites Follow-up Information Follow up With Details Comments Contact Info Janiya Chaudhary MD Call As needed for primary care [address] [place] [address] 
[phone] Faizan Brennan MD Schedule an appointment as soon as possible for a visit in 1 week For follow up of cirrhosis 200 [facility] [address] [place] [address] 
[phone] Haydee Keller NP Schedule an appointment as soon as possible for a visit in 2 weeks For follow up with 885 [facility] MOB Lisa 403 PALLIATIVE MEDICINE [address] [place] [address] 
[phone] [facility] PALLIATIVE CARE   [address] [place] [address] 
[phone] Your Scheduled Appointments Monday August 20, 2018 10:30 AM EDT  
US PARACENTESIS ABD W IMG with Diana Eugene MD, [facility] 1 [address] Department ([address]) [address]  
[phone] GENERAL INSTRUCTIONS 1. Bring any non [facility] facility films/reports pertaining to the area being studied with you on the day of appointment. 2. Bring a list of all medications you are currently taking, including over the counter medications. 3. A written order with a valid diagnosis and Physicians signature is required for all scheduled tests. 4. Blood thinners and platelet inhibitors should be stopped 3-5 days prior to procedure. Consult your ordering physician prior to stopping them.  5. Check in at registration 30 minutes before your appointment time unless you were instructed to do otherwise. 6. A  is required for the procedure. For some patients, there may be some unsteadiness post procedure. If no  is present, the patient may have to remain in the department for a longer period of time. 7. The procedure may last 1-1 ½ hours. You may be required to stay 4-6 hours after the procedure for observation. --Lab work for bleeding times (INR, PT , PTT and platelets) should be be completed at least the day before the exam.  Without this information the patient is delayed or  rescheduled. Patient should report to outpatient registration ([address] [place] [address]) 30 minutes prior to the appointment time unless instructed otherwise. (NOT FOR MRI) Tuesday September 04, 2018  1:00 PM EDT  
4 H [facility] with Sophie Tristan MD, [facility] 60  1 [address] Department ([address]) [address] [place] [address]  
[phone] GENERAL INSTRUCTIONS 1. Bring any non [facility] facility films/reports pertaining to the area being studied with you on the day of appointment. 2. Bring a list of all medications you are currently taking, including over the counter medications. 3. A written order with a valid diagnosis and Physicians signature is required for all scheduled tests. 4. Blood thinners and platelet inhibitors should be stopped 3-5 days prior to procedure. Consult your ordering physician prior to stopping them. 5. Check in at registration 30 minutes before your appointment time unless you were instructed to do otherwise. 6. A  is required for the procedure. For some patients, there may be some unsteadiness post procedure. If no  is present, the patient may have to remain in the department for a longer period of time. 7. The procedure may last 1-1 ½ hours.   You may be required to stay 4-6 hours after the procedure for observation. --Lab work for bleeding times (INR, PT , PTT and platelets) should be be completed at least the day before the exam.  Without this information the patient is delayed or  rescheduled. Patient should report to outpatient registration ([address] [place] [address]) 30 minutes prior to the appointment time unless instructed otherwise. (NOT FOR MRI) Discharge Orders None A check suzanne indicates which time of day the medication should be taken. My Medications START taking these medications Instructions Each Dose to Equal  
 Morning Noon Evening Bedtime  
 levoFLOXacin 500 mg tablet Commonly known as:  Jodee Armor Your last dose was: Your next dose is: Take 1 Tab by mouth daily for 10 days. 500 mg Saccharomyces boulardii 250 mg capsule Commonly known as:  Dago Ronaldo Your last dose was: Your next dose is: Take 1 Cap by mouth two (2) times a day for 30 days. Can be obtained over-the-counter. OK to substitute other probiotic  
 250 mg  
    
   
   
   
  
 traMADol 50 mg tablet Commonly known as:  ULTRAM  
   
Your last dose was: Your next dose is: Take 1 Tab by mouth every six (6) hours as needed. Max Daily Amount: 200 mg.  
 50 mg CONTINUE taking these medications Instructions Each Dose to Equal  
 Morning Noon Evening Bedtime  
 amiodarone 200 mg tablet Commonly known as:  CORDARONE Your last dose was: Your next dose is: Take 200 mg by mouth nightly. 200 mg CENTRUM SILVER Tab tablet Generic drug:  multivitamins-minerals-lutein Your last dose was: Your next dose is: Take 1 Tab by mouth daily. 1 Tab  
    
   
   
   
  
 furosemide 40 mg tablet Commonly known as:  LASIX Your last dose was: Your next dose is: Take once daily. gabapentin 100 mg capsule Commonly known as:  NEURONTIN Your last dose was: Your next dose is: Take 100 mg by mouth three (3) times daily. 100 mg  
    
   
   
   
  
 * lactulose 20 gram/30 mL Soln solution Commonly known as:  Seleta Chapel Your last dose was: Your next dose is: Take 15 mL by mouth three (3) times daily. 10 g * lactulose 10 gram/15 mL solution Commonly known as:  Seleta Chapel Your last dose was: Your next dose is: TAKE 15 ML (1 TABLESPOON) BY MOUTH THREE TIMES A DAY  
     
   
   
   
  
 metoprolol succinate 25 mg XL tablet Commonly known as:  TOPROL XL Your last dose was: Your next dose is: Take 0.5 Tabs by mouth daily. 12.5 mg  
    
   
   
   
  
 pantoprazole 40 mg tablet Commonly known as:  PROTONIX Your last dose was: Your next dose is: Take 1 Tab by mouth daily. 40 mg  
    
   
   
   
  
 rifAXIMin 550 mg tablet Commonly known as:  Heather Ill Your last dose was: Your next dose is: Take 1 Tab by mouth two (2) times a day. Indications: Hepatic Encephalopathy 550 mg  
    
   
   
   
  
 thiamine  mg tablet Commonly known as:  B-1 Your last dose was: Your next dose is: Take 1 Tab by mouth daily. 100 mg * Notice: This list has 2 medication(s) that are the same as other medications prescribed for you. Read the directions carefully, and ask your doctor or other care provider to review them with you. STOP taking these medications   
 digoxin 0.125 mg tablet Commonly known as:  LANOXIN  
   
  
 LOSARTAN PO  
   
  
 spironolactone 100 mg tablet Commonly known as:  ALDACTONE Where to Get Your Medications Information on where to get these meds will be given to you by the nurse or doctor. ! Ask your nurse or doctor about these medications  
  levoFLOXacin 500 mg tablet Saccharomyces boulardii 250 mg capsule  
 traMADol 50 mg tablet Opioid Education Prescription Opioids: What You Need to Know: 
 
 
Discharging provider:  Jaqueline Cheung MD 
 
You have been admitted to the hospital with the following diagnoses: · Ascites · Spontaneous bacterial peritonitis · Encephalopathy FOLLOW-UP CARE RECOMMENDATIONS: 
 
APPOINTMENTS: 
Follow-up Information Follow up With Details Comments Contact Info Radha Porras MD Call As needed for primary care [facility] 
[phone] Phani Patiño MD Schedule an appointment as soon as possible for a visit in 1 week For follow up of cirrhosis 200 [facility] [address] [place] [address] 
[phone] Sherley Goldsmith NP Schedule an appointment as soon as possible for a visit in 2 weeks For follow up with [address] [place] [address] PALLIATIVE MEDICINE [place] [address] 
[phone] Future Appointments Date Time Provider Yobani Garcia 8/20/2018 10:30 AM [facility] 1 [facility]  
9/4/2018 1:00 PM [facility] 1 [facility]  
 
FOLLOW-UP TESTS recommended: · Repeat paracentesis on 8/20 with cell counts. If this is negative for infection, please discuss placement of a long-term abdominal drain with Dr. Grzegorz Cummins. · BMP in 1 week with Hepatology SYMPTOMS to watch for: chest pain, shortness of breath, fever, chills, nausea, vomiting, diarrhea, change in mentation, falling, weakness, bleeding. DIET/what to eat:  Diabetic Diet ACTIVITY:  Activity as tolerated and No driving while on analgesics What to do if new or unexpected symptoms occur? If you experience any of the above symptoms (or should other concerns or questions arise after discharge) please call your primary care physician. Return to the emergency room if you cannot get hold of your doctor. · It is very important that you keep your follow-up appointment(s). · Please bring discharge papers, medication list (and/or medication bottles) to your follow-up appointments for review by your outpatient provider(s). · Please check the list of medications and be sure it includes every medication (even non-prescription medications) that your provider wants you to take. · It is important that you take the medication exactly as they are prescribed. · Keep your medication in the bottles provided by the pharmacist and keep a list of the medication names, dosages, and times to be taken in your wallet. · Do not take other medications without consulting your doctor. · If you have any questions about your medications or other instructions, please talk to your nurse or care provider before you leave the hospital. 
 
I understand that if any problems occur once I am at home I am to contact my physician. These instructions were explained to me and I had the opportunity to ask questions. DISCHARGE SUMMARY from Nurse PATIENT INSTRUCTIONS: 
 
After general anesthesia or intravenous sedation, for 24 hours or while taking prescription Narcotics: · Limit your activities · Do not drive and operate hazardous machinery · Do not make important personal or business decisions · Do  not drink alcoholic beverages · If you have not urinated within 8 hours after discharge, please contact your surgeon on call. Report the following to your surgeon: 
· Excessive pain, swelling, redness or odor of or around the surgical area · Temperature over 100.5 · Nausea and vomiting lasting longer than 4 hours or if unable to take medications · Any signs of decreased circulation or nerve impairment to extremity: change in color, persistent  numbness, tingling, coldness or increase pain · Any questions What to do at Home: 
Recommended activity: Activity as tolerated, Activity as tolerated and no driving for today, Bedrest, No lifting, Driving, or Strenuous exercise for , No heavy lifting, pushing, pulling with the implant side for 2 months, No driving for 2 weeks, No sex for  weeks, No driving while on analgesics and No heavy lifting for  weeks, If you experience any of the following symptoms , please follow up with . *  Please give a list of your current medications to your Primary Care Provider. *  Please update this list whenever your medications are discontinued, doses are 
    changed, or new medications (including over-the-counter products) are added. *  Please carry medication information at all times in case of emergency situations. These are general instructions for a healthy lifestyle: No smoking/ No tobacco products/ Avoid exposure to second hand smoke Surgeon General's Warning:  Quitting smoking now greatly reduces serious risk to your health. Obesity, smoking, and sedentary lifestyle greatly increases your risk for illness A healthy diet, regular physical exercise & weight monitoring are important for maintaining a healthy lifestyle You may be retaining fluid if you have a history of heart failure or if you experience any of the following symptoms:  Weight gain of 3 pounds or more overnight or 5 pounds in a week, increased swelling in our hands or feet or shortness of breath while lying flat in bed.   Please call your doctor as soon as you notice any of these symptoms; do not wait until your next office visit. Recognize signs and symptoms of STROKE: 
 
F-face looks uneven A-arms unable to move or move unevenly S-speech slurred or non-existent T-time-call 911 as soon as signs and symptoms begin-DO NOT go Back to bed or wait to see if you get better-TIME IS BRAIN. Warning Signs of HEART ATTACK Call 911 if you have these symptoms: 
? Chest discomfort. Most heart attacks involve discomfort in the center of the chest that lasts more than a few minutes, or that goes away and comes back. It can feel like uncomfortable pressure, squeezing, fullness, or pain. ? Discomfort in other areas of the upper body. Symptoms can include pain or discomfort in one or both arms, the back, neck, jaw, or stomach. ? Shortness of breath with or without chest discomfort. ? Other signs may include breaking out in a cold sweat, nausea, or lightheadedness. Don't wait more than five minutes to call [address]! Fast action can save your life. Calling 911 is almost always the fastest way to get lifesaving treatment. Emergency Medical Services staff can begin treatment when they arrive  up to an hour sooner than if someone gets to the hospital by car. The discharge information has been reviewed with the patient. The patient verbalized understanding. Discharge medications reviewed with the patient and appropriate educational materials and side effects teaching were provided. ___________________________________________________________________________________________________________________________________ MyChart Announcement We are excited to announce that we are making your provider's discharge notes available to you in [employer].   You will see these notes when they are completed and signed by the physician that discharged you from your recent hospital stay. If you have any questions or concerns about any information you see in [employer], please call the Health Information Department where you were seen or reach out to your Primary Care Provider for more information about your plan of care. Introducing Ace Lutz As a [facility] patient, I wanted to make you aware of our electronic visit tool called Ace Lutz. [employer] allows you to connect within minutes with a medical provider 24 hours a day, seven days a week via a mobile device or tablet or logging into a secure website from your computer. You can access Ace Lutz from anywhere in the [place]. A virtual visit might be right for you when you have a simple condition and feel like you just dont want to get out of bed, or cant get away from work for an appointment, when your regular [facility] provider is not available (evenings, weekends or holidays), or when youre out of town and need minor care. Electronic visits cost only $49 and if the [employer]Ecwid provider determines a prescription is needed to treat your condition, one can be electronically transmitted to a nearby pharmacy*. Please take a moment to enroll today if you have not already done so. The enrollment process is free and takes just a few minutes. To enroll, please download the Depop nicol to your tablet or phone, or visit [email or web address] to enroll on your computer. And, as an [address] [place] [address] patient with a [employer] account, the results of your visits will be scanned into your electronic medical record and your primary care provider will be able to view the scanned results. We urge you to continue to see your regular [facility] provider for your ongoing medical care.   And while your primary care provider may not be the one available when you seek a Ace Lutz virtual visit, the peace of mind you get from getting a real diagnosis real time can be priceless. For more information on Ace Lutz, view our Frequently Asked Questions (FAQs) at [email or web address]. Sincerely, 
 
Cassandra Jaramillo MD 
Chief Medical Officer [address] *:  certain medications cannot be prescribed via Ace Lutz Providers Seen During Your Hospitalization Provider Specialty Primary office phone Marcelo Moody MD Emergency Medicine [phone] James Garcia MD Hospitalist [phone] Cleopatra Mcnair MD Internal Medicine [phone] Your Primary Care Physician (PCP) Primary Care Physician Office Phone Office Fax York Danger [phone] You are allergic to the following No active allergies Recent Documentation Height Weight BMI Smoking Status 1.778 m 86.1 kg 27.24 kg/m2 Former Smoker Emergency Contacts Name Discharge Info Relation Home Work Mobile Cass Gonzalez DISCHARGE CAREGIVER [3] Spouse [3] [phone] Patient Belongings The following personal items are in your possession at time of discharge: 
  Dental Appliances: None  Visual Aid: Glasses, At bedside      Home Medications: None   Jewelry: Ring, Watch  Clothing: At bedside, Shorts, Shirt, Socks, Footwear, With patient    Other Valuables: [employer] Discharge Instructions Attachments/References MEFS - LEVOFLOXACIN (LEVAQUIN, LEVAQUIN LEVA-ELLIE) - (BY MOUTH) (ENGLISH) MEFS - TRAMADOL (ULTRAM, ULTRAM ER, RYZOLT, THERATRAMADOL-60) - (BY MOUTH) (ENGLISH) Patient Handouts Levofloxacin (Levaquin, Levaquin Leva-ellie) - (By mouth) Why this medicine is used:  
Treats infections. Contact a nurse or doctor right away if you have: · Blistering, peeling, red skin rash · Fast, slow, or uneven heartbeat; lightheadedness or fainting · Dark urine or pale stools, loss of appetite, stomach pain, yellow skin or eyes · Severe or bloody diarrhea · Pain, stiffness, swelling, or bruises around your ankle, leg, shoulder, or other joint Common side effects: · Mild nausea, vomiting, diarrhea · Mild headache © 2017 [address] Information is for End User's use only and may not be sold, redistributed or otherwise used for commercial purposes. Tramadol (Ultram, Ultram ER, Ryzolt, Theratramadol-60) - (By mouth) Why this medicine is used:  
Treats moderate to severe pain. This medicine is a narcotic pain reliever. Contact a nurse or doctor right away if you have: 
· Extreme weakness, shallow breathing · Seizures · Seeing or hearing things that are not there · Anxiety, restlessness, muscle spasms, fever, sweating, diarrhea · Slow or fast heartbeat Common side effects: 
· Nausea, vomiting, constipation · Headache, drowsiness · Itching, feeling of warmth, redness of the face, neck, arms, and upper chest 
© 2017 [address] Information is for End User's use only and may not be sold, redistributed or otherwise used for commercial purposes. Please provide this summary of care documentation to your next provider. Signatures-by signing, you are acknowledging that this After Visit Summary has been reviewed with you and you have received a copy. Patient Signature:  ____________________________________________________________ Date:  ____________________________________________________________  
  
Doris Navarro Provider Signature:  ____________________________________________________________ Date:  ____________________________________________________________

## 2018-08-06 NOTE — ED NOTES
Assumed care, verbal and beside report received from [place], [address] [place] [address]. Pt resting comfortably in bed,  alert and oriented x 4 with no complaints at this time.

## 2018-08-06 NOTE — ED NOTES
5:47 PM  I have evaluated the patient as the Provider in Triage. I have reviewed His vital signs and the triage nurse assessment. I have talked with the patient and any available family and advised that I am the provider in triage and have ordered the appropriate study to initiate their work up based on the clinical presentation during my assessment. I have advised that the patient will be accommodated in the Main ED as soon as possible. I have also requested to contact the triage nurse or myself immediately if the patient experiences any changes in their condition during this brief waiting period. Patient in end-stage cirrhosis of the liver. Has ascites. Has an appointment for a paracentesis Wednesday, last one was 8/2/18. Yesterday began having increased pain. Also has been increasingly weak and fatigue, denies confusion. Has an appointment with Dr. Priscilla Martinez on Thursday.    Jobe Essex, NP

## 2018-08-07 NOTE — PALLIATIVE CARE
PALLIATIVE MEDICINE              Consult noted. Will be available to see 8/8/18. Thank you. Neal Fonseca.  [address] MSN, FNP-BC, [facility]

## 2018-08-07 NOTE — ROUTINE PROCESS
TRANSFER - OUT REPORT:    Verbal report given to Katie RN (name) on Suma Lunch  being transferred to [facility] (unit) for routine progression of care       Report consisted of patients Situation, Background, Assessment and   Recommendations(SBAR). Information from the following report(s) SBAR, Results, MAR  was reviewed with the receiving nurse. Lines:   Peripheral IV 08/06/18 Left Antecubital (Active)        Opportunity for questions and clarification was provided.       Patient transported with:   Monitor  Tech

## 2018-08-07 NOTE — H&P
[address]   HISTORY AND PHYSICAL      Chelly RANKIN  MR#: [id number]  : 1960  ACCOUNT #: [de-identified]   ADMIT DATE: 2018    HISTORY OF PRESENT ILLNESS:  The patient has a history of alcoholic cirrhosis, history of ascites with regular needle scheduled paracentesis, history of cardiomyopathy, AICD placement, alcohol abuse, currently in remission, hepatic encephalopathy, history of LORENZA, hyponatremia, anemia, thrombocytopenia, esophageal varices, chronic systolic heart failure, stage II, status post left BKA, hypertension, diabetes mellitus type 2, presents to the hospital with the above-mentioned symptoms. The patient reports that he gets regular paracentesis done. He got his last paracentesis done on 2018, and has gained about 10 pounds in the last 4 days. The patient reports that since yesterday morning he has had generalized weakness, fatigue, nausea, poor appetite and lethargy. The patient also reports that he has been dry heaving and has not been eating or drinking much. Patient reports that \"I am worse than I have ever been in my life. \"  The patient reports that he feels that he has had low-grade fevers, but did not take his temperature. The patient was seen in the ER, was requested to be admitted under the hospitalist service. The patient denies any other complaints or problems. Patient reports that he does not have a headache, blurry vision, sore throat, trouble swallowing, trouble with speech, any chest pain, shortness of breath, cough, urinary symptoms, focal or generalized neurological weakness, recent travels, sick contacts, any falls, injuries, hematemesis, melena, hemoptysis, hematuria or any other complaints or problems. PAST MEDICAL HISTORY:  See above.      HOME MEDICATIONS:  Currently the patient is on lactulose 15 mL 3 times a day, losartan, furosemide 40 mg daily, spironolactone 100 mg every day, Xifaxan 550 mg b.i.d., Neurontin 100 mg t.i.d., pantoprazole 40 mg every day, thiamine 100 mg every day, metoprolol 12.5 mg every day, amiodarone 200 mg nightly, multivitamin and digoxin 0.125 mg nightly. SOCIAL HISTORY:  Former smoker, used to smoke 1 pack per day for 30 years, quit in 2013 used to drink heavily. Last drink was in 01/2018. No IV drug abuse. ALLERGIES:  NO KNOWN DRUG ALLERGIES. FAMILY HISTORY:  .  Brother has a history of heart disease, heart failure. REVIEW OF SYSTEMS:   All systems were reviewed and found to be essentially negative except for the symptoms mentioned above. PHYSICAL EXAMINATION:    VITAL SIGNS:  Temperature 98.2, pulse 60, respiratory rate 18, blood pressure 130/54, pulse ox 98% on room air. GENERAL:  Alert x2, awake, mildly distressed, pleasant male, appears to be stated age. HEENT:  Pupils equal, reactive to light. Dry mucous membranes. Tympanic membranes clear. NECK:  Supple. LUNGS:  Decreased basal breath sounds bilaterally. HEART:  S1, S2 heard. ABDOMEN:  Soft, mildly distended. Bowel sounds were hypoactive. EXTREMITIES:  No clubbing, no cyanosis. Left BKA. NEUROPSYCHIATRIC:  Pleasant mood and affect. Cranial nerves II-XII grossly intact. Sensory grossly within normal limits. DTRs 2+/4. SKIN:  Warm. LABORATORY DATA:  White count 10.7, hemoglobin 9.8, hematocrit 27, platelets 838,782. Sodium 135, potassium 5.5, chloride 95, bicarbonate 19, anion gap 11, glucose 182, BUN 43, creatinine 1.92, calcium 9.0, bilirubin total 2.2. ALT 52, AST 54, alkaline phosphatase 279, lipase 470, ammonia 227. The patient had ascitic fluid checked with RBCs greater than 100, nucleated cells 1 to 60, neutrophils 91%. On the last paracentesis on 08/02/2018 and the fluid showed greater than 100 RBCs, 1 to 60 nucleated cells of which 91% were neutrophils. EKG shows AV paced rhythm. ASSESSMENT AND PLAN:  1. Ascites with spontaneous bacterial peritonitis.   The patient will be admitted to telemetry bed. The patient's peritoneal fluid analysis shows SBP. We will start the patient on broad spectrum IV antibiotic of cefotaxime 2 grams IV q.8h. We will provide supportive care. The patient also appears to be in mild ascites and that will provide albumin infusion. Gentle IV diuresis. Hepatology has been consulted. Their recommendations will be incorporated and may consider paracentesis in the morning. The patient has had recurrent SBP and recurrent ascites. We discussed with Dr. Phill Zapata for further course of action. Would provide supportive care, daily weights, strict I's and O's and close monitoring. Will provide oxygen support and further intervention will be per hospital course. Reassess as needed. 2.  Hepatic encephalopathy. The patient appears to be minimally confused. We will provide lactulose, albumin, diuresis, will repeat serial ammonia level in the morning and continue to monitor. Neurovascular checks have been ordered. Further intervention will be per hospital course. Reassess as needed and continue to closely monitor. 3.  Atrial fibrillation. Will get digoxin level. Continue home medications. We will hold beta blockers for now as patient has SBP. 4.  History of diabetes. The patient was on sliding scale NovoLog insulin, Accu-Chek, diet control and close monitoring. Further intervention will be per hospital course. Reassess as needed. 5.  Hyponatremia, acute on chronic, most likely hypervolemic. Will put the patient on fluid restriction and gentle diuresis, neurovascular checks, close monitoring, repeat sodium levels in the morning. Further intervention will be per hospital course. 6.  Hypokalemia. The patient treated with insulin and D50 in the ER. EKG does not show any hyperacute PVCs. We will continue to monitor. Repeat potassium level in the morning. The patient will be on telemetry bed, providing diuresis and that should help.   Further intervention will be per hospital course. 7.  Chronic kidney disease stage IV. Continue to closely monitor. Avoid nephrotoxic medications. 8.  History of alcoholic cirrhosis,  elevated liver enzymes because of the same. Will get an INR level and continue to monitor. Pathology has been condition. CT of the abdomen and pelvis is pending. Further intervention per hospital course. 9.  GI and DVT prophylaxis. The patient will be on SCDs.       Homar Serrano MD MM/PN  D: 08/06/2018 21:23     T: 08/06/2018 21:57  JOB #: [id number]

## 2018-08-07 NOTE — HOSPICE
[facility]  Consult noted. Reviewing chart. Note wife desiring information but that  is not ready for hospice yet. Meeting arranged for 1:00- 1:15pm tomorrow. 24hr phone number provided should plans change. Thank you for the opportunity to care for this pt and family. Please contact hospice at [phone] with any questions or concerns.

## 2018-08-07 NOTE — PROGRESS NOTES
Jimmy Kate MD   Phone/text: [phone] (7am to 7pm)  After 7pm please call  for hospitalist on call    Hospitalist Progress Note     8/7/2018   PCP:  Dr. Arina Mcgee MD  Chief Complaint   Patient presents with    Swelling    Nausea       Admission Summary:   The patient has a history of alcoholic cirrhosis, history of ascites with regular needle scheduled paracentesis, history of cardiomyopathy, AICD placement, alcohol abuse, currently in remission, hepatic encephalopathy, history of LORENZA, hyponatremia, anemia, thrombocytopenia, esophageal varices, chronic systolic heart failure, stage II, status post left BKA, hypertension, diabetes mellitus type 2. He got his last paracentesis done on 08/02/2018, and has gained about 10 pounds in the last 4 days. The patient reports that since yesterday morning he has had generalized weakness, fatigue, nausea, poor appetite and lethargy. The patient also reports that he has been dry heaving and has not been eating or drinking much. Patient reports that \"I am worse than I have ever been in my life. \"     Reason For Visit:  SBP    Assessment/Plan   Ascites with spontaneous bacterial peritonitis (POA)  - CT abdomen/pelvis 8/6 with cirrhosis and splenomegaly, hyperdense left renal cyst, nephrolithiasis. - Paracentesis 8/2 with >1100 neutrophils  - Continue ceftriaxone started 8/6  - Given 25g of albumin, will give more to reach 1.5g/kg, repeat 1.0 g/kg on 8/9  - In-dwelling drain may be the way to go for ease of management at home  - Hepatology consulted    Hepatic encephalopathy (POA) - improving  - Continue lactulose, rifaxamin    Hyponatremia, acute on chronic, most likely hypervolemic  - Stop IV fluids  - Continue IV lasix, holding spironolactone    Alcoholic cirrhosis with ascites (chronic)  - As above    Digoxin overdose - digoxin level high.  May not be a great med in a patient with confusion and CKD4  - ECG with AV dual pacing  - Stop digoxin  - Continue amioderone  - Monitor levels    Hyperkalemia (POA) - better than  but still high  - Hold spironolactone, losartan  - Continue IV lasix, dose of kayexalate  - Monitor potassium    Atrial fibrillation s/p pacemaker  - Holding digoxin as above  - No anticoagulation due to coagulopathy and thrombocytopenia    Chronic systolic CHF - difficult to tell NYHA class given other medical problems  - Hold losartan given hyperkalemia  - Continue amiodarone, no BB due to chronic hypotension  - Lasix as above    Chronic kidney disease stage IV - around baseline, monitor    History of diabetes - recent A1c 5.4  - SSI as needed    Palliative care consult. Wife is interested in a hospice information session but patient states he is not ready for hospice right now. See orders for other plans. VTE prophylaxis: SCDs  Discussed plan of care with Patient/Family and Nurse   Pre-admission lived at home  Discharge plan: home  Estimated time to discharge: unclear     Subjective   Mr. Blanka Ann is having pain, mostly in his legs but a little in his abdomen. No more nausea. No SOB, chest pain, headache.     Reviewed interval history  Physical examination     Visit Vitals    /56 (BP 1 Location: Right arm, BP Patient Position: At rest;Supine)    Pulse 68    Temp 97.7 °F (36.5 °C)    Resp 14    Ht 5' 10\" (1.778 m)    Wt 89.2 kg (196 lb 10.4 oz)    SpO2 100%    BMI 28.22 kg/m2       Temp (24hrs), Av.9 °F (36.6 °C), Min:97.6 °F (36.4 °C), Max:98.3 °F (36.8 °C)      Intake/Output Summary (Last 24 hours) at 18 1206  Last data filed at 18 0305   Gross per 24 hour   Intake             1150 ml   Output                0 ml   Net             1150 ml       Gen - NAD  HEENT - MMM  Neck - supple, full ROM  CV - RRR, no MRG  Resp - lungs CTA b/l, no wheezing, normal WOB  GI - abdomen S, diffusely tender without rebound, distended with +flank dullness to percussion +BS   - no CVA tenderness, bladder non-palpable in lower abdomen  MSK - normal tone and bulk, left BKA, no edema  Neuro - A&Ox3, no focal deficits, +asterixis  Psych - calm and cooperative with normal affect    Data Review       Telemetry x   ECG    Xray    CT scan    MRI    Echocardiogram    Ultrasound              I have reviewed the flow sheet and recent notes  New labs and data personally reviewed.     Recent Labs      08/07/18   0305  08/06/18   1800   WBC  4.8  7.7   HGB  8.4*  9.8*   HCT  23.4*  27.8*   PLT  51*  100*     Recent Labs      08/07/18   0305  08/06/18   1800   NA   --   125*   K   --   5.5*   CL   --   95*   CO2   --   19*   GLU   --   182*   BUN   --   43*   CREA   --   1.92*   CA   --   9.0   ALB   --   2.9*   TBILI   --   2.2*   SGOT   --   54*   ALT   --   52   INR  1.3*   --        Medications reviewed  Current Facility-Administered Medications   Medication Dose Route Frequency    acetaminophen (TYLENOL) tablet 650 mg  650 mg Oral Q6H PRN    0.9% sodium chloride infusion  100 mL/hr IntraVENous CONTINUOUS    lactulose (CHRONULAC) solution 30 g  30 g Oral QID    amiodarone (CORDARONE) tablet 200 mg  200 mg Oral QHS    digoxin (LANOXIN) tablet 0.125 mg  0.125 mg Oral QHS    gabapentin (NEURONTIN) capsule 100 mg  100 mg Oral TID    multivitamin, tx-iron-ca-min (THERA-M w/ IRON) tablet 1 Tab  1 Tab Oral DAILY    pantoprazole (PROTONIX) tablet 40 mg  40 mg Oral ACB    rifAXIMin (XIFAXAN) tablet 550 mg  550 mg Oral BID    spironolactone (ALDACTONE) tablet 100 mg  100 mg Oral DAILY    thiamine HCL (B-1) tablet 100 mg  100 mg Oral DAILY    sodium chloride (NS) flush 5-10 mL  5-10 mL IntraVENous Q8H    sodium chloride (NS) flush 5-10 mL  5-10 mL IntraVENous PRN    furosemide (LASIX) injection 40 mg  40 mg IntraVENous BID    cefTRIAXone (ROCEPHIN) 1 g in 0.9% sodium chloride (MBP/ADV) 50 mL  1 g IntraVENous Q24H    glucose chewable tablet 16 g  4 Tab Oral PRN    dextrose (D50W) injection syrg 12.5-25 g  12.5-25 g IntraVENous PRN    glucagon (GLUCAGEN) injection 1 mg  1 mg IntraMUSCular PRN    insulin lispro (HUMALOG) injection   SubCUTAneous Alvin Francis MD  Internal Medicine  8/7/2018

## 2018-08-07 NOTE — PROGRESS NOTES
Rounded on [facility] patients and provided Anointing of the Sick  at request of patient    Fr. Elham Stuart

## 2018-08-07 NOTE — PROGRESS NOTES
Reason for Admission:   Ascites, Liver cirrhosis, paracentesis               RRAT Score:  27                Resources/supports as identified by patient/family:   Patient is                 Top Challenges facing patient (as identified by patient/family and CM):  End stage liver disease, recurrent ascites and paracentesis                     Finances/Medication cost?   Patient has insurance                 Transportation? By family              Support system or lack thereof? See above                     Living arrangements? Lives with his wife              Self-care/ADLs/Cognition? Patient is independent, alert and oriented x 4          Current Advanced Directive/Advance Care Plan:  DNR                          Plan for utilizing home health:    TBD. Patient had a hospice order. Referral sent to [facility] will see patient tomorrow. Likelihood of readmission: High                 Transition of Care Plan:  CM met with patient to discuss discharge planning. Patient sees his PCP and Dr. Nenita Roa on a regular basis. He uses his local [facility] for prescriptions. CM will follow as needed. Andree Alejandra MSA, RN, CRM. Care Management Interventions  PCP Verified by CM: Yes  Palliative Care Criteria Met (RRAT>21 & CHF Dx)?: Yes  Palliative Consult Recommended?: Yes  Mode of Transport at Discharge:  Other (see comment) (Private car)  Transition of Care Consult (CM Consult): Home Hospice  MyChart Signup: No  Discharge Durable Medical Equipment: No  Health Maintenance Reviewed: Yes  Physical Therapy Consult: No  Occupational Therapy Consult: No  Speech Therapy Consult: No  Current Support Network: Lives with Spouse  Confirm Follow Up Transport: Family  Plan discussed with Pt/Family/Caregiver: Yes  Freedom of Choice Offered: Yes  Discharge Location  Discharge Placement: Home with hospice

## 2018-08-07 NOTE — CDMP QUERY
Nathaly Barillas,     Please clarify if this patient is (was) being treated/managed for:     => Acute and subacute hepatic encephalopathy in the setting of confusion requiring lactulose and serial lab monitoring  => Other explanation of clinical findings  => Clinically Undetermined (no explanation for clinical findings)    The medical record reflects the following clinical findings, treatment, and risk factors. Risk Factors:  Alcoholic cirrhosis  Clinical Indicators:  confusion, elevated ammonia levels (227/52), ascites  Treatment: lactulose, serial lab monitoring    Please clarify and document your clinical opinion in the progress notes and discharge summary including the definitive and/or presumptive diagnosis, (suspected or probable), related to the above clinical findings. Please include clinical findings supporting your diagnosis.     Thank you,  Juan Noel  [facility]  [phone]

## 2018-08-07 NOTE — PROCEDURES
PROCEDURE:Paracentesis catheter placed for drainage of ascites. INDICATION:refractory ascites. ANESTHESIA:local.  COMPLICATION:NONE. SPECIMENS REMOVED:ongoing;samples to lab. BLOOD LOSS:NONE. /ASSISTANT:YULIANA Altamirano RECOMMENDATIONS:remove catheter when desired volume achieved. CONSENT OBTAINED:YES.  NOTES:none.

## 2018-08-07 NOTE — CONSULTS
[address] Steffi Durand MD, FACP, [address], [place]       Manan Villanueva, KULDEEP Marroquin, ACNP-BC   Giorgi Harrell, MARTÍN Campos 136    at [address], [address] [facility], [place]  [address].    [phone]    FAX: [address] [place] [address]    at [facility], [address] [place] [address], [address]    [phone]    FAX: [phone]       HEPATOLOGY CONSULT NOTE  The patient is well known to me and [facility]. He is a 59year old  male with cirrhosis secondary to ETOH. He contined to use alcohol even after knowing he had cirrhosis for several years and finally became abstinent in 2/2018. He has been hospitalized frequently in the past year for repeated bouts of HE. This finally subsided with compliance of his medications and abstinence from alcohol. He has developed refractory ascites and has required paracentesis every 2-4 weeks. We do not feel he is a good TIPS candidate because of HE.     He had a routine paracentesis for refractory ascites on 8/2. Labs came back today positive for SBP. He was subsequently hospitalized and treated with IV CFTX for 5 days and then converted to PO Cipro.       He returned to the ED because of abdominal swelling, weakness, nausea and dry heaves without vomitus. I have reviewed the Emergency room note, Hospital admission note, Notes by all other physicians who have seen the patient during this hospitalization to date. I have reviewed the problem list and the reason for this hospitalization. I have reviewed the allergies and the medications the patient was taking at home prior to this hospitalization. He is awake but very lethargic.   He has a lot of ascites. The abdomen is not tender.     ASSESSMENT AND PLAN:  Cirrhosis  This is secondary to alcohol. The CTP score is 9, Child class C, MELD score 27.    He is not a candidate for liver transplant because of cardiomyopathy, LVEF of 40-50% and he has an implantable defibrillator. We discussed this with the patient.       Cardiomyopathy  ECHO shows LVEF of only 40-45%.    However, there is no evidence for RV dysfunction and TR. Nuclear stress test was negative for ischemia with EF 49%  The reduced LVEF is secondary to ETOH cardiomyopathy.      Alcohol abuse in remission  He has been abstinent from alcohol for 6 months, since 1/2018.        Ascites  He has required repeated paracentesis every 2-4 weeks for several months. He needs another paracentesis today. I do not think we can place TIPS with his history of repeatd HE. Will have discussed placement of an Aspira catheter but cannot do this now because of recent SBP.     SBP  At the last paracentesis on Friday there were 800 WBC 75% polys. He was on CFTX 1 gm Q24hr for 5 days and tehn on Cipro 500 BID at discharge. Will see what cell count looks like with repeat paracentesis later today. He is back on CFTX for now which is fine.      Hepatic encephalopathy  He is on lactulose TID and Xifaxan BID.    His protein is restricted to 1 item per day. He has had multiple admissions for HE.  This time it took him about 7 weeks to develop HE and be readmitted.    He may have a vascular shunt but we cannot look for this.  He cannot have MRI because of the defibrillator and cannot have CT with contrast because of LORENZA.        LORENZA  Screat is up to 1.8 mg. Will treat with IV albumin 25 gm Q6H.      Hyponatremia  Secondary to cirrhosis and diuretics. NA was 129 in the ED.   Will treat with IV albumin.      Anemia  Secondary to GI blood loss from portal HTN, bone marrow suppression from malnutrition and chronic disease.    Will recheck hemoglobin today.      Thrombocytopenia  This is secondary to cirrhosis. No treatment needed. End-of life Care  I had a long conversation with the patient regarding his condition. I have had similar conversations with he and his wife over the past few months. He has no option for liver transplant because of heart disease. Given the frequency of hospitalizations it is reasonable to consider palliative Care and then transition to hospice.       SYSTEM REVIEW:  Constitution systems: Negative for fever, chills, weight gain, weight loss. Eyes: Negative for visual changes. ENT: Negative for sore throat, painful swallowing. Respiratory: Negative for cough, hemoptysis, SOB. Cardiology: Negative for chest pain, palpitations. GI:  Negative for constipation or diarrhea. : Negative for urinary frequency, dysuria, hematuria, nocturia. Skin: Negative for rash. Hematology: Negative for easy bruising, blood clots. Musculo-skelatal: Negative for back pain, muscle pain, weakness. Neurologic: Negative for headaches, dizziness, vertigo, memory problems not related to HE. Psychology: Negative for anxiety, depression.         FAMILY HISTORY:  The father  of unknown cause.    The mother  of heart disease.    There is no family history of liver disease.        SOCIAL HISTORY:  The patient is .    The patient has 3 children. The patient has never used tobacco products.    The patient consumes 6+ alcoholic beverages per day. [facility] has been abstinent from alcohol since 2018. The patient used to work . The patient retired in .          PHYSICAL EXAMINATION:  VS: per nursing note  General:  No acute distress. Eyes:  Sclera anicteric. ENT:  No oral lesions. Thyroid normal.  Nodes:  No adenopathy. Skin:  Spider angiomata. No jaundice. Respiratory:  Lungs clear to auscultation. Cardiovascular:  Regular heart rate. Abdomen:  Tight non-tender, Distended with obvious ascites.     Extremities:  No lower extremity edema. BKA right leg. Muscle wasting. Neurologic:  Alert and oriented. Cranial nerves grossly intact. No asterixis. LABORATORY:  Results for Rajesh Omer (MRN [id number]) as of 8/7/2018 14:08   Ref. Range 7/31/2018 09:40 8/6/2018 18:00 8/7/2018 03:05   WBC Latest Ref Range: 4.1 - 11.1 K/uL 6.8 7.7 4.8   HGB Latest Ref Range: 12.1 - 17.0 g/dL 9.8 (L) 9.8 (L) 8.4 (L)   PLATELET Latest Ref Range: 150 - 400 K/uL 83 (LL) 100 (L) 51 (L)   INR Latest Ref Range: 0.9 - 1.1   1.2  1.3 (H)   Sodium Latest Ref Range: 136 - 145 mmol/L 121 (L) 125 (L)    Potassium Latest Ref Range: 3.5 - 5.1 mmol/L 6.6 (>) 5.5 (H)    Chloride Latest Ref Range: 97 - 108 mmol/L 92 (L) 95 (L)    CO2 Latest Ref Range: 21 - 32 mmol/L 20 19 (L)    Glucose Latest Ref Range: 65 - 100 mg/dL 184 (H) 182 (H)    BUN Latest Ref Range: 6 - 20 MG/DL 47 (H) 43 (H)    Creatinine Latest Ref Range: 0.70 - 1.30 MG/DL 2.02 (H) 1.92 (H)    Bilirubin, total Latest Ref Range: 0.2 - 1.0 MG/DL 2.2 (H) 2.2 (H)    Albumin Latest Ref Range: 3.5 - 5.0 g/dL 3.6 2.9 (L)    ALT (SGPT) Latest Ref Range: 12 - 78 U/L 44 52    AST Latest Ref Range: 15 - 37 U/L 59 (H) 54 (H)    Alk. phosphatase Latest Ref Range: 45 - 117 U/L 247 (H) 279 (H)      RADIOLOGY  CT scan abdomen without IV contrast.  Changes consistent with cirrhosis. No liver mass lesions. No dilated bile ducts. Large amount of ascites.       MD Xavier Bravo 13 [facility] of [address] [address] [address]  Esther Denise  22.  [phone]

## 2018-08-07 NOTE — PROGRESS NOTES
Problem: Falls - Risk of  Goal: *Absence of Falls  Document Stefania Fall Risk and appropriate interventions in the flowsheet.   Outcome: Progressing Towards Goal  Fall Risk Interventions:

## 2018-08-08 NOTE — PROGRESS NOTES
Patient started vomiting at 6:45 a.m., Zofran given at 4 a.m., started vomiting after pain meds given, patient is still vomiting at time of report.

## 2018-08-08 NOTE — PROGRESS NOTES
Bedside shift change report given to Phyllis Villagomez RN (oncoming nurse) by JEAN Berumen (offgoing nurse). Report included the following information SBAR, Kardex, Intake/Output, MAR, Accordion and Cardiac Rhythm PACED.

## 2018-08-08 NOTE — PROGRESS NOTES
Radiology ordered to clamp paracentesis drain now, hold blood thinner and NPO at MN. Patient currently off the floor for Xray.

## 2018-08-08 NOTE — PROGRESS NOTES
Problem: Pressure Injury - Risk of  Goal: *Prevention of pressure injury  Document Spencer Scale and appropriate interventions in the flowsheet.    Outcome: Progressing Towards Goal  Pressure Injury Interventions:  Sensory Interventions: Keep linens dry and wrinkle-free    Moisture Interventions: Absorbent underpads    Activity Interventions: Pressure redistribution bed/mattress(bed type)    Mobility Interventions: Pressure redistribution bed/mattress (bed type)    Nutrition Interventions: Document food/fluid/supplement intake    Friction and Shear Interventions: HOB 30 degrees or less, Lift sheet

## 2018-08-08 NOTE — PROGRESS NOTES
Bedside shift change report given to JEAN Winslow  (oncoming nurse) by Paula Ogden RN  (offgoing nurse). Report included the following information SBAR and MAR.

## 2018-08-08 NOTE — ROUTINE PROCESS
Pt scratched head, requested bandaid. Apx 1cm, small amount blood, cleaned and covered.   Left side anterior

## 2018-08-08 NOTE — PROGRESS NOTES
Problem: Falls - Risk of  Goal: *Absence of Falls  Document Stefania Fall Risk and appropriate interventions in the flowsheet. Outcome: Progressing Towards Goal  Fall Risk Interventions:            Medication Interventions: Patient to call before getting OOB    Elimination Interventions: Call light in reach, Patient to call for help with toileting needs             Problem: Pressure Injury - Risk of  Goal: *Prevention of pressure injury  Document Spencer Scale and appropriate interventions in the flowsheet.    Outcome: Progressing Towards Goal  Pressure Injury Interventions:  Sensory Interventions: Keep linens dry and wrinkle-free    Moisture Interventions: Contain wound drainage, Assess need for specialty bed, Apply protective barrier, creams and emollients, Absorbent underpads    Activity Interventions: Pressure redistribution bed/mattress(bed type)    Mobility Interventions: Pressure redistribution bed/mattress (bed type)    Nutrition Interventions: Document food/fluid/supplement intake

## 2018-08-08 NOTE — DIABETES MGMT
DTC Progress Note    Recommendations/ Comments: Chart reviewed due to hyperglycemia. Blood sugars >180 mg/dl. If appropriate, please consider:   Changing the insulin correction scale to normal sensitivity scale  Add carb consistent to diet order    Current hospital DM medication: Humalog for correction, high sensitivity scale     Chart reviewed on [facility]. Patient is a 59 y.o. male with hx DM not on DM medications at home. Hx of CKD 4  A1c:   Lab Results   Component Value Date/Time    Hemoglobin A1c 5.4 07/16/2018 01:57 PM    Hemoglobin A1c 5.7 06/21/2018 04:30 PM       Recent Glucose Results:   Lab Results   Component Value Date/Time     (H) 08/08/2018 03:57 AM    GLUCPOC 189 (H) 08/08/2018 06:15 AM    GLUCPOC 207 (H) 08/07/2018 11:18 PM    GLUCPOC 180 (H) 08/07/2018 05:28 PM        Lab Results   Component Value Date/Time    Creatinine 1.69 (H) 08/08/2018 03:57 AM     Estimated Creatinine Clearance: 52.1 mL/min (based on Cr of 1.69). Active Orders   Diet    DIET RENAL Regular        PO intake:   Patient Vitals for the past 72 hrs:   % Diet Eaten   08/07/18 1709 75 %       Will continue to follow as needed.     Thank you  Luz Huynh RD, CDE

## 2018-08-08 NOTE — PROGRESS NOTES
[address] [facility] [address] [facility],[address]Th [address]       Kandi Johns MD, [address] [facility], [facility], [place]        April Saratha Epley, KULDEEP Farrell, [facility]-BC   MARTÍN Marroquin NP [facility]    at [facility]    [address], [address] [address]  [address].    [phone]    FAX: [address] [place] [address] Drive, [address] [facility], [address]    [phone]    FAX: [phone]         HEPATOLOGY CONSULT NOTE  The patient is well known to me and regularly cared for at [address] [facility] is a 59year old  male with cirrhosis secondary to [address] continued to use alcohol even after knowing he had cirrhosis for several years and finally became abstinent in 2/2018. Yosvayn Mercado has been hospitalized frequently in the past year for repeated bouts of HE.  This finally subsided with compliance of his medications and abstinence from alcohol. Yosvany Mercado has developed refractory ascites and has required paracentesis every 2-4 weeks.        He had a routine paracentesis for refractory ascites on 8/2, which showed SBP. His last para on 8/7/18 was clear.     He returned to the ED because of abdominal swelling, weakness, nausea and dry heaves without vomitus. He is very lethargic and can barely keep his eyes open. He has a lot of ascites. The abdomen is not tender.      ASSESSMENT AND PLAN:    Cirrhosis  This is secondary to alcohol. He is not a candidate for liver transplant because of cardiomyopathy, LVEF of 40-50% and he has an implantable defibrillator. We discussed this with the patient.       Cardiomyopathy  ECHO shows LVEF of only 40-45%.    However, there is no evidence for RV dysfunction and TR.   Nuclear stress test was negative for ischemia with EF 49%  The reduced LVEF is secondary to ETOH cardiomyopathy.      Alcohol abuse in remission  He has been abstinent from alcohol for 6 months, since 1/2018.        Ascites  He has required repeated paracentesis every 1-2 weeks for several months. They are going to place the long term catheter tomorrow. I have discontinued his Lasix (40 mg IV BID) which was ordered. We have kept him off of diuretics because of kidney function. With the placement of a catheter tomorrow, hopefully, that will help curb the ascites. Would like to see his kidney function return to normal before starting them.      SBP  His last para had 1263 WBC. The one from 8/7/18 is clear. He was on CFTX 1 gm Q24hr for 5 days and then on Cipro 500 BID at discharge. He is back on CFTX for now which is fine. I think he should complete a full course.      Hepatic encephalopathy  He is on lactulose TID and Xifaxan BID.    His protein is restricted to 1 item per day. He has had multiple admissions for HE.        LORENZA  Screat is down to 1.69.    Will treat with IV albumin 25 gm Q6H. May hold the 87.5 g am albumin dose. I have sent a TT to Dr. Amanda Mosqueda about it.       Hyponatremia  Secondary to cirrhosis and diuretics. NA was 129 in the ED. Will treat with IV albumin.      Anemia  Secondary to GI blood loss from portal HTN, bone marrow suppression from malnutrition and chronic disease.  It is down to 7  today. Given his extreme fatigue, he may benefit from a unit of blood.       Thrombocytopenia  This is secondary to cirrhosis. No treatment needed.     End-of life Care  They have decided to go home with hospice and an Aspira ascites catheter. They are having a meeting tomorrow at 10:30, I believe. I completely agree with the decision. We have discussed it in office many times. His AICD will need to be turned off.  Also, he looks to be 100% v-paced, so that needs to be discussed as well, or at least find out what his underlying rhythm is, if he has one.     SYSTEM REVIEW:  Constitution systems: Negative for fever, chills, weight gain, weight loss. Eyes: Negative for visual changes. ENT: Negative for sore throat, painful swallowing. Respiratory: Negative for cough, hemoptysis, SOB. Cardiology: Negative for chest pain, palpitations. GI:  Negative for constipation or diarrhea. : Negative for urinary frequency, dysuria, hematuria, nocturia. Skin: Negative for rash. Hematology: Negative for easy bruising, blood clots.    Musculo-skeletal: Negative for back pain, muscle pain, weakness. Neurologic: Negative for headaches, dizziness, vertigo, memory problems not related to HE. Psychology: Negative for anxiety, depression.         FAMILY HISTORY:  The father  of unknown cause.    The mother  of heart disease.    There is no family history of liver disease.        SOCIAL HISTORY:  The patient is .    The patient has 3 children. The patient has never used tobacco products.    The patient consumes 6+ alcoholic beverages per day. Juan M Brito has been abstinent from alcohol since 2018. The patient used to work . The patient retired in .          PHYSICAL EXAMINATION:  VS: per nursing note  General:  No acute distress. Eyes:  Sclera anicteric. ENT:  No oral lesions.    Nodes:  No adenopathy. Skin:  Spider angiomata.  No jaundice. Respiratory:  Lungs clear to auscultation. Cardiovascular:  100% V-paced  Abdomen:  Tight non-tender, distended with obvious ascites.  Catheter currently in place, clamped.   Extremities:  No lower extremity edema.  BKA right leg.  Muscle wasting.    Neurologic:  Alert and oriented, but can barely keep his eyes open. Cranial nerves grossly intact.  No asterixis.     LABORATORY:  [facility] [place] [address] [place] [address] Ref Rng & Units 2018   WBC 4.1 - 11.1 K/uL 3.0 (L) 4.8    ANC 1.8 - 8.0 K/UL      HGB 12.1 - 17.0 g/dL 7.0 (L) 8.4 (L)     - 400 K/uL 56 (L) 51 (L)    INR 0.9 - 1.1   1.4 (H) 1.3 (H)    AST 15 - 37 U/L 37     ALT 12 - 78 U/L 37     Alk Phos 45 - 117 U/L 132 (H)     Bili, Total 0.2 - 1.0 MG/DL 2.1 (H)     Bili, Direct 0.00 - 0.40 mg/dL      Albumin 3.5 - 5.0 g/dL 3.6     BUN 6 - 20 MG/DL 32 (H)  43 (H)   Creat 0.70 - 1.30 MG/DL 1.69 (H)  1.83 (H)   Na 136 - 145 mmol/L 126 (L)  126 (L)   K 3.5 - 5.1 mmol/L 5.1  6.1 (H)   Cl 97 - 108 mmol/L 95 (L)  94 (L)   CO2 21 - 32 mmol/L 19 (L)  21   Glucose 65 - 100 mg/dL 198 (H)  167 (H)   Ammonia <32 UMOL/L  52 (H)      Jazmine Alcantara [facility]-BC  [facility] [place] [facility] [address], [address] [address]  [address]. [phone]

## 2018-08-08 NOTE — PROGRESS NOTES
George Brenner MD   Phone/text: [phone] (7am to 7pm)  After 7pm please call  for hospitalist on call    Hospitalist Progress Note     8/8/2018   PCP:  Dr. Tolbert Galeazzi, MD  Chief Complaint   Patient presents with    Swelling    Nausea       Admission Summary:   The patient has a history of alcoholic cirrhosis, history of ascites with regular needle scheduled paracentesis, history of cardiomyopathy, AICD placement, alcohol abuse, currently in remission, hepatic encephalopathy, history of LORENZA, hyponatremia, anemia, thrombocytopenia, esophageal varices, chronic systolic heart failure, stage II, status post left BKA, hypertension, diabetes mellitus type 2. He got his last paracentesis done on 08/02/2018, and has gained about 10 pounds in the last 4 days. The patient reports that since yesterday morning he has had generalized weakness, fatigue, nausea, poor appetite and lethargy. The patient also reports that he has been dry heaving and has not been eating or drinking much. Patient reports that \"I am worse than I have ever been in my life. \"     Reason For Visit:  SBP    Assessment/Plan   Ascites with spontaneous bacterial peritonitis (POA)  - CT abdomen/pelvis 8/6 with cirrhosis and splenomegaly, hyperdense left renal cyst, nephrolithiasis. - Paracentesis 8/2 with >1100 neutrophils, repeat 8/7 shows resolution  - Continue ceftriaxone started 8/6  - Albumin given  - Plan for placement of long-term abdominal catheter for palliation of recurrent ascites now that SBP has resolved  - Hepatology consulted    Hepatic encephalopathy (POA) - up and down  - Continue lactulose, rifaxamin    Hyponatremia, acute on chronic, most likely hypervolemic - no improvement today  - Stopped IV fluids  - Continue IV lasix, holding spironolactone    Alcoholic cirrhosis with ascites (chronic)  - As above    Digoxin overdose - digoxin level back in the therapeutic range.  May not be a great med in a patient with confusion and CKD4  - ECG with AV dual pacing  - Stopped digoxin  - Continue amioderone  - Monitor levels    Hyperkalemia (POA) - improving  - Hold spironolactone, losartan  - Continue IV lasix, dose of kayexalate  - Monitor potassium    Atrial fibrillation s/p pacemaker  - Holding digoxin as above  - No anticoagulation due to coagulopathy and thrombocytopenia    Chronic systolic CHF - difficult to tell NYHA class given other medical problems  - Hold losartan given hyperkalemia  - Continue amiodarone, no BB due to chronic hypotension  - Lasix as above    Chronic kidney disease stage IV - around baseline, monitor    Chronic anemia, combination of iron deficiency and chronic kidney disease - counts dropping a bit without acute bleeding  - Monitor HH and transfuse as needed    History of diabetes - recent A1c 5.4  - SSI as needed    Hospice consulted - sounds like patient and family are leaning towards home hospice. See orders for other plans. VTE prophylaxis: SCDs  Discussed plan of care with Patient/Family and Nurse   Pre-admission lived at home  Discharge plan: home  Estimated time to discharge: unclear     Subjective   Mr. Nathanael Villa is more sleepy today. He was very nauseated after taking oxycodone earlier this morning and required compazine. Abdomen is softer and less tender after paracentesis.     Reviewed interval history  Physical examination     Visit Vitals    /43 (BP 1 Location: Right arm, BP Patient Position: At rest)    Pulse 61    Temp 98 °F (36.7 °C)    Resp 15    Ht 5' 10\" (1.778 m)    Wt 88.5 kg (195 lb 1.7 oz)    SpO2 100%    BMI 27.99 kg/m2       Temp (24hrs), Av °F (36.7 °C), Min:97.5 °F (36.4 °C), Max:98.4 °F (36.9 °C)      Intake/Output Summary (Last 24 hours) at 18 1902  Last data filed at 18 1518   Gross per 24 hour   Intake              890 ml   Output             9700 ml   Net            -8810 ml       Gen - nauseated, sleepy  HEENT - MMM  Neck - supple, full ROM  CV - RRR, no MRG  Resp - lungs CTA b/l, no wheezing, normal WOB  GI - abdomen S, diffusely tender without rebound, distended with +flank dullness to percussion +BS   - no CVA tenderness, bladder non-palpable in lower abdomen  MSK - normal tone and bulk, left BKA, no edema  Neuro - A&Ox2, no focal deficits, no asterixis  Psych - sleepy but cooperative    Data Review       Telemetry x   ECG    Xray    CT scan    MRI    Echocardiogram    Ultrasound              I have reviewed the flow sheet and recent notes  New labs and data personally reviewed.     Recent Labs      08/08/18 0357 08/07/18   0305 08/06/18   1800   WBC  3.0*  4.8  7.7   HGB  7.0*  8.4*  9.8*   HCT  19.9*  23.4*  27.8*   PLT  56*  51*  100*     Recent Labs      08/08/18 0357 08/07/18   0305 08/06/18   1800   NA  126*   --   126*  125*   K  5.1   --   6.1*  5.5*   CL  95*   --   94*  95*   CO2  19*   --   21  19*   GLU  198*   --   167*  182*   BUN  32*   --   43*  43*   CREA  1.69*   --   1.83*  1.92*   CA  8.9   --   9.0  9.0   ALB  3.6   --   2.9*   TBILI  2.1*   --   2.2*   SGOT  37   --   54*   ALT  37   --   52   INR  1.4*  1.3*   --        Medications reviewed  Current Facility-Administered Medications   Medication Dose Route Frequency    prochlorperazine (COMPAZINE) with saline injection 10 mg  10 mg IntraVENous Q6H PRN    traMADol (ULTRAM) tablet 50 mg  50 mg Oral Q6H PRN    insulin lispro (HUMALOG) injection   SubCUTAneous AC&HS    albumin human 25% (BUMINATE) solution 25 g  25 g IntraVENous Q6H    [START ON 8/9/2018] albumin human 25% (BUMINATE) solution 87.5 g  87.5 g IntraVENous ONCE    acetaminophen (TYLENOL) tablet 500 mg  500 mg Oral Q6H PRN    ondansetron (ZOFRAN) injection 4 mg  4 mg IntraVENous Q6H PRN    HYDROmorphone (DILAUDID) syringe 0.5 mg  0.5 mg IntraVENous Q4H PRN    lactulose (CHRONULAC) solution 30 g  30 g Oral QID    amiodarone (CORDARONE) tablet 200 mg  200 mg Oral QHS    gabapentin (NEURONTIN) capsule 100 mg  100 mg Oral TID    multivitamin, tx-iron-ca-min (THERA-M w/ IRON) tablet 1 Tab  1 Tab Oral DAILY    pantoprazole (PROTONIX) tablet 40 mg  40 mg Oral ACB    rifAXIMin (XIFAXAN) tablet 550 mg  550 mg Oral BID    thiamine HCL (B-1) tablet 100 mg  100 mg Oral DAILY    sodium chloride (NS) flush 5-10 mL  5-10 mL IntraVENous Q8H    sodium chloride (NS) flush 5-10 mL  5-10 mL IntraVENous PRN    cefTRIAXone (ROCEPHIN) 1 g in 0.9% sodium chloride (MBP/ADV) 50 mL  1 g IntraVENous Q24H    glucose chewable tablet 16 g  4 Tab Oral PRN    dextrose (D50W) injection syrg 12.5-25 g  12.5-25 g IntraVENous PRN    glucagon (GLUCAGEN) injection 1 mg  1 mg IntraMUSCular PRN         Kathleen Evans MD  Internal Medicine  8/8/2018

## 2018-08-08 NOTE — ROUTINE PROCESS
Bedside shift change report given to Tammy Astudillo RN (oncoming nurse) by Katherine Omalley (offgoing nurse). Report included the following information SBAR, Procedure Summary, Intake/Output and Recent Results.

## 2018-08-08 NOTE — PALLIATIVE CARE
PALLIATIVE MEDICINE              Noted plans for hospice and patient's election of comfort. Will defer our involvement at this time. Please call if we can be of further support. Sabas Trotter.  [address] MSN, FNP-BC, [facility]

## 2018-08-08 NOTE — PROGRESS NOTES
Spiritual Care Assessment/Progress Note  [facility]      NAME: Coni Chu      MRN: [id number]  AGE: 59 y.o. SEX: male  Yarsani Affiliation: Spiritism   Language: English     8/8/2018     Total Time (in minutes): 5     Spiritual Assessment begun in [facility] 4 SURG/BARIATRICS through conversation with:         []Patient        [] Family    [] Friend(s)        Reason for Consult: Palliative Care, Initial/Spiritual Assessment     Spiritual beliefs: (Please include comment if needed)     [] Identifies with a david tradition:         [] Supported by a david community:            [] Claims no spiritual orientation:           [] Seeking spiritual identity:                [] Adheres to an individual form of spirituality:           [x] Not able to assess:                          Identified resources for coping:      [] Prayer                               [] Music                  [] Guided Imagery     [] Family/friends                 [] Pet visits     [] Devotional reading                         [] Unknown     [] Other                                             Interventions offered during this visit: (See comments for more details)    Patient Interventions: Initial visit           Plan of Care:     [] Support spiritual and/or cultural needs    [] Support AMD and/or advance care planning process      [] Support grieving process   [] Coordinate Rites and/or Rituals    [] Coordination with community clergy   [] No spiritual needs identified at this time   [] Detailed Plan of Care below (See Comments)  [] Make referral to Music Therapy  [] Make referral to Pet Therapy     [] Make referral to Addiction services  [] Make referral to [facility]  [] Make referral to Spiritual Care Partner  [] No future visits requested        [x] Follow up visits as needed     Comments: Attempted visit with pt for initial spiritual assessment. Staff member was speaking with pt; did not disturb.   Per chart review, pt is [employer] and was provided anointing by Fr. Drake yesterday. Chaplains are available for support as needed; will follow-up as able.     Shoshana Jeff, Palliative

## 2018-08-08 NOTE — PROGRESS NOTES
Problem: Falls - Risk of  Goal: *Absence of Falls  Document Stefania Fall Risk and appropriate interventions in the flowsheet.    Outcome: Progressing Towards Goal  Fall Risk Interventions:            Medication Interventions: Patient to call before getting OOB    Elimination Interventions: Call light in reach

## 2018-08-08 NOTE — PROGRESS NOTES
Patient does not want to take Oxycodone anymore because it makes him \"sick\". MD made aware. Patient is now resting quietly.

## 2018-08-09 NOTE — HOSPICE
[facility] RN note:  Latest conversation with wife Dilma Cross states that the current plan is for pt to finish course of abx and wait for infection to clear before placing aspira drain. Given pt's fluctuating commitment to hospice and the uncertain length of treatment, consult to palliative home care placed. Corey Alvares (palliative intake) arranging for a palliative to follow pt at home until hospice able to admit. Dilma Cross states she is OK with this plan. Wife states AICD placed by [facility]. Recommend deactivation of shock/ tacky component prior to discharge. Will follow up with wife tomorrow to finalize plan. Thank you for the opportunity to care for this pt and family. Please contact hospice at [phone] with any questions or concerns.

## 2018-08-09 NOTE — PROGRESS NOTES
[address] [facility] Toussaint MD, FACP, [facility], [address] [address]  Talisha Alanis, KULDEEP Sanchez, ACNP-BC   Parth MARKS, MARTÍN Loera NP   [address] [facility]  [address] [facility], [address] [address]  [place], [address]    [phone]    FAX: [phone] [address] [place] [address] [address]  [address]  [address] [facility], [address]  [place], [address] [facility]    [phone]    FAX: [phone]   [facility]  HEPATOLOGY PROGRESS NOTE  The patient is a 59year old  male with cirrhosis secondary to [address] continued to use alcohol even after knowing he had cirrhosis for several years and finally became abstinent in 2/2018. [facility] has been hospitalized frequently in the past year for repeated bouts of HE.  This finally subsided with compliance of his medications and abstinence from alcohol. [facility] has developed refractory ascites and has required paracentesis every 2-4 weeks.        He had a routine paracentesis for refractory ascites on 8/2, which showed SBP. His last para on 8/7/18 still showed some WBC although not in the criteria for SBP.     He was hospitalized this time because of abdominal swelling, weakness, nausea and dry heaves without vomitus. He is lethargic but awake and alert. Will pull the paracentesis catheter today and plan for DC Friday with assistance of Palliative Care to help manage him as OP.        ASSESSMENT AND PLAN:   Cirrhosis  This is secondary to alcohol. He is not a candidate for liver transplant because of cardiomyopathy, LVEF of 40-50% and he has an implantable defibrillator. We discussed this with the patient.       Cardiomyopathy  ECHO shows LVEF of only 40-45%.    However, there is no evidence for RV dysfunction and TR.   Nuclear stress test was negative for ischemia with EF 49%  The reduced LVEF is secondary to ETOH cardiomyopathy.      Alcohol abuse in remission  He has been abstinent from alcohol since 1/2018.        Ascites  He has required repeated paracentesis every 1-2 weeks for several months. We were going to place the aspria catheter but I do not want to do this now because there are to many WBC in the ascites and I do nto want to risk infection of the catheter. Will remove drainage catheter today and plan to DC in AM.  Will then place aspira catheter once WBC in ascites drops to nominal counts.      SBP  His last para had 1263 WBC. The one from 8/7/18 showed 151 cells 45% polys. He was on CFTX 1 gm Q24hr. Will switch over to PO cipro for 10 days and then Cipro every day once aspira catheter in place.      Hepatic encephalopathy  He is on lactulose TID and Xifaxan BID.    His protein is restricted to 1 item per day. He has had multiple admissions for HE.        LORENZA  Screat is down to 1.48.    Will treat with IV albumin 25 gm Q6H.       Hyponatremia  Secondary to cirrhosis and diuretics. NA was 129 in the ED. Will treat with IV albumin.      Anemia  Secondary to GI blood loss from portal HTN, bone marrow suppression from malnutrition and chronic disease. It is down to 7  today. Given his extreme fatigue, he may benefit from a unit of blood.       Thrombocytopenia  This is secondary to cirrhosis. No treatment needed.      End-of life Care  The patient and wife continue to go back and forther regaridng Palliative Care and Hospice. I do not think he is mentally ready for hospice quite yet and if he is, we cannot act on this now because the Aspira cathter needs to be in place before he enters hospice. I would be happy for Palliative care to get involved and help as OP. Vannesa Most  PHYSICAL EXAMINATION:  VS: per nursing note  General:  No acute distress. Eyes:  Sclera anicteric. ENT:  No oral lesions.    Nodes:  No adenopathy. Skin:  Spider angiomata.  No jaundice. Respiratory:  Lungs clear to auscultation. Cardiovascular:  100% V-paced  Abdomen:  Tight non-tender, distended with obvious ascites. Catheter currently in place, clamped.   Extremities:  No lower extremity edema.  BKA right leg.  Muscle wasting.    Neurologic:  Alert and oriented, but can barely keep his eyes open. Cranial nerves grossly intact.  No asterixis.      LABORATORY:  Results for Luis Manuel Weiner (MRN [id number]) as of 8/9/2018 07:03   Ref. Range 7/31/2018 09:40 8/2/2018 09:35 8/6/2018 18:00 8/6/2018 18:00 8/7/2018 16:15 8/8/2018 03:57 8/9/2018 04:19   WBC Latest Ref Range: 4.1 - 11.1 K/uL 6.8  7.7 4.8  3.0 (L) 3.1 (L)   HGB Latest Ref Range: 12.1 - 17.0 g/dL 9.8 (L)  9.8 (L) 8.4 (L)  7.0 (L) 7.3 (L)   PLATELET Latest Ref Range: 150 - 400 K/uL 83 (LL)  100 (L) 51 (L)  56 (L) 57 (L)   INR Latest Ref Range: 0.9 - 1.1   1.2   1.3 (H)  1.4 (H)    Sodium Latest Ref Range: 136 - 145 mmol/L 121 (L)  125 (L) 126 (L)  126 (L) 125 (L)   Potassium Latest Ref Range: 3.5 - 5.1 mmol/L 6.6 (>)  5.5 (H) 6.1 (H)  5.1 4.8   Chloride Latest Ref Range: 97 - 108 mmol/L 92 (L)  95 (L) 94 (L)  95 (L) 92 (L)   CO2 Latest Ref Range: 21 - 32 mmol/L 20  19 (L) 21  19 (L) 20 (L)   Glucose Latest Ref Range: 65 - 100 mg/dL 184 (H)  182 (H) 167 (H)  198 (H) 205 (H)   BUN Latest Ref Range: 6 - 20 MG/DL 47 (H)  43 (H) 43 (H)  32 (H) 26 (H)   Creatinine Latest Ref Range: 0.70 - 1.30 MG/DL 2.02 (H)  1.92 (H) 1.83 (H)  1.69 (H) 1.48 (H)   Bilirubin, total Latest Ref Range: 0.2 - 1.0 MG/DL 2.2 (H)  2.2 (H)   2.1 (H) 1.9 (H)   Albumin Latest Ref Range: 3.5 - 5.0 g/dL 3.6  2.9 (L)   3.6 3.5   ALT (SGPT) Latest Ref Range: 12 - 78 U/L 44  52   37 41   AST Latest Ref Range: 15 - 37 U/L 59 (H)  54 (H)   37 44 (H)   Alk.  phosphatase Latest Ref Range: 45 - 117 U/L 247 (H)  279 (H)   132 (H) 152 (H)   Ammonia Latest Ref Range: <32 UMOL/L   227 (H) 52 (H)   103 (H)   BODY FLUID TYPE Latest Units:    ASCITIC FLUID   ASCITIC FLUID     FLUID APPEARANCE Latest Units:    HAZY   CLOUDY     FLUID COLOR Latest Units:    YELLOW   YELLOW     FLUID RBC CT.  Latest Ref Range: 0 /cu mm  >100 (H)   >100 (H)     FLUID NUCLEATED CELLS Latest Units: /cu mm  1263   151     FLD NEUTROPHILS Latest Ref Range: NRRE %  91 (A)   45 (A)     FLD LYMPHS Latest Ref Range: NRRE %  5 (A)   25 (A)     FLD MONO/MACROPHAGES Latest Ref Range: NRRE %  4 (A)   30 (A)         MD Araseli Dorantes 13 of [facility] Tre[facility] [address] [place] [address] [address], [address]  22. [phone]

## 2018-08-09 NOTE — HOSPICE
[facility] RN note: In to meet with pt briefly, Dr Frankie Araujo meeting. Wife [facility] and pt planning to meet with hospice at 10:00. Some confusion expressed by pt about plans going forward with hospice. Will hopefully clarify at 10:00 meeting. Dr Marcelo Muniz and Dr Rachid Truong to discuss aspira drain placement to maintain comfort at home. 10:30---Met with pt and wife [facility], confirmed desire to go home with hospice. Aspire drain to be placed according to Dr Andrzej Sarmiento last note. Will coordinate discharge with CM once procedure scheduled. Thank you for the opportunity to care for this pt and family. Please contact hospice at [phone] with any questions or concerns.

## 2018-08-09 NOTE — ROUTINE PROCESS
Bedside shift change report given to [address]lellan (oncoming nurse) by Feliz Asencio (offgoing nurse). Report included the following information SBAR.

## 2018-08-09 NOTE — PROGRESS NOTES
Problem: Falls - Risk of  Goal: *Absence of Falls  Document Stefania Fall Risk and appropriate interventions in the flowsheet.    Outcome: Progressing Towards Goal  Fall Risk Interventions:            Medication Interventions: Patient to call before getting OOB, Teach patient to arise slowly    Elimination Interventions: Urinal in reach, Patient to call for help with toileting needs

## 2018-08-09 NOTE — PROGRESS NOTES
Problem: Pressure Injury - Risk of  Goal: *Prevention of pressure injury  Document Spencer Scale and appropriate interventions in the flowsheet.    Outcome: Progressing Towards Goal  Pressure Injury Interventions:  Sensory Interventions: Assess changes in LOC, Minimize linen layers    Moisture Interventions: Minimize layers    Activity Interventions: Pressure redistribution bed/mattress(bed type), Increase time out of bed    Mobility Interventions: Pressure redistribution bed/mattress (bed type)    Nutrition Interventions: Document food/fluid/supplement intake    Friction and Shear Interventions: HOB 30 degrees or less, Lift sheet

## 2018-08-09 NOTE — PROGRESS NOTES
Thai Conner MD   Phone/text: [phone] (7am to 7pm)  After 7pm please call  for hospitalist on call    Hospitalist Progress Note     8/9/2018   PCP:  Dr. Desi Matos MD  Chief Complaint   Patient presents with    Swelling    Nausea       Admission Summary:   The patient has a history of alcoholic cirrhosis, history of ascites with regular needle scheduled paracentesis, history of cardiomyopathy, AICD placement, alcohol abuse, currently in remission, hepatic encephalopathy, history of LORENZA, hyponatremia, anemia, thrombocytopenia, esophageal varices, chronic systolic heart failure, stage II, status post left BKA, hypertension, diabetes mellitus type 2. He got his last paracentesis done on 08/02/2018, and has gained about 10 pounds in the last 4 days. The patient reports that since yesterday morning he has had generalized weakness, fatigue, nausea, poor appetite and lethargy. The patient also reports that he has been dry heaving and has not been eating or drinking much. Patient reports that \"I am worse than I have ever been in my life. \"     Reason For Visit:  SBP    Assessment/Plan   Ascites with spontaneous bacterial peritonitis (POA)  - CT abdomen/pelvis 8/6 with cirrhosis and splenomegaly, hyperdense left renal cyst, nephrolithiasis. - Paracentesis 8/2 with >1100 neutrophils, repeat 8/7 shows resolution  - Continue ceftriaxone started 8/6  - Albumin given  - If patient opts for hospice, we will place aspira. Otherwise, we will hold off.  - Hepatology consulted    Hepatic encephalopathy (POA) - up and down  - Continue lactulose, rifaxamin    Hyponatremia, acute on chronic, most likely hypervolemic - sodium still not great  - Stopped IV fluids  - Continue IV lasix, holding spironolactone    Alcoholic cirrhosis with ascites (chronic)  - As above    Digoxin overdose - digoxin level back in the therapeutic range.  May not be a great med in a patient with confusion and CKD4  - ECG with AV dual pacing  - Stopped digoxin  - Continue amioderone  - Monitor levels    Hyperkalemia (POA) - improving and within the normal range  - Hold spironolactone, losartan  - Continue IV lasix  - Monitor potassium    Atrial fibrillation s/p pacemaker  - Holding digoxin as above  - No anticoagulation due to coagulopathy and thrombocytopenia    Chronic systolic CHF - difficult to tell NYHA class given other medical problems  - Hold losartan given hyperkalemia  - Continue amiodarone, no BB due to chronic hypotension  - Lasix as above    Chronic kidney disease stage IV - improved back to baseline    Chronic anemia, combination of iron deficiency and chronic kidney disease - counts stable  - Monitor HH and transfuse as needed    History of diabetes - recent A1c 5.4  - SSI as needed    Hospice consulted - sounds like patient and family are leaning towards home hospice but patient still has doubts. See orders for other plans. VTE prophylaxis: SCDs  Discussed plan of care with Patient/Family and Nurse   Pre-admission lived at home  Discharge plan: home  Estimated time to discharge: unclear     Subjective   Mr. Layne Esteban is awake. Continued drainage from paracentesis catheter. No more nausea.      Reviewed interval history  Physical examination     Visit Vitals    /55 (BP 1 Location: Left arm, BP Patient Position: At rest)    Pulse 63    Temp 97.7 °F (36.5 °C)    Resp 18    Ht 5' 10\" (1.778 m)    Wt 84.6 kg (186 lb 8.2 oz)    SpO2 99%    BMI 26.76 kg/m2       Temp (24hrs), Av.2 °F (36.8 °C), Min:97.7 °F (36.5 °C), Max:98.7 °F (37.1 °C)      Intake/Output Summary (Last 24 hours) at 18 1152  Last data filed at 18 1120   Gross per 24 hour   Intake              530 ml   Output             1500 ml   Net             -970 ml       Gen - NAD  HEENT - MMM  Neck - supple, full ROM  CV - RRR, no MRG  Resp - lungs CTA b/l, no wheezing, normal WOB  GI - abdomen S, diffusely tender without rebound, less distended, +BS. Paracentesis catheter in place   - no CVA tenderness, bladder non-palpable in lower abdomen  MSK - normal tone and bulk, left BKA, no edema  Neuro - A&Ox2, no focal deficits, no asterixis  Psych - calm and cooperative    Data Review       Telemetry x   ECG    Xray    CT scan    MRI    Echocardiogram    Ultrasound              I have reviewed the flow sheet and recent notes  New labs and data personally reviewed.     Recent Labs      08/09/18 0419 08/08/18 0357 08/07/18   0305   WBC  3.1*  3.0*  4.8   HGB  7.3*  7.0*  8.4*   HCT  20.6*  19.9*  23.4*   PLT  57*  56*  51*     Recent Labs      08/09/18 0419 08/08/18 0357 08/07/18 0305  08/06/18   1800   NA  125*  126*   --   126*  125*   K  4.8  5.1   --   6.1*  5.5*   CL  92*  95*   --   94*  95*   CO2  20*  19*   --   21  19*   GLU  205*  198*   --   167*  182*   BUN  26*  32*   --   43*  43*   CREA  1.48*  1.69*   --   1.83*  1.92*   CA  8.7  8.9   --   9.0  9.0   ALB  3.5  3.6   --   2.9*   TBILI  1.9*  2.1*   --   2.2*   SGOT  44*  37   --   54*   ALT  41  37   --   52   INR   --   1.4*  1.3*   --        Medications reviewed  Current Facility-Administered Medications   Medication Dose Route Frequency    traMADol (ULTRAM) tablet 50 mg  50 mg Oral Q6H PRN    insulin lispro (HUMALOG) injection   SubCUTAneous AC&HS    acetaminophen (TYLENOL) tablet 500 mg  500 mg Oral Q6H PRN    ondansetron (ZOFRAN) injection 4 mg  4 mg IntraVENous Q6H PRN    HYDROmorphone (DILAUDID) syringe 0.5 mg  0.5 mg IntraVENous Q4H PRN    lactulose (CHRONULAC) solution 30 g  30 g Oral QID    amiodarone (CORDARONE) tablet 200 mg  200 mg Oral QHS    gabapentin (NEURONTIN) capsule 100 mg  100 mg Oral TID    multivitamin, tx-iron-ca-min (THERA-M w/ IRON) tablet 1 Tab  1 Tab Oral DAILY    pantoprazole (PROTONIX) tablet 40 mg  40 mg Oral ACB    rifAXIMin (XIFAXAN) tablet 550 mg  550 mg Oral BID    thiamine HCL (B-1) tablet 100 mg  100 mg Oral DAILY  sodium chloride (NS) flush 5-10 mL  5-10 mL IntraVENous Q8H    sodium chloride (NS) flush 5-10 mL  5-10 mL IntraVENous PRN    cefTRIAXone (ROCEPHIN) 1 g in 0.9% sodium chloride (MBP/ADV) 50 mL  1 g IntraVENous Q24H    glucose chewable tablet 16 g  4 Tab Oral PRN    dextrose (D50W) injection syrg 12.5-25 g  12.5-25 g IntraVENous PRN    glucagon (GLUCAGEN) injection 1 mg  1 mg IntraMUSCular PRN         Wilfredo Magallanes MD  Internal Medicine  8/9/2018

## 2018-08-09 NOTE — PROGRESS NOTES
[facility] Palliative Medicine Office  Nursing Note  [phone]  Fax [phone]     Name:  Shannon Membreno  YOB: 1960     Willy Coleman RN, [facility] Hospice Liaison nurse requested Palliative Home referral for Michael Means. Claudiafrank Valles (she has spoken with Dr. Elke Damico who approved referral). Pt was admitted to [facility] on 8/3/18 with ascites with spontaneous bacterial peritonitis (POA) . Plan is for pt to be discharged home from [facility] tomorrow on oral antibiotics and pt will be admitted to [facility] after infection has cleared and he is able to have aspira drain placed. Palliative to follow until after drain is inserted and then he can be admitted to hospice. Pt has history of alcoholic cirrhosis, ascites with regular scheduled paracentesis, cardiomyopathy, AICD placement, alcohol abuse (currently in remission since January 2018) , hepatic encephalopathy, LORENZA, hyponatremia, anemia, thrombocytopenia, esophageal varices, chronic systolic heart failure (stage II), status post left BKA, hypertension, diabetes mellitus type 2. Nurse called and spoke with pt's wife Morena Andre [phone] and explained Palliative Home services. Wife is confused about role of Palliative. Nurse explained that Palliative would offer support and provide symptom management (including 24/7 on call number) until pt is admitted to hospice. Wife agrees to Palliative following pt until he is admitted to hospice. Nurse will check on available times for Palliative Home visits next week and call wife back.     MUSTAPHA BarrazaN, RN  Clinical Referral Navigator

## 2018-08-09 NOTE — PROGRESS NOTES
Bedside shift change report given to Gladis Perkins (oncoming nurse) by Rajan Bailey (offgoing nurse). Report included the following information SBAR, Intake/Output, MAR and Cardiac Rhythm paced.

## 2018-08-10 NOTE — TELEPHONE ENCOUNTER
Called insurance company to verify CPT 60470 and 90993 to see if we needed a prior authorization/certification or referral.  Kirstin Cueva states NOT required, neither is required. There will be a $40.00 co pay. Call ref:  [id number].

## 2018-08-10 NOTE — ROUTINE PROCESS
Bedside shift change report given to Damaso Coleman (oncoming nurse) by Preethi Kim (offgoing nurse). Report included the following information SBAR.

## 2018-08-10 NOTE — PROGRESS NOTES
Hospital follow-up PCP transitional care appointment has been scheduled with MIRANDA Adames for Thursday, 8/16/18 at 11:00 a.m. Pending patient discharge.   Melany Ramirez, Care Management Specialist.

## 2018-08-10 NOTE — DISCHARGE SUMMARY
Discharge Summary     Patient:  Steve Matute       MRN: [id number]       YOB: 1960       Age: 59 y.o. Date of admission:  8/6/2018    Date of discharge:  8/10/2018    Primary care provider: Dr. Sky Murcia MD    Admitting provider:  Mariluz Vang MD    Discharging provider:  Angel Rogers [address] [phone]. If unavailable, call [phone] and ask the  to page the triage hospitalist.    Consultations  IP CONSULT TO HEPATOLOGY  IP CONSULT TO PALLIATIVE CARE - PROVIDER  IP CONSULT TO INTERVENTIONAL RADIOLOGY    Procedures  * No surgery found *    Discharge destination: Home. The patient is stable for discharge. Admission diagnosis  Ascites    Current Discharge Medication List      START taking these medications    Details   traMADol (ULTRAM) 50 mg tablet Take 1 Tab by mouth every six (6) hours as needed. Max Daily Amount: 200 mg. Qty: 20 Tab, Refills: 0    Associated Diagnoses: SBP (spontaneous bacterial peritonitis) (HCC)      Saccharomyces boulardii (FLORASTOR) 250 mg capsule Take 1 Cap by mouth two (2) times a day for 30 days. Can be obtained over-the-counter. OK to substitute other probiotic  Qty: 60 Cap, Refills: 0      levoFLOXacin (LEVAQUIN) 500 mg tablet Take 1 Tab by mouth daily for 10 days. Qty: 10 Tab, Refills: 0         CONTINUE these medications which have NOT CHANGED    Details   !! lactulose (CHRONULAC) 10 gram/15 mL solution TAKE 15 ML (1 TABLESPOON) BY MOUTH THREE TIMES A DAY  Qty: 946 mL, Refills: 6    Associated Diagnoses: Hepatic encephalopathy ([address]      ! ! lactulose (CHRONULAC) 20 gram/30 mL soln solution Take 15 mL by mouth three (3) times daily. Qty: 4320 mL, Refills: 2    Associated Diagnoses: Hepatic encephalopathy ([address]  Alcoholic cirrhosis of liver with ascites (HCC)      rifAXIMin (XIFAXAN) 550 mg tablet Take 1 Tab by mouth two (2) times a day. Indications: Hepatic Encephalopathy  Qty: 60 Tab, Refills: 2      gabapentin (NEURONTIN) 100 mg capsule Take 100 mg by mouth three (3) times daily. thiamine (B-1) 100 mg tablet Take 1 Tab by mouth daily. Qty: 30 Tab, Refills: 0      metoprolol succinate (TOPROL XL) 25 mg XL tablet Take 0.5 Tabs by mouth daily. Qty: 15 Tab, Refills: 0      amiodarone (CORDARONE) 200 mg tablet Take 200 mg by mouth nightly. furosemide (LASIX) 40 mg tablet Take once daily. Qty: 30 Tab, Refills: 0      pantoprazole (PROTONIX) 40 mg tablet Take 1 Tab by mouth daily. Qty: 60 Tab, Refills: 1      multivitamins-minerals-lutein (CENTRUM SILVER) Tab Take 1 Tab by mouth daily. !! - Potential duplicate medications found. Please discuss with provider. STOP taking these medications       LOSARTAN PO Comments:   Reason for Stopping:         digoxin (LANOXIN) 0.125 mg tablet Comments:   Reason for Stopping:         spironolactone (ALDACTONE) 100 mg tablet Comments:   Reason for Stopping: Follow-up Information     Follow up With Details Comments Contact Info    Crescencio Alfonso MD Call As needed for primary care [address]      Armando Solorzano MD Schedule an appointment as soon as possible for a visit in 1 week For follow up of cirrhosis [address] [place] [address]      Isidro Cheadle, NP Schedule an appointment as soon as possible for a visit in 2 weeks For follow up with [address] [place] [address]  [phone]            Future Appointments  Date Time Provider Yobani Garcia   8/20/2018 10:30 AM [facility] [place] [facility] [address] [facility]   9/4/2018 1:00 PM [facility] ER [address]     Final discharge diagnoses and brief hospital course  Please also refer to the admission H&P for details on the presenting problem.     The patient has a history of alcoholic cirrhosis, history of ascites with regular needle scheduled paracentesis, history of cardiomyopathy, AICD placement, alcohol abuse, currently in remission, hepatic encephalopathy, history of LORENZA, hyponatremia, anemia, thrombocytopenia, esophageal varices, chronic systolic heart failure, stage II, status post left BKA, hypertension, diabetes mellitus type 2. He got his last paracentesis done on 08/02/2018, and has gained about 10 pounds in the last 4 days.  The patient reports that since yesterday morning he has had generalized weakness, fatigue, nausea, poor appetite and lethargy.  The patient also reports that he has been dry heaving and has not been eating or drinking much. Patient reports that \"I am worse than I have ever been in my life. \"      Ascites with spontaneous bacterial peritonitis (POA)  - CT abdomen/pelvis 8/6 with cirrhosis and splenomegaly, hyperdense left renal cyst, nephrolithiasis. - Paracentesis 8/2 with >1100 neutrophils, repeat 8/7 shows resolution  - Continue ceftriaxone started 8/6  - Albumin given  - If patient opts for hospice, we will place aspira. Otherwise, we will hold off.  - Hepatology consulted     Hepatic encephalopathy (POA) - up and down  - Continue lactulose, rifaxamin     Hyponatremia, acute on chronic, most likely hypervolemic - sodium still not great but >125, where it has been for the past month  - Stopped IV fluids  - Continue lasix, holding spironolactone  - Follow up BMP and consider fluid restriction but patient not very interested     Alcoholic cirrhosis with ascites (chronic)  - As above     Digoxin overdose - digoxin level back in the therapeutic range.  May not be a great med in a patient with confusion and CKD4  - ECG with AV dual pacing  - Stopped digoxin  - Monitor levels     Hyperkalemia (POA) - improving and within the normal range  - Hold spironolactone, losartan  - Continue lasix  - Monitor potassium     Atrial fibrillation s/p pacemaker  - Holding digoxin as above  - Continue amioderone  - No anticoagulation due to coagulopathy and thrombocytopenia     Chronic systolic CHF - difficult to tell NYHA class given other medical problems  - Hold losartan given hyperkalemia  - Continue amiodarone, no BB due to chronic hypotension  - Lasix as above     Chronic kidney disease stage IV - improved back to baseline     Chronic anemia, combination of iron deficiency and chronic kidney disease - counts stable  - Monitor HH and transfuse as needed     History of diabetes - recent A1c 5.4  - Diabetic diet  - SSI as needed     Goals of care - sounds like patient and family are leaning towards home hospice but patient continues to want curative therapy. Follow up with Palliative care. FOLLOW-UP CARE RECOMMENDATIONS:     FOLLOW-UP TESTS recommended:   · Repeat paracentesis on 8/20 with cell counts. If this is negative for infection, please discuss placement of a long-term abdominal drain with Dr. Priscilla Martinez. · BMP in 1 week with Hepatology    SYMPTOMS to watch for: chest pain, shortness of breath, fever, chills, nausea, vomiting, diarrhea, change in mentation, falling, weakness, bleeding. DIET/what to eat:  Diabetic Diet    ACTIVITY:  Activity as tolerated and No driving while on analgesics    What to do if new or unexpected symptoms occur? If you experience any of the above symptoms (or should other concerns or questions arise after discharge) please call your primary care physician. Return to the emergency room if you cannot get hold of your doctor. · It is very important that you keep your follow-up appointment(s). · Please bring discharge papers, medication list (and/or medication bottles) to your follow-up appointments for review by your outpatient provider(s). · Please check the list of medications and be sure it includes every medication (even non-prescription medications) that your provider wants you to take.     · It is important that you take the medication exactly as they are prescribed. · Keep your medication in the bottles provided by the pharmacist and keep a list of the medication names, dosages, and times to be taken in your wallet. · Do not take other medications without consulting your doctor. · If you have any questions about your medications or other instructions, please talk to your nurse or care provider before you leave the hospital.    Physical examination at discharge  Visit Vitals    /51 (BP 1 Location: Right arm, BP Patient Position: At rest;Supine)    Pulse 61    Temp 98.5 °F (36.9 °C)    Resp 18    Ht 5' 10\" (1.778 m)    Wt 86.1 kg (189 lb 13.1 oz)    SpO2 98%    BMI 27.24 kg/m2     Gen - NAD  HEENT - MMM  Neck - supple, full ROM  CV - RRR, no MRG  Resp - lungs CTA b/l, no wheezing, normal WOB  GI - abdomen S, diffusely tender without rebound, less distended, +BS. Paracentesis catheter in place   - no CVA tenderness, bladder non-palpable in lower abdomen  MSK - normal tone and bulk, left BKA, no edema  Neuro - A&Ox2, no focal deficits, no asterixis  Psych - calm and cooperative    Pertinent imaging studies:    CT abdomen/pelvis 8/6/18  FINDINGS:   LUNG BASES: Mild scarring in the right lower lobe. INCIDENTALLY IMAGED HEART AND MEDIASTINUM: Unremarkable. LIVER: Small heterogeneous nodular liver without mass. GALLBLADDER: Cholelithiasis. SPLEEN: Enlarged. PANCREAS: No mass or ductal dilatation. ADRENALS: Unremarkable. KIDNEYS/URETERS: Bilateral nephrolithiasis. Hyperdense cyst left kidney. STOMACH: Unremarkable. SMALL BOWEL: No dilatation or wall thickening. COLON: No dilatation or wall thickening. APPENDIX: Not identified. PERITONEUM: No significant interval change in significant abdominal ascites. RETROPERITONEUM: No lymphadenopathy or aortic aneurysm. REPRODUCTIVE ORGANS: Enlarged prostate  URINARY BLADDER: No mass or calculus. BONES: No destructive bone lesion. ADDITIONAL COMMENTS: N/A     IMPRESSION  IMPRESSION:     1. No change in significant abdominal ascites. 2. Cirrhosis. 3. Splenomegaly. 4. Hyperdense left renal cyst.  5. Nephrolithiasis. US paracentesis 8/7/18  FINDINGS: Ultrasound exam demonstrates massive ascites.     Ultrasound was utilized for localization for catheter placement.     Successful pigtail catheter placement within the peritoneal cavity and freely  draining ascites fluid noted.     IMPRESSION  IMPRESSION:     Successful catheter drainage of massive ascites.     Fluid sent for laboratory analysis. Xray abdomen 8/8/18  FINDINGS:    There are couple air-fluid levels probably in small bowel without abnormal  distention. Some gas and fecal matter in the colon. No definite free  intraperitoneal air. Right-sided paracentesis catheter remains in place. Psoas  margins are sharp. Mild chronic degenerative changes in spine and hips.     IMPRESSION  IMPRESSION:   No acute abdominal abnormality demonstrated. Recent Labs      08/09/18 0419 08/08/18 0357   WBC  3.1*  3.0*   HGB  7.3*  7.0*   HCT  20.6*  19.9*   PLT  57*  56*     Recent Labs      08/09/18 0419 08/08/18   0357   NA  125*  126*   K  4.8  5.1   CL  92*  95*   CO2  20*  19*   BUN  26*  32*   CREA  1.48*  1.69*   GLU  205*  198*   CA  8.7  8.9     Recent Labs      08/09/18 0419 08/08/18   0357   SGOT  44*  37   AP  152*  132*   TP  5.9*  6.0*   ALB  3.5  3.6   GLOB  2.4  2.4     Recent Labs      08/08/18   0357   INR  1.4*   PTP  14.3*      No results for input(s): FE, TIBC, PSAT, FERR in the last 72 hours. No results for input(s): PH, PCO2, PO2 in the last 72 hours. No results for input(s): CPK, CKMB in the last 72 hours.     No lab exists for component: TROPONINI  No components found for: [place]    Chronic Diagnoses:    Problem List as of 8/10/2018  Date Reviewed: 8/6/2018          Codes Class Noted - Resolved    * (Principal)Ascites ICD-10-CM: R18.8  ICD-9-CM: 789.59  8/6/2018 - Present        SBP (spontaneous bacterial peritonitis) [facility]) ICD-10-CM: P47.8  ICD-9-CM: 567.23  7/16/2018 - Present        Iron deficiency anemia ICD-10-CM: D50.9  ICD-9-CM: 280.9  6/21/2018 - Present        Ascites due to alcoholic cirrhosis ([facility] [id number]: K70.31  ICD-9-CM: 571.2  6/21/2018 - Present        Thrombocytopenia ([facility] ICD-10-CM: D69.6  ICD-9-CM: 287.5  6/21/2018 - Present        Hepatic encephalopathy ([facility] ICD-10-CM: K72.90  ICD-9-CM: 572.2  4/29/2018 - Present        LORENZA (acute kidney injury) ([facility] ICD-10-CM: N17.9  ICD-9-CM: 584.9  4/20/2018 - Present        Esophageal varices ([place] [address].) ICD-10-CM: I85.00  ICD-9-CM: 456.1  2/8/2018 - Present        Cirrhosis, alcoholic ([facility] [id number]: K70.30  ICD-9-CM: 571.2  1/3/2018 - Present        Alcohol abuse, in remission ICD-10-CM: F10.11  ICD-9-CM: 305.03  1/3/2018 - Present        Chronic systolic heart failure (HCC) (Chronic) ICD-10-CM: I50.22  ICD-9-CM: 428.22  11/21/2017 - Present        Hyponatremia ICD-10-CM: E87.1  ICD-9-CM: 276.1  11/20/2017 - Present        Neuropathy ICD-10-CM: G62.9  ICD-9-CM: 355.9  3/30/2017 - Present        S/P BKA (below knee amputation) ([facility] ICD-10-CM: Z89.519  ICD-9-CM: V49.75  3/30/2017 - Present        Hypertension ICD-10-CM: I10  ICD-9-CM: 401.9  3/30/2017 - Present        Cardiac defibrillator in place ICD-10-CM: Z95.810  ICD-9-CM: V45.02  3/30/2017 - Present        Type II diabetes mellitus ([facility] ICD-10-CM: E11.9  ICD-9-CM: 250.00  2/1/2015 - Present        RESOLVED: LORENZA (acute kidney injury) ([facility] ICD-10-CM: N17.9  ICD-9-CM: 584.9  4/19/2018 - 4/19/2018        RESOLVED: GI bleed ICD-10-CM: K92.2  ICD-9-CM: 578.9  2/17/2018 - 4/19/2018        RESOLVED: GI bleed ICD-10-CM: K92.2  ICD-9-CM: 578.9  1/23/2018 - 1/28/2018        RESOLVED: Secondary esophageal varices without bleeding ([facility] ICD-10-CM: I85.10  ICD-9-CM: 456.21  1/3/2018 - 2/8/2018        RESOLVED: Hypomagnesemia ICD-10-CM: I59.85  ICD-9-CM: 275.2  12/6/2017 - 12/10/2017        RESOLVED: Suicidal ideations ICD-10-CM: X69.318  ICD-9-CM: V62.84  12/6/2017 - 12/10/2017        RESOLVED: Anasarca ICD-10-CM: R60.1  ICD-9-CM: 782.3  11/20/2017 - 11/24/2017        RESOLVED: Cirrhosis of liver without ascites ([facility] ICD-10-CM: K74.60  ICD-9-CM: 571.5  5/2/2017 - 1/3/2018        RESOLVED: Extremity pain ICD-10-CM: M79.609  ICD-9-CM: 729.5  2/18/2015 - 3/30/2017        RESOLVED: Cellulitis and abscess of foot ICD-10-CM: L03.119, L02.619  ICD-9-CM: 682.7  2/1/2015 - 2/13/2015        RESOLVED: Hyperglycemia ICD-10-CM: R73.9  ICD-9-CM: 790.29  1/31/2015 - 2/13/2015              Time spent on discharge related activities today greater than 30 minutes.       Signed:  Peter Vital MD                 Hospitalist, Internal Medicine      Cc: Edith Almonte MD

## 2018-08-10 NOTE — PROGRESS NOTES
CM noted patient discharged home today. Also reviewed note by Hospice nurse, patient will be followed by Palliative Care for Outpatient Services. The plan is not to deactivate AICD until treament complete and aspira drain placed. Patient will be discharged home in care of his wife and Dr. Cristina Bello.  Gabriella Muñiz MSA, RN, CRM

## 2018-08-10 NOTE — HOSPICE
[facility] RN note:  Communicated with Dr Courtney Cooper and wife Milton. Plan is for discharge this afternoon after last dose of IV abx. Palliative to follow until treatment complete and aspira drain placed. AICD to remain activated for now per Dr Courtney Cooper. Wife is in agreement with plan, somewhat anxious but feeling supported by hospice/ palliative teams. Thank you for the opportunity to care for this pt and family. Please contact hospice at [phone] with any questions or concerns.

## 2018-08-10 NOTE — DISCHARGE INSTRUCTIONS
Please bring this form with you to show your primary care provider at your follow-up appointment. Primary care provider:  Dr. Sera Vasquez MD    Discharging provider:  Stephanie Regalado MD    You have been admitted to the hospital with the following diagnoses:  · Ascites   · Spontaneous bacterial peritonitis  · Encephalopathy     FOLLOW-UP CARE RECOMMENDATIONS:    APPOINTMENTS:  Follow-up Information     Follow up With Details Comments Contact Info    Sera Vasquez MD Call As needed for primary care 1200 [facility] [address] [facility]      Jc Somers MD Schedule an appointment as soon as possible for a visit in 1 week For follow up of cirrhosis [address] [facility] 9      Cecelia Vivar NP Schedule an appointment as soon as possible for a visit in 2 weeks For follow up with [address]  [phone]           Future Appointments  Date Time Provider Yobani Garcia   8/20/2018 10:30 AM [address] ER [address] [facility]   9/4/2018 1:00 PM [facility] ER 1 [facility]     FOLLOW-UP TESTS recommended:   · Repeat paracentesis on 8/20 with cell counts. If this is negative for infection, please discuss placement of a long-term abdominal drain with Dr. Cristina Bello. · BMP in 1 week with Hepatology    SYMPTOMS to watch for: chest pain, shortness of breath, fever, chills, nausea, vomiting, diarrhea, change in mentation, falling, weakness, bleeding. DIET/what to eat:  Diabetic Diet    ACTIVITY:  Activity as tolerated and No driving while on analgesics    What to do if new or unexpected symptoms occur? If you experience any of the above symptoms (or should other concerns or questions arise after discharge) please call your primary care physician. Return to the emergency room if you cannot get hold of your doctor.     · It is very important that you keep your follow-up appointment(s). · Please bring discharge papers, medication list (and/or medication bottles) to your follow-up appointments for review by your outpatient provider(s). · Please check the list of medications and be sure it includes every medication (even non-prescription medications) that your provider wants you to take. · It is important that you take the medication exactly as they are prescribed. · Keep your medication in the bottles provided by the pharmacist and keep a list of the medication names, dosages, and times to be taken in your wallet. · Do not take other medications without consulting your doctor. · If you have any questions about your medications or other instructions, please talk to your nurse or care provider before you leave the hospital.    I understand that if any problems occur once I am at home I am to contact my physician. These instructions were explained to me and I had the opportunity to ask questions. DISCHARGE SUMMARY from Nurse    PATIENT INSTRUCTIONS:    After general anesthesia or intravenous sedation, for 24 hours or while taking prescription Narcotics:  · Limit your activities  · Do not drive and operate hazardous machinery  · Do not make important personal or business decisions  · Do  not drink alcoholic beverages  · If you have not urinated within 8 hours after discharge, please contact your surgeon on call.     Report the following to your surgeon:  · Excessive pain, swelling, redness or odor of or around the surgical area  · Temperature over 100.5  · Nausea and vomiting lasting longer than 4 hours or if unable to take medications  · Any signs of decreased circulation or nerve impairment to extremity: change in color, persistent  numbness, tingling, coldness or increase pain  · Any questions    What to do at Home:  Recommended activity: Activity as tolerated, Activity as tolerated and no driving for today, Bedrest, No lifting, Driving, or Strenuous exercise for , No heavy lifting, pushing, pulling with the implant side for 2 months, No driving for 2 weeks, No sex for  weeks, No driving while on analgesics and No heavy lifting for  weeks,     If you experience any of the following symptoms , please follow up with . *  Please give a list of your current medications to your Primary Care Provider. *  Please update this list whenever your medications are discontinued, doses are      changed, or new medications (including over-the-counter products) are added. *  Please carry medication information at all times in case of emergency situations. These are general instructions for a healthy lifestyle:    No smoking/ No tobacco products/ Avoid exposure to second hand smoke  Surgeon General's Warning:  Quitting smoking now greatly reduces serious risk to your health. Obesity, smoking, and sedentary lifestyle greatly increases your risk for illness    A healthy diet, regular physical exercise & weight monitoring are important for maintaining a healthy lifestyle    You may be retaining fluid if you have a history of heart failure or if you experience any of the following symptoms:  Weight gain of 3 pounds or more overnight or 5 pounds in a week, increased swelling in our hands or feet or shortness of breath while lying flat in bed. Please call your doctor as soon as you notice any of these symptoms; do not wait until your next office visit. Recognize signs and symptoms of STROKE:    F-face looks uneven    A-arms unable to move or move unevenly    S-speech slurred or non-existent    T-time-call 911 as soon as signs and symptoms begin-DO NOT go       Back to bed or wait to see if you get better-TIME IS BRAIN. Warning Signs of HEART ATTACK     Call 911 if you have these symptoms:   Chest discomfort. Most heart attacks involve discomfort in the center of the chest that lasts more than a few minutes, or that goes away and comes back.  It can feel like uncomfortable pressure, squeezing, fullness, or pain.  Discomfort in other areas of the upper body. Symptoms can include pain or discomfort in one or both arms, the back, neck, jaw, or stomach.  Shortness of breath with or without chest discomfort.  Other signs may include breaking out in a cold sweat, nausea, or lightheadedness. Don't wait more than five minutes to call 911 - MINUTES MATTER! Fast action can save your life. Calling 911 is almost always the fastest way to get lifesaving treatment. Emergency Medical Services staff can begin treatment when they arrive -- up to an hour sooner than if someone gets to the hospital by car. The discharge information has been reviewed with the patient. The patient verbalized understanding. Discharge medications reviewed with the patient and appropriate educational materials and side effects teaching were provided.   ___________________________________________________________________________________________________________________________________

## 2018-08-13 NOTE — PROGRESS NOTES
Palliative Medicine Outpatient Services  [address]: [phone]    Patient Name: Lauri Keys  YOB: 1960    Date of Current Visit: 08/14/18  Location of Current Visit:    [] [facility] Office  [] [facility] Office  [] [facility] Office  [x] Home  [] Other:      Date of Initial Visit: 8/13/18   Requesting Physician: Marc Clancy RN, Wilfrido Krishnamurthy MD  Primary Care Physician: Jesenia Chun MD      SUMMARY:   Lauri Keys is a 59y.o. year old with a  history of EtOH-related complicated by esophageal varices, recurrent ascites requiring paracentesis, recurrent hospitalizations for hepatic encephalopathy;CHF s/p AICD placement; DM; left BKA who was referred to Palliative Medicine by Marc Clancy RN for symptom management and supportive care. He was last hospitalized 8/6-8/10/18 with hepatic encephalopathy and spontaneous bacterial peritonitis. He was discharged to home with plan to continue course of oral antibiotics, then to have aspire drain placed. The patients social history includes: he is  to VDP. They have 2 adult daughters, ages 29 and 45 and a 8year old Lazaro Mosquera. He is a retired . His wife works at the [employer]. Palliative Medicine is addressing the following current patient/family concerns: fatigue, phantom limb pain, weakness, transition of care to hospice. PALLIATIVE DIAGNOSES:       ICD-10-CM ICD-9-CM    1. Fatigue, unspecified type R53.83 780.79    2. Pain, phantom limb (HCC) G54.6 353.6    3. Weakness R53.1 780.79    4. SBP (spontaneous bacterial peritonitis) (HCC) K65.2 567.23    5. Alcoholic cirrhosis of liver with ascites (HCC) K70.31 571.2    6. Chronic systolic heart failure [facility]) I50.22 428.22           PLAN:   Patient Instructions     Dear Lauri Keys ,    It was a pleasure seeing you today in your home. Your stated goal: to be at home with your family    Your described symptoms were:   Fatigue: 5 Drowsiness: 0 Depression: 0 Pain: 2   Anxiety: 0 Nausea: 0   Anorexia: 0 Dyspnea: 0     Constipation: No           This is the plan we talked about:     1. I reviewed your current medications and made no changes. 2. We will follow up with Dr. Leila Greer office about your paracentesis and drain placement. 3. I will also follow-up with Dr. Kathe Hilton about getting your AICD de-activated. 4. De-activation is a simple process which can be done in your home. 5. We also talked about your stump pain:   -Continue gabapentin 100-mg three times a day   -Continue tylenol as needed,  Limit to no more than 2,000-mg in 24-hours   -You have tramadol which is ineffective   -We decided to defer starting an opioid at this time as you feel your pain is manageable at this point    6. You and your wife [facility]    7. You expect to enroll in hospice after getting your drain. 8. The Palliative Medicine Team will continue to support you and your family in the interim. This is what you have shared with us about Vivian [address]. Planning 8/6/2018   Patient's Healthcare Decision Maker is: Legal Next of Stephania 67   Primary Decision Maker Name Juan Miguel Barnes   Primary Decision Maker Phone Number [phone]   Primary Decision Maker Relationship to Patient Spouse   Secondary Decision Maker Name -   Secondary Decision Maker Phone Number -   Secondary Decision Maker Relationship to Patient -   Confirm Advance Directive Yes, on file   Patient Would Like to Complete Advance Directive -         The Palliative Medicine Team is here to support you and your family. We will see you again in 2 weeks.     Sincerely,      Meli Kang MD and the Palliative Medicine Team      Counseling and Coordination: will confirm paracenteses appointments, will contact Dr. Orquidea Arboleda office about de-activation of AICD       GOALS OF CARE / TREATMENT PREFERENCES:   [====Goals of Care====]  GOALS OF CARE:  Patient / health care proxy stated goals:       TREATMENT PREFERENCES:   Code Status:  [] Attempt Resuscitation       [x] Do Not Attempt Resuscitation    Advance Care Planning:  Advance Care Planning 8/6/2018   Patient's Healthcare Decision Maker is: Legal Next of Stephania Beatty   Primary Decision Maker Name Olesya Waldron   Primary Decision Maker Phone Number [phone]   Primary Decision Maker Relationship to Patient Spouse   Secondary Decision Maker Name -   Secondary Decision Maker Phone Number -   Secondary Decision Maker Relationship to Patient -   Confirm Advance Directive Yes, on file   Patient Would Like to Complete Advance Directive -       Other:  (If patient appropriate for POST, consider using PALLPOST smart phrase here)    The palliative care team has discussed with patient / health care proxy about goals of care / treatment preferences for patient.  [====Goals of Care====]         PRESCRIPTIONS GIVEN:   No orders of the defined types were placed in this encounter. FOLLOW UP:     Future Appointments  Date Time Provider Yobani Garcia   8/20/2018 10:30 AM [facility] [address] [facility]    9/4/2018 1:00 PM [facility] [address] [facility]            PHYSICIANS INVOLVED IN CARE:   Patient Care Team:  Arina Mcgee MD as PCP - General (Family Practice)  Arina Mcgee MD (Family Practice)  Wendy Middleton MD (Gastroenterology)  Brent Harrison MD as Physician (Cardiology)       HISTORY:   Nursing documentation from date of visit reviewed. Reviewed patient-completed ESAS and advance care planning form. Reviewed patient record in prescription monitoring program.    CHIEF COMPLAINT: fatigue    HPI/SUBJECTIVE:    The patient is: [x] Verbal / [] Nonverbal     He feels good today. This is the best day since he came home from the hospital. The first night he slept a lot, then woke up the next day feeling better. he's been getting out of bed, coming downstairs, going out in his workshop, doing things with his son. He's still feeling fatigued but it's getting better every day. He wishes he could sleep better. He gets to sleep around 2:30-3:00am, then sleeps for about 5 hours. Otherwise, he's feeling good. He's eating. He's had no nausea or shortness of breath. He's weighing everyday. His weight was 189# on Saturday and 187# yesterday. He sometimes has phantom pain but not always. His spirits are good. He's not depressed or anxious. He knows he's dying from his liver disease. It's just how it is. He just wishes he had more time to spend with his son. He stopped drinking about 8 months ago and he's proud of that. He's been taking his lactulose. His thinking is much clearer. He's taking antibiotics for his stomach infection, then he's going to have the fluid drained again. If the infection is gone, he will have a catheter placed. Then he will get hospice. He wants to have his AICD turned off after the catheter is placed.      Clinical Pain Assessment (nonverbal scale for nonverbal patients):   [++++ Clinical Pain Assessment++++]  [++++Pain Severity++++]: Pain: 2  [++++Pain Character++++]: sharp  [++++Pain Duration++++]: years  [++++Pain Effect++++]: little effect  [++++Pain Factors++++]: unable to identify provoking factors, can \"walk it off\"  [++++Pain Frequency++++]: intermittent  [++++Pain Location++++]: left stump  [++++ Clinical Pain Assessment++++]       FUNCTIONAL ASSESSMENT:     Palliative Performance Scale (PPS):  PPS: 80       PSYCHOSOCIAL/SPIRITUAL SCREENING:     Any spiritual / Christianity concerns:  [] Yes /  [x] No    Caregiver Burnout:  [] Yes /  [x] No /  [] No Caregiver Present      Anticipatory grief assessment:   [] Normal  / [] Maladaptive       ESAS Anxiety: Anxiety: 0    ESAS Depression: Depression: 0       REVIEW OF SYSTEMS:     The following systems were [x] reviewed / [] unable to be reviewed  Systems: constitutional, ears/nose/mouth/throat, respiratory, gastrointestinal, genitourinary, musculoskeletal, integumentary, neurologic, psychiatric, endocrine. Positive findings noted below. Modified ESAS Completed by: provider   Fatigue: 5 Drowsiness: 0   Depression: 0 Pain: 2   Anxiety: 0 Nausea: 0   Anorexia: 0 Dyspnea: 0     Constipation: No              PHYSICAL EXAM:     Wt Readings from Last 3 Encounters:   08/10/18 189 lb 13.1 oz (86.1 kg)   08/02/18 199 lb (90.3 kg)   07/31/18 199 lb 9.6 oz (90.5 kg)     Blood pressure 122/66, pulse 62.   Last bowel movement: See Nursing Note    Constitutional: sitting on kitchen stool, alert, interactive  Eyes: pupils equal, anicteric  ENMT: no nasal discharge, moist mucous membranes  Cardiovascular: regular rhythm, distal pulses intact  Respiratory: breathing not labored, symmetric; no rales, no rhonchi  Gastrointestinal: distended, soft non-tender, +bowel sounds  Musculoskeletal: no deformity, no tenderness to palpation  Skin: warm, dry  Neurologic: following commands, moving all extremities  Psychiatric: full affect, no hallucinations  Other:       HISTORY:     Past Medical History:   Diagnosis Date    Arthritis     Atrial fibrillation ([address] 2014    CHF (congestive heart failure) (HCC)     Diabetes ([address]     Diabetic ulcer of left foot ([address] 2/2015 2/2015 Lt BKA    History of blood transfusion 2015    During Lt BKA; pt denies any adverse reaction    Hypertension     Liver disease     CIRRHOSIS    Sepsis ([address] 02/2015    From diabetic Left foot wound;  had a BKA ;  as of 11/21/16 pt states back to his baseline       Past Surgical History:   Procedure Laterality Date    HX AMPUTATION Left 2/10/2015    BKA    HX COLONOSCOPY      HX IMPLANTABLE CARDIOVERTER DEFIBRILLATOR  08/04/2015    [employer] cardio defibrillator; Dr Carson Watson; as of 11/21/16 pt denies defibrillator going off      Family History   Problem Relation Age of Onset    Heart Disease Brother     Heart Failure Brother     Heart Failure Brother       History reviewed, no pertinent family history. Social History   Substance Use Topics    Smoking status: Former Smoker     Packs/day: 1.00     Years: 5.00     Quit date: 1/1/2013    Smokeless tobacco: Never Used    Alcohol use Yes      Comment: Last drink January 2018     No Known Allergies   Current Outpatient Prescriptions   Medication Sig    traMADol (ULTRAM) 50 mg tablet Take 1 Tab by mouth every six (6) hours as needed. Max Daily Amount: 200 mg.    Saccharomyces boulardii (FLORASTOR) 250 mg capsule Take 1 Cap by mouth two (2) times a day for 30 days. Can be obtained over-the-counter. OK to substitute other probiotic    levoFLOXacin (LEVAQUIN) 500 mg tablet Take 1 Tab by mouth daily for 10 days.  lactulose (CHRONULAC) 10 gram/15 mL solution TAKE 15 ML (1 TABLESPOON) BY MOUTH THREE TIMES A DAY    lactulose (CHRONULAC) 20 gram/30 mL soln solution Take 15 mL by mouth three (3) times daily.  furosemide (LASIX) 40 mg tablet Take once daily.  rifAXIMin (XIFAXAN) 550 mg tablet Take 1 Tab by mouth two (2) times a day. Indications: Hepatic Encephalopathy    gabapentin (NEURONTIN) 100 mg capsule Take 100 mg by mouth three (3) times daily.  pantoprazole (PROTONIX) 40 mg tablet Take 1 Tab by mouth daily.  thiamine (B-1) 100 mg tablet Take 1 Tab by mouth daily.  metoprolol succinate (TOPROL XL) 25 mg XL tablet Take 0.5 Tabs by mouth daily.  amiodarone (CORDARONE) 200 mg tablet Take 200 mg by mouth nightly.  multivitamins-minerals-lutein (CENTRUM SILVER) Tab Take 1 Tab by mouth daily. No current facility-administered medications for this visit.            LAB DATA REVIEWED:     Lab Results   Component Value Date/Time    WBC 3.1 (L) 08/09/2018 04:19 AM    HGB 7.3 (L) 08/09/2018 04:19 AM    PLATELET 57 (L) 33/38/9687 04:19 AM     Lab Results   Component Value Date/Time    Sodium 125 (L) 08/09/2018 04:19 AM    Potassium 4.8 08/09/2018 04:19 AM    Chloride 92 (L) 08/09/2018 04:19 AM    CO2 20 (L) 08/09/2018 04:19 AM    BUN 26 (H) 08/09/2018 04:19 AM    Creatinine 1.48 (H) 08/09/2018 04:19 AM    Calcium 8.7 08/09/2018 04:19 AM    Magnesium 1.7 04/19/2018 10:45 AM    Phosphorus 3.1 04/19/2018 10:45 AM      Lab Results   Component Value Date/Time    AST (SGOT) 44 (H) 08/09/2018 04:19 AM    Alk.  phosphatase 152 (H) 08/09/2018 04:19 AM    Protein, total 5.9 (L) 08/09/2018 04:19 AM    Albumin 3.5 08/09/2018 04:19 AM    Globulin 2.4 08/09/2018 04:19 AM     Lab Results   Component Value Date/Time    INR 1.4 (H) 08/08/2018 03:57 AM    Prothrombin time 14.3 (H) 08/08/2018 03:57 AM    aPTT 30.0 02/17/2018 02:38 AM      Lab Results   Component Value Date/Time    Iron 47 04/20/2018 04:37 AM    TIBC 147 (L) 04/20/2018 04:37 AM    Iron % saturation 32 04/20/2018 04:37 AM    Ferritin 300 04/20/2018 04:37 AM           CONTROLLED SUBSTANCES SAFETY ASSESSMENT (IF ON CONTROLLED SUBSTANCES):     Reviewed opioid safety handout:  [] Yes   [] No  24 hour opioid dose >150mg morphine equivalent/day:  [] Yes   [] No  Benzodiazepines:  [] Yes   [] No  Sleep apnea:  [] Yes   [] No  Urine Toxicology Testing within last 6 months:  [] Yes   [] No  History of or new aberrant medication taking behaviors:  [] Yes   [] No            Total time:   Counseling / coordination time:   > 50% counseling / coordination?:

## 2018-08-14 NOTE — PATIENT INSTRUCTIONS
Dear Sam Espino ,    It was a pleasure seeing you today in your home. Your stated goal: to be at home with your family    Your described symptoms were: Fatigue: 5 Drowsiness: 0   Depression: 0 Pain: 2   Anxiety: 0 Nausea: 0   Anorexia: 0 Dyspnea: 0     Constipation: No           This is the plan we talked about:     1. I reviewed your current medications and made no changes. 2. We will follow up with Dr. Mookie Dailey office about your paracentesis and drain placement. 3. I will also follow-up with Dr. Xander Cho about getting your AICD de-activated. 4. De-activation is a simple process which can be done in your home. 5. We also talked about your stump pain:   -Continue gabapentin 100-mg three times a day   -Continue tylenol as needed,  Limit to no more than 2,000-mg in 24-hours   -You have tramadol which is ineffective   -We decided to defer starting an opioid at this time as you feel your pain is manageable at this point    6. You and your wife met with Odessa Ulrich RN, [facility], when you were in the hospital    7. You expect to enroll in hospice after getting your drain. 8. The Palliative Medicine Team will continue to support you and your family in the interim. This is what you have shared with us about Vivian Nielsen. Planning 8/6/2018   Patient's Healthcare Decision Maker is: Legal Next of Stephania 67   Primary Decision Maker Name Ligia Xiong   Primary Decision Maker Phone Number [phone]   Primary Decision Maker Relationship to Patient Spouse   Secondary Decision Maker Name -   Secondary Decision Maker Phone Number -   Secondary Decision Maker Relationship to Patient -   Confirm Advance Directive Yes, on file   Patient Would Like to Complete Advance Directive -         The Palliative Medicine Team is here to support you and your family. We will see you again in 2 weeks.     Sincerely,      Megan Cabrera MD and the Palliative Medicine Team

## 2018-08-14 NOTE — PROGRESS NOTES
Palliative Medicine  Nursing Note  [phone]  Fax [phone]        Clinic Office Visit  Patient Name: Sixto Martin  YOB: [birth year]      Advance Care Planning 8/6/2018   Patient's Healthcare Decision Maker is: Legal Next of Kin   Primary Decision Maker Name Gloria Garces   Primary Decision Maker Phone Number [phone]   Primary Decision Maker Relationship to Patient Spouse   Secondary Decision Maker Name -   Secondary Decision Maker Phone Number -   Secondary Decision Maker Relationship to Patient -   Confirm Advance Directive Yes, on file   Patient Would Like to Complete Advance Directive -     Met with patient and his wife for Home visit. Pleasant gentleman who lives with his wife and 10 yr. Old son. He states he is waiting to have an Aspira drain placed and is looking forward to the ability to drain the fluid off of his abdomen at home. He will be entering hospice once drain is placed. Provided New Patient Welcome Packet: Includes Opioid Safety, PM brochure and flyer, Magnet with Palliative Medicine Phone number. AVS reviewed with patient, verbalized an understanding of material discussed. Instructions given to patient to call the Palliative Medicine Office at [address] [place] [address] [phone] for any questions or concerns 24 hours a day, 7 days a weeks. Follow up appointment to be determined based off of drain placement. Nurse Navigator will provide a follow up phone call as needed.       Carlie Vang, MUSTAPHAN, RN, [facility]  Palliative Medicine

## 2018-08-15 NOTE — TELEPHONE ENCOUNTER
Voicemail left 5:30 08/14/18 Raissa Das requesting to speak Iglesia Mera about patients appt.  At 10am 08/15/18 at [address] [facility]

## 2018-08-15 NOTE — IP AVS SNAPSHOT
[address] [facility] [address] 
[phone] Patient: Sam Espino MRN: [id number] LMM:[birth year] A check suzanne indicates which time of day the medication should be taken. My Medications ASK your doctor about these medications Instructions Each Dose to Equal  
 Morning Noon Evening Bedtime  
 amiodarone 200 mg tablet Commonly known as:  CORDARONE Your last dose was: Your next dose is: Take 200 mg by mouth nightly. 200 mg CENTRUM SILVER Tab tablet Generic drug:  multivitamins-minerals-lutein Your last dose was: Your next dose is: Take 1 Tab by mouth daily. 1 Tab  
    
   
   
   
  
 furosemide 40 mg tablet Commonly known as:  LASIX Your last dose was: Your next dose is: Take once daily. gabapentin 100 mg capsule Commonly known as:  NEURONTIN Your last dose was: Your next dose is: Take 100 mg by mouth three (3) times daily. 100 mg  
    
   
   
   
  
 * lactulose 20 gram/30 mL Soln solution Commonly known as:  Vikas Kenneth Your last dose was: Your next dose is: Take 15 mL by mouth three (3) times daily. 10 g * lactulose 10 gram/15 mL solution Commonly known as:  Storm Torrance Your last dose was: Your next dose is: TAKE 15 ML (1 TABLESPOON) BY MOUTH THREE TIMES A DAY  
     
   
   
   
  
 levoFLOXacin 500 mg tablet Commonly known as:  Nissa Radon Your last dose was: Your next dose is: Take 1 Tab by mouth daily for 10 days. 500 mg  
    
   
   
   
  
 metoprolol succinate 25 mg XL tablet Commonly known as:  TOPROL XL Your last dose was: Your next dose is: Take 0.5 Tabs by mouth daily.   
 12.5 mg  
    
   
   
   
  
 pantoprazole 40 mg tablet Commonly known as:  PROTONIX Your last dose was: Your next dose is: Take 1 Tab by mouth daily. 40 mg  
    
   
   
   
  
 rifAXIMin 550 mg tablet Commonly known as:  Franklin Koehleron Your last dose was: Your next dose is: Take 1 Tab by mouth two (2) times a day. Indications: Hepatic Encephalopathy 550 mg Saccharomyces boulardii 250 mg capsule Commonly known as:  Dago Higgins Your last dose was: Your next dose is: Take 1 Cap by mouth two (2) times a day for 30 days. Can be obtained over-the-counter. OK to substitute other probiotic  
 250 mg  
    
   
   
   
  
 thiamine  mg tablet Commonly known as:  B-1 Your last dose was: Your next dose is: Take 1 Tab by mouth daily. 100 mg  
    
   
   
   
  
 traMADol 50 mg tablet Commonly known as:  ULTRAM  
   
Your last dose was: Your next dose is: Take 1 Tab by mouth every six (6) hours as needed. Max Daily Amount: 200 mg.  
 50 mg  
    
   
   
   
  
 * Notice: This list has 2 medication(s) that are the same as other medications prescribed for you. Read the directions carefully, and ask your doctor or other care provider to review them with you.

## 2018-08-15 NOTE — PROGRESS NOTES
Palliative Medicine  Nursing Note  [phone]  Fax [phone]        Clinic Office Visit  Patient Name: Josemanuel Cage  YOB: 1960    8/15/2018      Advance Care Planning 8/6/2018   Patient's Healthcare Decision Maker is: Legal Next of Kin   Primary Decision Maker Name Brooke George   Primary Decision Maker Phone Number [phone]   Primary Decision Maker Relationship to Patient Spouse   Secondary Decision Maker Name -   Secondary Decision Maker Phone Number -   Secondary Decision Maker Relationship to Patient -   Confirm Advance Directive Yes, on file   Patient Would Like to Complete Advance Directive -     Returned phone call to Mrs. Brooke George, wife of patient. She stated that patient is at [address] receiving a paracentesis today as his ascites build up happened very quickly. He will need to have the fluid assessed for cell counts regarding his recent infection and that will determine if he can be scheduled for the Aspira drain soon or will need to wait and finish antibiotics. Felisha Alexander will contact Palliative once she knows the outcome of cell count. She wanted to follow up on his ICD and how/when that could be turned off. They would like that done prior to entering hospice. Discussed that Palliative will reach out to Cardiology today to determine how they may proceed. She verbalized understanding and was appreciative. Call placed to Dr. Darlin Hamm office and left voice mail for Shaquille RN regarding the above concerns. Dr. Alba Kovacs aware.       Keira Joseph, BSN, RN, [facility]  Palliative Medicine

## 2018-08-15 NOTE — ROUTINE PROCESS
1000- Pt in for paracentesis. Wife with pt. Workup completed. Pt aware of risks and benefits and wishes to proceed. 1025- Cell count sent to lab. Dr Twin Chaney called to clarify if cultures need to be sent. Per MD office only cell count to be sent on fluid today. Wife Iris Delaney and pt updated. 1145- Paracentesis completed 6250ml removed. Albumin administered as ordered. Discharge instructions reviewed with pt. Pt D/C to home with wife.

## 2018-08-15 NOTE — DISCHARGE INSTRUCTIONS
[address]  Radiology Department  [phone]    Radiologist: Nirali Carter    Date:8/15/18      Paracentesis Discharge Instructions    You may have an aching pain in your abdomen at the puncture site tonight as the numbing medicine wears off. You may take Tylenol, as directed on the label, for  pain or discomfort. Resume your previous diet and medications. Rest the remainder of today. If we have removed a large volume of fluid, you be lightheaded or dizzy when making position changes. You may shower in 24 hours. Keep the dressing clean and dry until the site has healed. Watch for signs of infection at the puncture site. .. redness, swelling, pus, fever or chills. Contact your physician immediately if this occurs. If you experience severe sweating and new, severe abdominal pain seek emergency medical treatment. Follow up with your physician as previously discussed.

## 2018-08-15 NOTE — IP AVS SNAPSHOT
[address] [facility] 
[phone] Patient: Victorina Beasley MRN: [id number] NCM:[id number]/11/[id number] About your hospitalization You were admitted on:  August 15, 2018 You last received care in the:  [facility] Ultrasound You were discharged on:  August 15, 2018 Why you were hospitalized Your primary diagnosis was:  Not on File Follow-up Information None Your Scheduled Appointments Friday August 24, 2018 10:30 AM EDT  
US PARACENTESIS ABD W IMG with [facility] US ER 1 [address] Department ([address]) 611 [facility] 57  
[phone] GENERAL INSTRUCTIONS 1. Bring any non [facility] facility films/reports pertaining to the area being studied with you on the day of appointment. 2. Bring a list of all medications you are currently taking, including over the counter medications. 3. A written order with a valid diagnosis and Physicians signature is required for all scheduled tests. 4. Blood thinners and platelet inhibitors should be stopped 3-5 days prior to procedure. Consult your ordering physician prior to stopping them. 5. Check in at registration 30 minutes before your appointment time unless you were instructed to do otherwise. 6. A  is required for the procedure. For some patients, there may be some unsteadiness post procedure. If no  is present, the patient may have to remain in the department for a longer period of time. 7. The procedure may last 1-1 ½ hours. You may be required to stay 4-6 hours after the procedure for observation. --Lab work for bleeding times (INR, PT , PTT and platelets) should be be completed at least the day before the exam.  Without this information the patient is delayed or  rescheduled.   
  
    
Patient should report to outpatient registration ([facility]) 30 minutes prior to the appointment time unless instructed otherwise. (NOT FOR MRI) Tuesday September 04, 2018  1:00 PM EDT  
[address] with Toby Stockton MD, [place] [address] ER 1 [address] [place] [address] Department ([address]) [address] [place] [address]  
[phone] GENERAL INSTRUCTIONS 1. Bring any non [facility] facility films/reports pertaining to the area being studied with you on the day of appointment. 2. Bring a list of all medications you are currently taking, including over the counter medications. 3. A written order with a valid diagnosis and Physicians signature is required for all scheduled tests. 4. Blood thinners and platelet inhibitors should be stopped 3-5 days prior to procedure. Consult your ordering physician prior to stopping them. 5. Check in at registration 30 minutes before your appointment time unless you were instructed to do otherwise. 6. A  is required for the procedure. For some patients, there may be some unsteadiness post procedure. If no  is present, the patient may have to remain in the department for a longer period of time. 7. The procedure may last 1-1 ½ hours. You may be required to stay 4-6 hours after the procedure for observation. --Lab work for bleeding times (INR, PT , PTT and platelets) should be be completed at least the day before the exam.  Without this information the patient is delayed or  rescheduled. Patient should report to outpatient registration ([address] [place] [address]) 30 minutes prior to the appointment time unless instructed otherwise. (NOT FOR MRI) Discharge Orders None A check suzanne indicates which time of day the medication should be taken. My Medications ASK your doctor about these medications Instructions Each Dose to Equal  
 Morning Noon Evening Bedtime  
 amiodarone 200 mg tablet Commonly known as:  CORDARONE  
   
 Your last dose was: Your next dose is: Take 200 mg by mouth nightly. 200 mg CENTRUM SILVER Tab tablet Generic drug:  multivitamins-minerals-lutein Your last dose was: Your next dose is: Take 1 Tab by mouth daily. 1 Tab  
    
   
   
   
  
 furosemide 40 mg tablet Commonly known as:  LASIX Your last dose was: Your next dose is: Take once daily. gabapentin 100 mg capsule Commonly known as:  NEURONTIN Your last dose was: Your next dose is: Take 100 mg by mouth three (3) times daily. 100 mg  
    
   
   
   
  
 * lactulose 20 gram/30 mL Soln solution Commonly known as:  Anderson Minks Your last dose was: Your next dose is: Take 15 mL by mouth three (3) times daily. 10 g * lactulose 10 gram/15 mL solution Commonly known as:  Anderson Minks Your last dose was: Your next dose is: TAKE 15 ML (1 TABLESPOON) BY MOUTH THREE TIMES A DAY  
     
   
   
   
  
 levoFLOXacin 500 mg tablet Commonly known as:  Hortensia Britton Your last dose was: Your next dose is: Take 1 Tab by mouth daily for 10 days. 500 mg  
    
   
   
   
  
 metoprolol succinate 25 mg XL tablet Commonly known as:  TOPROL XL Your last dose was: Your next dose is: Take 0.5 Tabs by mouth daily. 12.5 mg  
    
   
   
   
  
 pantoprazole 40 mg tablet Commonly known as:  PROTONIX Your last dose was: Your next dose is: Take 1 Tab by mouth daily. 40 mg  
    
   
   
   
  
 rifAXIMin 550 mg tablet Commonly known as:  Raulito Enciso Your last dose was: Your next dose is: Take 1 Tab by mouth two (2) times a day. Indications: Hepatic Encephalopathy  550 mg  
    
   
   
   
  
 Saccharomyces boulardii 250 mg capsule Commonly known as:  Dago Higgins Your last dose was: Your next dose is: Take 1 Cap by mouth two (2) times a day for 30 days. Can be obtained over-the-counter. OK to substitute other probiotic  
 250 mg  
    
   
   
   
  
 thiamine  mg tablet Commonly known as:  B-1 Your last dose was: Your next dose is: Take 1 Tab by mouth daily. 100 mg  
    
   
   
   
  
 traMADol 50 mg tablet Commonly known as:  ULTRAM  
   
Your last dose was: Your next dose is: Take 1 Tab by mouth every six (6) hours as needed. Max Daily Amount: 200 mg.  
 50 mg  
    
   
   
   
  
 * Notice: This list has 2 medication(s) that are the same as other medications prescribed for you. Read the directions carefully, and ask your doctor or other care provider to review them with you. Opioid Education Prescription Opioids: What You Need to Know: 
 
Prescription opioids can be used to help relieve moderate-to-severe pain and are often prescribed following a surgery or injury, or for certain health conditions. These medications can be an important part of treatment but also come with serious risks. Opioids are strong pain medicines. Examples include hydrocodone, oxycodone, fentanyl, and morphine. Heroin is an example of an illegal opioid. It is important to work with your health care provider to make sure you are getting the safest, most effective care. WHAT ARE THE RISKS AND SIDE EFFECTS OF OPIOID USE? Prescription opioids carry serious risks of addiction and overdose, especially with prolonged use. An opioid overdose, often marked by slow breathing, can cause sudden death. The use of prescription opioids can have a number of side effects as well, even when taken as directed. · Tolerance-meaning you might need to take more of a medication for the same pain relief · Physical dependence-meaning you have symptoms of withdrawal when the medication is stopped. Withdrawal symptoms can include nausea, sweating, chills, diarrhea, stomach cramps, and muscle aches. Withdrawal can last up to several weeks, depending on which drug you took and how long you took it. · Increased sensitivity to pain · Constipation · Nausea, vomiting, and dry mouth · Sleepiness and dizziness · Confusion · Depression · Low levels of testosterone that can result in lower sex drive, energy, and strength · Itching and sweating RISKS ARE GREATER WITH:      
· History of drug misuse, substance use disorder, or overdose · Mental health conditions (such as depression or anxiety) · Sleep apnea · Older age (72 years or older) · Pregnancy Avoid alcohol while taking prescription opioids. Also, unless specifically advised by your health care provider, medications to avoid include: · Benzodiazepines (such as Xanax or Valium) · Muscle relaxants (such as Soma or Flexeril) · Hypnotics (such as Ambien or Lunesta) · Other prescription opioids KNOW YOUR OPTIONS Talk to your health care provider about ways to manage your pain that don't involve prescription opioids. Some of these options may actually work better and have fewer risks and side effects. Options may include: 
· Pain relievers such as acetaminophen, ibuprofen, and naproxen · Some medications that are also used for depression or seizures · Physical therapy and exercise · Counseling to help patients learn how to cope better with triggers of pain and stress. · Application of heat or cold compress · Massage therapy · Relaxation techniques Be Informed Make sure you know the name of your medication, how much and how often to take it, and its potential risks & side effects. IF YOU ARE PRESCRIBED OPIOIDS FOR PAIN: 
· Never take opioids in greater amounts or more often than prescribed. Remember the goal is not to be pain-free but to manage your pain at a tolerable level. · Follow up with your primary care provider to: · Work together to create a plan on how to manage your pain. · Talk about ways to help manage your pain that don't involve prescription opioids. · Talk about any and all concerns and side effects. · Help prevent misuse and abuse. · Never sell or share prescription opioids · Help prevent misuse and abuse. · Store prescription opioids in a secure place and out of reach of others (this may include visitors, children, friends, and family). · Safely dispose of unused/unwanted prescription opioids: Find your community drug take-back program or your pharmacy mail-back program, or flush them down the toilet, following guidance from the Food and Drug Administration (www.fda.gov/Drugs/ResourcesForYou). · Visit www.cdc.gov/drugoverdose to learn about the risks of opioid abuse and overdose. · If you believe you may be struggling with addiction, tell your health care provider and ask for guidance or call [address] [place] [address] at [phone]-IGUR. Discharge Instructions [facility] Radiology Department [phone] Radiologist: Fatemeh Fleming Date:8/15/18 Paracentesis Discharge Instructions You may have an aching pain in your abdomen at the puncture site tonight as the numbing medicine wears off. You may take Tylenol, as directed on the label, for  pain or discomfort. Resume your previous diet and medications. Rest the remainder of today. If we have removed a large volume of fluid, you be lightheaded or dizzy when making position changes. You may shower in 24 hours. Keep the dressing clean and dry until the site has healed. Watch for signs of infection at the puncture site. .. redness, swelling, pus, fever or chills. Contact your physician immediately if this occurs. If you experience severe sweating and new, severe abdominal pain seek emergency medical treatment. Follow up with your physician as previously discussed. Introducing Ace Lutz As a [facility] patient, I wanted to make you aware of our electronic visit tool called Ace uLtz. enavu allows you to connect within minutes with a medical provider 24 hours a day, seven days a week via a mobile device or tablet or logging into a secure website from your computer. You can access Ace Lutz from anywhere in the [place]. A virtual visit might be right for you when you have a simple condition and feel like you just dont want to get out of bed, or cant get away from work for an appointment, when your regular [facility] provider is not available (evenings, weekends or holidays), or when youre out of town and need minor care. Electronic visits cost only $49 and if the [employer] provider determines a prescription is needed to treat your condition, one can be electronically transmitted to a nearby pharmacy*. Please take a moment to enroll today if you have not already done so. The enrollment process is free and takes just a few minutes. To enroll, please download the enavu nicol to your tablet or phone, or visit [email or web address] to enroll on your computer. And, as an [address] [place] [address] patient with a [employer] account, the results of your visits will be scanned into your electronic medical record and your primary care provider will be able to view the scanned results. We urge you to continue to see your regular [facility] provider for your ongoing medical care.   And while your primary care provider may not be the one available when you seek a Ace Lutz virtual visit, the peace of mind you get from getting a real diagnosis real time can be priceless. For more information on Ace Lutz, view our Frequently Asked Questions (FAQs) at [email or web address]. Sincerely, 
 
Vandana Schafer MD 
Chief Medical Officer [address] Marks *:  certain medications cannot be prescribed via Ace Lutz Unresulted Labs-Please follow up with your PCP about these lab tests Order Current Status US PARACENTESIS ABD W IMAGING In process Providers Seen During Your Hospitalization Provider Specialty Primary office phone Jamie Gan MD Hepatology [phone] Your Primary Care Physician (PCP) Primary Care Physician Office Phone Office Fax Heather Blood [phone] You are allergic to the following No active allergies Recent Documentation Smoking Status Former Smoker Emergency Contacts Name Discharge Info Relation Home Work Mobile CarlosCass DISCHARGE CAREGIVER [3] Spouse [3] [phone] Patient Belongings The following personal items are in your possession at time of discharge: 
     Visual Aid: None Please provide this summary of care documentation to your next provider. Signatures-by signing, you are acknowledging that this After Visit Summary has been reviewed with you and you have received a copy. Patient Signature:  ____________________________________________________________ Date:  ____________________________________________________________  
  
Yovany Police Provider Signature:  ____________________________________________________________ Date:  ____________________________________________________________

## 2018-08-17 NOTE — PROGRESS NOTES
Palliative Medicine  Nursing Note  [phone])  Fax [phone]        Clinic Office Visit  Patient Name: Cristina Ramirez  YOB: [birth year]      Advance Care Planning 8/6/2018   Patient's Healthcare Decision Maker is: Legal Next of Kin   Primary Decision Maker Name Jovanni Fragoso   Primary Decision Maker Phone Number [phone]   Primary Decision Maker Relationship to Patient Spouse   Secondary Decision Maker Name -   Secondary Decision Maker Phone Number -   Secondary Decision Maker Relationship to Patient -   Confirm Advance Directive Yes, on file   Patient Would Like to Complete Advance Directive -     Returned call to Jovanni Fragoso, patient's wife. She shared that her  will be able to have the Aspira drain placed as his cell counts from his paracentesis were good. He is scheduled for Tuesday the 21st at 0700 at [facility]. She wants to coordinate his care prior to entering hospice. She would like his ICD turned off prior to hospice admission to help prevent their 8year old from possibly witnessing his father being shocked by his ICD going off. She thought it would be best for she and her family to have hospice to admit on Thursday the 23rd. She wondered about Aspira drain education and provided that hospice can provide that teaching. Informed her that Palliative will reach out to hospice and Cardiology regarding her concerns. She was appreciative. Palliative to call her back with update. Call placed to hospice to inform about the above. Spoke with intake and they will call family to arrange for Thursday admission and to provide education on Aspira drain. Call placed to Dr. Hannah Palomino office/ Cardiology. Informed of the above. They need a letter from hospice physician requesting the device be turned off per the family request with start date of hospice. Letter to be faxed to [phone].   Once received, Cardiology can approve order and contact the medical group related to device, who will then send a representative to the home and turn off the ICD. Call placed to Mrs. Rivera  and informed of the above info regarding ICD and hospice. She was appreciative. Attempted to contact [facility] to provide update, they were closed for the day. Will call back next work day.     Lenon Olszewski, BSN, RN, [facility]  Palliative Medicine

## 2018-08-21 NOTE — PROGRESS NOTES
Pt discharged via wheelchair escorted by wife.  Discharge instructions given and both verbalize understanding

## 2018-08-21 NOTE — TELEPHONE ENCOUNTER
Patients spouse requesting Pruden to follow up on Hospice and [address] date and time for visit.  Requesting call back

## 2018-08-21 NOTE — H&P
Radiology History and Physical    Patient: Shola Olvera 59 y.o. male       Chief Complaint: No chief complaint on file. History of Present Illness: Cirrhosis with large volume ascites requiring frequent paracentesis. Here for Aspira catheter placement. History:    Past Medical History:   Diagnosis Date    Arthritis     Atrial fibrillation ([facility] 2014    CHF [facility]congestive heart failure) [facility]     Diabetes ([facility]     Diabetic ulcer of left foot ([facility] 2/2015 2/2015 Lt BKA    History of blood transfusion 2015    During Lt BKA; pt denies any adverse reaction    Hypertension     Liver disease     CIRRHOSIS    Sepsis ([facility] 02/2015    From diabetic Left foot wound;  had a BKA ;  as of 11/21/16 pt states back to his baseline      Family History   Problem Relation Age of Onset    Heart Disease Brother     Heart Failure Brother     Heart Failure Brother      Social History     Social History    Marital status:      Spouse name: N/A    Number of children: N/A    Years of education: N/A     Occupational History    Not on file. Social History Main Topics    Smoking status: Former Smoker     Packs/day: 1.00     Years: 5.00     Quit date: 1/1/2013    Smokeless tobacco: Never Used    Alcohol use Yes      Comment: Last drink January 2018    Drug use: No    Sexual activity: Not on file     Other Topics Concern    Not on file     Social History Narrative       Allergies: No Known Allergies    Current Medications:  Current Facility-Administered Medications   Medication Dose Route Frequency    0.9% sodium chloride infusion  25 mL/hr IntraVENous CONTINUOUS    fentaNYL citrate (PF) injection 200 mcg  200 mcg IntraVENous RAD PRN    midazolam (VERSED) injection 5 mg  5 mg IntraVENous RAD PRN    albumin human 25% (BUMINATE) solution 25 g  25 g IntraVENous Rad Multiple        Physical Exam:  Blood pressure 116/64, pulse 65, temperature 98.2 °F (36.8 °C), resp.  rate 16, height 5' 10\" (1.778 m), weight 95.3 kg (210 lb), SpO2 100 %. GENERAL: alert, cooperative, no distress, appears stated age  LUNG: clear to auscultation bilaterally  HEART: regular rate and rhythm  ABD: Non tender, non distended. Alerts:    Hospital Problems  Date Reviewed: 8/6/2018    None          Laboratory:    No results for input(s): HGB, HCT, WBC, PLT, INR, BUN, CREA, K, CRCLT, HGBEXT, HCTEXT, PLTEXT in the last 72 hours. No lab exists for component: PTT, PT, INREXT      Plan of Care/Planned Procedure:  Risks, benefits, and alternatives reviewed with patient and he agrees to proceed with the procedure. Deemed appropriate or moderate sedation with versed and fentanyl.       Yovani Olivares MD

## 2018-08-21 NOTE — TELEPHONE ENCOUNTER
Returned call to Debbie's, patient's wife. She shared that the Aspira drain has been placed this morning and 8 liters were removed. She said he had gained 20lbs since his paracentesis last Wednesday 8/15/18. Reiterated that hospice will be calling to verify time and date with the family and that they tentatively have a 10:00 available for Thursday 8/23/18. The Cardiology group will contact [address] [place] [address], which is the company that will send a representative to come to home to turn off the ICD after the request is signed off by Cardiology physician, which is anticipated to be completed on Wednesday of this week. She verbalized understanding and was appreciative. Letter and order previously faxed to Cardiology as requested to initiate process to have the ICD discontinued. Call placed to hospice to provide update. They will reach out to wife to discuss services, education and verify date and time.       Faina Mcgee RN  Palliative Medicine

## 2018-08-21 NOTE — DISCHARGE INSTRUCTIONS
ABDOMINAL CATHETER  DISCHARGE INSTRUCTIONS    General Information:     A plastic catheter has been inserted through your skin and into your abdomen. This has been done because you have needed frequent Paracentesis. This catheter will provide you a way to drain off the fluid every couple of days. Your doctor will order a home health nurse to help you learn to do the drain and to take care of the catheter. Home Care Instructions: You can resume your regular diet and medication regimen. Do not drink alcohol, drive, or make any important legal decisions in the next 24 hours. Do not lift anything heavier than a gallon of milk, or do anything strenuous for the next 24 hours. You should not shower for 24 hours. You should cover the tube with plastic wrap and tape to keep it dry when you shower. Do not take a bath, swim or immerse yourself in water as long as you have this catheter. You should clean the tube and change the dressing every time you drain the fluid, and as needed. Keep the dressing clean and dry. Keep a journal of how often you drain the fluid, how much fluid you drain, and the character of the fluid. Call If:     You should call your Physician and/or the Radiology Nurse if you have any bleeding other than a small spot on your bandage. Call if you have any signs of infection, fever, or increased pain at the site of the tube. Call if you should have leakage around the tube, a change in the appearance of the fluid, or if the tube gets pulled out some or all the way out. Call us, your home health nurse, or your regular doctor if you have any concerns or questions about your catheter. Follow-Up Instructions: Please see your ordering doctor as he/she has requested. To Reach Us: Should you experience any significant changes, please call [phone] between the hours of 7:30 am and 10 pm or [phone] after hours.  After hours, ask the  to page the X-ray Technologist, and describe the problem to the technologist.

## 2018-08-21 NOTE — LETTER
8/21/2018 8:46 AM 
 
Mr. Dave Lute [address] [facility] [address] Mr. Carlos Christina and his wife are requesting to have his ICD turned off as he is entering hospice this Thursday 8/23/18. I will be his attending physician while under hospice care and am providing the order to have his ICD discontinued. After speaking with your office, we understand that [address] [place] [address] is the company that will need to be contacted to send a representative to the patient's home to have the device turned off. The family is hoping to coordinate the timing of this soon after his admission into hospice. Thank you very much, 
 
 
Sincerely, 
 
 
Dr. Radha Pierson

## 2018-08-22 NOTE — TELEPHONE ENCOUNTER
Return call made to Meena Gomez with [employer]. As per Faina Mcgee RN:     The Cardiology group will contact [address] [place] [address], which is the company that will send a representative to come to home to turn off the ICD after the request is signed off by Cardiology physician, which is anticipated to be completed on Wednesday of this week.     She verbalized understanding and was appreciative.     Letter and order previously faxed to Cardiology as requested to initiate process to have the ICD discontinued.     Call placed to hospice to provide update. They will reach out to wife to discuss services, education and verify date and time.     She informed me both the [facility] along with the Hospice Nurse both will be present when the device is turned off. Ms Youalena Bansal will contact [facility] to coordinate.

## 2018-08-22 NOTE — TELEPHONE ENCOUNTER
Caller stated they received an order to turn off ICD and she wants to speak with someone regarding this

## 2018-08-29 NOTE — CERTIFICATE OF TERMINAL ILLNESS
Hospice Physician Admission Narrative     Hospice terminal diagnosis:     Alcoholic cirrhosis of liver with ascites ([facility] (K70.31)  Other Hospice diagnoses:     Esophageal varices without bleeding, unspecified esophageal varices type ([address] (I85.00)     Hypertensive heart disease with congestive heart failure, unspecified heart failure type ([address] (I11.0)     Chronic systolic heart failure (HCC) (I50.22)     Controlled type 2 diabetes mellitus without complication, unspecified whether long term insulin use (HCC) (E11.9)     Atrial fibrillation, unspecified type ([facility] [address] (I48.91)     Cardiac defibrillator in place (Z95.810)     Iron deficiency anemia, unspecified iron deficiency anemia type (D50.9)     Arthritis (M19.90)     Encounter for hospice care (Z51.5)    Benefit Period 1  Start Date: 8/23/2018  End Date: 11/20/2018     HOSPICE NARRATIVE COMPOSED BY PHYSICIAN   Do not cut and paste chart information other than imaging findings    Sebastian Saleh is a 59y.o. year old who was admitted to [facility] for alcoholic cirrhosis of liver with ascites. He has cirrhosis related complications including repeated hospitalizations for encephalopathy and paracentesis s/p Aspira Drain for ascites as well as infection / bacterial peritonitis. With his End Stage Liver Disease he has no further disease directed options. He has multiple other co-morbidities including heart disease with an AICD (now deactivated), atrial fibrillation, DM, vascular s/p BKA in 2014. His Karnofsky has declined from 60-70 to now 40. His last hospitalization was 8/6/18 to 8/8/18. Objective information that support this patients limited prognosis includes: CT on 8/6/18 IMPRESSION:  1. No change in significant abdominal ascites; 2. Cirrhosis; 3. Splenomegaly; 4. Hyperdense left renal cyst; 5. Nephrolithiasis. Labs showed Bili 1.9, Na 125, Creat 1.48.      Lab Results   Component Value Date/Time    ALT (SGPT) 41 08/09/2018 04:19 AM    AST (SGOT) 44 (H) 08/09/2018 04:19 AM    Alk.  phosphatase 152 (H) 08/09/2018 04:19 AM    Bilirubin, direct 1.08 (H) 07/31/2018 09:40 AM    Bilirubin, total 1.9 (H) 08/09/2018 04:19 AM     Lab Results   Component Value Date/Time    Sodium 125 (L) 08/09/2018 04:19 AM    Potassium 4.8 08/09/2018 04:19 AM    Chloride 92 (L) 08/09/2018 04:19 AM    CO2 20 (L) 08/09/2018 04:19 AM    Anion gap 13 08/09/2018 04:19 AM    Glucose 205 (H) 08/09/2018 04:19 AM    BUN 26 (H) 08/09/2018 04:19 AM    Creatinine 1.48 (H) 08/09/2018 04:19 AM    BUN/Creatinine ratio 18 08/09/2018 04:19 AM    GFR est AA 59 (L) 08/09/2018 04:19 AM    GFR est non-AA 48 (L) 08/09/2018 04:19 AM    Calcium 8.7 08/09/2018 04:19 AM       The patient/family chose comfort measures with the support of Hospice.   ______________________________________________________________________

## 2018-09-04 ENCOUNTER — HOME CARE VISIT (OUTPATIENT)
Dept: HOSPICE | Facility: HOSPICE | Age: 58
End: 2018-09-04
Payer: MEDICARE

## 2019-08-21 PROCEDURE — HHS10554 SHAMPOO/BODY WASH 8 OZ ALOE VESTA

## 2020-02-25 NOTE — PROGRESS NOTES
Dr. Roxanne Shah wrote order and signed letter requesting for patient's ICD to be turned off. This was faxed, as requested, to Cardiology group with [facility].       Bridget Alberto RN  Palliative Medicine
73

## 2021-04-26 NOTE — H&P
History & Physical    Primary Care Provider: Triston Cheung MD  Source of Information: Patient     Chief Complaint: Swelling of the right leg and abdomen    History of Presenting Illness:   Veronica Penn is a 59 y.o.  male with past medical history cirrhosis, non ischemic cardiomyopathy EF 25% s/p AICD,  DM type 2 , hypertension who presented to the ED with the complaint of abdominal swelling and right lower extremity swelling. He reports that abdominal swelling has progressively worsened over the last couple of months. He says it started with the right leg and now involving the abdomen. He tells me that he gets short winded in the morning and also on exertion. He also reports 2 pillow orthopnea. No PND. Positive nausea but no vomiting. No abdominal pain. Last BM this morning and described as normal stool. No melena or hematochezia. He says he has not been meeting up with his appointments with Hepatology due to insurance issues. He has been drink alcoholic beverages- 6 beers most days. He knows he shouldn't be drinking alcohol. He saw his PCP last week and again today and he was advised to go to the ER for evaluation. The patient denies any fever, chills, chest pain, cough, congestion, recent illness, palpitations, or dysuria. ER course: he had a CXR done which showed shallow inspiration. He was administered 20 mg of lasix and we have been consulted for hospital admission and for evaluation.        Review of Systems:    A 12 point review of system was reviewed and negative aside from what was stated in the body of the history and physical.    Past Medical History:   Diagnosis Date    Arthritis     Atrial fibrillation ([facility] 2014    CHF (congestive heart failure) ([facility]     Diabetes ([facility]     Diabetic ulcer of left foot ([facility] [address] [facility] 2/2015 2/2015 Lt BKA    History of blood transfusion 2015    During Lt BKA; pt denies any adverse reaction    [facility]  Office: [address], DO, Angy Sophia, DO, Gisel Ruiz, DO, Marshal Hernandez Ale, DO, Pablito Mensah MD, Clarissa Padilla MD, Gui Andre MD, Johnnie Crump MD, Emily Patel MD, Sarah Renteria MD, Bert Chatman MD, Nicky Cantrell MD, Geri Koch DO, Bharat Garcia MD, Devin Benitez DO, Tre Munguia MD,  Mary Jo Eisenberg DO, Dodie Castillo MD, Erasmo Avelar MD, Mike Escalera MD, Moon Velez MD, [facility], [facility], [facility] Flores, CNP, Inag To, CNP, Paresh Zimmer, CNS, Renee Summers, CNP, Nba Blount, CNP, Phi Forte, CNP, Aristeo Hopkins, CNP, Clary Jeff, CNP, Nery Acevedo PA-C, Braden Mckeon, [facility], Cara Hawley, CNP, Blanca Gomes, CNP, Aneudy Esqueda, CNP, Thelma Coral, CNP, Walter Whiting, [facility], Sonya Wong, Tu Ruiz    Progress Note    4/26/2021    1:32 PM    Name:   Latasha Schwarz  MRN:     [id number]     Acct:      [de-identified]   Room:   [facility] Day:  6  Admit Date:  4/20/2021  2:42 AM    PCP:   Gurdeep Burns MD  Code Status:  DNR-CCA    Subjective:     C/C:   Chief Complaint   Patient presents with    Shortness of Breath     Interval History Status: worsened. Remains bipap dependent  On 100% fio2, was on 80% yesterday    Still sob    Reiterated yesterday she does not want intubation    Brief History:     Latasha Schwarz is a 66 y.o. AA female who presents with Shortness of Breath   and is admitted to the hospital for the management of Sepsis due to COVID-19 [facility]).    This 66 yof presents with sob for about a week pta. She has also had a cough with white phlegm and has had fevers, chills, sweats, shakes. Denies change in taste or smell, n/v/d or abd pain.     Review of Systems:     Constitutional:  negative for fevers, sweats  Respiratory:  negative for cough,  Wheezing; positive sob  Cardiovascular:  negative for chest pain, chest pressure/discomfort, Hypertension     Liver disease     CIRRHOSIS    Sepsis ([facility] [address] 02/2015    From diabetic Left foot wound;  had a BKA ;  as of 11/21/16 pt states back to his baseline       Past Surgical History:   Procedure Laterality Date    HX AMPUTATION Left 2/10/2015    BKA    HX COLONOSCOPY      HX IMPLANTABLE CARDIOVERTER DEFIBRILLATOR  08/04/2015    [facility] cardio defibrillator; Dr Jacy Simmons; as of 11/21/16 pt denies defibrillator going off     Prior to Admission medications    Medication Sig Start Date End Date Taking? Authorizing Provider   amiodarone (CORDARONE) 200 mg tablet Take 200 mg by mouth nightly. Yes Historical Provider   sacubitril-valsartan (ENTRESTO) 49 mg/51 mg tablet Take 1 Tab by mouth two (2) times a day. Yes Historical Provider   spironolactone (ALDACTONE) 50 mg tablet Take 50 mg by mouth nightly. Yes Historical Provider   linagliptin (TRADJENTA) 5 mg tablet Take 5 mg by mouth every morning. Yes Historical Provider   multivitamins-minerals-lutein (CENTRUM SILVER) Tab Take 1 Tab by mouth daily. Yes Historical Provider   metoprolol-XL (TOPROL XL) 100 mg XL tablet Take 100 mg by mouth nightly. Yes Huyen Larson MD   digoxin (LANOXIN) 0.125 mg tablet Take 0.125 mg by mouth nightly.    Yes Huyen Larson MD     No Known Allergies   Family History   Problem Relation Age of Onset    Heart Disease Brother     Heart Failure Brother     Heart Failure Brother         SOCIAL HISTORY:  Patient resides:Lives with wife and children  Independently    Assisted Living    SNF    With family care       Smoking history: Quit 4 years ago  None    Former x   Chronic      Alcohol history: Drinks 4 beers every days  None    Social    Chronic x     Ambulates:   Independently x   w/cane    w/walker    w/wc    CODE STATUS:  DNR    Full x   Other      Objective:     Physical Exam:     Visit Vitals    /82    Pulse 60    Temp 98.1 °F (36.7 °C)    Resp 19    Ht 5' 10\" (1.778 m)    Wt 109.4 kg (241 lb 2 oz)    SpO2 95%    BMI 34.6 kg/m2      O2 Device: Room air    General:  Alert, cooperative, no distress, appears stated age. Jaundiced   Head:  Normocephalic, without obvious abnormality, atraumatic. Eyes:  Conjunctivae/corneas clear. PERRL, EOMs intact. Nose: Nares normal. Septum midline. Mucosa normal. No drainage or sinus tenderness. Throat: Lips, mucosa, and tongue normal. Teeth and gums normal.   Neck: Supple, symmetrical, trachea midline, no adenopathy, thyroid: no enlargement/tenderness/nodules, no carotid bruit and no JVD. Back:   Symmetric, no curvature. ROM normal. No CVA tenderness. Lungs:   Clear to auscultation bilaterally. Chest wall:  No tenderness or deformity. Heart:  Regular rate and rhythm, S1, S2 normal, no murmur, click, rub or gallop. Abdomen:   Soft, non-tender. Distended abdomen with ascites. Bowel sounds normal. No masses,  No organomegaly. Extremities: Extremities normal, atraumatic, no cyanosis. +2 edema right lower extremity. Left BKA   Pulses: 2+ and symmetric all extremities. Skin: Skin color, texture, turgor normal. No rashes or lesions   Neurologic: CNII-XII intact. Data Review:     Recent Days:  Recent Labs      11/20/17   1342   WBC  4.4   HGB  11.0*   HCT  29.6*   PLT  85*     Recent Labs      11/20/17   1342   NA  117*   K  5.1   CL  87*   CO2  21   GLU  141*   BUN  13   CREA  1.03   CA  8.8   ALB  2.7*   SGOT  129*   ALT  51   INR  1.3*     No results for input(s): PH, PCO2, PO2, HCO3, FIO2 in the last 72 hours.     24 Hour Results:  Recent Results (from the past 24 hour(s))   CBC WITH AUTOMATED DIFF    Collection Time: 11/20/17  1:42 PM   Result Value Ref Range    WBC 4.4 4.1 - 11.1 K/uL    RBC 2.81 (L) 4.10 - 5.70 M/uL    HGB 11.0 (L) 12.1 - 17.0 g/dL    HCT 29.6 (L) 36.6 - 50.3 %    .3 (H) 80.0 - 99.0 FL    MCH 39.1 (H) 26.0 - 34.0 PG    MCHC 37.2 (H) 30.0 - 36.5 g/dL    RDW 13.0 11.5 - 14.5 %    PLATELET 85 (L) 884 - 400 K/uL    NEUTROPHILS 83 (H) intolerance (impaired glucose tolerance), History of anemia, History of corneal abrasion, History of dyspnea, History of pneumonia, Hyperlipidemia, Hypertension, Idiopathic gout, Morbid obesity ([address] Neck strain, Obstructive sleep apnea, Osteoarthritis, Pain, Shoulder strain, Systemic hypertension, TIA (transient ischemic attack), Type II or unspecified type diabetes mellitus without mention of complication, not stated as uncontrolled, Vitamin D deficiency, Wears dentures, and Wears glasses. Social History:   reports that she quit smoking about 31 years ago. Her smoking use included cigarettes. She started smoking about 61 years ago. She has a 30.00 pack-year smoking history. She has never used smokeless tobacco. She reports that she does not drink alcohol or use drugs. Family History:   Family History   Problem Relation Age of Onset   Angelia Worthy Asthma Mother     Stroke Sister     Diabetes Sister     Other Neg Hx         Sleep disorders or narcolepsy       Vitals:  /87   Pulse 77   Temp 98.6 °F (37 °C) (Axillary)   Resp 24   Ht 5' 3\" (1.6 m)   Wt 268 lb 8.3 oz (121.8 kg)   SpO2 90%   BMI 47.57 kg/m²   Temp (24hrs), Av.9 °F (36.6 °C), Min:97.4 °F (36.3 °C), Max:98.6 °F (37 °C)    Recent Labs     21  1639 21  1928 21  0926 21  1103   POCGLU 211* 203* 292* 285*       I/O (24Hr):     Intake/Output Summary (Last 24 hours) at 2021 1332  Last data filed at 2021 1118  Gross per 24 hour   Intake 1525 ml   Output 3450 ml   Net -1925 ml       Labs:  Hematology:  Recent Labs     21  0021 21  0753   WBC  --  9.2   RBC  --  3.62*   HGB 11.6* 10.8*   HCT 35.5* 34.3*   MCV  --  94.8   MCH  --  29.8   MCHC  --  31.5   RDW  --  13.6   PLT  --  157   MPV  --  11.0     Chemistry:  Recent Labs     21  0640 21  0753 21  0323   * 149* 144   K 3.8 4.4 4.3   * 113* 110*   CO2 25 23 22   GLUCOSE 193* 287* 263*   BUN 64* 58* 55*   CREATININE 1.81* 1.49* 1.32*   ANIONGAP 10 13 12   LABGLOM 27* 34* 39*   GFRAA 33* 42* 48*   CALCIUM 9.0 8.7 9.4   PROBNP 269  --   --      Recent Labs     04/24/21  0640 04/24/21  0640 04/25/21  0725 04/25/21  0753 04/25/21  1216 04/25/21  1639 04/25/21  1928 04/26/21  0323 04/26/21  0926 04/26/21  1103   PROT 6.0*  --   --  5.6*  --   --   --  5.9*  --   --    LABALBU 3.1*  --   --  2.8*  --   --   --  3.1*  --   --    *  --   --  111*  --   --   --  108*  --   --    ALT 63*  --   --  56*  --   --   --  59*  --   --    ALKPHOS 109*  --   --  112*  --   --   --  139*  --   --    BILITOT 0.34  --   --  0.33  --   --   --  0.41  --   --    POCGLU  --    < > 267*  --  272* 211* 203*  --  292* 285*    < > = values in this interval not displayed. ABG:  Lab Results   Component Value Date    POCPH 7.37 04/24/2021    POCPCO2 49 04/24/2021    POCPO2 78 04/24/2021    POCHCO3 28.2 04/24/2021    NBEA NOT REPORTED 04/24/2021    PBEA 3 04/24/2021    VWP2GJG 30 04/24/2021    YOUC9GBD 95 04/24/2021    FIO2 80.0 04/24/2021     Lab Results   Component Value Date/Time    [facility] 04/20/2021 03:56 AM     Lab Results   Component Value Date/Time    CULTURE NO GROWTH 6 DAYS 04/20/2021 03:56 AM       Radiology:  Michael Hand Chest Portable    Result Date: 4/21/2021  Stable exam since yesterday. Xr Chest Portable    Result Date: 4/20/2021  Patchy bilateral pneumonia concerning for COVID pneumonia. Mild stable cardiomegaly. Severe osteoarthritic changes at the bilateral glenohumeral joints.        Physical Examination:        General appearance:  alert, cooperative and no distress  Mental Status:  oriented to person and normal affect  Lungs:  clear to auscultation bilaterally, normal effort on bipap  Heart:  regular rate and rhythm, no murmur  Abdomen:  soft, nontender, nondistended, normal bowel sounds, no masses, hepatomegaly, splenomegaly; obese  Extremities:  no edema, redness, tenderness in the calves  Skin:  no gross lesions, rashes, 32 - 75 %    LYMPHOCYTES 4 (L) 12 - 49 %    MONOCYTES 11 5 - 13 %    EOSINOPHILS 1 0 - 7 %    BASOPHILS 1 0 - 1 %    ABS. NEUTROPHILS 3.7 1.8 - 8.0 K/UL    ABS. LYMPHOCYTES 0.2 (L) 0.8 - 3.5 K/UL    ABS. MONOCYTES 0.5 0.0 - 1.0 K/UL    ABS. EOSINOPHILS 0.0 0.0 - 0.4 K/UL    ABS. BASOPHILS 0.0 0.0 - 0.1 K/UL    DF SMEAR SCANNED      RBC COMMENTS MACROCYTOSIS  2+        RBC COMMENTS OVALOCYTES  PRESENT        RBC COMMENTS ANISOCYTOSIS  2+       METABOLIC PANEL, COMPREHENSIVE    Collection Time: 11/20/17  1:42 PM   Result Value Ref Range    Sodium 117 (LL) 136 - 145 mmol/L    Potassium 5.1 3.5 - 5.1 mmol/L    Chloride 87 (L) 97 - 108 mmol/L    CO2 21 21 - 32 mmol/L    Anion gap 9 5 - 15 mmol/L    Glucose 141 (H) 65 - 100 mg/dL    BUN 13 6 - 20 MG/DL    Creatinine 1.03 0.70 - 1.30 MG/DL    BUN/Creatinine ratio 13 12 - 20      GFR est AA >60 >60 ml/min/1.73m2    GFR est non-AA >60 >60 ml/min/1.73m2    Calcium 8.8 8.5 - 10.1 MG/DL    Bilirubin, total 6.3 (H) 0.2 - 1.0 MG/DL    ALT (SGPT) 51 12 - 78 U/L    AST (SGOT) 129 (H) 15 - 37 U/L    Alk. phosphatase 242 (H) 45 - 117 U/L    Protein, total 7.6 6.4 - 8.2 g/dL    Albumin 2.7 (L) 3.5 - 5.0 g/dL    Globulin 4.9 (H) 2.0 - 4.0 g/dL    A-G Ratio 0.6 (L) 1.1 - 2.2     PROTHROMBIN TIME + INR    Collection Time: 11/20/17  1:42 PM   Result Value Ref Range    INR 1.3 (H) 0.9 - 1.1      Prothrombin time 13.3 (H) 9.0 - 11.1 sec   PTT    Collection Time: 11/20/17  1:42 PM   Result Value Ref Range    aPTT 33.4 (H) 22.1 - 32.5 sec    aPTT, therapeutic range     58.0 - 77.0 SECS         Imaging:     Assessment:     Principal Problem:    Anasarca (11/20/2017)    Active Problems:    Hyponatremia (11/20/2017)           Plan:     1. Anasarca: This is secondary to decompensated liver cirrhosis, in the setting of chronic systolic heart failure. Diurese with Lasix 40 mg I.V BID, aldactone  Daily weight    2. Non ischemic cardiomyopathy: NYHA class 1/2  Last Echo 2/2015: Ef 20%.   S/p AICD  CXR done showed no acute findings. No pulmonary edema. No JVD  We will check Pro BNP. Diurese with Lasix 40 mg  I.V BID, aldactone. Continue metoprolol and entresto  Check 2 d echo to evaluate EF. Fluid water restriction 1.5 liters/ day. Daily weight    3. Alcoholic Liver Cirrhosis with large volume ascites: Decompensated  Cirrhosis: meld score 16  No evidence of hepatic encephalopathy. Resume aldactone. Continue Lasix  May benefit from paracentesis. Consult hepatology to evaluate- Dr Phill Zapata    4. Severe Hyponatremia: Secondary to hypervolemic state and beer potomania  Serum sodium of 117. Fluid restriction, lasix  Repeat BMP in AM    5. Hyperbilirubinemia: Likely due to Congestive heart failure in the setting of cirrhosis  Check direct bilirubin. 6. Thrombocytopenia: Secondary to liver cirrhosis  No evidence of bleeding. 7. Megaloblastic Anemia: Secondary to alcoholic abuse  Check serum vitamin b12 and folate  Monitor    8. Alcoholic liver hepatitis; AST > ALT (2>1)  Maddrey's dyscriminant function of 12. Will not benefit from corticosteriods  [facility]  Serial Monitoring    9. Paroxsymal Atrial fibrillation:   Patient is rate controlled and in SR. Continue amiodarone  Continue tele monitoring. Not on [address] due to bleeding risk    10. DM type 2; initiate ISS as per protocol  Check hbA1c    11. Alcohol Abuse with dependence; Patient is not in withdrawal.  Monitor for DT's. CIWA protocol with as needed ativan  Seizure precautions. Start thiamine 100 mg I.V daily for wernicke's Encephalopathy prophylaxis    12. DVT PPX: SCD    13. Dispo: Admit patient to telemetry. 12. Dispo:  Admit patient to tele       Signed By: Missy Santiago MD     November 20, 2017 induration    Assessment:        Hospital Problems           Last Modified POA    * (Principal) Sepsis due to COVID-19 [facility]) 4/20/2021 Yes    Obstructive sleep apnea 4/20/2021 Yes    Morbid obesity with BMI of 70 and over, adult ([facility] [address] 4/20/2021 Yes    Type 2 diabetes mellitus with stage 3a chronic kidney disease, without long-term current use of insulin ([facility] [address] 4/20/2021 Yes    SHA[facility]acute kidney injury) [facility] 4/20/2021 Yes    Essential hypertension 4/20/2021 Yes    Pneumonia due to COVID-19 virus 4/20/2021 Yes    Acute hypoxemic respiratory failure due to COVID-19 ([facility] 4/20/2021 Yes      markedly elevated d-dimer    Plan:        1. On decadron per protocol day 7/10   2. Received actemra 4/20  3. increased lantus for hyperglycemia due to steroids; will increase again  4. Wean bipap as able, but currently on 100% at 24/18  5. Patient declines intubation  6. Cont rocephin; completed zithromax  7. Monitor renal function: little better today  8. Agree with heparin drip given very high d-dimer;cannot go for cta chest due to shanae and resp status; dopplers negative  9. precedex drip for bipap tolerance  10. Dnrcca no intubation per patient wishes  6. D/w rn  12.  [facility] eval-family meeting tomorrow at 200; daughter aware of how ill she is and she is likely terminal    Skye Aguilera DO  4/26/2021  1:32 PM

## 2021-09-04 NOTE — ROUTINE PROCESS
TRANSFER - OUT REPORT:    Verbal report given to RN (name) on Janneth Carter  being transferred to [facility] (unit) for routine progression of care       Report consisted of patients Situation, Background, Assessment and   Recommendations(SBAR). Information from the following report(s) SBAR and ED Summary was reviewed with the receiving nurse. Lines:   Peripheral IV 04/19/18 Right Hand (Active)   Site Assessment Clean, dry, & intact 4/19/2018 10:46 AM   Phlebitis Assessment 0 4/19/2018 10:46 AM   Infiltration Assessment 0 4/19/2018 10:46 AM   Dressing Status Clean, dry, & intact 4/19/2018 10:46 AM        Opportunity for questions and clarification was provided.       Patient transported with:   [employer]
Name band;

## 2021-11-24 NOTE — DIABETES MGMT
DTC Progress Note    Recommendations/ Comments: chart reviewed for hyperglycemia. If appropriate, please consider:   Changing the insulin correction scale to normal sensitivity scale    Current hospital DM medication: Humalog for correction, high sensitivity scale     Chart reviewed on Beatriz Valencia. Patient is a 59 y.o. male with hx DM not on DM medications at home. Hx of CKD 4  A1c:   Lab Results   Component Value Date/Time    Hemoglobin A1c 5.4 07/16/2018 01:57 PM    Hemoglobin A1c 5.7 06/21/2018 04:30 PM       Recent Glucose Results:   Lab Results   Component Value Date/Time     (H) 08/06/2018 06:00 PM    GLUCPOC 316 (H) 08/07/2018 11:30 AM    GLUCPOC 201 (H) 08/07/2018 05:48 AM    GLUCPOC 224 (H) 08/06/2018 11:00 PM        Lab Results   Component Value Date/Time    Creatinine 1.92 (H) 08/06/2018 06:00 PM     Estimated Creatinine Clearance: 46 mL/min (based on Cr of 1.92). Active Orders   Diet    DIET RENAL Regular        PO intake: No data found. Will continue to follow as needed.     Thank you  Lindy Sheehan RD, [address] [facility]  Pager: [phone] (4) excellent

## 2022-01-01 NOTE — CDMP QUERY
#2  Please clarify if this patient is being treated/managed for:    =>Possible SIADH POA in the setting of severe hyponatremia requiring IV fluid bolus/lab monitoring   =>Other Explanation of clinical findings  =>Unable to Determine (no explanation of clinical findings)    The medical record reflects the following clinical findings, treatment, and risk factors:    Risk Factors: severe hyponatremia  Clinical Indicators: Na 118  Treatment:  IV fluid bolus/lab monitoring     Please clarify and document your clinical opinion in the progress notes and discharge summary including the definitive and/or presumptive diagnosis, (suspected or probable), related to the above clinical findings. Please include clinical findings supporting your diagnosis.     Thank you,         Asa Henao [address] [place] [address] Dr Jeffry Helm informed of desaturations noted with feeds. Explained that infant required pacing and pausing, self resolved but circumoral cyanosis noted x1 sat 78%. No other desats outside of feeding times.

## 2023-06-06 NOTE — PROGRESS NOTES
Bedside and Verbal shift change report given to [facility] and [address] [place] [address] (oncoming nurse) by Madhavi Moore RN (offgoing nurse). Report included the following information SBAR, Kardex, Procedure Summary, Intake/Output and MAR. Statement Selected

## 2023-07-06 NOTE — PROGRESS NOTES
Bedside shift change report given to [address] (oncoming nurse) by Glory Zheng RN (offgoing nurse). Report included the following information SBAR, Kardex, ED Summary, Procedure Summary, Intake/Output, MAR, Accordion and Recent Results. 63 yo M with  PMH of ICH (2021) s/p trach and PEG, HTN, HLD, T2DM, CAD s/p stents, Recurrent C diff, BIBEMS from [facility] for abnormal labs. Patient non-verbal at baseline - limited history. Per NH chart  have a BUN greater than 200 and creatinine of 4.3 on outpatient labs. Outpatient CXR also w/ new L sided pleural effusion. In ED, patient was hypotensive with Afib with RVR, found to be in acure renal failure, anemic with elevated trops. He was started on Amiodarone GTT, PPI GTT, gastric lavage with coffee ground secretions, started on broad spectrum abx and admitted to MICU  While in MICU, course complicated by DKA, s/p insulin drip, MICHELLE started on HD, acute anemia s/p PRBC transfusion, NSTEMI, downgraded to SDU -> floor. Pending TDC placement for discharge.    # Acute Renal Failure, started on HD 6/1  -Patient cleared by ID for TDC placement. Pending IR  # Mild Hyperkalemia  # Fluid Overload- Improving- c/w Bumex and HD  # HAGMA- resolved  - non oliguric, RBUS without hydro   - started on HD 6/1 via R IJ Tasha   - Nephro on board- cw HD  - s/p Midodrine- BP better, s/p Sodium Bicarb- acidosis improved  - TTE 6/1 - EF 63%, GIDD   - cw Lokelma and HD for high K- May need long term standing lokelma  -Bladder US: thick-walled bladder, no hydronephrosis, parenchymal disease      # Diarrhea   GI PCR -ve, C.diff -ve   cont dignishield     # chronic respiratory failure,   vent dependant via trach   trach care    # functional dysphagia,   sp peg   peg care     # Candiduria with U cx C tropicalis  # MDRO UTI and PNA-  s/p Fluconazole  s/p Ceftolozane-tazobactam    # Acute anemia  gatric lavage negative   resolved s/p Multiple transfusion    # Hyponatremia-resolved  -likely hypervolemic  -c/w bumex 2mg iv q12hr and HD per nephro    # Afib w/ RVR -   # Acute Decompensated CHF  # CAD s/p stents  # NSTEMI type 2  -now rate controlled- c/w Amio and Cardizem  -CHF resolved s/p diuresis  -Seen by cardio 5/31, recommend c/w asa and stating, medical management for NSTEMI    # DKA, resolved  -s/p insulin drip, now on basal/bolus and tube feeds  Monitor FS     # Hx ICH  # multiple sacral wounds  # functional paraplegia ,   Bedbound from NH  has upper ext contracture total care   c/w keppra 500mg BID for seizure prophylaxis  sacral and buttock wounds , un-stageable  Care instructions per wound care:   Clean sacrum , B/L buttock and upper back wound with vashe wound cleanser, pat dry then apply hydrogel with moist vashe guaze dressing  Q 12 hours   Continue skin barrier  to R heel - monitor progression  Pressure  injury  preventive  measures  Skin  and incontinence care         #Misc  -DVT prophylaxis: Heparin SQ  -GI prophylaxis: Protonix IV BID  -Diet: Tube feed  -Code status: DNR  -Activity: IAT  -Dispo: Vent unit- SNF when stable    Pending Nephro for long term HD then DC    #Hand off  -ID cleared the patient for TDC placement. F/u with IR  -f/u BMP

## 2023-07-12 NOTE — PROGRESS NOTES
[address]  Esther Cornelius MD, FACP, [address], [address] [facility]  Leon Borrego, MARTÍN Cunningham, KULDEEP SARKAR, ACNP-BC   Parth Ngo, MARTÍN Aldridge, MARTÍN   [address] [facility]  [address] [facility], [address] [address]  [place], [address]    [phone]    FAX: [phone] [facility]  [address]  [address] [facility], [address]  [place], [address] [facility]    [phone]    FAX: [phone]   Emma Gaona  HEPATOLOGY PROGRESS NOTE  The patient is a 59year old  male with cirrhosis secondary to [address] continued to use alcohol even after knowing he had cirrhosis for several years and finally became abstinent in 2/2018. Brenda Meadows has been hospitalized frequently in the past year for repeated bouts of HE.  This finally subsided with compliance of his medications and abstinence from alcohol. Brenda Meadows has developed refractory ascites and has required paracentesis every 2-4 weeks.        He had a routine paracentesis for refractory ascites on 8/2, which showed SBP. His last para on 8/7/18 still showed some WBC although not in the criteria for SBP.     He was hospitalized this time because of abdominal swelling, weakness, nausea and dry heaves without vomitus.     He is lethargic but awake and alert. Will pull the paracentesis catheter today and plan for DC Friday with assistance of Palliative Care to help manage him as OP and tehn transition to hospice once aspira catheter has been placed.      ASSESSMENT AND PLAN:   Cirrhosis  This is secondary to alcohol. He is not a candidate for liver transplant because of cardiomyopathy, LVEF of 40-50% and he has an implantable defibrillator. We discussed this with the patient.       Cardiomyopathy  ECHO shows LVEF of only 40-45%.     However, there Universal Protocol   Consent: Verbal consent obtained. Written consent obtained. Risks and benefits: risks, benefits and alternatives were discussed  Consent given by: patient  Patient understanding: patient states understanding of the procedure being performed  Patient consent: the patient's understanding of the procedure matches consent given  Procedure consent: procedure consent matches procedure scheduled  Relevant documents: relevant documents present and verified  Patient identity confirmed: verbally with patient        Chemodenervation     Date/Time 7/12/2023 8:00 AM     Performed by  Anup Smallwood by [address] details      Prepped With: Alcohol     Anesthesia  (see MAR for exact dosages):      Anesthesia method:  None   Procedure details     Position:  Upright   Botox     Botox Type:  Type A    Brand:  Botox    mL's of Botulinum Toxin:  155    mL's of preservative free sterile saline:  4    Final Concentration per CC:  50 units    Needle Gauge:  30 G 2.5 inch   Procedures     Botox Procedures: chronic headache      Indications: migraines      Date of last exam:  2/24/2023    Date of last injection:  4/10/2023   Injection Location      Head / Face:  L inferior cervical paraspinal, R superior cervical paraspinal, R inferior cervical paraspinal, L superior cervical paraspinal, L , R , R frontalis, L frontalis, R inferior occipitalis, L inferior occipitalis, R medial occipitalis, L lateral occipitalis, L medial occipitalis, R lateral occipitalis, procerus, R temporalis, L temporalis, R superior trapezius and L superior trapezius    L  injection amount:  5 unit(s)    R  injection amount:  5 unit(s)    L lateral frontalis:  5 unit(s)    R lateral frontalis:  5 unit(s)    L medial frontalis:  5 unit(s)    R medial frontalis:  5 unit(s)    L temporalis injection amount:  20 unit(s)    R temporalis injection amount:  20 unit(s)    Procerus injection amount:  5 unit(s)    L inferior occipitalis injection amount:  5 unit(s)    R inferior occipitalis injection amount:  5 unit(s)    L lateral occipitalis injection amount:  5 unit(s)    R lateral occipitalis injection amount:  5 unit(s)    L medial occipitalis injection amount:  5 unit(s)    R medial occipitalis injection amount:  5 unit(s)    L inferior cervical paraspinal injection amount:  5 unit(s)    R inferior cervical paraspinal injection amount:  5 unit(s)    L superior cervical paraspinal injection amount:  5 unit(s)    R superior cervical paraspinal injection amount:  5 unit(s)    L superior trapezius injection amount:  15 unit(s)    R superior trapezius injection amount:  15 unit(s)   Total Units     Total units used:  155    Total units discarded:  45   Post-procedure details      Chemodenervation:  Chronic migraine    Facial Nerve Location[de-identified]  Bilateral facial nerve    Patient tolerance of procedure: Tolerated well, no immediate complications   Comments      155 units administered following the PREEMPT protocol. 45 units wasted. is no evidence for RV dysfunction and TR. Nuclear stress test was negative for ischemia with EF 49%  The reduced LVEF is secondary to ETOH cardiomyopathy.      Alcohol abuse in remission  He has been abstinent from alcohol since 1/2018.        Ascites  He has required repeated paracentesis every 1-2 weeks for several months. We were going to place the aspria catheter but I do not want to do this now because there are to many WBC in the ascites and I do nto want to risk infection of the catheter. Will remove drainage catheter today and plan to DC in AM.  Will then place aspira catheter once WBC in ascites drops to nominal counts.      SBP  His last para had 1263 WBC. The one from 8/7/18 showed 151 cells 45% polys. He was on CFTX 1 gm Q24hr. Will switch over to PO cipro for 10 days and then Cipro every day once aspira catheter in place.      Hepatic encephalopathy  He is on lactulose TID and Xifaxan BID.    His protein is restricted to 1 item per day. He has had multiple admissions for HE.        LORENZA  Screat is down to 1.48.    Will treat with IV albumin 25 gm Q6H.       Hyponatremia  Secondary to cirrhosis and diuretics. NA was 129 in the ED. Will treat with IV albumin.      Anemia  Secondary to GI blood loss from portal HTN, bone marrow suppression from malnutrition and chronic disease. It is down to 7  today. Given his extreme fatigue, he may benefit from a unit of blood.       Thrombocytopenia  This is secondary to cirrhosis. No treatment needed.      End-of life Care  The plans appear to be finalized and in agreement with all parties. He will DC today with follow-up by Palliative Care home care. Once it is safe to place aspira catheter he will transition to hospice. I discussed this with wife yesterday by phone and agree with this plan.        PHYSICAL EXAMINATION:  VS: per nursing note  General:  No acute distress. Eyes:  Sclera anicteric. ENT:  No oral lesions.    Nodes:  No adenopathy. Skin:  Spider angiomata.  No jaundice. Respiratory:  Lungs clear to auscultation. Cardiovascular:  100% V-paced  Abdomen:  Tight non-tender, distended with obvious ascites. Catheter currently in place, clamped.   Extremities:  No lower extremity edema.  BKA right leg.  Muscle wasting.    Neurologic:  Alert and oriented, but can barely keep his eyes open. Cranial nerves grossly intact.  No asterixis.      LABORATORY:  Results for Faizan Garg (MRN [id number]) as of 8/10/2018 07:08   Ref. Range 8/6/2018 18:00 8/7/2018 03:05 8/8/2018 03:57 8/9/2018 04:19   WBC Latest Ref Range: 4.1 - 11.1 K/uL 7.7 4.8 3.0 (L) 3.1 (L)   HGB Latest Ref Range: 12.1 - 17.0 g/dL 9.8 (L) 8.4 (L) 7.0 (L) 7.3 (L)   PLATELET Latest Ref Range: 150 - 400 K/uL 100 (L) 51 (L) 56 (L) 57 (L)   INR Latest Ref Range: 0.9 - 1.1    1.3 (H) 1.4 (H)    Sodium Latest Ref Range: 136 - 145 mmol/L 125 (L)  126 (L) 125 (L)   Potassium Latest Ref Range: 3.5 - 5.1 mmol/L 5.5 (H)  5.1 4.8   Chloride Latest Ref Range: 97 - 108 mmol/L 95 (L)  95 (L) 92 (L)   CO2 Latest Ref Range: 21 - 32 mmol/L 19 (L)  19 (L) 20 (L)   Glucose Latest Ref Range: 65 - 100 mg/dL 182 (H)  198 (H) 205 (H)   BUN Latest Ref Range: 6 - 20 MG/DL 43 (H)  32 (H) 26 (H)   Creatinine Latest Ref Range: 0.70 - 1.30 MG/DL 1.92 (H)  1.69 (H) 1.48 (H)   Bilirubin, total Latest Ref Range: 0.2 - 1.0 MG/DL 2.2 (H)  2.1 (H) 1.9 (H)   Albumin Latest Ref Range: 3.5 - 5.0 g/dL 2.9 (L)  3.6 3.5   ALT (SGPT) Latest Ref Range: 12 - 78 U/L 52  37 41   AST Latest Ref Range: 15 - 37 U/L 54 (H)  37 44 (H)   Alk.  phosphatase Latest Ref Range: 45 - 117 U/L 279 (H)  132 (H) 152 (H)   Ammonia Latest Ref Range: <32 UMOL/L 227 (H) 52 (H)  103 (H)       Heaven Flores MD  [address] [place] [address] Blaze Trevizo [address] [place], [place] [address]. [phone]

## 2023-12-13 NOTE — ROUTINE PROCESS
TRANSFER - OUT REPORT:    Verbal report given to THE [facility]) on Josemanuel Cage  being transferred to (unit) for routine progression of care       Report consisted of patients Situation, Background, Assessment and   Recommendations(SBAR). Information from the following report(s) SBAR, Kardex, ED Summary and MAR was reviewed with the receiving nurse. Lines:   Peripheral IV 12/06/17 Left; Lower Forearm (Active)   Site Assessment Clean, dry, & intact 12/6/2017  4:36 PM   Phlebitis Assessment 0 12/6/2017  4:36 PM   Infiltration Assessment 0 12/6/2017  4:36 PM   Dressing Status Clean, dry, & intact 12/6/2017  4:36 PM       Peripheral IV 12/06/17 Left;Upper Forearm (Active)   Site Assessment Clean, dry, & intact 12/6/2017  6:21 PM   Phlebitis Assessment 0 12/6/2017  6:21 PM   Infiltration Assessment 0 12/6/2017  6:21 PM   Dressing Status Clean, dry, & intact 12/6/2017  6:21 PM        Opportunity for questions and clarification was provided.       Patient transported with:   [employer] Given HPI, PMH, PE, w/up and ED course admission/ OBS not indicated. Consideration of Admission/Observation

## 2024-01-19 NOTE — PROGRESS NOTES
Bedside shift change report given to Reynold Navarro (oncoming nurse) by Aurelio Alvarez (offgoing nurse). Report included the following information SBAR and Kardex. weakness cp

## 2025-01-23 NOTE — PROGRESS NOTES
Results reviewed with Dr. Grzegorz Cummins. Recommendations documented. Detail Level: Zone Detail Level: Generalized Detail Level: Detailed

## (undated) DEVICE — KENDALL RADIOLUCENT FOAM MONITORING ELECTRODE -RECTANGULAR SHAPE: Brand: KENDALL

## (undated) DEVICE — BW-412T DISP COMBO CLEANING BRUSH: Brand: SINGLE USE COMBINATION CLEANING BRUSH

## (undated) DEVICE — SET EXTN TBNG L BOR 4 W STPCOCK ST 32IN PRIMING VOL 6ML

## (undated) DEVICE — SET ADMIN 16ML TBNG L100IN 2 Y INJ SITE IV PIGGY BK DISP

## (undated) DEVICE — BITE BLK ENDOSCP AD 54FR GRN POLYETH ENDOSCP W STRP SLD

## (undated) DEVICE — SINGLE USE LIGATING DEVICE: Brand: SINGLE USE LIGATING DEVICE

## (undated) DEVICE — MULTIPLE BAND LIGATOR: Brand: SPEEDBAND SUPERVIEW SUPER 7

## (undated) DEVICE — Z DISCONTINUED NO SUB IDED SET EXTN W/ 4 W STPCOCK M SPIN LOK 36IN

## (undated) DEVICE — CATH IV AUTOGRD BC BLU 22GA 25 -- INSYTE

## (undated) DEVICE — CANN NASAL O2 CAPNOGRAPHY AD -- FILTERLINE

## (undated) DEVICE — BAG BELONG PT PERS CLEAR HANDL

## (undated) DEVICE — ENDO CARRY-ON PROCEDURE KIT INCLUDES ENZYMATIC SPONGE, GAUZE, BIOHAZARD LABEL, TRAY, LUBRICANT, DIRTY SCOPE LABEL, WATER LABEL, TRAY, DRAWSTRING PAD, AND DEFENDO 4-PIECE KIT.: Brand: ENDO CARRY-ON PROCEDURE KIT

## (undated) DEVICE — SYRINGE MED 20ML STD CLR PLAS LUERLOCK TIP N CTRL DISP

## (undated) DEVICE — NEEDLE HYPO 18GA L1.5IN PNK S STL HUB POLYPR SHLD REG BVL

## (undated) DEVICE — KIT IV STRT W CHLORAPREP PD 1ML

## (undated) DEVICE — 1200 GUARD II KIT W/5MM TUBE W/O VAC TUBE: Brand: GUARDIAN

## (undated) DEVICE — FORCEPS BX L240CM JAW DIA2.8MM L CAP W/ NDL MIC MESH TOOTH

## (undated) DEVICE — NEONATAL-ADULT SPO2 SENSOR: Brand: NELLCOR

## (undated) DEVICE — BAG SPEC BIOHZD LF 2MIL 6X10IN -- CONVERT TO ITEM 357326

## (undated) DEVICE — SOLIDIFIER FLUID 3000 CC ABSORB

## (undated) DEVICE — CONTAINER SPEC 20 ML LID NEUT BUFF FORMALIN 10 % POLYPR STS

## (undated) DEVICE — Z CONVERTED USE 2274299 CUFF BLD PRESSURE LNG MED AD 25-35 CM ARM FLEXIPORT DISP